# Patient Record
Sex: FEMALE | Race: OTHER | URBAN - METROPOLITAN AREA
[De-identification: names, ages, dates, MRNs, and addresses within clinical notes are randomized per-mention and may not be internally consistent; named-entity substitution may affect disease eponyms.]

---

## 2019-06-01 ENCOUNTER — OUTPATIENT (OUTPATIENT)
Dept: OUTPATIENT SERVICES | Facility: HOSPITAL | Age: 39
LOS: 1 days | End: 2019-06-01
Payer: MEDICAID

## 2019-06-01 PROCEDURE — G9001: CPT

## 2019-06-14 DIAGNOSIS — Z71.89 OTHER SPECIFIED COUNSELING: ICD-10-CM

## 2023-05-23 ENCOUNTER — INPATIENT (INPATIENT)
Facility: HOSPITAL | Age: 43
LOS: 54 days | Discharge: HOME HEALTH | DRG: 5 | End: 2023-07-17
Attending: INTERNAL MEDICINE | Admitting: INTERNAL MEDICINE
Payer: MEDICAID

## 2023-05-23 VITALS
OXYGEN SATURATION: 98 % | TEMPERATURE: 101 F | SYSTOLIC BLOOD PRESSURE: 141 MMHG | DIASTOLIC BLOOD PRESSURE: 87 MMHG | HEART RATE: 141 BPM | HEIGHT: 68 IN | WEIGHT: 207.9 LBS | RESPIRATION RATE: 28 BRPM

## 2023-05-23 DIAGNOSIS — Z78.1 PHYSICAL RESTRAINT STATUS: ICD-10-CM

## 2023-05-23 DIAGNOSIS — J10.01 INFLUENZA DUE TO OTHER IDENTIFIED INFLUENZA VIRUS WITH THE SAME OTHER IDENTIFIED INFLUENZA VIRUS PNEUMONIA: ICD-10-CM

## 2023-05-23 DIAGNOSIS — E87.0 HYPEROSMOLALITY AND HYPERNATREMIA: ICD-10-CM

## 2023-05-23 DIAGNOSIS — R45.1 RESTLESSNESS AND AGITATION: ICD-10-CM

## 2023-05-23 DIAGNOSIS — Z28.310 UNVACCINATED FOR COVID-19: ICD-10-CM

## 2023-05-23 DIAGNOSIS — Z72.0 TOBACCO USE: ICD-10-CM

## 2023-05-23 DIAGNOSIS — J15.211 PNEUMONIA DUE TO METHICILLIN SUSCEPTIBLE STAPHYLOCOCCUS AUREUS: ICD-10-CM

## 2023-05-23 DIAGNOSIS — I10 ESSENTIAL (PRIMARY) HYPERTENSION: ICD-10-CM

## 2023-05-23 DIAGNOSIS — J45.909 UNSPECIFIED ASTHMA, UNCOMPLICATED: ICD-10-CM

## 2023-05-23 DIAGNOSIS — Z28.39 OTHER UNDERIMMUNIZATION STATUS: ICD-10-CM

## 2023-05-23 DIAGNOSIS — J96.01 ACUTE RESPIRATORY FAILURE WITH HYPOXIA: ICD-10-CM

## 2023-05-23 DIAGNOSIS — D69.6 THROMBOCYTOPENIA, UNSPECIFIED: ICD-10-CM

## 2023-05-23 DIAGNOSIS — R65.21 SEVERE SEPSIS WITH SEPTIC SHOCK: ICD-10-CM

## 2023-05-23 DIAGNOSIS — R74.01 ELEVATION OF LEVELS OF LIVER TRANSAMINASE LEVELS: ICD-10-CM

## 2023-05-23 DIAGNOSIS — F41.9 ANXIETY DISORDER, UNSPECIFIED: ICD-10-CM

## 2023-05-23 DIAGNOSIS — E87.1 HYPO-OSMOLALITY AND HYPONATREMIA: ICD-10-CM

## 2023-05-23 DIAGNOSIS — A41.01 SEPSIS DUE TO METHICILLIN SUSCEPTIBLE STAPHYLOCOCCUS AUREUS: ICD-10-CM

## 2023-05-23 DIAGNOSIS — E87.20 ACIDOSIS, UNSPECIFIED: ICD-10-CM

## 2023-05-23 DIAGNOSIS — J18.9 PNEUMONIA, UNSPECIFIED ORGANISM: ICD-10-CM

## 2023-05-23 DIAGNOSIS — D63.8 ANEMIA IN OTHER CHRONIC DISEASES CLASSIFIED ELSEWHERE: ICD-10-CM

## 2023-05-23 DIAGNOSIS — N17.9 ACUTE KIDNEY FAILURE, UNSPECIFIED: ICD-10-CM

## 2023-05-23 DIAGNOSIS — E87.5 HYPERKALEMIA: ICD-10-CM

## 2023-05-23 DIAGNOSIS — R04.2 HEMOPTYSIS: ICD-10-CM

## 2023-05-23 LAB
ALBUMIN SERPL ELPH-MCNC: 4.5 G/DL — SIGNIFICANT CHANGE UP (ref 3.5–5.2)
ALP SERPL-CCNC: 125 U/L — HIGH (ref 30–115)
ALT FLD-CCNC: 27 U/L — SIGNIFICANT CHANGE UP (ref 0–41)
ANION GAP SERPL CALC-SCNC: 17 MMOL/L — HIGH (ref 7–14)
ANION GAP SERPL CALC-SCNC: 23 MMOL/L — HIGH (ref 7–14)
ANISOCYTOSIS BLD QL: SIGNIFICANT CHANGE UP
APTT BLD: 31 SEC — SIGNIFICANT CHANGE UP (ref 27–39.2)
AST SERPL-CCNC: 62 U/L — HIGH (ref 0–41)
BASE EXCESS BLDV CALC-SCNC: -5.9 MMOL/L — LOW (ref -2–3)
BASOPHILS # BLD AUTO: 0 K/UL — SIGNIFICANT CHANGE UP (ref 0–0.2)
BASOPHILS NFR BLD AUTO: 0 % — SIGNIFICANT CHANGE UP (ref 0–1)
BILIRUB SERPL-MCNC: 0.3 MG/DL — SIGNIFICANT CHANGE UP (ref 0.2–1.2)
BLD GP AB SCN SERPL QL: SIGNIFICANT CHANGE UP
BUN SERPL-MCNC: 23 MG/DL — HIGH (ref 10–20)
BUN SERPL-MCNC: 24 MG/DL — HIGH (ref 10–20)
CA-I SERPL-SCNC: 1.21 MMOL/L — SIGNIFICANT CHANGE UP (ref 1.15–1.33)
CALCIUM SERPL-MCNC: 8.7 MG/DL — SIGNIFICANT CHANGE UP (ref 8.4–10.5)
CALCIUM SERPL-MCNC: 9.5 MG/DL — SIGNIFICANT CHANGE UP (ref 8.4–10.5)
CHLORIDE SERPL-SCNC: 97 MMOL/L — LOW (ref 98–110)
CHLORIDE SERPL-SCNC: 98 MMOL/L — SIGNIFICANT CHANGE UP (ref 98–110)
CO2 SERPL-SCNC: 16 MMOL/L — LOW (ref 17–32)
CO2 SERPL-SCNC: 19 MMOL/L — SIGNIFICANT CHANGE UP (ref 17–32)
CREAT SERPL-MCNC: 1.9 MG/DL — HIGH (ref 0.7–1.5)
CREAT SERPL-MCNC: 2 MG/DL — HIGH (ref 0.7–1.5)
EGFR: 31 ML/MIN/1.73M2 — LOW
EGFR: 33 ML/MIN/1.73M2 — LOW
ELLIPTOCYTES BLD QL SMEAR: SLIGHT — SIGNIFICANT CHANGE UP
EOSINOPHIL # BLD AUTO: 0 K/UL — SIGNIFICANT CHANGE UP (ref 0–0.7)
EOSINOPHIL NFR BLD AUTO: 0 % — SIGNIFICANT CHANGE UP (ref 0–8)
FLUAV AG NPH QL: SIGNIFICANT CHANGE UP
FLUBV AG NPH QL: DETECTED
GAS PNL BLDV: 132 MMOL/L — LOW (ref 136–145)
GAS PNL BLDV: SIGNIFICANT CHANGE UP
GAS PNL BLDV: SIGNIFICANT CHANGE UP
GIANT PLATELETS BLD QL SMEAR: PRESENT — SIGNIFICANT CHANGE UP
GLUCOSE SERPL-MCNC: 129 MG/DL — HIGH (ref 70–99)
GLUCOSE SERPL-MCNC: 155 MG/DL — HIGH (ref 70–99)
HCG SERPL QL: NEGATIVE — SIGNIFICANT CHANGE UP
HCO3 BLDV-SCNC: 20 MMOL/L — LOW (ref 22–29)
HCT VFR BLD CALC: 39.1 % — SIGNIFICANT CHANGE UP (ref 37–47)
HCT VFR BLDA CALC: 35 % — LOW (ref 39–51)
HGB BLD CALC-MCNC: 11.7 G/DL — LOW (ref 12.6–17.4)
HGB BLD-MCNC: 11.4 G/DL — LOW (ref 12–16)
HYPOCHROMIA BLD QL: SIGNIFICANT CHANGE UP
INR BLD: 1.21 RATIO — SIGNIFICANT CHANGE UP (ref 0.65–1.3)
LACTATE BLDV-MCNC: 4.4 MMOL/L — CRITICAL HIGH (ref 0.5–2)
LACTATE SERPL-SCNC: 3.9 MMOL/L — HIGH (ref 0.7–2)
LACTATE SERPL-SCNC: 4.9 MMOL/L — CRITICAL HIGH (ref 0.7–2)
LYMPHOCYTES # BLD AUTO: 0 % — LOW (ref 20.5–51.1)
LYMPHOCYTES # BLD AUTO: 0 K/UL — LOW (ref 1.2–3.4)
MAGNESIUM SERPL-MCNC: 1.6 MG/DL — LOW (ref 1.8–2.4)
MANUAL SMEAR VERIFICATION: SIGNIFICANT CHANGE UP
MCHC RBC-ENTMCNC: 18 PG — LOW (ref 27–31)
MCHC RBC-ENTMCNC: 29.2 G/DL — LOW (ref 32–37)
MCV RBC AUTO: 61.6 FL — LOW (ref 81–99)
MICROCYTES BLD QL: SIGNIFICANT CHANGE UP
MONOCYTES # BLD AUTO: 0.59 K/UL — SIGNIFICANT CHANGE UP (ref 0.1–0.6)
MONOCYTES NFR BLD AUTO: 3.5 % — SIGNIFICANT CHANGE UP (ref 1.7–9.3)
NEUTROPHILS # BLD AUTO: 16.13 K/UL — HIGH (ref 1.4–6.5)
NEUTROPHILS NFR BLD AUTO: 84.3 % — HIGH (ref 42.2–75.2)
NEUTS BAND # BLD: 12.2 % — HIGH (ref 0–6)
NRBC # BLD: 2 /100 — HIGH (ref 0–0)
NRBC # BLD: SIGNIFICANT CHANGE UP /100 WBCS (ref 0–0)
PCO2 BLDV: 41 MMHG — SIGNIFICANT CHANGE UP (ref 39–42)
PH BLDV: 7.3 — LOW (ref 7.32–7.43)
PLAT MORPH BLD: NORMAL — SIGNIFICANT CHANGE UP
PLATELET # BLD AUTO: 253 K/UL — SIGNIFICANT CHANGE UP (ref 130–400)
PMV BLD: SIGNIFICANT CHANGE UP (ref 7.4–10.4)
PO2 BLDV: 28 MMHG — SIGNIFICANT CHANGE UP
POIKILOCYTOSIS BLD QL AUTO: SIGNIFICANT CHANGE UP
POLYCHROMASIA BLD QL SMEAR: SIGNIFICANT CHANGE UP
POTASSIUM BLDV-SCNC: 2.9 MMOL/L — CRITICAL LOW (ref 3.5–5.1)
POTASSIUM SERPL-MCNC: 2.9 MMOL/L — LOW (ref 3.5–5)
POTASSIUM SERPL-MCNC: 3.3 MMOL/L — LOW (ref 3.5–5)
POTASSIUM SERPL-SCNC: 2.9 MMOL/L — LOW (ref 3.5–5)
POTASSIUM SERPL-SCNC: 3.3 MMOL/L — LOW (ref 3.5–5)
PROT SERPL-MCNC: 7.7 G/DL — SIGNIFICANT CHANGE UP (ref 6–8)
PROTHROM AB SERPL-ACNC: 13.9 SEC — HIGH (ref 9.95–12.87)
RBC # BLD: 6.35 M/UL — HIGH (ref 4.2–5.4)
RBC # FLD: 21.7 % — HIGH (ref 11.5–14.5)
RBC BLD AUTO: ABNORMAL
RSV RNA NPH QL NAA+NON-PROBE: SIGNIFICANT CHANGE UP
SAO2 % BLDV: 34.5 % — SIGNIFICANT CHANGE UP
SARS-COV-2 RNA SPEC QL NAA+PROBE: SIGNIFICANT CHANGE UP
SODIUM SERPL-SCNC: 134 MMOL/L — LOW (ref 135–146)
SODIUM SERPL-SCNC: 136 MMOL/L — SIGNIFICANT CHANGE UP (ref 135–146)
TARGETS BLD QL SMEAR: SLIGHT — SIGNIFICANT CHANGE UP
TROPONIN T SERPL-MCNC: <0.01 NG/ML — SIGNIFICANT CHANGE UP
WBC # BLD: 16.72 K/UL — HIGH (ref 4.8–10.8)
WBC # FLD AUTO: 16.72 K/UL — HIGH (ref 4.8–10.8)

## 2023-05-23 PROCEDURE — 85730 THROMBOPLASTIN TIME PARTIAL: CPT

## 2023-05-23 PROCEDURE — 86900 BLOOD TYPING SEROLOGIC ABO: CPT

## 2023-05-23 PROCEDURE — 70450 CT HEAD/BRAIN W/O DYE: CPT

## 2023-05-23 PROCEDURE — 84100 ASSAY OF PHOSPHORUS: CPT

## 2023-05-23 PROCEDURE — 86147 CARDIOLIPIN ANTIBODY EA IG: CPT

## 2023-05-23 PROCEDURE — 86922 COMPATIBILITY TEST ANTIGLOB: CPT

## 2023-05-23 PROCEDURE — 71045 X-RAY EXAM CHEST 1 VIEW: CPT

## 2023-05-23 PROCEDURE — 80048 BASIC METABOLIC PNL TOTAL CA: CPT

## 2023-05-23 PROCEDURE — 87077 CULTURE AEROBIC IDENTIFY: CPT

## 2023-05-23 PROCEDURE — 87205 SMEAR GRAM STAIN: CPT

## 2023-05-23 PROCEDURE — 84295 ASSAY OF SERUM SODIUM: CPT

## 2023-05-23 PROCEDURE — 80074 ACUTE HEPATITIS PANEL: CPT

## 2023-05-23 PROCEDURE — 94002 VENT MGMT INPAT INIT DAY: CPT

## 2023-05-23 PROCEDURE — 85045 AUTOMATED RETICULOCYTE COUNT: CPT

## 2023-05-23 PROCEDURE — 93312 ECHO TRANSESOPHAGEAL: CPT

## 2023-05-23 PROCEDURE — 36430 TRANSFUSION BLD/BLD COMPNT: CPT

## 2023-05-23 PROCEDURE — P9040: CPT

## 2023-05-23 PROCEDURE — 97116 GAIT TRAINING THERAPY: CPT | Mod: GP

## 2023-05-23 PROCEDURE — 86022 PLATELET ANTIBODIES: CPT

## 2023-05-23 PROCEDURE — 87186 SC STD MICRODIL/AGAR DIL: CPT

## 2023-05-23 PROCEDURE — 82550 ASSAY OF CK (CPK): CPT

## 2023-05-23 PROCEDURE — 80361 OPIATES 1 OR MORE: CPT

## 2023-05-23 PROCEDURE — 80053 COMPREHEN METABOLIC PANEL: CPT

## 2023-05-23 PROCEDURE — 83735 ASSAY OF MAGNESIUM: CPT

## 2023-05-23 PROCEDURE — 82553 CREATINE MB FRACTION: CPT

## 2023-05-23 PROCEDURE — 94760 N-INVAS EAR/PLS OXIMETRY 1: CPT

## 2023-05-23 PROCEDURE — 87640 STAPH A DNA AMP PROBE: CPT

## 2023-05-23 PROCEDURE — 84478 ASSAY OF TRIGLYCERIDES: CPT

## 2023-05-23 PROCEDURE — 92611 MOTION FLUOROSCOPY/SWALLOW: CPT | Mod: GN

## 2023-05-23 PROCEDURE — 83935 ASSAY OF URINE OSMOLALITY: CPT

## 2023-05-23 PROCEDURE — 82570 ASSAY OF URINE CREATININE: CPT

## 2023-05-23 PROCEDURE — 87449 NOS EACH ORGANISM AG IA: CPT

## 2023-05-23 PROCEDURE — 86160 COMPLEMENT ANTIGEN: CPT

## 2023-05-23 PROCEDURE — 86901 BLOOD TYPING SEROLOGIC RH(D): CPT

## 2023-05-23 PROCEDURE — 86850 RBC ANTIBODY SCREEN: CPT

## 2023-05-23 PROCEDURE — 87529 HSV DNA AMP PROBE: CPT

## 2023-05-23 PROCEDURE — 89051 BODY FLUID CELL COUNT: CPT

## 2023-05-23 PROCEDURE — 93320 DOPPLER ECHO COMPLETE: CPT

## 2023-05-23 PROCEDURE — 93010 ELECTROCARDIOGRAM REPORT: CPT

## 2023-05-23 PROCEDURE — 71250 CT THORAX DX C-: CPT

## 2023-05-23 PROCEDURE — 94003 VENT MGMT INPAT SUBQ DAY: CPT

## 2023-05-23 PROCEDURE — 97112 NEUROMUSCULAR REEDUCATION: CPT | Mod: GO

## 2023-05-23 PROCEDURE — 71045 X-RAY EXAM CHEST 1 VIEW: CPT | Mod: 26

## 2023-05-23 PROCEDURE — 83605 ASSAY OF LACTIC ACID: CPT

## 2023-05-23 PROCEDURE — 85025 COMPLETE CBC W/AUTO DIFF WBC: CPT

## 2023-05-23 PROCEDURE — 93970 EXTREMITY STUDY: CPT

## 2023-05-23 PROCEDURE — 94640 AIRWAY INHALATION TREATMENT: CPT

## 2023-05-23 PROCEDURE — 84156 ASSAY OF PROTEIN URINE: CPT

## 2023-05-23 PROCEDURE — 92523 SPEECH SOUND LANG COMPREHEN: CPT | Mod: GN

## 2023-05-23 PROCEDURE — 85610 PROTHROMBIN TIME: CPT

## 2023-05-23 PROCEDURE — 86146 BETA-2 GLYCOPROTEIN ANTIBODY: CPT

## 2023-05-23 PROCEDURE — 80202 ASSAY OF VANCOMYCIN: CPT

## 2023-05-23 PROCEDURE — 84133 ASSAY OF URINE POTASSIUM: CPT

## 2023-05-23 PROCEDURE — 86255 FLUORESCENT ANTIBODY SCREEN: CPT

## 2023-05-23 PROCEDURE — 83036 HEMOGLOBIN GLYCOSYLATED A1C: CPT

## 2023-05-23 PROCEDURE — 92610 EVALUATE SWALLOWING FUNCTION: CPT | Mod: GN

## 2023-05-23 PROCEDURE — 80354 DRUG SCREENING FENTANYL: CPT

## 2023-05-23 PROCEDURE — 86923 COMPATIBILITY TEST ELECTRIC: CPT

## 2023-05-23 PROCEDURE — 84145 PROCALCITONIN (PCT): CPT

## 2023-05-23 PROCEDURE — 84480 ASSAY TRIIODOTHYRONINE (T3): CPT

## 2023-05-23 PROCEDURE — 97166 OT EVAL MOD COMPLEX 45 MIN: CPT | Mod: GO

## 2023-05-23 PROCEDURE — 81025 URINE PREGNANCY TEST: CPT

## 2023-05-23 PROCEDURE — 87305 ASPERGILLUS AG IA: CPT

## 2023-05-23 PROCEDURE — 71275 CT ANGIOGRAPHY CHEST: CPT | Mod: 26,MA

## 2023-05-23 PROCEDURE — 74018 RADEX ABDOMEN 1 VIEW: CPT

## 2023-05-23 PROCEDURE — 85027 COMPLETE CBC AUTOMATED: CPT

## 2023-05-23 PROCEDURE — 84132 ASSAY OF SERUM POTASSIUM: CPT

## 2023-05-23 PROCEDURE — 85018 HEMOGLOBIN: CPT

## 2023-05-23 PROCEDURE — 97535 SELF CARE MNGMENT TRAINING: CPT | Mod: GO

## 2023-05-23 PROCEDURE — 87641 MR-STAPH DNA AMP PROBE: CPT

## 2023-05-23 PROCEDURE — 76770 US EXAM ABDO BACK WALL COMP: CPT

## 2023-05-23 PROCEDURE — 36415 COLL VENOUS BLD VENIPUNCTURE: CPT

## 2023-05-23 PROCEDURE — 87798 DETECT AGENT NOS DNA AMP: CPT

## 2023-05-23 PROCEDURE — 87116 MYCOBACTERIA CULTURE: CPT

## 2023-05-23 PROCEDURE — 85384 FIBRINOGEN ACTIVITY: CPT

## 2023-05-23 PROCEDURE — 82330 ASSAY OF CALCIUM: CPT

## 2023-05-23 PROCEDURE — 99285 EMERGENCY DEPT VISIT HI MDM: CPT

## 2023-05-23 PROCEDURE — 82803 BLOOD GASES ANY COMBINATION: CPT

## 2023-05-23 PROCEDURE — 85014 HEMATOCRIT: CPT

## 2023-05-23 PROCEDURE — 93005 ELECTROCARDIOGRAM TRACING: CPT

## 2023-05-23 PROCEDURE — L8699: CPT

## 2023-05-23 PROCEDURE — 85379 FIBRIN DEGRADATION QUANT: CPT

## 2023-05-23 PROCEDURE — 93325 DOPPLER ECHO COLOR FLOW MAPG: CPT

## 2023-05-23 PROCEDURE — 84484 ASSAY OF TROPONIN QUANT: CPT

## 2023-05-23 PROCEDURE — 87075 CULTR BACTERIA EXCEPT BLOOD: CPT

## 2023-05-23 PROCEDURE — 83540 ASSAY OF IRON: CPT

## 2023-05-23 PROCEDURE — 80307 DRUG TEST PRSMV CHEM ANLYZR: CPT

## 2023-05-23 PROCEDURE — 84540 ASSAY OF URINE/UREA-N: CPT

## 2023-05-23 PROCEDURE — 87040 BLOOD CULTURE FOR BACTERIA: CPT

## 2023-05-23 PROCEDURE — 84300 ASSAY OF URINE SODIUM: CPT

## 2023-05-23 PROCEDURE — 83550 IRON BINDING TEST: CPT

## 2023-05-23 PROCEDURE — 97110 THERAPEUTIC EXERCISES: CPT | Mod: GP

## 2023-05-23 PROCEDURE — 87102 FUNGUS ISOLATION CULTURE: CPT

## 2023-05-23 PROCEDURE — 86038 ANTINUCLEAR ANTIBODIES: CPT

## 2023-05-23 PROCEDURE — 86480 TB TEST CELL IMMUN MEASURE: CPT

## 2023-05-23 PROCEDURE — 82784 ASSAY IGA/IGD/IGG/IGM EACH: CPT

## 2023-05-23 PROCEDURE — 92612 ENDOSCOPY SWALLOW (FEES) VID: CPT | Mod: GN

## 2023-05-23 PROCEDURE — 97163 PT EVAL HIGH COMPLEX 45 MIN: CPT | Mod: GP

## 2023-05-23 PROCEDURE — 86431 RHEUMATOID FACTOR QUANT: CPT

## 2023-05-23 PROCEDURE — 76705 ECHO EXAM OF ABDOMEN: CPT

## 2023-05-23 PROCEDURE — 97530 THERAPEUTIC ACTIVITIES: CPT | Mod: GO

## 2023-05-23 PROCEDURE — 82728 ASSAY OF FERRITIN: CPT

## 2023-05-23 PROCEDURE — 84439 ASSAY OF FREE THYROXINE: CPT

## 2023-05-23 PROCEDURE — 87206 SMEAR FLUORESCENT/ACID STAI: CPT

## 2023-05-23 PROCEDURE — 87899 AGENT NOS ASSAY W/OPTIC: CPT

## 2023-05-23 PROCEDURE — 80076 HEPATIC FUNCTION PANEL: CPT

## 2023-05-23 PROCEDURE — 87070 CULTURE OTHR SPECIMN AEROBIC: CPT

## 2023-05-23 PROCEDURE — P9016: CPT

## 2023-05-23 PROCEDURE — 74176 CT ABD & PELVIS W/O CONTRAST: CPT

## 2023-05-23 PROCEDURE — 82977 ASSAY OF GGT: CPT

## 2023-05-23 PROCEDURE — 87385 HISTOPLASMA CAPSUL AG IA: CPT

## 2023-05-23 PROCEDURE — 82962 GLUCOSE BLOOD TEST: CPT

## 2023-05-23 PROCEDURE — 83516 IMMUNOASSAY NONANTIBODY: CPT

## 2023-05-23 PROCEDURE — 87086 URINE CULTURE/COLONY COUNT: CPT

## 2023-05-23 PROCEDURE — 81001 URINALYSIS AUTO W/SCOPE: CPT

## 2023-05-23 PROCEDURE — 87389 HIV-1 AG W/HIV-1&-2 AB AG IA: CPT

## 2023-05-23 PROCEDURE — 87015 SPECIMEN INFECT AGNT CONCNTJ: CPT

## 2023-05-23 PROCEDURE — 86225 DNA ANTIBODY NATIVE: CPT

## 2023-05-23 PROCEDURE — 93306 TTE W/DOPPLER COMPLETE: CPT

## 2023-05-23 PROCEDURE — 92526 ORAL FUNCTION THERAPY: CPT | Mod: GN

## 2023-05-23 PROCEDURE — C1769: CPT

## 2023-05-23 RX ORDER — POTASSIUM CHLORIDE 20 MEQ
20 PACKET (EA) ORAL ONCE
Refills: 0 | Status: COMPLETED | OUTPATIENT
Start: 2023-05-23 | End: 2023-05-23

## 2023-05-23 RX ORDER — GUAIFENESIN/DEXTROMETHORPHAN 600MG-30MG
10 TABLET, EXTENDED RELEASE 12 HR ORAL ONCE
Refills: 0 | Status: COMPLETED | OUTPATIENT
Start: 2023-05-23 | End: 2023-05-23

## 2023-05-23 RX ORDER — MAGNESIUM SULFATE 500 MG/ML
2 VIAL (ML) INJECTION ONCE
Refills: 0 | Status: COMPLETED | OUTPATIENT
Start: 2023-05-23 | End: 2023-05-23

## 2023-05-23 RX ORDER — CEFTRIAXONE 500 MG/1
1000 INJECTION, POWDER, FOR SOLUTION INTRAMUSCULAR; INTRAVENOUS ONCE
Refills: 0 | Status: COMPLETED | OUTPATIENT
Start: 2023-05-23 | End: 2023-05-23

## 2023-05-23 RX ORDER — SODIUM CHLORIDE 9 MG/ML
2000 INJECTION, SOLUTION INTRAVENOUS ONCE
Refills: 0 | Status: COMPLETED | OUTPATIENT
Start: 2023-05-23 | End: 2023-05-23

## 2023-05-23 RX ORDER — SODIUM CHLORIDE 9 MG/ML
1000 INJECTION, SOLUTION INTRAVENOUS ONCE
Refills: 0 | Status: COMPLETED | OUTPATIENT
Start: 2023-05-23 | End: 2023-05-23

## 2023-05-23 RX ORDER — ACETAMINOPHEN 500 MG
975 TABLET ORAL ONCE
Refills: 0 | Status: COMPLETED | OUTPATIENT
Start: 2023-05-23 | End: 2023-05-23

## 2023-05-23 RX ORDER — MORPHINE SULFATE 50 MG/1
2 CAPSULE, EXTENDED RELEASE ORAL ONCE
Refills: 0 | Status: DISCONTINUED | OUTPATIENT
Start: 2023-05-23 | End: 2023-05-23

## 2023-05-23 RX ORDER — MEROPENEM 1 G/30ML
1000 INJECTION INTRAVENOUS ONCE
Refills: 0 | Status: COMPLETED | OUTPATIENT
Start: 2023-05-23 | End: 2023-05-23

## 2023-05-23 RX ORDER — AZITHROMYCIN 500 MG/1
500 TABLET, FILM COATED ORAL ONCE
Refills: 0 | Status: DISCONTINUED | OUTPATIENT
Start: 2023-05-23 | End: 2023-05-23

## 2023-05-23 RX ADMIN — MORPHINE SULFATE 2 MILLIGRAM(S): 50 CAPSULE, EXTENDED RELEASE ORAL at 21:14

## 2023-05-23 RX ADMIN — SODIUM CHLORIDE 1000 MILLILITER(S): 9 INJECTION, SOLUTION INTRAVENOUS at 23:24

## 2023-05-23 RX ADMIN — Medication 975 MILLIGRAM(S): at 21:14

## 2023-05-23 RX ADMIN — CEFTRIAXONE 100 MILLIGRAM(S): 500 INJECTION, POWDER, FOR SOLUTION INTRAMUSCULAR; INTRAVENOUS at 22:14

## 2023-05-23 RX ADMIN — MORPHINE SULFATE 2 MILLIGRAM(S): 50 CAPSULE, EXTENDED RELEASE ORAL at 21:44

## 2023-05-23 RX ADMIN — MEROPENEM 100 MILLIGRAM(S): 1 INJECTION INTRAVENOUS at 23:34

## 2023-05-23 RX ADMIN — SODIUM CHLORIDE 2000 MILLILITER(S): 9 INJECTION, SOLUTION INTRAVENOUS at 22:16

## 2023-05-23 RX ADMIN — Medication 25 GRAM(S): at 22:14

## 2023-05-23 RX ADMIN — Medication 50 MILLIEQUIVALENT(S): at 22:15

## 2023-05-23 RX ADMIN — Medication 10 MILLILITER(S): at 22:15

## 2023-05-23 NOTE — ED PROVIDER NOTE - OBJECTIVE STATEMENT
42 year-old female with no significant past medical history presents with complaint of cough x3 days.  Patient states cough has been progressively worsening over the past 3 days, endorses hemoptysis since yesterday.  States she has had subjective fevers with chills for the past 2 days .  Endorses chest pain worse with inspiration.  Denies history of recent travel, exertional chest pain, abdominal pain, nausea/vomiting/diarrhea, urinary symptoms, numbness/tingling, weakness/dizziness.

## 2023-05-23 NOTE — ED PROVIDER NOTE - NS ED ROS FT
Constitutional: (+) fever, (+) chills  Eyes: (-) visual changes  ENT: (-) nasal congestion  Cardiovascular: (+) chest pain, (-) syncope  Respiratory: (+) cough, (+) shortness of breath, (-) dyspnea,   Gastrointestinal: (-) vomiting, (-) diarrhea, (-)nausea,  Musculoskeletal: (-) neck pain, (-) back pain, (-) joint pain,  Integumentary: (-) rash, (-) edema, (-) bruises  Neurological: (-) headache, (-) loc, (-) dizziness, (-) tingling, (-)numbness,  Peripheral Vascular: (-) leg swelling  :  (-)dysuria,  (-) hematuria  Allergic/Immunologic: (-) pruritus

## 2023-05-23 NOTE — ED PROVIDER NOTE - CARE PLAN
1 Principal Discharge DX:	Pneumonia  Secondary Diagnosis:	Hemoptysis  Secondary Diagnosis:	Bandemia

## 2023-05-23 NOTE — ED PROVIDER NOTE - PHYSICAL EXAMINATION
Physical Exam    Vital Signs: I have reviewed the initial vital signs.  Constitutional: appears stated age, no acute distress  Eyes: Sclera clear, EOMI.  Cardiovascular: S1 and S2, regular rate, regular rhythm, well-perfused extremities, radial pulses equal and 2+, pedal pulses 2+ and equal  Respiratory: mildly increased work of braething, mild rhonchi bilaterally  Gastrointestinal: soft, non-tender abdomen, no pulsatile mass, normal bowl sounds  Musculoskeletal: supple neck, no lower extremity edema, no midline tenderness  Integumentary: warm, dry, no rash  Neurologic: awake, alert, oriented x3, extremities’ motor and sensory functions grossly intact

## 2023-05-23 NOTE — ED PROVIDER NOTE - NS ED ATTENDING STATEMENT MOD
This was a shared visit with the KRYSTA. I reviewed and verified the documentation and independently performed the documented:

## 2023-05-23 NOTE — ED PROVIDER NOTE - ATTENDING APP SHARED VISIT CONTRIBUTION OF CARE
42-year-old female no past medical history presents with 1 episodes hemoptysis chest pain shortness of breath patient was desatting in triage no family history of blood clots or TB positive sick contacts coughing up blood, NAD, non toxic. NCAT PERRLA EOMI neck supple non tender normal wob cta bl rrr abdomen s nt nd no rebound no guarding WWPx4 neuro non focal.  PE work-up rule out PE versus acute bronchitis EKG reviewed new right bundle.

## 2023-05-24 LAB
ALBUMIN SERPL ELPH-MCNC: 3.4 G/DL — LOW (ref 3.5–5.2)
ALP SERPL-CCNC: 87 U/L — SIGNIFICANT CHANGE UP (ref 30–115)
ALT FLD-CCNC: 23 U/L — SIGNIFICANT CHANGE UP (ref 0–41)
ANION GAP SERPL CALC-SCNC: 13 MMOL/L — SIGNIFICANT CHANGE UP (ref 7–14)
ANION GAP SERPL CALC-SCNC: 17 MMOL/L — HIGH (ref 7–14)
ANION GAP SERPL CALC-SCNC: 17 MMOL/L — HIGH (ref 7–14)
APPEARANCE UR: ABNORMAL
AST SERPL-CCNC: 54 U/L — HIGH (ref 0–41)
BACTERIA # UR AUTO: NEGATIVE — SIGNIFICANT CHANGE UP
BASE EXCESS BLDA CALC-SCNC: -3.8 MMOL/L — LOW (ref -2–3)
BASOPHILS # BLD AUTO: 0.01 K/UL — SIGNIFICANT CHANGE UP (ref 0–0.2)
BASOPHILS NFR BLD AUTO: 0.3 % — SIGNIFICANT CHANGE UP (ref 0–1)
BILIRUB SERPL-MCNC: 0.2 MG/DL — SIGNIFICANT CHANGE UP (ref 0.2–1.2)
BILIRUB UR-MCNC: NEGATIVE — SIGNIFICANT CHANGE UP
BUN SERPL-MCNC: 31 MG/DL — HIGH (ref 10–20)
BUN SERPL-MCNC: 35 MG/DL — HIGH (ref 10–20)
BUN SERPL-MCNC: 38 MG/DL — HIGH (ref 10–20)
CALCIUM SERPL-MCNC: 7.8 MG/DL — LOW (ref 8.4–10.4)
CALCIUM SERPL-MCNC: 8.2 MG/DL — LOW (ref 8.4–10.5)
CALCIUM SERPL-MCNC: 8.4 MG/DL — SIGNIFICANT CHANGE UP (ref 8.4–10.4)
CHLORIDE SERPL-SCNC: 96 MMOL/L — LOW (ref 98–110)
CHLORIDE SERPL-SCNC: 97 MMOL/L — LOW (ref 98–110)
CHLORIDE SERPL-SCNC: 99 MMOL/L — SIGNIFICANT CHANGE UP (ref 98–110)
CK MB CFR SERPL CALC: 13.7 NG/ML — HIGH (ref 0.6–6.3)
CK SERPL-CCNC: 1112 U/L — HIGH (ref 0–225)
CK SERPL-CCNC: 1483 U/L — HIGH (ref 0–225)
CO2 SERPL-SCNC: 16 MMOL/L — LOW (ref 17–32)
CO2 SERPL-SCNC: 18 MMOL/L — SIGNIFICANT CHANGE UP (ref 17–32)
CO2 SERPL-SCNC: 19 MMOL/L — SIGNIFICANT CHANGE UP (ref 17–32)
COLOR SPEC: YELLOW — SIGNIFICANT CHANGE UP
CREAT SERPL-MCNC: 2.6 MG/DL — HIGH (ref 0.7–1.5)
CREAT SERPL-MCNC: 2.8 MG/DL — HIGH (ref 0.7–1.5)
CREAT SERPL-MCNC: 3 MG/DL — HIGH (ref 0.7–1.5)
DIFF PNL FLD: ABNORMAL
EGFR: 19 ML/MIN/1.73M2 — LOW
EGFR: 21 ML/MIN/1.73M2 — LOW
EGFR: 23 ML/MIN/1.73M2 — LOW
EOSINOPHIL # BLD AUTO: 0 K/UL — SIGNIFICANT CHANGE UP (ref 0–0.7)
EOSINOPHIL NFR BLD AUTO: 0 % — SIGNIFICANT CHANGE UP (ref 0–8)
EPI CELLS # UR: 5 /HPF — SIGNIFICANT CHANGE UP (ref 0–5)
GAS PNL BLDA: SIGNIFICANT CHANGE UP
GAS PNL BLDA: SIGNIFICANT CHANGE UP
GLUCOSE BLDC GLUCOMTR-MCNC: 124 MG/DL — HIGH (ref 70–99)
GLUCOSE BLDC GLUCOMTR-MCNC: 136 MG/DL — HIGH (ref 70–99)
GLUCOSE BLDC GLUCOMTR-MCNC: 216 MG/DL — HIGH (ref 70–99)
GLUCOSE SERPL-MCNC: 103 MG/DL — HIGH (ref 70–99)
GLUCOSE SERPL-MCNC: 109 MG/DL — HIGH (ref 70–99)
GLUCOSE SERPL-MCNC: 185 MG/DL — HIGH (ref 70–99)
GLUCOSE UR QL: NEGATIVE — SIGNIFICANT CHANGE UP
GRAM STN FLD: SIGNIFICANT CHANGE UP
HCO3 BLDA-SCNC: 22 MMOL/L — SIGNIFICANT CHANGE UP (ref 21–28)
HCT VFR BLD CALC: 34.8 % — LOW (ref 37–47)
HGB BLD-MCNC: 10.1 G/DL — LOW (ref 12–16)
HOROWITZ INDEX BLDA+IHG-RTO: 100 — SIGNIFICANT CHANGE UP
HYALINE CASTS # UR AUTO: 13 /LPF — HIGH (ref 0–7)
IMM GRANULOCYTES NFR BLD AUTO: 0.5 % — HIGH (ref 0.1–0.3)
KETONES UR-MCNC: SIGNIFICANT CHANGE UP
LACTATE SERPL-SCNC: 1.9 MMOL/L — SIGNIFICANT CHANGE UP (ref 0.7–2)
LEUKOCYTE ESTERASE UR-ACNC: NEGATIVE — SIGNIFICANT CHANGE UP
LYMPHOCYTES # BLD AUTO: 0.27 K/UL — LOW (ref 1.2–3.4)
LYMPHOCYTES # BLD AUTO: 6.9 % — LOW (ref 20.5–51.1)
MAGNESIUM SERPL-MCNC: 1.8 MG/DL — SIGNIFICANT CHANGE UP (ref 1.8–2.4)
MCHC RBC-ENTMCNC: 17.8 PG — LOW (ref 27–31)
MCHC RBC-ENTMCNC: 29 G/DL — LOW (ref 32–37)
MCV RBC AUTO: 61.5 FL — LOW (ref 81–99)
METHOD TYPE: SIGNIFICANT CHANGE UP
MONOCYTES # BLD AUTO: 0.16 K/UL — SIGNIFICANT CHANGE UP (ref 0.1–0.6)
MONOCYTES NFR BLD AUTO: 4.1 % — SIGNIFICANT CHANGE UP (ref 1.7–9.3)
MRSA PCR RESULT.: POSITIVE
MRSA SPEC QL CULT: SIGNIFICANT CHANGE UP
NEUTROPHILS # BLD AUTO: 3.46 K/UL — SIGNIFICANT CHANGE UP (ref 1.4–6.5)
NEUTROPHILS NFR BLD AUTO: 88.2 % — HIGH (ref 42.2–75.2)
NITRITE UR-MCNC: NEGATIVE — SIGNIFICANT CHANGE UP
NRBC # BLD: 0 /100 WBCS — SIGNIFICANT CHANGE UP (ref 0–0)
OSMOLALITY UR: 340 MOS/KG — SIGNIFICANT CHANGE UP (ref 50–1200)
PCO2 BLDA: 39 MMHG — SIGNIFICANT CHANGE UP (ref 25–48)
PH BLDA: 7.35 — SIGNIFICANT CHANGE UP (ref 7.35–7.45)
PH UR: 5.5 — SIGNIFICANT CHANGE UP (ref 5–8)
PLATELET # BLD AUTO: 203 K/UL — SIGNIFICANT CHANGE UP (ref 130–400)
PMV BLD: SIGNIFICANT CHANGE UP (ref 7.4–10.4)
PO2 BLDA: 417 MMHG — HIGH (ref 83–108)
POTASSIUM SERPL-MCNC: 3.3 MMOL/L — LOW (ref 3.5–5)
POTASSIUM SERPL-MCNC: 3.5 MMOL/L — SIGNIFICANT CHANGE UP (ref 3.5–5)
POTASSIUM SERPL-MCNC: 4.6 MMOL/L — SIGNIFICANT CHANGE UP (ref 3.5–5)
POTASSIUM SERPL-SCNC: 3.3 MMOL/L — LOW (ref 3.5–5)
POTASSIUM SERPL-SCNC: 3.5 MMOL/L — SIGNIFICANT CHANGE UP (ref 3.5–5)
POTASSIUM SERPL-SCNC: 4.6 MMOL/L — SIGNIFICANT CHANGE UP (ref 3.5–5)
POTASSIUM UR-SCNC: 50 MMOL/L — SIGNIFICANT CHANGE UP
PROCALCITONIN SERPL-MCNC: 48.3 NG/ML — HIGH (ref 0.02–0.1)
PROT SERPL-MCNC: 6.3 G/DL — SIGNIFICANT CHANGE UP (ref 6–8)
PROT UR-MCNC: ABNORMAL
RBC # BLD: 5.66 M/UL — HIGH (ref 4.2–5.4)
RBC # FLD: 21.2 % — HIGH (ref 11.5–14.5)
RBC CASTS # UR COMP ASSIST: 1 /HPF — SIGNIFICANT CHANGE UP (ref 0–4)
SAO2 % BLDA: 98.8 % — HIGH (ref 94–98)
SODIUM SERPL-SCNC: 128 MMOL/L — LOW (ref 135–146)
SODIUM SERPL-SCNC: 132 MMOL/L — LOW (ref 135–146)
SODIUM SERPL-SCNC: 132 MMOL/L — LOW (ref 135–146)
SODIUM UR-SCNC: <20 MMOL/L — SIGNIFICANT CHANGE UP
SP GR SPEC: 1.05 — HIGH (ref 1.01–1.03)
SPECIMEN SOURCE: SIGNIFICANT CHANGE UP
SPECIMEN SOURCE: SIGNIFICANT CHANGE UP
TROPONIN T SERPL-MCNC: <0.01 NG/ML — SIGNIFICANT CHANGE UP
UROBILINOGEN FLD QL: SIGNIFICANT CHANGE UP
WBC # BLD: 3.92 K/UL — LOW (ref 4.8–10.8)
WBC # FLD AUTO: 3.92 K/UL — LOW (ref 4.8–10.8)
WBC UR QL: 77 /HPF — HIGH (ref 0–5)

## 2023-05-24 PROCEDURE — 99291 CRITICAL CARE FIRST HOUR: CPT

## 2023-05-24 PROCEDURE — 76770 US EXAM ABDO BACK WALL COMP: CPT | Mod: 26

## 2023-05-24 PROCEDURE — 71045 X-RAY EXAM CHEST 1 VIEW: CPT | Mod: 26

## 2023-05-24 PROCEDURE — 93010 ELECTROCARDIOGRAM REPORT: CPT

## 2023-05-24 RX ORDER — MORPHINE SULFATE 50 MG/1
2 CAPSULE, EXTENDED RELEASE ORAL EVERY 6 HOURS
Refills: 0 | Status: DISCONTINUED | OUTPATIENT
Start: 2023-05-24 | End: 2023-05-24

## 2023-05-24 RX ORDER — SENNA PLUS 8.6 MG/1
2 TABLET ORAL AT BEDTIME
Refills: 0 | Status: DISCONTINUED | OUTPATIENT
Start: 2023-05-24 | End: 2023-06-10

## 2023-05-24 RX ORDER — ADENOSINE 3 MG/ML
6 INJECTION INTRAVENOUS ONCE
Refills: 0 | Status: COMPLETED | OUTPATIENT
Start: 2023-05-24 | End: 2023-05-24

## 2023-05-24 RX ORDER — ALBUTEROL 90 UG/1
4 AEROSOL, METERED ORAL EVERY 4 HOURS
Refills: 0 | Status: DISCONTINUED | OUTPATIENT
Start: 2023-05-24 | End: 2023-05-27

## 2023-05-24 RX ORDER — PIPERACILLIN AND TAZOBACTAM 4; .5 G/20ML; G/20ML
3.38 INJECTION, POWDER, LYOPHILIZED, FOR SOLUTION INTRAVENOUS EVERY 8 HOURS
Refills: 0 | Status: DISCONTINUED | OUTPATIENT
Start: 2023-05-24 | End: 2023-05-25

## 2023-05-24 RX ORDER — PIPERACILLIN AND TAZOBACTAM 4; .5 G/20ML; G/20ML
4.5 INJECTION, POWDER, LYOPHILIZED, FOR SOLUTION INTRAVENOUS ONCE
Refills: 0 | Status: COMPLETED | OUTPATIENT
Start: 2023-05-24 | End: 2023-05-24

## 2023-05-24 RX ORDER — IPRATROPIUM BROMIDE 0.2 MG/ML
4 SOLUTION, NON-ORAL INHALATION
Refills: 0 | Status: DISCONTINUED | OUTPATIENT
Start: 2023-05-24 | End: 2023-05-27

## 2023-05-24 RX ORDER — POTASSIUM CHLORIDE 20 MEQ
40 PACKET (EA) ORAL ONCE
Refills: 0 | Status: DISCONTINUED | OUTPATIENT
Start: 2023-05-24 | End: 2023-05-24

## 2023-05-24 RX ORDER — PANTOPRAZOLE SODIUM 20 MG/1
40 TABLET, DELAYED RELEASE ORAL DAILY
Refills: 0 | Status: DISCONTINUED | OUTPATIENT
Start: 2023-05-24 | End: 2023-06-27

## 2023-05-24 RX ORDER — MORPHINE SULFATE 50 MG/1
2 CAPSULE, EXTENDED RELEASE ORAL ONCE
Refills: 0 | Status: DISCONTINUED | OUTPATIENT
Start: 2023-05-24 | End: 2023-05-24

## 2023-05-24 RX ORDER — CHLORHEXIDINE GLUCONATE 213 G/1000ML
1 SOLUTION TOPICAL DAILY
Refills: 0 | Status: DISCONTINUED | OUTPATIENT
Start: 2023-05-24 | End: 2023-06-23

## 2023-05-24 RX ORDER — PROPOFOL 10 MG/ML
10 INJECTION, EMULSION INTRAVENOUS
Qty: 1000 | Refills: 0 | Status: DISCONTINUED | OUTPATIENT
Start: 2023-05-24 | End: 2023-05-25

## 2023-05-24 RX ORDER — BENZOCAINE 10 %
1 GEL (GRAM) MUCOUS MEMBRANE ONCE
Refills: 0 | Status: COMPLETED | OUTPATIENT
Start: 2023-05-24 | End: 2023-05-24

## 2023-05-24 RX ORDER — IPRATROPIUM/ALBUTEROL SULFATE 18-103MCG
3 AEROSOL WITH ADAPTER (GRAM) INHALATION EVERY 6 HOURS
Refills: 0 | Status: DISCONTINUED | OUTPATIENT
Start: 2023-05-24 | End: 2023-05-24

## 2023-05-24 RX ORDER — INSULIN LISPRO 100/ML
VIAL (ML) SUBCUTANEOUS
Refills: 0 | Status: DISCONTINUED | OUTPATIENT
Start: 2023-05-24 | End: 2023-06-17

## 2023-05-24 RX ORDER — SODIUM CHLORIDE 9 MG/ML
500 INJECTION, SOLUTION INTRAVENOUS ONCE
Refills: 0 | Status: COMPLETED | OUTPATIENT
Start: 2023-05-24 | End: 2023-05-24

## 2023-05-24 RX ORDER — ENOXAPARIN SODIUM 100 MG/ML
40 INJECTION SUBCUTANEOUS EVERY 24 HOURS
Refills: 0 | Status: DISCONTINUED | OUTPATIENT
Start: 2023-05-24 | End: 2023-05-24

## 2023-05-24 RX ORDER — HEPARIN SODIUM 5000 [USP'U]/ML
5000 INJECTION INTRAVENOUS; SUBCUTANEOUS EVERY 8 HOURS
Refills: 0 | Status: DISCONTINUED | OUTPATIENT
Start: 2023-05-24 | End: 2023-05-30

## 2023-05-24 RX ORDER — PIPERACILLIN AND TAZOBACTAM 4; .5 G/20ML; G/20ML
4.5 INJECTION, POWDER, LYOPHILIZED, FOR SOLUTION INTRAVENOUS EVERY 6 HOURS
Refills: 0 | Status: DISCONTINUED | OUTPATIENT
Start: 2023-05-24 | End: 2023-05-24

## 2023-05-24 RX ORDER — VANCOMYCIN HCL 1 G
2000 VIAL (EA) INTRAVENOUS EVERY 24 HOURS
Refills: 0 | Status: DISCONTINUED | OUTPATIENT
Start: 2023-05-24 | End: 2023-05-24

## 2023-05-24 RX ORDER — CHLORHEXIDINE GLUCONATE 213 G/1000ML
15 SOLUTION TOPICAL EVERY 12 HOURS
Refills: 0 | Status: DISCONTINUED | OUTPATIENT
Start: 2023-05-24 | End: 2023-05-30

## 2023-05-24 RX ORDER — ALBUTEROL 90 UG/1
2 AEROSOL, METERED ORAL EVERY 6 HOURS
Refills: 0 | Status: DISCONTINUED | OUTPATIENT
Start: 2023-05-24 | End: 2023-05-24

## 2023-05-24 RX ORDER — LINEZOLID 600 MG/300ML
600 INJECTION, SOLUTION INTRAVENOUS EVERY 12 HOURS
Refills: 0 | Status: DISCONTINUED | OUTPATIENT
Start: 2023-05-24 | End: 2023-05-30

## 2023-05-24 RX ORDER — POTASSIUM CHLORIDE 20 MEQ
20 PACKET (EA) ORAL ONCE
Refills: 0 | Status: DISCONTINUED | OUTPATIENT
Start: 2023-05-24 | End: 2023-05-24

## 2023-05-24 RX ORDER — ALBUTEROL 90 UG/1
4 AEROSOL, METERED ORAL EVERY 4 HOURS
Refills: 0 | Status: DISCONTINUED | OUTPATIENT
Start: 2023-05-24 | End: 2023-06-05

## 2023-05-24 RX ORDER — MAGNESIUM SULFATE 500 MG/ML
2 VIAL (ML) INJECTION ONCE
Refills: 0 | Status: COMPLETED | OUTPATIENT
Start: 2023-05-24 | End: 2023-05-24

## 2023-05-24 RX ORDER — FENTANYL CITRATE 50 UG/ML
0.5 INJECTION INTRAVENOUS
Qty: 2500 | Refills: 0 | Status: DISCONTINUED | OUTPATIENT
Start: 2023-05-24 | End: 2023-05-25

## 2023-05-24 RX ORDER — POTASSIUM CHLORIDE 20 MEQ
40 PACKET (EA) ORAL ONCE
Refills: 0 | Status: COMPLETED | OUTPATIENT
Start: 2023-05-24 | End: 2023-05-24

## 2023-05-24 RX ORDER — VANCOMYCIN HCL 1 G
VIAL (EA) INTRAVENOUS
Refills: 0 | Status: DISCONTINUED | OUTPATIENT
Start: 2023-05-24 | End: 2023-05-24

## 2023-05-24 RX ORDER — ALPRAZOLAM 0.25 MG
1 TABLET ORAL ONCE
Refills: 0 | Status: DISCONTINUED | OUTPATIENT
Start: 2023-05-24 | End: 2023-05-24

## 2023-05-24 RX ORDER — ACETAMINOPHEN 500 MG
650 TABLET ORAL EVERY 6 HOURS
Refills: 0 | Status: DISCONTINUED | OUTPATIENT
Start: 2023-05-24 | End: 2023-06-27

## 2023-05-24 RX ORDER — PIPERACILLIN AND TAZOBACTAM 4; .5 G/20ML; G/20ML
3.75 INJECTION, POWDER, LYOPHILIZED, FOR SOLUTION INTRAVENOUS EVERY 8 HOURS
Refills: 0 | Status: DISCONTINUED | OUTPATIENT
Start: 2023-05-24 | End: 2023-05-24

## 2023-05-24 RX ORDER — IPRATROPIUM BROMIDE 0.2 MG/ML
4 SOLUTION, NON-ORAL INHALATION
Refills: 0 | Status: DISCONTINUED | OUTPATIENT
Start: 2023-05-24 | End: 2023-06-05

## 2023-05-24 RX ORDER — SODIUM CHLORIDE 9 MG/ML
1000 INJECTION, SOLUTION INTRAVENOUS
Refills: 0 | Status: DISCONTINUED | OUTPATIENT
Start: 2023-05-24 | End: 2023-05-25

## 2023-05-24 RX ORDER — POLYETHYLENE GLYCOL 3350 17 G/17G
17 POWDER, FOR SOLUTION ORAL DAILY
Refills: 0 | Status: DISCONTINUED | OUTPATIENT
Start: 2023-05-24 | End: 2023-05-25

## 2023-05-24 RX ADMIN — CHLORHEXIDINE GLUCONATE 15 MILLILITER(S): 213 SOLUTION TOPICAL at 07:14

## 2023-05-24 RX ADMIN — Medication 40 MILLIGRAM(S): at 21:12

## 2023-05-24 RX ADMIN — ADENOSINE 6 MILLIGRAM(S): 3 INJECTION INTRAVENOUS at 04:07

## 2023-05-24 RX ADMIN — Medication 40 MILLIEQUIVALENT(S): at 14:01

## 2023-05-24 RX ADMIN — SENNA PLUS 2 TABLET(S): 8.6 TABLET ORAL at 21:11

## 2023-05-24 RX ADMIN — LINEZOLID 300 MILLIGRAM(S): 600 INJECTION, SOLUTION INTRAVENOUS at 17:52

## 2023-05-24 RX ADMIN — Medication 30 MILLIGRAM(S): at 11:40

## 2023-05-24 RX ADMIN — Medication 40 MILLIGRAM(S): at 02:25

## 2023-05-24 RX ADMIN — POLYETHYLENE GLYCOL 3350 17 GRAM(S): 17 POWDER, FOR SOLUTION ORAL at 11:44

## 2023-05-24 RX ADMIN — PANTOPRAZOLE SODIUM 40 MILLIGRAM(S): 20 TABLET, DELAYED RELEASE ORAL at 11:44

## 2023-05-24 RX ADMIN — SODIUM CHLORIDE 100 MILLILITER(S): 9 INJECTION, SOLUTION INTRAVENOUS at 01:41

## 2023-05-24 RX ADMIN — HEPARIN SODIUM 5000 UNIT(S): 5000 INJECTION INTRAVENOUS; SUBCUTANEOUS at 14:00

## 2023-05-24 RX ADMIN — Medication 25 GRAM(S): at 14:02

## 2023-05-24 RX ADMIN — MORPHINE SULFATE 2 MILLIGRAM(S): 50 CAPSULE, EXTENDED RELEASE ORAL at 02:07

## 2023-05-24 RX ADMIN — PIPERACILLIN AND TAZOBACTAM 25 GRAM(S): 4; .5 INJECTION, POWDER, LYOPHILIZED, FOR SOLUTION INTRAVENOUS at 14:12

## 2023-05-24 RX ADMIN — FENTANYL CITRATE 4.72 MICROGRAM(S)/KG/HR: 50 INJECTION INTRAVENOUS at 21:25

## 2023-05-24 RX ADMIN — Medication 650 MILLIGRAM(S): at 12:14

## 2023-05-24 RX ADMIN — CHLORHEXIDINE GLUCONATE 15 MILLILITER(S): 213 SOLUTION TOPICAL at 17:52

## 2023-05-24 RX ADMIN — Medication 1 MILLIGRAM(S): at 02:26

## 2023-05-24 RX ADMIN — HEPARIN SODIUM 5000 UNIT(S): 5000 INJECTION INTRAVENOUS; SUBCUTANEOUS at 21:12

## 2023-05-24 RX ADMIN — Medication 650 MILLIGRAM(S): at 18:37

## 2023-05-24 RX ADMIN — SODIUM CHLORIDE 500 MILLILITER(S): 9 INJECTION, SOLUTION INTRAVENOUS at 21:35

## 2023-05-24 RX ADMIN — Medication 650 MILLIGRAM(S): at 11:44

## 2023-05-24 RX ADMIN — PIPERACILLIN AND TAZOBACTAM 25 GRAM(S): 4; .5 INJECTION, POWDER, LYOPHILIZED, FOR SOLUTION INTRAVENOUS at 05:39

## 2023-05-24 RX ADMIN — Medication 40 MILLIGRAM(S): at 14:01

## 2023-05-24 RX ADMIN — Medication 1 SPRAY(S): at 02:23

## 2023-05-24 RX ADMIN — ALBUTEROL 4 PUFF(S): 90 AEROSOL, METERED ORAL at 12:53

## 2023-05-24 RX ADMIN — SODIUM CHLORIDE 1000 MILLILITER(S): 9 INJECTION, SOLUTION INTRAVENOUS at 14:00

## 2023-05-24 RX ADMIN — CHLORHEXIDINE GLUCONATE 1 APPLICATION(S): 213 SOLUTION TOPICAL at 11:39

## 2023-05-24 RX ADMIN — MORPHINE SULFATE 2 MILLIGRAM(S): 50 CAPSULE, EXTENDED RELEASE ORAL at 01:39

## 2023-05-24 RX ADMIN — SODIUM CHLORIDE 100 MILLILITER(S): 9 INJECTION, SOLUTION INTRAVENOUS at 21:17

## 2023-05-24 RX ADMIN — MORPHINE SULFATE 2 MILLIGRAM(S): 50 CAPSULE, EXTENDED RELEASE ORAL at 01:59

## 2023-05-24 RX ADMIN — Medication 250 MILLIGRAM(S): at 05:38

## 2023-05-24 RX ADMIN — PIPERACILLIN AND TAZOBACTAM 25 GRAM(S): 4; .5 INJECTION, POWDER, LYOPHILIZED, FOR SOLUTION INTRAVENOUS at 21:11

## 2023-05-24 RX ADMIN — Medication 650 MILLIGRAM(S): at 17:53

## 2023-05-24 NOTE — H&P ADULT - NSHPPHYSICALEXAM_GEN_ALL_CORE
GENERAL: in acute distress  HEENT: NCAT  CHEST/LUNG: b/l coarse Rhonchi   HEART: Tachycardic and rhythm; No murmurs, rubs, or gallops  ABDOMEN: Bowel sounds present; Soft, Nontender, Nondistended.   EXTREMITIES:  No clubbing, cyanosis, or edema  NERVOUS SYSTEM:  Alert & Oriented X3, agitated  MSK: FROM all 4 extremities, full and equal strength  SKIN: No rashes or lesions

## 2023-05-24 NOTE — H&P ADULT - ASSESSMENT
42 year-old female with PMH of Asthma, HTN? presents with complaint of cough x3 days and SOB. During my encounter pt with dyspnea and increased work of breathing and chest pain, unable to properly provide accurate history. She states that her cough has been progressively worsening over the past 3 days, endorses hemoptysis since yesterday. She states she had a friend who came over and stayed with her who was sick recently, but she does not know if she recently travelled out of [facility] or not. She also admits that her children has been sick with Sore throat and fever for past 3 days. She admits to fever, chills, sever Chest pain which has got worse since she came to the ED. She took 1x Tamiflu yesterday. She otherwise denies diarrhea, constipation, urinary symptoms. She is not vaccinated for Influenza or COVID-19.     In ED  /87, , RR 28, T 100.8 F, 98% on 15 L NRB  Labs significant for wbc 16.7K with Neutrophilic Predominance and L shift, Hgb 11.4, MCV 61.6, Cr 2.0, BUN 23, Alk Phos 125/AST 62/ALT 27, Lac 4.9> 3.9     CT Angio Chest PE Protocol w/ IV Cont   1. Bilateral patchy groundglass nodularity with dominant confluent left lower lobe opacification with numerous cavitary lesions. Additional contralateral right lower lobe cavitary 1 cm nodule. Findings compatible with cavitary pneumonia in the appropriate clinical setting; differential diagnosis includes tuberculosis.  2. Confluent ill-defined left hilar and mediastinal adenopathy, likely reactive, without dominant lymph node delineated.  3. No evidence of pulmonary embolism.    s/p 1x Rocephin, Meropenem, 3 L LR in ED    #AHRF in setting of numerous cavitary lesions  #Influenza B  #Suspected Superimposed Bacterial PNA  - Sever Sepsis POA: , RR 28, T 100.8 F, 98% on 15 L NRB, Lactic acidosis, LANCE  - CT Angio Chest PE Protocol w/ IV Cont   1. Bilateral patchy groundglass nodularity with dominant confluent left lower lobe opacification with numerous cavitary lesions. Additional contralateral right lower lobe cavitary 1 cm nodule. Findings compatible with cavitary pneumonia in the appropriate clinical setting; differential diagnosis includes tuberculosis.  2. Confluent ill-defined left hilar and mediastinal adenopathy, likely reactive, without dominant lymph node delineated.  3. No evidence of pulmonary embolism.  - s/p 1x Rocephin, Meropenem, 3 L LR in ED  - Check Bcx,   - Start Vancomycin 2g q24h, Zosyn 4.5 mg q6h   - f/u ID recommendations   42 year-old female with PMH of Asthma, HTN? presents with complaint of cough x3 days and SOB. During my encounter pt with dyspnea and increased work of breathing and chest pain, unable to properly provide accurate history. She states that her cough has been progressively worsening over the past 3 days, endorses hemoptysis since yesterday.     #AHRF in setting of numerous cavitary lesions  #Influenza B  #Suspected Superimposed Bacterial PNA  - Sever Sepsis POA: , RR 28, T 100.8 F, 98% on 15 L NRB, Lactic acidosis, LANCE  - wbc 16.7K with Neutrophilic Predominance and L shift, Cr 2.0, BUN 23, Alk Phos 125/AST 62/ALT 27, Lac 4.9> 3.9  - CT Angio Chest PE Protocol w/ IV Cont   1. Bilateral patchy groundglass nodularity with dominant confluent left lower lobe opacification with numerous cavitary lesions. Additional contralateral right lower lobe cavitary 1 cm nodule. Findings compatible with cavitary pneumonia in the appropriate clinical setting; differential diagnosis includes tuberculosis.  2. Confluent ill-defined left hilar and mediastinal adenopathy, likely reactive, without dominant lymph node delineated.  3. No evidence of pulmonary embolism.  - s/p 1x Rocephin, Meropenem, 3 L LR in ED  - Check Bcx,   - Start Vancomycin 2g q24h, Zosyn 4.5 mg q6h   - f/u ID recommendations    #Microcytic Anemia  - Hgb 11.4, MCV 61.6  - Check fe profile    #LANCE vs CKD3b  - Cr 2.0, BUN 23  - Check urine lytes  - KBUS  - c/w IVF at 100 cc/hr    #Transaminitis- likely reactive  - Alk Phos 125/AST 62/ALT 27  - cont to monitor  - Avoid Hepatotoxins    DVT Ppx: Lovenox 40mg SQ QD  GI Ppx: Pantoprazole 40mg PO QD   Diet: OGT  Activity: IAT  Dispo: From Home      Pt very agitated and in acute distress could not confirm her home medications- please confirm with pharmacy in AM 42 year-old female with PMH of Asthma, HTN? presents with complaint of cough x3 days and SOB. During my encounter pt with dyspnea and increased work of breathing and chest pain, unable to properly provide accurate history. She states that her cough has been progressively worsening over the past 3 days, endorses hemoptysis since yesterday.     #AHRF in setting of numerous cavitary lesions  #Influenza B +  #Suspected Superimposed Bacterial PNA  - Sever Sepsis POA: , RR 28, T 100.8 F, 98% on 15 L NRB, Lactic acidosis, LANCE  - wbc 16.7K with Neutrophilic Predominance and L shift, Cr 2.0, BUN 23, Alk Phos 125/AST 62/ALT 27, Lac 4.9> 3.9  - CT Angio Chest PE Protocol w/ IV Cont   1. Bilateral patchy groundglass nodularity with dominant confluent left lower lobe opacification with numerous cavitary lesions. Additional contralateral right lower lobe cavitary 1 cm nodule. Findings compatible with cavitary pneumonia in the appropriate clinical setting; differential diagnosis includes tuberculosis.  2. Confluent ill-defined left hilar and mediastinal adenopathy, likely reactive, without dominant lymph node delineated.  3. No evidence of pulmonary embolism.  - s/p 1x Rocephin, Meropenem, 3 L LR in ED  - Check Bcx,   - Start Vancomycin 2g q24h, Zosyn 4.5 mg q6h, Tamiflu 75 mg BID  - f/u ID recommendations    #Microcytic Anemia  - Hgb 11.4, MCV 61.6  - Check fe profile    #LANCE vs CKD3b  - Cr 2.0, BUN 23  - Check urine lytes  - KBUS  - c/w IVF at 100 cc/hr    #Transaminitis- likely reactive  - Alk Phos 125/AST 62/ALT 27  - cont to monitor  - Avoid Hepatotoxins    DVT Ppx: Lovenox 40mg SQ QD  GI Ppx: Pantoprazole 40mg PO QD   Diet: OGT  Activity: IAT  Dispo: From Home      Pt very agitated and in acute distress could not confirm her home medications- please confirm with pharmacy in AM 42 year-old female with PMH of Asthma, HTN? presents with complaint of cough x3 days and SOB. During my encounter pt with dyspnea and increased work of breathing and chest pain, unable to properly provide accurate history. She states that her cough has been progressively worsening over the past 3 days, endorses hemoptysis since yesterday.     #AHRF in setting Necrotizing PNA  #Influenza B +  #Suspected Superimposed Bacterial PNA  - Sever Sepsis POA: , RR 28, T 100.8 F, 98% on 15 L NRB, Lactic acidosis, LANCE  - wbc 16.7K with Neutrophilic Predominance and L shift, Cr 2.0, BUN 23, Alk Phos 125/AST 62/ALT 27, Lac 4.9> 3.9  - CT Angio Chest PE Protocol w/ IV Cont   1. Bilateral patchy groundglass nodularity with dominant confluent left lower lobe opacification with numerous cavitary lesions. Additional contralateral right lower lobe cavitary 1 cm nodule. Findings compatible with cavitary pneumonia in the appropriate clinical setting; differential diagnosis includes tuberculosis.  2. Confluent ill-defined left hilar and mediastinal adenopathy, likely reactive, without dominant lymph node delineated.  3. No evidence of pulmonary embolism.  - s/p 1x Rocephin, Meropenem, 3 L LR in ED  - Check Bcx,   - Start Vancomycin 2g q24h, Zosyn 4.5 mg q6h, Tamiflu 75 mg BID  - f/u ID recommendations    #Microcytic Anemia  - Hgb 11.4, MCV 61.6  - Check fe profile    #LANCE vs CKD3b  - Cr 2.0, BUN 23  - Check urine lytes  - KBUS  - c/w IVF at 100 cc/hr    #Transaminitis- likely reactive  - Alk Phos 125/AST 62/ALT 27  - cont to monitor  - Avoid Hepatotoxins    DVT Ppx: Lovenox 40mg SQ QD  GI Ppx: Pantoprazole 40mg PO QD   Diet: OGT  Activity: IAT  Dispo: From Home      Pt very agitated and in acute distress could not confirm her home medications- please confirm with pharmacy in AM

## 2023-05-24 NOTE — AIRWAY PLACEMENT NOTE ADULT - AIRWAY COMMENTS:
Called to CEU room 2. Pt. seen at bedside, on FM with increased work of breathing. Chart reviewed, Medicine Resident requesting pt. to be intubated. Plan discussed with pt., agrees. Pt. pre-ox100% via bag mask. Induced with meds stated above.  Pt. suctioned orally for old bloody secretions. V/c visualized with glidescope. ETT 7.5 placed, + color change x 5 breaths noted. B/L BS auscultated. ETT secured, pt. connected to MV by respiratory. Care endorsed to Rn and Medicine team. Chest xray pending. Spo2 96%.

## 2023-05-24 NOTE — PHARMACOTHERAPY INTERVENTION NOTE - COMMENTS
SCr 2.6, CrCl~ 42  -recommended adjusting oseltamivir 30mg og q12h x5 days  -levofloxacin 750mg IV q48h  -Zosyn 4.5 IV q6h over 4 hrs, recommended changing to over 30min, per ID change to 3.375g IV q8h over 4hrs

## 2023-05-24 NOTE — H&P ADULT - NSHPLABSRESULTS_GEN_ALL_CORE
11.4   16.72 )-----------( 253      ( 23 May 2023 20:07 )             39.1       05-23    134<L>  |  98  |  24<H>  ----------------------------<  129<H>  2.9<L>   |  19  |  1.9<H>    Ca    8.7      23 May 2023 22:52  Mg     1.6     05-23    TPro  7.7  /  Alb  4.5  /  TBili  0.3  /  DBili  x   /  AST  62<H>  /  ALT  27  /  AlkPhos  125<H>  05-23                 CT Angio Chest PE Protocol w/ IV Cont   1. Bilateral patchy groundglass nodularity with dominant confluent left lower lobe opacification with numerous cavitary lesions. Additional contralateral right lower lobe cavitary 1 cm nodule. Findings compatible with cavitary pneumonia in the appropriate clinical setting; differential diagnosis includes tuberculosis.  2. Confluent ill-defined left hilar and mediastinal adenopathy, likely reactive, without dominant lymph node delineated.  3. No evidence of pulmonary embolism.    PT/INR - ( 23 May 2023 20:07 )   PT: 13.90 sec;   INR: 1.21 ratio         PTT - ( 23 May 2023 20:07 )  PTT:31.0 sec    Lactate Trend  05-23 @ 22:52 Lactate:3.9   05-23 @ 20:07 Lactate:4.9       CARDIAC MARKERS ( 23 May 2023 20:07 )  x     / <0.01 ng/mL / x     / x     / x            CAPILLARY BLOOD GLUCOSE

## 2023-05-24 NOTE — CONSULT NOTE ADULT - SUBJECTIVE AND OBJECTIVE BOX
Patient is a 42y old  Female who presents with a chief complaint of AHRF (24 May 2023 00:58)      HPI:  42 year-old female with PMH of Asthma, HTN? presents with complaint of cough x3 days and SOB. During my encounter pt with dyspnea and increased work of breathing and chest pain, unable to properly provide accurate history. She states that her cough has been progressively worsening over the past 3 days, endorses hemoptysis since yesterday. She states she had a friend who came over and stayed with for more than a week who was sick recently, but she does not know if she recently travelled out of [facility] or not. She also admits that her children has been sick with Sore throat and fever for past 3 days. She admits to fever, chills, sever Chest pain which has gotten worse since she came to the ED. She took 1x Tamiflu yesterday. She otherwise denies diarrhea, constipation, urinary symptoms. She is not vaccinated for Influenza or COVID-19.     In ED  /87, , RR 28, T 100.8 F, 98% on 15 L NRB  Labs significant for wbc 16.7K with Neutrophilic Predominance and L shift, Hgb 11.4, MCV 61.6, Cr 2.0, BUN 23, Alk Phos 125/AST 62/ALT 27, Lac 4.9> 3.9  RVP + Influenza B      CT Angio Chest PE Protocol w/ IV Cont   1. Bilateral patchy groundglass nodularity with dominant confluent left lower lobe opacification with numerous cavitary lesions. Additional contralateral right lower lobe cavitary 1 cm nodule. Findings compatible with cavitary pneumonia in the appropriate clinical setting; differential diagnosis includes tuberculosis.  2. Confluent ill-defined left hilar and mediastinal adenopathy, likely reactive, without dominant lymph node delineated.  3. No evidence of pulmonary embolism.    s/p 1x Rocephin, Meropenem, 3 L LR in ED    Pt admitted to SDU for further managements, during my encounter pt is very agitated, in acute distress. Pt received appropriate pain control with IV Morphine, s/p Xanax 1 mg 1x for agitation and anxiety, despite pain control and adequate fluid resuscitation pt remained tachycardic, tachypneic. STAT EKG reviewed with Cardiology team on call After discussion with Cardiology team, Pt received 1x Adenosine 6 mg IV push with no change. Case discussed with Critical Care team, decision was made for Intubation and Pt upgraded to MICU.   (24 May 2023 00:58)      PAST MEDICAL & SURGICAL HISTORY:      SOCIAL HX:   Smoking       Positive                   ETOH                            Other    FAMILY HISTORY:  :  No known cardiovacular family hisotry     Review Of Systems:     All ROS are negative except per HPI       Allergies    aspirin (Short breath; Anaphylaxis)    Intolerances          PHYSICAL EXAM    ICU Vital Signs Last 24 Hrs  T(C): 36.8 (24 May 2023 07:17), Max: 38.2 (23 May 2023 19:26)  T(F): 98.2 (24 May 2023 07:17), Max: 100.8 (23 May 2023 19:26)  HR: 122 (24 May 2023 07:17) (122 - 141)  BP: 156/109 (24 May 2023 07:17) (125/69 - 156/109)  BP(mean): 88 (24 May 2023 06:00) (88 - 107)  ABP: --  ABP(mean): --  RR: 22 (24 May 2023 07:17) (22 - 28)  SpO2: 100% (24 May 2023 07:17) (91% - 100%)    O2 Parameters below as of 24 May 2023 06:00  Patient On (Oxygen Delivery Method): ventilator    O2 Concentration (%): 100        CONSTITUTIONAL:  Well nourished.  in NAD    ENT:   Airway patent,   Mouth with normal mucosa.       CARDIAC:   Normal rate,   Regular rhythm.        RESPIRATORY:   No wheezing  Bilateral BS   Not tachypneic,  No use of accessory muscles    GASTROINTESTINAL:  Abdomen soft,   Non-tender,   No guarding,   + BS      NEUROLOGICAL:   Sedated     SKIN:   Skin normal color for race,   No evidence of rash.              05-24-23 @ 07:01  -  05-24-23 @ 08:47  --------------------------------------------------------  IN:    FentaNYL: 14.1 mL    Lactated Ringers: 100 mL    Propofol: 16.9 mL  Total IN: 131 mL    OUT:  Total OUT: 0 mL    Total NET: 131 mL          LABS:                          10.1   3.92  )-----------( 203      ( 24 May 2023 06:10 )             34.8                                               05-24    132<L>  |  99  |  31<H>  ----------------------------<  185<H>  3.3<L>   |  16<L>  |  2.6<H>    Ca    8.4      24 May 2023 06:10  Mg     1.8     05-24    TPro  6.3  /  Alb  3.4<L>  /  TBili  0.2  /  DBili  x   /  AST  54<H>  /  ALT  23  /  AlkPhos  87  05-24      PT/INR - ( 23 May 2023 20:07 )   PT: 13.90 sec;   INR: 1.21 ratio         PTT - ( 23 May 2023 20:07 )  PTT:31.0 sec                                           CARDIAC MARKERS ( 24 May 2023 06:10 )  x     / <0.01 ng/mL / 1483 U/L / x     / 13.7 ng/mL  CARDIAC MARKERS ( 23 May 2023 20:07 )  x     / <0.01 ng/mL / x     / x     / x                                                LIVER FUNCTIONS - ( 24 May 2023 06:10 )  Alb: 3.4 g/dL / Pro: 6.3 g/dL / ALK PHOS: 87 U/L / ALT: 23 U/L / AST: 54 U/L / GGT: x                                                                                               Mode: AC/ CMV (Assist Control/ Continuous Mandatory Ventilation)  RR (machine): 22  FiO2: 100  PEEP: 8  ITime: 1  PC: 18                                      ABG - ( 24 May 2023 07:33 )  pH, Arterial: 7.31  pH, Blood: x     /  pCO2: 39    /  pO2: 133   / HCO3: 20    / Base Excess: -6.2  /  SaO2: 99.2                X-Rays reviewed                                                                                     ECHO        MEDICATIONS  (STANDING):  albuterol/ipratropium for Nebulization 3 milliLiter(s) Nebulizer every 6 hours  chlorhexidine 0.12% Liquid 15 milliLiter(s) Oral Mucosa every 12 hours  chlorhexidine 2% Cloths 1 Application(s) Topical daily  enoxaparin Injectable 40 milliGRAM(s) SubCutaneous every 24 hours  fentaNYL   Infusion. 0.5 MICROgram(s)/kG/Hr (4.72 mL/Hr) IV Continuous <Continuous>  lactated ringers. 1000 milliLiter(s) (100 mL/Hr) IV Continuous <Continuous>  methylPREDNISolone sodium succinate Injectable 40 milliGRAM(s) IV Push every 8 hours  morphine  - Injectable 2 milliGRAM(s) IV Push every 6 hours  oseltamivir 75 milliGRAM(s) Oral two times a day  piperacillin/tazobactam IVPB.. 4.5 Gram(s) IV Intermittent every 6 hours  propofol Infusion 10 MICROgram(s)/kG/Min (5.66 mL/Hr) IV Continuous <Continuous>  vancomycin  IVPB 2000 milliGRAM(s) IV Intermittent every 24 hours    MEDICATIONS  (PRN):  albuterol    90 MICROgram(s) HFA Inhaler 2 Puff(s) Inhalation every 6 hours PRN Shortness of Breath and/or Wheezing

## 2023-05-24 NOTE — H&P ADULT - HISTORY OF PRESENT ILLNESS
42 year-old female with no significant past medical history presents with complaint of cough x3 days.  Patient states cough has been progressively worsening over the past 3 days, endorses hemoptysis since yesterday.  States she has had subjective fevers with chills for the past 2 days .  Endorses chest pain worse with inspiration.  Denies history of recent travel, exertional chest pain, abdominal pain, nausea/vomiting/diarrhea, urinary symptoms, numbness/tingling, weakness/dizziness.    In ED  /87, , RR 28, T 100.8 F, 98% on 15 L NRB  Labs significant for wbc 16.7K with Neutrophilic Predominance and L shift, Hgb 11.4, MCV 61.6, Cr 2.0, BUN 23, Alk Phos 125/AST 62/ALT 27, Lac 4.9> 3.9     CT Angio Chest PE Protocol w/ IV Cont   1. Bilateral patchy groundglass nodularity with dominant confluent left lower lobe opacification with numerous cavitary lesions. Additional contralateral right lower lobe cavitary 1 cm nodule. Findings compatible with cavitary pneumonia in the appropriate clinical setting; differential diagnosis includes tuberculosis.  2. Confluent ill-defined left hilar and mediastinal adenopathy, likely reactive, without dominant lymph node delineated.  3. No evidence of pulmonary embolism.    s/p 1x Rocephin, Meropenem, 3 L LR in ED   42 year-old female with PMH of Asthma, HTN? presents with complaint of cough x3 days and SOB. During my encounter pt with dyspnea and increased work of breathing and chest pain, unable to properly provide accurate history. She states that her cough has been progressively worsening over the past 3 days, endorses hemoptysis since yesterday. She states she had a friend who came over and stayed with her who was sick recently, but she does not know if she recently travelled out of [facility] or not. She also admits that her children has been sick with Sore throat and fever for past 3 days. She admits to fever, chills, sever Chest pain which has got worse since she came to the ED. She took 1x Tamiflu yesterday. She otherwise denies diarrhea, constipation, urinary symptoms. She is not vaccinated for Influenza or COVID-19.     In ED  /87, , RR 28, T 100.8 F, 98% on 15 L NRB  Labs significant for wbc 16.7K with Neutrophilic Predominance and L shift, Hgb 11.4, MCV 61.6, Cr 2.0, BUN 23, Alk Phos 125/AST 62/ALT 27, Lac 4.9> 3.9  RVP + Influenza B     CT Angio Chest PE Protocol w/ IV Cont   1. Bilateral patchy groundglass nodularity with dominant confluent left lower lobe opacification with numerous cavitary lesions. Additional contralateral right lower lobe cavitary 1 cm nodule. Findings compatible with cavitary pneumonia in the appropriate clinical setting; differential diagnosis includes tuberculosis.  2. Confluent ill-defined left hilar and mediastinal adenopathy, likely reactive, without dominant lymph node delineated.  3. No evidence of pulmonary embolism.    s/p 1x Rocephin, Meropenem, 3 L LR in ED   42 year-old female with PMH of Asthma, HTN? presents with complaint of cough x3 days and SOB. During my encounter pt with dyspnea and increased work of breathing and chest pain, unable to properly provide accurate history. She states that her cough has been progressively worsening over the past 3 days, endorses hemoptysis since yesterday. She states she had a friend who came over and stayed with for more than a week her who was sick recently, but she does not know if she recently travelled out of [facility] or not. She also admits that her children has been sick with Sore throat and fever for past 3 days. She admits to fever, chills, sever Chest pain which has got worse since she came to the ED. She took 1x Tamiflu yesterday. She otherwise denies diarrhea, constipation, urinary symptoms. She is not vaccinated for Influenza or COVID-19.     In ED  /87, , RR 28, T 100.8 F, 98% on 15 L NRB  Labs significant for wbc 16.7K with Neutrophilic Predominance and L shift, Hgb 11.4, MCV 61.6, Cr 2.0, BUN 23, Alk Phos 125/AST 62/ALT 27, Lac 4.9> 3.9  RVP + Influenza B     CT Angio Chest PE Protocol w/ IV Cont   1. Bilateral patchy groundglass nodularity with dominant confluent left lower lobe opacification with numerous cavitary lesions. Additional contralateral right lower lobe cavitary 1 cm nodule. Findings compatible with cavitary pneumonia in the appropriate clinical setting; differential diagnosis includes tuberculosis.  2. Confluent ill-defined left hilar and mediastinal adenopathy, likely reactive, without dominant lymph node delineated.  3. No evidence of pulmonary embolism.    s/p 1x Rocephin, Meropenem, 3 L LR in ED   42 year-old female with PMH of Asthma, HTN? presents with complaint of cough x3 days and SOB. During my encounter pt with dyspnea and increased work of breathing and chest pain, unable to properly provide accurate history. She states that her cough has been progressively worsening over the past 3 days, endorses hemoptysis since yesterday. She states she had a friend who came over and stayed with for more than a week her who was sick recently, but she does not know if she recently travelled out of [facility] or not. She also admits that her children has been sick with Sore throat and fever for past 3 days. She admits to fever, chills, sever Chest pain which has got worse since she came to the ED. She took 1x Tamiflu yesterday. She otherwise denies diarrhea, constipation, urinary symptoms. She is not vaccinated for Influenza or COVID-19.     In ED  /87, , RR 28, T 100.8 F, 98% on 15 L NRB  Labs significant for wbc 16.7K with Neutrophilic Predominance and L shift, Hgb 11.4, MCV 61.6, Cr 2.0, BUN 23, Alk Phos 125/AST 62/ALT 27, Lac 4.9> 3.9  RVP + Influenza B      CT Angio Chest PE Protocol w/ IV Cont   1. Bilateral patchy groundglass nodularity with dominant confluent left lower lobe opacification with numerous cavitary lesions. Additional contralateral right lower lobe cavitary 1 cm nodule. Findings compatible with cavitary pneumonia in the appropriate clinical setting; differential diagnosis includes tuberculosis.  2. Confluent ill-defined left hilar and mediastinal adenopathy, likely reactive, without dominant lymph node delineated.  3. No evidence of pulmonary embolism.    s/p 1x Rocephin, Meropenem, 3 L LR in ED    Pt admitted to SDU for further managements, during my encounter pt is very agitated, in acute distress. Pt received appropriate pain control with IV Morphine, s/p Xanax 1 mg 1x for agitation and anxiety, despite pain control and adequate fluid resuscitation pt remained tachycardic, tachypneic. STAT EKG reviewed with Cardiology team on call After discussion with Cardiology team, Pt received 1x Adenosine 6 mg IV push with no change. Case discussed with Critical Care team, decision was made for Intubation and Pt upgraded to MICU.   42 year-old female with PMH of Asthma, HTN? presents with complaint of cough x3 days and SOB. During my encounter pt with dyspnea and increased work of breathing and chest pain, unable to properly provide accurate history. She states that her cough has been progressively worsening over the past 3 days, endorses hemoptysis since yesterday. She states she had a friend who came over and stayed with for more than a week who was sick recently, but she does not know if she recently travelled out of [facility] or not. She also admits that her children has been sick with Sore throat and fever for past 3 days. She admits to fever, chills, sever Chest pain which has gotten worse since she came to the ED. She took 1x Tamiflu yesterday. She otherwise denies diarrhea, constipation, urinary symptoms. She is not vaccinated for Influenza or COVID-19.     In ED  /87, , RR 28, T 100.8 F, 98% on 15 L NRB  Labs significant for wbc 16.7K with Neutrophilic Predominance and L shift, Hgb 11.4, MCV 61.6, Cr 2.0, BUN 23, Alk Phos 125/AST 62/ALT 27, Lac 4.9> 3.9  RVP + Influenza B      CT Angio Chest PE Protocol w/ IV Cont   1. Bilateral patchy groundglass nodularity with dominant confluent left lower lobe opacification with numerous cavitary lesions. Additional contralateral right lower lobe cavitary 1 cm nodule. Findings compatible with cavitary pneumonia in the appropriate clinical setting; differential diagnosis includes tuberculosis.  2. Confluent ill-defined left hilar and mediastinal adenopathy, likely reactive, without dominant lymph node delineated.  3. No evidence of pulmonary embolism.    s/p 1x Rocephin, Meropenem, 3 L LR in ED    Pt admitted to SDU for further managements, during my encounter pt is very agitated, in acute distress. Pt received appropriate pain control with IV Morphine, s/p Xanax 1 mg 1x for agitation and anxiety, despite pain control and adequate fluid resuscitation pt remained tachycardic, tachypneic. STAT EKG reviewed with Cardiology team on call After discussion with Cardiology team, Pt received 1x Adenosine 6 mg IV push with no change. Case discussed with Critical Care team, decision was made for Intubation and Pt upgraded to MICU.

## 2023-05-24 NOTE — CONSULT NOTE ADULT - ASSESSMENT
ASSESSMENT  42 year-old female with PMH of Asthma, HTN? presents with complaint of cough x3 days and SOB. During my encounter pt with dyspnea and increased work of breathing and chest pain, unable to properly provide accurate history. She states that her cough has been progressively worsening over the past 3 days, endorses hemoptysis since yesterday.     IMPRESSION  # Severe sepsis present on admission, with leukocytosis, tachycardia, elevated lactate, and clear source of infection    # Increased work off breathing, s/p intubated and mechanically ventilated   # CT chest: Bilateral patchy groundglass nodularity with dominant confluent left lower lobe opacification with numerous cavitary lesions. Additional contralateral right lower lobe cavitary 1 cm nodule. Findings compatible with cavitary pneumonia in the appropriate clinical setting; differential diagnosis includes tuberculosis. Confluent ill-defined left hilar and mediastinal adenopathy, likely reactive, without dominant lymph node delineated.  # RVP: positive for Influenza type B  # S/p rocephine and meropenem in ED  # Started on Vancomycin and Zosyn and now switched to Linezolid, Levofloxacin and Zosyn   # On Tamiflu  # Most likely influenza pneumonia super imposed by mssa pneumonia     RECOMMENDATIONS  - f/u pending cultures, please get MRSA nares  - Please get deep tracheal aspirate cultures   - Strep and Legionella Urine Ag   - Adjust Zosyn dose to 3.375 Q8   - Adjust Levofloxacin to 750 Q 48h , (unlikely to be atypical pneumonia), Consider D/C   - C/w with Linezolid 600 mg BID   - Adjust Tamiflu to 30 mg Q12

## 2023-05-24 NOTE — CONSULT NOTE ADULT - SUBJECTIVE AND OBJECTIVE BOX
NEPHROLOGY CONSULTATION NOTE  As per EMRL:  42 year-old female with PMH of Asthma, HTN? presents with complaint of cough x3 days and SOB. During my encounter pt with dyspnea and increased work of breathing and chest pain, unable to properly provide accurate history. She states that her cough has been progressively worsening over the past 3 days, endorses hemoptysis since yesterday. She states she had a friend who came over and stayed with for more than a week who was sick recently, but she does not know if she recently travelled out of [facility] or not. She also admits that her children has been sick with Sore throat and fever for past 3 days. She admits to fever, chills, sever Chest pain which has gotten worse since she came to the ED. She took 1x Tamiflu yesterday. She otherwise denies diarrhea, constipation, urinary symptoms. She is not vaccinated for Influenza or COVID-19.     In ED  /87, , RR 28, T 100.8 F, 98% on 15 L NRB  Labs significant for wbc 16.7K with Neutrophilic Predominance and L shift, Hgb 11.4, MCV 61.6, Cr 2.0, BUN 23, Alk Phos 125/AST 62/ALT 27, Lac 4.9> 3.9  RVP + Influenza B      CT Angio Chest PE Protocol w/ IV Cont   1. Bilateral patchy groundglass nodularity with dominant confluent left lower lobe opacification with numerous cavitary lesions. Additional contralateral right lower lobe cavitary 1 cm nodule. Findings compatible with cavitary pneumonia in the appropriate clinical setting; differential diagnosis includes tuberculosis.  2. Confluent ill-defined left hilar and mediastinal adenopathy, likely reactive, without dominant lymph node delineated.  3. No evidence of pulmonary embolism.    Renal was called for LANCE - creat 2.0-3.0 , on  cc/hr, intubated, no pressors    PAST MEDICAL & SURGICAL HISTORY:    Allergies:  aspirin (Short breath; Anaphylaxis)    Home Medications Reviewed  Hospital Medications:   MEDICATIONS  (STANDING):  albuterol    90 MICROgram(s) HFA Inhaler 4 Puff(s) Inhalation every 4 hours  chlorhexidine 0.12% Liquid 15 milliLiter(s) Oral Mucosa every 12 hours  chlorhexidine 2% Cloths 1 Application(s) Topical daily  fentaNYL   Infusion. 0.5 MICROgram(s)/kG/Hr (4.72 mL/Hr) IV Continuous <Continuous>  heparin   Injectable 5000 Unit(s) SubCutaneous every 8 hours  insulin lispro (ADMELOG) corrective regimen sliding scale   SubCutaneous three times a day before meals  ipratropium 17 MICROgram(s) HFA Inhaler 4 Puff(s) Inhalation <User Schedule>  ipratropium 17 MICROgram(s) HFA Inhaler 4 Puff(s) Inhalation <User Schedule>  lactated ringers Bolus 500 milliLiter(s) IV Bolus once  lactated ringers. 1000 milliLiter(s) (100 mL/Hr) IV Continuous <Continuous>  levoFLOXacin IVPB      linezolid  IVPB 600 milliGRAM(s) IV Intermittent every 12 hours  methylPREDNISolone sodium succinate Injectable 40 milliGRAM(s) IV Push every 8 hours  oseltamivir Suspension 30 milliGRAM(s) Oral every 12 hours  pantoprazole   Suspension 40 milliGRAM(s) Oral daily  piperacillin/tazobactam IVPB.. 3.375 Gram(s) IV Intermittent every 8 hours  polyethylene glycol 3350 17 Gram(s) Oral daily  propofol Infusion 10 MICROgram(s)/kG/Min (5.66 mL/Hr) IV Continuous <Continuous>  senna 2 Tablet(s) Oral at bedtime      SOCIAL HISTORY:  Denies ETOH,Smoking,   FAMILY HISTORY:        REVIEW OF SYSTEMS:  Unable to obtain, on MV    VITALS:  T(F): 100.7 (05-24-23 @ 12:00), Max: 100.8 (05-23-23 @ 19:26)  HR: 110 (05-24-23 @ 18:30)  BP: 131/82 (05-24-23 @ 18:30)  RR: 24 (05-24-23 @ 18:30)  SpO2: 100% (05-24-23 @ 18:30)    05-24 @ 07:01  -  05-24 @ 18:33  --------------------------------------------------------  IN: 2552.6 mL / OUT: 95 mL / NET: 2457.6 mL      Height (cm): 160 (05-24 @ 08:15)  Weight (kg): 85 (05-24 @ 08:15)  BMI (kg/m2): 33.2 (05-24 @ 08:15)  BSA (m2): 1.88 (05-24 @ 08:15)    05-24-23 @ 07:01  -  05-24-23 @ 18:33  --------------------------------------------------------  IN: 0 mL / OUT: 95 mL / NET: -95 mL      I&O's Detail    24 May 2023 07:01  -  24 May 2023 18:33  --------------------------------------------------------  IN:    Enteral Tube Flush: 200 mL    FentaNYL: 135.6 mL    IV PiggyBack: 250 mL    Lactated Ringers: 1000 mL    Lactated Ringers Bolus: 800 mL    Propofol: 90.5 mL    Propofol: 76.5 mL  Total IN: 2552.6 mL    OUT:    Indwelling Catheter - Urethral (mL): 95 mL  Total OUT: 95 mL    Total NET: 2457.6 mL        Creatine Kinase, Serum: 1112 U/L (05-24-23 @ 11:48)  Creatine Kinase, Serum: 1483 U/L (05-24-23 @ 06:10)      PHYSICAL EXAM:  Constitutional: NAD. On vent  Respiratory: CTA  Cardiovascular: S1, S2, RRR  Gastrointestinal: BS+, soft, NT/ND  Extremities: No peripheral edema  Neurological: sedated  : has diggs.   Skin: No rashes  Vascular Access:    LABS:  05-24    132<L>  |  96<L>  |  35<H>  ----------------------------<  103<H>  3.5   |  19  |  3.0<H>    Ca    8.2<L>      24 May 2023 11:48  Mg     1.8     05-24    TPro  6.3  /  Alb  3.4<L>  /  TBili  0.2  /  DBili      /  AST  54<H>  /  ALT  23  /  AlkPhos  87  05-24    Creatinine Trend: 3.0 <--, 2.6 <--, 1.9 <--, 2.0 <--                        10.1   3.92  )-----------( 203      ( 24 May 2023 06:10 )             34.8     Urine Studies:              RADIOLOGY & ADDITIONAL STUDIES:    < from: Xray Chest 1 View- PORTABLE-Urgent (Xray Chest 1 View- PORTABLE-Urgent .) (05.24.23 @ 06:56) >  Impression:    Bilateral opacities.    < end of copied text >

## 2023-05-24 NOTE — ED ADULT NURSE NOTE - NSFALLRISKINTERV_ED_ALL_ED
Assistance with ambulation/Communicate fall risk and risk factors to all staff, patient, and family/Provide visual cue: yellow wristband, yellow gown, etc/Reinforce activity limits and safety measures with patient and family/Call bell, personal items and telephone in reach/Instruct patient to call for assistance before getting out of bed/chair/stretcher/Non-slip footwear applied when patient is off stretcher/[place] to call system/Physically safe environment - no spills, clutter or unnecessary equipment/Purposeful Proactive Rounding/Room/bathroom lighting operational, light cord in reach

## 2023-05-24 NOTE — CONSULT NOTE ADULT - ASSESSMENT
IMPRESSION:    Acute hypoxemic respiratory failure   Severe CAP   Non massive hemoptysis   LANCE   HO asthma   Influenza B     PLAN:    CNS: Adequate sedation.  Drug screen     HEENT: Oral care    PULMONARY:  HOB @ 45 degrees.  Aspiration precautions.  ARDS net MV setting.  Adjust PEEP.  Wean O2.  DTA including viral, fungal, AFB, and PCP.  Bronchoscopy when more stable.      CARDIOVASCULAR:  Goal directed fluid resuscitation.  ECHO.  trend CPK.      GI: GI prophylaxis.  Feeding.  Bowel regimen.      RENAL:  Follow up lytes.  Correct as needed.  [place] for strict Is and OS.  urine lytes and Sediment.  Vasculitis and Rheum panel.  Renal eval     INFECTIOUS DISEASE: Follow up cultures.  Zosyn, Zyvox, and levaquin.  Tamiflu.  Urine legionella and strep Ag.  HIV.  Galactomannan and Fungitall.  ID eval     HEMATOLOGICAL:  DVT prophylaxis.  Dimer     ENDOCRINE:  Follow up FS.  Insulin protocol if needed.      MUSCULOSKELETAL:  Rheum and Vasculitis panel.  Off loading     MICU

## 2023-05-24 NOTE — CONSULT NOTE ADULT - SUBJECTIVE AND OBJECTIVE BOX
ROSSI, TINA  42y, Female  Allergy: aspirin (Short breath; Anaphylaxis)    CHIEF COMPLAINT: AHRF (24 May 2023 08:47)    HPI:  HPI:  42 year-old female with PMH of Asthma, HTN? presents with complaint of cough x3 days and SOB. During my encounter pt with dyspnea and increased work of breathing and chest pain, unable to properly provide accurate history. She states that her cough has been progressively worsening over the past 3 days, endorses hemoptysis since yesterday. She states she had a friend who came over and stayed with for more than a week who was sick recently, but she does not know if she recently travelled out of [facility] or not. She also admits that her children has been sick with Sore throat and fever for past 3 days. She admits to fever, chills, sever Chest pain which has gotten worse since she came to the ED. She took 1x Tamiflu yesterday. She otherwise denies diarrhea, constipation, urinary symptoms. She is not vaccinated for Influenza or COVID-19.     In ED  /87, , RR 28, T 100.8 F, 98% on 15 L NRB  Labs significant for wbc 16.7K with Neutrophilic Predominance and L shift, Hgb 11.4, MCV 61.6, Cr 2.0, BUN 23, Alk Phos 125/AST 62/ALT 27, Lac 4.9> 3.9  RVP + Influenza B      CT Angio Chest PE Protocol w/ IV Cont   1. Bilateral patchy groundglass nodularity with dominant confluent left lower lobe opacification with numerous cavitary lesions. Additional contralateral right lower lobe cavitary 1 cm nodule. Findings compatible with cavitary pneumonia in the appropriate clinical setting; differential diagnosis includes tuberculosis.  2. Confluent ill-defined left hilar and mediastinal adenopathy, likely reactive, without dominant lymph node delineated.  3. No evidence of pulmonary embolism.    s/p 1x Rocephin, Meropenem, 3 L LR in ED    Pt admitted to SDU for further managements, during my encounter pt is very agitated, in acute distress. Pt received appropriate pain control with IV Morphine, s/p Xanax 1 mg 1x for agitation and anxiety, despite pain control and adequate fluid resuscitation pt remained tachycardic, tachypneic. STAT EKG reviewed with Cardiology team on call After discussion with Cardiology team, Pt received 1x Adenosine 6 mg IV push with no change. Case discussed with Critical Care team, decision was made for Intubation and Pt upgraded to MICU.   (24 May 2023 00:58)      Infectious Diseases History:  Old Micro Data/Cultures:     FAMILY HISTORY:    PAST MEDICAL & SURGICAL HISTORY:      SOCIAL HISTORY  Social History:      Recent Travel:  Other Exposures:     ROS  Unable to obtain, patient intubated     VITALS:  T(F): 100.7, Max: 100.8 (05-23-23 @ 19:26)  HR: 120  BP: 124/87  RR: 20Vital Signs Last 24 Hrs  T(C): 38.2 (24 May 2023 08:15), Max: 38.2 (23 May 2023 19:26)  T(F): 100.7 (24 May 2023 08:15), Max: 100.8 (23 May 2023 19:26)  HR: 120 (24 May 2023 08:15) (120 - 141)  BP: 124/87 (24 May 2023 08:15) (124/87 - 156/109)  BP(mean): 101 (24 May 2023 08:15) (88 - 107)  RR: 20 (24 May 2023 08:15) (20 - 28)  SpO2: 100% (24 May 2023 08:15) (91% - 100%)    Parameters below as of 24 May 2023 08:15  Patient On (Oxygen Delivery Method): ventilator    O2 Concentration (%): 100    PHYSICAL EXAM:  Gen: NAD, resting in bed  HEENT: Normocephalic, atraumatic  Neck: supple, no lymphadenopathy  CV: Regular rate & regular rhythm  Lungs: CTAB  Abdomen: Soft, BS present  Ext: Warm, well perfused  Neuro: non focal, awake  Skin: no rash, no lesions  Lines: no phlebitis    TESTS & MEASUREMENTS:                        10.1   3.92  )-----------( 203      ( 24 May 2023 06:10 )             34.8     05-24    132<L>  |  99  |  31<H>  ----------------------------<  185<H>  3.3<L>   |  16<L>  |  2.6<H>    Ca    8.4      24 May 2023 06:10  Mg     1.8     05-24    TPro  6.3  /  Alb  3.4<L>  /  TBili  0.2  /  DBili  x   /  AST  54<H>  /  ALT  23  /  AlkPhos  87  05-24      LIVER FUNCTIONS - ( 24 May 2023 06:10 )  Alb: 3.4 g/dL / Pro: 6.3 g/dL / ALK PHOS: 87 U/L / ALT: 23 U/L / AST: 54 U/L / GGT: x                 Lactate, Blood: 3.9 mmol/L (05-23-23 @ 22:52)  Blood Gas Venous - Lactate: 4.40 mmol/L (05-23-23 @ 20:36)  Lactate, Blood: 4.9 mmol/L (05-23-23 @ 20:07)      INFECTIOUS DISEASES TESTING      RADIOLOGY & ADDITIONAL TESTS:  I have personally reviewed the last available Chest xray  CXR  Xray Chest 1 View- PORTABLE-Urgent:   ACC: [id number] EXAM:  XR CHEST PORTABLE URGENT 1V   ORDERED BY: AMILCAR GARCÍA     PROCEDURE DATE:  05/24/2023          INTERPRETATION:  Clinical History / Reason for exam: Follow-up.    Comparison : Chest radiograph May 23, 2023.    Technique/Positioning: Adequate.    Findings:    Support devices: ETT with its tip above the noe and NGT with its tip   below the diaphragm.    Cardiac/mediastinum/hilum: Stable    Lung parenchyma/Pleura: Bilateral opacities. No pneumothorax is seen.    Skeleton/soft tissues: Stable    Impression:    Bilateral opacities.    Support tubes as above.    --- End of Report ---            AMANDA VELARDE MD; Attending Radiologist  This document has been electronically signed. May 24 2023  7:14AM (05-24-23 @ 06:56)  Xray Chest 1 View- PORTABLE-Urgent:   ACC: [id number] EXAM:  XR CHEST PORTABLE URGENT 1V   ORDERED BY: CESIA SHEIKH     PROCEDURE DATE:  05/23/2023          INTERPRETATION:  Clinical History / Reason for exam: Shortness of breath    Comparison : Chest radiograph None.    Technique/Positioning: Low lung volume.    Findings:    Support devices: None.    Cardiac/mediastinum/hilum: Unremarkable.    Lung parenchyma/Pleura: Within normal limits.    Skeleton/soft tissues: Unremarkable.    Impression:    Low lung volume.    No acute infiltrates.    Refer to the report of the CT scan of the chest performed on the same day.    --- End of Report ---            AMANDA VELARDE MD; Attending Radiologist  This document has been electronically signed. May 24 2023  6:01AM (05-23-23 @ 20:29)      CT  CT Angio Chest PE Protocol w/ IV Cont:   ACC: [id number] EXAM:  CT ANGIO CHEST PULM ART [facility]   ORDERED BY: CESIA SHEIKH     PROCEDURE DATE:  05/23/2023          INTERPRETATION:  CLINICAL HISTORY/REASON FOR EXAM: Shortness of breath..    TECHNIQUE: Multislice helical sections were obtained from the thoracic   inlet to the lung bases during rapid administration of 65 cc Omnipaque   350 intravenous contrast. Thin sections were reconstructed through the   pulmonary vasculature. 3D (MIP) reformats obtained. 35 cc contrast   discarded.    COMPARISON: None.    FINDINGS:    PULMONARY EMBOLUS: No evidence of acute pulmonary embolism.    LUNGS, PLEURA, AIRWAYS: Bilateral patchy groundglass nodularity with   dominant confluent left lower lobe opacification with numerous cavitary   lesions. Additional contralateral right lower lobe cavitary 1 cm nodule.    Central airway patent. No pleural effusion. No pneumothorax.    THORACIC NODES: Confluent ill-defined left hilar and mediastinal   adenopathy, likely reactive, without dominant lymph node delineated.    MEDIASTINUM/GREAT VESSELS: No pericardial effusion. Heart size is within   normal limits. The aorta and main pulmonary artery are of normal caliber.    BONES/SOFT TISSUES: Unremarkable.    VISUALIZED UPPER ABDOMEN: Left nonobstructingrenal calculi.      IMPRESSION:    1. Bilateral patchy groundglass nodularity with dominant confluent left   lower lobe opacification with numerous cavitary lesions. Additional   contralateral right lower lobe cavitary 1 cm nodule. Findings compatible   with cavitary pneumonia in the appropriate clinical setting; differential   diagnosis includes tuberculosis.    2. Confluent ill-defined left hilar and mediastinal adenopathy, likely   reactive, without dominant lymph node delineated.    3. No evidence of pulmonary embolism.    Spoke with CESIA SHEIKH PA; Emergency Me on 5/23/2023 10:53 PM with   readback.    --- End of Report ---            YARITZA WLIDE MD; Attending Radiologist  This document has been electronically signed. May 23 2023 10:56PM (05-23-23 @ 22:06)      CARDIOLOGY TESTING  12 Lead ECG:   Ventricular Rate 134 BPM    Atrial Rate 134 BPM    P-R Interval 208 ms    QRS Duration 76 ms    Q-T Interval 318 ms    QTC Calculation(Bazett) 474 ms    P Axis 97 degrees    R Axis 40 degrees    T Axis 70 degrees    Diagnosis Line Sinus tachycardiawith Fusion complexes  Otherwise normal ECG    Confirmed by PANFILO COVINGTON MD ([id number]) on 5/24/2023 6:54:09 AM (05-24-23 @ 02:40)  12 Lead ECG:   Ventricular Rate 140 BPM    Atrial Rate 140 BPM    P-R Interval 120 ms    QRS Duration 138 ms    Q-T Interval 354 ms    QTC Calculation(Bazett) 540 ms    R Axis 73 degrees    T Axis 60 degrees    Diagnosis Line Sinus tachycardia  Right bundle branch block  Abnormal ECG    Confirmed by Jorge Wyatt ([id number]) on 5/23/2023 10:37:28 PM (05-23-23 @ 19:52)      All available historical records have been reviewed    MEDICATIONS  albuterol/ipratropium for Nebulization 3  chlorhexidine 0.12% Liquid 15  chlorhexidine 2% Cloths 1  fentaNYL   Infusion. 0.5  heparin   Injectable 5000  lactated ringers. 1000  levoFLOXacin IVPB   linezolid  IVPB 600  methylPREDNISolone sodium succinate Injectable 40  oseltamivir 30  oseltamivir 30  piperacillin/tazobactam IVPB.. 4.5  propofol Infusion 10      ANTIBIOTICS:  levoFLOXacin IVPB      linezolid  IVPB 600 milliGRAM(s) IV Intermittent every 12 hours  oseltamivir 30 milliGRAM(s) Oral every 12 hours  oseltamivir 30 milliGRAM(s) Oral every 12 hours  piperacillin/tazobactam IVPB.. 4.5 Gram(s) IV Intermittent every 6 hours      All available historical data has been reviewed    ASSESSMENT  42 year-old female with PMH of Asthma, HTN? presents with complaint of cough x3 days and SOB. During my encounter pt with dyspnea and increased work of breathing and chest pain, unable to properly provide accurate history. She states that her cough has been progressively worsening over the past 3 days, endorses hemoptysis since yesterday.     IMPRESSION  # Severe sepsis present on admission, with leukocytosis, tachycardia, elevated lactate, and clear source of infection    # Increased work off breathing, s/p intubated and mechanically ventilated   # CT chest: Bilateral patchy groundglass nodularity with dominant confluent left lower lobe opacification with numerous cavitary lesions. Additional contralateral right lower lobe cavitary 1 cm nodule. Findings compatible with cavitary pneumonia in the appropriate clinical setting; differential diagnosis includes tuberculosis. Confluent ill-defined left hilar and mediastinal adenopathy, likely reactive, without dominant lymph node delineated.  # RVP: positive for Influenza type B  # S/p rocephine and meropenem in ED  # Started on Vancomycin and Zosyn and now switched to Linezolid and Zosyn   # On Tamiflu     RECOMMENDATIONS  - f/u pending cultures, MRSA nares  - C/w Zosyn and Linezolid   - C/w Tamiflu     This is a pended note. All final recommendations to follow pending discussion with ID Attending    ROSSI, TINA  42y, Female  Allergy: aspirin (Short breath; Anaphylaxis)    CHIEF COMPLAINT: AHRF (24 May 2023 08:47)    HPI:  HPI:  42 year-old female with PMH of Asthma, HTN? presents with complaint of cough x3 days and SOB. During my encounter pt with dyspnea and increased work of breathing and chest pain, unable to properly provide accurate history. She states that her cough has been progressively worsening over the past 3 days, endorses hemoptysis since yesterday. She states she had a friend who came over and stayed with for more than a week who was sick recently, but she does not know if she recently travelled out of [facility] or not. She also admits that her children has been sick with Sore throat and fever for past 3 days. She admits to fever, chills, sever Chest pain which has gotten worse since she came to the ED. She took 1x Tamiflu yesterday. She otherwise denies diarrhea, constipation, urinary symptoms. She is not vaccinated for Influenza or COVID-19.     In ED  /87, , RR 28, T 100.8 F, 98% on 15 L NRB  Labs significant for wbc 16.7K with Neutrophilic Predominance and L shift, Hgb 11.4, MCV 61.6, Cr 2.0, BUN 23, Alk Phos 125/AST 62/ALT 27, Lac 4.9> 3.9  RVP + Influenza B      CT Angio Chest PE Protocol w/ IV Cont   1. Bilateral patchy groundglass nodularity with dominant confluent left lower lobe opacification with numerous cavitary lesions. Additional contralateral right lower lobe cavitary 1 cm nodule. Findings compatible with cavitary pneumonia in the appropriate clinical setting; differential diagnosis includes tuberculosis.  2. Confluent ill-defined left hilar and mediastinal adenopathy, likely reactive, without dominant lymph node delineated.  3. No evidence of pulmonary embolism.    s/p 1x Rocephin, Meropenem, 3 L LR in ED    Pt admitted to SDU for further managements, during my encounter pt is very agitated, in acute distress. Pt received appropriate pain control with IV Morphine, s/p Xanax 1 mg 1x for agitation and anxiety, despite pain control and adequate fluid resuscitation pt remained tachycardic, tachypneic. STAT EKG reviewed with Cardiology team on call After discussion with Cardiology team, Pt received 1x Adenosine 6 mg IV push with no change. Case discussed with Critical Care team, decision was made for Intubation and Pt upgraded to MICU.   (24 May 2023 00:58)      Infectious Diseases History:  Old Micro Data/Cultures:     FAMILY HISTORY:    PAST MEDICAL & SURGICAL HISTORY:      SOCIAL HISTORY  Social History:      Recent Travel:  Other Exposures:     ROS  Unable to obtain, patient intubated     VITALS:  T(F): 100.7, Max: 100.8 (05-23-23 @ 19:26)  HR: 120  BP: 124/87  RR: 20Vital Signs Last 24 Hrs  T(C): 38.2 (24 May 2023 08:15), Max: 38.2 (23 May 2023 19:26)  T(F): 100.7 (24 May 2023 08:15), Max: 100.8 (23 May 2023 19:26)  HR: 120 (24 May 2023 08:15) (120 - 141)  BP: 124/87 (24 May 2023 08:15) (124/87 - 156/109)  BP(mean): 101 (24 May 2023 08:15) (88 - 107)  RR: 20 (24 May 2023 08:15) (20 - 28)  SpO2: 100% (24 May 2023 08:15) (91% - 100%)    Parameters below as of 24 May 2023 08:15  Patient On (Oxygen Delivery Method): ventilator    O2 Concentration (%): 100    PHYSICAL EXAM:  Gen: NAD, resting in bed  HEENT: Normocephalic, atraumatic  Neck: supple, no lymphadenopathy  CV: Regular rate & regular rhythm  Lungs: CTAB  Abdomen: Soft, BS present  Ext: Warm, well perfused  Neuro: non focal, awake  Skin: no rash, no lesions  Lines: no phlebitis    TESTS & MEASUREMENTS:                        10.1   3.92  )-----------( 203      ( 24 May 2023 06:10 )             34.8     05-24    132<L>  |  99  |  31<H>  ----------------------------<  185<H>  3.3<L>   |  16<L>  |  2.6<H>    Ca    8.4      24 May 2023 06:10  Mg     1.8     05-24    TPro  6.3  /  Alb  3.4<L>  /  TBili  0.2  /  DBili  x   /  AST  54<H>  /  ALT  23  /  AlkPhos  87  05-24      LIVER FUNCTIONS - ( 24 May 2023 06:10 )  Alb: 3.4 g/dL / Pro: 6.3 g/dL / ALK PHOS: 87 U/L / ALT: 23 U/L / AST: 54 U/L / GGT: x                 Lactate, Blood: 3.9 mmol/L (05-23-23 @ 22:52)  Blood Gas Venous - Lactate: 4.40 mmol/L (05-23-23 @ 20:36)  Lactate, Blood: 4.9 mmol/L (05-23-23 @ 20:07)      INFECTIOUS DISEASES TESTING      RADIOLOGY & ADDITIONAL TESTS:  I have personally reviewed the last available Chest xray  CXR  Xray Chest 1 View- PORTABLE-Urgent:   ACC: [id number] EXAM:  XR CHEST PORTABLE URGENT 1V   ORDERED BY: AMILCAR GARCÍA     PROCEDURE DATE:  05/24/2023          INTERPRETATION:  Clinical History / Reason for exam: Follow-up.    Comparison : Chest radiograph May 23, 2023.    Technique/Positioning: Adequate.    Findings:    Support devices: ETT with its tip above the noe and NGT with its tip   below the diaphragm.    Cardiac/mediastinum/hilum: Stable    Lung parenchyma/Pleura: Bilateral opacities. No pneumothorax is seen.    Skeleton/soft tissues: Stable    Impression:    Bilateral opacities.    Support tubes as above.    --- End of Report ---            AMANDA VELARDE MD; Attending Radiologist  This document has been electronically signed. May 24 2023  7:14AM (05-24-23 @ 06:56)  Xray Chest 1 View- PORTABLE-Urgent:   ACC: [id number] EXAM:  XR CHEST PORTABLE URGENT 1V   ORDERED BY: CESIA SHEIKH     PROCEDURE DATE:  05/23/2023          INTERPRETATION:  Clinical History / Reason for exam: Shortness of breath    Comparison : Chest radiograph None.    Technique/Positioning: Low lung volume.    Findings:    Support devices: None.    Cardiac/mediastinum/hilum: Unremarkable.    Lung parenchyma/Pleura: Within normal limits.    Skeleton/soft tissues: Unremarkable.    Impression:    Low lung volume.    No acute infiltrates.    Refer to the report of the CT scan of the chest performed on the same day.    --- End of Report ---            AMANDA VELARDE MD; Attending Radiologist  This document has been electronically signed. May 24 2023  6:01AM (05-23-23 @ 20:29)      CT  CT Angio Chest PE Protocol w/ IV Cont:   ACC: [id number] EXAM:  CT ANGIO CHEST PULM ART [facility]   ORDERED BY: CESIA SHEIKH     PROCEDURE DATE:  05/23/2023          INTERPRETATION:  CLINICAL HISTORY/REASON FOR EXAM: Shortness of breath..    TECHNIQUE: Multislice helical sections were obtained from the thoracic   inlet to the lung bases during rapid administration of 65 cc Omnipaque   350 intravenous contrast. Thin sections were reconstructed through the   pulmonary vasculature. 3D (MIP) reformats obtained. 35 cc contrast   discarded.    COMPARISON: None.    FINDINGS:    PULMONARY EMBOLUS: No evidence of acute pulmonary embolism.    LUNGS, PLEURA, AIRWAYS: Bilateral patchy groundglass nodularity with   dominant confluent left lower lobe opacification with numerous cavitary   lesions. Additional contralateral right lower lobe cavitary 1 cm nodule.    Central airway patent. No pleural effusion. No pneumothorax.    THORACIC NODES: Confluent ill-defined left hilar and mediastinal   adenopathy, likely reactive, without dominant lymph node delineated.    MEDIASTINUM/GREAT VESSELS: No pericardial effusion. Heart size is within   normal limits. The aorta and main pulmonary artery are of normal caliber.    BONES/SOFT TISSUES: Unremarkable.    VISUALIZED UPPER ABDOMEN: Left nonobstructingrenal calculi.      IMPRESSION:    1. Bilateral patchy groundglass nodularity with dominant confluent left   lower lobe opacification with numerous cavitary lesions. Additional   contralateral right lower lobe cavitary 1 cm nodule. Findings compatible   with cavitary pneumonia in the appropriate clinical setting; differential   diagnosis includes tuberculosis.    2. Confluent ill-defined left hilar and mediastinal adenopathy, likely   reactive, without dominant lymph node delineated.    3. No evidence of pulmonary embolism.    Spoke with CESIA SHEIKH PA; Emergency Me on 5/23/2023 10:53 PM with   readback.    --- End of Report ---            YARITZA WILDE MD; Attending Radiologist  This document has been electronically signed. May 23 2023 10:56PM (05-23-23 @ 22:06)      CARDIOLOGY TESTING  12 Lead ECG:   Ventricular Rate 134 BPM    Atrial Rate 134 BPM    P-R Interval 208 ms    QRS Duration 76 ms    Q-T Interval 318 ms    QTC Calculation(Bazett) 474 ms    P Axis 97 degrees    R Axis 40 degrees    T Axis 70 degrees    Diagnosis Line Sinus tachycardiawith Fusion complexes  Otherwise normal ECG    Confirmed by PANFILO COVINGTON MD ([id number]) on 5/24/2023 6:54:09 AM (05-24-23 @ 02:40)  12 Lead ECG:   Ventricular Rate 140 BPM    Atrial Rate 140 BPM    P-R Interval 120 ms    QRS Duration 138 ms    Q-T Interval 354 ms    QTC Calculation(Bazett) 540 ms    R Axis 73 degrees    T Axis 60 degrees    Diagnosis Line Sinus tachycardia  Right bundle branch block  Abnormal ECG    Confirmed by Jorge Wyatt ([id number]) on 5/23/2023 10:37:28 PM (05-23-23 @ 19:52)      All available historical records have been reviewed    MEDICATIONS  albuterol/ipratropium for Nebulization 3  chlorhexidine 0.12% Liquid 15  chlorhexidine 2% Cloths 1  fentaNYL   Infusion. 0.5  heparin   Injectable 5000  lactated ringers. 1000  levoFLOXacin IVPB   linezolid  IVPB 600  methylPREDNISolone sodium succinate Injectable 40  oseltamivir 30  oseltamivir 30  piperacillin/tazobactam IVPB.. 4.5  propofol Infusion 10      ANTIBIOTICS:  levoFLOXacin IVPB      linezolid  IVPB 600 milliGRAM(s) IV Intermittent every 12 hours  oseltamivir 30 milliGRAM(s) Oral every 12 hours  oseltamivir 30 milliGRAM(s) Oral every 12 hours  piperacillin/tazobactam IVPB.. 4.5 Gram(s) IV Intermittent every 6 hours      All available historical data has been reviewed    ASSESSMENT  42 year-old female with PMH of Asthma, HTN? presents with complaint of cough x3 days and SOB. During my encounter pt with dyspnea and increased work of breathing and chest pain, unable to properly provide accurate history. She states that her cough has been progressively worsening over the past 3 days, endorses hemoptysis since yesterday.     IMPRESSION  # Severe sepsis present on admission, with leukocytosis, tachycardia, elevated lactate, and clear source of infection    # Increased work off breathing, s/p intubated and mechanically ventilated   # CT chest: Bilateral patchy groundglass nodularity with dominant confluent left lower lobe opacification with numerous cavitary lesions. Additional contralateral right lower lobe cavitary 1 cm nodule. Findings compatible with cavitary pneumonia in the appropriate clinical setting; differential diagnosis includes tuberculosis. Confluent ill-defined left hilar and mediastinal adenopathy, likely reactive, without dominant lymph node delineated.  # RVP: positive for Influenza type B  # S/p rocephine and meropenem in ED  # Started on Vancomycin and Zosyn and now switched to Linezolid, Levofloxacin and Zosyn   # On Tamiflu     RECOMMENDATIONS  - f/u pending cultures, MRSA nares  - C/w Zosyn, Levofloxacin and Linezolid   - C/w Tamiflu     This is a pended note. All final recommendations to follow pending discussion with ID Attending    ROSSI, TINA  42y, Female  Allergy: aspirin (Short breath; Anaphylaxis)    CHIEF COMPLAINT: AHRF (24 May 2023 08:47)    HPI:  HPI:  42 year-old female with PMH of Asthma, HTN? presents with complaint of cough x3 days and SOB. During my encounter pt with dyspnea and increased work of breathing and chest pain, unable to properly provide accurate history. She states that her cough has been progressively worsening over the past 3 days, endorses hemoptysis since yesterday. She states she had a friend who came over and stayed with for more than a week who was sick recently, but she does not know if she recently travelled out of [facility] or not. She also admits that her children has been sick with Sore throat and fever for past 3 days. She admits to fever, chills, sever Chest pain which has gotten worse since she came to the ED. She took 1x Tamiflu yesterday. She otherwise denies diarrhea, constipation, urinary symptoms. She is not vaccinated for Influenza or COVID-19.     In ED  /87, , RR 28, T 100.8 F, 98% on 15 L NRB  Labs significant for wbc 16.7K with Neutrophilic Predominance and L shift, Hgb 11.4, MCV 61.6, Cr 2.0, BUN 23, Alk Phos 125/AST 62/ALT 27, Lac 4.9> 3.9  RVP + Influenza B      CT Angio Chest PE Protocol w/ IV Cont   1. Bilateral patchy groundglass nodularity with dominant confluent left lower lobe opacification with numerous cavitary lesions. Additional contralateral right lower lobe cavitary 1 cm nodule. Findings compatible with cavitary pneumonia in the appropriate clinical setting; differential diagnosis includes tuberculosis.  2. Confluent ill-defined left hilar and mediastinal adenopathy, likely reactive, without dominant lymph node delineated.  3. No evidence of pulmonary embolism.    s/p 1x Rocephin, Meropenem, 3 L LR in ED    Pt admitted to SDU for further managements, during my encounter pt is very agitated, in acute distress. Pt received appropriate pain control with IV Morphine, s/p Xanax 1 mg 1x for agitation and anxiety, despite pain control and adequate fluid resuscitation pt remained tachycardic, tachypneic. STAT EKG reviewed with Cardiology team on call After discussion with Cardiology team, Pt received 1x Adenosine 6 mg IV push with no change. Case discussed with Critical Care team, decision was made for Intubation and Pt upgraded to MICU.   (24 May 2023 00:58)      Infectious Diseases History:  Old Micro Data/Cultures:     FAMILY HISTORY:    PAST MEDICAL & SURGICAL HISTORY:      SOCIAL HISTORY  Social History:      Recent Travel:  Other Exposures:     ROS  Unable to obtain, patient intubated     VITALS:  T(F): 100.7, Max: 100.8 (05-23-23 @ 19:26)  HR: 120  BP: 124/87  RR: 20Vital Signs Last 24 Hrs  T(C): 38.2 (24 May 2023 08:15), Max: 38.2 (23 May 2023 19:26)  T(F): 100.7 (24 May 2023 08:15), Max: 100.8 (23 May 2023 19:26)  HR: 120 (24 May 2023 08:15) (120 - 141)  BP: 124/87 (24 May 2023 08:15) (124/87 - 156/109)  BP(mean): 101 (24 May 2023 08:15) (88 - 107)  RR: 20 (24 May 2023 08:15) (20 - 28)  SpO2: 100% (24 May 2023 08:15) (91% - 100%)    Parameters below as of 24 May 2023 08:15  Patient On (Oxygen Delivery Method): ventilator    O2 Concentration (%): 100    PHYSICAL EXAM:  Gen: NAD, resting in bed  HEENT: Normocephalic, atraumatic  Neck: supple, no lymphadenopathy  CV: Regular rate & regular rhythm  Lungs: CTAB  Abdomen: Soft, BS present  Ext: Warm, well perfused  Neuro: non focal, awake  Skin: no rash, no lesions  Lines: no phlebitis    TESTS & MEASUREMENTS:                        10.1   3.92  )-----------( 203      ( 24 May 2023 06:10 )             34.8     05-24    132<L>  |  99  |  31<H>  ----------------------------<  185<H>  3.3<L>   |  16<L>  |  2.6<H>    Ca    8.4      24 May 2023 06:10  Mg     1.8     05-24    TPro  6.3  /  Alb  3.4<L>  /  TBili  0.2  /  DBili  x   /  AST  54<H>  /  ALT  23  /  AlkPhos  87  05-24      LIVER FUNCTIONS - ( 24 May 2023 06:10 )  Alb: 3.4 g/dL / Pro: 6.3 g/dL / ALK PHOS: 87 U/L / ALT: 23 U/L / AST: 54 U/L / GGT: x                 Lactate, Blood: 3.9 mmol/L (05-23-23 @ 22:52)  Blood Gas Venous - Lactate: 4.40 mmol/L (05-23-23 @ 20:36)  Lactate, Blood: 4.9 mmol/L (05-23-23 @ 20:07)      INFECTIOUS DISEASES TESTING      RADIOLOGY & ADDITIONAL TESTS:  I have personally reviewed the last available Chest xray  CXR  Xray Chest 1 View- PORTABLE-Urgent:   ACC: [id number] EXAM:  XR CHEST PORTABLE URGENT 1V   ORDERED BY: AMILCAR GARCÍA     PROCEDURE DATE:  05/24/2023          INTERPRETATION:  Clinical History / Reason for exam: Follow-up.    Comparison : Chest radiograph May 23, 2023.    Technique/Positioning: Adequate.    Findings:    Support devices: ETT with its tip above the noe and NGT with its tip   below the diaphragm.    Cardiac/mediastinum/hilum: Stable    Lung parenchyma/Pleura: Bilateral opacities. No pneumothorax is seen.    Skeleton/soft tissues: Stable    Impression:    Bilateral opacities.    Support tubes as above.    --- End of Report ---            AMANDA VELARDE MD; Attending Radiologist  This document has been electronically signed. May 24 2023  7:14AM (05-24-23 @ 06:56)  Xray Chest 1 View- PORTABLE-Urgent:   ACC: [id number] EXAM:  XR CHEST PORTABLE URGENT 1V   ORDERED BY: CESIA SHEIKH     PROCEDURE DATE:  05/23/2023          INTERPRETATION:  Clinical History / Reason for exam: Shortness of breath    Comparison : Chest radiograph None.    Technique/Positioning: Low lung volume.    Findings:    Support devices: None.    Cardiac/mediastinum/hilum: Unremarkable.    Lung parenchyma/Pleura: Within normal limits.    Skeleton/soft tissues: Unremarkable.    Impression:    Low lung volume.    No acute infiltrates.    Refer to the report of the CT scan of the chest performed on the same day.    --- End of Report ---            AMANDA VELARDE MD; Attending Radiologist  This document has been electronically signed. May 24 2023  6:01AM (05-23-23 @ 20:29)      CT  CT Angio Chest PE Protocol w/ IV Cont:   ACC: [id number] EXAM:  CT ANGIO CHEST PULM ART [facility]   ORDERED BY: CESIA SHEIKH     PROCEDURE DATE:  05/23/2023          INTERPRETATION:  CLINICAL HISTORY/REASON FOR EXAM: Shortness of breath..    TECHNIQUE: Multislice helical sections were obtained from the thoracic   inlet to the lung bases during rapid administration of 65 cc Omnipaque   350 intravenous contrast. Thin sections were reconstructed through the   pulmonary vasculature. 3D (MIP) reformats obtained. 35 cc contrast   discarded.    COMPARISON: None.    FINDINGS:    PULMONARY EMBOLUS: No evidence of acute pulmonary embolism.    LUNGS, PLEURA, AIRWAYS: Bilateral patchy groundglass nodularity with   dominant confluent left lower lobe opacification with numerous cavitary   lesions. Additional contralateral right lower lobe cavitary 1 cm nodule.    Central airway patent. No pleural effusion. No pneumothorax.    THORACIC NODES: Confluent ill-defined left hilar and mediastinal   adenopathy, likely reactive, without dominant lymph node delineated.    MEDIASTINUM/GREAT VESSELS: No pericardial effusion. Heart size is within   normal limits. The aorta and main pulmonary artery are of normal caliber.    BONES/SOFT TISSUES: Unremarkable.    VISUALIZED UPPER ABDOMEN: Left nonobstructingrenal calculi.      IMPRESSION:    1. Bilateral patchy groundglass nodularity with dominant confluent left   lower lobe opacification with numerous cavitary lesions. Additional   contralateral right lower lobe cavitary 1 cm nodule. Findings compatible   with cavitary pneumonia in the appropriate clinical setting; differential   diagnosis includes tuberculosis.    2. Confluent ill-defined left hilar and mediastinal adenopathy, likely   reactive, without dominant lymph node delineated.    3. No evidence of pulmonary embolism.    Spoke with CESIA SHEIKH PA; Emergency Me on 5/23/2023 10:53 PM with   readback.    --- End of Report ---            YARITZA WILDE MD; Attending Radiologist  This document has been electronically signed. May 23 2023 10:56PM (05-23-23 @ 22:06)      CARDIOLOGY TESTING  12 Lead ECG:   Ventricular Rate 134 BPM    Atrial Rate 134 BPM    P-R Interval 208 ms    QRS Duration 76 ms    Q-T Interval 318 ms    QTC Calculation(Bazett) 474 ms    P Axis 97 degrees    R Axis 40 degrees    T Axis 70 degrees    Diagnosis Line Sinus tachycardiawith Fusion complexes  Otherwise normal ECG    Confirmed by PANFILO COVINGTON MD ([id number]) on 5/24/2023 6:54:09 AM (05-24-23 @ 02:40)  12 Lead ECG:   Ventricular Rate 140 BPM    Atrial Rate 140 BPM    P-R Interval 120 ms    QRS Duration 138 ms    Q-T Interval 354 ms    QTC Calculation(Bazett) 540 ms    R Axis 73 degrees    T Axis 60 degrees    Diagnosis Line Sinus tachycardia  Right bundle branch block  Abnormal ECG    Confirmed by Jorge Wyatt ([id number]) on 5/23/2023 10:37:28 PM (05-23-23 @ 19:52)      All available historical records have been reviewed    MEDICATIONS  albuterol/ipratropium for Nebulization 3  chlorhexidine 0.12% Liquid 15  chlorhexidine 2% Cloths 1  fentaNYL   Infusion. 0.5  heparin   Injectable 5000  lactated ringers. 1000  levoFLOXacin IVPB   linezolid  IVPB 600  methylPREDNISolone sodium succinate Injectable 40  oseltamivir 30  oseltamivir 30  piperacillin/tazobactam IVPB.. 4.5  propofol Infusion 10      ANTIBIOTICS:  levoFLOXacin IVPB      linezolid  IVPB 600 milliGRAM(s) IV Intermittent every 12 hours  oseltamivir 30 milliGRAM(s) Oral every 12 hours  oseltamivir 30 milliGRAM(s) Oral every 12 hours  piperacillin/tazobactam IVPB.. 4.5 Gram(s) IV Intermittent every 6 hours      All available historical data has been reviewed    ASSESSMENT  42 year-old female with PMH of Asthma, HTN? presents with complaint of cough x3 days and SOB. During my encounter pt with dyspnea and increased work of breathing and chest pain, unable to properly provide accurate history. She states that her cough has been progressively worsening over the past 3 days, endorses hemoptysis since yesterday.     IMPRESSION  # Severe sepsis present on admission, with leukocytosis, tachycardia, elevated lactate, and clear source of infection    # Increased work off breathing, s/p intubated and mechanically ventilated   # CT chest: Bilateral patchy groundglass nodularity with dominant confluent left lower lobe opacification with numerous cavitary lesions. Additional contralateral right lower lobe cavitary 1 cm nodule. Findings compatible with cavitary pneumonia in the appropriate clinical setting; differential diagnosis includes tuberculosis. Confluent ill-defined left hilar and mediastinal adenopathy, likely reactive, without dominant lymph node delineated.  # RVP: positive for Influenza type B  # S/p rocephine and meropenem in ED  # Started on Vancomycin and Zosyn and now switched to Linezolid, Levofloxacin and Zosyn   # On Tamiflu     RECOMMENDATIONS  - f/u pending cultures, MRSA nares  - Strep and Legionella Urine Ag   - HIV, Galactomannan, and Fungitall   - C/w Zosyn, Levofloxacin and Linezolid   - C/w Tamiflu     This is a pended note. All final recommendations to follow pending discussion with ID Attending    ROSSI, TINA  42y, Female  Allergy: aspirin (Short breath; Anaphylaxis)    CHIEF COMPLAINT: AHRF (24 May 2023 08:47)    HPI:  HPI:  42 year-old female with PMH of Asthma, HTN? presents with complaint of cough x3 days and SOB. During my encounter pt with dyspnea and increased work of breathing and chest pain, unable to properly provide accurate history. She states that her cough has been progressively worsening over the past 3 days, endorses hemoptysis since yesterday. She states she had a friend who came over and stayed with for more than a week who was sick recently, but she does not know if she recently travelled out of [facility] or not. She also admits that her children has been sick with Sore throat and fever for past 3 days. She admits to fever, chills, sever Chest pain which has gotten worse since she came to the ED. She took 1x Tamiflu yesterday. She otherwise denies diarrhea, constipation, urinary symptoms. She is not vaccinated for Influenza or COVID-19.     In ED  /87, , RR 28, T 100.8 F, 98% on 15 L NRB  Labs significant for wbc 16.7K with Neutrophilic Predominance and L shift, Hgb 11.4, MCV 61.6, Cr 2.0, BUN 23, Alk Phos 125/AST 62/ALT 27, Lac 4.9> 3.9  RVP + Influenza B      CT Angio Chest PE Protocol w/ IV Cont   1. Bilateral patchy groundglass nodularity with dominant confluent left lower lobe opacification with numerous cavitary lesions. Additional contralateral right lower lobe cavitary 1 cm nodule. Findings compatible with cavitary pneumonia in the appropriate clinical setting; differential diagnosis includes tuberculosis.  2. Confluent ill-defined left hilar and mediastinal adenopathy, likely reactive, without dominant lymph node delineated.  3. No evidence of pulmonary embolism.    s/p 1x Rocephin, Meropenem, 3 L LR in ED    Pt admitted to SDU for further managements, during my encounter pt is very agitated, in acute distress. Pt received appropriate pain control with IV Morphine, s/p Xanax 1 mg 1x for agitation and anxiety, despite pain control and adequate fluid resuscitation pt remained tachycardic, tachypneic. STAT EKG reviewed with Cardiology team on call After discussion with Cardiology team, Pt received 1x Adenosine 6 mg IV push with no change. Case discussed with Critical Care team, decision was made for Intubation and Pt upgraded to MICU.   (24 May 2023 00:58)      Infectious Diseases History:  Old Micro Data/Cultures:     FAMILY HISTORY:    PAST MEDICAL & SURGICAL HISTORY:      SOCIAL HISTORY  Social History:      Recent Travel:  Other Exposures:     ROS  Unable to obtain, patient intubated     VITALS:  T(F): 100.7, Max: 100.8 (05-23-23 @ 19:26)  HR: 120  BP: 124/87  RR: 20Vital Signs Last 24 Hrs  T(C): 38.2 (24 May 2023 08:15), Max: 38.2 (23 May 2023 19:26)  T(F): 100.7 (24 May 2023 08:15), Max: 100.8 (23 May 2023 19:26)  HR: 120 (24 May 2023 08:15) (120 - 141)  BP: 124/87 (24 May 2023 08:15) (124/87 - 156/109)  BP(mean): 101 (24 May 2023 08:15) (88 - 107)  RR: 20 (24 May 2023 08:15) (20 - 28)  SpO2: 100% (24 May 2023 08:15) (91% - 100%)    Parameters below as of 24 May 2023 08:15  Patient On (Oxygen Delivery Method): ventilator    O2 Concentration (%): 100    PHYSICAL EXAM:  Gen: Intubated  HEENT: Normocephalic, atraumatic  Neck: supple, no lymphadenopathy  CV: Regular rate & regular rhythm  Lungs: bilateral crackles   Abdomen: Soft, BS present  Ext: Warm, well perfused  Skin: no rash, no lesions  Lines: no phlebitis    TESTS & MEASUREMENTS:                        10.1   3.92  )-----------( 203      ( 24 May 2023 06:10 )             34.8     05-24    132<L>  |  99  |  31<H>  ----------------------------<  185<H>  3.3<L>   |  16<L>  |  2.6<H>    Ca    8.4      24 May 2023 06:10  Mg     1.8     05-24    TPro  6.3  /  Alb  3.4<L>  /  TBili  0.2  /  DBili  x   /  AST  54<H>  /  ALT  23  /  AlkPhos  87  05-24      LIVER FUNCTIONS - ( 24 May 2023 06:10 )  Alb: 3.4 g/dL / Pro: 6.3 g/dL / ALK PHOS: 87 U/L / ALT: 23 U/L / AST: 54 U/L / GGT: x                 Lactate, Blood: 3.9 mmol/L (05-23-23 @ 22:52)  Blood Gas Venous - Lactate: 4.40 mmol/L (05-23-23 @ 20:36)  Lactate, Blood: 4.9 mmol/L (05-23-23 @ 20:07)      INFECTIOUS DISEASES TESTING      RADIOLOGY & ADDITIONAL TESTS:  I have personally reviewed the last available Chest xray  CXR  Xray Chest 1 View- PORTABLE-Urgent:   ACC: [id number] EXAM:  XR CHEST PORTABLE URGENT 1V   ORDERED BY: AMILCAR GARCÍA     PROCEDURE DATE:  05/24/2023          INTERPRETATION:  Clinical History / Reason for exam: Follow-up.    Comparison : Chest radiograph May 23, 2023.    Technique/Positioning: Adequate.    Findings:    Support devices: ETT with its tip above the noe and NGT with its tip   below the diaphragm.    Cardiac/mediastinum/hilum: Stable    Lung parenchyma/Pleura: Bilateral opacities. No pneumothorax is seen.    Skeleton/soft tissues: Stable    Impression:    Bilateral opacities.    Support tubes as above.    --- End of Report ---            AMANDA VELARDE MD; Attending Radiologist  This document has been electronically signed. May 24 2023  7:14AM (05-24-23 @ 06:56)  Xray Chest 1 View- PORTABLE-Urgent:   ACC: [id number] EXAM:  XR CHEST PORTABLE URGENT 1V   ORDERED BY: CESIA SHEIKH     PROCEDURE DATE:  05/23/2023          INTERPRETATION:  Clinical History / Reason for exam: Shortness of breath    Comparison : Chest radiograph None.    Technique/Positioning: Low lung volume.    Findings:    Support devices: None.    Cardiac/mediastinum/hilum: Unremarkable.    Lung parenchyma/Pleura: Within normal limits.    Skeleton/soft tissues: Unremarkable.    Impression:    Low lung volume.    No acute infiltrates.    Refer to the report of the CT scan of the chest performed on the same day.    --- End of Report ---            AMANDA VELARDE MD; Attending Radiologist  This document has been electronically signed. May 24 2023  6:01AM (05-23-23 @ 20:29)      CT  CT Angio Chest PE Protocol w/ IV Cont:   ACC: [id number] EXAM:  CT ANGIO CHEST PULM ART [facility]   ORDERED BY: CESIA SHEIKH     PROCEDURE DATE:  05/23/2023          INTERPRETATION:  CLINICAL HISTORY/REASON FOR EXAM: Shortness of breath..    TECHNIQUE: Multislice helical sections were obtained from the thoracic   inlet to the lung bases during rapid administration of 65 cc Omnipaque   350 intravenous contrast. Thin sections were reconstructed through the   pulmonary vasculature. 3D (MIP) reformats obtained. 35 cc contrast   discarded.    COMPARISON: None.    FINDINGS:    PULMONARY EMBOLUS: No evidence of acute pulmonary embolism.    LUNGS, PLEURA, AIRWAYS: Bilateral patchy groundglass nodularity with   dominant confluent left lower lobe opacification with numerous cavitary   lesions. Additional contralateral right lower lobe cavitary 1 cm nodule.    Central airway patent. No pleural effusion. No pneumothorax.    THORACIC NODES: Confluent ill-defined left hilar and mediastinal   adenopathy, likely reactive, without dominant lymph node delineated.    MEDIASTINUM/GREAT VESSELS: No pericardial effusion. Heart size is within   normal limits. The aorta and main pulmonary artery are of normal caliber.    BONES/SOFT TISSUES: Unremarkable.    VISUALIZED UPPER ABDOMEN: Left nonobstructingrenal calculi.      IMPRESSION:    1. Bilateral patchy groundglass nodularity with dominant confluent left   lower lobe opacification with numerous cavitary lesions. Additional   contralateral right lower lobe cavitary 1 cm nodule. Findings compatible   with cavitary pneumonia in the appropriate clinical setting; differential   diagnosis includes tuberculosis.    2. Confluent ill-defined left hilar and mediastinal adenopathy, likely   reactive, without dominant lymph node delineated.    3. No evidence of pulmonary embolism.    Spoke with CESIA SHEIKH PA; Emergency Me on 5/23/2023 10:53 PM with   readback.    --- End of Report ---            YARITZA WILDE MD; Attending Radiologist  This document has been electronically signed. May 23 2023 10:56PM (05-23-23 @ 22:06)      CARDIOLOGY TESTING  12 Lead ECG:   Ventricular Rate 134 BPM    Atrial Rate 134 BPM    P-R Interval 208 ms    QRS Duration 76 ms    Q-T Interval 318 ms    QTC Calculation(Bazett) 474 ms    P Axis 97 degrees    R Axis 40 degrees    T Axis 70 degrees    Diagnosis Line Sinus tachycardiawith Fusion complexes  Otherwise normal ECG    Confirmed by PANFILO COVINGTON MD ([id number]) on 5/24/2023 6:54:09 AM (05-24-23 @ 02:40)  12 Lead ECG:   Ventricular Rate 140 BPM    Atrial Rate 140 BPM    P-R Interval 120 ms    QRS Duration 138 ms    Q-T Interval 354 ms    QTC Calculation(Bazett) 540 ms    R Axis 73 degrees    T Axis 60 degrees    Diagnosis Line Sinus tachycardia  Right bundle branch block  Abnormal ECG    Confirmed by Jorge Wyatt ([id number]) on 5/23/2023 10:37:28 PM (05-23-23 @ 19:52)      All available historical records have been reviewed    MEDICATIONS  albuterol/ipratropium for Nebulization 3  chlorhexidine 0.12% Liquid 15  chlorhexidine 2% Cloths 1  fentaNYL   Infusion. 0.5  heparin   Injectable 5000  lactated ringers. 1000  levoFLOXacin IVPB   linezolid  IVPB 600  methylPREDNISolone sodium succinate Injectable 40  oseltamivir 30  oseltamivir 30  piperacillin/tazobactam IVPB.. 4.5  propofol Infusion 10      ANTIBIOTICS:  levoFLOXacin IVPB      linezolid  IVPB 600 milliGRAM(s) IV Intermittent every 12 hours  oseltamivir 30 milliGRAM(s) Oral every 12 hours  oseltamivir 30 milliGRAM(s) Oral every 12 hours  piperacillin/tazobactam IVPB.. 4.5 Gram(s) IV Intermittent every 6 hours      All available historical data has been reviewed    ASSESSMENT  42 year-old female with PMH of Asthma, HTN? presents with complaint of cough x3 days and SOB. During my encounter pt with dyspnea and increased work of breathing and chest pain, unable to properly provide accurate history. She states that her cough has been progressively worsening over the past 3 days, endorses hemoptysis since yesterday.     IMPRESSION  # Severe sepsis present on admission, with leukocytosis, tachycardia, elevated lactate, and clear source of infection    # Increased work off breathing, s/p intubated and mechanically ventilated   # CT chest: Bilateral patchy groundglass nodularity with dominant confluent left lower lobe opacification with numerous cavitary lesions. Additional contralateral right lower lobe cavitary 1 cm nodule. Findings compatible with cavitary pneumonia in the appropriate clinical setting; differential diagnosis includes tuberculosis. Confluent ill-defined left hilar and mediastinal adenopathy, likely reactive, without dominant lymph node delineated.  # RVP: positive for Influenza type B  # S/p rocephine and meropenem in ED  # Started on Vancomycin and Zosyn and now switched to Linezolid, Levofloxacin and Zosyn   # On Tamiflu  # Most likely influenza pneumonia super imposed by mssa pneumonia     RECOMMENDATIONS  - f/u pending cultures, please get MRSA nares  - Please get deep tracheal aspirate cultures   - Strep and Legionella Urine Ag   - Adjust Zosyn dose to 3.375 Q8   - Adjust Levofloxacin to 750 Q 48h , (unlikely to be atypical pneumonia)  - C/w with Linezolid 600 mg BID   - Adjust Tamiflu to 30 mg Q12     This is a pended note. All final recommendations to follow pending discussion with ID Attending    ROSSI, TINA  42y, Female  Allergy: aspirin (Short breath; Anaphylaxis)    CHIEF COMPLAINT: AHRF (24 May 2023 08:47)    HPI:  HPI:  42 year-old female with PMH of Asthma, HTN? presents with complaint of cough x3 days and SOB. During my encounter pt with dyspnea and increased work of breathing and chest pain, unable to properly provide accurate history. She states that her cough has been progressively worsening over the past 3 days, endorses hemoptysis since yesterday. She states she had a friend who came over and stayed with for more than a week who was sick recently, but she does not know if she recently travelled out of [facility] or not. She also admits that her children has been sick with Sore throat and fever for past 3 days. She admits to fever, chills, sever Chest pain which has gotten worse since she came to the ED. She took 1x Tamiflu yesterday. She otherwise denies diarrhea, constipation, urinary symptoms. She is not vaccinated for Influenza or COVID-19.     In ED  /87, , RR 28, T 100.8 F, 98% on 15 L NRB  Labs significant for wbc 16.7K with Neutrophilic Predominance and L shift, Hgb 11.4, MCV 61.6, Cr 2.0, BUN 23, Alk Phos 125/AST 62/ALT 27, Lac 4.9> 3.9  RVP + Influenza B      CT Angio Chest PE Protocol w/ IV Cont   1. Bilateral patchy groundglass nodularity with dominant confluent left lower lobe opacification with numerous cavitary lesions. Additional contralateral right lower lobe cavitary 1 cm nodule. Findings compatible with cavitary pneumonia in the appropriate clinical setting; differential diagnosis includes tuberculosis.  2. Confluent ill-defined left hilar and mediastinal adenopathy, likely reactive, without dominant lymph node delineated.  3. No evidence of pulmonary embolism.    s/p 1x Rocephin, Meropenem, 3 L LR in ED    Pt admitted to SDU for further managements, during my encounter pt is very agitated, in acute distress. Pt received appropriate pain control with IV Morphine, s/p Xanax 1 mg 1x for agitation and anxiety, despite pain control and adequate fluid resuscitation pt remained tachycardic, tachypneic. STAT EKG reviewed with Cardiology team on call After discussion with Cardiology team, Pt received 1x Adenosine 6 mg IV push with no change. Case discussed with Critical Care team, decision was made for Intubation and Pt upgraded to MICU.   (24 May 2023 00:58)      Infectious Diseases History:  Old Micro Data/Cultures:     FAMILY HISTORY: non-contributory     PAST MEDICAL & SURGICAL HISTORY:      SOCIAL HISTORY  Social History:      Recent Travel:  Other Exposures:     ROS  Unable to obtain, patient intubated     VITALS:  T(F): 100.7, Max: 100.8 (05-23-23 @ 19:26)  HR: 120  BP: 124/87  RR: 20Vital Signs Last 24 Hrs  T(C): 38.2 (24 May 2023 08:15), Max: 38.2 (23 May 2023 19:26)  T(F): 100.7 (24 May 2023 08:15), Max: 100.8 (23 May 2023 19:26)  HR: 120 (24 May 2023 08:15) (120 - 141)  BP: 124/87 (24 May 2023 08:15) (124/87 - 156/109)  BP(mean): 101 (24 May 2023 08:15) (88 - 107)  RR: 20 (24 May 2023 08:15) (20 - 28)  SpO2: 100% (24 May 2023 08:15) (91% - 100%)    Parameters below as of 24 May 2023 08:15  Patient On (Oxygen Delivery Method): ventilator    O2 Concentration (%): 100    PHYSICAL EXAM:  Gen: Intubated  HEENT: Normocephalic, atraumatic  Neck: supple, no lymphadenopathy  CV: Regular rate & regular rhythm  Lungs: bilateral crackles   Abdomen: Soft, BS present  Ext: Warm, well perfused  Skin: no rash, no lesions  Lines: no phlebitis    TESTS & MEASUREMENTS:                        10.1   3.92  )-----------( 203      ( 24 May 2023 06:10 )             34.8     05-24    132<L>  |  99  |  31<H>  ----------------------------<  185<H>  3.3<L>   |  16<L>  |  2.6<H>    Ca    8.4      24 May 2023 06:10  Mg     1.8     05-24    TPro  6.3  /  Alb  3.4<L>  /  TBili  0.2  /  DBili  x   /  AST  54<H>  /  ALT  23  /  AlkPhos  87  05-24      LIVER FUNCTIONS - ( 24 May 2023 06:10 )  Alb: 3.4 g/dL / Pro: 6.3 g/dL / ALK PHOS: 87 U/L / ALT: 23 U/L / AST: 54 U/L / GGT: x                 Lactate, Blood: 3.9 mmol/L (05-23-23 @ 22:52)  Blood Gas Venous - Lactate: 4.40 mmol/L (05-23-23 @ 20:36)  Lactate, Blood: 4.9 mmol/L (05-23-23 @ 20:07)      INFECTIOUS DISEASES TESTING      RADIOLOGY & ADDITIONAL TESTS:  I have personally reviewed the last available Chest xray  CXR  Xray Chest 1 View- PORTABLE-Urgent:   ACC: [id number] EXAM:  XR CHEST PORTABLE URGENT 1V   ORDERED BY: AMILCAR GARCÍA     PROCEDURE DATE:  05/24/2023          INTERPRETATION:  Clinical History / Reason for exam: Follow-up.    Comparison : Chest radiograph May 23, 2023.    Technique/Positioning: Adequate.    Findings:    Support devices: ETT with its tip above the noe and NGT with its tip   below the diaphragm.    Cardiac/mediastinum/hilum: Stable    Lung parenchyma/Pleura: Bilateral opacities. No pneumothorax is seen.    Skeleton/soft tissues: Stable    Impression:    Bilateral opacities.    Support tubes as above.    --- End of Report ---            AMANDA VELARDE MD; Attending Radiologist  This document has been electronically signed. May 24 2023  7:14AM (05-24-23 @ 06:56)  Xray Chest 1 View- PORTABLE-Urgent:   ACC: [id number] EXAM:  XR CHEST PORTABLE URGENT 1V   ORDERED BY: CESIA SHEIKH     PROCEDURE DATE:  05/23/2023          INTERPRETATION:  Clinical History / Reason for exam: Shortness of breath    Comparison : Chest radiograph None.    Technique/Positioning: Low lung volume.    Findings:    Support devices: None.    Cardiac/mediastinum/hilum: Unremarkable.    Lung parenchyma/Pleura: Within normal limits.    Skeleton/soft tissues: Unremarkable.    Impression:    Low lung volume.    No acute infiltrates.    Refer to the report of the CT scan of the chest performed on the same day.    --- End of Report ---            AMANDA VELARDE MD; Attending Radiologist  This document has been electronically signed. May 24 2023  6:01AM (05-23-23 @ 20:29)      CT  CT Angio Chest PE Protocol w/ IV Cont:   ACC: [id number] EXAM:  CT ANGIO CHEST PULM ART [facility]   ORDERED BY: CESIA SHEIKH     PROCEDURE DATE:  05/23/2023          INTERPRETATION:  CLINICAL HISTORY/REASON FOR EXAM: Shortness of breath..    TECHNIQUE: Multislice helical sections were obtained from the thoracic   inlet to the lung bases during rapid administration of 65 cc Omnipaque   350 intravenous contrast. Thin sections were reconstructed through the   pulmonary vasculature. 3D (MIP) reformats obtained. 35 cc contrast   discarded.    COMPARISON: None.    FINDINGS:    PULMONARY EMBOLUS: No evidence of acute pulmonary embolism.    LUNGS, PLEURA, AIRWAYS: Bilateral patchy groundglass nodularity with   dominant confluent left lower lobe opacification with numerous cavitary   lesions. Additional contralateral right lower lobe cavitary 1 cm nodule.    Central airway patent. No pleural effusion. No pneumothorax.    THORACIC NODES: Confluent ill-defined left hilar and mediastinal   adenopathy, likely reactive, without dominant lymph node delineated.    MEDIASTINUM/GREAT VESSELS: No pericardial effusion. Heart size is within   normal limits. The aorta and main pulmonary artery are of normal caliber.    BONES/SOFT TISSUES: Unremarkable.    VISUALIZED UPPER ABDOMEN: Left nonobstructingrenal calculi.      IMPRESSION:    1. Bilateral patchy groundglass nodularity with dominant confluent left   lower lobe opacification with numerous cavitary lesions. Additional   contralateral right lower lobe cavitary 1 cm nodule. Findings compatible   with cavitary pneumonia in the appropriate clinical setting; differential   diagnosis includes tuberculosis.    2. Confluent ill-defined left hilar and mediastinal adenopathy, likely   reactive, without dominant lymph node delineated.    3. No evidence of pulmonary embolism.    Spoke with CESIA SHEIKH PA; Emergency Me on 5/23/2023 10:53 PM with   readback.    --- End of Report ---            YARITZA WILDE MD; Attending Radiologist  This document has been electronically signed. May 23 2023 10:56PM (05-23-23 @ 22:06)      CARDIOLOGY TESTING  12 Lead ECG:   Ventricular Rate 134 BPM    Atrial Rate 134 BPM    P-R Interval 208 ms    QRS Duration 76 ms    Q-T Interval 318 ms    QTC Calculation(Bazett) 474 ms    P Axis 97 degrees    R Axis 40 degrees    T Axis 70 degrees    Diagnosis Line Sinus tachycardiawith Fusion complexes  Otherwise normal ECG    Confirmed by PANFILO COVINGTON MD ([id number]) on 5/24/2023 6:54:09 AM (05-24-23 @ 02:40)  12 Lead ECG:   Ventricular Rate 140 BPM    Atrial Rate 140 BPM    P-R Interval 120 ms    QRS Duration 138 ms    Q-T Interval 354 ms    QTC Calculation(Bazett) 540 ms    R Axis 73 degrees    T Axis 60 degrees    Diagnosis Line Sinus tachycardia  Right bundle branch block  Abnormal ECG    Confirmed by Jorge Wyatt ([id number]) on 5/23/2023 10:37:28 PM (05-23-23 @ 19:52)      All available historical records have been reviewed    MEDICATIONS  albuterol/ipratropium for Nebulization 3  chlorhexidine 0.12% Liquid 15  chlorhexidine 2% Cloths 1  fentaNYL   Infusion. 0.5  heparin   Injectable 5000  lactated ringers. 1000  levoFLOXacin IVPB   linezolid  IVPB 600  methylPREDNISolone sodium succinate Injectable 40  oseltamivir 30  oseltamivir 30  piperacillin/tazobactam IVPB.. 4.5  propofol Infusion 10      ANTIBIOTICS:  levoFLOXacin IVPB      linezolid  IVPB 600 milliGRAM(s) IV Intermittent every 12 hours  oseltamivir 30 milliGRAM(s) Oral every 12 hours  oseltamivir 30 milliGRAM(s) Oral every 12 hours  piperacillin/tazobactam IVPB.. 4.5 Gram(s) IV Intermittent every 6 hours      All available historical data has been reviewed

## 2023-05-24 NOTE — PHARMACOTHERAPY INTERVENTION NOTE - COMMENTS
pt intubated-will change albuterol/ Atrovent neb to HFA 4 puffs q4h   -change KDur tab to Klor 40meq powder x1

## 2023-05-24 NOTE — GOALS OF CARE CONVERSATION - ADVANCED CARE PLANNING - CONVERSATION DETAILS
Discussed with patient and Her Mother (next of kin)  GoC options and their impact on QoL and duration of life. Discussed possible need for central line, CPR, and intubation. Discussed treatments' impact on QoL and life duration. Patient wished to continue to be FULL Code.

## 2023-05-24 NOTE — PATIENT PROFILE ADULT - FALL HARM RISK - UNIVERSAL INTERVENTIONS
Bed in lowest position, wheels locked, appropriate side rails in place/Call bell, personal items and telephone in reach/Instruct patient to call for assistance before getting out of bed or chair/Non-slip footwear when patient is out of bed/[place] to call system/Physically safe environment - no spills, clutter or unnecessary equipment/Purposeful Proactive Rounding/Room/bathroom lighting operational, light cord in reach

## 2023-05-24 NOTE — CONSULT NOTE ADULT - ATTENDING COMMENTS
I have personally seen and examined this patient.  I have fully participated in the care of this patient.  I have reviewed all pertinent clinical information, including history, physical exam, plan and note.  I have reviewed all pertinent clinical information and reviewed all relevant imaging and diagnostic studies personally.  My addendum includes the final recommendations and supersedes the resident's note.       #Flu B with cavitary PNA, suspect MSSA  WBC 16--> 3 12% Bands, Severe sepsis on admission  Doubt TB (lower lobe)  Acute hypoxemic resp failure requiring intubation  < from: CT Angio Chest PE Protocol w/ IV Cont (05.23.23 @ 22:06) >  1. Bilateral patchy groundglass nodularity with dominant confluent left   lower lobe opacification with numerous cavitary lesions. Additional   contralateral right lower lobe cavitary 1 cm nodule. Findings compatible   with cavitary pneumonia in the appropriate clinical setting; differential   diagnosis includes tuberculosis.  2. Confluent ill-defined left hilar and mediastinal adenopathy, likely   reactive, without dominant lymph node delineated.  3. No evidence of pulmonary embolism.  #Lactic acidosis  #LANCE  #Hyponatremia    - antibiotics and plan as above    If any questions, please call or send a message on [employer] Teams  Please continue to update ID with any pertinent new laboratory or radiographic findings  Spectra [id number]

## 2023-05-24 NOTE — H&P ADULT - NSHPREVIEWOFSYSTEMS_GEN_ALL_CORE
REVIEW OF SYSTEMS:    CONSTITUTIONAL: No weakness, + fevers or chills  EYES/ENT: No visual changes;  No vertigo or throat pain   NECK: No pain or stiffness  RESPIRATORY: +cough, wheezing, hemoptysis; and shortness of breath  CARDIOVASCULAR: + chest pain and palpitations  GASTROINTESTINAL: No abdominal or epigastric pain. No nausea, vomiting, or hematemesis; No diarrhea or constipation. No melena or hematochezia.  GENITOURINARY: No dysuria, frequency or hematuria  NEUROLOGICAL: No numbness or weakness  SKIN: No itching, rashes

## 2023-05-24 NOTE — CONSULT NOTE ADULT - ASSESSMENT
42/F with PMH of asthma and HTN, admitted with SOB, developed LANCE.     Acute hypoxemic respiratory failure   Severe CAP Cavitary PNA, r/o TB  Non massive hemoptysis   LANCE oliguric, not on pressors, on IVF  HO asthma   Influenza B +    LANCE prerenal vs ATN vs pulmo renal syndrome  - no old creat in [place]  -- obtain UA, Urine Na BUN and creat  s/p CT with IV contrast 5/23 - cont  cc/hr  CPK mildy elevated ~1500, trending down  check phos  replete K to 4, check mag  Lactic acid trending down  if UA with proteinuria, hematuria - send serology for vasculitis, Hep B C  check ANCA CHARLES c3c4   strict Is and OS  Abx - Levofloxa and Linezolid and Zosyn

## 2023-05-25 LAB
A1C WITH ESTIMATED AVERAGE GLUCOSE RESULT: 5.6 % — SIGNIFICANT CHANGE UP (ref 4–5.6)
ALBUMIN SERPL ELPH-MCNC: 2.7 G/DL — LOW (ref 3.5–5.2)
ALBUMIN SERPL ELPH-MCNC: 2.8 G/DL — LOW (ref 3.5–5.2)
ALP SERPL-CCNC: 57 U/L — SIGNIFICANT CHANGE UP (ref 30–115)
ALP SERPL-CCNC: 58 U/L — SIGNIFICANT CHANGE UP (ref 30–115)
ALT FLD-CCNC: 17 U/L — SIGNIFICANT CHANGE UP (ref 0–41)
ALT FLD-CCNC: 17 U/L — SIGNIFICANT CHANGE UP (ref 0–41)
AMPHET UR-MCNC: NEGATIVE — SIGNIFICANT CHANGE UP
ANION GAP SERPL CALC-SCNC: 16 MMOL/L — HIGH (ref 7–14)
ANION GAP SERPL CALC-SCNC: 18 MMOL/L — HIGH (ref 7–14)
APPEARANCE UR: ABNORMAL
AST SERPL-CCNC: 36 U/L — SIGNIFICANT CHANGE UP (ref 0–41)
AST SERPL-CCNC: 36 U/L — SIGNIFICANT CHANGE UP (ref 0–41)
AUTO DIFF PNL BLD: NEGATIVE — SIGNIFICANT CHANGE UP
B2 GLYCOPROT1 AB SER QL: NEGATIVE — SIGNIFICANT CHANGE UP
BACTERIA # UR AUTO: NEGATIVE — SIGNIFICANT CHANGE UP
BARBITURATES UR SCN-MCNC: NEGATIVE — SIGNIFICANT CHANGE UP
BASOPHILS # BLD AUTO: 0.02 K/UL — SIGNIFICANT CHANGE UP (ref 0–0.2)
BASOPHILS # BLD AUTO: 0.03 K/UL — SIGNIFICANT CHANGE UP (ref 0–0.2)
BASOPHILS NFR BLD AUTO: 0.3 % — SIGNIFICANT CHANGE UP (ref 0–1)
BASOPHILS NFR BLD AUTO: 0.4 % — SIGNIFICANT CHANGE UP (ref 0–1)
BENZODIAZ UR-MCNC: NEGATIVE — SIGNIFICANT CHANGE UP
BILIRUB SERPL-MCNC: 0.4 MG/DL — SIGNIFICANT CHANGE UP (ref 0.2–1.2)
BILIRUB SERPL-MCNC: 0.4 MG/DL — SIGNIFICANT CHANGE UP (ref 0.2–1.2)
BILIRUB UR-MCNC: NEGATIVE — SIGNIFICANT CHANGE UP
BUN SERPL-MCNC: 42 MG/DL — HIGH (ref 10–20)
BUN SERPL-MCNC: 44 MG/DL — HIGH (ref 10–20)
C-ANCA SER-ACNC: NEGATIVE — SIGNIFICANT CHANGE UP
C3 SERPL-MCNC: 80 MG/DL — LOW (ref 81–157)
C4 SERPL-MCNC: 34 MG/DL — SIGNIFICANT CHANGE UP (ref 13–39)
CALCIUM SERPL-MCNC: 7.5 MG/DL — LOW (ref 8.4–10.5)
CALCIUM SERPL-MCNC: 7.8 MG/DL — LOW (ref 8.4–10.5)
CARDIOLIPIN AB SER-ACNC: NEGATIVE — SIGNIFICANT CHANGE UP
CHLORIDE SERPL-SCNC: 101 MMOL/L — SIGNIFICANT CHANGE UP (ref 98–110)
CHLORIDE SERPL-SCNC: 98 MMOL/L — SIGNIFICANT CHANGE UP (ref 98–110)
CK SERPL-CCNC: 469 U/L — HIGH (ref 0–225)
CO2 SERPL-SCNC: 15 MMOL/L — LOW (ref 17–32)
CO2 SERPL-SCNC: 18 MMOL/L — SIGNIFICANT CHANGE UP (ref 17–32)
COCAINE METAB.OTHER UR-MCNC: NEGATIVE — SIGNIFICANT CHANGE UP
COLOR SPEC: YELLOW — SIGNIFICANT CHANGE UP
CREAT ?TM UR-MCNC: 178 MG/DL — SIGNIFICANT CHANGE UP
CREAT SERPL-MCNC: 2.4 MG/DL — HIGH (ref 0.7–1.5)
CREAT SERPL-MCNC: 2.5 MG/DL — HIGH (ref 0.7–1.5)
D DIMER BLD IA.RAPID-MCNC: 1665 NG/ML DDU — HIGH
DIFF PNL FLD: ABNORMAL
DRUG SCREEN 1, URINE RESULT: SIGNIFICANT CHANGE UP
EGFR: 24 ML/MIN/1.73M2 — LOW
EGFR: 25 ML/MIN/1.73M2 — LOW
EOSINOPHIL # BLD AUTO: 0 K/UL — SIGNIFICANT CHANGE UP (ref 0–0.7)
EOSINOPHIL # BLD AUTO: 0 K/UL — SIGNIFICANT CHANGE UP (ref 0–0.7)
EOSINOPHIL NFR BLD AUTO: 0 % — SIGNIFICANT CHANGE UP (ref 0–8)
EOSINOPHIL NFR BLD AUTO: 0 % — SIGNIFICANT CHANGE UP (ref 0–8)
EPI CELLS # UR: 10 /HPF — HIGH (ref 0–5)
ESTIMATED AVERAGE GLUCOSE: 114 MG/DL — SIGNIFICANT CHANGE UP (ref 68–114)
FENTANYL UR QL: POSITIVE
FIBRINOGEN PPP-MCNC: >700 MG/DL — HIGH (ref 204.4–570.6)
GAS PNL BLDA: SIGNIFICANT CHANGE UP
GAS PNL BLDA: SIGNIFICANT CHANGE UP
GLUCOSE BLDC GLUCOMTR-MCNC: 121 MG/DL — HIGH (ref 70–99)
GLUCOSE BLDC GLUCOMTR-MCNC: 125 MG/DL — HIGH (ref 70–99)
GLUCOSE BLDC GLUCOMTR-MCNC: 128 MG/DL — HIGH (ref 70–99)
GLUCOSE BLDC GLUCOMTR-MCNC: 137 MG/DL — HIGH (ref 70–99)
GLUCOSE SERPL-MCNC: 112 MG/DL — HIGH (ref 70–99)
GLUCOSE SERPL-MCNC: 99 MG/DL — SIGNIFICANT CHANGE UP (ref 70–99)
GLUCOSE UR QL: NEGATIVE — SIGNIFICANT CHANGE UP
GRAM STN FLD: SIGNIFICANT CHANGE UP
HCT VFR BLD CALC: 28.5 % — LOW (ref 37–47)
HCT VFR BLD CALC: 28.6 % — LOW (ref 37–47)
HGB BLD-MCNC: 8.7 G/DL — LOW (ref 12–16)
HGB BLD-MCNC: 8.7 G/DL — LOW (ref 12–16)
HYALINE CASTS # UR AUTO: 9 /LPF — HIGH (ref 0–7)
IGA FLD-MCNC: 261 MG/DL — SIGNIFICANT CHANGE UP (ref 84–499)
IGG FLD-MCNC: 1032 MG/DL — SIGNIFICANT CHANGE UP (ref 610–1660)
IGM SERPL-MCNC: 127 MG/DL — SIGNIFICANT CHANGE UP (ref 35–242)
IMM GRANULOCYTES NFR BLD AUTO: 1.1 % — HIGH (ref 0.1–0.3)
IMM GRANULOCYTES NFR BLD AUTO: 1.5 % — HIGH (ref 0.1–0.3)
KAPPA LC SER QL IFE: 16.56 MG/DL — HIGH (ref 0.33–1.94)
KAPPA/LAMBDA FREE LIGHT CHAIN RATIO, SERUM: 2.25 RATIO — HIGH (ref 0.26–1.65)
KETONES UR-MCNC: NEGATIVE — SIGNIFICANT CHANGE UP
LACTATE SERPL-SCNC: 2.8 MMOL/L — HIGH (ref 0.7–2)
LAMBDA LC SER QL IFE: 7.37 MG/DL — HIGH (ref 0.57–2.63)
LEGIONELLA AG UR QL: NEGATIVE — SIGNIFICANT CHANGE UP
LEUKOCYTE ESTERASE UR-ACNC: NEGATIVE — SIGNIFICANT CHANGE UP
LYMPHOCYTES # BLD AUTO: 0.39 K/UL — LOW (ref 1.2–3.4)
LYMPHOCYTES # BLD AUTO: 0.42 K/UL — LOW (ref 1.2–3.4)
LYMPHOCYTES # BLD AUTO: 5.2 % — LOW (ref 20.5–51.1)
LYMPHOCYTES # BLD AUTO: 6.3 % — LOW (ref 20.5–51.1)
MAGNESIUM SERPL-MCNC: 1.9 MG/DL — SIGNIFICANT CHANGE UP (ref 1.8–2.4)
MAGNESIUM SERPL-MCNC: 1.9 MG/DL — SIGNIFICANT CHANGE UP (ref 1.8–2.4)
MCHC RBC-ENTMCNC: 18.1 PG — LOW (ref 27–31)
MCHC RBC-ENTMCNC: 18.2 PG — LOW (ref 27–31)
MCHC RBC-ENTMCNC: 30.4 G/DL — LOW (ref 32–37)
MCHC RBC-ENTMCNC: 30.5 G/DL — LOW (ref 32–37)
MCV RBC AUTO: 59.5 FL — LOW (ref 81–99)
MCV RBC AUTO: 59.5 FL — LOW (ref 81–99)
METHADONE UR-MCNC: NEGATIVE — SIGNIFICANT CHANGE UP
MONOCYTES # BLD AUTO: 0.33 K/UL — SIGNIFICANT CHANGE UP (ref 0.1–0.6)
MONOCYTES # BLD AUTO: 0.44 K/UL — SIGNIFICANT CHANGE UP (ref 0.1–0.6)
MONOCYTES NFR BLD AUTO: 5.3 % — SIGNIFICANT CHANGE UP (ref 1.7–9.3)
MONOCYTES NFR BLD AUTO: 5.4 % — SIGNIFICANT CHANGE UP (ref 1.7–9.3)
MPO AB + PR3 PNL SER: SIGNIFICANT CHANGE UP
NEUTROPHILS # BLD AUTO: 5.34 K/UL — SIGNIFICANT CHANGE UP (ref 1.4–6.5)
NEUTROPHILS # BLD AUTO: 7.12 K/UL — HIGH (ref 1.4–6.5)
NEUTROPHILS NFR BLD AUTO: 86.6 % — HIGH (ref 42.2–75.2)
NEUTROPHILS NFR BLD AUTO: 87.9 % — HIGH (ref 42.2–75.2)
NIGHT BLUE STAIN TISS: SIGNIFICANT CHANGE UP
NITRITE UR-MCNC: NEGATIVE — SIGNIFICANT CHANGE UP
NRBC # BLD: 0 /100 WBCS — SIGNIFICANT CHANGE UP (ref 0–0)
NRBC # BLD: 0 /100 WBCS — SIGNIFICANT CHANGE UP (ref 0–0)
OPIATES UR-MCNC: POSITIVE
OXYCODONE UR-MCNC: NEGATIVE — SIGNIFICANT CHANGE UP
P-ANCA SER-ACNC: NEGATIVE — SIGNIFICANT CHANGE UP
PCP UR-MCNC: NEGATIVE — SIGNIFICANT CHANGE UP
PH UR: 6 — SIGNIFICANT CHANGE UP (ref 5–8)
PHOSPHATE SERPL-MCNC: 5.4 MG/DL — HIGH (ref 2.1–4.9)
PLATELET # BLD AUTO: 130 K/UL — SIGNIFICANT CHANGE UP (ref 130–400)
PLATELET # BLD AUTO: 136 K/UL — SIGNIFICANT CHANGE UP (ref 130–400)
PMV BLD: SIGNIFICANT CHANGE UP (ref 7.4–10.4)
PMV BLD: SIGNIFICANT CHANGE UP (ref 7.4–10.4)
POTASSIUM SERPL-MCNC: 4.3 MMOL/L — SIGNIFICANT CHANGE UP (ref 3.5–5)
POTASSIUM SERPL-MCNC: 4.4 MMOL/L — SIGNIFICANT CHANGE UP (ref 3.5–5)
POTASSIUM SERPL-SCNC: 4.3 MMOL/L — SIGNIFICANT CHANGE UP (ref 3.5–5)
POTASSIUM SERPL-SCNC: 4.4 MMOL/L — SIGNIFICANT CHANGE UP (ref 3.5–5)
PROPOXYPHENE QUALITATIVE URINE RESULT: NEGATIVE — SIGNIFICANT CHANGE UP
PROT ?TM UR-MCNC: 232 MG/DLG/24H — SIGNIFICANT CHANGE UP
PROT SERPL-MCNC: 5.3 G/DL — LOW (ref 6–8)
PROT SERPL-MCNC: 5.5 G/DL — LOW (ref 6–8)
PROT UR-MCNC: ABNORMAL
PROT/CREAT UR-RTO: 1.3 RATIO — HIGH (ref 0–0.2)
RBC # BLD: 4.79 M/UL — SIGNIFICANT CHANGE UP (ref 4.2–5.4)
RBC # BLD: 4.81 M/UL — SIGNIFICANT CHANGE UP (ref 4.2–5.4)
RBC # FLD: 20.4 % — HIGH (ref 11.5–14.5)
RBC # FLD: 20.5 % — HIGH (ref 11.5–14.5)
RBC CASTS # UR COMP ASSIST: 45 /HPF — HIGH (ref 0–4)
S PNEUM AG UR QL: NEGATIVE — SIGNIFICANT CHANGE UP
SODIUM SERPL-SCNC: 132 MMOL/L — LOW (ref 135–146)
SODIUM SERPL-SCNC: 134 MMOL/L — LOW (ref 135–146)
SP GR SPEC: 1.03 — SIGNIFICANT CHANGE UP (ref 1.01–1.03)
SPECIMEN SOURCE: SIGNIFICANT CHANGE UP
THC UR QL: POSITIVE
UROBILINOGEN FLD QL: SIGNIFICANT CHANGE UP
UUN UR-MCNC: 239 MG/DL — SIGNIFICANT CHANGE UP
WBC # BLD: 6.17 K/UL — SIGNIFICANT CHANGE UP (ref 4.8–10.8)
WBC # BLD: 8.1 K/UL — SIGNIFICANT CHANGE UP (ref 4.8–10.8)
WBC # FLD AUTO: 6.17 K/UL — SIGNIFICANT CHANGE UP (ref 4.8–10.8)
WBC # FLD AUTO: 8.1 K/UL — SIGNIFICANT CHANGE UP (ref 4.8–10.8)
WBC UR QL: 30 /HPF — HIGH (ref 0–5)

## 2023-05-25 PROCEDURE — 71045 X-RAY EXAM CHEST 1 VIEW: CPT | Mod: 26

## 2023-05-25 PROCEDURE — 99291 CRITICAL CARE FIRST HOUR: CPT

## 2023-05-25 RX ORDER — SODIUM CHLORIDE 9 MG/ML
500 INJECTION, SOLUTION INTRAVENOUS ONCE
Refills: 0 | Status: COMPLETED | OUTPATIENT
Start: 2023-05-25 | End: 2023-05-25

## 2023-05-25 RX ORDER — SODIUM CHLORIDE 9 MG/ML
1000 INJECTION, SOLUTION INTRAVENOUS
Refills: 0 | Status: DISCONTINUED | OUTPATIENT
Start: 2023-05-25 | End: 2023-05-29

## 2023-05-25 RX ORDER — POLYETHYLENE GLYCOL 3350 17 G/17G
17 POWDER, FOR SOLUTION ORAL EVERY 12 HOURS
Refills: 0 | Status: DISCONTINUED | OUTPATIENT
Start: 2023-05-25 | End: 2023-06-10

## 2023-05-25 RX ORDER — SODIUM CHLORIDE 9 MG/ML
250 INJECTION, SOLUTION INTRAVENOUS ONCE
Refills: 0 | Status: COMPLETED | OUTPATIENT
Start: 2023-05-25 | End: 2023-05-25

## 2023-05-25 RX ORDER — PROPOFOL 10 MG/ML
11.1 INJECTION, EMULSION INTRAVENOUS
Qty: 1000 | Refills: 0 | Status: DISCONTINUED | OUTPATIENT
Start: 2023-05-25 | End: 2023-06-05

## 2023-05-25 RX ORDER — FENTANYL CITRATE 50 UG/ML
0.56 INJECTION INTRAVENOUS
Qty: 2500 | Refills: 0 | Status: DISCONTINUED | OUTPATIENT
Start: 2023-05-25 | End: 2023-06-01

## 2023-05-25 RX ORDER — NOREPINEPHRINE BITARTRATE/D5W 8 MG/250ML
0.05 PLASTIC BAG, INJECTION (ML) INTRAVENOUS
Qty: 8 | Refills: 0 | Status: DISCONTINUED | OUTPATIENT
Start: 2023-05-25 | End: 2023-05-30

## 2023-05-25 RX ORDER — MUPIROCIN 20 MG/G
1 OINTMENT TOPICAL
Refills: 0 | Status: DISCONTINUED | OUTPATIENT
Start: 2023-05-25 | End: 2023-06-01

## 2023-05-25 RX ORDER — DEXMEDETOMIDINE HYDROCHLORIDE IN 0.9% SODIUM CHLORIDE 4 UG/ML
0.2 INJECTION INTRAVENOUS
Qty: 400 | Refills: 0 | Status: DISCONTINUED | OUTPATIENT
Start: 2023-05-25 | End: 2023-06-26

## 2023-05-25 RX ORDER — DEXMEDETOMIDINE HYDROCHLORIDE IN 0.9% SODIUM CHLORIDE 4 UG/ML
0.2 INJECTION INTRAVENOUS
Qty: 400 | Refills: 0 | Status: DISCONTINUED | OUTPATIENT
Start: 2023-05-25 | End: 2023-05-25

## 2023-05-25 RX ADMIN — ALBUTEROL 4 PUFF(S): 90 AEROSOL, METERED ORAL at 08:09

## 2023-05-25 RX ADMIN — Medication 7.97 MICROGRAM(S)/KG/MIN: at 17:04

## 2023-05-25 RX ADMIN — PROPOFOL 5.66 MICROGRAM(S)/KG/MIN: 10 INJECTION, EMULSION INTRAVENOUS at 06:20

## 2023-05-25 RX ADMIN — SENNA PLUS 2 TABLET(S): 8.6 TABLET ORAL at 21:05

## 2023-05-25 RX ADMIN — FENTANYL CITRATE 4.72 MICROGRAM(S)/KG/HR: 50 INJECTION INTRAVENOUS at 17:05

## 2023-05-25 RX ADMIN — PIPERACILLIN AND TAZOBACTAM 25 GRAM(S): 4; .5 INJECTION, POWDER, LYOPHILIZED, FOR SOLUTION INTRAVENOUS at 06:28

## 2023-05-25 RX ADMIN — SODIUM CHLORIDE 7500 MILLILITER(S): 9 INJECTION, SOLUTION INTRAVENOUS at 15:12

## 2023-05-25 RX ADMIN — Medication 40 MILLIGRAM(S): at 14:40

## 2023-05-25 RX ADMIN — ALBUTEROL 4 PUFF(S): 90 AEROSOL, METERED ORAL at 13:02

## 2023-05-25 RX ADMIN — Medication 40 MILLIGRAM(S): at 21:06

## 2023-05-25 RX ADMIN — CHLORHEXIDINE GLUCONATE 15 MILLILITER(S): 213 SOLUTION TOPICAL at 17:05

## 2023-05-25 RX ADMIN — LINEZOLID 300 MILLIGRAM(S): 600 INJECTION, SOLUTION INTRAVENOUS at 05:03

## 2023-05-25 RX ADMIN — PROPOFOL 5.66 MICROGRAM(S)/KG/MIN: 10 INJECTION, EMULSION INTRAVENOUS at 00:30

## 2023-05-25 RX ADMIN — SODIUM CHLORIDE 100 MILLILITER(S): 9 INJECTION, SOLUTION INTRAVENOUS at 06:29

## 2023-05-25 RX ADMIN — CHLORHEXIDINE GLUCONATE 1 APPLICATION(S): 213 SOLUTION TOPICAL at 12:27

## 2023-05-25 RX ADMIN — ALBUTEROL 4 PUFF(S): 90 AEROSOL, METERED ORAL at 16:56

## 2023-05-25 RX ADMIN — HEPARIN SODIUM 5000 UNIT(S): 5000 INJECTION INTRAVENOUS; SUBCUTANEOUS at 21:06

## 2023-05-25 RX ADMIN — CHLORHEXIDINE GLUCONATE 15 MILLILITER(S): 213 SOLUTION TOPICAL at 05:03

## 2023-05-25 RX ADMIN — POLYETHYLENE GLYCOL 3350 17 GRAM(S): 17 POWDER, FOR SOLUTION ORAL at 21:05

## 2023-05-25 RX ADMIN — MUPIROCIN 1 APPLICATION(S): 20 OINTMENT TOPICAL at 17:25

## 2023-05-25 RX ADMIN — Medication 4 PUFF(S): at 19:31

## 2023-05-25 RX ADMIN — Medication 30 MILLIGRAM(S): at 00:01

## 2023-05-25 RX ADMIN — Medication 4 PUFF(S): at 08:10

## 2023-05-25 RX ADMIN — DEXMEDETOMIDINE HYDROCHLORIDE IN 0.9% SODIUM CHLORIDE 4.25 MICROGRAM(S)/KG/HR: 4 INJECTION INTRAVENOUS at 06:53

## 2023-05-25 RX ADMIN — FENTANYL CITRATE 4.72 MICROGRAM(S)/KG/HR: 50 INJECTION INTRAVENOUS at 14:45

## 2023-05-25 RX ADMIN — LINEZOLID 300 MILLIGRAM(S): 600 INJECTION, SOLUTION INTRAVENOUS at 17:04

## 2023-05-25 RX ADMIN — PROPOFOL 5.66 MICROGRAM(S)/KG/MIN: 10 INJECTION, EMULSION INTRAVENOUS at 22:19

## 2023-05-25 RX ADMIN — Medication 4 PUFF(S): at 22:00

## 2023-05-25 RX ADMIN — DEXMEDETOMIDINE HYDROCHLORIDE IN 0.9% SODIUM CHLORIDE 4.25 MICROGRAM(S)/KG/HR: 4 INJECTION INTRAVENOUS at 17:05

## 2023-05-25 RX ADMIN — HEPARIN SODIUM 5000 UNIT(S): 5000 INJECTION INTRAVENOUS; SUBCUTANEOUS at 14:40

## 2023-05-25 RX ADMIN — Medication 40 MILLIGRAM(S): at 05:03

## 2023-05-25 RX ADMIN — PANTOPRAZOLE SODIUM 40 MILLIGRAM(S): 20 TABLET, DELAYED RELEASE ORAL at 12:36

## 2023-05-25 RX ADMIN — SODIUM CHLORIDE 7500 MILLILITER(S): 9 INJECTION, SOLUTION INTRAVENOUS at 18:00

## 2023-05-25 RX ADMIN — Medication 4 PUFF(S): at 16:54

## 2023-05-25 RX ADMIN — SODIUM CHLORIDE 500 MILLILITER(S): 9 INJECTION, SOLUTION INTRAVENOUS at 12:00

## 2023-05-25 RX ADMIN — ALBUTEROL 4 PUFF(S): 90 AEROSOL, METERED ORAL at 19:31

## 2023-05-25 RX ADMIN — Medication 30 MILLIGRAM(S): at 12:37

## 2023-05-25 RX ADMIN — Medication 30 MILLIGRAM(S): at 23:31

## 2023-05-25 RX ADMIN — PROPOFOL 5.66 MICROGRAM(S)/KG/MIN: 10 INJECTION, EMULSION INTRAVENOUS at 14:45

## 2023-05-25 RX ADMIN — HEPARIN SODIUM 5000 UNIT(S): 5000 INJECTION INTRAVENOUS; SUBCUTANEOUS at 05:03

## 2023-05-25 RX ADMIN — SODIUM CHLORIDE 100 MILLILITER(S): 9 INJECTION, SOLUTION INTRAVENOUS at 17:05

## 2023-05-25 RX ADMIN — PROPOFOL 5.66 MICROGRAM(S)/KG/MIN: 10 INJECTION, EMULSION INTRAVENOUS at 17:05

## 2023-05-25 RX ADMIN — SODIUM CHLORIDE 9999 MILLILITER(S): 9 INJECTION, SOLUTION INTRAVENOUS at 11:00

## 2023-05-25 RX ADMIN — DEXMEDETOMIDINE HYDROCHLORIDE IN 0.9% SODIUM CHLORIDE 4.25 MICROGRAM(S)/KG/HR: 4 INJECTION INTRAVENOUS at 22:19

## 2023-05-25 RX ADMIN — POLYETHYLENE GLYCOL 3350 17 GRAM(S): 17 POWDER, FOR SOLUTION ORAL at 12:36

## 2023-05-25 NOTE — PROGRESS NOTE ADULT - ASSESSMENT
42/F with PMH of asthma and HTN, admitted with SOB, developed LANCE.     Acute hypoxemic respiratory failure   Flu B with cavitary PNA, with MRSA bacteremia and cavitary PNA    5/23 BCX MRSA 3/4 bottles    MRSA PCR +   WBC 16--> 3 12% Bands, Severe sepsis on admission  Doubt TB (lower lobe)  Acute hypoxemic resp failure requiring intubation  < from: CT Angio Chest PE Protocol w/ IV Cont (05.23.23 @ 22:06) >  1. Bilateral patchy groundglass nodularity with dominant confluent left   lower lobe opacification with numerous cavitary lesions. Additional   contralateral right lower lobe cavitary 1 cm nodule. Findings compatible   with cavitary pneumonia in the appropriate clinical setting; differential   diagnosis includes tuberculosis.  Non massive hemoptysis     LANCE oliguric, not on pressors until now, BP remains low after re-intubation  UA with hematuria, proteinuria 300->100, hyaline casts  on a very concentrated urine sample  MRSA bacteremia, prerenal FFeNa, suspicion for post infectious GN  c3c4 pending along with CHARLES ANCA Hep B and C  Kidney sono neg for obstruction    LANCE prerenal vs ATN vs pulmo renal syndrome  - no old creat in [place]  s/p CT with IV contrast 5/23 - cont  cc/hr  CPK mildy elevated ~1500, trending down  check phos  Avoid hypotension, pressors as needed  Abx - on Linezolid    D/W ICU team  will follow

## 2023-05-25 NOTE — PROGRESS NOTE ADULT - ASSESSMENT
ASSESSMENT  42 year-old female with PMH of Asthma, HTN? presents with complaint of cough x3 days and SOB. During my encounter pt with dyspnea and increased work of breathing and chest pain, unable to properly provide accurate history. She states that her cough has been progressively worsening over the past 3 days, endorses hemoptysis since yesterday.     IMPRESSION  #Flu B with cavitary PNA, with MRSA bacteremia and cavitary PNA    5/23 BCX MRSA 3/4 bottles    MRSA PCR +   WBC 16--> 3 12% Bands, Severe sepsis on admission  Doubt TB (lower lobe)  Acute hypoxemic resp failure requiring intubation  < from: CT Angio Chest PE Protocol w/ IV Cont (05.23.23 @ 22:06) >  1. Bilateral patchy groundglass nodularity with dominant confluent left   lower lobe opacification with numerous cavitary lesions. Additional   contralateral right lower lobe cavitary 1 cm nodule. Findings compatible   with cavitary pneumonia in the appropriate clinical setting; differential   diagnosis includes tuberculosis.  2. Confluent ill-defined left hilar and mediastinal adenopathy, likely   reactive, without dominant lymph node delineated.  3. No evidence of pulmonary embolism.  #Lactic acidosis  #LANCE  #Hyponatremia      RECOMMENDATIONS  - D/C Zosyn  - D/C levaquin  - Send DTA  - Repeat BCX until clearance  - TTE  - Continue linezolid 600mg q12h IV, however PLT are on the downtrend. If any further downtrend, would change to Vanc IV dosing per ID pharmacy + PO Clinda 900mg TID (there is an IV Clinda shortage)  - Continue tamiflu     If any questions, please call or send a message on [email or web address] Teams  Please continue to update ID with any pertinent new laboratory or radiographic findings  Spectra [id number]

## 2023-05-25 NOTE — PROGRESS NOTE ADULT - SUBJECTIVE AND OBJECTIVE BOX
ROSSI, TINA  42y, Female  Allergy: aspirin (Short breath; Anaphylaxis)      LOS  2d    CHIEF COMPLAINT: AHRF (24 May 2023 18:33)      INTERVAL EVENTS/HPI  - T(F): , Max: 100.7 (23 @ 08:15), BCX MRSA  - WBC Count: 6.17 (23 @ 00:59)  WBC Count: 3.92 (23 @ 06:10)     - Creatinine, Serum: 2.4 (23 @ 00:59)  Creatinine, Serum: 2.8 (23 @ 17:09)     -   -   -     ROS  cannot obtain secondary to patient's sedation and/or mental status    VITALS:  T(F): 99, Max: 100.7 (23 @ 08:15)  HR: 105  BP: 150/85  RR: 25Vital Signs Last 24 Hrs  T(C): 37.2 (25 May 2023 04:00), Max: 38.2 (24 May 2023 08:15)  T(F): 99 (25 May 2023 04:00), Max: 100.7 (24 May 2023 08:15)  HR: 105 (25 May 2023 06:30) (99 - 122)  BP: 150/85 (25 May 2023 06:30) (84/58 - 156/109)  BP(mean): 113 (25 May 2023 06:30) (67 - 113)  RR: 25 (25 May 2023 06:30) (19 - 45)  SpO2: 98% (25 May 2023 06:30) (97% - 100%)    Parameters below as of 25 May 2023 04:00  Patient On (Oxygen Delivery Method): ventilator        PHYSICAL EXAM:  ***     FH: Non-contributory  Social Hx: Non-contributory    TESTS & MEASUREMENTS:                        8.7    6.17  )-----------( 130      ( 25 May 2023 00:59 )             28.6     05-    134<L>  |  101  |  42<H>  ----------------------------<  112<H>  4.4   |  15<L>  |  2.4<H>    Ca    7.5<L>      25 May 2023 00:59  Mg     1.9         TPro  5.3<L>  /  Alb  2.7<L>  /  TBili  0.4  /  DBili  x   /  AST  36  /  ALT  17  /  AlkPhos  57        LIVER FUNCTIONS - ( 25 May 2023 00:59 )  Alb: 2.7 g/dL / Pro: 5.3 g/dL / ALK PHOS: 57 U/L / ALT: 17 U/L / AST: 36 U/L / GGT: x           Urinalysis Basic - ( 24 May 2023 19:16 )    Color: Yellow / Appearance: Slightly Turbid / S.047 / pH: x  Gluc: x / Ketone: Trace  / Bili: Negative / Urobili: <2 mg/dL   Blood: x / Protein: 300 mg/dL / Nitrite: Negative   Leuk Esterase: Negative / RBC: 1 /HPF / WBC 77 /HPF   Sq Epi: x / Non Sq Epi: x / Bacteria: Negative        Culture - Blood (collected 23 @ 20:37)  Source: .Blood Blood-Peripheral  Gram Stain (23 @ 17:51):    Growth in aerobic bottle: Gram Positive Cocci in Clusters    Growth in anaerobic bottle: Gram Positive Cocci in Clusters  Preliminary Report (23 @ 17:51):    Growth in aerobic bottle: Gram Positive Cocci in Clusters    Growth in anaerobic bottle: Gram Positive Cocci in Clusters    Culture - Blood (collected 23 @ 20:37)  Source: .Blood Blood-Peripheral  Gram Stain (23 @ 14:24):    Growth in aerobic bottle: Gram Positive Cocci in Clusters  Preliminary Report (23 @ 14:24):    Growth in aerobic bottle: Gram Positive Cocci in Clusters    ***Blood Panel PCR results on this specimen are available    approximately 3 hours after the Gram stain result.***    Gram stain, PCR, and/or culture results may not always    correspond due to difference in methodologies.    ************************************************************    This PCR assay was performed by multiplex PCR. This    Assay tests for 66 bacterial and resistance gene targets.    Please refer to the [facility] test directory    at [email or web address] for details.  Organism: Blood Culture PCR (23 @ 17:20)  Organism: Blood Culture PCR (23 @ 17:20)      Method Type: PCR      -  Methicillin resistant Staphylococcus aureus (MRSA): Detec        Lactate, Blood: 1.9 mmol/L (23 @ 17:09)  Lactate, Blood: 3.9 mmol/L (23 @ 22:52)  Blood Gas Venous - Lactate: 4.40 mmol/L (23 @ 20:36)  Lactate, Blood: 4.9 mmol/L (23 @ 20:07)      INFECTIOUS DISEASES TESTING  MRSA PCR Result.: Positive (23 @ 19:17)  Procalcitonin, Serum: 48.30 (23 @ 06:10)      INFLAMMATORY MARKERS      RADIOLOGY & ADDITIONAL TESTS:  I have personally reviewed the last available Chest xray  CXR  Xray Chest 1 View- PORTABLE-Urgent:   ACC: [id number] EXAM:  XR CHEST PORTABLE URGENT 1V   ORDERED BY: AMILCAR GARCÍA     PROCEDURE DATE:  2023          INTERPRETATION:  Clinical History / Reason for exam: Follow-up.    Comparison : Chest radiograph May 23, 2023.    Technique/Positioning: Adequate.    Findings:    Support devices: ETT with its tip above the noe and NGT with its tip   below the diaphragm.    Cardiac/mediastinum/hilum: Stable    Lung parenchyma/Pleura: Bilateral opacities. No pneumothorax is seen.    Skeleton/soft tissues: Stable    Impression:    Bilateral opacities.    Support tubes as above.    --- End of Report ---            AMANDA VELARDE MD; Attending Radiologist  This document has been electronically signed. May 24 2023  7:14AM (23 @ 06:56)      CT  CT Angio Chest PE Protocol w/ IV Cont:   ACC: [id number] EXAM:  CT ANGIO CHEST PULM ART WAWIC   ORDERED BY: CESIA SHEIKH     PROCEDURE DATE:  2023          INTERPRETATION:  CLINICAL HISTORY/REASON FOR EXAM: Shortness of breath..    TECHNIQUE: Multislice helical sections were obtained from the thoracic   inlet to the lung bases during rapid administration of 65 cc Omnipaque   350 intravenous contrast. Thin sections were reconstructed through the   pulmonary vasculature. 3D (MIP) reformats obtained. 35 cc contrast   discarded.    COMPARISON: None.    FINDINGS:    PULMONARY EMBOLUS: No evidence of acute pulmonary embolism.    LUNGS, PLEURA, AIRWAYS: Bilateral patchy groundglass nodularity with   dominant confluent left lower lobe opacification with numerous cavitary   lesions. Additional contralateral right lower lobe cavitary 1 cm nodule.    Central airway patent. No pleural effusion. No pneumothorax.    THORACIC NODES: Confluent ill-defined left hilar and mediastinal   adenopathy, likely reactive, without dominant lymph node delineated.    MEDIASTINUM/GREAT VESSELS: No pericardial effusion. Heart size is within   normal limits. The aorta and main pulmonary artery are of normal caliber.    BONES/SOFT TISSUES: Unremarkable.    VISUALIZED UPPER ABDOMEN: Left nonobstructingrenal calculi.      IMPRESSION:    1. Bilateral patchy groundglass nodularity with dominant confluent left   lower lobe opacification with numerous cavitary lesions. Additional   contralateral right lower lobe cavitary 1 cm nodule. Findings compatible   with cavitary pneumonia in the appropriate clinical setting; differential   diagnosis includes tuberculosis.    2. Confluent ill-defined left hilar and mediastinal adenopathy, likely   reactive, without dominant lymph node delineated.    3. No evidence of pulmonary embolism.    Spoke with CESIA SHEIKH PA; Emergency Me on 2023 10:53 PM with   readback.    --- End of Report ---            YARITZA WILDE MD; Attending Radiologist  This document has been electronically signed. May 23 2023 10:56PM (23 @ 22:06)      CARDIOLOGY TESTING  12 Lead ECG:   Ventricular Rate 134 BPM    Atrial Rate 134 BPM    P-R Interval 208 ms    QRS Duration 76 ms    Q-T Interval 318 ms    QTC Calculation(Bazett) 474 ms    P Axis 97 degrees    R Axis 40 degrees    T Axis 70 degrees    Diagnosis Line Sinus tachycardiawith Fusion complexes  Otherwise normal ECG    Confirmed by PANFILO COVINGTON MD ([id number]) on 2023 6:54:09 AM (23 @ 02:40)  12 Lead ECG:   Ventricular Rate 140 BPM    Atrial Rate 140 BPM    P-R Interval 120 ms    QRS Duration 138 ms    Q-T Interval 354 ms    QTC Calculation(Bazett) 540 ms    R Axis 73 degrees    T Axis 60 degrees    Diagnosis Line Sinus tachycardia  Right bundle branch block  Abnormal ECG    Confirmed by Jorge Wyatt ([id number]) on 2023 10:37:28 PM (05-23-23 @ 19:52)      MEDICATIONS  albuterol    90 MICROgram(s) HFA Inhaler 4  chlorhexidine 0.12% Liquid 15  chlorhexidine 2% Cloths 1  dexMEDEtomidine Infusion 0.2  fentaNYL   Infusion. 0.5  heparin   Injectable 5000  insulin lispro (ADMELOG) corrective regimen sliding scale   ipratropium 17 MICROgram(s) HFA Inhaler 4  ipratropium 17 MICROgram(s) HFA Inhaler 4  lactated ringers. 1000  levoFLOXacin IVPB   linezolid  IVPB 600  methylPREDNISolone sodium succinate Injectable 40  oseltamivir Suspension 30  pantoprazole   Suspension 40  piperacillin/tazobactam IVPB.. 3.375  polyethylene glycol 3350 17  propofol Infusion 10  senna 2      WEIGHT  Weight (kg): 85 (23 @ 08:15)  Creatinine, Serum: 2.4 mg/dL (23 @ 00:59)  Creatinine, Serum: 2.8 mg/dL (23 @ 17:09)  Creatinine, Serum: 3.0 mg/dL (23 @ 11:48)      ANTIBIOTICS:  levoFLOXacin IVPB      linezolid  IVPB 600 milliGRAM(s) IV Intermittent every 12 hours  oseltamivir Suspension 30 milliGRAM(s) Oral every 12 hours  piperacillin/tazobactam IVPB.. 3.375 Gram(s) IV Intermittent every 8 hours      All available historical records have been reviewed   ZEYNEP ROSSI  42y, Female  Allergy: aspirin (Short breath; Anaphylaxis)      LOS  2d    CHIEF COMPLAINT: AHRF (24 May 2023 18:33)      INTERVAL EVENTS/HPI  - T(F): , Max: 100.7 (23 @ 08:15), BCX MRSA  - WBC Count: 6.17 (23 @ 00:59)  WBC Count: 3.92 (23 @ 06:10)     - Creatinine, Serum: 2.4 (23 @ 00:59)  Creatinine, Serum: 2.8 (23 @ 17:09)     -   -   -     ROS  cannot obtain secondary to patient's sedation and/or mental status    VITALS:  T(F): 99, Max: 100.7 (23 @ 08:15)  HR: 105  BP: 150/85  RR: 25Vital Signs Last 24 Hrs  T(C): 37.2 (25 May 2023 04:00), Max: 38.2 (24 May 2023 08:15)  T(F): 99 (25 May 2023 04:00), Max: 100.7 (24 May 2023 08:15)  HR: 105 (25 May 2023 06:30) (99 - 122)  BP: 150/85 (25 May 2023 06:30) (84/58 - 156/109)  BP(mean): 113 (25 May 2023 06:30) (67 - 113)  RR: 25 (25 May 2023 06:30) (19 - 45)  SpO2: 98% (25 May 2023 06:30) (97% - 100%)    Parameters below as of 25 May 2023 04:00  Patient On (Oxygen Delivery Method): ventilator        PHYSICAL EXAM:  General: intubated  HEENT: NCAT  CV: RRR  Lungs: symmetric chest expansion, decreased BS at bases  Abd: Soft  Skin: no rash  Neuro: sedated  Lines: no phlebitis     FH: Non-contributory  Social Hx: Non-contributory    TESTS & MEASUREMENTS:                        8.7    6.17  )-----------( 130      ( 25 May 2023 00:59 )             28.6     05-    134<L>  |  101  |  42<H>  ----------------------------<  112<H>  4.4   |  15<L>  |  2.4<H>    Ca    7.5<L>      25 May 2023 00:59  Mg     1.9         TPro  5.3<L>  /  Alb  2.7<L>  /  TBili  0.4  /  DBili  x   /  AST  36  /  ALT  17  /  AlkPhos  57        LIVER FUNCTIONS - ( 25 May 2023 00:59 )  Alb: 2.7 g/dL / Pro: 5.3 g/dL / ALK PHOS: 57 U/L / ALT: 17 U/L / AST: 36 U/L / GGT: x           Urinalysis Basic - ( 24 May 2023 19:16 )    Color: Yellow / Appearance: Slightly Turbid / S.047 / pH: x  Gluc: x / Ketone: Trace  / Bili: Negative / Urobili: <2 mg/dL   Blood: x / Protein: 300 mg/dL / Nitrite: Negative   Leuk Esterase: Negative / RBC: 1 /HPF / WBC 77 /HPF   Sq Epi: x / Non Sq Epi: x / Bacteria: Negative        Culture - Blood (collected 23 @ 20:37)  Source: .Blood Blood-Peripheral  Gram Stain (23 @ 17:51):    Growth in aerobic bottle: Gram Positive Cocci in Clusters    Growth in anaerobic bottle: Gram Positive Cocci in Clusters  Preliminary Report (23 @ 17:51):    Growth in aerobic bottle: Gram Positive Cocci in Clusters    Growth in anaerobic bottle: Gram Positive Cocci in Clusters    Culture - Blood (collected 23 @ 20:37)  Source: .Blood Blood-Peripheral  Gram Stain (23 @ 14:24):    Growth in aerobic bottle: Gram Positive Cocci in Clusters  Preliminary Report (23 @ 14:24):    Growth in aerobic bottle: Gram Positive Cocci in Clusters    ***Blood Panel PCR results on this specimen are available    approximately 3 hours after the Gram stain result.***    Gram stain, PCR, and/or culture results may not always    correspond due to difference in methodologies.    ************************************************************    This PCR assay was performed by multiplex PCR. This    Assay tests for 66 bacterial and resistance gene targets.    Please refer to the [facility] test directory    at [email or web address] for details.  Organism: Blood Culture PCR (23 @ 17:20)  Organism: Blood Culture PCR (23 @ 17:20)      Method Type: PCR      -  Methicillin resistant Staphylococcus aureus (MRSA): Detec        Lactate, Blood: 1.9 mmol/L (23 @ 17:09)  Lactate, Blood: 3.9 mmol/L (23 @ 22:52)  Blood Gas Venous - Lactate: 4.40 mmol/L (23 @ 20:36)  Lactate, Blood: 4.9 mmol/L (23 @ 20:07)      INFECTIOUS DISEASES TESTING  MRSA PCR Result.: Positive (23 @ 19:17)  Procalcitonin, Serum: 48.30 (23 @ 06:10)      INFLAMMATORY MARKERS      RADIOLOGY & ADDITIONAL TESTS:  I have personally reviewed the last available Chest xray  CXR  Xray Chest 1 View- PORTABLE-Urgent:   ACC: [id number] EXAM:  XR CHEST PORTABLE URGENT 1V   ORDERED BY: AMILCAR GARCÍA     PROCEDURE DATE:  2023          INTERPRETATION:  Clinical History / Reason for exam: Follow-up.    Comparison : Chest radiograph May 23, 2023.    Technique/Positioning: Adequate.    Findings:    Support devices: ETT with its tip above the noe and NGT with its tip   below the diaphragm.    Cardiac/mediastinum/hilum: Stable    Lung parenchyma/Pleura: Bilateral opacities. No pneumothorax is seen.    Skeleton/soft tissues: Stable    Impression:    Bilateral opacities.    Support tubes as above.    --- End of Report ---            AMANDA VELARDE MD; Attending Radiologist  This document has been electronically signed. May 24 2023  7:14AM (23 @ 06:56)      CT  CT Angio Chest PE Protocol w/ IV Cont:   ACC: [id number] EXAM:  CT ANGIO CHEST PULM ART WAWIC   ORDERED BY: CESIA SHEIKH     PROCEDURE DATE:  2023          INTERPRETATION:  CLINICAL HISTORY/REASON FOR EXAM: Shortness of breath..    TECHNIQUE: Multislice helical sections were obtained from the thoracic   inlet to the lung bases during rapid administration of 65 cc Omnipaque   350 intravenous contrast. Thin sections were reconstructed through the   pulmonary vasculature. 3D (MIP) reformats obtained. 35 cc contrast   discarded.    COMPARISON: None.    FINDINGS:    PULMONARY EMBOLUS: No evidence of acute pulmonary embolism.    LUNGS, PLEURA, AIRWAYS: Bilateral patchy groundglass nodularity with   dominant confluent left lower lobe opacification with numerous cavitary   lesions. Additional contralateral right lower lobe cavitary 1 cm nodule.    Central airway patent. No pleural effusion. No pneumothorax.    THORACIC NODES: Confluent ill-defined left hilar and mediastinal   adenopathy, likely reactive, without dominant lymph node delineated.    MEDIASTINUM/GREAT VESSELS: No pericardial effusion. Heart size is within   normal limits. The aorta and main pulmonary artery are of normal caliber.    BONES/SOFT TISSUES: Unremarkable.    VISUALIZED UPPER ABDOMEN: Left nonobstructingrenal calculi.      IMPRESSION:    1. Bilateral patchy groundglass nodularity with dominant confluent left   lower lobe opacification with numerous cavitary lesions. Additional   contralateral right lower lobe cavitary 1 cm nodule. Findings compatible   with cavitary pneumonia in the appropriate clinical setting; differential   diagnosis includes tuberculosis.    2. Confluent ill-defined left hilar and mediastinal adenopathy, likely   reactive, without dominant lymph node delineated.    3. No evidence of pulmonary embolism.    Spoke with CESIA SHEIKH PA; Emergency Me on 2023 10:53 PM with   readback.    --- End of Report ---            YARITZA WILDE MD; Attending Radiologist  This document has been electronically signed. May 23 2023 10:56PM (23 @ 22:06)      CARDIOLOGY TESTING  12 Lead ECG:   Ventricular Rate 134 BPM    Atrial Rate 134 BPM    P-R Interval 208 ms    QRS Duration 76 ms    Q-T Interval 318 ms    QTC Calculation(Bazett) 474 ms    P Axis 97 degrees    R Axis 40 degrees    T Axis 70 degrees    Diagnosis Line Sinus tachycardiawith Fusion complexes  Otherwise normal ECG    Confirmed by PANFILO COVINGTON MD ([id number]) on 2023 6:54:09 AM (23 @ 02:40)  12 Lead ECG:   Ventricular Rate 140 BPM    Atrial Rate 140 BPM    P-R Interval 120 ms    QRS Duration 138 ms    Q-T Interval 354 ms    QTC Calculation(Bazett) 540 ms    R Axis 73 degrees    T Axis 60 degrees    Diagnosis Line Sinus tachycardia  Right bundle branch block  Abnormal ECG    Confirmed by Jorge Wyatt ([id number]) on 2023 10:37:28 PM (23 @ 19:52)      MEDICATIONS  albuterol    90 MICROgram(s) HFA Inhaler 4  chlorhexidine 0.12% Liquid 15  chlorhexidine 2% Cloths 1  dexMEDEtomidine Infusion 0.2  fentaNYL   Infusion. 0.5  heparin   Injectable 5000  insulin lispro (ADMELOG) corrective regimen sliding scale   ipratropium 17 MICROgram(s) HFA Inhaler 4  ipratropium 17 MICROgram(s) HFA Inhaler 4  lactated ringers. 1000  levoFLOXacin IVPB   linezolid  IVPB 600  methylPREDNISolone sodium succinate Injectable 40  oseltamivir Suspension 30  pantoprazole   Suspension 40  piperacillin/tazobactam IVPB.. 3.375  polyethylene glycol 3350 17  propofol Infusion 10  senna 2      WEIGHT  Weight (kg): 85 (23 @ 08:15)  Creatinine, Serum: 2.4 mg/dL (23 @ 00:59)  Creatinine, Serum: 2.8 mg/dL (23 @ 17:09)  Creatinine, Serum: 3.0 mg/dL (23 @ 11:48)      ANTIBIOTICS:  levoFLOXacin IVPB      linezolid  IVPB 600 milliGRAM(s) IV Intermittent every 12 hours  oseltamivir Suspension 30 milliGRAM(s) Oral every 12 hours  piperacillin/tazobactam IVPB.. 3.375 Gram(s) IV Intermittent every 8 hours      All available historical records have been reviewed

## 2023-05-25 NOTE — PROGRESS NOTE ADULT - ASSESSMENT
IMPRESSION:    Acute hypoxemic respiratory failure   Severe CAP MRSA   MRSA bacteremia   Non massive hemoptysis   LANCE improving   HO asthma   Influenza B     PLAN:    CNS: Adequate sedation.  Drug screen     HEENT: Oral care    PULMONARY:  HOB @ 45 degrees.  Aspiration precautions.  ARDS net MV setting.  Repeat ABG.  Wean O2.  FU DTA including viral, fungal, AFB, and PCP.  No need for Bronchoscopy.  DC Solumedrol       CARDIOVASCULAR:  Goal directed fluid resuscitation.  ECHO.  Trend CPK.      GI: GI prophylaxis.  OG Feeding.  Bowel regimen.      RENAL:  Follow up lytes.  Correct as needed.  Diggs for strict Is and OS.  urine lytes and Sediment.  Vasculitis and Rheum panel.  Renal eval     INFECTIOUS DISEASE: Follow up cultures. Zyvox, and Tamiflu.  FU Urine legionella and strep Ag. FU HIV.  Galactomannan and Fungitall.  ID eval appreciated.  Daily BC      HEMATOLOGICAL:  DVT prophylaxis.  Dimer     ENDOCRINE:  Follow up FS.  Insulin protocol if needed.      MUSCULOSKELETAL:  Rheum and Vasculitis panel.  Off loading     MICU     TO MARILIN diggs in am    Will need steroids prior to extubation for risk of PES

## 2023-05-25 NOTE — DIETITIAN INITIAL EVALUATION ADULT - ENTERAL
Vital High Protein formula, start and maintain at 20ml/hr. Add No Carb Prosource (1pkg = 15gms Protein) 4x/day. -- will provide 720kcal, 98g protein, 403ml free water. +673kcal/day from propofol.

## 2023-05-25 NOTE — DIETITIAN INITIAL EVALUATION ADULT - PERTINENT MEDS FT
MEDICATIONS  (STANDING):  albuterol    90 MICROgram(s) HFA Inhaler 4 Puff(s) Inhalation every 4 hours  chlorhexidine 0.12% Liquid 15 milliLiter(s) Oral Mucosa every 12 hours  chlorhexidine 2% Cloths 1 Application(s) Topical daily  dexMEDEtomidine Infusion 0.2 MICROgram(s)/kG/Hr (4.25 mL/Hr) IV Continuous <Continuous>  fentaNYL   Infusion. 0.5 MICROgram(s)/kG/Hr (4.72 mL/Hr) IV Continuous <Continuous>  heparin   Injectable 5000 Unit(s) SubCutaneous every 8 hours  insulin lispro (ADMELOG) corrective regimen sliding scale   SubCutaneous three times a day before meals  ipratropium 17 MICROgram(s) HFA Inhaler 4 Puff(s) Inhalation <User Schedule>  ipratropium 17 MICROgram(s) HFA Inhaler 4 Puff(s) Inhalation <User Schedule>  lactated ringers. 1000 milliLiter(s) (100 mL/Hr) IV Continuous <Continuous>  linezolid  IVPB 600 milliGRAM(s) IV Intermittent every 12 hours  methylPREDNISolone sodium succinate Injectable 40 milliGRAM(s) IV Push every 8 hours  oseltamivir Suspension 30 milliGRAM(s) Oral every 12 hours  pantoprazole   Suspension 40 milliGRAM(s) Oral daily  polyethylene glycol 3350 17 Gram(s) Oral every 12 hours  propofol Infusion 10 MICROgram(s)/kG/Min (5.66 mL/Hr) IV Continuous <Continuous>  -- 25.5ml/hr = 673kcal/day  senna 2 Tablet(s) Oral at bedtime    MEDICATIONS  (PRN):  acetaminophen     Tablet .. 650 milliGRAM(s) Oral every 6 hours PRN Temp greater or equal to 38C (100.4F)  albuterol    90 MICROgram(s) HFA Inhaler 4 Puff(s) Inhalation every 4 hours PRN Shortness of Breath and/or Wheezing

## 2023-05-25 NOTE — PROGRESS NOTE ADULT - SUBJECTIVE AND OBJECTIVE BOX
Patient is a 42y old  Female who presents with a chief complaint of AHRF (25 May 2023 06:29)        Over Night Events:  Remains critically ill on MV.  Sedated.  Off pressors.          ROS:     All ROS are negative except HPI         PHYSICAL EXAM    ICU Vital Signs Last 24 Hrs  T(C): 37.2 (25 May 2023 04:00), Max: 38.2 (24 May 2023 12:00)  T(F): 99 (25 May 2023 04:00), Max: 100.7 (24 May 2023 12:00)  HR: 97 (25 May 2023 07:41) (97 - 122)  BP: 157/92 (25 May 2023 07:00) (84/58 - 157/92)  BP(mean): 118 (25 May 2023 07:00) (67 - 118)  ABP: --  ABP(mean): --  RR: 20 (25 May 2023 07:00) (19 - 45)  SpO2: 99% (25 May 2023 07:41) (97% - 100%)    O2 Parameters below as of 25 May 2023 06:00  Patient On (Oxygen Delivery Method): ventilator            CONSTITUTIONAL:  In NAD    ENT:   Airway patent,   Mouth with normal mucosa.   ET     EYES:   Pupils equal,   Round and reactive to light.    CARDIAC:   Normal rate,   Regular rhythm.        RESPIRATORY:   No wheezing  Bilateral BS  Normal chest expansion  Not tachypneic,  No use of accessory muscles    GASTROINTESTINAL:  Abdomen soft,   Non-tender,   No guarding,   + BS    MUSCULOSKELETAL:   Range of motion is not limited,  No clubbing, cyanosis    NEUROLOGICAL:   Sedated   Follows commands     SKIN:   Skin normal color for race,   No evidence of rash.      23 @ 07:01  -  23 @ 07:00  --------------------------------------------------------  IN:    Enteral Tube Flush: 400 mL    FentaNYL: 264.1 mL    IV PiggyBack: 800 mL    Lactated Ringers: 2400 mL    Lactated Ringers Bolus: 1500 mL    Propofol: 90.5 mL    Propofol: 257.4 mL  Total IN: 5712 mL    OUT:    Indwelling Catheter - Urethral (mL): 665 mL  Total OUT: 665 mL    Total NET: 5047 mL          LABS:                            8.7    8.10  )-----------( 136      ( 25 May 2023 05:47 )             28.5                  8.7    8.10  )-----------( 136      (  @ 05:47 )             28.5                8.7    6.17  )-----------( 130      (  @ 00:59 )             28.6                10.1   3.92  )-----------( 203      (  @ 06:10 )             34.8                11.4   16.72 )-----------( 253      (  @ 20:07 )             39.1                                                     132<L>  |  98  |  44<H>  ----------------------------<  99  4.3   |  18  |  2.5<H>    Ca    7.8<L>      25 May 2023 05:47  Mg     1.9         TPro  5.5<L>  /  Alb  2.8<L>  /  TBili  0.4  /  DBili  x   /  AST  36  /  ALT  17  /  AlkPhos  58        PT/INR - ( 23 May 2023 20:07 )   PT: 13.90 sec;   INR: 1.21 ratio         PTT - ( 23 May 2023 20:07 )  PTT:31.0 sec                                       Urinalysis Basic - ( 24 May 2023 19:16 )    Color: Yellow / Appearance: Slightly Turbid / S.047 / pH: x  Gluc: x / Ketone: Trace  / Bili: Negative / Urobili: <2 mg/dL   Blood: x / Protein: 300 mg/dL / Nitrite: Negative   Leuk Esterase: Negative / RBC: 1 /HPF / WBC 77 /HPF   Sq Epi: x / Non Sq Epi: x / Bacteria: Negative        CARDIAC MARKERS ( 25 May 2023 05:47 )  x     / x     / 469 U/L / x     / x      CARDIAC MARKERS ( 24 May 2023 11:48 )  x     / x     / 1112 U/L / x     / x      CARDIAC MARKERS ( 24 May 2023 06:10 )  x     / <0.01 ng/mL / 1483 U/L / x     / 13.7 ng/mL  CARDIAC MARKERS ( 23 May 2023 20:07 )  x     / <0.01 ng/mL / x     / x     / x                                                LIVER FUNCTIONS - ( 25 May 2023 05:47 )  Alb: 2.8 g/dL / Pro: 5.5 g/dL / ALK PHOS: 58 U/L / ALT: 17 U/L / AST: 36 U/L / GGT: x                                                  Culture - Sputum (collected 24 May 2023 19:17)  Source: Trach Asp Tracheal Aspirate  Gram Stain (25 May 2023 07:31):    No polymorphonuclear leukocytes per low power field    No Squamous epithelial cells per low power field    Numerous Gram positive cocci in pairs per oil power field    Culture - Blood (collected 23 May 2023 20:37)  Source: .Blood Blood-Peripheral  Gram Stain (24 May 2023 17:51):    Growth in aerobic bottle: Gram Positive Cocci in Clusters    Growth in anaerobic bottle: Gram Positive Cocci in Clusters  Preliminary Report (24 May 2023 17:51):    Growth in aerobic bottle: Gram Positive Cocci in Clusters    Growth in anaerobic bottle: Gram Positive Cocci in Clusters    Culture - Blood (collected 23 May 2023 20:37)  Source: .Blood Blood-Peripheral  Gram Stain (24 May 2023 14:24):    Growth in aerobic bottle: Gram Positive Cocci in Clusters  Preliminary Report (24 May 2023 14:24):    Growth in aerobic bottle: Gram Positive Cocci in Clusters    ***Blood Panel PCR results on this specimen are available    approximately 3 hours after the Gram stain result.***    Gram stain, PCR, and/or culture results may not always    correspond due to difference in methodologies.    ************************************************************    This PCR assay was performed by multiplex PCR. This    Assay tests for 66 bacterial and resistance gene targets.    Please refer to the [facility] test directory    at [email or web address] for details.  Organism: Blood Culture PCR (24 May 2023 17:20)  Organism: Blood Culture PCR (24 May 2023 17:20)                                                   Mode: AC/ CMV (Assist Control/ Continuous Mandatory Ventilation)  RR (machine): 22  TV (machine): 380  FiO2: 40  PEEP: 10  ITime: 1  MAP: 14  PIP: 34                                      ABG - ( 25 May 2023 03:00 )  pH, Arterial: 7.34  pH, Blood: x     /  pCO2: 36    /  pO2: 137   / HCO3: 19    / Base Excess: -5.8  /  SaO2: 98.8    Lac 1.7.  PPL 18               MEDICATIONS  (STANDING):  albuterol    90 MICROgram(s) HFA Inhaler 4 Puff(s) Inhalation every 4 hours  chlorhexidine 0.12% Liquid 15 milliLiter(s) Oral Mucosa every 12 hours  chlorhexidine 2% Cloths 1 Application(s) Topical daily  dexMEDEtomidine Infusion 0.2 MICROgram(s)/kG/Hr (4.25 mL/Hr) IV Continuous <Continuous>  fentaNYL   Infusion. 0.5 MICROgram(s)/kG/Hr (4.72 mL/Hr) IV Continuous <Continuous>  heparin   Injectable 5000 Unit(s) SubCutaneous every 8 hours  insulin lispro (ADMELOG) corrective regimen sliding scale   SubCutaneous three times a day before meals  ipratropium 17 MICROgram(s) HFA Inhaler 4 Puff(s) Inhalation <User Schedule>  ipratropium 17 MICROgram(s) HFA Inhaler 4 Puff(s) Inhalation <User Schedule>  lactated ringers. 1000 milliLiter(s) (100 mL/Hr) IV Continuous <Continuous>  levoFLOXacin IVPB      linezolid  IVPB 600 milliGRAM(s) IV Intermittent every 12 hours  methylPREDNISolone sodium succinate Injectable 40 milliGRAM(s) IV Push every 8 hours  oseltamivir Suspension 30 milliGRAM(s) Oral every 12 hours  pantoprazole   Suspension 40 milliGRAM(s) Oral daily  piperacillin/tazobactam IVPB.. 3.375 Gram(s) IV Intermittent every 8 hours  polyethylene glycol 3350 17 Gram(s) Oral daily  propofol Infusion 10 MICROgram(s)/kG/Min (5.66 mL/Hr) IV Continuous <Continuous>  senna 2 Tablet(s) Oral at bedtime    MEDICATIONS  (PRN):  acetaminophen     Tablet .. 650 milliGRAM(s) Oral every 6 hours PRN Temp greater or equal to 38C (100.4F)  albuterol    90 MICROgram(s) HFA Inhaler 4 Puff(s) Inhalation every 4 hours PRN Shortness of Breath and/or Wheezing      New X-rays reviewed:                                                                                  ECHO

## 2023-05-25 NOTE — PROCEDURE NOTE - ADDITIONAL PROCEDURE DETAILS
pt in respiratory distress. verbally consented to intubation. pre oxygenation x3 mins 100%nrbm.  /70 o2 sat 100 RR 40. propofol 150mg IV given succinylcholine 140mg IV. glidescope mac 3 used grade 1 view noted quick easy intubation noted.  /50 o2 sat 100 on vent.

## 2023-05-25 NOTE — DIETITIAN INITIAL EVALUATION ADULT - NSFNSGIIOFT_GEN_A_CORE
I&O's Detail    24 May 2023 07:01  -  25 May 2023 07:00  --------------------------------------------------------  IN:    Enteral Tube Flush: 400 mL    FentaNYL: 264.1 mL    IV PiggyBack: 800 mL    Lactated Ringers: 2400 mL    Lactated Ringers Bolus: 1500 mL    Propofol: 90.5 mL    Propofol: 257.4 mL  Total IN: 5712 mL    OUT:    Indwelling Catheter - Urethral (mL): 665 mL  Total OUT: 665 mL    Total NET: 5047 mL

## 2023-05-25 NOTE — PROGRESS NOTE ADULT - SUBJECTIVE AND OBJECTIVE BOX
Nephrology progress note    Patient is seen and examined at 10 am, events over the last 24 h noted .  Pt self-extubated and reintubated this am  BP has been stable off pressors  A little low at present , on increased sedation   Allergies:  aspirin (Short breath; Anaphylaxis)    Hospital Medications:   MEDICATIONS  (STANDING):  albuterol    90 MICROgram(s) HFA Inhaler 4 Puff(s) Inhalation every 4 hours  chlorhexidine 0.12% Liquid 15 milliLiter(s) Oral Mucosa every 12 hours  chlorhexidine 2% Cloths 1 Application(s) Topical daily  dexMEDEtomidine Infusion 0.2 MICROgram(s)/kG/Hr (4.25 mL/Hr) IV Continuous <Continuous>  fentaNYL   Infusion. 0.5 MICROgram(s)/kG/Hr (4.72 mL/Hr) IV Continuous <Continuous>  heparin   Injectable 5000 Unit(s) SubCutaneous every 8 hours  insulin lispro (ADMELOG) corrective regimen sliding scale   SubCutaneous three times a day before meals  ipratropium 17 MICROgram(s) HFA Inhaler 4 Puff(s) Inhalation <User Schedule>  ipratropium 17 MICROgram(s) HFA Inhaler 4 Puff(s) Inhalation <User Schedule>  lactated ringers. 1000 milliLiter(s) (100 mL/Hr) IV Continuous <Continuous>  linezolid  IVPB 600 milliGRAM(s) IV Intermittent every 12 hours  methylPREDNISolone sodium succinate Injectable 40 milliGRAM(s) IV Push every 8 hours  mupirocin 2% Nasal 1 Application(s) Both Nostrils two times a day  norepinephrine Infusion 0.05 MICROgram(s)/kG/Min (7.97 mL/Hr) IV Continuous <Continuous>  oseltamivir Suspension 30 milliGRAM(s) Oral every 12 hours  pantoprazole   Suspension 40 milliGRAM(s) Oral daily  polyethylene glycol 3350 17 Gram(s) Oral every 12 hours  propofol Infusion 10 MICROgram(s)/kG/Min (5.66 mL/Hr) IV Continuous <Continuous>  senna 2 Tablet(s) Oral at bedtime        VITALS:  T(F): 97.8 (23 @ 12:00), Max: 100.7 (23 @ 16:00)  HR: 90 (23 @ 12:15)  BP: 81/50 (23 @ 12:15)  RR: 27 (23 @ 12:15)  SpO2: 98% (23 @ 12:15)  Wt(kg): --     @ 07:01  -   @ 07:00  --------------------------------------------------------  IN: 5712 mL / OUT: 665 mL / NET: 5047 mL     @ 07:01  -   @ 12:51  --------------------------------------------------------  IN: 3217.1 mL / OUT: 230 mL / NET: 2987.1 mL          PHYSICAL EXAM:  Constitutional: NAD. On vent  Neck: No JVD  Respiratory: BS b/l+  Cardiovascular: S1, S2, RRR  Gastrointestinal: BS+, soft, NT/ND  Extremities: No peripheral edema  Neurological: sedated  : has dontae.   Skin: No rashes  Vascular Access:    LABS:      132<L>  |  98  |  44<H>  ----------------------------<  99  4.3   |  18  |  2.5<H>  Creatinine Trend: 2.5<--, 2.4<--, 2.8<--, 3.0<--, 2.6<--, 1.9<--    Ca    7.8<L>      25 May 2023 05:47  Phos  5.4       Mg     1.9         TPro  5.5<L>  /  Alb  2.8<L>  /  TBili  0.4  /  DBili      /  AST  36  /  ALT  17  /  AlkPhos  58                            8.7    8.10  )-----------( 136      ( 25 May 2023 05:47 )             28.5       Urine Studies:  Urinalysis Basic - ( 25 May 2023 11:30 )    Color: Yellow / Appearance: Slightly Turbid / S.027 / pH:   Gluc:  / Ketone: Negative  / Bili: Negative / Urobili: <2 mg/dL   Blood:  / Protein: 100 mg/dL / Nitrite: Negative   Leuk Esterase: Negative / RBC: 45 /HPF / WBC 30 /HPF   Sq Epi:  / Non Sq Epi:  / Bacteria: Negative      Sodium, Random Urine: <20.0 mmoL/L ( @ 19:16)  Creatinine, Random Urine: 178 mg/dL ( @ 19:16)  Protein/Creatinine Ratio Calculation: 1.3 Ratio ( @ 19:16)  Osmolality, Random Urine: 340 mos/kg ( @ 19:16)  Potassium, Random Urine: 50 mmol/L ( @ 19:16)    RADIOLOGY & ADDITIONAL STUDIES:  < from: Xray Chest 1 View- PORTABLE-Urgent (Xray Chest 1 View- PORTABLE-Urgent .) (23 @ 10:15) >    Unchanged bilateral predominantly left lung airspace opacities.  Likely new small layering left pleural effusion.  No pneumothorax.  Satisfactory support devices.    < end of copied text >  < from: US Kidney and Bladder (23 @ 17:52) >    Right kidney: 11.4 cm. No hydronephrosis. 3.3 cm parapelvic cyst.    Left kidney:  12.1 cm. No hydronephrosis.    Urinary bladder: Not visualized, possibly empty.    IMPRESSION:  No hydronephrosis.    < end of copied text >

## 2023-05-25 NOTE — DIETITIAN INITIAL EVALUATION ADULT - OTHER INFO
Pt presented with cough and SOB, found to have AHRF in setting Necrotizing PNA and Influenza B. Pt intubated.

## 2023-05-25 NOTE — PROGRESS NOTE ADULT - SUBJECTIVE AND OBJECTIVE BOX
ZEYNEP ROSSI 42y Female  MRN#: [id number]   Hospital Day: 2d        SUBJECTIVE  Patient is a 42y old Female who presents with a chief complaint of AHRF (25 May 2023 12:51)  Currently admitted to medicine with the primary diagnosis of Pneumonia      INTERVAL HPI AND OVERNIGHT EVENTS:  Patient was examined and seen at bedside.   Intubated, sedated with fentanyl, propofol, precedex  Pt self extubated herself overnight, re-intubated for hypoxia and tachypnia       OBJECTIVE  PAST MEDICAL & SURGICAL HISTORY    ALLERGIES:  aspirin (Short breath; Anaphylaxis)    MEDICATIONS:  STANDING MEDICATIONS  albuterol    90 MICROgram(s) HFA Inhaler 4 Puff(s) Inhalation every 4 hours  chlorhexidine 0.12% Liquid 15 milliLiter(s) Oral Mucosa every 12 hours  chlorhexidine 2% Cloths 1 Application(s) Topical daily  dexMEDEtomidine Infusion 0.2 MICROgram(s)/kG/Hr IV Continuous <Continuous>  fentaNYL   Infusion. 0.5 MICROgram(s)/kG/Hr IV Continuous <Continuous>  heparin   Injectable 5000 Unit(s) SubCutaneous every 8 hours  insulin lispro (ADMELOG) corrective regimen sliding scale   SubCutaneous three times a day before meals  ipratropium 17 MICROgram(s) HFA Inhaler 4 Puff(s) Inhalation <User Schedule>  lactated ringers Bolus 250 milliLiter(s) IV Bolus once  lactated ringers. 1000 milliLiter(s) IV Continuous <Continuous>  linezolid  IVPB 600 milliGRAM(s) IV Intermittent every 12 hours  methylPREDNISolone sodium succinate Injectable 40 milliGRAM(s) IV Push every 8 hours  mupirocin 2% Nasal 1 Application(s) Both Nostrils two times a day  norepinephrine Infusion 0.05 MICROgram(s)/kG/Min IV Continuous <Continuous>  oseltamivir Suspension 30 milliGRAM(s) Oral every 12 hours  pantoprazole   Suspension 40 milliGRAM(s) Oral daily  polyethylene glycol 3350 17 Gram(s) Oral every 12 hours  propofol Infusion 10 MICROgram(s)/kG/Min IV Continuous <Continuous>  senna 2 Tablet(s) Oral at bedtime    PRN MEDICATIONS  acetaminophen     Tablet .. 650 milliGRAM(s) Oral every 6 hours PRN  albuterol    90 MICROgram(s) HFA Inhaler 4 Puff(s) Inhalation every 4 hours PRN  ipratropium 17 MICROgram(s) HFA Inhaler 4 Puff(s) Inhalation <User Schedule> PRN      VITAL SIGNS: Last 24 Hours  T(C): 36.6 (25 May 2023 12:00), Max: 38.2 (24 May 2023 16:00)  T(F): 97.8 (25 May 2023 12:00), Max: 100.7 (24 May 2023 16:00)  HR: 85 (25 May 2023 15:00) (83 - 112)  BP: 109/68 (25 May 2023 15:00) (67/44 - 157/92)  BP(mean): 85 (25 May 2023 15:00) (52 - 118)  RR: 40 (25 May 2023 15:00) (19 - 44)  SpO2: 99% (25 May 2023 15:00) (98% - 100%)    LABS:                        8.7    8.10  )-----------( 136      ( 25 May 2023 05:47 )             28.5     05-25    132<L>  |  98  |  44<H>  ----------------------------<  99  4.3   |  18  |  2.5<H>    Ca    7.8<L>      25 May 2023 05:47  Phos  5.4     05-25  Mg     1.9     05-25    TPro  5.5<L>  /  Alb  2.8<L>  /  TBili  0.4  /  DBili  x   /  AST  36  /  ALT  17  /  AlkPhos  58  05-25    PT/INR - ( 23 May 2023 20:07 )   PT: 13.90 sec;   INR: 1.21 ratio         PTT - ( 23 May 2023 20:07 )  PTT:31.0 sec  Urinalysis Basic - ( 25 May 2023 11:30 )    Color: Yellow / Appearance: Slightly Turbid / S.027 / pH: x  Gluc: x / Ketone: Negative  / Bili: Negative / Urobili: <2 mg/dL   Blood: x / Protein: 100 mg/dL / Nitrite: Negative   Leuk Esterase: Negative / RBC: 45 /HPF / WBC 30 /HPF   Sq Epi: x / Non Sq Epi: x / Bacteria: Negative      ABG - ( 25 May 2023 08:45 )  pH, Arterial: 7.41  pH, Blood: x     /  pCO2: 29    /  pO2: 129   / HCO3: 18    / Base Excess: -5.4  /  SaO2: 97.9              Lactate, Blood: 2.8 mmol/L *H* (23 @ 11:41)  Creatine Kinase, Serum: 469 U/L *H* (23 @ 05:47)  Lactate, Blood: 1.9 mmol/L (23 @ 17:09)      Culture - Sputum (collected 24 May 2023 19:17)  Source: Trach Asp Tracheal Aspirate  Gram Stain (25 May 2023 07:31):    No polymorphonuclear leukocytes per low power field    No Squamous epithelial cells per low power field    Numerous Gram positive cocci in pairs per oil power field    Culture - Acid Fast - Sputum w/Smear (collected 24 May 2023 19:17)  Source: Trach Asp Tracheal Aspirate    Culture - Fungal, Sputum (collected 24 May 2023 19:17)  Source: Trach Asp Tracheal Aspirate  Preliminary Report (25 May 2023 11:01):    Testing in progress    Culture - Blood (collected 24 May 2023 06:10)  Source: .Blood None  Gram Stain (25 May 2023 11:18):    Growth in aerobic bottle: Gram Positive Cocci in Clusters    Growth in anaerobic bottle: Gram Positive Cocci in Clusters  Preliminary Report (25 May 2023 11:18):    Growth in aerobic bottle: Gram Positive Cocci in Clusters    Growth in anaerobic bottle: Gram Positive Cocci in Clusters    Culture - Blood (collected 23 May 2023 20:37)  Source: .Blood Blood-Peripheral  Gram Stain (24 May 2023 17:51):    Growth in aerobic bottle: Gram Positive Cocci in Clusters    Growth in anaerobic bottle: Gram Positive Cocci in Clusters  Preliminary Report (25 May 2023 11:29):    Growth in aerobic and anaerobic bottles: Staphylococcus aureus    See previous culture [id number]    Culture - Blood (collected 23 May 2023 20:37)  Source: .Blood Blood-Peripheral  Gram Stain (24 May 2023 14:24):    Growth in aerobic bottle: Gram Positive Cocci in Clusters  Preliminary Report (25 May 2023 11:28):    Growth in aerobic bottle: Staphylococcus aureus    ***Blood Panel PCR results on this specimen are available    approximately 3 hours after the Gram stain result.***    Gram stain, PCR, and/or culture results may not always    correspond due to difference in methodologies.    ************************************************************    This PCR assay was performed by multiplex PCR. This    Assay tests for 66 bacterial and resistance gene targets.    Please refer to the [facility] Labs test directory    at [email or web address] for details.  Organism: Blood Culture PCR (24 May 2023 17:20)  Organism: Blood Culture PCR (24 May 2023 17:20)      CARDIAC MARKERS ( 25 May 2023 05:47 )  x     / x     / 469 U/L / x     / x      CARDIAC MARKERS ( 24 May 2023 11:48 )  x     / x     / 1112 U/L / x     / x      CARDIAC MARKERS ( 24 May 2023 06:10 )  x     / <0.01 ng/mL / 1483 U/L / x     / 13.7 ng/mL  CARDIAC MARKERS ( 23 May 2023 20:07 )  x     / <0.01 ng/mL / x     / x     / x            PHYSICAL EXAM:  CONSTITUTIONAL: intubated, sedated   HEAD: Atraumatic, normocephalic  EYES: EOM intact, PERRLA, conjunctiva and sclera clear  ENT: Supple, no masses, no thyromegaly, no bruits, no JVD; moist mucous membranes  PULMONARY: Clear to auscultation bilaterally; no wheezes, rales, or rhonchi  CARDIOVASCULAR: Regular rate and rhythm; no murmurs, rubs, or gallops  GASTROINTESTINAL: Soft, non-tender, non-distended; bowel sounds present  MUSCULOSKELETAL: 2+ peripheral pulses; no clubbing, no cyanosis, no edema  NEUROLOGY: non-focal  SKIN: No rashes or lesions; warm and dry    ASSESSMENT & PLAN   ZEYNEP ROSSI 42y Female  MRN#: [id number]   Hospital Day: 2d        SUBJECTIVE  Patient is a 42y old Female who presents with a chief complaint of AHRF (25 May 2023 12:51)  Currently admitted to medicine with the primary diagnosis of Pneumonia      INTERVAL HPI AND OVERNIGHT EVENTS:  Patient was examined and seen at bedside.   Intubated, sedated with fentanyl, propofol, precedex  Pt self extubated herself overnight, re-intubated for hypoxia and tachypnia       OBJECTIVE  PAST MEDICAL & SURGICAL HISTORY    ALLERGIES:  aspirin (Short breath; Anaphylaxis)    MEDICATIONS:  STANDING MEDICATIONS  albuterol    90 MICROgram(s) HFA Inhaler 4 Puff(s) Inhalation every 4 hours  chlorhexidine 0.12% Liquid 15 milliLiter(s) Oral Mucosa every 12 hours  chlorhexidine 2% Cloths 1 Application(s) Topical daily  dexMEDEtomidine Infusion 0.2 MICROgram(s)/kG/Hr IV Continuous <Continuous>  fentaNYL   Infusion. 0.5 MICROgram(s)/kG/Hr IV Continuous <Continuous>  heparin   Injectable 5000 Unit(s) SubCutaneous every 8 hours  insulin lispro (ADMELOG) corrective regimen sliding scale   SubCutaneous three times a day before meals  ipratropium 17 MICROgram(s) HFA Inhaler 4 Puff(s) Inhalation <User Schedule>  lactated ringers Bolus 250 milliLiter(s) IV Bolus once  lactated ringers. 1000 milliLiter(s) IV Continuous <Continuous>  linezolid  IVPB 600 milliGRAM(s) IV Intermittent every 12 hours  methylPREDNISolone sodium succinate Injectable 40 milliGRAM(s) IV Push every 8 hours  mupirocin 2% Nasal 1 Application(s) Both Nostrils two times a day  norepinephrine Infusion 0.05 MICROgram(s)/kG/Min IV Continuous <Continuous>  oseltamivir Suspension 30 milliGRAM(s) Oral every 12 hours  pantoprazole   Suspension 40 milliGRAM(s) Oral daily  polyethylene glycol 3350 17 Gram(s) Oral every 12 hours  propofol Infusion 10 MICROgram(s)/kG/Min IV Continuous <Continuous>  senna 2 Tablet(s) Oral at bedtime    PRN MEDICATIONS  acetaminophen     Tablet .. 650 milliGRAM(s) Oral every 6 hours PRN  albuterol    90 MICROgram(s) HFA Inhaler 4 Puff(s) Inhalation every 4 hours PRN  ipratropium 17 MICROgram(s) HFA Inhaler 4 Puff(s) Inhalation <User Schedule> PRN      VITAL SIGNS: Last 24 Hours  T(C): 36.6 (25 May 2023 12:00), Max: 38.2 (24 May 2023 16:00)  T(F): 97.8 (25 May 2023 12:00), Max: 100.7 (24 May 2023 16:00)  HR: 85 (25 May 2023 15:00) (83 - 112)  BP: 109/68 (25 May 2023 15:00) (67/44 - 157/92)  BP(mean): 85 (25 May 2023 15:00) (52 - 118)  RR: 40 (25 May 2023 15:00) (19 - 44)  SpO2: 99% (25 May 2023 15:00) (98% - 100%)    LABS:                        8.7    8.10  )-----------( 136      ( 25 May 2023 05:47 )             28.5     05-25    132<L>  |  98  |  44<H>  ----------------------------<  99  4.3   |  18  |  2.5<H>    Ca    7.8<L>      25 May 2023 05:47  Phos  5.4     05-25  Mg     1.9     05-25    TPro  5.5<L>  /  Alb  2.8<L>  /  TBili  0.4  /  DBili  x   /  AST  36  /  ALT  17  /  AlkPhos  58  05-25    PT/INR - ( 23 May 2023 20:07 )   PT: 13.90 sec;   INR: 1.21 ratio         PTT - ( 23 May 2023 20:07 )  PTT:31.0 sec  Urinalysis Basic - ( 25 May 2023 11:30 )    Color: Yellow / Appearance: Slightly Turbid / S.027 / pH: x  Gluc: x / Ketone: Negative  / Bili: Negative / Urobili: <2 mg/dL   Blood: x / Protein: 100 mg/dL / Nitrite: Negative   Leuk Esterase: Negative / RBC: 45 /HPF / WBC 30 /HPF   Sq Epi: x / Non Sq Epi: x / Bacteria: Negative      ABG - ( 25 May 2023 08:45 )  pH, Arterial: 7.41  pH, Blood: x     /  pCO2: 29    /  pO2: 129   / HCO3: 18    / Base Excess: -5.4  /  SaO2: 97.9              Lactate, Blood: 2.8 mmol/L *H* (23 @ 11:41)  Creatine Kinase, Serum: 469 U/L *H* (23 @ 05:47)  Lactate, Blood: 1.9 mmol/L (23 @ 17:09)      Culture - Sputum (collected 24 May 2023 19:17)  Source: Trach Asp Tracheal Aspirate  Gram Stain (25 May 2023 07:31):    No polymorphonuclear leukocytes per low power field    No Squamous epithelial cells per low power field    Numerous Gram positive cocci in pairs per oil power field    Culture - Acid Fast - Sputum w/Smear (collected 24 May 2023 19:17)  Source: Trach Asp Tracheal Aspirate    Culture - Fungal, Sputum (collected 24 May 2023 19:17)  Source: Trach Asp Tracheal Aspirate  Preliminary Report (25 May 2023 11:01):    Testing in progress    Culture - Blood (collected 24 May 2023 06:10)  Source: .Blood None  Gram Stain (25 May 2023 11:18):    Growth in aerobic bottle: Gram Positive Cocci in Clusters    Growth in anaerobic bottle: Gram Positive Cocci in Clusters  Preliminary Report (25 May 2023 11:18):    Growth in aerobic bottle: Gram Positive Cocci in Clusters    Growth in anaerobic bottle: Gram Positive Cocci in Clusters    Culture - Blood (collected 23 May 2023 20:37)  Source: .Blood Blood-Peripheral  Gram Stain (24 May 2023 17:51):    Growth in aerobic bottle: Gram Positive Cocci in Clusters    Growth in anaerobic bottle: Gram Positive Cocci in Clusters  Preliminary Report (25 May 2023 11:29):    Growth in aerobic and anaerobic bottles: Staphylococcus aureus    See previous culture [id number]    Culture - Blood (collected 23 May 2023 20:37)  Source: .Blood Blood-Peripheral  Gram Stain (24 May 2023 14:24):    Growth in aerobic bottle: Gram Positive Cocci in Clusters  Preliminary Report (25 May 2023 11:28):    Growth in aerobic bottle: Staphylococcus aureus    ***Blood Panel PCR results on this specimen are available    approximately 3 hours after the Gram stain result.***    Gram stain, PCR, and/or culture results may not always    correspond due to difference in methodologies.    ************************************************************    This PCR assay was performed by multiplex PCR. This    Assay tests for 66 bacterial and resistance gene targets.    Please refer to the [facility] Labs test directory    at [email or web address] for details.  Organism: Blood Culture PCR (24 May 2023 17:20)  Organism: Blood Culture PCR (24 May 2023 17:20)      CARDIAC MARKERS ( 25 May 2023 05:47 )  x     / x     / 469 U/L / x     / x      CARDIAC MARKERS ( 24 May 2023 11:48 )  x     / x     / 1112 U/L / x     / x      CARDIAC MARKERS ( 24 May 2023 06:10 )  x     / <0.01 ng/mL / 1483 U/L / x     / 13.7 ng/mL  CARDIAC MARKERS ( 23 May 2023 20:07 )  x     / <0.01 ng/mL / x     / x     / x            PHYSICAL EXAM:  CONSTITUTIONAL: intubated, sedated   HEAD: Atraumatic, normocephalic  EYES: EOM intact, PERRLA, conjunctiva and sclera clear  ENT: Supple, no masses, no thyromegaly, no bruits, no JVD; moist mucous membranes  PULMONARY: Clear to auscultation bilaterally; no wheezes, rales, or rhonchi  CARDIOVASCULAR: Regular rate and rhythm; no murmurs, rubs, or gallops  GASTROINTESTINAL: Soft, non-tender, non-distended; bowel sounds present  MUSCULOSKELETAL: 2+ peripheral pulses; no clubbing, no cyanosis, no edema  NEUROLOGY: non-focal  SKIN: No rashes or lesions; warm and dry    ASSESSMENT & PLAN  42 year-old female with PMH of Asthma, HTN? presents with complaint of cough x3 days and SOB. Patient intubated after admission due t hypoxia, tachypnea and respiratory distress. Transferred to ICU.     #Acute hypoxemic respiratory failure   #Severe CAP MRSA   #MRSA bacteremia   #Non massive hemoptysis   #HO asthma   #Influenza B   - intubated , self extubated  and re-intubated    - blood cx and DTA positive for MRSA   - ID following   - on zyvox 600 mg IV Q12   - repeat blood cx until negative   - will switch to vanc and clindamycin if plt still dropping   - stopped steroids , will give one dose prior to extubation   - c/w nebs   - c/w oseltamivir for 5 days   - off levophed     #LANCE   - Cr peaked at 3 trending down 2.5 today   - probably pre-renal/ ATN   - c/w IV LR 100cc/hr   - boluses based on CHEETAH   - nephro eval noted   - KBUS ordered   - CK trending down   - good UO   - UA +ve for proteins and blood, Pr/Cr 1.3 (H)  - f/u vasculitis panel     #Thrombocytopenia   #Anemia   - due to sepsis  - monitor , dc zyvox if worsening thrombocytopenia   - keep Hb > 7     #DVT ppx: heparin SQ   #GI ppx: pantoprazole   #Diet: OG tube feeds    ZEYNEP ROSSI 42y Female  MRN#: [id number]   Hospital Day: 2d        SUBJECTIVE  Patient is a 42y old Female who presents with a chief complaint of AHRF (25 May 2023 12:51)  Currently admitted to medicine with the primary diagnosis of Pneumonia      INTERVAL HPI AND OVERNIGHT EVENTS:  Patient was examined and seen at bedside.   Intubated, sedated with fentanyl, propofol, precedex  Pt self extubated herself overnight, re-intubated for hypoxia and tachypnia       OBJECTIVE  PAST MEDICAL & SURGICAL HISTORY    ALLERGIES:  aspirin (Short breath; Anaphylaxis)    MEDICATIONS:  STANDING MEDICATIONS  albuterol    90 MICROgram(s) HFA Inhaler 4 Puff(s) Inhalation every 4 hours  chlorhexidine 0.12% Liquid 15 milliLiter(s) Oral Mucosa every 12 hours  chlorhexidine 2% Cloths 1 Application(s) Topical daily  dexMEDEtomidine Infusion 0.2 MICROgram(s)/kG/Hr IV Continuous <Continuous>  fentaNYL   Infusion. 0.5 MICROgram(s)/kG/Hr IV Continuous <Continuous>  heparin   Injectable 5000 Unit(s) SubCutaneous every 8 hours  insulin lispro (ADMELOG) corrective regimen sliding scale   SubCutaneous three times a day before meals  ipratropium 17 MICROgram(s) HFA Inhaler 4 Puff(s) Inhalation <User Schedule>  lactated ringers Bolus 250 milliLiter(s) IV Bolus once  lactated ringers. 1000 milliLiter(s) IV Continuous <Continuous>  linezolid  IVPB 600 milliGRAM(s) IV Intermittent every 12 hours  methylPREDNISolone sodium succinate Injectable 40 milliGRAM(s) IV Push every 8 hours  mupirocin 2% Nasal 1 Application(s) Both Nostrils two times a day  norepinephrine Infusion 0.05 MICROgram(s)/kG/Min IV Continuous <Continuous>  oseltamivir Suspension 30 milliGRAM(s) Oral every 12 hours  pantoprazole   Suspension 40 milliGRAM(s) Oral daily  polyethylene glycol 3350 17 Gram(s) Oral every 12 hours  propofol Infusion 10 MICROgram(s)/kG/Min IV Continuous <Continuous>  senna 2 Tablet(s) Oral at bedtime    PRN MEDICATIONS  acetaminophen     Tablet .. 650 milliGRAM(s) Oral every 6 hours PRN  albuterol    90 MICROgram(s) HFA Inhaler 4 Puff(s) Inhalation every 4 hours PRN  ipratropium 17 MICROgram(s) HFA Inhaler 4 Puff(s) Inhalation <User Schedule> PRN      VITAL SIGNS: Last 24 Hours  T(C): 36.6 (25 May 2023 12:00), Max: 38.2 (24 May 2023 16:00)  T(F): 97.8 (25 May 2023 12:00), Max: 100.7 (24 May 2023 16:00)  HR: 85 (25 May 2023 15:00) (83 - 112)  BP: 109/68 (25 May 2023 15:00) (67/44 - 157/92)  BP(mean): 85 (25 May 2023 15:00) (52 - 118)  RR: 40 (25 May 2023 15:00) (19 - 44)  SpO2: 99% (25 May 2023 15:00) (98% - 100%)    LABS:                        8.7    8.10  )-----------( 136      ( 25 May 2023 05:47 )             28.5     05-25    132<L>  |  98  |  44<H>  ----------------------------<  99  4.3   |  18  |  2.5<H>    Ca    7.8<L>      25 May 2023 05:47  Phos  5.4     05-25  Mg     1.9     05-25    TPro  5.5<L>  /  Alb  2.8<L>  /  TBili  0.4  /  DBili  x   /  AST  36  /  ALT  17  /  AlkPhos  58  05-25    PT/INR - ( 23 May 2023 20:07 )   PT: 13.90 sec;   INR: 1.21 ratio         PTT - ( 23 May 2023 20:07 )  PTT:31.0 sec  Urinalysis Basic - ( 25 May 2023 11:30 )    Color: Yellow / Appearance: Slightly Turbid / S.027 / pH: x  Gluc: x / Ketone: Negative  / Bili: Negative / Urobili: <2 mg/dL   Blood: x / Protein: 100 mg/dL / Nitrite: Negative   Leuk Esterase: Negative / RBC: 45 /HPF / WBC 30 /HPF   Sq Epi: x / Non Sq Epi: x / Bacteria: Negative      ABG - ( 25 May 2023 08:45 )  pH, Arterial: 7.41  pH, Blood: x     /  pCO2: 29    /  pO2: 129   / HCO3: 18    / Base Excess: -5.4  /  SaO2: 97.9              Lactate, Blood: 2.8 mmol/L *H* (23 @ 11:41)  Creatine Kinase, Serum: 469 U/L *H* (23 @ 05:47)  Lactate, Blood: 1.9 mmol/L (23 @ 17:09)      Culture - Sputum (collected 24 May 2023 19:17)  Source: Trach Asp Tracheal Aspirate  Gram Stain (25 May 2023 07:31):    No polymorphonuclear leukocytes per low power field    No Squamous epithelial cells per low power field    Numerous Gram positive cocci in pairs per oil power field    Culture - Acid Fast - Sputum w/Smear (collected 24 May 2023 19:17)  Source: Trach Asp Tracheal Aspirate    Culture - Fungal, Sputum (collected 24 May 2023 19:17)  Source: Trach Asp Tracheal Aspirate  Preliminary Report (25 May 2023 11:01):    Testing in progress    Culture - Blood (collected 24 May 2023 06:10)  Source: .Blood None  Gram Stain (25 May 2023 11:18):    Growth in aerobic bottle: Gram Positive Cocci in Clusters    Growth in anaerobic bottle: Gram Positive Cocci in Clusters  Preliminary Report (25 May 2023 11:18):    Growth in aerobic bottle: Gram Positive Cocci in Clusters    Growth in anaerobic bottle: Gram Positive Cocci in Clusters    Culture - Blood (collected 23 May 2023 20:37)  Source: .Blood Blood-Peripheral  Gram Stain (24 May 2023 17:51):    Growth in aerobic bottle: Gram Positive Cocci in Clusters    Growth in anaerobic bottle: Gram Positive Cocci in Clusters  Preliminary Report (25 May 2023 11:29):    Growth in aerobic and anaerobic bottles: Staphylococcus aureus    See previous culture [id number]    Culture - Blood (collected 23 May 2023 20:37)  Source: .Blood Blood-Peripheral  Gram Stain (24 May 2023 14:24):    Growth in aerobic bottle: Gram Positive Cocci in Clusters  Preliminary Report (25 May 2023 11:28):    Growth in aerobic bottle: Staphylococcus aureus    ***Blood Panel PCR results on this specimen are available    approximately 3 hours after the Gram stain result.***    Gram stain, PCR, and/or culture results may not always    correspond due to difference in methodologies.    ************************************************************    This PCR assay was performed by multiplex PCR. This    Assay tests for 66 bacterial and resistance gene targets.    Please refer to the [facility] Labs test directory    at [email or web address] for details.  Organism: Blood Culture PCR (24 May 2023 17:20)  Organism: Blood Culture PCR (24 May 2023 17:20)      CARDIAC MARKERS ( 25 May 2023 05:47 )  x     / x     / 469 U/L / x     / x      CARDIAC MARKERS ( 24 May 2023 11:48 )  x     / x     / 1112 U/L / x     / x      CARDIAC MARKERS ( 24 May 2023 06:10 )  x     / <0.01 ng/mL / 1483 U/L / x     / 13.7 ng/mL  CARDIAC MARKERS ( 23 May 2023 20:07 )  x     / <0.01 ng/mL / x     / x     / x            PHYSICAL EXAM:  CONSTITUTIONAL: intubated, sedated   HEAD: Atraumatic, normocephalic  EYES: EOM intact, PERRLA, conjunctiva and sclera clear  ENT: Supple, no masses, no thyromegaly, no bruits, no JVD; moist mucous membranes  PULMONARY: Clear to auscultation bilaterally; no wheezes, rales, or rhonchi  CARDIOVASCULAR: Regular rate and rhythm; no murmurs, rubs, or gallops  GASTROINTESTINAL: Soft, non-tender, non-distended; bowel sounds present  MUSCULOSKELETAL: 2+ peripheral pulses; no clubbing, no cyanosis, no edema  NEUROLOGY: non-focal  SKIN: No rashes or lesions; warm and dry    ASSESSMENT & PLAN  42 year-old female with PMH of Asthma, HTN? presents with complaint of cough x3 days and SOB. Patient intubated after admission due t hypoxia, tachypnea and respiratory distress. Transferred to ICU.     #Acute hypoxemic respiratory failure   #Severe CAP MRSA   #MRSA bacteremia   #Non massive hemoptysis   #HO asthma   #Influenza B   - CT Angio Chest PE Protocol w/ IV Cont (23 @ 22:06) >  1. Bilateral patchy groundglass nodularity with dominant confluent left   lower lobe opacification with numerous cavitary lesions. Additional   contralateral right lower lobe cavitary 1 cm nodule. Findings compatible   with cavitary pneumonia in the appropriate clinical setting; differential   diagnosis includes tuberculosis.  2. Confluent ill-defined left hilar and mediastinal adenopathy, likely   reactive, without dominant lymph node delineated.  3. No evidence of pulmonary embolism.  - intubated , self extubated  and re-intubated    - blood cx and DTA positive for MRSA   - ID following   - on zyvox 600 mg IV Q12   - repeat blood cx until negative   - will switch to vanc and clindamycin if plt still dropping   - stopped steroids , will give one dose prior to extubation   - c/w nebs   - c/w oseltamivir for 5 days   - off levophed     #LANCE   - Cr peaked at 3 trending down 2.5 today   - probably pre-renal/ ATN   - c/w IV LR 100cc/hr   - boluses based on CHEETAH   - nephro eval noted   - KBUS ordered   - CK trending down   - good UO   - UA +ve for proteins and blood, Pr/Cr 1.3 (H)  - f/u vasculitis panel     #Thrombocytopenia   #Anemia   - due to sepsis  - monitor , dc zyvox if worsening thrombocytopenia   - keep Hb > 7     #DVT ppx: heparin SQ   #GI ppx: pantoprazole   #Diet: OG tube feeds

## 2023-05-25 NOTE — DIETITIAN INITIAL EVALUATION ADULT - NS FNS DIET ORDER
Diet, NPO with Tube Feed:   Tube Feeding Modality: Orogastric  Jevity 1.2 Olvin  Total Volume for 24 Hours (mL): 1000  Bolus  Total Volume of Bolus (mL):  250  Total # of Feeds: 3  Tube Feed Frequency: Every 6 hours   Tube Feed Start Time: 08:00  Bolus Feed Rate (mL per Hour): 250   Bolus Feed Duration (in Hours): 1  Free Water Flush  Bolus   Total Volume per Flush (mL): 30   Frequency: Every 6 Hours (05-25-23 @ 07:49)

## 2023-05-25 NOTE — DIETITIAN INITIAL EVALUATION ADULT - NUTRITIONGOAL OUTCOME1
Pt to meet >90% & <105% estimated needs via EN in 3-5 days. RD to follow as per high risk protocol.  Monitor diet order, kcal intake, body composition, NFPE, labs  (lytes, BG, renal indices)

## 2023-05-26 LAB
-  AMPICILLIN/SULBACTAM: SIGNIFICANT CHANGE UP
-  CEFAZOLIN: SIGNIFICANT CHANGE UP
-  CLINDAMYCIN: SIGNIFICANT CHANGE UP
-  DAPTOMYCIN: SIGNIFICANT CHANGE UP
-  ERYTHROMYCIN: SIGNIFICANT CHANGE UP
-  GENTAMICIN: SIGNIFICANT CHANGE UP
-  LINEZOLID: SIGNIFICANT CHANGE UP
-  OXACILLIN: SIGNIFICANT CHANGE UP
-  PENICILLIN: SIGNIFICANT CHANGE UP
-  RIFAMPIN: SIGNIFICANT CHANGE UP
-  TETRACYCLINE: SIGNIFICANT CHANGE UP
-  TRIMETHOPRIM/SULFAMETHOXAZOLE: SIGNIFICANT CHANGE UP
-  VANCOMYCIN: SIGNIFICANT CHANGE UP
6-ACETYLMORPHINE, UR RESULT: NEGATIVE NG/ML — SIGNIFICANT CHANGE UP
6MAM UR CFM-MCNC: NEGATIVE NG/ML — SIGNIFICANT CHANGE UP
ALBUMIN SERPL ELPH-MCNC: 2.5 G/DL — LOW (ref 3.5–5.2)
ALP SERPL-CCNC: 100 U/L — SIGNIFICANT CHANGE UP (ref 30–115)
ALT FLD-CCNC: <5 U/L — SIGNIFICANT CHANGE UP (ref 0–41)
ANA PAT FLD IF-IMP: ABNORMAL
ANA TITR SER: ABNORMAL
ANION GAP SERPL CALC-SCNC: 18 MMOL/L — HIGH (ref 7–14)
AST SERPL-CCNC: 48 U/L — HIGH (ref 0–41)
BASOPHILS # BLD AUTO: 0.02 K/UL — SIGNIFICANT CHANGE UP (ref 0–0.2)
BASOPHILS # BLD AUTO: 0.07 K/UL — SIGNIFICANT CHANGE UP (ref 0–0.2)
BASOPHILS NFR BLD AUTO: 0.1 % — SIGNIFICANT CHANGE UP (ref 0–1)
BASOPHILS NFR BLD AUTO: 0.5 % — SIGNIFICANT CHANGE UP (ref 0–1)
BILIRUB SERPL-MCNC: 0.3 MG/DL — SIGNIFICANT CHANGE UP (ref 0.2–1.2)
BLD GP AB SCN SERPL QL: SIGNIFICANT CHANGE UP
BUN SERPL-MCNC: 61 MG/DL — CRITICAL HIGH (ref 10–20)
CALCIUM SERPL-MCNC: 8.2 MG/DL — LOW (ref 8.4–10.5)
CHLORIDE SERPL-SCNC: 98 MMOL/L — SIGNIFICANT CHANGE UP (ref 98–110)
CK SERPL-CCNC: 395 U/L — HIGH (ref 0–225)
CO2 SERPL-SCNC: 17 MMOL/L — SIGNIFICANT CHANGE UP (ref 17–32)
CODEINE UR CFM-MCNC: NEGATIVE NG/ML — SIGNIFICANT CHANGE UP
CODEINE, UR RESULT: NEGATIVE NG/ML — SIGNIFICANT CHANGE UP
CREAT SERPL-MCNC: 2.6 MG/DL — HIGH (ref 0.7–1.5)
CULTURE RESULTS: SIGNIFICANT CHANGE UP
DSDNA AB SER-ACNC: <12 IU/ML — SIGNIFICANT CHANGE UP
EGFR: 23 ML/MIN/1.73M2 — LOW
EOSINOPHIL # BLD AUTO: 0 K/UL — SIGNIFICANT CHANGE UP (ref 0–0.7)
EOSINOPHIL # BLD AUTO: 0.02 K/UL — SIGNIFICANT CHANGE UP (ref 0–0.7)
EOSINOPHIL NFR BLD AUTO: 0 % — SIGNIFICANT CHANGE UP (ref 0–8)
EOSINOPHIL NFR BLD AUTO: 0.1 % — SIGNIFICANT CHANGE UP (ref 0–8)
FUNGITELL: 41 PG/ML — SIGNIFICANT CHANGE UP
GAMMA INTERFERON BACKGROUND BLD IA-ACNC: 0.01 IU/ML — SIGNIFICANT CHANGE UP
GAS PNL BLDA: SIGNIFICANT CHANGE UP
GBM IGG SER-ACNC: <0.2 — SIGNIFICANT CHANGE UP (ref 0–0.9)
GLUCOSE BLDC GLUCOMTR-MCNC: 149 MG/DL — HIGH (ref 70–99)
GLUCOSE BLDC GLUCOMTR-MCNC: 164 MG/DL — HIGH (ref 70–99)
GLUCOSE BLDC GLUCOMTR-MCNC: 182 MG/DL — HIGH (ref 70–99)
GLUCOSE BLDC GLUCOMTR-MCNC: 241 MG/DL — HIGH (ref 70–99)
GLUCOSE SERPL-MCNC: 131 MG/DL — HIGH (ref 70–99)
HCT VFR BLD CALC: 23.9 % — LOW (ref 37–47)
HCT VFR BLD CALC: 24.8 % — LOW (ref 37–47)
HEPARIN-PF4 AB RESULT: <0.6 U/ML — SIGNIFICANT CHANGE UP (ref 0–0.9)
HGB BLD-MCNC: 7.6 G/DL — LOW (ref 12–16)
HGB BLD-MCNC: 7.9 G/DL — LOW (ref 12–16)
HYDROCODONE UR QL CFM: NEGATIVE NG/ML — SIGNIFICANT CHANGE UP
HYDROCODONE, UR RESULT: NEGATIVE NG/ML — SIGNIFICANT CHANGE UP
HYDROMORPHONE UR QL CFM: NEGATIVE NG/ML — SIGNIFICANT CHANGE UP
HYDROMORPHONE, UR RESULT: NEGATIVE NG/ML — SIGNIFICANT CHANGE UP
IMM GRANULOCYTES NFR BLD AUTO: 2.4 % — HIGH (ref 0.1–0.3)
IMM GRANULOCYTES NFR BLD AUTO: 7.2 % — HIGH (ref 0.1–0.3)
LACTATE SERPL-SCNC: 2.3 MMOL/L — HIGH (ref 0.7–2)
LYMPHOCYTES # BLD AUTO: 0.65 K/UL — LOW (ref 1.2–3.4)
LYMPHOCYTES # BLD AUTO: 0.65 K/UL — LOW (ref 1.2–3.4)
LYMPHOCYTES # BLD AUTO: 4.5 % — LOW (ref 20.5–51.1)
LYMPHOCYTES # BLD AUTO: 4.7 % — LOW (ref 20.5–51.1)
M TB IFN-G BLD-IMP: NEGATIVE — SIGNIFICANT CHANGE UP
M TB IFN-G CD4+ BCKGRND COR BLD-ACNC: 0.01 IU/ML — SIGNIFICANT CHANGE UP
M TB IFN-G CD4+CD8+ BCKGRND COR BLD-ACNC: 0.01 IU/ML — SIGNIFICANT CHANGE UP
MCHC RBC-ENTMCNC: 18.8 PG — LOW (ref 27–31)
MCHC RBC-ENTMCNC: 18.9 PG — LOW (ref 27–31)
MCHC RBC-ENTMCNC: 31.8 G/DL — LOW (ref 32–37)
MCHC RBC-ENTMCNC: 31.9 G/DL — LOW (ref 32–37)
MCV RBC AUTO: 59.2 FL — LOW (ref 81–99)
MCV RBC AUTO: 59.3 FL — LOW (ref 81–99)
METHOD TYPE: SIGNIFICANT CHANGE UP
MONOCYTES # BLD AUTO: 0.98 K/UL — HIGH (ref 0.1–0.6)
MONOCYTES # BLD AUTO: 1.02 K/UL — HIGH (ref 0.1–0.6)
MONOCYTES NFR BLD AUTO: 6.8 % — SIGNIFICANT CHANGE UP (ref 1.7–9.3)
MONOCYTES NFR BLD AUTO: 7.4 % — SIGNIFICANT CHANGE UP (ref 1.7–9.3)
MORPHINE UR QL CFM: 1641 NG/ML — SIGNIFICANT CHANGE UP
MORPHINE, UR RESULT: 1641 NG/ML — SIGNIFICANT CHANGE UP
NEUTROPHILS # BLD AUTO: 11.03 K/UL — HIGH (ref 1.4–6.5)
NEUTROPHILS # BLD AUTO: 12.4 K/UL — HIGH (ref 1.4–6.5)
NEUTROPHILS NFR BLD AUTO: 80.6 % — HIGH (ref 42.2–75.2)
NEUTROPHILS NFR BLD AUTO: 85.7 % — HIGH (ref 42.2–75.2)
NOROXYCODONE (OPIATES), UR RESULT: NEGATIVE NG/ML — SIGNIFICANT CHANGE UP
NOROXYCODONE UR CFM-MCNC: NEGATIVE NG/ML — SIGNIFICANT CHANGE UP
NRBC # BLD: 0 /100 WBCS — SIGNIFICANT CHANGE UP (ref 0–0)
NRBC # BLD: 0 /100 WBCS — SIGNIFICANT CHANGE UP (ref 0–0)
OPIATES IN-HOUSE INTERPRETATION: POSITIVE
OPIATES UR QL CFM: POSITIVE
ORGANISM # SPEC MICROSCOPIC CNT: SIGNIFICANT CHANGE UP
OXYCODONE (OPIATES), UR RESULT: NEGATIVE NG/ML — SIGNIFICANT CHANGE UP
OXYCODONE UR-MCNC: NEGATIVE NG/ML — SIGNIFICANT CHANGE UP
OXYMORPHONE (OPIATES), UR RESULT: NEGATIVE NG/ML — SIGNIFICANT CHANGE UP
OXYMORPHONE UR CFM-MCNC: NEGATIVE NG/ML — SIGNIFICANT CHANGE UP
PF4 HEPARIN CMPLX AB SER-ACNC: NEGATIVE — SIGNIFICANT CHANGE UP
PLATELET # BLD AUTO: 119 K/UL — LOW (ref 130–400)
PLATELET # BLD AUTO: 120 K/UL — LOW (ref 130–400)
PMV BLD: SIGNIFICANT CHANGE UP (ref 7.4–10.4)
PMV BLD: SIGNIFICANT CHANGE UP (ref 7.4–10.4)
POTASSIUM SERPL-MCNC: 4.5 MMOL/L — SIGNIFICANT CHANGE UP (ref 3.5–5)
POTASSIUM SERPL-SCNC: 4.5 MMOL/L — SIGNIFICANT CHANGE UP (ref 3.5–5)
PROT SERPL-MCNC: 5.4 G/DL — LOW (ref 6–8)
QUANT TB PLUS MITOGEN MINUS NIL: 1.18 IU/ML — SIGNIFICANT CHANGE UP
RBC # BLD: 4.04 M/UL — LOW (ref 4.2–5.4)
RBC # BLD: 4.18 M/UL — LOW (ref 4.2–5.4)
RBC # FLD: 20.6 % — HIGH (ref 11.5–14.5)
RBC # FLD: 20.7 % — HIGH (ref 11.5–14.5)
SODIUM SERPL-SCNC: 133 MMOL/L — LOW (ref 135–146)
SPECIMEN SOURCE: SIGNIFICANT CHANGE UP
WBC # BLD: 13.7 K/UL — HIGH (ref 4.8–10.8)
WBC # BLD: 14.47 K/UL — HIGH (ref 4.8–10.8)
WBC # FLD AUTO: 13.7 K/UL — HIGH (ref 4.8–10.8)
WBC # FLD AUTO: 14.47 K/UL — HIGH (ref 4.8–10.8)

## 2023-05-26 PROCEDURE — 71045 X-RAY EXAM CHEST 1 VIEW: CPT | Mod: 26

## 2023-05-26 PROCEDURE — 99291 CRITICAL CARE FIRST HOUR: CPT

## 2023-05-26 RX ORDER — SODIUM BICARBONATE 1 MEQ/ML
650 SYRINGE (ML) INTRAVENOUS EVERY 8 HOURS
Refills: 0 | Status: DISCONTINUED | OUTPATIENT
Start: 2023-05-26 | End: 2023-05-28

## 2023-05-26 RX ORDER — CHLORHEXIDINE GLUCONATE 213 G/1000ML
1 SOLUTION TOPICAL
Refills: 0 | Status: DISCONTINUED | OUTPATIENT
Start: 2023-05-26 | End: 2023-07-17

## 2023-05-26 RX ADMIN — SENNA PLUS 2 TABLET(S): 8.6 TABLET ORAL at 23:31

## 2023-05-26 RX ADMIN — POLYETHYLENE GLYCOL 3350 17 GRAM(S): 17 POWDER, FOR SOLUTION ORAL at 11:04

## 2023-05-26 RX ADMIN — ALBUTEROL 4 PUFF(S): 90 AEROSOL, METERED ORAL at 04:42

## 2023-05-26 RX ADMIN — Medication 40 MILLIGRAM(S): at 05:34

## 2023-05-26 RX ADMIN — Medication 1: at 06:47

## 2023-05-26 RX ADMIN — Medication 1: at 18:43

## 2023-05-26 RX ADMIN — MUPIROCIN 1 APPLICATION(S): 20 OINTMENT TOPICAL at 17:45

## 2023-05-26 RX ADMIN — CHLORHEXIDINE GLUCONATE 15 MILLILITER(S): 213 SOLUTION TOPICAL at 17:44

## 2023-05-26 RX ADMIN — PANTOPRAZOLE SODIUM 40 MILLIGRAM(S): 20 TABLET, DELAYED RELEASE ORAL at 12:55

## 2023-05-26 RX ADMIN — Medication 650 MILLIGRAM(S): at 09:22

## 2023-05-26 RX ADMIN — Medication 2: at 11:11

## 2023-05-26 RX ADMIN — ALBUTEROL 4 PUFF(S): 90 AEROSOL, METERED ORAL at 00:09

## 2023-05-26 RX ADMIN — LINEZOLID 300 MILLIGRAM(S): 600 INJECTION, SOLUTION INTRAVENOUS at 05:34

## 2023-05-26 RX ADMIN — Medication 650 MILLIGRAM(S): at 17:45

## 2023-05-26 RX ADMIN — CHLORHEXIDINE GLUCONATE 15 MILLILITER(S): 213 SOLUTION TOPICAL at 05:34

## 2023-05-26 RX ADMIN — Medication 4 PUFF(S): at 18:24

## 2023-05-26 RX ADMIN — CHLORHEXIDINE GLUCONATE 1 APPLICATION(S): 213 SOLUTION TOPICAL at 14:40

## 2023-05-26 RX ADMIN — FENTANYL CITRATE 4.72 MICROGRAM(S)/KG/HR: 50 INJECTION INTRAVENOUS at 02:44

## 2023-05-26 RX ADMIN — MUPIROCIN 1 APPLICATION(S): 20 OINTMENT TOPICAL at 05:35

## 2023-05-26 RX ADMIN — HEPARIN SODIUM 5000 UNIT(S): 5000 INJECTION INTRAVENOUS; SUBCUTANEOUS at 05:35

## 2023-05-26 RX ADMIN — POLYETHYLENE GLYCOL 3350 17 GRAM(S): 17 POWDER, FOR SOLUTION ORAL at 23:31

## 2023-05-26 RX ADMIN — Medication 650 MILLIGRAM(S): at 23:32

## 2023-05-26 RX ADMIN — ALBUTEROL 4 PUFF(S): 90 AEROSOL, METERED ORAL at 19:59

## 2023-05-26 RX ADMIN — ALBUTEROL 4 PUFF(S): 90 AEROSOL, METERED ORAL at 10:57

## 2023-05-26 RX ADMIN — HEPARIN SODIUM 5000 UNIT(S): 5000 INJECTION INTRAVENOUS; SUBCUTANEOUS at 14:38

## 2023-05-26 RX ADMIN — PROPOFOL 5.66 MICROGRAM(S)/KG/MIN: 10 INJECTION, EMULSION INTRAVENOUS at 02:44

## 2023-05-26 RX ADMIN — Medication 4 PUFF(S): at 10:58

## 2023-05-26 RX ADMIN — LINEZOLID 300 MILLIGRAM(S): 600 INJECTION, SOLUTION INTRAVENOUS at 17:44

## 2023-05-26 RX ADMIN — Medication 4 PUFF(S): at 16:01

## 2023-05-26 RX ADMIN — HEPARIN SODIUM 5000 UNIT(S): 5000 INJECTION INTRAVENOUS; SUBCUTANEOUS at 22:31

## 2023-05-26 RX ADMIN — ALBUTEROL 4 PUFF(S): 90 AEROSOL, METERED ORAL at 16:00

## 2023-05-26 RX ADMIN — CHLORHEXIDINE GLUCONATE 1 APPLICATION(S): 213 SOLUTION TOPICAL at 05:35

## 2023-05-26 NOTE — PROGRESS NOTE ADULT - ASSESSMENT
IMPRESSION:    Acute hypoxemic respiratory failure   Severe CAP MRSA   MRSA bacteremia   Non massive hemoptysis   LANCE   HO asthma   Influenza B     PLAN:    CNS: SAT.  Drug screen     HEENT: Oral care.  ET care     PULMONARY:  HOB @ 45 degrees.  Aspiration precautions.  ARDS net MV setting.  Repeat ABG noted.  Wean O2.  SBT       CARDIOVASCULAR:  Goal directed fluid resuscitation.  FU ECHO.  Trend CPK.      GI: GI prophylaxis.  Hold OG Feeding for SBT .  Bowel regimen.      RENAL:  Follow up lytes.  Correct as needed.  Diggs for strict Is and OS.       INFECTIOUS DISEASE: Follow up cultures. Zyvox, and Tamiflu.  FU Urine legionella and strep Ag. FU HIV.  Galactomannan and Fungitall.  ID eval appreciated.  Daily BC      HEMATOLOGICAL:  DVT prophylaxis.  Dimer noted.  LE duplex .  Monitor CBC     ENDOCRINE:  Follow up FS.  Insulin protocol if needed.      MUSCULOSKELETAL:  Rheum and Vasculitis panel.  Off loading     MICU     TO DC diggs in am    Will need steroids prior to extubation for risk of PES        IMPRESSION:    Acute hypoxemic respiratory failure   Severe CAP MRSA   MRSA bacteremia   Non massive hemoptysis   LANCE   HO asthma   Influenza B     PLAN:    CNS: SAT.  Drug screen     HEENT: Oral care.  ET care     PULMONARY:  HOB @ 45 degrees.  Aspiration precautions.  ARDS net MV setting.  Repeat ABG noted.  Wean O2.  SBT.  Left chest US wiht no pleural effusion       CARDIOVASCULAR:  Goal directed fluid resuscitation.  FU ECHO.  Trend CPK.      GI: GI prophylaxis.  Hold OG Feeding for SBT .  Bowel regimen.      RENAL:  Follow up lytes.  Correct as needed.  Diggs for strict Is and OS.       INFECTIOUS DISEASE: Follow up cultures. Zyvox, and Tamiflu.  FU Urine legionella and strep Ag. FU HIV.  Galactomannan and Fungitall.  ID eval appreciated.  Daily BC      HEMATOLOGICAL:  DVT prophylaxis.  Dimer noted.  LE duplex .  Monitor CBC     ENDOCRINE:  Follow up FS.  Insulin protocol if needed.      MUSCULOSKELETAL:  Rheum and Vasculitis panel.  Off loading     MICU     TO DC diggs in am    Will need steroids prior to extubation for risk of PES

## 2023-05-26 NOTE — PROGRESS NOTE ADULT - SUBJECTIVE AND OBJECTIVE BOX
ROSSIZEYNEP  42y, Female  Allergy: aspirin (Short breath; Anaphylaxis)      LOS  3d    CHIEF COMPLAINT: AHRF (26 May 2023 08:21)      INTERVAL EVENTS/HPI  - T(F): , Max: 99.7 (23 @ 16:00)  - WBC Count: 14.47 (23 @ 06:09)  WBC Count: 8.10 (23 @ 05:47)     - Creatinine, Serum: 2.6 (23 @ 06:09)  Creatinine, Serum: 2.5 (23 @ 05:47)     -   - D-Dimer Assay, Quantitative: 1665 ng/mL DDU (23 @ 11:41)    -     ROS  cannot obtain secondary to patient's sedation and/or mental status    VITALS:  T(F): 96.7, Max: 99.7 (23 @ 16:00)  HR: 75  BP: 141/91  RR: 31Vital Signs Last 24 Hrs  T(C): 35.9 (26 May 2023 08:00), Max: 37.6 (25 May 2023 16:00)  T(F): 96.7 (26 May 2023 08:00), Max: 99.7 (25 May 2023 16:00)  HR: 75 (26 May 2023 10:00) (75 - 90)  BP: 141/91 (26 May 2023 10:00) (67/44 - 147/95)  BP(mean): 112 (26 May 2023 10:00) (52 - 115)  RR: 31 (26 May 2023 10:00) (15 - 44)  SpO2: 99% (26 May 2023 10:00) (96% - 100%)    Parameters below as of 26 May 2023 06:00  Patient On (Oxygen Delivery Method): ventilator    O2 Concentration (%): 40    PHYSICAL EXAM:  Gen: Intubated  CV: RRR  Lungs: Decreased BS at bases  Abd: Soft  Neuro: Sedated  Lines clean, no phlebitis    FH: Non-contributory  Social Hx: Non-contributory    TESTS & MEASUREMENTS:                        7.9    14.47 )-----------( 120      ( 26 May 2023 06:09 )             24.8         133<L>  |  98  |  61<HH>  ----------------------------<  131<H>  4.5   |  17  |  2.6<H>    Ca    8.2<L>      26 May 2023 06:09  Phos  5.4       Mg     1.9         TPro  5.4<L>  /  Alb  2.5<L>  /  TBili  0.3  /  DBili  x   /  AST  48<H>  /  ALT  <5  /  AlkPhos  100        LIVER FUNCTIONS - ( 26 May 2023 06:09 )  Alb: 2.5 g/dL / Pro: 5.4 g/dL / ALK PHOS: 100 U/L / ALT: <5 U/L / AST: 48 U/L / GGT: x           Urinalysis Basic - ( 25 May 2023 11:30 )    Color: Yellow / Appearance: Slightly Turbid / S.027 / pH: x  Gluc: x / Ketone: Negative  / Bili: Negative / Urobili: <2 mg/dL   Blood: x / Protein: 100 mg/dL / Nitrite: Negative   Leuk Esterase: Negative / RBC: 45 /HPF / WBC 30 /HPF   Sq Epi: x / Non Sq Epi: x / Bacteria: Negative        Culture - Sputum (collected 23 @ 19:17)  Source: Trach Asp Tracheal Aspirate  Gram Stain (23 @ 07:31):    No polymorphonuclear leukocytes per low power field    No Squamous epithelial cells per low power field    Numerous Gram positive cocci in pairs per oil power field  Preliminary Report (23 @ 07:47):    Numerous Staphylococcus aureus    Normal Respiratory Laila present    Culture - Acid Fast - Sputum w/Smear (collected 23 @ 19:17)  Source: Trach Asp Tracheal Aspirate    Culture - Fungal, Sputum (collected 23 @ 19:17)  Source: Trach Asp Tracheal Aspirate  Preliminary Report (23 @ 11:01):    Testing in progress    Culture - Blood (collected 23 @ 06:10)  Source: .Blood None  Gram Stain (23 @ 11:18):    Growth in aerobic bottle: Gram Positive Cocci in Clusters    Growth in anaerobic bottle: Gram Positive Cocci in Clusters  Final Report (23 @ 08:13):    Growth in aerobic and anaerobic bottles: Methicillin Resistant    Staphylococcus aureus    See previous culture [id number]    Culture - Blood (collected 23 @ 20:37)  Source: .Blood Blood-Peripheral  Gram Stain (23 @ 17:51):    Growth in aerobic bottle: Gram Positive Cocci in Clusters    Growth in anaerobic bottle: Gram Positive Cocci in Clusters  Final Report (23 @ 08:14):    Growth in aerobic and anaerobic bottles: Methicillin Resistant    Staphylococcus aureus    See previous culture [id number]    Culture - Blood (collected 23 @ 20:37)  Source: .Blood Blood-Peripheral  Gram Stain (23 @ 14:24):    Growth in aerobic bottle: Gram Positive Cocci in Clusters  Final Report (23 @ 08:11):    Growth in aerobic bottle: Methicillin Resistant Staphylococcus aureus    ***Blood Panel PCR results on this specimen are available    approximately 3 hours after the Gram stain result.***    Gram stain, PCR, and/or culture results may not always    correspond due to difference in methodologies.    ************************************************************    This PCR assay was performed by multiplex PCR. This    Assay tests for 66 bacterial and resistance gene targets.    Please refer to the [facility] test directory    at [email or web address] for details.  Organism: Blood Culture PCR  Methicillin resistant Staphylococcus aureus (23 @ 08:11)  Organism: Methicillin resistant Staphylococcus aureus (23 @ 08:11)      Method Type: RUSSELL      -  Ampicillin/Sulbactam: R 16/8      -  Cefazolin: R 16      -  Clindamycin: S <=0.25      -  Daptomycin: S 0.5      -  Erythromycin: R >4      -  Gentamicin: S <=1 Should not be used as monotherapy      -  Linezolid: S 2      -  Oxacillin: R >2      -  Penicillin: R >8      -  Rifampin: S <=1 Should not be used as monotherapy      -  Tetracycline: S <=1      -  Trimethoprim/Sulfamethoxazole: S <=0.5/9.5      -  Vancomycin: S 2  Organism: Blood Culture PCR (23 @ 08:11)      Method Type: PCR      -  Methicillin resistant Staphylococcus aureus (MRSA): Detec        Lactate, Blood: 2.3 mmol/L (23 @ 06:09)  Lactate, Blood: 2.8 mmol/L (23 @ 11:41)  Lactate, Blood: 1.9 mmol/L (23 @ 17:09)  Lactate, Blood: 3.9 mmol/L (23 @ 22:52)  Blood Gas Venous - Lactate: 4.40 mmol/L (23 @ 20:36)  Lactate, Blood: 4.9 mmol/L (23 @ 20:07)      INFECTIOUS DISEASES TESTING  MRSA PCR Result.: Positive (23 @ 19:17)  Legionella Antigen, Urine: Negative (23 @ 19:16)  Procalcitonin, Serum: 48.30 (23 @ 06:10)      INFLAMMATORY MARKERS      RADIOLOGY & ADDITIONAL TESTS:  I have personally reviewed the last available Chest xray  CXR  Xray Chest 1 View- PORTABLE-Urgent:   ACC: [id number] EXAM:  XR CHEST PORTABLE URGENT 1V   ORDERED BY: RADHA SOLORIO     ACC: [id number] EXAM:  XR CHEST PORTABLE ROUTINE 1V   ORDERED BY: CHIARA HENNING     PROCEDURE DATE:  2023          INTERPRETATION:  Clinical History / Reason for exam: Intubated. Follow-up.    Comparison : Chest radiograph 2023 at 6:30 AM.    Technique/Positioning: Portable radiographs were obtained at 4:25 AM and   8:50 AM on 2023.    Findings:    2023 at 4:25 AM: Enteric tube tip below left hemidiaphragm.   Endotracheal tube tip at the level of clavicles Overlying telemetry   leads. Unchanged cardiomediastinal silhouette and osseous structures.   Bilateral opacities appear unchanged, however a left layering pleural   effusion appears worse, possibly related to positioning. No pneumothorax    2023 at 8:50 AM:  Support devices: Enteric tube below left hemidiaphragm. Endotracheal tube   tip 2.5 cm above noe.    Cardiac/mediastinum/hilum: Unchanged    Lung parenchyma/Pleura: Bilateral opacities are unchanged. Layering left   pleural effusion appears decreased from earlier in the day but again new   from on 2023. No pneumothorax.    Skeleton/soft tissues: Unchanged    Impression:    Unchanged bilateral predominantly left lung airspace opacities.  Likely new small layering left pleural effusion.  No pneumothorax.  Satisfactory support devices.        --- End of Report ---            LOIDA MACIAS MD; Attending Radiologist  This document has been electronically signed. 2023 10:44AM (23 @ 10:15)      CT  CT Angio Chest PE Protocol w/ IV Cont:   ACC: [id number] EXAM:  CT ANGIO CHEST PULM ART WAWI   ORDERED BY: CESIA SHEIKH     PROCEDURE DATE:  2023          INTERPRETATION:  CLINICAL HISTORY/REASON FOR EXAM: Shortness of breath..    TECHNIQUE: Multislice helical sections were obtained from the thoracic   inlet to the lung bases during rapid administration of 65 cc Omnipaque   350 intravenous contrast. Thin sections were reconstructed through the   pulmonary vasculature. 3D (MIP) reformats obtained. 35 cc contrast   discarded.    COMPARISON: None.    FINDINGS:    PULMONARY EMBOLUS: No evidence of acute pulmonary embolism.    LUNGS, PLEURA, AIRWAYS: Bilateral patchy groundglass nodularity with   dominant confluent left lower lobe opacification with numerous cavitary   lesions. Additional contralateral right lower lobe cavitary 1 cm nodule.    Central airway patent. No pleural effusion. No pneumothorax.    THORACIC NODES: Confluent ill-defined left hilar and mediastinal   adenopathy, likely reactive, without dominant lymph node delineated.    MEDIASTINUM/GREAT VESSELS: No pericardial effusion. Heart size is within   normal limits. The aorta and main pulmonary artery are of normal caliber.    BONES/SOFT TISSUES: Unremarkable.    VISUALIZED UPPER ABDOMEN: Left nonobstructingrenal calculi.      IMPRESSION:    1. Bilateral patchy groundglass nodularity with dominant confluent left   lower lobe opacification with numerous cavitary lesions. Additional   contralateral right lower lobe cavitary 1 cm nodule. Findings compatible   with cavitary pneumonia in the appropriate clinical setting; differential   diagnosis includes tuberculosis.    2. Confluent ill-defined left hilar and mediastinal adenopathy, likely   reactive, without dominant lymph node delineated.    3. No evidence of pulmonary embolism.    Spoke with CESIA SHEIKH PA; Emergency Me on 2023 10:53 PM with   readback.    --- End of Report ---            YARITZA WILDE MD; Attending Radiologist  This document has been electronically signed. May 23 2023 10:56PM (23 @ 22:06)      CARDIOLOGY TESTING  12 Lead ECG:   Ventricular Rate 134 BPM    Atrial Rate 134 BPM    P-R Interval 208 ms    QRS Duration 76 ms    Q-T Interval 318 ms    QTC Calculation(Bazett) 474 ms    P Axis 97 degrees    R Axis 40 degrees    T Axis 70 degrees    Diagnosis Line Sinus tachycardiawith Fusion complexes  Otherwise normal ECG    Confirmed by PANFILO COVINGTON MD ([id number]) on 2023 6:54:09 AM (23 @ 02:40)  12 Lead ECG:   Ventricular Rate 140 BPM    Atrial Rate 140 BPM    P-R Interval 120 ms    QRS Duration 138 ms    Q-T Interval 354 ms    QTC Calculation(Bazett) 540 ms    R Axis 73 degrees    T Axis 60 degrees    Diagnosis Line Sinus tachycardia  Right bundle branch block  Abnormal ECG    Confirmed by Jorge Wyatt ([id number]) on 2023 10:37:28 PM (23 @ 19:52)      MEDICATIONS  albuterol    90 MICROgram(s) HFA Inhaler 4  chlorhexidine 0.12% Liquid 15  chlorhexidine 2% Cloths 1  chlorhexidine 2% Cloths 1  dexMEDEtomidine Infusion 0.2  fentaNYL   Infusion. 0.555  heparin   Injectable 5000  insulin lispro (ADMELOG) corrective regimen sliding scale   ipratropium 17 MICROgram(s) HFA Inhaler 4  lactated ringers. 1000  linezolid  IVPB 600  mupirocin 2% Nasal 1  norepinephrine Infusion 0.05  oseltamivir Suspension 30  pantoprazole   Suspension 40  polyethylene glycol 3350 17  propofol Infusion 11.098  senna 2  sodium bicarbonate 650      WEIGHT  Weight (kg): 85 (23 @ 08:15)  Creatinine, Serum: 2.6 mg/dL (23 @ 06:09)      ANTIBIOTICS:  linezolid  IVPB 600 milliGRAM(s) IV Intermittent every 12 hours  oseltamivir Suspension 30 milliGRAM(s) Oral every 12 hours      All available historical records have been reviewed

## 2023-05-26 NOTE — PROGRESS NOTE ADULT - SUBJECTIVE AND OBJECTIVE BOX
ZEYNEP ROSSI 42y Female  MRN#: [id number]   Hospital Day: 3d    HPI:  42 year-old female with PMH of Asthma, HTN? presents with complaint of cough x3 days and SOB. During my encounter pt with dyspnea and increased work of breathing and chest pain, unable to properly provide accurate history. She states that her cough has been progressively worsening over the past 3 days, endorses hemoptysis since yesterday. She states she had a friend who came over and stayed with for more than a week who was sick recently, but she does not know if she recently travelled out of [facility] or not. She also admits that her children has been sick with Sore throat and fever for past 3 days. She admits to fever, chills, sever Chest pain which has gotten worse since she came to the ED. She took 1x Tamiflu yesterday. She otherwise denies diarrhea, constipation, urinary symptoms. She is not vaccinated for Influenza or COVID-19.     In ED  /87, , RR 28, T 100.8 F, 98% on 15 L NRB  Labs significant for wbc 16.7K with Neutrophilic Predominance and L shift, Hgb 11.4, MCV 61.6, Cr 2.0, BUN 23, Alk Phos 125/AST 62/ALT 27, Lac 4.9> 3.9  RVP + Influenza B      CT Angio Chest PE Protocol w/ IV Cont   1. Bilateral patchy groundglass nodularity with dominant confluent left lower lobe opacification with numerous cavitary lesions. Additional contralateral right lower lobe cavitary 1 cm nodule. Findings compatible with cavitary pneumonia in the appropriate clinical setting; differential diagnosis includes tuberculosis.  2. Confluent ill-defined left hilar and mediastinal adenopathy, likely reactive, without dominant lymph node delineated.  3. No evidence of pulmonary embolism.    s/p 1x Rocephin, Meropenem, 3 L LR in ED    Pt admitted to SDU for further managements, during my encounter pt is very agitated, in acute distress. Pt received appropriate pain control with IV Morphine, s/p Xanax 1 mg 1x for agitation and anxiety, despite pain control and adequate fluid resuscitation pt remained tachycardic, tachypneic. STAT EKG reviewed with Cardiology team on call After discussion with Cardiology team, Pt received 1x Adenosine 6 mg IV push with no change. Case discussed with Critical Care team, decision was made for Intubation and Pt upgraded to MICU.   (24 May 2023 00:58)      SUBJECTIVE  Patient is a 42y old Female who presents with a chief complaint of AHRF (26 May 2023 10:28)  Currently admitted to medicine with the primary diagnosis of Pneumonia      INTERVAL HPI AND OVERNIGHT EVENTS:  Patient was examined and seen at bedside.   Intubated, sedated       OBJECTIVE  PAST MEDICAL & SURGICAL HISTORY    ALLERGIES:  aspirin (Short breath; Anaphylaxis)    MEDICATIONS:  STANDING MEDICATIONS  albuterol    90 MICROgram(s) HFA Inhaler 4 Puff(s) Inhalation every 4 hours  chlorhexidine 0.12% Liquid 15 milliLiter(s) Oral Mucosa every 12 hours  chlorhexidine 2% Cloths 1 Application(s) Topical daily  chlorhexidine 2% Cloths 1 Application(s) Topical <User Schedule>  dexMEDEtomidine Infusion 0.2 MICROgram(s)/kG/Hr IV Continuous <Continuous>  fentaNYL   Infusion. 0.555 MICROgram(s)/kG/Hr IV Continuous <Continuous>  heparin   Injectable 5000 Unit(s) SubCutaneous every 8 hours  insulin lispro (ADMELOG) corrective regimen sliding scale   SubCutaneous three times a day before meals  ipratropium 17 MICROgram(s) HFA Inhaler 4 Puff(s) Inhalation <User Schedule>  lactated ringers. 1000 milliLiter(s) IV Continuous <Continuous>  linezolid  IVPB 600 milliGRAM(s) IV Intermittent every 12 hours  mupirocin 2% Nasal 1 Application(s) Both Nostrils two times a day  norepinephrine Infusion 0.05 MICROgram(s)/kG/Min IV Continuous <Continuous>  oseltamivir Suspension 30 milliGRAM(s) Oral every 12 hours  pantoprazole   Suspension 40 milliGRAM(s) Oral daily  polyethylene glycol 3350 17 Gram(s) Oral every 12 hours  propofol Infusion 11.098 MICROgram(s)/kG/Min IV Continuous <Continuous>  senna 2 Tablet(s) Oral at bedtime  sodium bicarbonate 650 milliGRAM(s) Oral every 8 hours    PRN MEDICATIONS  acetaminophen     Tablet .. 650 milliGRAM(s) Oral every 6 hours PRN  albuterol    90 MICROgram(s) HFA Inhaler 4 Puff(s) Inhalation every 4 hours PRN  ipratropium 17 MICROgram(s) HFA Inhaler 4 Puff(s) Inhalation <User Schedule> PRN      VITAL SIGNS: Last 24 Hours  T(C): 35.9 (26 May 2023 08:00), Max: 37.6 (25 May 2023 16:00)  T(F): 96.7 (26 May 2023 08:00), Max: 99.7 (25 May 2023 16:00)  HR: 73 (26 May 2023 13:00) (73 - 88)  BP: 145/92 (26 May 2023 13:00) (89/53 - 147/95)  BP(mean): 114 (26 May 2023 13:00) (67 - 115)  RR: 16 (26 May 2023 13:00) (15 - 43)  SpO2: 100% (26 May 2023 13:00) (96% - 100%)    LABS:                        7.9    14.47 )-----------( 120      ( 26 May 2023 06:09 )             24.8     05-    133<L>  |  98  |  61<HH>  ----------------------------<  131<H>  4.5   |  17  |  2.6<H>    Ca    8.2<L>      26 May 2023 06:09  Phos  5.4     05-25  Mg     1.9     05-25    TPro  5.4<L>  /  Alb  2.5<L>  /  TBili  0.3  /  DBili  x   /  AST  48<H>  /  ALT  <5  /  AlkPhos  100  05-26      Urinalysis Basic - ( 25 May 2023 11:30 )    Color: Yellow / Appearance: Slightly Turbid / S.027 / pH: x  Gluc: x / Ketone: Negative  / Bili: Negative / Urobili: <2 mg/dL   Blood: x / Protein: 100 mg/dL / Nitrite: Negative   Leuk Esterase: Negative / RBC: 45 /HPF / WBC 30 /HPF   Sq Epi: x / Non Sq Epi: x / Bacteria: Negative      ABG - ( 26 May 2023 03:38 )  pH, Arterial: 7.34  pH, Blood: x     /  pCO2: 34    /  pO2: 110   / HCO3: 18    / Base Excess: -6.8  /  SaO2: 98.4      Lactate, Blood: 2.3 mmol/L *H* (23 @ 06:09)  Creatine Kinase, Serum: 395 U/L *H* (23 @ 06:09)      Culture - Sputum (collected 24 May 2023 19:17)  Source: Trach Asp Tracheal Aspirate  Gram Stain (25 May 2023 07:31):    No polymorphonuclear leukocytes per low power field    No Squamous epithelial cells per low power field    Numerous Gram positive cocci in pairs per oil power field  Preliminary Report (26 May 2023 07:47):    Numerous Staphylococcus aureus    Normal Respiratory Laila present    Culture - Acid Fast - Sputum w/Smear (collected 24 May 2023 19:17)  Source: Trach Asp Tracheal Aspirate    Culture - Fungal, Sputum (collected 24 May 2023 19:17)  Source: Trach Asp Tracheal Aspirate  Preliminary Report (25 May 2023 11:01):    Testing in progress    Culture - Blood (collected 24 May 2023 06:10)  Source: .Blood None  Gram Stain (25 May 2023 11:18):    Growth in aerobic bottle: Gram Positive Cocci in Clusters    Growth in anaerobic bottle: Gram Positive Cocci in Clusters  Final Report (26 May 2023 08:13):    Growth in aerobic and anaerobic bottles: Methicillin Resistant    Staphylococcus aureus    See previous culture [id number]    Culture - Blood (collected 23 May 2023 20:37)  Source: .Blood Blood-Peripheral  Gram Stain (24 May 2023 17:51):    Growth in aerobic bottle: Gram Positive Cocci in Clusters    Growth in anaerobic bottle: Gram Positive Cocci in Clusters  Final Report (26 May 2023 08:14):    Growth in aerobic and anaerobic bottles: Methicillin Resistant    Staphylococcus aureus    See previous culture [id number]    Culture - Blood (collected 23 May 2023 20:37)  Source: .Blood Blood-Peripheral  Gram Stain (24 May 2023 14:24):    Growth in aerobic bottle: Gram Positive Cocci in Clusters  Final Report (26 May 2023 08:11):    Growth in aerobic bottle: Methicillin Resistant Staphylococcus aureus    ***Blood Panel PCR results on this specimen are available    approximately 3 hours after the Gram stain result.***    Gram stain, PCR, and/or culture results may not always    correspond due to difference in methodologies.    ************************************************************    This PCR assay was performed by multiplex PCR. This    Assay tests for 66 bacterial and resistance gene targets.    Please refer to the [facility] test directory    at [email or web address] for details.  Organism: Blood Culture PCR  Methicillin resistant Staphylococcus aureus (26 May 2023 08:11)  Organism: Methicillin resistant Staphylococcus aureus (26 May 2023 08:11)  Organism: Blood Culture PCR (26 May 2023 08:11)      CARDIAC MARKERS ( 26 May 2023 06:09 )  x     / x     / 395 U/L / x     / x      CARDIAC MARKERS ( 25 May 2023 05:47 )  x     / x     / 469 U/L / x     / x          PHYSICAL EXAM:  CONSTITUTIONAL: intubated, sedated   HEAD: Atraumatic, normocephalic  EYES: EOM intact, PERRLA, conjunctiva and sclera clear  ENT: Supple, no masses, no thyromegaly, no bruits, no JVD; moist mucous membranes  PULMONARY: Clear to auscultation bilaterally; no wheezes, rales, or rhonchi  CARDIOVASCULAR: Regular rate and rhythm; no murmurs, rubs, or gallops  GASTROINTESTINAL: Soft, non-tender, non-distended; bowel sounds present  MUSCULOSKELETAL: 2+ peripheral pulses; no clubbing, no cyanosis, no edema  NEUROLOGY: non-focal  SKIN: No rashes or lesions; warm and dry    ASSESSMENT & PLAN  42 year-old female with PMH of Asthma, HTN? presents with complaint of cough x3 days and SOB. Patient intubated after admission due t hypoxia, tachypnea and respiratory distress. Transferred to ICU.     #Acute hypoxemic respiratory failure   #Severe CAP MRSA   #MRSA bacteremia   #Non massive hemoptysis   #HO asthma   #Influenza B   - CT Angio Chest PE Protocol w/ IV Cont (23 @ 22:06) >  1. Bilateral patchy groundglass nodularity with dominant confluent left   lower lobe opacification with numerous cavitary lesions. Additional   contralateral right lower lobe cavitary 1 cm nodule. Findings compatible   with cavitary pneumonia in the appropriate clinical setting; differential   diagnosis includes tuberculosis.  2. Confluent ill-defined left hilar and mediastinal adenopathy, likely   reactive, without dominant lymph node delineated.  3. No evidence of pulmonary embolism.  - intubated , self extubated  and re-intubated    - blood cx and DTA positive for MRSA   - ID following  - on zyvox 600 mg IV Q12   - repeat blood cx until negative   - will switch to vanc and clindamycin if plt still dropping   - stopped steroids , will give one dose prior to extubation   - c/w nebs   - c/w oseltamivir for 5 days   - off levophed   - CXR showed left sided opacity, bedside POCUS done, no loculation or collection  - f/u TTE to r/o vegetations     #LANCE   - Cr peaked at 3 --> 2.5 --> 2.6   - probably pre-renal/ ATN   - c/w IV LR 100cc/hr   - boluses based on CHEETAH   - nephro eval noted   - KBUS negative   - CK trending down   - good UO   - UA +ve for proteins and blood, Pr/Cr 1.3 (H)  - Urine lytes suggestive of pre-renal LANCE   - f/u vasculitis panel, hepatitis, HIV   - started on oral bicarb 650 mg Q8hrs     #Thrombocytopenia   #Anemia   - due to sepsis  - monitor , dc zyvox if worsening thrombocytopenia   - keep Hb > 7   - DIC panel -ve   - Dimer 1665  - f/u LE duplex     #DVT ppx: heparin SQ   #GI ppx: pantoprazole   #Diet: OG tube feeds

## 2023-05-26 NOTE — PROGRESS NOTE ADULT - ASSESSMENT
ASSESSMENT  42 year-old female with PMH of Asthma, HTN? presents with complaint of cough x3 days and SOB. During my encounter pt with dyspnea and increased work of breathing and chest pain, unable to properly provide accurate history. She states that her cough has been progressively worsening over the past 3 days, endorses hemoptysis since yesterday.     IMPRESSION  #Flu B with cavitary PNA, with MRSA bacteremia and cavitary PNA    5/24 DTA   Numerous Staphylococcus aureus    5/23 BCX MRSA 3/4 bottles    MRSA PCR +   WBC 16--> 3 12% Bands, Severe sepsis on admission  Doubt TB (lower lobe)  Acute hypoxemic resp failure requiring intubation  < from: CT Angio Chest PE Protocol w/ IV Cont (05.23.23 @ 22:06) >  1. Bilateral patchy groundglass nodularity with dominant confluent left   lower lobe opacification with numerous cavitary lesions. Additional   contralateral right lower lobe cavitary 1 cm nodule. Findings compatible   with cavitary pneumonia in the appropriate clinical setting; differential   diagnosis includes tuberculosis.  2. Confluent ill-defined left hilar and mediastinal adenopathy, likely   reactive, without dominant lymph node delineated.  3. No evidence of pulmonary embolism.  #Lactic acidosis  #LANCE  #Hyponatremia      RECOMMENDATIONS  - Repeat BCX until clearance  - TTE  - Given downtrending PLT recommend CHANGE to Vanc IV renal dosing per ID pharmacy + PO Clinda 900mg TID (there is an IV Clinda shortage)  - Continue tamiflu , would prolong to 10 days as severely ill     If any questions, please call or send a message on [employer] Teams  Please continue to update ID with any pertinent new laboratory or radiographic findings  Spectra [id number]  ASSESSMENT  42 year-old female with PMH of Asthma, HTN? presents with complaint of cough x3 days and SOB. During my encounter pt with dyspnea and increased work of breathing and chest pain, unable to properly provide accurate history. She states that her cough has been progressively worsening over the past 3 days, endorses hemoptysis since yesterday.     IMPRESSION  #Flu B with cavitary PNA, with MRSA bacteremia and cavitary PNA    5/24 DTA   Numerous Staphylococcus aureus    5/23 BCX MRSA 3/4 bottles    MRSA PCR +   WBC 16--> 3 12% Bands, Severe sepsis on admission  Doubt TB (lower lobe)  Acute hypoxemic resp failure requiring intubation  < from: CT Angio Chest PE Protocol w/ IV Cont (05.23.23 @ 22:06) >  1. Bilateral patchy groundglass nodularity with dominant confluent left   lower lobe opacification with numerous cavitary lesions. Additional   contralateral right lower lobe cavitary 1 cm nodule. Findings compatible   with cavitary pneumonia in the appropriate clinical setting; differential   diagnosis includes tuberculosis.  2. Confluent ill-defined left hilar and mediastinal adenopathy, likely   reactive, without dominant lymph node delineated.  3. No evidence of pulmonary embolism.  #Lactic acidosis  #LANCE  #Hyponatremia      RECOMMENDATIONS  - Repeat BCX until clearance  - TTE  - Given downtrending PLT recommend CHANGE to Vanc IV renal dosing per ID pharmacy + PO Clinda 900mg TID (there is an IV Clinda shortage)  Would not continue linezolid if PLT <100  - Continue tamiflu , would prolong to 10 days as severely ill     If any questions, please call or send a message on [employer] Teams  Please continue to update ID with any pertinent new laboratory or radiographic findings  Spectra [phone]

## 2023-05-26 NOTE — PROGRESS NOTE ADULT - SUBJECTIVE AND OBJECTIVE BOX
Patient is a 42y old  Female who presents with a chief complaint of AHRF (26 May 2023 07:05)        Over Night Events:  Remains Critically ill on MV>  Sedated.  Off pressors.          ROS:     All ROS are negative except HPI         PHYSICAL EXAM    ICU Vital Signs Last 24 Hrs  T(C): 36.9 (26 May 2023 04:00), Max: 37.6 (25 May 2023 16:00)  T(F): 98.5 (26 May 2023 04:00), Max: 99.7 (25 May 2023 16:00)  HR: 75 (26 May 2023 06:45) (75 - 94)  BP: 147/95 (26 May 2023 06:45) (67/44 - 147/95)  BP(mean): 115 (26 May 2023 06:45) (52 - 115)  ABP: --  ABP(mean): --  RR: 15 (26 May 2023 06:45) (15 - 44)  SpO2: 99% (26 May 2023 06:45) (96% - 100%)    O2 Parameters below as of 26 May 2023 06:00  Patient On (Oxygen Delivery Method): ventilator    O2 Concentration (%): 40        CONSTITUTIONAL:  In NAD    ENT:   Airway patent,   Mouth with normal mucosa.   ET     EYES:   Pupils equal,   Round and reactive to light.    CARDIAC:   Normal rate,   Regular rhythm.        RESPIRATORY:   No wheezing  Bilateral BS  Normal chest expansion  Not tachypneic,  No use of accessory muscles    GASTROINTESTINAL:  Abdomen soft,   Non-tender,   No guarding,   + BS    MUSCULOSKELETAL:   Range of motion is not limited,  No clubbing, cyanosis    NEUROLOGICAL:   Sedated.    SKIN:   Skin normal color for race,   Warm and dry and intact.   No evidence of rash.      23 @ 07:01  -  23 @ 07:00  --------------------------------------------------------  IN:    Dexmedetomidine: 212.5 mL    Dexmedetomidine: 357.3 mL    Enteral Tube Flush: 250 mL    FentaNYL: 204 mL    FentaNYL: 165.6 mL    IV PiggyBack: 300 mL    Jevity 1.2: 250 mL    Lactated Ringers: 1100 mL    Lactated Ringers: 2300 mL    Lactated Ringers Bolus: 1500 mL    Norepinephrine: 53.1 mL    Propofol: 224.4 mL    Propofol: 382.5 mL    Vital High Protein: 420 mL  Total IN: 7719.4 mL    OUT:    Indwelling Catheter - Urethral (mL): 1450 mL  Total OUT: 1450 mL    Total NET: 6269.4 mL          LABS:                            7.9    14.47 )-----------( 120      ( 26 May 2023 06:09 )             24.8                                                   133<L>  |  98  |  61<HH>  ----------------------------<  131<H>  4.5   |  17  |  2.6<H>    Creatinine Trend  BUN 61, Cr 2.6, (23 @ 06:09)  Creatinine Trend  BUN 44, Cr 2.5, (23 @ 05:47)  Creatinine Trend  BUN 42, Cr 2.4, (23 @ 00:59)  Creatinine Trend  BUN 38, Cr 2.8, (23 @ 17:09)  Creatinine Trend  BUN 35, Cr 3.0, (23 @ 11:48)  Creatinine Trend  BUN 31, Cr 2.6, (23 @ 06:10)  Creatinine Trend  BUN 24, Cr 1.9, (23 @ 22:52)  Creatinine Trend  BUN 23, Cr 2.0, (23 @ 20:07)      Ca    8.2<L>      26 May 2023 06:09  Phos  5.4       Mg     1.9         TPro  5.4<L>  /  Alb  2.5<L>  /  TBili  0.3  /  DBili  x   /  AST  48<H>  /  ALT  <5  /  AlkPhos  100                                               Urinalysis Basic - ( 25 May 2023 11:30 )    Color: Yellow / Appearance: Slightly Turbid / S.027 / pH: x  Gluc: x / Ketone: Negative  / Bili: Negative / Urobili: <2 mg/dL   Blood: x / Protein: 100 mg/dL / Nitrite: Negative   Leuk Esterase: Negative / RBC: 45 /HPF / WBC 30 /HPF   Sq Epi: x / Non Sq Epi: x / Bacteria: Negative        CARDIAC MARKERS ( 26 May 2023 06:09 )  x     / x     / 395 U/L / x     / x      CARDIAC MARKERS ( 25 May 2023 05:47 )  x     / x     / 469 U/L / x     / x      CARDIAC MARKERS ( 24 May 2023 11:48 )  x     / x     / 1112 U/L / x     / x                                                LIVER FUNCTIONS - ( 26 May 2023 06:09 )  Alb: 2.5 g/dL / Pro: 5.4 g/dL / ALK PHOS: 100 U/L / ALT: <5 U/L / AST: 48 U/L / GGT: x                                                  Culture - Sputum (collected 24 May 2023 19:17)  Source: Trach Asp Tracheal Aspirate  Gram Stain (25 May 2023 07:31):    No polymorphonuclear leukocytes per low power field    No Squamous epithelial cells per low power field    Numerous Gram positive cocci in pairs per oil power field  Preliminary Report (26 May 2023 07:47):    Numerous Staphylococcus aureus    Normal Respiratory Laila present    Culture - Acid Fast - Sputum w/Smear (collected 24 May 2023 19:17)  Source: Trach Asp Tracheal Aspirate    Culture - Fungal, Sputum (collected 24 May 2023 19:17)  Source: Trach Asp Tracheal Aspirate  Preliminary Report (25 May 2023 11:01):    Testing in progress    Culture - Blood (collected 24 May 2023 06:10)  Source: .Blood None  Gram Stain (25 May 2023 11:18):    Growth in aerobic bottle: Gram Positive Cocci in Clusters    Growth in anaerobic bottle: Gram Positive Cocci in Clusters  Final Report (26 May 2023 08:13):    Growth in aerobic and anaerobic bottles: Methicillin Resistant    Staphylococcus aureus    See previous culture [id number]    Culture - Blood (collected 23 May 2023 20:37)  Source: .Blood Blood-Peripheral  Gram Stain (24 May 2023 17:51):    Growth in aerobic bottle: Gram Positive Cocci in Clusters    Growth in anaerobic bottle: Gram Positive Cocci in Clusters  Final Report (26 May 2023 08:14):    Growth in aerobic and anaerobic bottles: Methicillin Resistant    Staphylococcus aureus    See previous culture [id number]    Culture - Blood (collected 23 May 2023 20:37)  Source: .Blood Blood-Peripheral  Gram Stain (24 May 2023 14:24):    Growth in aerobic bottle: Gram Positive Cocci in Clusters  Final Report (26 May 2023 08:11):    Growth in aerobic bottle: Methicillin Resistant Staphylococcus aureus    ***Blood Panel PCR results on this specimen are available    approximately 3 hours after the Gram stain result.***    Gram stain, PCR, and/or culture results may not always    correspond due to difference in methodologies.    ************************************************************    This PCR assay was performed by multiplex PCR. This    Assay tests for 66 bacterial and resistance gene targets.    Please refer to the [facility] test directory    at [email or web address] for details.  Organism: Blood Culture PCR  Methicillin resistant Staphylococcus aureus (26 May 2023 08:11)  Organism: Methicillin resistant Staphylococcus aureus (26 May 2023 08:11)  Organism: Blood Culture PCR (26 May 2023 08:11)                                                   Mode: AC/ CMV (Assist Control/ Continuous Mandatory Ventilation)  RR (machine): 22  TV (machine): 380  FiO2: 40  PEEP: 8  ITime: 1  MAP: 14  PIP: 24                                      ABG - ( 26 May 2023 03:38 )  pH, Arterial: 7.34  pH, Blood: x     /  pCO2: 34    /  pO2: 110   / HCO3: 18    / Base Excess: -6.8  /  SaO2: 98.4                MEDICATIONS  (STANDING):  albuterol    90 MICROgram(s) HFA Inhaler 4 Puff(s) Inhalation every 4 hours  chlorhexidine 0.12% Liquid 15 milliLiter(s) Oral Mucosa every 12 hours  chlorhexidine 2% Cloths 1 Application(s) Topical daily  chlorhexidine 2% Cloths 1 Application(s) Topical <User Schedule>  dexMEDEtomidine Infusion 0.2 MICROgram(s)/kG/Hr (4.25 mL/Hr) IV Continuous <Continuous>  fentaNYL   Infusion. 0.555 MICROgram(s)/kG/Hr (4.72 mL/Hr) IV Continuous <Continuous>  heparin   Injectable 5000 Unit(s) SubCutaneous every 8 hours  insulin lispro (ADMELOG) corrective regimen sliding scale   SubCutaneous three times a day before meals  ipratropium 17 MICROgram(s) HFA Inhaler 4 Puff(s) Inhalation <User Schedule>  lactated ringers. 1000 milliLiter(s) (100 mL/Hr) IV Continuous <Continuous>  linezolid  IVPB 600 milliGRAM(s) IV Intermittent every 12 hours  mupirocin 2% Nasal 1 Application(s) Both Nostrils two times a day  norepinephrine Infusion 0.05 MICROgram(s)/kG/Min (7.97 mL/Hr) IV Continuous <Continuous>  oseltamivir Suspension 30 milliGRAM(s) Oral every 12 hours  pantoprazole   Suspension 40 milliGRAM(s) Oral daily  polyethylene glycol 3350 17 Gram(s) Oral every 12 hours  propofol Infusion 11.098 MICROgram(s)/kG/Min (5.66 mL/Hr) IV Continuous <Continuous>  senna 2 Tablet(s) Oral at bedtime  sodium bicarbonate 650 milliGRAM(s) Oral every 8 hours    MEDICATIONS  (PRN):  acetaminophen     Tablet .. 650 milliGRAM(s) Oral every 6 hours PRN Temp greater or equal to 38C (100.4F)  albuterol    90 MICROgram(s) HFA Inhaler 4 Puff(s) Inhalation every 4 hours PRN Shortness of Breath and/or Wheezing  ipratropium 17 MICROgram(s) HFA Inhaler 4 Puff(s) Inhalation <User Schedule> PRN -      New X-rays reviewed:                                                                                  ECHO

## 2023-05-26 NOTE — PROGRESS NOTE ADULT - ASSESSMENT
42/F with PMH of asthma and HTN, admitted with SOB, developed LANCE.   Acute hypoxemic resp failure requiring intubation  < from: CT Angio Chest PE Protocol w/ IV Cont (05.23.23 @ 22:06) >  1. Bilateral patchy groundglass nodularity with dominant confluent left   lower lobe opacification with numerous cavitary lesions. Additional   contralateral right lower lobe cavitary 1 cm nodule. Findings compatible   with cavitary pneumonia in the appropriate clinical setting; differential   diagnosis includes tuberculosis.  Non massive hemoptysis   MRSA bacteremia, prerenal FFeNa, suspicion for post infectious GN  c3c4 pending along with CHARLES ANCA Hep B and C  Kidney sono neg for obstruction  LANCE prerenal vs ATN vs pulmo renal syndrome  s/p CT with IV contrast 5/23 - cont  cc/hr  non oliguric   cr stable    phos noted / no binders   w/up for GN noted , check  anti GBM/ DNA, HEPATITIS PROFILE   U sodium in favor of prerenal or acute GN   followed by pulmonary ? bronchoscopy   start sodium bicarbonate 650 q 8   no acute indication for RRT   will follow

## 2023-05-26 NOTE — PROGRESS NOTE ADULT - SUBJECTIVE AND OBJECTIVE BOX
seen and examined  24 h events noted   off pressors   intubated/ventilated         PAST HISTORY  --------------------------------------------------------------------------------  No significant changes to PMH, PSH, FHx, SHx, unless otherwise noted    ALLERGIES & MEDICATIONS  --------------------------------------------------------------------------------  Allergies    aspirin (Short breath; Anaphylaxis)    Intolerances      Standing Inpatient Medications  albuterol    90 MICROgram(s) HFA Inhaler 4 Puff(s) Inhalation every 4 hours  chlorhexidine 0.12% Liquid 15 milliLiter(s) Oral Mucosa every 12 hours  chlorhexidine 2% Cloths 1 Application(s) Topical daily  chlorhexidine 2% Cloths 1 Application(s) Topical <User Schedule>  dexMEDEtomidine Infusion 0.2 MICROgram(s)/kG/Hr IV Continuous <Continuous>  fentaNYL   Infusion. 0.555 MICROgram(s)/kG/Hr IV Continuous <Continuous>  heparin   Injectable 5000 Unit(s) SubCutaneous every 8 hours  insulin lispro (ADMELOG) corrective regimen sliding scale   SubCutaneous three times a day before meals  ipratropium 17 MICROgram(s) HFA Inhaler 4 Puff(s) Inhalation <User Schedule>  lactated ringers. 1000 milliLiter(s) IV Continuous <Continuous>  linezolid  IVPB 600 milliGRAM(s) IV Intermittent every 12 hours  methylPREDNISolone sodium succinate Injectable 40 milliGRAM(s) IV Push every 8 hours  mupirocin 2% Nasal 1 Application(s) Both Nostrils two times a day  norepinephrine Infusion 0.05 MICROgram(s)/kG/Min IV Continuous <Continuous>  oseltamivir Suspension 30 milliGRAM(s) Oral every 12 hours  pantoprazole   Suspension 40 milliGRAM(s) Oral daily  polyethylene glycol 3350 17 Gram(s) Oral every 12 hours  propofol Infusion 11.098 MICROgram(s)/kG/Min IV Continuous <Continuous>  senna 2 Tablet(s) Oral at bedtime    PRN Inpatient Medications  acetaminophen     Tablet .. 650 milliGRAM(s) Oral every 6 hours PRN  albuterol    90 MICROgram(s) HFA Inhaler 4 Puff(s) Inhalation every 4 hours PRN  ipratropium 17 MICROgram(s) HFA Inhaler 4 Puff(s) Inhalation <User Schedule> PRN        VITALS/PHYSICAL EXAM  --------------------------------------------------------------------------------  T(C): 36.9 (05-26-23 @ 04:00), Max: 37.6 (05-25-23 @ 16:00)  HR: 75 (05-26-23 @ 06:45) (75 - 97)  BP: 147/95 (05-26-23 @ 06:45) (67/44 - 147/95)  RR: 15 (05-26-23 @ 06:45) (15 - 44)  SpO2: 99% (05-26-23 @ 06:45) (96% - 100%)  Wt(kg): --  Height (cm): 160 (05-24-23 @ 08:15)  Weight (kg): 85 (05-24-23 @ 08:15)  BMI (kg/m2): 33.2 (05-24-23 @ 08:15)  BSA (m2): 1.88 (05-24-23 @ 08:15)      05-25-23 @ 07:01  -  05-26-23 @ 07:00  --------------------------------------------------------  IN: 7719.4 mL / OUT: 1450 mL / NET: 6269.4 mL      Physical Exam:  	Gen: intubated/ventilated   	Pulm:  B/L kell   	CV: S1S2; no rub  	Abd: +distended      LABS/STUDIES  --------------------------------------------------------------------------------              8.7    8.10  >-----------<  136      [05-25-23 @ 05:47]              28.5     132  |  98  |  44  ----------------------------<  99      [05-25-23 @ 05:47]  4.3   |  18  |  2.5        Ca     7.8     [05-25-23 @ 05:47]      Mg     1.9     [05-25-23 @ 08:10]      Phos  5.4     [05-25-23 @ 08:10]    TPro  5.5  /  Alb  2.8  /  TBili  0.4  /  DBili  x   /  AST  36  /  ALT  17  /  AlkPhos  58  [05-25-23 @ 05:47]            [05-25-23 @ 05:47]    Creatinine Trend:  SCr 2.5 [05-25 @ 05:47]  SCr 2.4 [05-25 @ 00:59]  SCr 2.8 [05-24 @ 17:09]  SCr 3.0 [05-24 @ 11:48]  SCr 2.6 [05-24 @ 06:10]    Urinalysis - [05-25-23 @ 11:30]      Color Yellow / Appearance Slightly Turbid / SG 1.027 / pH 6.0      Gluc Negative / Ketone Negative  / Bili Negative / Urobili <2 mg/dL       Blood Large / Protein 100 mg/dL / Leuk Est Negative / Nitrite Negative      RBC 45 / WBC 30 / Hyaline 9 / Gran  / Sq Epi  / Non Sq Epi  / Bacteria Negative    Urine Creatinine 178      [05-24-23 @ 19:16]  Urine Protein 232      [05-24-23 @ 19:16]  Urine Sodium <20.0      [05-24-23 @ 19:16]  Urine Urea Nitrogen 239      [05-24-23 @ 19:16]  Urine Potassium 50      [05-24-23 @ 19:16]  Urine Osmolality 340      [05-24-23 @ 19:16]        CHARLES: titer 1:640, pattern Centromere      [05-24-23 @ 11:48]  C3 Complement 80      [05-24-23 @ 11:48]  C4 Complement 34      [05-24-23 @ 11:48]  ANCA: cANCA Negative, pANCA Negative, atypical ANCA Negative      [05-24-23 @ 11:48]  Free Light Chains: kappa 16.56, lambda 7.37, ratio = 2.25      [05-24 @ 11:48]

## 2023-05-27 LAB
-  AMPICILLIN/SULBACTAM: SIGNIFICANT CHANGE UP
-  CEFAZOLIN: SIGNIFICANT CHANGE UP
-  CLINDAMYCIN: SIGNIFICANT CHANGE UP
-  ERYTHROMYCIN: SIGNIFICANT CHANGE UP
-  GENTAMICIN: SIGNIFICANT CHANGE UP
-  LINEZOLID: SIGNIFICANT CHANGE UP
-  OXACILLIN: SIGNIFICANT CHANGE UP
-  PENICILLIN: SIGNIFICANT CHANGE UP
-  RIFAMPIN: SIGNIFICANT CHANGE UP
-  TETRACYCLINE: SIGNIFICANT CHANGE UP
-  TRIMETHOPRIM/SULFAMETHOXAZOLE: SIGNIFICANT CHANGE UP
-  VANCOMYCIN: SIGNIFICANT CHANGE UP
ALBUMIN SERPL ELPH-MCNC: 2.4 G/DL — LOW (ref 3.5–5.2)
ALP SERPL-CCNC: 93 U/L — SIGNIFICANT CHANGE UP (ref 30–115)
ALT FLD-CCNC: <5 U/L — SIGNIFICANT CHANGE UP (ref 0–41)
ANION GAP SERPL CALC-SCNC: 16 MMOL/L — HIGH (ref 7–14)
AST SERPL-CCNC: <5 U/L — SIGNIFICANT CHANGE UP (ref 0–41)
BASE EXCESS BLDA CALC-SCNC: -4 MMOL/L — LOW (ref -2–3)
BASOPHILS # BLD AUTO: 0.01 K/UL — SIGNIFICANT CHANGE UP (ref 0–0.2)
BASOPHILS NFR BLD AUTO: 0.1 % — SIGNIFICANT CHANGE UP (ref 0–1)
BILIRUB SERPL-MCNC: 0.2 MG/DL — SIGNIFICANT CHANGE UP (ref 0.2–1.2)
BUN SERPL-MCNC: 81 MG/DL — CRITICAL HIGH (ref 10–20)
CALCIUM SERPL-MCNC: 8.5 MG/DL — SIGNIFICANT CHANGE UP (ref 8.4–10.5)
CHLORIDE SERPL-SCNC: 104 MMOL/L — SIGNIFICANT CHANGE UP (ref 98–110)
CO2 SERPL-SCNC: 17 MMOL/L — SIGNIFICANT CHANGE UP (ref 17–32)
CREAT SERPL-MCNC: 2.7 MG/DL — HIGH (ref 0.7–1.5)
CULTURE RESULTS: SIGNIFICANT CHANGE UP
EGFR: 22 ML/MIN/1.73M2 — LOW
EOSINOPHIL # BLD AUTO: 0 K/UL — SIGNIFICANT CHANGE UP (ref 0–0.7)
EOSINOPHIL NFR BLD AUTO: 0 % — SIGNIFICANT CHANGE UP (ref 0–8)
GAS PNL BLDA: SIGNIFICANT CHANGE UP
GAS PNL BLDA: SIGNIFICANT CHANGE UP
GLUCOSE BLDC GLUCOMTR-MCNC: 106 MG/DL — HIGH (ref 70–99)
GLUCOSE BLDC GLUCOMTR-MCNC: 115 MG/DL — HIGH (ref 70–99)
GLUCOSE SERPL-MCNC: 135 MG/DL — HIGH (ref 70–99)
HAV IGM SER-ACNC: SIGNIFICANT CHANGE UP
HBV CORE IGM SER-ACNC: SIGNIFICANT CHANGE UP
HBV SURFACE AG SER-ACNC: SIGNIFICANT CHANGE UP
HCO3 BLDA-SCNC: 21 MMOL/L — SIGNIFICANT CHANGE UP (ref 21–28)
HCT VFR BLD CALC: 22 % — LOW (ref 37–47)
HCV AB S/CO SERPL IA: 0.16 S/CO — SIGNIFICANT CHANGE UP (ref 0–0.99)
HCV AB SERPL-IMP: SIGNIFICANT CHANGE UP
HGB BLD-MCNC: 7.2 G/DL — LOW (ref 12–16)
HIV 1+2 AB+HIV1 P24 AG SERPL QL IA: SIGNIFICANT CHANGE UP
HOROWITZ INDEX BLDA+IHG-RTO: 40 — SIGNIFICANT CHANGE UP
IMM GRANULOCYTES NFR BLD AUTO: 4.4 % — HIGH (ref 0.1–0.3)
LYMPHOCYTES # BLD AUTO: 0.76 K/UL — LOW (ref 1.2–3.4)
LYMPHOCYTES # BLD AUTO: 7.1 % — LOW (ref 20.5–51.1)
MCHC RBC-ENTMCNC: 19 PG — LOW (ref 27–31)
MCHC RBC-ENTMCNC: 32.7 G/DL — SIGNIFICANT CHANGE UP (ref 32–37)
MCV RBC AUTO: 58 FL — LOW (ref 81–99)
METHOD TYPE: SIGNIFICANT CHANGE UP
MONOCYTES # BLD AUTO: 0.64 K/UL — HIGH (ref 0.1–0.6)
MONOCYTES NFR BLD AUTO: 5.9 % — SIGNIFICANT CHANGE UP (ref 1.7–9.3)
NEUTROPHILS # BLD AUTO: 8.89 K/UL — HIGH (ref 1.4–6.5)
NEUTROPHILS NFR BLD AUTO: 82.5 % — HIGH (ref 42.2–75.2)
NRBC # BLD: 0 /100 WBCS — SIGNIFICANT CHANGE UP (ref 0–0)
ORGANISM # SPEC MICROSCOPIC CNT: SIGNIFICANT CHANGE UP
ORGANISM # SPEC MICROSCOPIC CNT: SIGNIFICANT CHANGE UP
PCO2 BLDA: 37 MMHG — SIGNIFICANT CHANGE UP (ref 25–48)
PH BLDA: 7.36 — SIGNIFICANT CHANGE UP (ref 7.35–7.45)
PLATELET # BLD AUTO: 117 K/UL — LOW (ref 130–400)
PMV BLD: SIGNIFICANT CHANGE UP (ref 7.4–10.4)
PO2 BLDA: 123 MMHG — HIGH (ref 83–108)
POTASSIUM SERPL-MCNC: 4.2 MMOL/L — SIGNIFICANT CHANGE UP (ref 3.5–5)
POTASSIUM SERPL-SCNC: 4.2 MMOL/L — SIGNIFICANT CHANGE UP (ref 3.5–5)
PROT SERPL-MCNC: 5.4 G/DL — LOW (ref 6–8)
RBC # BLD: 3.79 M/UL — LOW (ref 4.2–5.4)
RBC # FLD: 20.4 % — HIGH (ref 11.5–14.5)
SAO2 % BLDA: 99.1 % — HIGH (ref 94–98)
SODIUM SERPL-SCNC: 137 MMOL/L — SIGNIFICANT CHANGE UP (ref 135–146)
SPECIMEN SOURCE: SIGNIFICANT CHANGE UP
WBC # BLD: 10.77 K/UL — SIGNIFICANT CHANGE UP (ref 4.8–10.8)
WBC # FLD AUTO: 10.77 K/UL — SIGNIFICANT CHANGE UP (ref 4.8–10.8)

## 2023-05-27 PROCEDURE — 99291 CRITICAL CARE FIRST HOUR: CPT

## 2023-05-27 PROCEDURE — 93970 EXTREMITY STUDY: CPT | Mod: 26

## 2023-05-27 PROCEDURE — 71045 X-RAY EXAM CHEST 1 VIEW: CPT | Mod: 26

## 2023-05-27 RX ORDER — IPRATROPIUM BROMIDE 0.2 MG/ML
4 SOLUTION, NON-ORAL INHALATION EVERY 6 HOURS
Refills: 0 | Status: DISCONTINUED | OUTPATIENT
Start: 2023-05-27 | End: 2023-06-03

## 2023-05-27 RX ORDER — ALBUTEROL 90 UG/1
4 AEROSOL, METERED ORAL EVERY 6 HOURS
Refills: 0 | Status: DISCONTINUED | OUTPATIENT
Start: 2023-05-27 | End: 2023-06-03

## 2023-05-27 RX ADMIN — HEPARIN SODIUM 5000 UNIT(S): 5000 INJECTION INTRAVENOUS; SUBCUTANEOUS at 05:32

## 2023-05-27 RX ADMIN — MUPIROCIN 1 APPLICATION(S): 20 OINTMENT TOPICAL at 05:32

## 2023-05-27 RX ADMIN — Medication 30 MILLIGRAM(S): at 17:49

## 2023-05-27 RX ADMIN — ALBUTEROL 4 PUFF(S): 90 AEROSOL, METERED ORAL at 03:58

## 2023-05-27 RX ADMIN — CHLORHEXIDINE GLUCONATE 1 APPLICATION(S): 213 SOLUTION TOPICAL at 05:32

## 2023-05-27 RX ADMIN — LINEZOLID 300 MILLIGRAM(S): 600 INJECTION, SOLUTION INTRAVENOUS at 05:32

## 2023-05-27 RX ADMIN — HEPARIN SODIUM 5000 UNIT(S): 5000 INJECTION INTRAVENOUS; SUBCUTANEOUS at 21:26

## 2023-05-27 RX ADMIN — Medication 650 MILLIGRAM(S): at 12:59

## 2023-05-27 RX ADMIN — Medication 4 PUFF(S): at 20:28

## 2023-05-27 RX ADMIN — PANTOPRAZOLE SODIUM 40 MILLIGRAM(S): 20 TABLET, DELAYED RELEASE ORAL at 12:58

## 2023-05-27 RX ADMIN — MUPIROCIN 1 APPLICATION(S): 20 OINTMENT TOPICAL at 17:17

## 2023-05-27 RX ADMIN — Medication 4 PUFF(S): at 07:51

## 2023-05-27 RX ADMIN — POLYETHYLENE GLYCOL 3350 17 GRAM(S): 17 POWDER, FOR SOLUTION ORAL at 21:26

## 2023-05-27 RX ADMIN — ALBUTEROL 4 PUFF(S): 90 AEROSOL, METERED ORAL at 20:28

## 2023-05-27 RX ADMIN — POLYETHYLENE GLYCOL 3350 17 GRAM(S): 17 POWDER, FOR SOLUTION ORAL at 12:57

## 2023-05-27 RX ADMIN — SENNA PLUS 2 TABLET(S): 8.6 TABLET ORAL at 21:26

## 2023-05-27 RX ADMIN — LINEZOLID 300 MILLIGRAM(S): 600 INJECTION, SOLUTION INTRAVENOUS at 17:16

## 2023-05-27 RX ADMIN — Medication 30 MILLIGRAM(S): at 01:42

## 2023-05-27 RX ADMIN — Medication 650 MILLIGRAM(S): at 05:32

## 2023-05-27 RX ADMIN — CHLORHEXIDINE GLUCONATE 1 APPLICATION(S): 213 SOLUTION TOPICAL at 17:16

## 2023-05-27 RX ADMIN — ALBUTEROL 4 PUFF(S): 90 AEROSOL, METERED ORAL at 13:14

## 2023-05-27 RX ADMIN — Medication 4 PUFF(S): at 13:13

## 2023-05-27 RX ADMIN — Medication 4 PUFF(S): at 02:59

## 2023-05-27 RX ADMIN — HEPARIN SODIUM 5000 UNIT(S): 5000 INJECTION INTRAVENOUS; SUBCUTANEOUS at 12:57

## 2023-05-27 RX ADMIN — ALBUTEROL 4 PUFF(S): 90 AEROSOL, METERED ORAL at 07:51

## 2023-05-27 RX ADMIN — Medication 650 MILLIGRAM(S): at 21:27

## 2023-05-27 RX ADMIN — ALBUTEROL 4 PUFF(S): 90 AEROSOL, METERED ORAL at 00:15

## 2023-05-27 RX ADMIN — CHLORHEXIDINE GLUCONATE 15 MILLILITER(S): 213 SOLUTION TOPICAL at 17:17

## 2023-05-27 RX ADMIN — CHLORHEXIDINE GLUCONATE 15 MILLILITER(S): 213 SOLUTION TOPICAL at 05:32

## 2023-05-27 NOTE — PROGRESS NOTE ADULT - ASSESSMENT
42/F with PMH of asthma and HTN, admitted with SOB, developed LANCE.   Acute hypoxemic resp failure requiring intubation  < from: CT Angio Chest PE Protocol w/ IV Cont (05.23.23 @ 22:06) >  1. Bilateral patchy groundglass nodularity with dominant confluent left   lower lobe opacification with numerous cavitary lesions. Additional   contralateral right lower lobe cavitary 1 cm nodule. Findings compatible   with cavitary pneumonia in the appropriate clinical setting; differential   diagnosis includes tuberculosis.  Non massive hemoptysis   MRSA bacteremia, prerenal FFeNa, suspicion for post infectious GN  c3c4 pending along with CHARLES ANCA Hep B and C  Kidney sono neg for obstruction  LANCE prerenal vs ATN vs pulmo renal syndrome  s/p CT with IV contrast 5/23 - cont  cc/hr  non oliguric   cr stable    phos noted / no binders yet / feed nepro   w/up for GN noted  negative so far   U sodium in favor of prerenal or acute GN   followed by pulmonary ? bronchoscopy  ID notes appreciated    follow platelet count   check iron stores   increase sodium bicarbonate  to 1300  q 8   no acute indication for RRT   will follow

## 2023-05-27 NOTE — PHARMACOTHERAPY INTERVENTION NOTE - COMMENTS
Recommended to decrease oseltamivir dosing to 30mg via OG tube daily, since patient has creatinine clearance of 28 ml/min using adjusted body weight of 65kg. Recommended to add stop date as 6/2, since Dr. Chance recommended to prolong duration to 10 days due to severity of illness.

## 2023-05-27 NOTE — PROGRESS NOTE ADULT - SUBJECTIVE AND OBJECTIVE BOX
Patient is a 42y old  Female who presents with a chief complaint of AHRF (27 May 2023 06:45)        Over Night Events:  Remains critically ill on MV.  Sedated.  off pressors.  Agitated off sedation         ROS:     All ROS are negative except HPI         PHYSICAL EXAM    ICU Vital Signs Last 24 Hrs  T(C): 36.7 (27 May 2023 08:00), Max: 37.5 (26 May 2023 16:00)  T(F): 98 (27 May 2023 08:00), Max: 99.5 (26 May 2023 16:00)  HR: 69 (27 May 2023 08:00) (64 - 77)  BP: 157/92 (27 May 2023 08:00) (136/88 - 165/101)  BP(mean): 118 (27 May 2023 08:00) (107 - 127)  ABP: --  ABP(mean): --  RR: 24 (27 May 2023 08:00) (16 - 34)  SpO2: 100% (27 May 2023 08:00) (99% - 100%)    O2 Parameters below as of 27 May 2023 09:00  Patient On (Oxygen Delivery Method): ventilator    O2 Concentration (%): 40        CONSTITUTIONAL:  In NAD    ENT:   Airway patent,   Mouth with normal mucosa.   ET     EYES:   Pupils equal,   Round and reactive to light.    CARDIAC:   Normal rate,   Regular rhythm.        RESPIRATORY:   No wheezing  Bilateral BS  Normal chest expansion  Not tachypneic,  No use of accessory muscles    GASTROINTESTINAL:  Abdomen soft,   Non-tender,   No guarding,   + BS    MUSCULOSKELETAL:   Range of motion is not limited,  No clubbing, cyanosis    NEUROLOGICAL:   Sedated    SKIN:   Skin normal color for race,           23 @ 07:01  -  23 @ 07:00  --------------------------------------------------------  IN:    Dexmedetomidine: 676 mL    Enteral Tube Flush: 220 mL    IV PiggyBack: 300 mL    Lactated Ringers: 2400 mL    Propofol: 663.2 mL    Vital High Protein: 460 mL  Total IN: 4719.2 mL    OUT:    FentaNYL: 0 mL    Indwelling Catheter - Urethral (mL): 2210 mL    Norepinephrine: 0 mL  Total OUT: 2210 mL    Total NET: 2509.2 mL      23 @ 07:01  -  23 @ 09:53  --------------------------------------------------------  IN:    Dexmedetomidine: 63.8 mL    Lactated Ringers: 200 mL    Propofol: 61.2 mL    Vital High Protein: 40 mL  Total IN: 365 mL    OUT:    Indwelling Catheter - Urethral (mL): 200 mL  Total OUT: 200 mL    Total NET: 165 mL          LABS:                            7.2    10.77 )-----------( 117      ( 27 May 2023 05:47 )             22.0                                                   137  |  104  |  81<HH>  ----------------------------<  135<H>  4.2   |  17  |  2.7<H>    Creatinine Trend  BUN 81, Cr 2.7, (23 @ 05:47)  Creatinine Trend  BUN 61, Cr 2.6, (23 @ 06:09)  Creatinine Trend  BUN 44, Cr 2.5, (23 @ 05:47)  Creatinine Trend  BUN 42, Cr 2.4, (23 @ 00:59)  Creatinine Trend  BUN 38, Cr 2.8, (23 @ 17:09)  Creatinine Trend  BUN 35, Cr 3.0, (23 @ 11:48)  Creatinine Trend  BUN 31, Cr 2.6, (23 @ 06:10)  Creatinine Trend  BUN 24, Cr 1.9, (23 @ 22:52)  Creatinine Trend  BUN 23, Cr 2.0, (23 @ 20:07)      Ca    8.5      27 May 2023 05:47    TPro  5.4<L>  /  Alb  2.4<L>  /  TBili  0.2  /  DBili  x   /  AST  <5  /  ALT  <5  /  AlkPhos  93                                               Urinalysis Basic - ( 25 May 2023 11:30 )    Color: Yellow / Appearance: Slightly Turbid / S.027 / pH: x  Gluc: x / Ketone: Negative  / Bili: Negative / Urobili: <2 mg/dL   Blood: x / Protein: 100 mg/dL / Nitrite: Negative   Leuk Esterase: Negative / RBC: 45 /HPF / WBC 30 /HPF   Sq Epi: x / Non Sq Epi: x / Bacteria: Negative        CARDIAC MARKERS ( 26 May 2023 06:09 )  x     / x     / 395 U/L / x     / x                                                LIVER FUNCTIONS - ( 27 May 2023 05:47 )  Alb: 2.4 g/dL / Pro: 5.4 g/dL / ALK PHOS: 93 U/L / ALT: <5 U/L / AST: <5 U/L / GGT: x                                                  Culture - Sputum (collected 24 May 2023 19:17)  Source: Trach Asp Tracheal Aspirate  Gram Stain (25 May 2023 07:31):    No polymorphonuclear leukocytes per low power field    No Squamous epithelial cells per low power field    Numerous Gram positive cocci in pairs per oil power field  Final Report (27 May 2023 08:45):    Numerous Methicillin Resistant Staphylococcus aureus    Normal Respiratory Laila present  Organism: Methicillin resistant Staphylococcus aureus (27 May 2023 08:45)  Organism: Methicillin resistant Staphylococcus aureus (27 May 2023 08:45)    Culture - Acid Fast - Sputum w/Smear (collected 24 May 2023 19:17)  Source: Trach Asp Tracheal Aspirate    Culture - Fungal, Sputum (collected 24 May 2023 19:17)  Source: Trach Asp Tracheal Aspirate  Preliminary Report (25 May 2023 11:01):    Testing in progress                                                   Mode: AC/ CMV (Assist Control/ Continuous Mandatory Ventilation)  RR (machine): 22  TV (machine): 380  FiO2: 40  PEEP: 8  ITime: 1  MAP: 14  PIP: 22                                      ABG - ( 27 May 2023 02:50 )  pH, Arterial: 7.30  pH, Blood: x     /  pCO2: 42    /  pO2: 63    / HCO3: 20    / Base Excess: -5.4  /  SaO2: 90.0                MEDICATIONS  (STANDING):  albuterol    90 MICROgram(s) HFA Inhaler 4 Puff(s) Inhalation every 6 hours  chlorhexidine 0.12% Liquid 15 milliLiter(s) Oral Mucosa every 12 hours  chlorhexidine 2% Cloths 1 Application(s) Topical <User Schedule>  chlorhexidine 2% Cloths 1 Application(s) Topical daily  dexMEDEtomidine Infusion 0.2 MICROgram(s)/kG/Hr (4.25 mL/Hr) IV Continuous <Continuous>  fentaNYL   Infusion. 0.555 MICROgram(s)/kG/Hr (4.72 mL/Hr) IV Continuous <Continuous>  heparin   Injectable 5000 Unit(s) SubCutaneous every 8 hours  insulin lispro (ADMELOG) corrective regimen sliding scale   SubCutaneous three times a day before meals  ipratropium 17 MICROgram(s) HFA Inhaler 4 Puff(s) Inhalation every 6 hours  lactated ringers. 1000 milliLiter(s) (100 mL/Hr) IV Continuous <Continuous>  linezolid  IVPB 600 milliGRAM(s) IV Intermittent every 12 hours  mupirocin 2% Nasal 1 Application(s) Both Nostrils two times a day  norepinephrine Infusion 0.05 MICROgram(s)/kG/Min (7.97 mL/Hr) IV Continuous <Continuous>  oseltamivir Suspension 30 milliGRAM(s) Oral every 12 hours  pantoprazole   Suspension 40 milliGRAM(s) Oral daily  polyethylene glycol 3350 17 Gram(s) Oral every 12 hours  propofol Infusion 11.098 MICROgram(s)/kG/Min (5.66 mL/Hr) IV Continuous <Continuous>  senna 2 Tablet(s) Oral at bedtime  sodium bicarbonate 650 milliGRAM(s) Oral every 8 hours    MEDICATIONS  (PRN):  acetaminophen     Tablet .. 650 milliGRAM(s) Oral every 6 hours PRN Temp greater or equal to 38C (100.4F)  albuterol    90 MICROgram(s) HFA Inhaler 4 Puff(s) Inhalation every 4 hours PRN Shortness of Breath and/or Wheezing  ipratropium 17 MICROgram(s) HFA Inhaler 4 Puff(s) Inhalation <User Schedule> PRN -      New X-rays reviewed:                                                                                  ECHO

## 2023-05-27 NOTE — PROGRESS NOTE ADULT - SUBJECTIVE AND OBJECTIVE BOX
seen and examined  24 h events noted   intubated/ventilated         PAST HISTORY  --------------------------------------------------------------------------------  No significant changes to PMH, PSH, FHx, SHx, unless otherwise noted    ALLERGIES & MEDICATIONS  --------------------------------------------------------------------------------  Allergies    aspirin (Short breath; Anaphylaxis)    Intolerances      Standing Inpatient Medications  albuterol    90 MICROgram(s) HFA Inhaler 4 Puff(s) Inhalation every 4 hours  chlorhexidine 0.12% Liquid 15 milliLiter(s) Oral Mucosa every 12 hours  chlorhexidine 2% Cloths 1 Application(s) Topical <User Schedule>  chlorhexidine 2% Cloths 1 Application(s) Topical daily  dexMEDEtomidine Infusion 0.2 MICROgram(s)/kG/Hr IV Continuous <Continuous>  fentaNYL   Infusion. 0.555 MICROgram(s)/kG/Hr IV Continuous <Continuous>  heparin   Injectable 5000 Unit(s) SubCutaneous every 8 hours  insulin lispro (ADMELOG) corrective regimen sliding scale   SubCutaneous three times a day before meals  ipratropium 17 MICROgram(s) HFA Inhaler 4 Puff(s) Inhalation <User Schedule>  lactated ringers. 1000 milliLiter(s) IV Continuous <Continuous>  linezolid  IVPB 600 milliGRAM(s) IV Intermittent every 12 hours  mupirocin 2% Nasal 1 Application(s) Both Nostrils two times a day  norepinephrine Infusion 0.05 MICROgram(s)/kG/Min IV Continuous <Continuous>  oseltamivir Suspension 30 milliGRAM(s) Oral every 12 hours  pantoprazole   Suspension 40 milliGRAM(s) Oral daily  polyethylene glycol 3350 17 Gram(s) Oral every 12 hours  propofol Infusion 11.098 MICROgram(s)/kG/Min IV Continuous <Continuous>  senna 2 Tablet(s) Oral at bedtime  sodium bicarbonate 650 milliGRAM(s) Oral every 8 hours    PRN Inpatient Medications  acetaminophen     Tablet .. 650 milliGRAM(s) Oral every 6 hours PRN  albuterol    90 MICROgram(s) HFA Inhaler 4 Puff(s) Inhalation every 4 hours PRN  ipratropium 17 MICROgram(s) HFA Inhaler 4 Puff(s) Inhalation <User Schedule> PRN            VITALS/PHYSICAL EXAM  --------------------------------------------------------------------------------  T(C): 37.5 (05-27-23 @ 04:00), Max: 37.5 (05-26-23 @ 16:00)  HR: 65 (05-27-23 @ 06:00) (64 - 81)  BP: 152/90 (05-27-23 @ 06:00) (116/85 - 165/101)  RR: 17 (05-27-23 @ 06:00) (15 - 34)  SpO2: 100% (05-27-23 @ 06:00) (98% - 100%)  Wt(kg): --        05-25-23 @ 07:01  -  05-26-23 @ 07:00  --------------------------------------------------------  IN: 7719.4 mL / OUT: 1450 mL / NET: 6269.4 mL    05-26-23 @ 07:01  -  05-27-23 @ 06:46  --------------------------------------------------------  IN: 4506.7 mL / OUT: 1985 mL / NET: 2521.7 mL        LABS/STUDIES  --------------------------------------------------------------------------------              7.6    13.70 >-----------<  119      [05-26-23 @ 11:00]              23.9     133  |  98  |  61  ----------------------------<  131      [05-26-23 @ 06:09]  4.5   |  17  |  2.6        Ca     8.2     [05-26-23 @ 06:09]      Mg     1.9     [05-25-23 @ 08:10]      Phos  5.4     [05-25-23 @ 08:10]    TPro  5.4  /  Alb  2.5  /  TBili  0.3  /  DBili  x   /  AST  48  /  ALT  <5  /  AlkPhos  100  [05-26-23 @ 06:09]          [05-26-23 @ 06:09]    Creatinine Trend:  SCr 2.6 [05-26 @ 06:09]  SCr 2.5 [05-25 @ 05:47]  SCr 2.4 [05-25 @ 00:59]  SCr 2.8 [05-24 @ 17:09]  SCr 3.0 [05-24 @ 11:48]    Urinalysis - [05-25-23 @ 11:30]      Color Yellow / Appearance Slightly Turbid / SG 1.027 / pH 6.0      Gluc Negative / Ketone Negative  / Bili Negative / Urobili <2 mg/dL       Blood Large / Protein 100 mg/dL / Leuk Est Negative / Nitrite Negative      RBC 45 / WBC 30 / Hyaline 9 / Gran  / Sq Epi  / Non Sq Epi  / Bacteria Negative    Urine Creatinine 178      [05-24-23 @ 19:16]  Urine Protein 232      [05-24-23 @ 19:16]  Urine Sodium <20.0      [05-24-23 @ 19:16]  Urine Urea Nitrogen 239      [05-24-23 @ 19:16]  Urine Potassium 50      [05-24-23 @ 19:16]  Urine Osmolality 340      [05-24-23 @ 19:16]      HBsAg Nonreact      [05-26-23 @ 11:00]  HCV 0.16, Nonreact      [05-26-23 @ 11:00]    CHARLES: titer 1:640, pattern Centromere      [05-24-23 @ 11:48]  dsDNA <12      [05-24-23 @ 11:48]  C3 Complement 80      [05-24-23 @ 11:48]  C4 Complement 34      [05-24-23 @ 11:48]  ANCA: cANCA Negative, pANCA Negative, atypical ANCA Negative      [05-24-23 @ 11:48]  anti-GBM <0.2      [05-24-23 @ 11:48]  Free Light Chains: kappa 16.56, lambda 7.37, ratio = 2.25      [05-24 @ 11:48]

## 2023-05-27 NOTE — PROGRESS NOTE ADULT - ASSESSMENT
IMPRESSION:    Acute hypoxemic respiratory failure   Severe CAP MRSA   MRSA bacteremia   Non massive hemoptysis   LANCE   HO asthma   Influenza B     PLAN:    CNS: SAT.  Drug screen     HEENT: Oral care.  ET care     PULMONARY:  HOB @ 45 degrees.  Aspiration precautions.  ARDS net MV setting.  Repeat ABG noted.  Wean O2.  SBT.      CARDIOVASCULAR:  Goal directed fluid resuscitation.  FU ECHO.  Trend CPK.      GI: GI prophylaxis.  OG Feeding to target.  Adjust Bowel regimen.      RENAL:  Follow up lytes.  Correct as needed.  Weaver for strict Is and OS.       INFECTIOUS DISEASE: Follow up cultures. Zyvox, and Tamiflu.  FU Urine legionella and strep Ag. FU HIV.  Galactomannan and Fungitell.  Daily BC      HEMATOLOGICAL:  DVT prophylaxis.  Dimer noted.  LE duplex  .  Monitor CBC     ENDOCRINE:  Follow up FS.  Insulin protocol if needed.      MUSCULOSKELETAL:  Rheum and Vasculitis panel.  Off loading     MICU     Will need steroids prior to extubation for risk of PES

## 2023-05-28 LAB
ALBUMIN SERPL ELPH-MCNC: 1.9 G/DL — LOW (ref 3.5–5.2)
ALP SERPL-CCNC: 81 U/L — SIGNIFICANT CHANGE UP (ref 30–115)
ALT FLD-CCNC: 13 U/L — SIGNIFICANT CHANGE UP (ref 0–41)
ANION GAP SERPL CALC-SCNC: 15 MMOL/L — HIGH (ref 7–14)
APTT BLD: 31 SEC — SIGNIFICANT CHANGE UP (ref 27–39.2)
AST SERPL-CCNC: 15 U/L — SIGNIFICANT CHANGE UP (ref 0–41)
BASOPHILS # BLD AUTO: 0.02 K/UL — SIGNIFICANT CHANGE UP (ref 0–0.2)
BASOPHILS NFR BLD AUTO: 0.2 % — SIGNIFICANT CHANGE UP (ref 0–1)
BILIRUB SERPL-MCNC: 0.3 MG/DL — SIGNIFICANT CHANGE UP (ref 0.2–1.2)
BLD GP AB SCN SERPL QL: SIGNIFICANT CHANGE UP
BUN SERPL-MCNC: 82 MG/DL — CRITICAL HIGH (ref 10–20)
CALCIUM SERPL-MCNC: 8.1 MG/DL — LOW (ref 8.4–10.5)
CHLORIDE SERPL-SCNC: 106 MMOL/L — SIGNIFICANT CHANGE UP (ref 98–110)
CO2 SERPL-SCNC: 18 MMOL/L — SIGNIFICANT CHANGE UP (ref 17–32)
CREAT SERPL-MCNC: 3 MG/DL — HIGH (ref 0.7–1.5)
EGFR: 19 ML/MIN/1.73M2 — LOW
EOSINOPHIL # BLD AUTO: 0.02 K/UL — SIGNIFICANT CHANGE UP (ref 0–0.7)
EOSINOPHIL NFR BLD AUTO: 0.2 % — SIGNIFICANT CHANGE UP (ref 0–8)
GAS PNL BLDA: SIGNIFICANT CHANGE UP
GLUCOSE BLDC GLUCOMTR-MCNC: 121 MG/DL — HIGH (ref 70–99)
GLUCOSE BLDC GLUCOMTR-MCNC: 128 MG/DL — HIGH (ref 70–99)
GLUCOSE BLDC GLUCOMTR-MCNC: 97 MG/DL — SIGNIFICANT CHANGE UP (ref 70–99)
GLUCOSE SERPL-MCNC: 73 MG/DL — SIGNIFICANT CHANGE UP (ref 70–99)
GRAM STN FLD: SIGNIFICANT CHANGE UP
GRAM STN FLD: SIGNIFICANT CHANGE UP
HCT VFR BLD CALC: 21.5 % — LOW (ref 37–47)
HGB BLD-MCNC: 7 G/DL — LOW (ref 12–16)
IMM GRANULOCYTES NFR BLD AUTO: 0.4 % — HIGH (ref 0.1–0.3)
INR BLD: 1.03 RATIO — SIGNIFICANT CHANGE UP (ref 0.65–1.3)
IRON SATN MFR SERPL: 13 UG/DL — LOW (ref 35–150)
IRON SATN MFR SERPL: 7 % — LOW (ref 15–50)
LYMPHOCYTES # BLD AUTO: 0.52 K/UL — LOW (ref 1.2–3.4)
LYMPHOCYTES # BLD AUTO: 4.6 % — LOW (ref 20.5–51.1)
MAGNESIUM SERPL-MCNC: 2.4 MG/DL — SIGNIFICANT CHANGE UP (ref 1.8–2.4)
MCHC RBC-ENTMCNC: 19.1 PG — LOW (ref 27–31)
MCHC RBC-ENTMCNC: 32.6 G/DL — SIGNIFICANT CHANGE UP (ref 32–37)
MCV RBC AUTO: 58.6 FL — LOW (ref 81–99)
MONOCYTES # BLD AUTO: 0.85 K/UL — HIGH (ref 0.1–0.6)
MONOCYTES NFR BLD AUTO: 7.6 % — SIGNIFICANT CHANGE UP (ref 1.7–9.3)
NEUTROPHILS # BLD AUTO: 9.79 K/UL — HIGH (ref 1.4–6.5)
NEUTROPHILS NFR BLD AUTO: 87 % — HIGH (ref 42.2–75.2)
NRBC # BLD: 0 /100 WBCS — SIGNIFICANT CHANGE UP (ref 0–0)
PHOSPHATE SERPL-MCNC: 5 MG/DL — HIGH (ref 2.1–4.9)
PLATELET # BLD AUTO: 147 K/UL — SIGNIFICANT CHANGE UP (ref 130–400)
PMV BLD: SIGNIFICANT CHANGE UP (ref 7.4–10.4)
POTASSIUM SERPL-MCNC: 4.4 MMOL/L — SIGNIFICANT CHANGE UP (ref 3.5–5)
POTASSIUM SERPL-SCNC: 4.4 MMOL/L — SIGNIFICANT CHANGE UP (ref 3.5–5)
PROT SERPL-MCNC: 5.3 G/DL — LOW (ref 6–8)
PROTHROM AB SERPL-ACNC: 11.8 SEC — SIGNIFICANT CHANGE UP (ref 9.95–12.87)
RBC # BLD: 3.67 M/UL — LOW (ref 4.2–5.4)
RBC # FLD: 20.8 % — HIGH (ref 11.5–14.5)
SODIUM SERPL-SCNC: 139 MMOL/L — SIGNIFICANT CHANGE UP (ref 135–146)
SPECIMEN SOURCE: SIGNIFICANT CHANGE UP
SPECIMEN SOURCE: SIGNIFICANT CHANGE UP
TIBC SERPL-MCNC: 174 UG/DL — LOW (ref 220–430)
UIBC SERPL-MCNC: 161 UG/DL — SIGNIFICANT CHANGE UP (ref 110–370)
WBC # BLD: 11.24 K/UL — HIGH (ref 4.8–10.8)
WBC # FLD AUTO: 11.24 K/UL — HIGH (ref 4.8–10.8)

## 2023-05-28 PROCEDURE — 71045 X-RAY EXAM CHEST 1 VIEW: CPT | Mod: 26,76

## 2023-05-28 PROCEDURE — 93306 TTE W/DOPPLER COMPLETE: CPT | Mod: 26

## 2023-05-28 PROCEDURE — 99291 CRITICAL CARE FIRST HOUR: CPT

## 2023-05-28 RX ORDER — MIDAZOLAM HYDROCHLORIDE 1 MG/ML
2 INJECTION, SOLUTION INTRAMUSCULAR; INTRAVENOUS ONCE
Refills: 0 | Status: DISCONTINUED | OUTPATIENT
Start: 2023-05-28 | End: 2023-05-28

## 2023-05-28 RX ORDER — MIDAZOLAM HYDROCHLORIDE 1 MG/ML
2 INJECTION, SOLUTION INTRAMUSCULAR; INTRAVENOUS EVERY 8 HOURS
Refills: 0 | Status: DISCONTINUED | OUTPATIENT
Start: 2023-05-28 | End: 2023-05-30

## 2023-05-28 RX ORDER — SODIUM BICARBONATE 1 MEQ/ML
1300 SYRINGE (ML) INTRAVENOUS EVERY 8 HOURS
Refills: 0 | Status: DISCONTINUED | OUTPATIENT
Start: 2023-05-28 | End: 2023-06-01

## 2023-05-28 RX ORDER — DEXAMETHASONE 0.5 MG/5ML
10 ELIXIR ORAL ONCE
Refills: 0 | Status: COMPLETED | OUTPATIENT
Start: 2023-05-28 | End: 2023-05-28

## 2023-05-28 RX ADMIN — SODIUM CHLORIDE 100 MILLILITER(S): 9 INJECTION, SOLUTION INTRAVENOUS at 16:36

## 2023-05-28 RX ADMIN — CHLORHEXIDINE GLUCONATE 15 MILLILITER(S): 213 SOLUTION TOPICAL at 05:15

## 2023-05-28 RX ADMIN — SENNA PLUS 2 TABLET(S): 8.6 TABLET ORAL at 22:03

## 2023-05-28 RX ADMIN — DEXMEDETOMIDINE HYDROCHLORIDE IN 0.9% SODIUM CHLORIDE 4.25 MICROGRAM(S)/KG/HR: 4 INJECTION INTRAVENOUS at 16:36

## 2023-05-28 RX ADMIN — PROPOFOL 5.66 MICROGRAM(S)/KG/MIN: 10 INJECTION, EMULSION INTRAVENOUS at 13:50

## 2023-05-28 RX ADMIN — MUPIROCIN 1 APPLICATION(S): 20 OINTMENT TOPICAL at 05:21

## 2023-05-28 RX ADMIN — FENTANYL CITRATE 4.72 MICROGRAM(S)/KG/HR: 50 INJECTION INTRAVENOUS at 07:57

## 2023-05-28 RX ADMIN — MIDAZOLAM HYDROCHLORIDE 2 MILLIGRAM(S): 1 INJECTION, SOLUTION INTRAMUSCULAR; INTRAVENOUS at 12:06

## 2023-05-28 RX ADMIN — HEPARIN SODIUM 5000 UNIT(S): 5000 INJECTION INTRAVENOUS; SUBCUTANEOUS at 05:16

## 2023-05-28 RX ADMIN — MUPIROCIN 1 APPLICATION(S): 20 OINTMENT TOPICAL at 18:18

## 2023-05-28 RX ADMIN — HEPARIN SODIUM 5000 UNIT(S): 5000 INJECTION INTRAVENOUS; SUBCUTANEOUS at 13:19

## 2023-05-28 RX ADMIN — HEPARIN SODIUM 5000 UNIT(S): 5000 INJECTION INTRAVENOUS; SUBCUTANEOUS at 22:04

## 2023-05-28 RX ADMIN — Medication 4 PUFF(S): at 03:21

## 2023-05-28 RX ADMIN — Medication 4 PUFF(S): at 19:50

## 2023-05-28 RX ADMIN — PANTOPRAZOLE SODIUM 40 MILLIGRAM(S): 20 TABLET, DELAYED RELEASE ORAL at 13:19

## 2023-05-28 RX ADMIN — DEXMEDETOMIDINE HYDROCHLORIDE IN 0.9% SODIUM CHLORIDE 4.25 MICROGRAM(S)/KG/HR: 4 INJECTION INTRAVENOUS at 11:07

## 2023-05-28 RX ADMIN — Medication 1300 MILLIGRAM(S): at 13:18

## 2023-05-28 RX ADMIN — ALBUTEROL 4 PUFF(S): 90 AEROSOL, METERED ORAL at 19:50

## 2023-05-28 RX ADMIN — CHLORHEXIDINE GLUCONATE 1 APPLICATION(S): 213 SOLUTION TOPICAL at 05:17

## 2023-05-28 RX ADMIN — Medication 4 PUFF(S): at 08:04

## 2023-05-28 RX ADMIN — Medication 30 MILLIGRAM(S): at 13:50

## 2023-05-28 RX ADMIN — FENTANYL CITRATE 4.72 MICROGRAM(S)/KG/HR: 50 INJECTION INTRAVENOUS at 00:27

## 2023-05-28 RX ADMIN — PROPOFOL 5.66 MICROGRAM(S)/KG/MIN: 10 INJECTION, EMULSION INTRAVENOUS at 10:35

## 2023-05-28 RX ADMIN — ALBUTEROL 4 PUFF(S): 90 AEROSOL, METERED ORAL at 14:30

## 2023-05-28 RX ADMIN — POLYETHYLENE GLYCOL 3350 17 GRAM(S): 17 POWDER, FOR SOLUTION ORAL at 13:18

## 2023-05-28 RX ADMIN — LINEZOLID 300 MILLIGRAM(S): 600 INJECTION, SOLUTION INTRAVENOUS at 18:17

## 2023-05-28 RX ADMIN — Medication 10 MILLIGRAM(S): at 10:35

## 2023-05-28 RX ADMIN — ALBUTEROL 4 PUFF(S): 90 AEROSOL, METERED ORAL at 08:03

## 2023-05-28 RX ADMIN — POLYETHYLENE GLYCOL 3350 17 GRAM(S): 17 POWDER, FOR SOLUTION ORAL at 22:03

## 2023-05-28 RX ADMIN — DEXMEDETOMIDINE HYDROCHLORIDE IN 0.9% SODIUM CHLORIDE 4.25 MICROGRAM(S)/KG/HR: 4 INJECTION INTRAVENOUS at 07:55

## 2023-05-28 RX ADMIN — Medication 4 PUFF(S): at 14:30

## 2023-05-28 RX ADMIN — LINEZOLID 300 MILLIGRAM(S): 600 INJECTION, SOLUTION INTRAVENOUS at 05:18

## 2023-05-28 RX ADMIN — CHLORHEXIDINE GLUCONATE 15 MILLILITER(S): 213 SOLUTION TOPICAL at 18:17

## 2023-05-28 RX ADMIN — FENTANYL CITRATE 4.72 MICROGRAM(S)/KG/HR: 50 INJECTION INTRAVENOUS at 16:36

## 2023-05-28 RX ADMIN — Medication 1300 MILLIGRAM(S): at 22:03

## 2023-05-28 RX ADMIN — Medication 650 MILLIGRAM(S): at 05:16

## 2023-05-28 RX ADMIN — MIDAZOLAM HYDROCHLORIDE 2 MILLIGRAM(S): 1 INJECTION, SOLUTION INTRAMUSCULAR; INTRAVENOUS at 19:28

## 2023-05-28 RX ADMIN — CHLORHEXIDINE GLUCONATE 1 APPLICATION(S): 213 SOLUTION TOPICAL at 13:32

## 2023-05-28 RX ADMIN — ALBUTEROL 4 PUFF(S): 90 AEROSOL, METERED ORAL at 03:20

## 2023-05-28 NOTE — PROGRESS NOTE ADULT - SUBJECTIVE AND OBJECTIVE BOX
Patient is a 42y old  Female who presents with a chief complaint of AHRF (27 May 2023 09:53)        Over Night Events:  remains critically ill on MV.  Sedated.  Off Levophed          ROS:     All ROS are negative except HPI         PHYSICAL EXAM    ICU Vital Signs Last 24 Hrs  T(C): 36.7 (28 May 2023 07:00), Max: 37 (27 May 2023 20:00)  T(F): 98 (28 May 2023 07:00), Max: 98.6 (27 May 2023 20:00)  HR: 73 (28 May 2023 07:00) (64 - 77)  BP: 101/60 (28 May 2023 07:00) (97/54 - 148/75)  BP(mean): 73 (28 May 2023 07:00) (70 - 105)  ABP: --  ABP(mean): --  RR: 14 (28 May 2023 07:00) (14 - 41)  SpO2: 99% (28 May 2023 07:00) (97% - 100%)    O2 Parameters below as of 28 May 2023 07:00  Patient On (Oxygen Delivery Method): ventilator    O2 Concentration (%): 40        CONSTITUTIONAL:  In NAD    ENT:   Airway patent,   Mouth with normal mucosa.   ET     EYES:   Pupils equal,   Round and reactive to light.    CARDIAC:   Normal rate,   Regular rhythm.        RESPIRATORY:   No wheezing  Bilateral BS  Normal chest expansion  Not tachypneic,  No use of accessory muscles    GASTROINTESTINAL:  Abdomen soft,   Non-tender,   No guarding,   + BS    MUSCULOSKELETAL:   Range of motion is not limited,  No clubbing, cyanosis    NEUROLOGICAL:   Sedated     SKIN:   Skin normal color for race,   No evidence of rash.        05-27-23 @ 07:01  -  05-28-23 @ 07:00  --------------------------------------------------------  IN:    Dexmedetomidine: 765.6 mL    FentaNYL: 382.6 mL    IV PiggyBack: 300 mL    Lactated Ringers: 2400 mL    Propofol: 752.6 mL    Vital High Protein: 340 mL  Total IN: 4940.8 mL    OUT:    Indwelling Catheter - Urethral (mL): 2125 mL  Total OUT: 2125 mL    Total NET: 2815.8 mL      05-28-23 @ 07:01  -  05-28-23 @ 08:45  --------------------------------------------------------  IN:    Dexmedetomidine: 63.8 mL    FentaNYL: 59.6 mL    Lactated Ringers: 200 mL    Propofol: 61.2 mL  Total IN: 384.6 mL    OUT:    Indwelling Catheter - Urethral (mL): 85 mL  Total OUT: 85 mL    Total NET: 299.6 mL          LABS:                            7.2    10.77 )-----------( 117      ( 27 May 2023 05:47 )             22.0                    7.2    10.77 )-----------( 117      ( 05-27 @ 05:47 )             22.0                7.6    13.70 )-----------( 119      ( 05-26 @ 11:00 )             23.9                7.9    14.47 )-----------( 120      ( 05-26 @ 06:09 )             24.8                                               05-27    137  |  104  |  81<HH>  ----------------------------<  135<H>  4.2   |  17  |  2.7<H>    Creatinine Trend  BUN 81, Cr 2.7, (05-27-23 @ 05:47)  Creatinine Trend  BUN 61, Cr 2.6, (05-26-23 @ 06:09)  Creatinine Trend  BUN 44, Cr 2.5, (05-25-23 @ 05:47)  Creatinine Trend  BUN 42, Cr 2.4, (05-25-23 @ 00:59)  Creatinine Trend  BUN 38, Cr 2.8, (05-24-23 @ 17:09)  Creatinine Trend  BUN 35, Cr 3.0, (05-24-23 @ 11:48)  Creatinine Trend  BUN 31, Cr 2.6, (05-24-23 @ 06:10)  Creatinine Trend  BUN 24, Cr 1.9, (05-23-23 @ 22:52)  Creatinine Trend  BUN 23, Cr 2.0, (05-23-23 @ 20:07)      Ca    8.5      27 May 2023 05:47    TPro  5.4<L>  /  Alb  2.4<L>  /  TBili  0.2  /  DBili  x   /  AST  <5  /  ALT  <5  /  AlkPhos  93  05-27                                                                                           LIVER FUNCTIONS - ( 27 May 2023 05:47 )  Alb: 2.4 g/dL / Pro: 5.4 g/dL / ALK PHOS: 93 U/L / ALT: <5 U/L / AST: <5 U/L / GGT: x                                                  Culture - Blood (collected 26 May 2023 06:09)  Source: .Blood Blood  Preliminary Report (27 May 2023 13:02):    No growth to date.                                                   Mode: AC/ CMV (Assist Control/ Continuous Mandatory Ventilation)  RR (machine): 22  TV (machine): 380  FiO2: 40  PEEP: 8  ITime: 1  MAP: 14  PIP: 30                                      ABG - ( 28 May 2023 03:32 )  pH, Arterial: 7.37  pH, Blood: x     /  pCO2: 34    /  pO2: 134   / HCO3: 20    / Base Excess: -5.1  /  SaO2: 98.5                MEDICATIONS  (STANDING):  albuterol    90 MICROgram(s) HFA Inhaler 4 Puff(s) Inhalation every 6 hours  chlorhexidine 0.12% Liquid 15 milliLiter(s) Oral Mucosa every 12 hours  chlorhexidine 2% Cloths 1 Application(s) Topical <User Schedule>  chlorhexidine 2% Cloths 1 Application(s) Topical daily  dexMEDEtomidine Infusion 0.2 MICROgram(s)/kG/Hr (4.25 mL/Hr) IV Continuous <Continuous>  fentaNYL   Infusion. 0.555 MICROgram(s)/kG/Hr (4.72 mL/Hr) IV Continuous <Continuous>  heparin   Injectable 5000 Unit(s) SubCutaneous every 8 hours  insulin lispro (ADMELOG) corrective regimen sliding scale   SubCutaneous three times a day before meals  ipratropium 17 MICROgram(s) HFA Inhaler 4 Puff(s) Inhalation every 6 hours  lactated ringers. 1000 milliLiter(s) (100 mL/Hr) IV Continuous <Continuous>  linezolid  IVPB 600 milliGRAM(s) IV Intermittent every 12 hours  mupirocin 2% Nasal 1 Application(s) Both Nostrils two times a day  norepinephrine Infusion 0.05 MICROgram(s)/kG/Min (7.97 mL/Hr) IV Continuous <Continuous>  oseltamivir Suspension 30 milliGRAM(s) Oral daily  pantoprazole   Suspension 40 milliGRAM(s) Oral daily  polyethylene glycol 3350 17 Gram(s) Oral every 12 hours  propofol Infusion 11.098 MICROgram(s)/kG/Min (5.66 mL/Hr) IV Continuous <Continuous>  senna 2 Tablet(s) Oral at bedtime  sodium bicarbonate 1300 milliGRAM(s) Oral every 8 hours    MEDICATIONS  (PRN):  acetaminophen     Tablet .. 650 milliGRAM(s) Oral every 6 hours PRN Temp greater or equal to 38C (100.4F)  albuterol    90 MICROgram(s) HFA Inhaler 4 Puff(s) Inhalation every 4 hours PRN Shortness of Breath and/or Wheezing  ipratropium 17 MICROgram(s) HFA Inhaler 4 Puff(s) Inhalation <User Schedule> PRN -      New X-rays reviewed:                                                                                  ECHO

## 2023-05-28 NOTE — PROGRESS NOTE ADULT - ASSESSMENT
42/F with PMH of asthma and HTN, admitted with SOB, developed LANCE.   Acute hypoxemic resp failure requiring intubation  < from: CT Angio Chest PE Protocol w/ IV Cont (05.23.23 @ 22:06) >  1. Bilateral patchy groundglass nodularity with dominant confluent left   lower lobe opacification with numerous cavitary lesions. Additional   contralateral right lower lobe cavitary 1 cm nodule. Findings compatible   with cavitary pneumonia in the appropriate clinical setting; differential   diagnosis includes tuberculosis.  Non massive hemoptysis   MRSA bacteremia, prerenal FFeNa, suspicion for post infectious GN  c3c4 pending along with CHARLES ANCA Hep B and C  Kidney sono neg for obstruction  LANCE prerenal vs ATN vs pulmo renal syndrome  s/p CT with IV contrast 5/23 - cont  cc/hr  non oliguric   cr stable    phos noted / no binders yet / feed nepro   w/up for GN noted  negative so far   U sodium in favor of prerenal or acute GN   followed by pulmonary ? bronchoscopy  ID notes appreciated    sodium bicarbonate  to 1300  q 8   no acute indication for RRT   will follow

## 2023-05-28 NOTE — PROGRESS NOTE ADULT - ASSESSMENT
ASSESSMENT  42 year-old female with PMH of Asthma, HTN? presents with complaint of cough x3 days and SOB. During my encounter pt with dyspnea and increased work of breathing and chest pain, unable to properly provide accurate history. She states that her cough has been progressively worsening over the past 3 days, endorses hemoptysis since yesterday.     IMPRESSION  #Flu B with cavitary PNA, with MRSA bacteremia and cavitary PNA    5/26 BCX NGTD     5/24 DTA MRSA    5/23 BCX MRSA 3/4 bottles    MRSA PCR +   WBC 16--> 3 12% Bands, Severe sepsis on admission  Doubt TB (lower lobe)  Acute hypoxemic resp failure requiring intubation  < from: CT Angio Chest PE Protocol w/ IV Cont (05.23.23 @ 22:06) >  1. Bilateral patchy groundglass nodularity with dominant confluent left   lower lobe opacification with numerous cavitary lesions. Additional   contralateral right lower lobe cavitary 1 cm nodule. Findings compatible   with cavitary pneumonia in the appropriate clinical setting; differential   diagnosis includes tuberculosis.  2. Confluent ill-defined left hilar and mediastinal adenopathy, likely   reactive, without dominant lymph node delineated.  3. No evidence of pulmonary embolism.  #Lactic acidosis  #LANCE  #Hyponatremia      RECOMMENDATIONS  - Repeat BCX until clearance, 5/26 NG  - TTE   - On linezolid  IVPB 600 milliGRAM(s) IV Intermittent every 12 hours, if  downtrending PLT < 100 recommend CHANGE to Vanc IV renal dosing per ID pharmacy + PO Clinda 900mg TID (there is an IV Clinda shortage)  - Continue tamiflu , would prolong to 10 days as severely ill   - Guarded prognosis    If any questions, please call or send a message on [employer]  Please continue to update ID with any pertinent new laboratory or radiographic findings  Spectra [id number]

## 2023-05-28 NOTE — CHART NOTE - NSCHARTNOTEFT_GEN_A_CORE
Pt maxed out on sedation on propofol, Precedex, and Fentanyl. Pt remains agitated and biting on to ET tube,     Versed pushes PRN added Pt maxed out on sedation on propofol, Precedex, and Fentanyl. Pt remains agitated and biting on to ET tube,     Versed pushes PRN added    Overnight Updated:  Notified by RN, patient became very agitated despite current sedation  Lost all IV access due to patient agitation  Actively biting ET tube  Gave an additional push of Versed  Patient HD stable  Placed right midline and 18 gauge on the left  Will f/u ET tube placement  Consider adding versed drip if patient continues to be agitated Pt maxed out on sedation on propofol, Precedex, and Fentanyl. Pt remains agitated and biting on to ET tube,     Versed pushes PRN added    Overnight Updated:  Notified by RN, patient became very agitated despite current sedation  Lost all IV access due to patient agitation  Actively biting ET tube, OG tube possibly displaced 2/2 agitation  Gave an additional push of Versed  Patient HD stable  Placed right midline and 18 gauge on the left  OG tube confirmed on CXR  Consider adding versed drip if patient continues to be agitated

## 2023-05-28 NOTE — CHART NOTE - NSCHARTNOTEFT_GEN_A_CORE
Plan was for extubation today as per AM rounds.    RN gave 10mg IV push of Dexa and began SAT. Propofol dose lowered to 60 from 70    Called at bedside by RN, pt seen agitated, biting on to ET tube, not following commands.     Propofol dose increased back to 70, pt required 2mg IVP of versed.    Pt failed SAT, cannot be extubated today      Spoke with pt brother Juan, medical update given and he requested to be called at [phone] for any changes Plan was for extubation today as per AM rounds.    RN gave 10mg IV push of Dexa and began SAT. Propofol dose decreased to wean pt off sedation for SAT.    Called at bedside by RN, pt seen agitated, biting on to ET tube, not following commands.     Propofol dose increased again, pt required 2mg IVP of versed.    Pt failed SAT, cannot be extubated today      Spoke with pt brother Juan, medical update given and he requested to be called at [phone] for any changes

## 2023-05-28 NOTE — PROGRESS NOTE ADULT - ASSESSMENT
IMPRESSION:    Acute hypoxemic respiratory failure   Severe CAP MRSA   MRSA bacteremia   Non massive hemoptysis   LANCE   HO asthma   Influenza B     PLAN:    CNS: SAT.  Drug screen noted     HEENT: Oral care.  ET care     PULMONARY:  HOB @ 45 degrees.  Aspiration precautions.  ARDS net MV setting.  Monitor PPL and DP.  SBT.      CARDIOVASCULAR:  Goal directed fluid resuscitation.  FU ECHO.  Trend CPK.      GI: GI prophylaxis.  OG Feeding to target.  Adjust Bowel regimen.      RENAL:  Follow up lytes.  Correct as needed.  Weaver for strict Is and OS.       INFECTIOUS DISEASE: Follow up cultures. Zyvox, and Tamiflu.  FU Urine legionella and strep Ag. HIV negative.  FU Galactomannan and Fungitell.  Daily BC      HEMATOLOGICAL:  DVT prophylaxis.  Dimer noted.  FU LE duplex .  Monitor CBC     ENDOCRINE:  Follow up FS.  Insulin protocol if needed.      MUSCULOSKELETAL:  Rheum and Vasculitis panel.  Off loading     MICU     Will need steroids prior to extubation for risk of PES

## 2023-05-28 NOTE — PROGRESS NOTE ADULT - SUBJECTIVE AND OBJECTIVE BOX
Nephrology progress note    Patient was seen and examined, events over the last 24 h noted .  Cr stable     Allergies:  aspirin (Short breath; Anaphylaxis)    Hospital Medications:   MEDICATIONS  (STANDING):  albuterol    90 MICROgram(s) HFA Inhaler 4 Puff(s) Inhalation every 6 hours  chlorhexidine 0.12% Liquid 15 milliLiter(s) Oral Mucosa every 12 hours  chlorhexidine 2% Cloths 1 Application(s) Topical daily  chlorhexidine 2% Cloths 1 Application(s) Topical <User Schedule>  dexMEDEtomidine Infusion 0.2 MICROgram(s)/kG/Hr (4.25 mL/Hr) IV Continuous <Continuous>  fentaNYL   Infusion. 0.555 MICROgram(s)/kG/Hr (4.72 mL/Hr) IV Continuous <Continuous>  heparin   Injectable 5000 Unit(s) SubCutaneous every 8 hours  insulin lispro (ADMELOG) corrective regimen sliding scale   SubCutaneous three times a day before meals  ipratropium 17 MICROgram(s) HFA Inhaler 4 Puff(s) Inhalation every 6 hours  lactated ringers. 1000 milliLiter(s) (100 mL/Hr) IV Continuous <Continuous>  linezolid  IVPB 600 milliGRAM(s) IV Intermittent every 12 hours  mupirocin 2% Nasal 1 Application(s) Both Nostrils two times a day  norepinephrine Infusion 0.05 MICROgram(s)/kG/Min (7.97 mL/Hr) IV Continuous <Continuous>  oseltamivir Suspension 30 milliGRAM(s) Oral daily  pantoprazole   Suspension 40 milliGRAM(s) Oral daily  polyethylene glycol 3350 17 Gram(s) Oral every 12 hours  propofol Infusion 11.098 MICROgram(s)/kG/Min (5.66 mL/Hr) IV Continuous <Continuous>  senna 2 Tablet(s) Oral at bedtime  sodium bicarbonate 1300 milliGRAM(s) Oral every 8 hours        VITALS:  T(F): 98 (23 @ 07:00), Max: 98.6 (23 @ 20:00)  HR: 73 (23 @ 11:00)  BP: 104/61 (23 @ 10:00)  RR: 19 (23 @ 11:00)  SpO2: 100% (23 @ 11:00)  Wt(kg): --     @ 07: @ 07:00  --------------------------------------------------------  IN: 4719.2 mL / OUT: 2210 mL / NET: 2509.2 mL     @ 07: @ 07:00  --------------------------------------------------------  IN: 4940.8 mL / OUT: 2125 mL / NET: 2815.8 mL     @ 07: @ 13:38  --------------------------------------------------------  IN: 384.6 mL / OUT: 85 mL / NET: 299.6 mL          PHYSICAL EXAM:  Constitutional: NAD  HEENT: anicteric sclera, oropharynx clear, MMM  Neck: No JVD  Respiratory: CTAB, no wheezes, rales or rhonchi  Cardiovascular: S1, S2, RRR  Gastrointestinal: BS+, soft, NT/ND  Extremities: No cyanosis or clubbing. No peripheral edema  :  No diggs.   Skin: No rashes    LABS:      139  |  106  |  82<HH>  ----------------------------<  73  4.4   |  18  |  3.0<H>    Ca    8.1<L>      28 May 2023 12:05  Phos  5.0       Mg     2.4         TPro  5.3<L>  /  Alb  1.9<L>  /  TBili  0.3  /  DBili      /  AST  15  /  ALT  13  /  AlkPhos  81                            7.0    11.24 )-----------( 147      ( 28 May 2023 12:10 )             21.5       Urine Studies:  Urinalysis Basic - ( 25 May 2023 11:30 )    Color: Yellow / Appearance: Slightly Turbid / S.027 / pH:   Gluc:  / Ketone: Negative  / Bili: Negative / Urobili: <2 mg/dL   Blood:  / Protein: 100 mg/dL / Nitrite: Negative   Leuk Esterase: Negative / RBC: 45 /HPF / WBC 30 /HPF   Sq Epi:  / Non Sq Epi:  / Bacteria: Negative      Sodium, Random Urine: <20.0 mmoL/L ( @ 19:16)  Creatinine, Random Urine: 178 mg/dL ( @ 19:16)  Protein/Creatinine Ratio Calculation: 1.3 Ratio ( @ 19:16)  Osmolality, Random Urine: 340 mos/kg ( @ 19:16)  Potassium, Random Urine: 50 mmol/L ( @ 19:16)    RADIOLOGY & ADDITIONAL STUDIES:

## 2023-05-28 NOTE — PROGRESS NOTE ADULT - SUBJECTIVE AND OBJECTIVE BOX
ROSSIZEYNEP  42y, Female  Allergy: aspirin (Short breath; Anaphylaxis)      LOS  5d    CHIEF COMPLAINT: AHRF (28 May 2023 08:45)      INTERVAL EVENTS/HPI  - T(F): , Max: 98.6 (05-27-23 @ 20:00), off pressors  - WBC Count: 10.77 (05-27-23 @ 05:47)  WBC Count: 13.70 (05-26-23 @ 11:00)     - Creatinine, Serum: 2.7 (05-27-23 @ 05:47)     -   -   -     ROS  cannot obtain secondary to patient's sedation and/or mental status    VITALS:  T(F): 98, Max: 98.6 (05-27-23 @ 20:00)  HR: 73  BP: 101/60  RR: 14Vital Signs Last 24 Hrs  T(C): 36.7 (28 May 2023 07:00), Max: 37 (27 May 2023 20:00)  T(F): 98 (28 May 2023 07:00), Max: 98.6 (27 May 2023 20:00)  HR: 73 (28 May 2023 07:00) (64 - 77)  BP: 101/60 (28 May 2023 07:00) (97/54 - 140/77)  BP(mean): 73 (28 May 2023 07:00) (70 - 103)  RR: 14 (28 May 2023 07:00) (14 - 41)  SpO2: 99% (28 May 2023 07:00) (97% - 100%)    Parameters below as of 28 May 2023 07:00  Patient On (Oxygen Delivery Method): ventilator    O2 Concentration (%): 40    PHYSICAL EXAM:  Gen: Intubated  CV: RRR  Lungs: Decreased BS at bases  Abd: Soft  Neuro: Sedated  Lines clean, no phlebitis    FH: Non-contributory  Social Hx: Non-contributory    TESTS & MEASUREMENTS:                        7.2    10.77 )-----------( 117      ( 27 May 2023 05:47 )             22.0     05-27    137  |  104  |  81<HH>  ----------------------------<  135<H>  4.2   |  17  |  2.7<H>    Ca    8.5      27 May 2023 05:47    TPro  5.4<L>  /  Alb  2.4<L>  /  TBili  0.2  /  DBili  x   /  AST  <5  /  ALT  <5  /  AlkPhos  93  05-27      LIVER FUNCTIONS - ( 27 May 2023 05:47 )  Alb: 2.4 g/dL / Pro: 5.4 g/dL / ALK PHOS: 93 U/L / ALT: <5 U/L / AST: <5 U/L / GGT: x               Culture - Blood (collected 05-26-23 @ 06:09)  Source: .Blood Blood  Preliminary Report (05-27-23 @ 13:02):    No growth to date.    Culture - Sputum (collected 05-24-23 @ 19:17)  Source: Trach Asp Tracheal Aspirate  Gram Stain (05-25-23 @ 07:31):    No polymorphonuclear leukocytes per low power field    No Squamous epithelial cells per low power field    Numerous Gram positive cocci in pairs per oil power field  Final Report (05-27-23 @ 08:45):    Numerous Methicillin Resistant Staphylococcus aureus    Normal Respiratory Laila present  Organism: Methicillin resistant Staphylococcus aureus (05-27-23 @ 08:45)  Organism: Methicillin resistant Staphylococcus aureus (05-27-23 @ 08:45)      Method Type: RUSSELL      -  Ampicillin/Sulbactam: R 16/8      -  Cefazolin: R >16      -  Clindamycin: S <=0.25      -  Erythromycin: R >4      -  Gentamicin: S <=1 Should not be used as monotherapy      -  Linezolid: S 2      -  Oxacillin: R >2      -  Penicillin: R >8      -  Rifampin: S <=1 Should not be used as monotherapy      -  Tetracycline: S <=1      -  Trimethoprim/Sulfamethoxazole: S <=0.5/9.5      -  Vancomycin: S 2    Culture - Acid Fast - Sputum w/Smear (collected 05-24-23 @ 19:17)  Source: Trach Asp Tracheal Aspirate  Preliminary Report (05-27-23 @ 15:08):    Culture is being performed.    Culture - Fungal, Sputum (collected 05-24-23 @ 19:17)  Source: Trach Asp Tracheal Aspirate  Preliminary Report (05-27-23 @ 15:03):    Culture is being performed. Fungal cultures are held for 4 weeks.    Culture - Blood (collected 05-24-23 @ 06:10)  Source: .Blood None  Gram Stain (05-25-23 @ 11:18):    Growth in aerobic bottle: Gram Positive Cocci in Clusters    Growth in anaerobic bottle: Gram Positive Cocci in Clusters  Final Report (05-26-23 @ 08:13):    Growth in aerobic and anaerobic bottles: Methicillin Resistant    Staphylococcus aureus    See previous culture [id number]    Culture - Blood (collected 05-23-23 @ 20:37)  Source: .Blood Blood-Peripheral  Gram Stain (05-24-23 @ 17:51):    Growth in aerobic bottle: Gram Positive Cocci in Clusters    Growth in anaerobic bottle: Gram Positive Cocci in Clusters  Final Report (05-26-23 @ 08:14):    Growth in aerobic and anaerobic bottles: Methicillin Resistant    Staphylococcus aureus    See previous culture [id number]    Culture - Blood (collected 05-23-23 @ 20:37)  Source: .Blood Blood-Peripheral  Gram Stain (05-24-23 @ 14:24):    Growth in aerobic bottle: Gram Positive Cocci in Clusters  Final Report (05-26-23 @ 08:11):    Growth in aerobic bottle: Methicillin Resistant Staphylococcus aureus    ***Blood Panel PCR results on this specimen are available    approximately 3 hours after the Gram stain result.***    Gram stain, PCR, and/or culture results may not always    correspond due to difference in methodologies.    ************************************************************    This PCR assay was performed by multiplex PCR. This    Assay tests for 66 bacterial and resistance gene targets.    Please refer to the [facility] test directory    at [email or web address] for details.  Organism: Blood Culture PCR  Methicillin resistant Staphylococcus aureus (05-26-23 @ 08:11)  Organism: Methicillin resistant Staphylococcus aureus (05-26-23 @ 08:11)      Method Type: RUSSELL      -  Ampicillin/Sulbactam: R 16/8      -  Cefazolin: R 16      -  Clindamycin: S <=0.25      -  Daptomycin: S 0.5      -  Erythromycin: R >4      -  Gentamicin: S <=1 Should not be used as monotherapy      -  Linezolid: S 2      -  Oxacillin: R >2      -  Penicillin: R >8      -  Rifampin: S <=1 Should not be used as monotherapy      -  Tetracycline: S <=1      -  Trimethoprim/Sulfamethoxazole: S <=0.5/9.5      -  Vancomycin: S 2  Organism: Blood Culture PCR (05-26-23 @ 08:11)      Method Type: PCR      -  Methicillin resistant Staphylococcus aureus (MRSA): Detec        Lactate, Blood: 2.3 mmol/L (05-26-23 @ 06:09)  Lactate, Blood: 2.8 mmol/L (05-25-23 @ 11:41)  Lactate, Blood: 1.9 mmol/L (05-24-23 @ 17:09)  Lactate, Blood: 3.9 mmol/L (05-23-23 @ 22:52)  Blood Gas Venous - Lactate: 4.40 mmol/L (05-23-23 @ 20:36)  Lactate, Blood: 4.9 mmol/L (05-23-23 @ 20:07)      INFECTIOUS DISEASES TESTING  HIV-1/2 Combo Result: Nonreact (05-27-23 @ 05:47)  MRSA PCR Result.: Positive (05-24-23 @ 19:17)  Legionella Antigen, Urine: Negative (05-24-23 @ 19:16)  Procalcitonin, Serum: 48.30 (05-24-23 @ 06:10)  Fungitell: 41 (05-24-23 @ 06:10)      INFLAMMATORY MARKERS      RADIOLOGY & ADDITIONAL TESTS:  I have personally reviewed the last available Chest xray  CXR  Xray Chest 1 View- PORTABLE-Urgent:   ACC: [id number] EXAM:  XR CHEST PORTABLE URGENT 1V   ORDERED BY: CHIARA HENNING     PROCEDURE DATE:  05/28/2023          INTERPRETATION:  CLINICAL HISTORY / REASON FOR EXAM: Line placement.    COMPARISON: Chest radiograph from May 28, 2023.    TECHNIQUE/POSITIONING: Satisfactory. Single image, AP portable chest   radiograph.    FINDINGS:    SUPPORT DEVICES: Endotracheal tube with distal tip approximately 3 cm   above the noe. Enteric tube courses below the left hemidiaphragm, with   distal tip out of the field of view.    CARDIAC/MEDIASTINUM/HILUM: Unchanged cardiac silhouette.    LUNG PARENCHYMA/PLEURA: Unchanged left greater than right opacities. No   pneumothorax.    SKELETON/SOFT TISSUES: Unchanged.      IMPRESSION:    Unchanged left greater than right opacities.    Interval placement of enteric tube.    --- End of Report ---            FLORA GOODMAN MD; Attending Radiologist  This document has been electronically signed. May 28 2023  8:52AM (05-28-23 @ 07:59)      CT      CARDIOLOGY TESTING  12 Lead ECG:   Ventricular Rate 134 BPM    Atrial Rate 134 BPM    P-R Interval 208 ms    QRS Duration 76 ms    Q-T Interval 318 ms    QTC Calculation(Bazett) 474 ms    P Axis 97 degrees    R Axis 40 degrees    T Axis 70 degrees    Diagnosis Line Sinus tachycardiawith Fusion complexes  Otherwise normal ECG    Confirmed by PANFILO COVINGTON MD ([id number]) on 5/24/2023 6:54:09 AM (05-24-23 @ 02:40)  12 Lead ECG:   Ventricular Rate 140 BPM    Atrial Rate 140 BPM    P-R Interval 120 ms    QRS Duration 138 ms    Q-T Interval 354 ms    QTC Calculation(Bazett) 540 ms    R Axis 73 degrees    T Axis 60 degrees    Diagnosis Line Sinus tachycardia  Right bundle branch block  Abnormal ECG    Confirmed by Jorge Wyatt ([id number]) on 5/23/2023 10:37:28 PM (05-23-23 @ 19:52)      MEDICATIONS  albuterol    90 MICROgram(s) HFA Inhaler 4  chlorhexidine 0.12% Liquid 15  chlorhexidine 2% Cloths 1  chlorhexidine 2% Cloths 1  dexAMETHasone  Injectable 10  dexMEDEtomidine Infusion 0.2  fentaNYL   Infusion. 0.555  heparin   Injectable 5000  insulin lispro (ADMELOG) corrective regimen sliding scale   ipratropium 17 MICROgram(s) HFA Inhaler 4  lactated ringers. 1000  linezolid  IVPB 600  mupirocin 2% Nasal 1  norepinephrine Infusion 0.05  oseltamivir Suspension 30  pantoprazole   Suspension 40  polyethylene glycol 3350 17  propofol Infusion 11.098  senna 2  sodium bicarbonate 1300      WEIGHT  Weight (kg): 85 (05-24-23 @ 08:15)      ANTIBIOTICS:  linezolid  IVPB 600 milliGRAM(s) IV Intermittent every 12 hours  oseltamivir Suspension 30 milliGRAM(s) Oral daily      All available historical records have been reviewed

## 2023-05-29 LAB
ALBUMIN SERPL ELPH-MCNC: 1.8 G/DL — LOW (ref 3.5–5.2)
ALP SERPL-CCNC: 80 U/L — SIGNIFICANT CHANGE UP (ref 30–115)
ALT FLD-CCNC: 11 U/L — SIGNIFICANT CHANGE UP (ref 0–41)
ANION GAP SERPL CALC-SCNC: 15 MMOL/L — HIGH (ref 7–14)
AST SERPL-CCNC: 13 U/L — SIGNIFICANT CHANGE UP (ref 0–41)
BASOPHILS # BLD AUTO: 0.02 K/UL — SIGNIFICANT CHANGE UP (ref 0–0.2)
BASOPHILS # BLD AUTO: 0.04 K/UL — SIGNIFICANT CHANGE UP (ref 0–0.2)
BASOPHILS NFR BLD AUTO: 0.1 % — SIGNIFICANT CHANGE UP (ref 0–1)
BASOPHILS NFR BLD AUTO: 0.2 % — SIGNIFICANT CHANGE UP (ref 0–1)
BILIRUB SERPL-MCNC: <0.2 MG/DL — SIGNIFICANT CHANGE UP (ref 0.2–1.2)
BUN SERPL-MCNC: 82 MG/DL — CRITICAL HIGH (ref 10–20)
CALCIUM SERPL-MCNC: 8.2 MG/DL — LOW (ref 8.4–10.5)
CHLORIDE SERPL-SCNC: 112 MMOL/L — HIGH (ref 98–110)
CK SERPL-CCNC: 39 U/L — SIGNIFICANT CHANGE UP (ref 0–225)
CO2 SERPL-SCNC: 18 MMOL/L — SIGNIFICANT CHANGE UP (ref 17–32)
CREAT SERPL-MCNC: 2.9 MG/DL — HIGH (ref 0.7–1.5)
EGFR: 20 ML/MIN/1.73M2 — LOW
EOSINOPHIL # BLD AUTO: 0.04 K/UL — SIGNIFICANT CHANGE UP (ref 0–0.7)
EOSINOPHIL # BLD AUTO: 0.07 K/UL — SIGNIFICANT CHANGE UP (ref 0–0.7)
EOSINOPHIL NFR BLD AUTO: 0.2 % — SIGNIFICANT CHANGE UP (ref 0–8)
EOSINOPHIL NFR BLD AUTO: 0.4 % — SIGNIFICANT CHANGE UP (ref 0–8)
GAS PNL BLDA: SIGNIFICANT CHANGE UP
GLUCOSE BLDC GLUCOMTR-MCNC: 126 MG/DL — HIGH (ref 70–99)
GLUCOSE BLDC GLUCOMTR-MCNC: 127 MG/DL — HIGH (ref 70–99)
GLUCOSE SERPL-MCNC: 91 MG/DL — SIGNIFICANT CHANGE UP (ref 70–99)
GRAM STN FLD: SIGNIFICANT CHANGE UP
GRAM STN FLD: SIGNIFICANT CHANGE UP
HCT VFR BLD CALC: 22.2 % — LOW (ref 37–47)
HCT VFR BLD CALC: 23.2 % — LOW (ref 37–47)
HGB BLD-MCNC: 7.2 G/DL — LOW (ref 12–16)
HGB BLD-MCNC: 7.5 G/DL — LOW (ref 12–16)
IMM GRANULOCYTES NFR BLD AUTO: 2.2 % — HIGH (ref 0.1–0.3)
IMM GRANULOCYTES NFR BLD AUTO: 3.3 % — HIGH (ref 0.1–0.3)
LYMPHOCYTES # BLD AUTO: 1.01 K/UL — LOW (ref 1.2–3.4)
LYMPHOCYTES # BLD AUTO: 1.11 K/UL — LOW (ref 1.2–3.4)
LYMPHOCYTES # BLD AUTO: 6.1 % — LOW (ref 20.5–51.1)
LYMPHOCYTES # BLD AUTO: 6.5 % — LOW (ref 20.5–51.1)
MAGNESIUM SERPL-MCNC: 2.5 MG/DL — HIGH (ref 1.8–2.4)
MCHC RBC-ENTMCNC: 19.8 PG — LOW (ref 27–31)
MCHC RBC-ENTMCNC: 19.9 PG — LOW (ref 27–31)
MCHC RBC-ENTMCNC: 32.3 G/DL — SIGNIFICANT CHANGE UP (ref 32–37)
MCHC RBC-ENTMCNC: 32.4 G/DL — SIGNIFICANT CHANGE UP (ref 32–37)
MCV RBC AUTO: 61 FL — LOW (ref 81–99)
MCV RBC AUTO: 61.5 FL — LOW (ref 81–99)
MONOCYTES # BLD AUTO: 1.47 K/UL — HIGH (ref 0.1–0.6)
MONOCYTES # BLD AUTO: 1.63 K/UL — HIGH (ref 0.1–0.6)
MONOCYTES NFR BLD AUTO: 8.9 % — SIGNIFICANT CHANGE UP (ref 1.7–9.3)
MONOCYTES NFR BLD AUTO: 9.6 % — HIGH (ref 1.7–9.3)
NEUTROPHILS # BLD AUTO: 13.5 K/UL — HIGH (ref 1.4–6.5)
NEUTROPHILS # BLD AUTO: 13.78 K/UL — HIGH (ref 1.4–6.5)
NEUTROPHILS NFR BLD AUTO: 81.2 % — HIGH (ref 42.2–75.2)
NEUTROPHILS NFR BLD AUTO: 81.3 % — HIGH (ref 42.2–75.2)
NRBC # BLD: 0 /100 WBCS — SIGNIFICANT CHANGE UP (ref 0–0)
NRBC # BLD: 0 /100 WBCS — SIGNIFICANT CHANGE UP (ref 0–0)
PLATELET # BLD AUTO: 184 K/UL — SIGNIFICANT CHANGE UP (ref 130–400)
PLATELET # BLD AUTO: 197 K/UL — SIGNIFICANT CHANGE UP (ref 130–400)
PMV BLD: 10 FL — SIGNIFICANT CHANGE UP (ref 7.4–10.4)
PMV BLD: 9.8 FL — SIGNIFICANT CHANGE UP (ref 7.4–10.4)
POTASSIUM SERPL-MCNC: 4.5 MMOL/L — SIGNIFICANT CHANGE UP (ref 3.5–5)
POTASSIUM SERPL-SCNC: 4.5 MMOL/L — SIGNIFICANT CHANGE UP (ref 3.5–5)
PROT SERPL-MCNC: 5.1 G/DL — LOW (ref 6–8)
RBC # BLD: 3.64 M/UL — LOW (ref 4.2–5.4)
RBC # BLD: 3.77 M/UL — LOW (ref 4.2–5.4)
RBC # FLD: 22.7 % — HIGH (ref 11.5–14.5)
RBC # FLD: 23 % — HIGH (ref 11.5–14.5)
SODIUM SERPL-SCNC: 145 MMOL/L — SIGNIFICANT CHANGE UP (ref 135–146)
WBC # BLD: 16.61 K/UL — HIGH (ref 4.8–10.8)
WBC # BLD: 16.98 K/UL — HIGH (ref 4.8–10.8)
WBC # FLD AUTO: 16.61 K/UL — HIGH (ref 4.8–10.8)
WBC # FLD AUTO: 16.98 K/UL — HIGH (ref 4.8–10.8)

## 2023-05-29 PROCEDURE — 71250 CT THORAX DX C-: CPT | Mod: 26

## 2023-05-29 PROCEDURE — 71045 X-RAY EXAM CHEST 1 VIEW: CPT | Mod: 26,76

## 2023-05-29 PROCEDURE — 99291 CRITICAL CARE FIRST HOUR: CPT

## 2023-05-29 RX ORDER — MIDAZOLAM HYDROCHLORIDE 1 MG/ML
2 INJECTION, SOLUTION INTRAMUSCULAR; INTRAVENOUS ONCE
Refills: 0 | Status: DISCONTINUED | OUTPATIENT
Start: 2023-05-29 | End: 2023-05-30

## 2023-05-29 RX ORDER — FUROSEMIDE 40 MG
60 TABLET ORAL EVERY 12 HOURS
Refills: 0 | Status: DISCONTINUED | OUTPATIENT
Start: 2023-05-29 | End: 2023-05-30

## 2023-05-29 RX ADMIN — CHLORHEXIDINE GLUCONATE 1 APPLICATION(S): 213 SOLUTION TOPICAL at 06:07

## 2023-05-29 RX ADMIN — ALBUTEROL 4 PUFF(S): 90 AEROSOL, METERED ORAL at 07:58

## 2023-05-29 RX ADMIN — FENTANYL CITRATE 4.72 MICROGRAM(S)/KG/HR: 50 INJECTION INTRAVENOUS at 15:53

## 2023-05-29 RX ADMIN — MIDAZOLAM HYDROCHLORIDE 2 MILLIGRAM(S): 1 INJECTION, SOLUTION INTRAMUSCULAR; INTRAVENOUS at 11:11

## 2023-05-29 RX ADMIN — PROPOFOL 5.66 MICROGRAM(S)/KG/MIN: 10 INJECTION, EMULSION INTRAVENOUS at 08:36

## 2023-05-29 RX ADMIN — PANTOPRAZOLE SODIUM 40 MILLIGRAM(S): 20 TABLET, DELAYED RELEASE ORAL at 13:23

## 2023-05-29 RX ADMIN — SENNA PLUS 2 TABLET(S): 8.6 TABLET ORAL at 22:13

## 2023-05-29 RX ADMIN — HEPARIN SODIUM 5000 UNIT(S): 5000 INJECTION INTRAVENOUS; SUBCUTANEOUS at 06:07

## 2023-05-29 RX ADMIN — MUPIROCIN 1 APPLICATION(S): 20 OINTMENT TOPICAL at 06:19

## 2023-05-29 RX ADMIN — POLYETHYLENE GLYCOL 3350 17 GRAM(S): 17 POWDER, FOR SOLUTION ORAL at 22:13

## 2023-05-29 RX ADMIN — CHLORHEXIDINE GLUCONATE 1 APPLICATION(S): 213 SOLUTION TOPICAL at 14:29

## 2023-05-29 RX ADMIN — CHLORHEXIDINE GLUCONATE 15 MILLILITER(S): 213 SOLUTION TOPICAL at 18:42

## 2023-05-29 RX ADMIN — HEPARIN SODIUM 5000 UNIT(S): 5000 INJECTION INTRAVENOUS; SUBCUTANEOUS at 13:23

## 2023-05-29 RX ADMIN — Medication 4 PUFF(S): at 08:22

## 2023-05-29 RX ADMIN — Medication 1300 MILLIGRAM(S): at 22:13

## 2023-05-29 RX ADMIN — MUPIROCIN 1 APPLICATION(S): 20 OINTMENT TOPICAL at 18:42

## 2023-05-29 RX ADMIN — MIDAZOLAM HYDROCHLORIDE 2 MILLIGRAM(S): 1 INJECTION, SOLUTION INTRAMUSCULAR; INTRAVENOUS at 00:21

## 2023-05-29 RX ADMIN — ALBUTEROL 4 PUFF(S): 90 AEROSOL, METERED ORAL at 13:40

## 2023-05-29 RX ADMIN — DEXMEDETOMIDINE HYDROCHLORIDE IN 0.9% SODIUM CHLORIDE 4.25 MICROGRAM(S)/KG/HR: 4 INJECTION INTRAVENOUS at 00:18

## 2023-05-29 RX ADMIN — Medication 4 PUFF(S): at 20:45

## 2023-05-29 RX ADMIN — ALBUTEROL 4 PUFF(S): 90 AEROSOL, METERED ORAL at 20:45

## 2023-05-29 RX ADMIN — LINEZOLID 300 MILLIGRAM(S): 600 INJECTION, SOLUTION INTRAVENOUS at 08:33

## 2023-05-29 RX ADMIN — CHLORHEXIDINE GLUCONATE 15 MILLILITER(S): 213 SOLUTION TOPICAL at 06:07

## 2023-05-29 RX ADMIN — Medication 4 PUFF(S): at 02:57

## 2023-05-29 RX ADMIN — ALBUTEROL 4 PUFF(S): 90 AEROSOL, METERED ORAL at 02:57

## 2023-05-29 RX ADMIN — Medication 4 PUFF(S): at 13:39

## 2023-05-29 RX ADMIN — PROPOFOL 5.66 MICROGRAM(S)/KG/MIN: 10 INJECTION, EMULSION INTRAVENOUS at 00:17

## 2023-05-29 RX ADMIN — Medication 1300 MILLIGRAM(S): at 06:07

## 2023-05-29 RX ADMIN — HEPARIN SODIUM 5000 UNIT(S): 5000 INJECTION INTRAVENOUS; SUBCUTANEOUS at 22:14

## 2023-05-29 RX ADMIN — Medication 60 MILLIGRAM(S): at 10:02

## 2023-05-29 RX ADMIN — POLYETHYLENE GLYCOL 3350 17 GRAM(S): 17 POWDER, FOR SOLUTION ORAL at 08:33

## 2023-05-29 RX ADMIN — Medication 1300 MILLIGRAM(S): at 13:23

## 2023-05-29 RX ADMIN — DEXMEDETOMIDINE HYDROCHLORIDE IN 0.9% SODIUM CHLORIDE 4.25 MICROGRAM(S)/KG/HR: 4 INJECTION INTRAVENOUS at 13:24

## 2023-05-29 RX ADMIN — LINEZOLID 300 MILLIGRAM(S): 600 INJECTION, SOLUTION INTRAVENOUS at 18:43

## 2023-05-29 RX ADMIN — Medication 60 MILLIGRAM(S): at 18:43

## 2023-05-29 RX ADMIN — FENTANYL CITRATE 4.72 MICROGRAM(S)/KG/HR: 50 INJECTION INTRAVENOUS at 00:18

## 2023-05-29 NOTE — PROGRESS NOTE ADULT - SUBJECTIVE AND OBJECTIVE BOX
Patient is a 42y old  Female who presents with a chief complaint of AHRF (28 May 2023 13:38)        Over Night Events:  Remains critically ill on MV.  Sedated.  Off pressors.          ROS:     All ROS are negative except HPI         PHYSICAL EXAM    ICU Vital Signs Last 24 Hrs  T(C): 36.5 (29 May 2023 08:00), Max: 37.2 (29 May 2023 04:00)  T(F): 97.7 (29 May 2023 08:00), Max: 98.9 (29 May 2023 04:00)  HR: 92 (29 May 2023 08:00) (70 - 133)  BP: 134/70 (29 May 2023 08:00) (102/59 - 148/87)  BP(mean): 94 (29 May 2023 08:00) (73 - 112)  ABP: --  ABP(mean): --  RR: 33 (29 May 2023 08:00) (9 - 40)  SpO2: 99% (29 May 2023 08:00) (91% - 100%)    O2 Parameters below as of 29 May 2023 08:00  Patient On (Oxygen Delivery Method): ventilator    O2 Concentration (%): 40        CONSTITUTIONAL:  Well nourished.  NAD    ENT:   Airway patent,   Mouth with normal mucosa.       EYES:   Pupils equal,   Round and reactive to light.    CARDIAC:   Normal rate,   Regular rhythm.    edema    RESPIRATORY:   No wheezing  Dec BS left base   Normal chest expansion  Not tachypneic,  No use of accessory muscles    GASTROINTESTINAL:  Abdomen soft,   Non-tender,   No guarding,   + BS    MUSCULOSKELETAL:   Range of motion is not limited,  No clubbing, cyanosis    NEUROLOGICAL:   Sedated     SKIN:   Skin normal color for race,    No evidence of rash.    PSYCHIATRIC:   No apparent risk to self or others.        05-28-23 @ 07:01  -  05-29-23 @ 07:00  --------------------------------------------------------  IN:    Dexmedetomidine: 765.6 mL    Enteral Tube Flush: 100 mL    FentaNYL: 723.3 mL    Lactated Ringers: 2200 mL    PRBCs (Packed Red Blood Cells): 282 mL    Propofol: 773.4 mL    Vital High Protein: 280 mL  Total IN: 5124.3 mL    OUT:    Indwelling Catheter - Urethral (mL): 1585 mL  Total OUT: 1585 mL    Total NET: 3539.3 mL      05-29-23 @ 07:01  -  05-29-23 @ 08:57  --------------------------------------------------------  IN:    Dexmedetomidine: 63.8 mL    FentaNYL: 51 mL    Lactated Ringers: 200 mL    Propofol: 33.2 mL    Vital High Protein: 40 mL  Total IN: 388 mL    OUT:    Indwelling Catheter - Urethral (mL): 210 mL    Norepinephrine: 0 mL  Total OUT: 210 mL    Total NET: 178 mL          LABS:                            7.0    11.24 )-----------( 147      ( 28 May 2023 12:10 )             21.5                                               05-29    145  |  112<H>  |  82<HH>  ----------------------------<  91  4.5   |  18  |  2.9<H>    Creatinine Trend  BUN 82, Cr 2.9, (05-29-23 @ 05:42)  Creatinine Trend  BUN 82, Cr 3.0, (05-28-23 @ 12:05)  Creatinine Trend  BUN 81, Cr 2.7, (05-27-23 @ 05:47)  Creatinine Trend  BUN 61, Cr 2.6, (05-26-23 @ 06:09)  Creatinine Trend  BUN 44, Cr 2.5, (05-25-23 @ 05:47)  Creatinine Trend  BUN 42, Cr 2.4, (05-25-23 @ 00:59)  Creatinine Trend  BUN 38, Cr 2.8, (05-24-23 @ 17:09)  Creatinine Trend  BUN 35, Cr 3.0, (05-24-23 @ 11:48)      Ca    8.2<L>      29 May 2023 05:42  Phos  5.0     05-28  Mg     2.5     05-29    TPro  5.1<L>  /  Alb  1.8<L>  /  TBili  <0.2  /  DBili  x   /  AST  13  /  ALT  11  /  AlkPhos  80  05-29      PT/INR - ( 28 May 2023 12:05 )   PT: 11.80 sec;   INR: 1.03 ratio         PTT - ( 28 May 2023 12:05 )  PTT:31.0 sec                                           CARDIAC MARKERS ( 29 May 2023 05:42 )  x     / x     / 39 U/L / x     / x                                                LIVER FUNCTIONS - ( 29 May 2023 05:42 )  Alb: 1.8 g/dL / Pro: 5.1 g/dL / ALK PHOS: 80 U/L / ALT: 11 U/L / AST: 13 U/L / GGT: x                                                  Culture - Blood (collected 27 May 2023 06:05)  Source: .Blood Blood  Gram Stain (28 May 2023 17:02):    Growth in aerobic bottle: Gram Positive Cocci in Clusters  Preliminary Report (28 May 2023 17:02):    Growth in aerobic bottle: Gram Positive Cocci in Clusters    Culture - Blood (collected 27 May 2023 06:05)  Source: .Blood Blood  Gram Stain (28 May 2023 13:39):    Growth in aerobic bottle: Gram Positive Cocci in Clusters  Preliminary Report (28 May 2023 13:39):    Growth in aerobic bottle: Gram Positive Cocci in Clusters                                                   Mode: AC/ CMV (Assist Control/ Continuous Mandatory Ventilation)  RR (machine): 22  TV (machine): 380  FiO2: 40  PEEP: 8  ITime: 1  MAP: 13  PIP: 26                                      ABG - ( 29 May 2023 03:07 )  pH, Arterial: 7.37  pH, Blood: x     /  pCO2: 32    /  pO2: 91    / HCO3: 18    / Base Excess: -6.2  /  SaO2: 98.3  PPL 20.                MEDICATIONS  (STANDING):  albuterol    90 MICROgram(s) HFA Inhaler 4 Puff(s) Inhalation every 6 hours  chlorhexidine 0.12% Liquid 15 milliLiter(s) Oral Mucosa every 12 hours  chlorhexidine 2% Cloths 1 Application(s) Topical daily  chlorhexidine 2% Cloths 1 Application(s) Topical <User Schedule>  dexMEDEtomidine Infusion 0.2 MICROgram(s)/kG/Hr (4.25 mL/Hr) IV Continuous <Continuous>  fentaNYL   Infusion. 0.555 MICROgram(s)/kG/Hr (4.72 mL/Hr) IV Continuous <Continuous>  heparin   Injectable 5000 Unit(s) SubCutaneous every 8 hours  insulin lispro (ADMELOG) corrective regimen sliding scale   SubCutaneous three times a day before meals  ipratropium 17 MICROgram(s) HFA Inhaler 4 Puff(s) Inhalation every 6 hours  lactated ringers. 1000 milliLiter(s) (100 mL/Hr) IV Continuous <Continuous>  linezolid  IVPB 600 milliGRAM(s) IV Intermittent every 12 hours  mupirocin 2% Nasal 1 Application(s) Both Nostrils two times a day  norepinephrine Infusion 0.05 MICROgram(s)/kG/Min (7.97 mL/Hr) IV Continuous <Continuous>  oseltamivir Suspension 30 milliGRAM(s) Oral daily  pantoprazole   Suspension 40 milliGRAM(s) Oral daily  polyethylene glycol 3350 17 Gram(s) Oral every 12 hours  propofol Infusion 11.098 MICROgram(s)/kG/Min (5.66 mL/Hr) IV Continuous <Continuous>  senna 2 Tablet(s) Oral at bedtime  sodium bicarbonate 1300 milliGRAM(s) Oral every 8 hours    MEDICATIONS  (PRN):  acetaminophen     Tablet .. 650 milliGRAM(s) Oral every 6 hours PRN Temp greater or equal to 38C (100.4F)  albuterol    90 MICROgram(s) HFA Inhaler 4 Puff(s) Inhalation every 4 hours PRN Shortness of Breath and/or Wheezing  ipratropium 17 MICROgram(s) HFA Inhaler 4 Puff(s) Inhalation <User Schedule> PRN -  midazolam Injectable 2 milliGRAM(s) IV Push every 8 hours PRN Sedation      New X-rays reviewed:                                                                                  ECHO

## 2023-05-29 NOTE — CHART NOTE - NSCHARTNOTEFT_GEN_A_CORE
Notified by RT, ABG showed Hgb of 6.2  Previous CBC showed Hgb of 7.0, which has been steadily downtrending  As ABGs usually overestimate Hgb, 1x pRBC was given  continue to monitor CBC and maintain active T&S

## 2023-05-29 NOTE — CHART NOTE - NSCHARTNOTEFT_GEN_A_CORE
Registered Dietitian Follow-Up     Patient Profile Reviewed                           Yes [x]   No []     Nutrition History Previously Obtained        Yes []  No [x]       Pertinent Subjective Information: Limited to chart review at this time as pt intubated and unable to provide hx.     Pertinent Medical Interventions: Pt continues to be managed fro Acute hypoxemic respiratory failure on vent, Severe CAP MRSA, MRSA bacteremia, Non massive hemoptysis, LANCE and Influenza B. Pt remains intubated.      Diet order:   Diet, NPO with Tube Feed:   Tube Feeding Modality: Orogastric  Vital High Protein  Total Volume for 24 Hours (mL): 480  Continuous  Starting Tube Feed Rate {mL per Hour}: 20  Until Goal Tube Feed Rate (mL per Hour): 20  Tube Feed Duration (in Hours): 24  Tube Feed Start Time: 11:00  Free Water Flush  Bolus   Total Volume per Flush (mL): 30   Frequency: Every 6 Hours  No Carb Prosource (1pkg = 15gms Protein)     Qty per Day:  4 (23 @ 10:25) [Active]    Anthropometrics:  Height (cm): 160 (23 @ 08:15)  Weight (kg): 85 (23 @ 08:15)  BMI (kg/m2): 33.2 (23 @ 08:15)  IBW: 52.1 kg  Daily Weight in k.1 (05-29), Weight in k.7 (-28), Weight in k.3 (05-27), Weight in k.5 (05-26), Weight in k.8 (-25), Weight in k (-24)  Weight Change: trending up likely reflective of fluid retention     MEDICATIONS  (STANDING):  albuterol    90 MICROgram(s) HFA Inhaler 4 Puff(s) Inhalation every 6 hours  chlorhexidine 0.12% Liquid 15 milliLiter(s) Oral Mucosa every 12 hours  chlorhexidine 2% Cloths 1 Application(s) Topical daily  chlorhexidine 2% Cloths 1 Application(s) Topical <User Schedule>  dexMEDEtomidine Infusion 0.2 MICROgram(s)/kG/Hr (4.25 mL/Hr) IV Continuous <Continuous>  fentaNYL   Infusion. 0.555 MICROgram(s)/kG/Hr (4.72 mL/Hr) IV Continuous <Continuous>  furosemide   Injectable 60 milliGRAM(s) IV Push every 12 hours  heparin   Injectable 5000 Unit(s) SubCutaneous every 8 hours  insulin lispro (ADMELOG) corrective regimen sliding scale   SubCutaneous three times a day before meals  ipratropium 17 MICROgram(s) HFA Inhaler 4 Puff(s) Inhalation every 6 hours  linezolid  IVPB 600 milliGRAM(s) IV Intermittent every 12 hours  mupirocin 2% Nasal 1 Application(s) Both Nostrils two times a day  norepinephrine Infusion 0.05 MICROgram(s)/kG/Min (7.97 mL/Hr) IV Continuous <Continuous>  pantoprazole   Suspension 40 milliGRAM(s) Oral daily  polyethylene glycol 3350 17 Gram(s) Oral every 12 hours  propofol Infusion 11.098 MICROgram(s)/kG/Min IV Continuous <Continuous>  -- 33.2ml/hr = 876kcal/day  senna 2 Tablet(s) Oral at bedtime  sodium bicarbonate 1300 milliGRAM(s) Oral every 8 hours    MEDICATIONS  (PRN):  acetaminophen     Tablet .. 650 milliGRAM(s) Oral every 6 hours PRN Temp greater or equal to 38C (100.4F)  albuterol    90 MICROgram(s) HFA Inhaler 4 Puff(s) Inhalation every 4 hours PRN Shortness of Breath and/or Wheezing  ipratropium 17 MICROgram(s) HFA Inhaler 4 Puff(s) Inhalation <User Schedule> PRN -  midazolam Injectable 2 milliGRAM(s) IV Push every 8 hours PRN Sedation    Pertinent Labs:                         7.0    11.24 )-----------( 147      ( 28 May 2023 12:10 )             21.5     05- @ 05:42: Na 145, BUN 82<HH>, Cr 2.9<H>, BG 91, K+ 4.5, Phos --, Mg 2.5<H>, Alk Phos 80, ALT/SGPT 11, AST/SGOT 13, HbA1c --   @ 12:05: Na 139, BUN 82<HH>, Cr 3.0<H>, BG 73, K+ 4.4, Phos 5.0<H>, Mg 2.4, Alk Phos 81, ALT/SGPT 13, AST/SGOT 15, HbA1c --    Finger Sticks:  POCT Blood Glucose.: 126 mg/dL ( @ 11:19)  POCT Blood Glucose.: 127 mg/dL ( @ 06:05)  POCT Blood Glucose.: 121 mg/dL ( @ 16:30)    I&O's Detail    28 May 2023 07:01  -  29 May 2023 07:00  --------------------------------------------------------  IN:    Dexmedetomidine: 765.6 mL    Enteral Tube Flush: 100 mL    FentaNYL: 723.3 mL    Lactated Ringers: 2200 mL    PRBCs (Packed Red Blood Cells): 282 mL    Propofol: 773.4 mL    Vital High Protein: 280 mL  Total IN: 5124.3 mL    OUT:    Indwelling Catheter - Urethral (mL): 1585 mL  Total OUT: 1585 mL    Total NET: 3539.3 mL    Physical Findings:  - Appearance: sedated; 2+ generalized edema; 3+ edema to left arm; right arm; left hand; right hand  - GI function: abdomen obese; last bowel movement 29-May-2023  - Tubes: OG tube  - Oral/Mouth cavity: NPO  - Skin: intact     Nutrition Requirements:  Weight Used: dosing weight 85kg; IBW 52.1 kg     Estimated Energy Needs    Continue [x]  Adjust []  Energy Recommendations: 935-1190 kcal/day - 11-14kcal/kg IBW        Estimated Protein Needs    Continue [x]  Adjust []  Protein Recommendations: 104 gm/day - 2g/kg IBW        Estimated Fluid Needs        Continue [x]  Adjust []  Fluid Recommendations: 2975 mL/day - adjusted fluid needs for BMI>30     Nutrient Intake: current TF provides 720kcal, 98g protein, 403ml free water. Propofol providing 876kcal/day.     [] Previous Nutrition Diagnosis: Increased Nutrient Needs (protein)            [x] Ongoing          [] Resolved    Nutrition Intervention      Goal/Expected Outcome: Pt to meet >90% & <105% estimated needs via EN in 3-5 days. RD to follow as per high risk protocol.     Indicator/Monitoring: Monitor diet order, kcal intake, body composition, NFPE, labs  (lytes, BG, renal indices)    Recommendation:  Continue Vital HP formula, decrease rate to 10ml/hr. Add No Carb Prosource (1pkg = 15gms Protein) 2pkt 3x/day (6 pkts in total daily). -- will provide 600kcal, 109g protein, 202ml free water. +876kcal/day from propofol.   Free water flushes per MICU team.     Patient assessed at high nutrition risk.  RD spectra [phone], also available on Teams.

## 2023-05-29 NOTE — PROGRESS NOTE ADULT - ASSESSMENT
IMPRESSION:    Acute hypoxemic respiratory failure   Severe CAP MRSA   MRSA bacteremia   Non massive hemoptysis   LANCE   HO asthma   Influenza B     PLAN:    CNS: SAT.  Drug screen noted     HEENT: Oral care.  ET care     PULMONARY:  HOB @ 45 degrees.  Aspiration precautions.  ARDS net MV setting.  Monitor PPL and DP.  PEEP 10.  CT chest NC.      CARDIOVASCULAR:  ECHO noted.  Lasix 60 mg BID.  DC IVF.  Trend CPK.      GI: GI prophylaxis.  OG Feeding to target.  Adjust Bowel regimen.      RENAL:  Follow up lytes.  Correct as needed.  Weaver for strict Is and OS.       INFECTIOUS DISEASE: Follow up cultures. Zyvox, and Tamiflu.  FU Urine legionella and strep Ag. HIV negative.  FU Galactomannan and Fungitell.  Daily BC      HEMATOLOGICAL:  DVT prophylaxis.  Dimer noted.  FU LE duplex .  Monitor CBC     ENDOCRINE:  Follow up FS.  Insulin protocol if needed.      MUSCULOSKELETAL:  Rheum and Vasculitis panel.  Off loading     MICU     Will need steroids prior to extubation for risk of PES

## 2023-05-29 NOTE — PROGRESS NOTE ADULT - ASSESSMENT
42/F with PMH of asthma and HTN, admitted with SOB, developed LANCE.   Acute hypoxemic resp failure requiring intubation    < from: CT Angio Chest PE Protocol w/ IV Cont (05.23.23 @ 22:06) >  1. Bilateral patchy groundglass nodularity with dominant confluent left   lower lobe opacification with numerous cavitary lesions. Additional   contralateral right lower lobe cavitary 1 cm nodule. Findings compatible   with cavitary pneumonia in the appropriate clinical setting; differential   diagnosis includes tuberculosis.  Non massive hemoptysis   MRSA bacteremia, prerenal FFeNa, suspicion for post infectious GN  c3c4 pending along with CHARLES ANCA Hep B and C  Kidney sono neg for obstruction  LANCE prerenal vs ATN vs pulmo renal syndrome  s/p CT with IV contrast 5/23 -  non oliguric   cr stable    phos noted / no binders yet / feed nepro   w/up for GN noted  negative so far   ID notes appreciated    sodium bicarbonate  to 1300  q 8   no acute indication for RRT   will follow

## 2023-05-29 NOTE — PROGRESS NOTE ADULT - SUBJECTIVE AND OBJECTIVE BOX
ZEYNEP ROSSI 42y Female  MRN#: [id number]   CODE STATUS:________    Hospital Day: 6d    Pt is currently admitted with the primary diagnosis of AHRF    SUBJECTIVE    Patient seen and examined at bedside.  Intubated. On Precedex, Fentanyl, Propofol. No pressors.     Overnight events: Versed PRN pushes started for agitation.                                             ----------------------------------------------------------  OBJECTIVE  PAST MEDICAL & SURGICAL HISTORY                                            -----------------------------------------------------------  ALLERGIES:  aspirin (Short breath; Anaphylaxis)                                            ------------------------------------------------------------    HOME MEDICATIONS  Home Medications:                           MEDICATIONS:  STANDING MEDICATIONS  albuterol    90 MICROgram(s) HFA Inhaler 4 Puff(s) Inhalation every 6 hours  chlorhexidine 0.12% Liquid 15 milliLiter(s) Oral Mucosa every 12 hours  chlorhexidine 2% Cloths 1 Application(s) Topical daily  chlorhexidine 2% Cloths 1 Application(s) Topical <User Schedule>  dexMEDEtomidine Infusion 0.2 MICROgram(s)/kG/Hr IV Continuous <Continuous>  fentaNYL   Infusion. 0.555 MICROgram(s)/kG/Hr IV Continuous <Continuous>  furosemide   Injectable 60 milliGRAM(s) IV Push every 12 hours  heparin   Injectable 5000 Unit(s) SubCutaneous every 8 hours  insulin lispro (ADMELOG) corrective regimen sliding scale   SubCutaneous three times a day before meals  ipratropium 17 MICROgram(s) HFA Inhaler 4 Puff(s) Inhalation every 6 hours  linezolid  IVPB 600 milliGRAM(s) IV Intermittent every 12 hours  mupirocin 2% Nasal 1 Application(s) Both Nostrils two times a day  norepinephrine Infusion 0.05 MICROgram(s)/kG/Min IV Continuous <Continuous>  pantoprazole   Suspension 40 milliGRAM(s) Oral daily  polyethylene glycol 3350 17 Gram(s) Oral every 12 hours  propofol Infusion 11.098 MICROgram(s)/kG/Min IV Continuous <Continuous>  senna 2 Tablet(s) Oral at bedtime  sodium bicarbonate 1300 milliGRAM(s) Oral every 8 hours    PRN MEDICATIONS  acetaminophen     Tablet .. 650 milliGRAM(s) Oral every 6 hours PRN  albuterol    90 MICROgram(s) HFA Inhaler 4 Puff(s) Inhalation every 4 hours PRN  ipratropium 17 MICROgram(s) HFA Inhaler 4 Puff(s) Inhalation <User Schedule> PRN  midazolam Injectable 2 milliGRAM(s) IV Push every 8 hours PRN                                            ------------------------------------------------------------  VITAL SIGNS: Last 24 Hours  T(C): 36.5 (29 May 2023 08:00), Max: 37.2 (29 May 2023 04:00)  T(F): 97.7 (29 May 2023 08:00), Max: 98.9 (29 May 2023 04:00)  HR: 79 (29 May 2023 09:00) (70 - 133)  BP: 113/65 (29 May 2023 09:00) (102/59 - 148/87)  BP(mean): 83 (29 May 2023 09:00) (73 - 112)  RR: 25 (29 May 2023 09:00) (9 - 40)  SpO2: 98% (29 May 2023 09:00) (91% - 100%)      05-28-23 @ 07:01  -  05-29-23 @ 07:00  --------------------------------------------------------  IN: 5124.3 mL / OUT: 1585 mL / NET: 3539.3 mL    05-29-23 @ 07:01  -  05-29-23 @ 10:21  --------------------------------------------------------  IN: 388 mL / OUT: 210 mL / NET: 178 mL                                             --------------------------------------------------------------  LABS:                        7.0    11.24 )-----------( 147      ( 28 May 2023 12:10 )             21.5     05-29    145  |  112<H>  |  82<HH>  ----------------------------<  91  4.5   |  18  |  2.9<H>    Ca    8.2<L>      29 May 2023 05:42  Phos  5.0     05-28  Mg     2.5     05-29    TPro  5.1<L>  /  Alb  1.8<L>  /  TBili  <0.2  /  DBili  x   /  AST  13  /  ALT  11  /  AlkPhos  80  05-29    PT/INR - ( 28 May 2023 12:05 )   PT: 11.80 sec;   INR: 1.03 ratio         PTT - ( 28 May 2023 12:05 )  PTT:31.0 sec    ABG - ( 29 May 2023 03:07 )  pH, Arterial: 7.37  pH, Blood: x     /  pCO2: 32    /  pO2: 91    / HCO3: 18    / Base Excess: -6.2  /  SaO2: 98.3              Creatine Kinase, Serum: 39 U/L (05-29-23 @ 05:42)        Culture - Blood (collected 27 May 2023 06:05)  Source: .Blood Blood  Gram Stain (28 May 2023 17:02):    Growth in aerobic bottle: Gram Positive Cocci in Clusters  Preliminary Report (29 May 2023 10:07):    Growth in aerobic bottle: Staphylococcus aureus    See previous culture [id number]    Culture - Blood (collected 27 May 2023 06:05)  Source: .Blood Blood  Gram Stain (28 May 2023 13:39):    Growth in aerobic bottle: Gram Positive Cocci in Clusters  Preliminary Report (29 May 2023 10:06):    Growth in aerobic bottle: Staphylococcus aureus        CARDIAC MARKERS ( 29 May 2023 05:42 )  x     / x     / 39 U/L / x     / x                                                  -------------------------------------------------------------  RADIOLOGY:    < from: Xray Chest 1 View- PORTABLE-Routine (Xray Chest 1 View- PORTABLE-Routine .) (05.28.23 @ 10:40) >  IMPRESSION:    Placement of enteric tube.    Unchanged left-sided opacity.    --- End of Report ---    < end of copied text >  < from: Xray Chest 1 View- PORTABLE-Urgent (Xray Chest 1 View- PORTABLE-Urgent .) (05.28.23 @ 07:59) >  IMPRESSION:    Unchanged left greater than right opacities.    Interval placement of enteric tube.    --- End of Report ---    < end of copied text >  < from: US Kidney and Bladder (05.24.23 @ 17:52) >  IMPRESSION:  No hydronephrosis.    < end of copied text >  < from: CT Angio Chest PE Protocol w/ IV Cont (05.23.23 @ 22:06) >  IMPRESSION:    1. Bilateral patchy groundglass nodularity with dominant confluent left   lower lobe opacification with numerous cavitary lesions. Additional   contralateral right lower lobe cavitary 1 cm nodule. Findings compatible   with cavitary pneumonia in the appropriate clinical setting; differential   diagnosis includes tuberculosis.    2. Confluent ill-defined left hilar and mediastinal adenopathy, likely   reactive, without dominant lymph node delineated.    3. No evidence of pulmonary embolism.    Spoke with CESIA SHEIKH PA; Emergency Me on 5/23/2023 10:53 PM with   readback.    --- End of Report ---      < end of copied text >                                              --------------------------------------------------------------    PHYSICAL EXAM:  General:   HEENT:  LUNGS:  HEART:  ABDOMEN:  EXT:  NEURO:  SKIN:                                           --------------------------------------------------------------    ASSESSMENT & PLAN    42 year old female with PMHx Asthma, HTN presents with cough and SOB x3 days and hemoptysis. CT chest showing cavitary pneumonia. Cultures +MRSA bacteremia. Intubated; failed SBT. On Zyvox and s/p Tamiflu 6 day course.     # Acute hypoxemic respiratory failure 2/2 cavitary PNA  # MRSA bacteremia  # Influenza B  # hx Asthma   - Strep/Legionella negative, HIV negative   - CT Angio Chest PE Protocol: Bilateral patchy groundglass nodularity with dominant confluent left lower lobe opacification with numerous cavitary lesions. Additional contralateral right lower lobe cavitary 1 cm nodule. Findings compatible with cavitary pneumonia in the appropriate clinical setting; differential diagnosis includes tuberculosis.  - blood cultures +MRSA  - ABx: Zyvox 600mg q12, s/p 6 day course Tamiflu  - Mupirocin BID x5 days  - f/u daily blood cultures  - ECHO: no vegetations. EF 65%, mild TR and pulm HTN  - MASTER?  - failed SBT  - D-dimer 1665, f/u LE duplex  - d/c fluids and start Lasix 60mg BID  - f/u CT chest non-con    # LANCE   - Cr stable around 2.9  - monitor electrolytes and replete PRN  - d/c IVF, start Lasix 60mg BID    # Acute anemia  - Hgb 6.2 on ABG; transfused 1u pRBC  - trend CBC, active type and screen, transfuse <7    #DVT ppx: heparin 5000 q8  #GI ppx: Pantoprazole 40mg qd  #Diet: NPO w/ tube feed   #Activity: bedrest   #Dispo: MICU     ZEYNEP ROSSI 42y Female  MRN#: [id number]   CODE STATUS:________    Hospital Day: 6d    Pt is currently admitted with the primary diagnosis of AHRF    SUBJECTIVE    Patient seen and examined at bedside.  Intubated. On Precedex, Fentanyl, Propofol. No pressors.     Overnight events: Versed PRN pushes started for agitation.                                             ----------------------------------------------------------  OBJECTIVE  PAST MEDICAL & SURGICAL HISTORY                                            -----------------------------------------------------------  ALLERGIES:  aspirin (Short breath; Anaphylaxis)                                            ------------------------------------------------------------    HOME MEDICATIONS  Home Medications:                           MEDICATIONS:  STANDING MEDICATIONS  albuterol    90 MICROgram(s) HFA Inhaler 4 Puff(s) Inhalation every 6 hours  chlorhexidine 0.12% Liquid 15 milliLiter(s) Oral Mucosa every 12 hours  chlorhexidine 2% Cloths 1 Application(s) Topical daily  chlorhexidine 2% Cloths 1 Application(s) Topical <User Schedule>  dexMEDEtomidine Infusion 0.2 MICROgram(s)/kG/Hr IV Continuous <Continuous>  fentaNYL   Infusion. 0.555 MICROgram(s)/kG/Hr IV Continuous <Continuous>  furosemide   Injectable 60 milliGRAM(s) IV Push every 12 hours  heparin   Injectable 5000 Unit(s) SubCutaneous every 8 hours  insulin lispro (ADMELOG) corrective regimen sliding scale   SubCutaneous three times a day before meals  ipratropium 17 MICROgram(s) HFA Inhaler 4 Puff(s) Inhalation every 6 hours  linezolid  IVPB 600 milliGRAM(s) IV Intermittent every 12 hours  mupirocin 2% Nasal 1 Application(s) Both Nostrils two times a day  norepinephrine Infusion 0.05 MICROgram(s)/kG/Min IV Continuous <Continuous>  pantoprazole   Suspension 40 milliGRAM(s) Oral daily  polyethylene glycol 3350 17 Gram(s) Oral every 12 hours  propofol Infusion 11.098 MICROgram(s)/kG/Min IV Continuous <Continuous>  senna 2 Tablet(s) Oral at bedtime  sodium bicarbonate 1300 milliGRAM(s) Oral every 8 hours    PRN MEDICATIONS  acetaminophen     Tablet .. 650 milliGRAM(s) Oral every 6 hours PRN  albuterol    90 MICROgram(s) HFA Inhaler 4 Puff(s) Inhalation every 4 hours PRN  ipratropium 17 MICROgram(s) HFA Inhaler 4 Puff(s) Inhalation <User Schedule> PRN  midazolam Injectable 2 milliGRAM(s) IV Push every 8 hours PRN                                            ------------------------------------------------------------  VITAL SIGNS: Last 24 Hours  T(C): 36.5 (29 May 2023 08:00), Max: 37.2 (29 May 2023 04:00)  T(F): 97.7 (29 May 2023 08:00), Max: 98.9 (29 May 2023 04:00)  HR: 79 (29 May 2023 09:00) (70 - 133)  BP: 113/65 (29 May 2023 09:00) (102/59 - 148/87)  BP(mean): 83 (29 May 2023 09:00) (73 - 112)  RR: 25 (29 May 2023 09:00) (9 - 40)  SpO2: 98% (29 May 2023 09:00) (91% - 100%)      05-28-23 @ 07:01  -  05-29-23 @ 07:00  --------------------------------------------------------  IN: 5124.3 mL / OUT: 1585 mL / NET: 3539.3 mL    05-29-23 @ 07:01  -  05-29-23 @ 10:21  --------------------------------------------------------  IN: 388 mL / OUT: 210 mL / NET: 178 mL                                             --------------------------------------------------------------  LABS:                        7.0    11.24 )-----------( 147      ( 28 May 2023 12:10 )             21.5     05-29    145  |  112<H>  |  82<HH>  ----------------------------<  91  4.5   |  18  |  2.9<H>    Ca    8.2<L>      29 May 2023 05:42  Phos  5.0     05-28  Mg     2.5     05-29    TPro  5.1<L>  /  Alb  1.8<L>  /  TBili  <0.2  /  DBili  x   /  AST  13  /  ALT  11  /  AlkPhos  80  05-29    PT/INR - ( 28 May 2023 12:05 )   PT: 11.80 sec;   INR: 1.03 ratio         PTT - ( 28 May 2023 12:05 )  PTT:31.0 sec    ABG - ( 29 May 2023 03:07 )  pH, Arterial: 7.37  pH, Blood: x     /  pCO2: 32    /  pO2: 91    / HCO3: 18    / Base Excess: -6.2  /  SaO2: 98.3              Creatine Kinase, Serum: 39 U/L (05-29-23 @ 05:42)        Culture - Blood (collected 27 May 2023 06:05)  Source: .Blood Blood  Gram Stain (28 May 2023 17:02):    Growth in aerobic bottle: Gram Positive Cocci in Clusters  Preliminary Report (29 May 2023 10:07):    Growth in aerobic bottle: Staphylococcus aureus    See previous culture [id number]    Culture - Blood (collected 27 May 2023 06:05)  Source: .Blood Blood  Gram Stain (28 May 2023 13:39):    Growth in aerobic bottle: Gram Positive Cocci in Clusters  Preliminary Report (29 May 2023 10:06):    Growth in aerobic bottle: Staphylococcus aureus        CARDIAC MARKERS ( 29 May 2023 05:42 )  x     / x     / 39 U/L / x     / x                                                  -------------------------------------------------------------  RADIOLOGY:    < from: Xray Chest 1 View- PORTABLE-Routine (Xray Chest 1 View- PORTABLE-Routine .) (05.28.23 @ 10:40) >  IMPRESSION:    Placement of enteric tube.    Unchanged left-sided opacity.    --- End of Report ---    < end of copied text >  < from: Xray Chest 1 View- PORTABLE-Urgent (Xray Chest 1 View- PORTABLE-Urgent .) (05.28.23 @ 07:59) >  IMPRESSION:    Unchanged left greater than right opacities.    Interval placement of enteric tube.    --- End of Report ---    < end of copied text >  < from: US Kidney and Bladder (05.24.23 @ 17:52) >  IMPRESSION:  No hydronephrosis.    < end of copied text >  < from: CT Angio Chest PE Protocol w/ IV Cont (05.23.23 @ 22:06) >  IMPRESSION:    1. Bilateral patchy groundglass nodularity with dominant confluent left   lower lobe opacification with numerous cavitary lesions. Additional   contralateral right lower lobe cavitary 1 cm nodule. Findings compatible   with cavitary pneumonia in the appropriate clinical setting; differential   diagnosis includes tuberculosis.    2. Confluent ill-defined left hilar and mediastinal adenopathy, likely   reactive, without dominant lymph node delineated.    3. No evidence of pulmonary embolism.    Spoke with CESIA SHEIKH PA; Emergency Me on 5/23/2023 10:53 PM with   readback.    --- End of Report ---      < end of copied text >                                              --------------------------------------------------------------    PHYSICAL EXAM:  CONSTITUTIONAL: intubated, sedated   HEAD: Atraumatic, normocephalic  EYES: EOM intact, PERRLA, conjunctiva and sclera clear  ENT: Supple, no masses, no thyromegaly, no bruits, no JVD; moist mucous membranes  PULMONARY: Clear to auscultation bilaterally; no wheezes, rales, or rhonchi  CARDIOVASCULAR: Regular rate and rhythm; no murmurs, rubs, or gallops  GASTROINTESTINAL: Soft, non-tender, non-distended; bowel sounds present  MUSCULOSKELETAL: 2+ peripheral pulses; no clubbing, no cyanosis, no edema  NEUROLOGY: non-focal  SKIN: No rashes or lesions; warm and dry                                           --------------------------------------------------------------    ASSESSMENT & PLAN    42 year old female with PMHx Asthma, HTN presents with cough and SOB x3 days and hemoptysis. CT chest showing cavitary pneumonia. Cultures +MRSA bacteremia. Intubated; failed SBT. On Zyvox and s/p Tamiflu 6 day course.     # Acute hypoxemic respiratory failure 2/2 cavitary PNA  # MRSA bacteremia  # Influenza B  # hx Asthma   - CT Angio Chest PE Protocol: Bilateral patchy groundglass nodularity with dominant confluent left lower lobe opacification with numerous cavitary lesions. Additional contralateral right lower lobe cavitary 1 cm nodule. Findings compatible with cavitary pneumonia in the appropriate clinical setting; differential diagnosis includes tuberculosis.  - Strep/Legionella negative, HIV negative   - intubated 5/23, self-extubated 5/25, re-intubated 5/25  - blood cultures and DTA +MRSA  - ABx per ID: Zyvox 600mg q12, s/p 6 day course Tamiflu  - Mupirocin BID x5 days  - f/u daily blood cultures  - ECHO: no vegetations. EF 65%, mild TR and pulm HTN  - MASTER?  - failed SBT  - D-dimer 1665, f/u LE duplex  - d/c fluids and start Lasix 60mg BID  - f/u CT chest non-con    # LANCE   - Cr stable around 2.9  - monitor electrolytes and replete PRN  - renal U/S: negative   - CK trending down 1483>39  - good urine output  - UA: +proteins and blood, Pr/Cr 1.3 (H)  - Urine lytes suggestive of pre-renal LANCE   - f/u vasculitis panel, hepatitis, HIV neg  - c/w oral bicarb 1300 mg q8  - d/c IVF, start Lasix 60mg BID    # Anemia  # Thrombocytopenia   - Hgb 6.2 on ABG; transfused 1u pRBC  - trend CBC, active type and screen, transfuse <7  - monitor plts, on Zyvox  - DIC panel negative   - Dimer 1665, f/u LE duplex     #DVT ppx: heparin 5000 q8  #GI ppx: Pantoprazole 40mg qd  #Diet: NPO w/ tube feed   #Activity: bedrest   #Dispo: MICU     ZEYNEP ROSSI 42y Female  MRN#: [id number]   CODE STATUS: full    Hospital Day: 6d    Pt is currently admitted with the primary diagnosis of AHRF    SUBJECTIVE    Patient seen and examined at bedside.  Intubated. On Precedex, Fentanyl, Propofol. No pressors.     Overnight events: Versed PRN pushes started for agitation.                                             ----------------------------------------------------------  OBJECTIVE  PAST MEDICAL & SURGICAL HISTORY                                            -----------------------------------------------------------  ALLERGIES:  aspirin (Short breath; Anaphylaxis)                                            ------------------------------------------------------------    HOME MEDICATIONS  Home Medications:                           MEDICATIONS:  STANDING MEDICATIONS  albuterol    90 MICROgram(s) HFA Inhaler 4 Puff(s) Inhalation every 6 hours  chlorhexidine 0.12% Liquid 15 milliLiter(s) Oral Mucosa every 12 hours  chlorhexidine 2% Cloths 1 Application(s) Topical daily  chlorhexidine 2% Cloths 1 Application(s) Topical <User Schedule>  dexMEDEtomidine Infusion 0.2 MICROgram(s)/kG/Hr IV Continuous <Continuous>  fentaNYL   Infusion. 0.555 MICROgram(s)/kG/Hr IV Continuous <Continuous>  furosemide   Injectable 60 milliGRAM(s) IV Push every 12 hours  heparin   Injectable 5000 Unit(s) SubCutaneous every 8 hours  insulin lispro (ADMELOG) corrective regimen sliding scale   SubCutaneous three times a day before meals  ipratropium 17 MICROgram(s) HFA Inhaler 4 Puff(s) Inhalation every 6 hours  linezolid  IVPB 600 milliGRAM(s) IV Intermittent every 12 hours  mupirocin 2% Nasal 1 Application(s) Both Nostrils two times a day  norepinephrine Infusion 0.05 MICROgram(s)/kG/Min IV Continuous <Continuous>  pantoprazole   Suspension 40 milliGRAM(s) Oral daily  polyethylene glycol 3350 17 Gram(s) Oral every 12 hours  propofol Infusion 11.098 MICROgram(s)/kG/Min IV Continuous <Continuous>  senna 2 Tablet(s) Oral at bedtime  sodium bicarbonate 1300 milliGRAM(s) Oral every 8 hours    PRN MEDICATIONS  acetaminophen     Tablet .. 650 milliGRAM(s) Oral every 6 hours PRN  albuterol    90 MICROgram(s) HFA Inhaler 4 Puff(s) Inhalation every 4 hours PRN  ipratropium 17 MICROgram(s) HFA Inhaler 4 Puff(s) Inhalation <User Schedule> PRN  midazolam Injectable 2 milliGRAM(s) IV Push every 8 hours PRN                                            ------------------------------------------------------------  VITAL SIGNS: Last 24 Hours  T(C): 36.5 (29 May 2023 08:00), Max: 37.2 (29 May 2023 04:00)  T(F): 97.7 (29 May 2023 08:00), Max: 98.9 (29 May 2023 04:00)  HR: 79 (29 May 2023 09:00) (70 - 133)  BP: 113/65 (29 May 2023 09:00) (102/59 - 148/87)  BP(mean): 83 (29 May 2023 09:00) (73 - 112)  RR: 25 (29 May 2023 09:00) (9 - 40)  SpO2: 98% (29 May 2023 09:00) (91% - 100%)      05-28-23 @ 07:01  -  05-29-23 @ 07:00  --------------------------------------------------------  IN: 5124.3 mL / OUT: 1585 mL / NET: 3539.3 mL    05-29-23 @ 07:01  -  05-29-23 @ 10:21  --------------------------------------------------------  IN: 388 mL / OUT: 210 mL / NET: 178 mL                                             --------------------------------------------------------------  LABS:                        7.0    11.24 )-----------( 147      ( 28 May 2023 12:10 )             21.5     05-29    145  |  112<H>  |  82<HH>  ----------------------------<  91  4.5   |  18  |  2.9<H>    Ca    8.2<L>      29 May 2023 05:42  Phos  5.0     05-28  Mg     2.5     05-29    TPro  5.1<L>  /  Alb  1.8<L>  /  TBili  <0.2  /  DBili  x   /  AST  13  /  ALT  11  /  AlkPhos  80  05-29    PT/INR - ( 28 May 2023 12:05 )   PT: 11.80 sec;   INR: 1.03 ratio         PTT - ( 28 May 2023 12:05 )  PTT:31.0 sec    ABG - ( 29 May 2023 03:07 )  pH, Arterial: 7.37  pH, Blood: x     /  pCO2: 32    /  pO2: 91    / HCO3: 18    / Base Excess: -6.2  /  SaO2: 98.3              Creatine Kinase, Serum: 39 U/L (05-29-23 @ 05:42)        Culture - Blood (collected 27 May 2023 06:05)  Source: .Blood Blood  Gram Stain (28 May 2023 17:02):    Growth in aerobic bottle: Gram Positive Cocci in Clusters  Preliminary Report (29 May 2023 10:07):    Growth in aerobic bottle: Staphylococcus aureus    See previous culture [id number]    Culture - Blood (collected 27 May 2023 06:05)  Source: .Blood Blood  Gram Stain (28 May 2023 13:39):    Growth in aerobic bottle: Gram Positive Cocci in Clusters  Preliminary Report (29 May 2023 10:06):    Growth in aerobic bottle: Staphylococcus aureus        CARDIAC MARKERS ( 29 May 2023 05:42 )  x     / x     / 39 U/L / x     / x                                                  -------------------------------------------------------------  RADIOLOGY:    < from: Xray Chest 1 View- PORTABLE-Routine (Xray Chest 1 View- PORTABLE-Routine .) (05.28.23 @ 10:40) >  IMPRESSION:    Placement of enteric tube.    Unchanged left-sided opacity.    --- End of Report ---    < end of copied text >  < from: Xray Chest 1 View- PORTABLE-Urgent (Xray Chest 1 View- PORTABLE-Urgent .) (05.28.23 @ 07:59) >  IMPRESSION:    Unchanged left greater than right opacities.    Interval placement of enteric tube.    --- End of Report ---    < end of copied text >  < from: US Kidney and Bladder (05.24.23 @ 17:52) >  IMPRESSION:  No hydronephrosis.    < end of copied text >  < from: CT Angio Chest PE Protocol w/ IV Cont (05.23.23 @ 22:06) >  IMPRESSION:    1. Bilateral patchy groundglass nodularity with dominant confluent left   lower lobe opacification with numerous cavitary lesions. Additional   contralateral right lower lobe cavitary 1 cm nodule. Findings compatible   with cavitary pneumonia in the appropriate clinical setting; differential   diagnosis includes tuberculosis.    2. Confluent ill-defined left hilar and mediastinal adenopathy, likely   reactive, without dominant lymph node delineated.    3. No evidence of pulmonary embolism.    Spoke with CESIA SHEIKH PA; Emergency Me on 5/23/2023 10:53 PM with   readback.    --- End of Report ---      < end of copied text >                                              --------------------------------------------------------------    PHYSICAL EXAM:  CONSTITUTIONAL: intubated, sedated   HEAD: Atraumatic, normocephalic  EYES: EOM intact, PERRLA, conjunctiva and sclera clear  ENT: Supple, no masses, no thyromegaly, no bruits, no JVD; moist mucous membranes  PULMONARY: Clear to auscultation bilaterally; no wheezes, rales, or rhonchi  CARDIOVASCULAR: Regular rate and rhythm; no murmurs, rubs, or gallops  GASTROINTESTINAL: Soft, non-tender, non-distended; bowel sounds present  MUSCULOSKELETAL: 2+ peripheral pulses; no clubbing, no cyanosis, no edema  NEUROLOGY: non-focal  SKIN: No rashes or lesions; warm and dry                                           --------------------------------------------------------------    ASSESSMENT & PLAN    42 year old female with PMHx Asthma, HTN presents with cough and SOB x3 days and hemoptysis. CT chest showing cavitary pneumonia. Cultures +MRSA bacteremia. Intubated; failed SBT. On Zyvox and s/p Tamiflu 6 day course.     # Acute hypoxemic respiratory failure 2/2 cavitary PNA  # MRSA bacteremia  # Influenza B  # hx Asthma   - CT Angio Chest PE Protocol: Bilateral patchy groundglass nodularity with dominant confluent left lower lobe opacification with numerous cavitary lesions. Additional contralateral right lower lobe cavitary 1 cm nodule. Findings compatible with cavitary pneumonia in the appropriate clinical setting; differential diagnosis includes tuberculosis.  - Strep/Legionella negative, HIV negative   - intubated 5/23, self-extubated 5/25, re-intubated 5/25  - blood cultures and DTA +MRSA  - ABx per ID: Zyvox 600mg q12, s/p 6 day course Tamiflu  - Mupirocin BID x5 days  - f/u daily blood cultures  - ECHO: no vegetations. EF 65%, mild TR and pulm HTN  - MASTER?  - failed SBT  - D-dimer 1665, f/u LE duplex  - d/c fluids and start Lasix 60mg BID  - f/u CT chest non-con    # LANCE   - Cr stable around 2.9  - monitor electrolytes and replete PRN  - renal U/S: negative   - CK trending down 1483>39  - good urine output  - UA: +proteins and blood, Pr/Cr 1.3 (H)  - Urine lytes suggestive of pre-renal LANCE   - f/u vasculitis panel, hepatitis, HIV neg  - c/w oral bicarb 1300 mg q8  - d/c IVF, start Lasix 60mg BID    # Anemia  # Thrombocytopenia   - Hgb 6.2 on ABG; transfused 1u pRBC  - trend CBC, active type and screen, transfuse <7  - monitor plts, on Zyvox  - DIC panel negative   - Dimer 1665, f/u LE duplex     #DVT ppx: heparin 5000 q8  #GI ppx: Pantoprazole 40mg qd  #Diet: NPO w/ tube feed   #Activity: bedrest   #Dispo: MICU

## 2023-05-29 NOTE — PROGRESS NOTE ADULT - SUBJECTIVE AND OBJECTIVE BOX
Nephrology progress note    Patient was seen and examined, events over the last 24 h noted .  Cr stable    Allergies:  aspirin (Short breath; Anaphylaxis)    Hospital Medications:   MEDICATIONS  (STANDING):  albuterol    90 MICROgram(s) HFA Inhaler 4 Puff(s) Inhalation every 6 hours  chlorhexidine 0.12% Liquid 15 milliLiter(s) Oral Mucosa every 12 hours  chlorhexidine 2% Cloths 1 Application(s) Topical daily  chlorhexidine 2% Cloths 1 Application(s) Topical <User Schedule>  dexMEDEtomidine Infusion 0.2 MICROgram(s)/kG/Hr (4.25 mL/Hr) IV Continuous <Continuous>  fentaNYL   Infusion. 0.555 MICROgram(s)/kG/Hr (4.72 mL/Hr) IV Continuous <Continuous>  furosemide   Injectable 60 milliGRAM(s) IV Push every 12 hours  heparin   Injectable 5000 Unit(s) SubCutaneous every 8 hours  insulin lispro (ADMELOG) corrective regimen sliding scale   SubCutaneous three times a day before meals  ipratropium 17 MICROgram(s) HFA Inhaler 4 Puff(s) Inhalation every 6 hours  linezolid  IVPB 600 milliGRAM(s) IV Intermittent every 12 hours  mupirocin 2% Nasal 1 Application(s) Both Nostrils two times a day  norepinephrine Infusion 0.05 MICROgram(s)/kG/Min (7.97 mL/Hr) IV Continuous <Continuous>  pantoprazole   Suspension 40 milliGRAM(s) Oral daily  polyethylene glycol 3350 17 Gram(s) Oral every 12 hours  propofol Infusion 11.098 MICROgram(s)/kG/Min (5.66 mL/Hr) IV Continuous <Continuous>  senna 2 Tablet(s) Oral at bedtime  sodium bicarbonate 1300 milliGRAM(s) Oral every 8 hours        VITALS:  T(F): 99.4 (23 @ 10:00), Max: 99.4 (23 @ 10:00)  HR: 75 (23 @ 10:00)  BP: 115/63 (23 @ 10:00)  RR: 11 (23 @ 10:00)  SpO2: 99% (23 @ 10:00)  Wt(kg): --     @ 07:01  -   @ 07:00  --------------------------------------------------------  IN: 4940.8 mL / OUT: 2125 mL / NET: 2815.8 mL     @ 07: @ 07:00  --------------------------------------------------------  IN: 5124.3 mL / OUT: 1585 mL / NET: 3539.3 mL     @ 07: @ 12:29  --------------------------------------------------------  IN: 792.4 mL / OUT: 435 mL / NET: 357.4 mL          PHYSICAL EXAM:  intubated/ventilated     LABS:      145  |  112<H>  |  82<HH>  ----------------------------<  91  4.5   |  18  |  2.9<H>    Ca    8.2<L>      29 May 2023 05:42  Phos  5.0       Mg     2.5         TPro  5.1<L>  /  Alb  1.8<L>  /  TBili  <0.2  /  DBili      /  AST  13  /  ALT  11  /  AlkPhos  80                            7.5    16.98 )-----------( 184      ( 29 May 2023 11:49 )             23.2       Urine Studies:  Urinalysis Basic - ( 25 May 2023 11:30 )    Color: Yellow / Appearance: Slightly Turbid / S.027 / pH:   Gluc:  / Ketone: Negative  / Bili: Negative / Urobili: <2 mg/dL   Blood:  / Protein: 100 mg/dL / Nitrite: Negative   Leuk Esterase: Negative / RBC: 45 /HPF / WBC 30 /HPF   Sq Epi:  / Non Sq Epi:  / Bacteria: Negative      Sodium, Random Urine: <20.0 mmoL/L ( @ 19:16)  Creatinine, Random Urine: 178 mg/dL ( @ 19:16)  Protein/Creatinine Ratio Calculation: 1.3 Ratio ( @ 19:16)  Osmolality, Random Urine: 340 mos/kg ( @ 19:16)  Potassium, Random Urine: 50 mmol/L ( @ 19:16)    RADIOLOGY & ADDITIONAL STUDIES:

## 2023-05-30 LAB
-  AMPICILLIN/SULBACTAM: SIGNIFICANT CHANGE UP
-  CEFAZOLIN: SIGNIFICANT CHANGE UP
-  CLINDAMYCIN: SIGNIFICANT CHANGE UP
-  DAPTOMYCIN: SIGNIFICANT CHANGE UP
-  ERYTHROMYCIN: SIGNIFICANT CHANGE UP
-  GENTAMICIN: SIGNIFICANT CHANGE UP
-  LINEZOLID: SIGNIFICANT CHANGE UP
-  OXACILLIN: SIGNIFICANT CHANGE UP
-  PENICILLIN: SIGNIFICANT CHANGE UP
-  RIFAMPIN: SIGNIFICANT CHANGE UP
-  TETRACYCLINE: SIGNIFICANT CHANGE UP
-  TRIMETHOPRIM/SULFAMETHOXAZOLE: SIGNIFICANT CHANGE UP
-  VANCOMYCIN: SIGNIFICANT CHANGE UP
ALBUMIN SERPL ELPH-MCNC: 2 G/DL — LOW (ref 3.5–5.2)
ALP SERPL-CCNC: 84 U/L — SIGNIFICANT CHANGE UP (ref 30–115)
ALT FLD-CCNC: <5 U/L — SIGNIFICANT CHANGE UP (ref 0–41)
ANION GAP SERPL CALC-SCNC: 14 MMOL/L — SIGNIFICANT CHANGE UP (ref 7–14)
AST SERPL-CCNC: <5 U/L — SIGNIFICANT CHANGE UP (ref 0–41)
BASE EXCESS BLDA CALC-SCNC: -2.4 MMOL/L — LOW (ref -2–3)
BASOPHILS # BLD AUTO: 0.03 K/UL — SIGNIFICANT CHANGE UP (ref 0–0.2)
BASOPHILS NFR BLD AUTO: 0.2 % — SIGNIFICANT CHANGE UP (ref 0–1)
BILIRUB SERPL-MCNC: 0.2 MG/DL — SIGNIFICANT CHANGE UP (ref 0.2–1.2)
BUN SERPL-MCNC: 81 MG/DL — CRITICAL HIGH (ref 10–20)
CALCIUM SERPL-MCNC: 8.3 MG/DL — LOW (ref 8.4–10.5)
CHLORIDE SERPL-SCNC: 112 MMOL/L — HIGH (ref 98–110)
CK SERPL-CCNC: 33 U/L — SIGNIFICANT CHANGE UP (ref 0–225)
CK SERPL-CCNC: 50 U/L — SIGNIFICANT CHANGE UP (ref 0–225)
CO2 SERPL-SCNC: 21 MMOL/L — SIGNIFICANT CHANGE UP (ref 17–32)
CREAT SERPL-MCNC: 2.8 MG/DL — HIGH (ref 0.7–1.5)
CULTURE RESULTS: SIGNIFICANT CHANGE UP
CULTURE RESULTS: SIGNIFICANT CHANGE UP
EGFR: 21 ML/MIN/1.73M2 — LOW
EOSINOPHIL # BLD AUTO: 0.07 K/UL — SIGNIFICANT CHANGE UP (ref 0–0.7)
EOSINOPHIL NFR BLD AUTO: 0.4 % — SIGNIFICANT CHANGE UP (ref 0–8)
GALACTOMANNAN AG SERPL-ACNC: 0.05 INDEX — SIGNIFICANT CHANGE UP (ref 0–0.49)
GAS PNL BLDA: SIGNIFICANT CHANGE UP
GLUCOSE BLDC GLUCOMTR-MCNC: 112 MG/DL — HIGH (ref 70–99)
GLUCOSE BLDC GLUCOMTR-MCNC: 113 MG/DL — HIGH (ref 70–99)
GLUCOSE BLDC GLUCOMTR-MCNC: 114 MG/DL — HIGH (ref 70–99)
GLUCOSE BLDC GLUCOMTR-MCNC: 145 MG/DL — HIGH (ref 70–99)
GLUCOSE SERPL-MCNC: 91 MG/DL — SIGNIFICANT CHANGE UP (ref 70–99)
HCO3 BLDA-SCNC: 23 MMOL/L — SIGNIFICANT CHANGE UP (ref 21–28)
HCT VFR BLD CALC: 22.4 % — LOW (ref 37–47)
HGB BLD-MCNC: 7.2 G/DL — LOW (ref 12–16)
IMM GRANULOCYTES NFR BLD AUTO: 2.6 % — HIGH (ref 0.1–0.3)
LYMPHOCYTES # BLD AUTO: 0.98 K/UL — LOW (ref 1.2–3.4)
LYMPHOCYTES # BLD AUTO: 5.8 % — LOW (ref 20.5–51.1)
MAGNESIUM SERPL-MCNC: 2.3 MG/DL — SIGNIFICANT CHANGE UP (ref 1.8–2.4)
MCHC RBC-ENTMCNC: 19.7 PG — LOW (ref 27–31)
MCHC RBC-ENTMCNC: 32.1 G/DL — SIGNIFICANT CHANGE UP (ref 32–37)
MCV RBC AUTO: 61.4 FL — LOW (ref 81–99)
METHOD TYPE: SIGNIFICANT CHANGE UP
MONOCYTES # BLD AUTO: 1.26 K/UL — HIGH (ref 0.1–0.6)
MONOCYTES NFR BLD AUTO: 7.5 % — SIGNIFICANT CHANGE UP (ref 1.7–9.3)
NEUTROPHILS # BLD AUTO: 14.1 K/UL — HIGH (ref 1.4–6.5)
NEUTROPHILS NFR BLD AUTO: 83.5 % — HIGH (ref 42.2–75.2)
NRBC # BLD: 0 /100 WBCS — SIGNIFICANT CHANGE UP (ref 0–0)
ORGANISM # SPEC MICROSCOPIC CNT: SIGNIFICANT CHANGE UP
ORGANISM # SPEC MICROSCOPIC CNT: SIGNIFICANT CHANGE UP
PCO2 BLDA: 42 MMHG — SIGNIFICANT CHANGE UP (ref 25–48)
PH BLDA: 7.35 — SIGNIFICANT CHANGE UP (ref 7.35–7.45)
PLATELET # BLD AUTO: 214 K/UL — SIGNIFICANT CHANGE UP (ref 130–400)
PMV BLD: 9.8 FL — SIGNIFICANT CHANGE UP (ref 7.4–10.4)
PO2 BLDA: 103 MMHG — SIGNIFICANT CHANGE UP (ref 83–108)
POTASSIUM SERPL-MCNC: 4.3 MMOL/L — SIGNIFICANT CHANGE UP (ref 3.5–5)
POTASSIUM SERPL-SCNC: 4.3 MMOL/L — SIGNIFICANT CHANGE UP (ref 3.5–5)
PROT SERPL-MCNC: 5.5 G/DL — LOW (ref 6–8)
RBC # BLD: 3.65 M/UL — LOW (ref 4.2–5.4)
RBC # FLD: 22.6 % — HIGH (ref 11.5–14.5)
SAO2 % BLDA: 98.6 % — HIGH (ref 94–98)
SODIUM SERPL-SCNC: 147 MMOL/L — HIGH (ref 135–146)
SPECIMEN SOURCE: SIGNIFICANT CHANGE UP
SPECIMEN SOURCE: SIGNIFICANT CHANGE UP
TRIGL SERPL-MCNC: 1009 MG/DL — HIGH
WBC # BLD: 16.88 K/UL — HIGH (ref 4.8–10.8)
WBC # FLD AUTO: 16.88 K/UL — HIGH (ref 4.8–10.8)

## 2023-05-30 PROCEDURE — 71045 X-RAY EXAM CHEST 1 VIEW: CPT | Mod: 26

## 2023-05-30 PROCEDURE — 99291 CRITICAL CARE FIRST HOUR: CPT

## 2023-05-30 RX ORDER — FUROSEMIDE 40 MG
60 TABLET ORAL EVERY 24 HOURS
Refills: 0 | Status: DISCONTINUED | OUTPATIENT
Start: 2023-05-31 | End: 2023-05-31

## 2023-05-30 RX ORDER — NOREPINEPHRINE BITARTRATE/D5W 8 MG/250ML
0.05 PLASTIC BAG, INJECTION (ML) INTRAVENOUS
Qty: 16 | Refills: 0 | Status: DISCONTINUED | OUTPATIENT
Start: 2023-05-30 | End: 2023-05-30

## 2023-05-30 RX ORDER — NOREPINEPHRINE BITARTRATE/D5W 8 MG/250ML
0.05 PLASTIC BAG, INJECTION (ML) INTRAVENOUS
Qty: 8 | Refills: 0 | Status: DISCONTINUED | OUTPATIENT
Start: 2023-05-30 | End: 2023-06-05

## 2023-05-30 RX ORDER — MIDAZOLAM HYDROCHLORIDE 1 MG/ML
2 INJECTION, SOLUTION INTRAMUSCULAR; INTRAVENOUS ONCE
Refills: 0 | Status: DISCONTINUED | OUTPATIENT
Start: 2023-05-30 | End: 2023-05-30

## 2023-05-30 RX ORDER — MIDAZOLAM HYDROCHLORIDE 1 MG/ML
2 INJECTION, SOLUTION INTRAMUSCULAR; INTRAVENOUS EVERY 4 HOURS
Refills: 0 | Status: DISCONTINUED | OUTPATIENT
Start: 2023-05-30 | End: 2023-06-05

## 2023-05-30 RX ORDER — CHLORHEXIDINE GLUCONATE 213 G/1000ML
15 SOLUTION TOPICAL EVERY 12 HOURS
Refills: 0 | Status: DISCONTINUED | OUTPATIENT
Start: 2023-05-30 | End: 2023-05-31

## 2023-05-30 RX ORDER — KETAMINE HYDROCHLORIDE 100 MG/ML
0.25 INJECTION INTRAMUSCULAR; INTRAVENOUS
Qty: 1000 | Refills: 0 | Status: DISCONTINUED | OUTPATIENT
Start: 2023-05-30 | End: 2023-06-06

## 2023-05-30 RX ORDER — VANCOMYCIN HCL 1 G
500 VIAL (EA) INTRAVENOUS EVERY 24 HOURS
Refills: 0 | Status: DISCONTINUED | OUTPATIENT
Start: 2023-05-30 | End: 2023-06-02

## 2023-05-30 RX ADMIN — Medication 60 MILLIGRAM(S): at 05:01

## 2023-05-30 RX ADMIN — Medication 4 PUFF(S): at 03:35

## 2023-05-30 RX ADMIN — CHLORHEXIDINE GLUCONATE 15 MILLILITER(S): 213 SOLUTION TOPICAL at 05:01

## 2023-05-30 RX ADMIN — POLYETHYLENE GLYCOL 3350 17 GRAM(S): 17 POWDER, FOR SOLUTION ORAL at 12:15

## 2023-05-30 RX ADMIN — CHLORHEXIDINE GLUCONATE 15 MILLILITER(S): 213 SOLUTION TOPICAL at 18:00

## 2023-05-30 RX ADMIN — Medication 650 MILLIGRAM(S): at 21:17

## 2023-05-30 RX ADMIN — Medication 100 MILLIGRAM(S): at 18:31

## 2023-05-30 RX ADMIN — FENTANYL CITRATE 4.72 MICROGRAM(S)/KG/HR: 50 INJECTION INTRAVENOUS at 23:41

## 2023-05-30 RX ADMIN — ALBUTEROL 4 PUFF(S): 90 AEROSOL, METERED ORAL at 03:35

## 2023-05-30 RX ADMIN — Medication 650 MILLIGRAM(S): at 04:11

## 2023-05-30 RX ADMIN — Medication 4 PUFF(S): at 08:45

## 2023-05-30 RX ADMIN — MIDAZOLAM HYDROCHLORIDE 2 MILLIGRAM(S): 1 INJECTION, SOLUTION INTRAMUSCULAR; INTRAVENOUS at 20:14

## 2023-05-30 RX ADMIN — Medication 1300 MILLIGRAM(S): at 15:24

## 2023-05-30 RX ADMIN — MIDAZOLAM HYDROCHLORIDE 2 MILLIGRAM(S): 1 INJECTION, SOLUTION INTRAMUSCULAR; INTRAVENOUS at 19:17

## 2023-05-30 RX ADMIN — DEXMEDETOMIDINE HYDROCHLORIDE IN 0.9% SODIUM CHLORIDE 4.25 MICROGRAM(S)/KG/HR: 4 INJECTION INTRAVENOUS at 04:11

## 2023-05-30 RX ADMIN — LINEZOLID 300 MILLIGRAM(S): 600 INJECTION, SOLUTION INTRAVENOUS at 05:02

## 2023-05-30 RX ADMIN — CHLORHEXIDINE GLUCONATE 1 APPLICATION(S): 213 SOLUTION TOPICAL at 17:59

## 2023-05-30 RX ADMIN — Medication 1300 MILLIGRAM(S): at 21:16

## 2023-05-30 RX ADMIN — Medication 30 MILLIGRAM(S): at 11:06

## 2023-05-30 RX ADMIN — MIDAZOLAM HYDROCHLORIDE 2 MILLIGRAM(S): 1 INJECTION, SOLUTION INTRAMUSCULAR; INTRAVENOUS at 22:06

## 2023-05-30 RX ADMIN — DEXMEDETOMIDINE HYDROCHLORIDE IN 0.9% SODIUM CHLORIDE 4.25 MICROGRAM(S)/KG/HR: 4 INJECTION INTRAVENOUS at 13:37

## 2023-05-30 RX ADMIN — Medication 450 MILLIGRAM(S): at 18:00

## 2023-05-30 RX ADMIN — DEXMEDETOMIDINE HYDROCHLORIDE IN 0.9% SODIUM CHLORIDE 4.25 MICROGRAM(S)/KG/HR: 4 INJECTION INTRAVENOUS at 18:30

## 2023-05-30 RX ADMIN — MIDAZOLAM HYDROCHLORIDE 2 MILLIGRAM(S): 1 INJECTION, SOLUTION INTRAMUSCULAR; INTRAVENOUS at 11:05

## 2023-05-30 RX ADMIN — Medication 4 PUFF(S): at 13:40

## 2023-05-30 RX ADMIN — KETAMINE HYDROCHLORIDE 2.13 MG/KG/HR: 100 INJECTION INTRAMUSCULAR; INTRAVENOUS at 21:18

## 2023-05-30 RX ADMIN — CHLORHEXIDINE GLUCONATE 1 APPLICATION(S): 213 SOLUTION TOPICAL at 05:02

## 2023-05-30 RX ADMIN — POLYETHYLENE GLYCOL 3350 17 GRAM(S): 17 POWDER, FOR SOLUTION ORAL at 21:16

## 2023-05-30 RX ADMIN — FENTANYL CITRATE 4.72 MICROGRAM(S)/KG/HR: 50 INJECTION INTRAVENOUS at 18:31

## 2023-05-30 RX ADMIN — DEXMEDETOMIDINE HYDROCHLORIDE IN 0.9% SODIUM CHLORIDE 4.25 MICROGRAM(S)/KG/HR: 4 INJECTION INTRAVENOUS at 11:05

## 2023-05-30 RX ADMIN — Medication 30 MILLIGRAM(S): at 21:16

## 2023-05-30 RX ADMIN — PROPOFOL 5.66 MICROGRAM(S)/KG/MIN: 10 INJECTION, EMULSION INTRAVENOUS at 13:37

## 2023-05-30 RX ADMIN — KETAMINE HYDROCHLORIDE 2.13 MG/KG/HR: 100 INJECTION INTRAMUSCULAR; INTRAVENOUS at 13:36

## 2023-05-30 RX ADMIN — PANTOPRAZOLE SODIUM 40 MILLIGRAM(S): 20 TABLET, DELAYED RELEASE ORAL at 15:24

## 2023-05-30 RX ADMIN — MUPIROCIN 1 APPLICATION(S): 20 OINTMENT TOPICAL at 18:00

## 2023-05-30 RX ADMIN — Medication 650 MILLIGRAM(S): at 21:47

## 2023-05-30 RX ADMIN — MIDAZOLAM HYDROCHLORIDE 2 MILLIGRAM(S): 1 INJECTION, SOLUTION INTRAMUSCULAR; INTRAVENOUS at 04:28

## 2023-05-30 RX ADMIN — Medication 650 MILLIGRAM(S): at 04:41

## 2023-05-30 RX ADMIN — PROPOFOL 5.66 MICROGRAM(S)/KG/MIN: 10 INJECTION, EMULSION INTRAVENOUS at 04:11

## 2023-05-30 RX ADMIN — HEPARIN SODIUM 5000 UNIT(S): 5000 INJECTION INTRAVENOUS; SUBCUTANEOUS at 05:01

## 2023-05-30 RX ADMIN — MIDAZOLAM HYDROCHLORIDE 2 MILLIGRAM(S): 1 INJECTION, SOLUTION INTRAMUSCULAR; INTRAVENOUS at 12:30

## 2023-05-30 RX ADMIN — SENNA PLUS 2 TABLET(S): 8.6 TABLET ORAL at 21:16

## 2023-05-30 RX ADMIN — Medication 1300 MILLIGRAM(S): at 05:01

## 2023-05-30 RX ADMIN — MUPIROCIN 1 APPLICATION(S): 20 OINTMENT TOPICAL at 05:01

## 2023-05-30 NOTE — PROGRESS NOTE ADULT - SUBJECTIVE AND OBJECTIVE BOX
Over Night Events: events noted, remain critically ill, still intubated, ventilated, on precedex 1.5, fentanyl 3/ propofol 65, low grade T    PHYSICAL EXAM    ICU Vital Signs Last 24 Hrs  T(C): 38 (30 May 2023 04:00), Max: 38 (30 May 2023 04:00)  T(F): 100.4 (30 May 2023 04:00), Max: 100.4 (30 May 2023 04:00)  HR: 94 (30 May 2023 07:40) (75 - 94)  BP: 100/58 (30 May 2023 07:00) (96/54 - 135/72)  BP(mean): 75 (30 May 2023 07:00) (70 - 98)  RR: 29 (30 May 2023 07:00) (11 - 39)  SpO2: 100% (30 May 2023 07:40) (96% - 100%)    O2 Parameters below as of 29 May 2023 20:00  Patient On (Oxygen Delivery Method): ventilator    O2 Concentration (%): 40        General: ill looking  HEENT: ETT  Lungs: dec bs both bases, bloody secretions  Cardiovascular: Regular   Abdomen: Soft, Positive BS  Extremities: No clubbing   Neurological: Non focal       05-29-23 @ 07:01  -  05-30-23 @ 07:00  --------------------------------------------------------  IN:    Dexmedetomidine: 606.1 mL    Enteral Tube Flush: 250 mL    FentaNYL: 484.5 mL    IV PiggyBack: 300 mL    Lactated Ringers: 200 mL    Propofol: 630.8 mL    Vital High Protein: 240 mL  Total IN: 2711.4 mL    OUT:    Indwelling Catheter - Urethral (mL): 3405 mL    Norepinephrine: 0 mL  Total OUT: 3405 mL    Total NET: -693.6 mL          LABS:                          7.2    16.88 )-----------( 214      ( 30 May 2023 06:00 )             22.4                                               05-30    147<H>  |  112<H>  |  81<HH>  ----------------------------<  91  4.3   |  21  |  2.8<H>    Ca    8.3<L>      30 May 2023 06:00  Phos  5.0     05-28  Mg     2.3     05-30    TPro  5.5<L>  /  Alb  2.0<L>  /  TBili  0.2  /  DBili  x   /  AST  <5  /  ALT  <5  /  AlkPhos  84  05-30      PT/INR - ( 28 May 2023 12:05 )   PT: 11.80 sec;   INR: 1.03 ratio         PTT - ( 28 May 2023 12:05 )  PTT:31.0 sec                                           CARDIAC MARKERS ( 30 May 2023 06:00 )  x     / x     / 33 U/L / x     / x      CARDIAC MARKERS ( 29 May 2023 05:42 )  x     / x     / 39 U/L / x     / x                                                LIVER FUNCTIONS - ( 30 May 2023 06:00 )  Alb: 2.0 g/dL / Pro: 5.5 g/dL / ALK PHOS: 84 U/L / ALT: <5 U/L / AST: <5 U/L / GGT: x                                                  Culture - Blood (collected 28 May 2023 16:00)  Source: .Blood Blood-Peripheral  Preliminary Report (30 May 2023 03:02):    No growth to date.    Culture - Blood (collected 28 May 2023 12:00)  Source: .Blood Blood  Preliminary Report (29 May 2023 21:01):    No growth to date.                                                   Mode: AC/ CMV (Assist Control/ Continuous Mandatory Ventilation)  RR (machine): 22  TV (machine): 380  FiO2: 40  PEEP: 10  ITime: 1  MAP: 14  PIP: 29                                      ABG - ( 30 May 2023 02:43 )  pH, Arterial: 7.36  pH, Blood: x     /  pCO2: 37    /  pO2: 123   / HCO3: 21    / Base Excess: -4.2  /  SaO2: 98.2                MEDICATIONS  (STANDING):  albuterol    90 MICROgram(s) HFA Inhaler 4 Puff(s) Inhalation every 6 hours  chlorhexidine 0.12% Liquid 15 milliLiter(s) Oral Mucosa every 12 hours  chlorhexidine 2% Cloths 1 Application(s) Topical <User Schedule>  chlorhexidine 2% Cloths 1 Application(s) Topical daily  dexMEDEtomidine Infusion 0.2 MICROgram(s)/kG/Hr (4.25 mL/Hr) IV Continuous <Continuous>  fentaNYL   Infusion. 0.555 MICROgram(s)/kG/Hr (4.72 mL/Hr) IV Continuous <Continuous>  furosemide   Injectable 60 milliGRAM(s) IV Push every 12 hours  heparin   Injectable 5000 Unit(s) SubCutaneous every 8 hours  insulin lispro (ADMELOG) corrective regimen sliding scale   SubCutaneous three times a day before meals  ipratropium 17 MICROgram(s) HFA Inhaler 4 Puff(s) Inhalation every 6 hours  linezolid  IVPB 600 milliGRAM(s) IV Intermittent every 12 hours  mupirocin 2% Nasal 1 Application(s) Both Nostrils two times a day  norepinephrine Infusion 0.05 MICROgram(s)/kG/Min (7.97 mL/Hr) IV Continuous <Continuous>  pantoprazole   Suspension 40 milliGRAM(s) Oral daily  polyethylene glycol 3350 17 Gram(s) Oral every 12 hours  propofol Infusion 11.098 MICROgram(s)/kG/Min (5.66 mL/Hr) IV Continuous <Continuous>  senna 2 Tablet(s) Oral at bedtime  sodium bicarbonate 1300 milliGRAM(s) Oral every 8 hours    MEDICATIONS  (PRN):  acetaminophen     Tablet .. 650 milliGRAM(s) Oral every 6 hours PRN Temp greater or equal to 38C (100.4F)  albuterol    90 MICROgram(s) HFA Inhaler 4 Puff(s) Inhalation every 4 hours PRN Shortness of Breath and/or Wheezing  ipratropium 17 MICROgram(s) HFA Inhaler 4 Puff(s) Inhalation <User Schedule> PRN -  midazolam Injectable 2 milliGRAM(s) IV Push once PRN agitation  midazolam Injectable 2 milliGRAM(s) IV Push every 8 hours PRN Sedation    CXR reviewed

## 2023-05-30 NOTE — CHART NOTE - NSCHARTNOTEFT_GEN_A_CORE
Patient with septic shock secondary to pneumonia and MSSA bacteremia   ID recommending MASTER   Patient intubated and sedated   Anemia but no thrombocytopenia   No absolute contraindicaton for MASTER   Keep NPO after midnight

## 2023-05-30 NOTE — PROGRESS NOTE ADULT - ASSESSMENT
42 year old female with PMHx Asthma, HTN presents with cough and SOB x3 days and hemoptysis. CT chest showing cavitary pneumonia. Cultures +MRSA bacteremia. Intubated; failed SBT. On Zyvox and Tamiflu. Repeat CT showing worsening PNA.    # Acute hypoxemic respiratory failure 2/2 cavitary PNA  # MRSA bacteremia  # Influenza B  # hx Asthma   - CT Angio Chest PE Protocol: Bilateral patchy groundglass nodularity with dominant confluent left lower lobe opacification with numerous cavitary lesions. Additional contralateral right lower lobe cavitary 1 cm nodule. Findings compatible with cavitary pneumonia in the appropriate clinical setting; differential diagnosis includes tuberculosis.  - Strep/Legionella negative, HIV negative   - intubated 5/23, self-extubated 5/25, re-intubated 5/25  - blood cultures and DTA +MRSA  - ABx per ID: Zyvox 600mg q12, 10 day course Tamiflu  - Mupirocin BID x5 days  - blood cultures +MRSA  - blood cultures 5/28: NGTD x2  - ECHO: no vegetations. EF 65%, mild TR and pulm HTN  - MASTER?  - repeat CT chest: Diffusely increased number and size of bilateral cavitary lesions with the left lower lobe now demonstrating a consolidative process of the entirelobe.  - D-dimer 1665, LE duplex negative for DVT  - c/w Lasix 60mg qd, add Metolazone 5mg   - repeat pro-mya    # LANCE   - Nephro following; post-infectious GN vs. ATN vs. pulmonary renal syndrome  - Cr stable around 2.9  - renal U/S: negative   - CK trending down 1483>39, continue to trend   - good urine output  - UA: +proteins and blood, Pr/Cr 1.3 (H)  - f/u vasculitis panel, hepatitis, HIV neg  - c/w oral bicarb 1300 mg q8  - d/c IVF, start Lasix 60mg BID  - no acute indication for RRT    # Anemia  # Thrombocytopenia   - Hgb 6.2 on ABG; transfused 1u pRBC  - trend CBC, active type and screen, transfuse <7  - monitor plts, on Zyvox  - DIC panel negative   - Dimer 1665, LE Duplex negative for DVT  - hold heparin 5/30    #DVT ppx: heparin 5000 q8  #GI ppx: Pantoprazole 40mg qd  #Diet: NPO w/ tube feed   #Activity: bedrest   #Dispo: MICU   42 year old female with PMHx Asthma, HTN presents with cough and SOB x3 days and hemoptysis. CT chest showing cavitary pneumonia. Cultures +MRSA bacteremia. Intubated; failed SBT. On Zyvox and Tamiflu. Repeat CT showing worsening PNA.    # Acute hypoxemic respiratory failure 2/2 cavitary PNA  # MRSA bacteremia  # Influenza B  # hx Asthma   - CT Angio Chest PE Protocol: Bilateral patchy groundglass nodularity with dominant confluent left lower lobe opacification with numerous cavitary lesions. Additional contralateral right lower lobe cavitary 1 cm nodule. Findings compatible with cavitary pneumonia in the appropriate clinical setting; differential diagnosis includes tuberculosis.  - Strep/Legionella negative, HIV negative   - intubated 5/23, self-extubated 5/25, re-intubated 5/25  - blood cultures and DTA +MRSA  - ABx per ID: Zyvox 600mg q12, 10 day course Tamiflu  - Mupirocin BID x5 days  - blood cultures +MRSA  - blood cultures 5/28: NGTD x2  - ECHO: no vegetations. EF 65%, mild TR and pulm HTN  - MASTER?  - repeat CT chest: Diffusely increased number and size of bilateral cavitary lesions with the left lower lobe now demonstrating a consolidative process of the entirelobe.  - D-dimer 1665, LE duplex negative for DVT  - c/w Lasix 60mg qd, add Metolazone 5mg   - repeat pro-mya  - as per ID: recommend MASTER  - D/C linezolid- Clinda 450mg every 6h PO (IV storage), Vancomycin 500 qd as per pharmacy  - c/w Tamiflu (day 7/10)    # LANCE   - Nephro following; post-infectious GN vs. ATN vs. pulmonary renal syndrome  - Cr stable around 2.9  - renal U/S: negative   - CK trending down 1483>39, continue to trend   - good urine output  - UA: +proteins and blood, Pr/Cr 1.3 (H)  - f/u vasculitis panel, hepatitis, HIV neg  - c/w oral bicarb 1300 mg q8  - d/c IVF, start Lasix 60mg BID  - no acute indication for RRT    # Anemia  # Thrombocytopenia   - Hgb 6.2 on ABG; transfused 1u pRBC  - trend CBC, active type and screen, transfuse <7  - monitor plts, on Zyvox  - DIC panel negative   - Dimer 1665, LE Duplex negative for DVT  - hold heparin 5/30    #DVT ppx: heparin 5000 q8  #GI ppx: Pantoprazole 40mg qd  #Diet: NPO w/ tube feed   #Activity: bedrest   #Dispo: MICU

## 2023-05-30 NOTE — PROGRESS NOTE ADULT - SUBJECTIVE AND OBJECTIVE BOX
Patient is a 42y old  Female who presents with a chief complaint of AHRF (30 May 2023 09:15)    HPI:  42 year-old female with PMH of Asthma, HTN? presents with complaint of cough x3 days and SOB. During my encounter pt with dyspnea and increased work of breathing and chest pain, unable to properly provide accurate history. She states that her cough has been progressively worsening over the past 3 days, endorses hemoptysis since yesterday. She states she had a friend who came over and stayed with for more than a week who was sick recently, but she does not know if she recently travelled out of [facility] or not. She also admits that her children has been sick with Sore throat and fever for past 3 days. She admits to fever, chills, sever Chest pain which has gotten worse since she came to the ED. She took 1x Tamiflu yesterday. She otherwise denies diarrhea, constipation, urinary symptoms. She is not vaccinated for Influenza or COVID-19.     In ED  /87, , RR 28, T 100.8 F, 98% on 15 L NRB  Labs significant for wbc 16.7K with Neutrophilic Predominance and L shift, Hgb 11.4, MCV 61.6, Cr 2.0, BUN 23, Alk Phos 125/AST 62/ALT 27, Lac 4.9> 3.9  RVP + Influenza B      CT Angio Chest PE Protocol w/ IV Cont   1. Bilateral patchy groundglass nodularity with dominant confluent left lower lobe opacification with numerous cavitary lesions. Additional contralateral right lower lobe cavitary 1 cm nodule. Findings compatible with cavitary pneumonia in the appropriate clinical setting; differential diagnosis includes tuberculosis.  2. Confluent ill-defined left hilar and mediastinal adenopathy, likely reactive, without dominant lymph node delineated.  3. No evidence of pulmonary embolism.    s/p 1x Rocephin, Meropenem, 3 L LR in ED    Pt admitted to SDU for further managements, during my encounter pt is very agitated, in acute distress. Pt received appropriate pain control with IV Morphine, s/p Xanax 1 mg 1x for agitation and anxiety, despite pain control and adequate fluid resuscitation pt remained tachycardic, tachypneic. STAT EKG reviewed with Cardiology team on call After discussion with Cardiology team, Pt received 1x Adenosine 6 mg IV push with no change. Case discussed with Critical Care team, decision was made for Intubation and Pt upgraded to MICU.   (24 May 2023 00:58)       INTERVAL HPI/OVERNIGHT EVENTS:   No overnight events   Afebrile, hemodynamically stable   Sedated with Precedex, Fentanyl, Propofol. Versed pushes PRN for agitation    Subjective:    ICU Vital Signs Last 24 Hrs  T(C): 37.1 (30 May 2023 08:00), Max: 38 (30 May 2023 04:00)  T(F): 98.8 (30 May 2023 08:00), Max: 100.4 (30 May 2023 04:00)  HR: 79 (30 May 2023 09:00) (76 - 94)  BP: 107/69 (30 May 2023 08:00) (96/54 - 135/72)  BP(mean): 83 (30 May 2023 08:00) (70 - 98)  ABP: --  ABP(mean): --  RR: 22 (30 May 2023 09:00) (15 - 39)  SpO2: 99% (30 May 2023 09:00) (96% - 100%)    O2 Parameters below as of 30 May 2023 09:00      O2 Concentration (%): 40      I&O's Summary    29 May 2023 07:01  -  30 May 2023 07:00  --------------------------------------------------------  IN: 2711.4 mL / OUT: 3405 mL / NET: -693.6 mL    30 May 2023 07:01  -  30 May 2023 10:00  --------------------------------------------------------  IN: 292.5 mL / OUT: 625 mL / NET: -332.5 mL      Mode: AC/ CMV (Assist Control/ Continuous Mandatory Ventilation)  RR (machine): 22  TV (machine): 380  FiO2: 40  PEEP: 10  ITime: 1  MAP: 14  PIP: 29      Daily     Daily Weight in k.4 (30 May 2023 05:00)    Adult Advanced Hemodynamics Last 24 Hrs  CVP(mm Hg): --  CVP(cm H2O): --  CO: --  CI: --  PA: --  PA(mean): --  PCWP: --  SVR: --  SVRI: --  PVR: --  PVRI: --    EKG/Telemetry Events:    MEDICATIONS  (STANDING):  albuterol    90 MICROgram(s) HFA Inhaler 4 Puff(s) Inhalation every 6 hours  chlorhexidine 0.12% Liquid 15 milliLiter(s) Oral Mucosa every 12 hours  chlorhexidine 2% Cloths 1 Application(s) Topical daily  chlorhexidine 2% Cloths 1 Application(s) Topical <User Schedule>  dexMEDEtomidine Infusion 0.2 MICROgram(s)/kG/Hr (4.25 mL/Hr) IV Continuous <Continuous>  fentaNYL   Infusion. 0.555 MICROgram(s)/kG/Hr (4.72 mL/Hr) IV Continuous <Continuous>  insulin lispro (ADMELOG) corrective regimen sliding scale   SubCutaneous three times a day before meals  ipratropium 17 MICROgram(s) HFA Inhaler 4 Puff(s) Inhalation every 6 hours  linezolid  IVPB 600 milliGRAM(s) IV Intermittent every 12 hours  metolazone 5 milliGRAM(s) Oral daily  mupirocin 2% Nasal 1 Application(s) Both Nostrils two times a day  oseltamivir 30 milliGRAM(s) Oral every 12 hours  pantoprazole   Suspension 40 milliGRAM(s) Oral daily  polyethylene glycol 3350 17 Gram(s) Oral every 12 hours  propofol Infusion 11.098 MICROgram(s)/kG/Min (5.66 mL/Hr) IV Continuous <Continuous>  senna 2 Tablet(s) Oral at bedtime  sodium bicarbonate 1300 milliGRAM(s) Oral every 8 hours    MEDICATIONS  (PRN):  acetaminophen     Tablet .. 650 milliGRAM(s) Oral every 6 hours PRN Temp greater or equal to 38C (100.4F)  albuterol    90 MICROgram(s) HFA Inhaler 4 Puff(s) Inhalation every 4 hours PRN Shortness of Breath and/or Wheezing  ipratropium 17 MICROgram(s) HFA Inhaler 4 Puff(s) Inhalation <User Schedule> PRN -  midazolam Injectable 2 milliGRAM(s) IV Push once PRN agitation  midazolam Injectable 2 milliGRAM(s) IV Push every 8 hours PRN Sedation      PHYSICAL EXAM:  GENERAL:   HEAD:  Atraumatic, Normocephalic  EYES: EOMI, PERRLA, conjunctiva and sclera clear  NECK: Supple, No JVD, Normal thyroid, no enlarged nodes  NERVOUS SYSTEM:  Alert & Awake.   CHEST/LUNG: B/L good air entry; No rales, rhonchi, or wheezing  HEART: S1S2 normal, no S3, Regular rate and rhythm; No murmurs  ABDOMEN: Soft, Nontender, Nondistended; Bowel sounds present  EXTREMITIES:  2+ Peripheral Pulses, No clubbing, cyanosis, or edema  LYMPH: No lymphadenopathy noted  SKIN: No rashes or lesions    LABS:                        7.2    16.88 )-----------( 214      ( 30 May 2023 06:00 )             22.4     05-30    147<H>  |  112<H>  |  81<HH>  ----------------------------<  91  4.3   |  21  |  2.8<H>    Ca    8.3<L>      30 May 2023 06:00  Phos  5.0     05-28  Mg     2.3     05-30    TPro  5.5<L>  /  Alb  2.0<L>  /  TBili  0.2  /  DBili  x   /  AST  <5  /  ALT  <5  /  AlkPhos  84  05-30    LIVER FUNCTIONS - ( 30 May 2023 06:00 )  Alb: 2.0 g/dL / Pro: 5.5 g/dL / ALK PHOS: 84 U/L / ALT: <5 U/L / AST: <5 U/L / GGT: x           PT/INR - ( 28 May 2023 12:05 )   PT: 11.80 sec;   INR: 1.03 ratio         PTT - ( 28 May 2023 12:05 )  PTT:31.0 sec  CAPILLARY BLOOD GLUCOSE      POCT Blood Glucose.: 145 mg/dL (30 May 2023 06:29)  POCT Blood Glucose.: 126 mg/dL (29 May 2023 11:19)    ABG - ( 30 May 2023 02:43 )  pH, Arterial: 7.36  pH, Blood: x     /  pCO2: 37    /  pO2: 123   / HCO3: 21    / Base Excess: -4.2  /  SaO2: 98.2              Creatine Kinase, Serum: 33 U/L ( @ 06:00)    CARDIAC MARKERS ( 30 May 2023 06:00 )  x     / x     / 33 U/L / x     / x      CARDIAC MARKERS ( 29 May 2023 05:42 )  x     / x     / 39 U/L / x     / x                RADIOLOGY & ADDITIONAL TESTS:  CXR:    < from: CT Chest No Cont (23 @ 14:14) >  IMPRESSION:    Diffusely increased number and size of bilateral cavitary lesions with   the left lower lobe now demonstrating a consolidative process of the   entirelobe.      Additional scattered bilateral ground glass opacities are noted. Findings   are compatible with progressing pneumonia.    < end of copied text >    < from: TTE Echo Complete w/o Contrast w/ Doppler (23 @ 08:47) >  Summary:   1. LV Ejection Fraction by Evans's Method with a biplane EF of 65 %.   2. Normal global left ventricular systolic function.   3. Mildly increased LV wall thickness.   4. Mildly reduced RV systolic function.   5. Mild mitral annular calcification.   6. Mild thickening of the anterior and posterior mitral valve leaflets.   7. Moderate tricuspid regurgitation.   8. Estimated pulmonary artery systolic pressure is 42.3 mmHg assuming a   right atrial pressure of 8 mmHg, which is consistent with mild pulmonary   hypertension.   9. Normal left atrial size.  10. Normal right atrial size.  11. Large pleural effusion in the left lateral region.    < end of copied text >          Care Discussed with Consultants/Other Providers [ x] YES  [ ] NO

## 2023-05-30 NOTE — PHARMACOTHERAPY INTERVENTION NOTE - COMMENTS
Recommended switching linezolid to IV vancomycin and PO clindamycin, as patient has been persistently bacteremic, is continuing to spike fevers, and still has leukocytosis.

## 2023-05-30 NOTE — PROGRESS NOTE ADULT - ASSESSMENT
IMPRESSION:    Acute hypoxemic respiratory failure still intubated, ventilated  Severe cavitary CAP MRSA   MRSA bacteremia   Non massive hemoptysis   LANCE   HO asthma   Influenza B     PLAN:    CNS: SAT.  Drug screen noted     HEENT: Oral care.  ET care     PULMONARY:  HOB @ 45 degrees.  Aspiration precautions.  ARDS net MV setting.  Monitor PPL and DP.  if needed change to PC for flow asynchrony,     CARDIOVASCULAR:  ECHO noted.  Hold lasix .  DC IVF.  Trend CPK.  metolazone/ lasix ( + 19 l), NEED noe    GI: GI prophylaxis.  OG Feeding to target.  Adjust Bowel regimen.      RENAL:  Follow up lytes.  Correct as needed.  Weaver for strict Is and OS.       INFECTIOUS DISEASE: Follow up cultures. Zyvox, and Tamiflu.     HEMATOLOGICAL:  DVT prophylaxis.  Dimer noted.  FU LE duplex .  Monitor CBC     ENDOCRINE:  Follow up FS.  Insulin protocol if needed.      MUSCULOSKELETAL:  Rheum and Vasculitis panel.  Off loading     MICU

## 2023-05-30 NOTE — PROGRESS NOTE ADULT - SUBJECTIVE AND OBJECTIVE BOX
seen andexamined  24 h events noted   intubated/ventilated         PAST HISTORY  --------------------------------------------------------------------------------  No significant changes to PMH, PSH, FHx, SHx, unless otherwise noted    ALLERGIES & MEDICATIONS  --------------------------------------------------------------------------------  Allergies    aspirin (Short breath; Anaphylaxis)    Intolerances      Standing Inpatient Medications  albuterol    90 MICROgram(s) HFA Inhaler 4 Puff(s) Inhalation every 6 hours  chlorhexidine 0.12% Liquid 15 milliLiter(s) Oral Mucosa every 12 hours  chlorhexidine 2% Cloths 1 Application(s) Topical <User Schedule>  chlorhexidine 2% Cloths 1 Application(s) Topical daily  dexMEDEtomidine Infusion 0.2 MICROgram(s)/kG/Hr IV Continuous <Continuous>  fentaNYL   Infusion. 0.555 MICROgram(s)/kG/Hr IV Continuous <Continuous>  insulin lispro (ADMELOG) corrective regimen sliding scale   SubCutaneous three times a day before meals  ipratropium 17 MICROgram(s) HFA Inhaler 4 Puff(s) Inhalation every 6 hours  linezolid  IVPB 600 milliGRAM(s) IV Intermittent every 12 hours  metolazone 5 milliGRAM(s) Oral daily  mupirocin 2% Nasal 1 Application(s) Both Nostrils two times a day  oseltamivir 30 milliGRAM(s) Oral every 24 hours  pantoprazole   Suspension 40 milliGRAM(s) Oral daily  polyethylene glycol 3350 17 Gram(s) Oral every 12 hours  propofol Infusion 11.098 MICROgram(s)/kG/Min IV Continuous <Continuous>  senna 2 Tablet(s) Oral at bedtime  sodium bicarbonate 1300 milliGRAM(s) Oral every 8 hours    PRN Inpatient Medications  acetaminophen     Tablet .. 650 milliGRAM(s) Oral every 6 hours PRN  albuterol    90 MICROgram(s) HFA Inhaler 4 Puff(s) Inhalation every 4 hours PRN  ipratropium 17 MICROgram(s) HFA Inhaler 4 Puff(s) Inhalation <User Schedule> PRN  midazolam Injectable 2 milliGRAM(s) IV Push once PRN  midazolam Injectable 2 milliGRAM(s) IV Push every 8 hours PRN      VITALS/PHYSICAL EXAM  --------------------------------------------------------------------------------  T(C): 37.1 (05-30-23 @ 08:00), Max: 38 (05-30-23 @ 04:00)  HR: 85 (05-30-23 @ 08:00) (75 - 94)  BP: 107/69 (05-30-23 @ 08:00) (96/54 - 135/72)  RR: 36 (05-30-23 @ 08:00) (11 - 39)  SpO2: 98% (05-30-23 @ 08:00) (96% - 100%)  Wt(kg): --        05-29-23 @ 07:01  -  05-30-23 @ 07:00  --------------------------------------------------------  IN: 2711.4 mL / OUT: 3405 mL / NET: -693.6 mL      Physical Exam:  	Gen: intubated/ventilated   	Pulm: B/L kell   	CV:  S1S2; no rub  	Abd: +distended  	    LABS/STUDIES  --------------------------------------------------------------------------------              7.2    16.88 >-----------<  214      [05-30-23 @ 06:00]              22.4     147  |  112  |  81  ----------------------------<  91      [05-30-23 @ 06:00]  4.3   |  21  |  2.8        Ca     8.3     [05-30-23 @ 06:00]      Mg     2.3     [05-30-23 @ 06:00]      Phos  5.0     [05-28-23 @ 12:05]    TPro  5.5  /  Alb  2.0  /  TBili  0.2  /  DBili  x   /  AST  <5  /  ALT  <5  /  AlkPhos  84  [05-30-23 @ 06:00]    PT/INR: PT 11.80, INR 1.03       [05-28-23 @ 12:05]  PTT: 31.0       [05-28-23 @ 12:05]    CK 33      [05-30-23 @ 06:00]    Creatinine Trend:  SCr 2.8 [05-30 @ 06:00]  SCr 2.9 [05-29 @ 05:42]  SCr 3.0 [05-28 @ 12:05]  SCr 2.7 [05-27 @ 05:47]  SCr 2.6 [05-26 @ 06:09]    Urinalysis - [05-25-23 @ 11:30]      Color Yellow / Appearance Slightly Turbid / SG 1.027 / pH 6.0      Gluc Negative / Ketone Negative  / Bili Negative / Urobili <2 mg/dL       Blood Large / Protein 100 mg/dL / Leuk Est Negative / Nitrite Negative      RBC 45 / WBC 30 / Hyaline 9 / Gran  / Sq Epi  / Non Sq Epi  / Bacteria Negative    Urine Creatinine 178      [05-24-23 @ 19:16]  Urine Protein 232      [05-24-23 @ 19:16]  Urine Sodium <20.0      [05-24-23 @ 19:16]  Urine Urea Nitrogen 239      [05-24-23 @ 19:16]  Urine Potassium 50      [05-24-23 @ 19:16]  Urine Osmolality 340      [05-24-23 @ 19:16]    Iron 13, TIBC 174, %sat 7      [05-28-23 @ 12:10]    HBsAg Nonreact      [05-26-23 @ 11:00]  HCV 0.16, Nonreact      [05-26-23 @ 11:00]  HIV Nonreact      [05-27-23 @ 05:47]    CHARLES: titer 1:640, pattern Centromere      [05-24-23 @ 11:48]  dsDNA <12      [05-24-23 @ 11:48]  C3 Complement 80      [05-24-23 @ 11:48]  C4 Complement 34      [05-24-23 @ 11:48]  ANCA: cANCA Negative, pANCA Negative, atypical ANCA Negative      [05-24-23 @ 11:48]  anti-GBM <0.2      [05-24-23 @ 11:48]  Free Light Chains: kappa 16.56, lambda 7.37, ratio = 2.25      [05-24 @ 11:48]

## 2023-05-30 NOTE — PROGRESS NOTE ADULT - ASSESSMENT
ASSESSMENT  42 year-old female with PMH of Asthma, HTN? presents with complaint of cough x3 days and SOB. During my encounter pt with dyspnea and increased work of breathing and chest pain, unable to properly provide accurate history. She states that her cough has been progressively worsening over the past 3 days, endorses hemoptysis since yesterday.     IMPRESSION  #Flu B with cavitary PNA, with MRSA bacteremia and cavitary PNA    5/29 BCX NGTD     5/28 BCX NGTD     5/27 BCX +     5/26 BCX +    5/24 DTA MRSA    5/24 fungal ETT   Few Yeast, Fungitell: 41 (05-24-23 @ 06:10)-- unremarkable     5/23 BCX MRSA 3/4 bottles    MRSA PCR +   < from: CT Chest No Cont (05.29.23 @ 14:14) >  Diffusely increased number and size of bilateral cavitary lesions with   the left lower lobe now demonstrating a consolidative process of the   entirelobe.  Additional scattered bilateral ground glass opacities are noted. Findings   are compatible with progressing pneumonia.  WBC 16--> 3 12% Bands, Severe sepsis on admission  Doubt TB (lower lobe)  Acute hypoxemic resp failure requiring intubation  < from: CT Angio Chest PE Protocol w/ IV Cont (05.23.23 @ 22:06) >  1. Bilateral patchy groundglass nodularity with dominant confluent left   lower lobe opacification with numerous cavitary lesions. Additional   contralateral right lower lobe cavitary 1 cm nodule. Findings compatible   with cavitary pneumonia in the appropriate clinical setting; differential   diagnosis includes tuberculosis.  2. Confluent ill-defined left hilar and mediastinal adenopathy, likely   reactive, without dominant lymph node delineated.  3. No evidence of pulmonary embolism.  #Lactic acidosis  #LANCE  #Hyponatremia      RECOMMENDATIONS  - Repeat ETT Cx   - Repeat BCX until clearance, 5/28 NG  - TTE Mild thickening of the anterior and posterior mitral valve leaflets.  - Persistent bacteremia , recommend MASTER  - D/C linezolid- Vanc dosing per ID pharmacy and Clinda 450mg every 6h PO (IV storage)  - Continue tamiflu , would prolong to 10 days as severely ill   - Guarded prognosis    If any questions, please call or send a message on [facility] [employer]  Please continue to update ID with any pertinent new laboratory or radiographic findings  Spectra [id number]

## 2023-05-30 NOTE — PROGRESS NOTE ADULT - ASSESSMENT
42/F with PMH of asthma and HTN, admitted with SOB, developed LANCE.   Acute hypoxemic resp failure requiring intubation  Bilateral patchy groundglass nodularity with dominant confluent left   lower lobe opacification with numerous cavitary lesions. Additional   contralateral right lower lobe cavitary 1 cm nodule. Findings compatible   with cavitary pneumonia in the appropriate clinical setting; differential   diagnosis includes tuberculosis.  Non massive hemoptysis   MRSA bacteremia, prerenal FeNa, suspicion for post infectious GN  Kidney sono neg for obstruction  LANCE prerenal vs ATN vs pulmo renal syndrome  s/p CT with IV contrast 5/23 -  non oliguric   cr stable    phos noted / no binders yet / feed nepro   w/up for GN noted  negative so far      continue sodium bicarbonate   1300  q 8   no acute indication for RRT   will follow

## 2023-05-30 NOTE — PROGRESS NOTE ADULT - SUBJECTIVE AND OBJECTIVE BOX
ROSSIZEYNEP  42y, Female  Allergy: aspirin (Short breath; Anaphylaxis)      LOS  7d    CHIEF COMPLAINT: AHRF (30 May 2023 10:00)      INTERVAL EVENTS/HPI  - T(F): , Max: 100.4 (05-30-23 @ 04:00) fever  - WBC Count: 16.88 (05-30-23 @ 06:00)  WBC Count: 16.61 (05-29-23 @ 16:00)     - Creatinine, Serum: 2.8 (05-30-23 @ 06:00)  Creatinine, Serum: 2.9 (05-29-23 @ 05:42)     -   -   -     ROS  cannot obtain secondary to patient's sedation and/or mental status    VITALS:  T(F): 98.8, Max: 100.4 (05-30-23 @ 04:00)  HR: 98  BP: 159/80  RR: 26Vital Signs Last 24 Hrs  T(C): 37.1 (30 May 2023 08:00), Max: 38 (30 May 2023 04:00)  T(F): 98.8 (30 May 2023 08:00), Max: 100.4 (30 May 2023 04:00)  HR: 98 (30 May 2023 12:23) (76 - 100)  BP: 159/80 (30 May 2023 12:23) (83/48 - 159/80)  BP(mean): 111 (30 May 2023 12:23) (61 - 122)  RR: 26 (30 May 2023 12:23) (19 - 45)  SpO2: 100% (30 May 2023 12:23) (96% - 100%)    Parameters below as of 30 May 2023 09:00      O2 Concentration (%): 40    PHYSICAL EXAM:  Gen: Intubated  CV: RRR  Lungs: Decreased BS at bases  Abd: Soft  Neuro: Sedated  Lines clean, no phlebitis    FH: Non-contributory  Social Hx: Non-contributory    TESTS & MEASUREMENTS:                        7.2    16.88 )-----------( 214      ( 30 May 2023 06:00 )             22.4     05-30    147<H>  |  112<H>  |  81<HH>  ----------------------------<  91  4.3   |  21  |  2.8<H>    Ca    8.3<L>      30 May 2023 06:00  Mg     2.3     05-30    TPro  5.5<L>  /  Alb  2.0<L>  /  TBili  0.2  /  DBili  x   /  AST  <5  /  ALT  <5  /  AlkPhos  84  05-30      LIVER FUNCTIONS - ( 30 May 2023 06:00 )  Alb: 2.0 g/dL / Pro: 5.5 g/dL / ALK PHOS: 84 U/L / ALT: <5 U/L / AST: <5 U/L / GGT: x               Culture - Blood (collected 05-29-23 @ 05:55)  Source: .Blood Blood  Preliminary Report (05-30-23 @ 14:01):    No growth to date.    Culture - Blood (collected 05-28-23 @ 16:00)  Source: .Blood Blood-Peripheral  Preliminary Report (05-30-23 @ 03:02):    No growth to date.    Culture - Blood (collected 05-28-23 @ 12:00)  Source: .Blood Blood  Preliminary Report (05-29-23 @ 21:01):    No growth to date.    Culture - Blood (collected 05-27-23 @ 06:05)  Source: .Blood Blood  Gram Stain (05-28-23 @ 17:02):    Growth in aerobic bottle: Gram Positive Cocci in Clusters  Final Report (05-30-23 @ 09:16):    Growth in aerobic bottle: Staphylococcus aureus    See previous culture [id number]    Culture - Blood (collected 05-27-23 @ 06:05)  Source: .Blood Blood  Gram Stain (05-29-23 @ 13:29):    Growth in aerobic bottle: Gram Positive Cocci in Clusters    Growth in anaerobic bottle: Gram Positive Cocci in Clusters  Final Report (05-30-23 @ 11:11):    Growth in aerobic and anaerobic bottles: Methicillin Resistant    Staphylococcus aureus  Organism: Methicillin resistant Staphylococcus aureus (05-30-23 @ 11:11)  Organism: Methicillin resistant Staphylococcus aureus (05-30-23 @ 11:11)      Method Type: RUSSELL      -  Ampicillin/Sulbactam: R 16/8      -  Cefazolin: R >16      -  Clindamycin: S 0.5      -  Daptomycin: S 1      -  Erythromycin: R >4      -  Gentamicin: S <=1 Should not be used as monotherapy      -  Linezolid: S 2      -  Oxacillin: R >2      -  Penicillin: R >8      -  Rifampin: S <=1 Should not be used as monotherapy      -  Tetracycline: S <=1      -  Trimethoprim/Sulfamethoxazole: S <=0.5/9.5      -  Vancomycin: S 2    Culture - Blood (collected 05-26-23 @ 06:09)  Source: .Blood Blood  Gram Stain (05-29-23 @ 00:35):    Growth in anaerobic bottle: Gram Positive Cocci in Clusters  Preliminary Report (05-29-23 @ 16:57):    Growth in anaerobic bottle: Staphylococcus aureus    See previous culture [id number]    Culture - Sputum (collected 05-24-23 @ 19:17)  Source: Trach Asp Tracheal Aspirate  Gram Stain (05-25-23 @ 07:31):    No polymorphonuclear leukocytes per low power field    No Squamous epithelial cells per low power field    Numerous Gram positive cocci in pairs per oil power field  Final Report (05-27-23 @ 08:45):    Numerous Methicillin Resistant Staphylococcus aureus    Normal Respiratory Laila present  Organism: Methicillin resistant Staphylococcus aureus (05-27-23 @ 08:45)  Organism: Methicillin resistant Staphylococcus aureus (05-27-23 @ 08:45)      Method Type: RUSSELL      -  Ampicillin/Sulbactam: R 16/8      -  Cefazolin: R >16      -  Clindamycin: S <=0.25      -  Erythromycin: R >4      -  Gentamicin: S <=1 Should not be used as monotherapy      -  Linezolid: S 2      -  Oxacillin: R >2      -  Penicillin: R >8      -  Rifampin: S <=1 Should not be used as monotherapy      -  Tetracycline: S <=1      -  Trimethoprim/Sulfamethoxazole: S <=0.5/9.5      -  Vancomycin: S 2    Culture - Acid Fast - Sputum w/Smear (collected 05-24-23 @ 19:17)  Source: Trach Asp Tracheal Aspirate  Preliminary Report (05-27-23 @ 15:08):    Culture is being performed.    Culture - Fungal, Sputum (collected 05-24-23 @ 19:17)  Source: Trach Asp Tracheal Aspirate  Preliminary Report (05-29-23 @ 07:07):    Few Yeast    Culture - Blood (collected 05-24-23 @ 06:10)  Source: .Blood None  Gram Stain (05-25-23 @ 11:18):    Growth in aerobic bottle: Gram Positive Cocci in Clusters    Growth in anaerobic bottle: Gram Positive Cocci in Clusters  Final Report (05-26-23 @ 08:13):    Growth in aerobic and anaerobic bottles: Methicillin Resistant    Staphylococcus aureus    See previous culture [id number]    Culture - Blood (collected 05-23-23 @ 20:37)  Source: .Blood Blood-Peripheral  Gram Stain (05-24-23 @ 17:51):    Growth in aerobic bottle: Gram Positive Cocci in Clusters    Growth in anaerobic bottle: Gram Positive Cocci in Clusters  Final Report (05-26-23 @ 08:14):    Growth in aerobic and anaerobic bottles: Methicillin Resistant    Staphylococcus aureus    See previous culture [id number]    Culture - Blood (collected 05-23-23 @ 20:37)  Source: .Blood Blood-Peripheral  Gram Stain (05-24-23 @ 14:24):    Growth in aerobic bottle: Gram Positive Cocci in Clusters  Final Report (05-26-23 @ 08:11):    Growth in aerobic bottle: Methicillin Resistant Staphylococcus aureus    ***Blood Panel PCR results on this specimen are available    approximately 3 hours after the Gram stain result.***    Gram stain, PCR, and/or culture results may not always    correspond due to difference in methodologies.    ************************************************************    This PCR assay was performed by multiplex PCR. This    Assay tests for 66 bacterial and resistance gene targets.    Please refer to the [facility] test directory    at [email or web address] for details.  Organism: Blood Culture PCR  Methicillin resistant Staphylococcus aureus (05-26-23 @ 08:11)  Organism: Methicillin resistant Staphylococcus aureus (05-26-23 @ 08:11)      Method Type: RUSSELL      -  Ampicillin/Sulbactam: R 16/8      -  Cefazolin: R 16      -  Clindamycin: S <=0.25      -  Daptomycin: S 0.5      -  Erythromycin: R >4      -  Gentamicin: S <=1 Should not be used as monotherapy      -  Linezolid: S 2      -  Oxacillin: R >2      -  Penicillin: R >8      -  Rifampin: S <=1 Should not be used as monotherapy      -  Tetracycline: S <=1      -  Trimethoprim/Sulfamethoxazole: S <=0.5/9.5      -  Vancomycin: S 2  Organism: Blood Culture PCR (05-26-23 @ 08:11)      Method Type: PCR      -  Methicillin resistant Staphylococcus aureus (MRSA): Detec        Lactate, Blood: 2.3 mmol/L (05-26-23 @ 06:09)      INFECTIOUS DISEASES TESTING  HIV-1/2 Combo Result: Nonreact (05-27-23 @ 05:47)  MRSA PCR Result.: Positive (05-24-23 @ 19:17)  Legionella Antigen, Urine: Negative (05-24-23 @ 19:16)  Procalcitonin, Serum: 48.30 (05-24-23 @ 06:10)  Fungitell: 41 (05-24-23 @ 06:10)      INFLAMMATORY MARKERS      RADIOLOGY & ADDITIONAL TESTS:  I have personally reviewed the last available Chest xray  CXR  Xray Chest 1 View- PORTABLE-Urgent:   ACC: [id number] EXAM:  XR CHEST PORTABLE URGENT 1V   ORDERED BY: CHIARA HENNING     PROCEDURE DATE:  05/28/2023          INTERPRETATION:  CLINICAL HISTORY / REASON FOR EXAM: Line placement.    COMPARISON: Chest radiograph from May 28, 2023.    TECHNIQUE/POSITIONING: Satisfactory. Single image, AP portable chest   radiograph.    FINDINGS:    SUPPORT DEVICES: Endotracheal tube with distal tip approximately 3 cm   above the noe. Enteric tube courses below the left hemidiaphragm, with   distal tip out of the field of view.    CARDIAC/MEDIASTINUM/HILUM: Unchanged cardiac silhouette.    LUNG PARENCHYMA/PLEURA: Unchanged left greater than right opacities. No   pneumothorax.    SKELETON/SOFT TISSUES: Unchanged.      IMPRESSION:    Unchanged left greater than right opacities.    Interval placement of enteric tube.    --- End of Report ---            FLORA GOODMAN MD; Attending Radiologist  This document has been electronically signed. May 28 2023  8:52AM (05-28-23 @ 07:59)      CT  CT Chest No Cont:   ACC: [id number] EXAM:  CT CHEST   ORDERED BY: SILVER HERNANDEZ     PROCEDURE DATE:  05/29/2023          INTERPRETATION:  CLINICAL HISTORY / REASON FOR EXAM: Evaluate fluid   overload. MRSA bacteremia.    TECHNIQUE: Multislice helical sections were obtained from the thoracic   inlet to the lung bases without intravenous contrast. Coronal, sagittal   and 3D/MIP reformatted images are also submitted.    COMPARISON: CT chest from 5/23/2023.    FINDINGS:    TUBES/LINES: Endotracheal tube terminates 3cm superior to the noe.   Enteric tube terminates within the stomach.    LUNGS, PLEURA, AND AIRWAYS: Motion artifact limits evaluation of lung   parenchyma. There has been interval increase in size and number of the   previously noted left-sided predominant cavitary lesions, now with   multiple new cavitary lesions within the left upper lobe and right middle   lobe. The previously noted left lower lobe groundglass opacities now   demonstrate a consolidative process of the entire lobe. Additional   scattered bilateral glass opacities.    MEDIASTINUM/LYMPH NODES: No mediastinal or axillary lymphadenopathy.    HEART/GREAT VESSELS: No pericardial effusion. Heart size unremarkable. No   aneurysmal dilation of the thoracic aorta.    BONES/SOFT TISSUES: No acute osseous abnormality.    VISUALIZED UPPER ABDOMEN: Unremarkable.      IMPRESSION:    Diffusely increased number and size of bilateral cavitary lesions with   the left lower lobe now demonstrating a consolidative process of the   entirelobe.      Additional scattered bilateral ground glass opacities are noted. Findings   are compatible with progressing pneumonia.    --- End of Report ---          HEMA PAREDES MD; Resident Radiologist  This document has been electronically signed.  BARBARA BARRY MD; Attending Radiologist  This document has been electronically signed. May 30 2023  9:38AM (05-29-23 @ 14:14)      CARDIOLOGY TESTING  12 Lead ECG:   Ventricular Rate 134 BPM    Atrial Rate 134 BPM    P-R Interval 208 ms    QRS Duration 76 ms    Q-T Interval 318 ms    QTC Calculation(Bazett) 474 ms    P Axis 97 degrees    R Axis 40 degrees    T Axis 70 degrees    Diagnosis Line Sinus tachycardiawith Fusion complexes  Otherwise normal ECG    Confirmed by PANFILO COVINGTON MD ([id number]) on 5/24/2023 6:54:09 AM (05-24-23 @ 02:40)  12 Lead ECG:   Ventricular Rate 140 BPM    Atrial Rate 140 BPM    P-R Interval 120 ms    QRS Duration 138 ms    Q-T Interval 354 ms    QTC Calculation(Bazett) 540 ms    R Axis 73 degrees    T Axis 60 degrees    Diagnosis Line Sinus tachycardia  Right bundle branch block  Abnormal ECG    Confirmed by Jorge Wyatt ([id number]) on 5/23/2023 10:37:28 PM (05-23-23 @ 19:52)      MEDICATIONS  albuterol    90 MICROgram(s) HFA Inhaler 4  chlorhexidine 0.12% Liquid 15  chlorhexidine 2% Cloths 1  chlorhexidine 2% Cloths 1  dexMEDEtomidine Infusion 0.2  fentaNYL   Infusion. 0.555  insulin lispro (ADMELOG) corrective regimen sliding scale   ipratropium 17 MICROgram(s) HFA Inhaler 4  ketamine Infusion. 0.25  linezolid  IVPB 600  metolazone 5  mupirocin 2% Nasal 1  oseltamivir 30  pantoprazole   Suspension 40  polyethylene glycol 3350 17  propofol Infusion 11.098  senna 2  sodium bicarbonate 1300      WEIGHT  Weight (kg): 85 (05-24-23 @ 08:15)  Creatinine, Serum: 2.8 mg/dL (05-30-23 @ 06:00)      ANTIBIOTICS:  linezolid  IVPB 600 milliGRAM(s) IV Intermittent every 12 hours  oseltamivir 30 milliGRAM(s) Oral every 12 hours      All available historical records have been reviewed

## 2023-05-31 LAB
-  AMPICILLIN/SULBACTAM: SIGNIFICANT CHANGE UP
-  CEFAZOLIN: SIGNIFICANT CHANGE UP
-  CLINDAMYCIN: SIGNIFICANT CHANGE UP
-  DAPTOMYCIN: SIGNIFICANT CHANGE UP
-  ERYTHROMYCIN: SIGNIFICANT CHANGE UP
-  GENTAMICIN: SIGNIFICANT CHANGE UP
-  LINEZOLID: SIGNIFICANT CHANGE UP
-  OXACILLIN: SIGNIFICANT CHANGE UP
-  PENICILLIN: SIGNIFICANT CHANGE UP
-  RIFAMPIN: SIGNIFICANT CHANGE UP
-  TETRACYCLINE: SIGNIFICANT CHANGE UP
-  TRIMETHOPRIM/SULFAMETHOXAZOLE: SIGNIFICANT CHANGE UP
-  VANCOMYCIN: SIGNIFICANT CHANGE UP
ALBUMIN SERPL ELPH-MCNC: 2.3 G/DL — LOW (ref 3.5–5.2)
ALFENTANIL UR QUANT: SIGNIFICANT CHANGE UP NG/MG CREAT
ALP SERPL-CCNC: 79 U/L — SIGNIFICANT CHANGE UP (ref 30–115)
ALT FLD-CCNC: 10 U/L — SIGNIFICANT CHANGE UP (ref 0–41)
ANION GAP SERPL CALC-SCNC: 15 MMOL/L — HIGH (ref 7–14)
ANISOCYTOSIS BLD QL: SIGNIFICANT CHANGE UP
AST SERPL-CCNC: 19 U/L — SIGNIFICANT CHANGE UP (ref 0–41)
BASOPHILS # BLD AUTO: 0 K/UL — SIGNIFICANT CHANGE UP (ref 0–0.2)
BASOPHILS NFR BLD AUTO: 0 % — SIGNIFICANT CHANGE UP (ref 0–1)
BILIRUB SERPL-MCNC: 0.4 MG/DL — SIGNIFICANT CHANGE UP (ref 0.2–1.2)
BUN SERPL-MCNC: 75 MG/DL — CRITICAL HIGH (ref 10–20)
CALCIUM SERPL-MCNC: 8.2 MG/DL — LOW (ref 8.4–10.5)
CHLORIDE SERPL-SCNC: 112 MMOL/L — HIGH (ref 98–110)
CK SERPL-CCNC: 41 U/L — SIGNIFICANT CHANGE UP (ref 0–225)
CO2 SERPL-SCNC: 21 MMOL/L — SIGNIFICANT CHANGE UP (ref 17–32)
CREAT SERPL-MCNC: 3 MG/DL — HIGH (ref 0.7–1.5)
CREAT UR-MCNC: 134 MG/DL — SIGNIFICANT CHANGE UP
CULTURE RESULTS: SIGNIFICANT CHANGE UP
EGFR: 19 ML/MIN/1.73M2 — LOW
EOSINOPHIL # BLD AUTO: 0 K/UL — SIGNIFICANT CHANGE UP (ref 0–0.7)
EOSINOPHIL NFR BLD AUTO: 0 % — SIGNIFICANT CHANGE UP (ref 0–8)
FENTANYL UR CFM-MCNC: 93 NG/MG CREAT — SIGNIFICANT CHANGE UP
FENTANYL UR CFM-MCNC: ABNORMAL
FERRITIN SERPL-MCNC: 136 NG/ML — SIGNIFICANT CHANGE UP (ref 15–150)
GAS PNL BLDA: SIGNIFICANT CHANGE UP
GIANT PLATELETS BLD QL SMEAR: PRESENT — SIGNIFICANT CHANGE UP
GLUCOSE BLDC GLUCOMTR-MCNC: 112 MG/DL — HIGH (ref 70–99)
GLUCOSE BLDC GLUCOMTR-MCNC: 112 MG/DL — HIGH (ref 70–99)
GLUCOSE BLDC GLUCOMTR-MCNC: 114 MG/DL — HIGH (ref 70–99)
GLUCOSE BLDC GLUCOMTR-MCNC: 118 MG/DL — HIGH (ref 70–99)
GLUCOSE SERPL-MCNC: 86 MG/DL — SIGNIFICANT CHANGE UP (ref 70–99)
H CAPSUL AG SER IA-MCNC: SIGNIFICANT CHANGE UP
HCT VFR BLD CALC: 24.9 % — LOW (ref 37–47)
HGB BLD-MCNC: 7.6 G/DL — LOW (ref 12–16)
HYPOCHROMIA BLD QL: SIGNIFICANT CHANGE UP
LYMPHOCYTES # BLD AUTO: 10.4 % — LOW (ref 20.5–51.1)
LYMPHOCYTES # BLD AUTO: 2.48 K/UL — SIGNIFICANT CHANGE UP (ref 1.2–3.4)
MAGNESIUM SERPL-MCNC: 2.2 MG/DL — SIGNIFICANT CHANGE UP (ref 1.8–2.4)
MANUAL SMEAR VERIFICATION: SIGNIFICANT CHANGE UP
MCHC RBC-ENTMCNC: 19 PG — LOW (ref 27–31)
MCHC RBC-ENTMCNC: 30.5 G/DL — LOW (ref 32–37)
MCV RBC AUTO: 62.3 FL — LOW (ref 81–99)
METAMYELOCYTES # FLD: 1.7 % — HIGH (ref 0–0)
METHOD TYPE: SIGNIFICANT CHANGE UP
MICROCYTES BLD QL: SIGNIFICANT CHANGE UP
MONOCYTES # BLD AUTO: 1.86 K/UL — HIGH (ref 0.1–0.6)
MONOCYTES NFR BLD AUTO: 7.8 % — SIGNIFICANT CHANGE UP (ref 1.7–9.3)
MYELOCYTES NFR BLD: 0.9 % — HIGH (ref 0–0)
NEUTROPHILS # BLD AUTO: 18.87 K/UL — HIGH (ref 1.4–6.5)
NEUTROPHILS NFR BLD AUTO: 78.3 % — HIGH (ref 42.2–75.2)
NEUTS BAND # BLD: 0.9 % — SIGNIFICANT CHANGE UP (ref 0–6)
NORFENTANYL UR-MCNC: 103 NG/MG CREAT — SIGNIFICANT CHANGE UP
ORGANISM # SPEC MICROSCOPIC CNT: SIGNIFICANT CHANGE UP
ORGANISM # SPEC MICROSCOPIC CNT: SIGNIFICANT CHANGE UP
PLAT MORPH BLD: NORMAL — SIGNIFICANT CHANGE UP
PLATELET # BLD AUTO: 278 K/UL — SIGNIFICANT CHANGE UP (ref 130–400)
PMV BLD: 9.7 FL — SIGNIFICANT CHANGE UP (ref 7.4–10.4)
POIKILOCYTOSIS BLD QL AUTO: SLIGHT — SIGNIFICANT CHANGE UP
POLYCHROMASIA BLD QL SMEAR: SIGNIFICANT CHANGE UP
POTASSIUM SERPL-MCNC: 4.6 MMOL/L — SIGNIFICANT CHANGE UP (ref 3.5–5)
POTASSIUM SERPL-SCNC: 4.6 MMOL/L — SIGNIFICANT CHANGE UP (ref 3.5–5)
PROCALCITONIN SERPL-MCNC: 5.8 NG/ML — HIGH (ref 0.02–0.1)
PROT SERPL-MCNC: 6.2 G/DL — SIGNIFICANT CHANGE UP (ref 6–8)
RBC # BLD: 4 M/UL — LOW (ref 4.2–5.4)
RBC # FLD: 22.5 % — HIGH (ref 11.5–14.5)
RBC BLD AUTO: ABNORMAL
SODIUM SERPL-SCNC: 148 MMOL/L — HIGH (ref 135–146)
SPECIMEN SOURCE: SIGNIFICANT CHANGE UP
SUFENTANIL UR QUANT: SIGNIFICANT CHANGE UP NG/MG CREAT
TARGETS BLD QL SMEAR: SLIGHT — SIGNIFICANT CHANGE UP
WBC # BLD: 23.83 K/UL — HIGH (ref 4.8–10.8)
WBC # FLD AUTO: 23.83 K/UL — HIGH (ref 4.8–10.8)

## 2023-05-31 PROCEDURE — 93325 DOPPLER ECHO COLOR FLOW MAPG: CPT | Mod: 26

## 2023-05-31 PROCEDURE — 93320 DOPPLER ECHO COMPLETE: CPT | Mod: 26

## 2023-05-31 PROCEDURE — 71045 X-RAY EXAM CHEST 1 VIEW: CPT | Mod: 26

## 2023-05-31 PROCEDURE — 93312 ECHO TRANSESOPHAGEAL: CPT | Mod: 26,XU

## 2023-05-31 PROCEDURE — 99291 CRITICAL CARE FIRST HOUR: CPT

## 2023-05-31 RX ORDER — CEFEPIME 1 G/1
INJECTION, POWDER, FOR SOLUTION INTRAMUSCULAR; INTRAVENOUS
Refills: 0 | Status: DISCONTINUED | OUTPATIENT
Start: 2023-05-31 | End: 2023-06-02

## 2023-05-31 RX ORDER — CHLORHEXIDINE GLUCONATE 213 G/1000ML
15 SOLUTION TOPICAL EVERY 12 HOURS
Refills: 0 | Status: DISCONTINUED | OUTPATIENT
Start: 2023-05-31 | End: 2023-06-03

## 2023-05-31 RX ORDER — CEFEPIME 1 G/1
1000 INJECTION, POWDER, FOR SOLUTION INTRAMUSCULAR; INTRAVENOUS EVERY 24 HOURS
Refills: 0 | Status: DISCONTINUED | OUTPATIENT
Start: 2023-06-01 | End: 2023-06-02

## 2023-05-31 RX ORDER — SALICYLIC ACID 0.5 %
1 CLEANSER (GRAM) TOPICAL DAILY
Refills: 0 | Status: DISCONTINUED | OUTPATIENT
Start: 2023-05-31 | End: 2023-07-07

## 2023-05-31 RX ORDER — CHLORHEXIDINE GLUCONATE 213 G/1000ML
15 SOLUTION TOPICAL EVERY 12 HOURS
Refills: 0 | Status: DISCONTINUED | OUTPATIENT
Start: 2023-05-31 | End: 2023-05-31

## 2023-05-31 RX ORDER — CEFEPIME 1 G/1
1000 INJECTION, POWDER, FOR SOLUTION INTRAMUSCULAR; INTRAVENOUS ONCE
Refills: 0 | Status: COMPLETED | OUTPATIENT
Start: 2023-05-31 | End: 2023-05-31

## 2023-05-31 RX ADMIN — PANTOPRAZOLE SODIUM 40 MILLIGRAM(S): 20 TABLET, DELAYED RELEASE ORAL at 12:47

## 2023-05-31 RX ADMIN — Medication 100 MILLIGRAM(S): at 17:22

## 2023-05-31 RX ADMIN — DEXMEDETOMIDINE HYDROCHLORIDE IN 0.9% SODIUM CHLORIDE 4.25 MICROGRAM(S)/KG/HR: 4 INJECTION INTRAVENOUS at 05:25

## 2023-05-31 RX ADMIN — Medication 30 MILLIGRAM(S): at 21:31

## 2023-05-31 RX ADMIN — Medication 650 MILLIGRAM(S): at 05:13

## 2023-05-31 RX ADMIN — POLYETHYLENE GLYCOL 3350 17 GRAM(S): 17 POWDER, FOR SOLUTION ORAL at 08:20

## 2023-05-31 RX ADMIN — CHLORHEXIDINE GLUCONATE 15 MILLILITER(S): 213 SOLUTION TOPICAL at 05:13

## 2023-05-31 RX ADMIN — MUPIROCIN 1 APPLICATION(S): 20 OINTMENT TOPICAL at 05:26

## 2023-05-31 RX ADMIN — MIDAZOLAM HYDROCHLORIDE 2 MILLIGRAM(S): 1 INJECTION, SOLUTION INTRAMUSCULAR; INTRAVENOUS at 18:44

## 2023-05-31 RX ADMIN — KETAMINE HYDROCHLORIDE 2.13 MG/KG/HR: 100 INJECTION INTRAMUSCULAR; INTRAVENOUS at 21:30

## 2023-05-31 RX ADMIN — CHLORHEXIDINE GLUCONATE 15 MILLILITER(S): 213 SOLUTION TOPICAL at 17:23

## 2023-05-31 RX ADMIN — POLYETHYLENE GLYCOL 3350 17 GRAM(S): 17 POWDER, FOR SOLUTION ORAL at 21:31

## 2023-05-31 RX ADMIN — CHLORHEXIDINE GLUCONATE 15 MILLILITER(S): 213 SOLUTION TOPICAL at 18:17

## 2023-05-31 RX ADMIN — CEFEPIME 1000 MILLIGRAM(S): 1 INJECTION, POWDER, FOR SOLUTION INTRAMUSCULAR; INTRAVENOUS at 12:45

## 2023-05-31 RX ADMIN — Medication 1300 MILLIGRAM(S): at 21:31

## 2023-05-31 RX ADMIN — Medication 450 MILLIGRAM(S): at 00:44

## 2023-05-31 RX ADMIN — Medication 650 MILLIGRAM(S): at 18:44

## 2023-05-31 RX ADMIN — CHLORHEXIDINE GLUCONATE 1 APPLICATION(S): 213 SOLUTION TOPICAL at 14:04

## 2023-05-31 RX ADMIN — Medication 450 MILLIGRAM(S): at 17:22

## 2023-05-31 RX ADMIN — Medication 4 PUFF(S): at 20:03

## 2023-05-31 RX ADMIN — SENNA PLUS 2 TABLET(S): 8.6 TABLET ORAL at 21:34

## 2023-05-31 RX ADMIN — FENTANYL CITRATE 4.72 MICROGRAM(S)/KG/HR: 50 INJECTION INTRAVENOUS at 23:41

## 2023-05-31 RX ADMIN — DEXMEDETOMIDINE HYDROCHLORIDE IN 0.9% SODIUM CHLORIDE 4.25 MICROGRAM(S)/KG/HR: 4 INJECTION INTRAVENOUS at 21:31

## 2023-05-31 RX ADMIN — FENTANYL CITRATE 4.72 MICROGRAM(S)/KG/HR: 50 INJECTION INTRAVENOUS at 05:25

## 2023-05-31 RX ADMIN — CHLORHEXIDINE GLUCONATE 1 APPLICATION(S): 213 SOLUTION TOPICAL at 05:25

## 2023-05-31 RX ADMIN — Medication 450 MILLIGRAM(S): at 23:41

## 2023-05-31 RX ADMIN — Medication 30 MILLIGRAM(S): at 12:47

## 2023-05-31 RX ADMIN — Medication 60 MILLIGRAM(S): at 05:14

## 2023-05-31 RX ADMIN — Medication 1300 MILLIGRAM(S): at 14:07

## 2023-05-31 RX ADMIN — MUPIROCIN 1 APPLICATION(S): 20 OINTMENT TOPICAL at 18:18

## 2023-05-31 RX ADMIN — MIDAZOLAM HYDROCHLORIDE 2 MILLIGRAM(S): 1 INJECTION, SOLUTION INTRAMUSCULAR; INTRAVENOUS at 08:40

## 2023-05-31 RX ADMIN — Medication 1300 MILLIGRAM(S): at 05:13

## 2023-05-31 RX ADMIN — Medication 450 MILLIGRAM(S): at 06:51

## 2023-05-31 RX ADMIN — Medication 650 MILLIGRAM(S): at 12:48

## 2023-05-31 RX ADMIN — ALBUTEROL 4 PUFF(S): 90 AEROSOL, METERED ORAL at 20:03

## 2023-05-31 RX ADMIN — Medication 1 APPLICATION(S): at 18:23

## 2023-05-31 RX ADMIN — Medication 650 MILLIGRAM(S): at 05:43

## 2023-05-31 NOTE — PROGRESS NOTE ADULT - ASSESSMENT
ASSESSMENT  42 year-old female with PMH of Asthma, HTN? presents with complaint of cough x3 days and SOB. During my encounter pt with dyspnea and increased work of breathing and chest pain, unable to properly provide accurate history. She states that her cough has been progressively worsening over the past 3 days, endorses hemoptysis since yesterday.     IMPRESSION  #Fever  #Flu B with cavitary PNA, with MRSA bacteremia and cavitary PNA    5/29 BCX NGTD     5/28 BCX NGTD     5/27 BCX +     5/26 BCX +    5/24 DTA MRSA    5/24 fungal ETT   Few Yeast, Fungitell: 41 (05-24-23 @ 06:10)-- unremarkable     5/23 BCX MRSA 3/4 bottles    MRSA PCR +   < from: CT Chest No Cont (05.29.23 @ 14:14) >  Diffusely increased number and size of bilateral cavitary lesions with   the left lower lobe now demonstrating a consolidative process of the   entirelobe.  Additional scattered bilateral ground glass opacities are noted. Findings   are compatible with progressing pneumonia.  WBC 16--> 3 12% Bands, Severe sepsis on admission  Doubt TB (lower lobe)  Acute hypoxemic resp failure requiring intubation  < from: CT Angio Chest PE Protocol w/ IV Cont (05.23.23 @ 22:06) >  1. Bilateral patchy groundglass nodularity with dominant confluent left   lower lobe opacification with numerous cavitary lesions. Additional   contralateral right lower lobe cavitary 1 cm nodule. Findings compatible   with cavitary pneumonia in the appropriate clinical setting; differential   diagnosis includes tuberculosis.  2. Confluent ill-defined left hilar and mediastinal adenopathy, likely   reactive, without dominant lymph node delineated.  3. No evidence of pulmonary embolism.  #Lactic acidosis  #LANCE  #Hyponatremia      RECOMMENDATIONS  - Fever to 103 , f/u pending BCX  - ADD Cefepime 1g q24h IV   - Send ETT Cx   - Weaver 5/24- remove when possible, midline 5/29   - Repeat BCX until clearance, 5/28 NG  - TTE Mild thickening of the anterior and posterior mitral valve leaflets.  - Persistent bacteremia , recommend MASTER when stable  - Vanc dosing per ID pharmacy  - Clinda 450mg every 6h PO (IV shortage)  - tamiflu 10 days  - Guarded prognosis    If any questions, please call or send a message on [employer] (by [email or web address]) Teams  Please continue to update ID with any pertinent new laboratory or radiographic findings  Spectra [id number]  ASSESSMENT  42 year-old female with PMH of Asthma, HTN? presents with complaint of cough x3 days and SOB. During my encounter pt with dyspnea and increased work of breathing and chest pain, unable to properly provide accurate history. She states that her cough has been progressively worsening over the past 3 days, endorses hemoptysis since yesterday.     IMPRESSION  #Fever  #Flu B with cavitary PNA, with MRSA bacteremia and cavitary PNA    5/29 BCX NGTD     5/28 BCX NGTD  x2    5/27 BCX +     5/26 BCX +    5/24 DTA MRSA    5/24 fungal ETT   Few Yeast, Fungitell: 41 (05-24-23 @ 06:10)-- unremarkable     5/23 BCX MRSA 3/4 bottles    MRSA PCR +     MASTER no vegetations   < from: CT Chest No Cont (05.29.23 @ 14:14) >  Diffusely increased number and size of bilateral cavitary lesions with   the left lower lobe now demonstrating a consolidative process of the   entirelobe.  Additional scattered bilateral ground glass opacities are noted. Findings   are compatible with progressing pneumonia.  WBC 16--> 3 12% Bands, Severe sepsis on admission  Doubt TB (lower lobe)  Acute hypoxemic resp failure requiring intubation  < from: CT Angio Chest PE Protocol w/ IV Cont (05.23.23 @ 22:06) >  1. Bilateral patchy groundglass nodularity with dominant confluent left   lower lobe opacification with numerous cavitary lesions. Additional   contralateral right lower lobe cavitary 1 cm nodule. Findings compatible   with cavitary pneumonia in the appropriate clinical setting; differential   diagnosis includes tuberculosis.  2. Confluent ill-defined left hilar and mediastinal adenopathy, likely   reactive, without dominant lymph node delineated.  3. No evidence of pulmonary embolism.  #Lactic acidosis  #LANCE  #Hyponatremia      RECOMMENDATIONS  - Fever to 103 , f/u pending BCX  - ADD Cefepime 1g q24h IV   - Send ETT Cx   - Weaver 5/24- remove when possible, midline 5/29   - Repeat BCX until clearance, 5/28 NG  - Vanc dosing per ID pharmacy  - Clinda 450mg every 6h PO (IV shortage)  - tamiflu 10 days  - Guarded prognosis    If any questions, please call or send a message on [employer] Teams  Please continue to update ID with any pertinent new laboratory or radiographic findings  Spectra [phone]

## 2023-05-31 NOTE — PROGRESS NOTE ADULT - SUBJECTIVE AND OBJECTIVE BOX
Over Night Events: events noted, remain critically ill, still intubated ventilated, on precedex, fentanyl. ketamine, and levophed 0.04, low grade T     PHYSICAL EXAM    ICU Vital Signs Last 24 Hrs  T(C): 38.2 (31 May 2023 06:00), Max: 38.2 (30 May 2023 20:00)  T(F): 100.7 (31 May 2023 06:00), Max: 100.8 (30 May 2023 20:00)  HR: 101 (31 May 2023 07:00) (79 - 138)  BP: 96/53 (31 May 2023 07:00) (65/34 - 179/104)  BP(mean): 69 (31 May 2023 07:00) (45 - 135)  RR: 22 (31 May 2023 07:00) (19 - 45)  SpO2: 100% (31 May 2023 07:00) (93% - 100%)    O2 Parameters below as of 31 May 2023 07:00  Patient On (Oxygen Delivery Method): ventilator    O2 Concentration (%): 40        General: ILL LOOKING  HEENT: ett  Lungs: dec bs l base  Cardiovascular: Regular   Abdomen: Soft, Positive BS  Extremities: No clubbing   Neurological: Non focal       05-30-23 @ 07:01  -  05-31-23 @ 07:00  --------------------------------------------------------  IN:    Dexmedetomidine: 744.8 mL    Enteral Tube Flush: 210 mL    FentaNYL: 858 mL    Ketamine: 364.5 mL    Norepinephrine: 51.2 mL    Propofol: 230 mL    Vital High Protein: 130 mL  Total IN: 2588.5 mL    OUT:    Indwelling Catheter - Urethral (mL): 3595 mL  Total OUT: 3595 mL    Total NET: -1006.5 mL          LABS:                          7.6    23.83 )-----------( 278      ( 31 May 2023 04:48 )             24.9                                               05-31    148<H>  |  112<H>  |  75<HH>  ----------------------------<  86  4.6   |  21  |  3.0<H>    Ca    8.2<L>      31 May 2023 04:48  Mg     2.2     05-31    TPro  6.2  /  Alb  2.3<L>  /  TBili  0.4  /  DBili  x   /  AST  19  /  ALT  10  /  AlkPhos  79  05-31                                                 CARDIAC MARKERS ( 31 May 2023 04:48 )  x     / x     / 41 U/L / x     / x      CARDIAC MARKERS ( 30 May 2023 11:39 )  x     / x     / 50 U/L / x     / x      CARDIAC MARKERS ( 30 May 2023 06:00 )  x     / x     / 33 U/L / x     / x                                                LIVER FUNCTIONS - ( 31 May 2023 04:48 )  Alb: 2.3 g/dL / Pro: 6.2 g/dL / ALK PHOS: 79 U/L / ALT: 10 U/L / AST: 19 U/L / GGT: x                                                  Culture - Blood (collected 29 May 2023 05:55)  Source: .Blood Blood  Preliminary Report (30 May 2023 14:01):    No growth to date.    Culture - Blood (collected 28 May 2023 16:00)  Source: .Blood Blood-Peripheral  Preliminary Report (30 May 2023 03:02):    No growth to date.    Culture - Blood (collected 28 May 2023 12:00)  Source: .Blood Blood  Preliminary Report (29 May 2023 21:01):    No growth to date.                                                   Mode: AC/ CMV (Assist Control/ Continuous Mandatory Ventilation)  RR (machine): 22  FiO2: 40  PEEP: 10  ITime: 0.9  MAP: 18  PC: 18  PIP: 30                                      ABG - ( 31 May 2023 03:36 )  pH, Arterial: 7.31  pH, Blood: x     /  pCO2: 47    /  pO2: 93    / HCO3: 24    / Base Excess: -2.6  /  SaO2: 96.8                MEDICATIONS  (STANDING):  albuterol    90 MICROgram(s) HFA Inhaler 4 Puff(s) Inhalation every 6 hours  chlorhexidine 0.12% Liquid 15 milliLiter(s) Oral Mucosa every 12 hours  chlorhexidine 2% Cloths 1 Application(s) Topical daily  chlorhexidine 2% Cloths 1 Application(s) Topical <User Schedule>  clindamycin Solution 15 mG/mL 450 milliGRAM(s) Oral every 6 hours  dexMEDEtomidine Infusion 0.2 MICROgram(s)/kG/Hr (4.25 mL/Hr) IV Continuous <Continuous>  fentaNYL   Infusion. 0.555 MICROgram(s)/kG/Hr (4.72 mL/Hr) IV Continuous <Continuous>  furosemide   Injectable 60 milliGRAM(s) IV Push every 24 hours  insulin lispro (ADMELOG) corrective regimen sliding scale   SubCutaneous three times a day before meals  ipratropium 17 MICROgram(s) HFA Inhaler 4 Puff(s) Inhalation every 6 hours  ketamine Infusion. 0.25 mG/kG/Hr (2.13 mL/Hr) IV Continuous <Continuous>  metolazone 5 milliGRAM(s) Oral daily  mupirocin 2% Nasal 1 Application(s) Both Nostrils two times a day  norepinephrine Infusion 0.05 MICROgram(s)/kG/Min (7.97 mL/Hr) IV Continuous <Continuous>  oseltamivir Suspension 30 milliGRAM(s) Oral every 12 hours  pantoprazole   Suspension 40 milliGRAM(s) Oral daily  polyethylene glycol 3350 17 Gram(s) Oral every 12 hours  propofol Infusion 11.098 MICROgram(s)/kG/Min (5.66 mL/Hr) IV Continuous <Continuous>  senna 2 Tablet(s) Oral at bedtime  sodium bicarbonate 1300 milliGRAM(s) Oral every 8 hours  vancomycin  IVPB 500 milliGRAM(s) IV Intermittent every 24 hours    MEDICATIONS  (PRN):  acetaminophen     Tablet .. 650 milliGRAM(s) Oral every 6 hours PRN Temp greater or equal to 38C (100.4F)  albuterol    90 MICROgram(s) HFA Inhaler 4 Puff(s) Inhalation every 4 hours PRN Shortness of Breath and/or Wheezing  ipratropium 17 MICROgram(s) HFA Inhaler 4 Puff(s) Inhalation <User Schedule> PRN -  midazolam Injectable 2 milliGRAM(s) IV Push every 4 hours PRN Agitation    CXR reviewed

## 2023-05-31 NOTE — CHART NOTE - NSCHARTNOTEFT_GEN_A_CORE
Pt noted to be regurgitating her OG tube feeds, feed rate lowered and pt still not tolerating. OG tube feeds held for now.

## 2023-05-31 NOTE — PROGRESS NOTE ADULT - SUBJECTIVE AND OBJECTIVE BOX
Patient is a 42y old  Female who presents with a chief complaint of AHRF (31 May 2023 09:16)    HPI:  42 year-old female with PMH of Asthma, HTN? presents with complaint of cough x3 days and SOB. During my encounter pt with dyspnea and increased work of breathing and chest pain, unable to properly provide accurate history. She states that her cough has been progressively worsening over the past 3 days, endorses hemoptysis since yesterday. She states she had a friend who came over and stayed with for more than a week who was sick recently, but she does not know if she recently travelled out of [facility] or not. She also admits that her children has been sick with Sore throat and fever for past 3 days. She admits to fever, chills, sever Chest pain which has gotten worse since she came to the ED. She took 1x Tamiflu yesterday. She otherwise denies diarrhea, constipation, urinary symptoms. She is not vaccinated for Influenza or COVID-19.     In ED  /87, , RR 28, T 100.8 F, 98% on 15 L NRB  Labs significant for wbc 16.7K with Neutrophilic Predominance and L shift, Hgb 11.4, MCV 61.6, Cr 2.0, BUN 23, Alk Phos 125/AST 62/ALT 27, Lac 4.9> 3.9  RVP + Influenza B      CT Angio Chest PE Protocol w/ IV Cont   1. Bilateral patchy groundglass nodularity with dominant confluent left lower lobe opacification with numerous cavitary lesions. Additional contralateral right lower lobe cavitary 1 cm nodule. Findings compatible with cavitary pneumonia in the appropriate clinical setting; differential diagnosis includes tuberculosis.  2. Confluent ill-defined left hilar and mediastinal adenopathy, likely reactive, without dominant lymph node delineated.  3. No evidence of pulmonary embolism.    s/p 1x Rocephin, Meropenem, 3 L LR in ED    Pt admitted to SDU for further managements, during my encounter pt is very agitated, in acute distress. Pt received appropriate pain control with IV Morphine, s/p Xanax 1 mg 1x for agitation and anxiety, despite pain control and adequate fluid resuscitation pt remained tachycardic, tachypneic. STAT EKG reviewed with Cardiology team on call After discussion with Cardiology team, Pt received 1x Adenosine 6 mg IV push with no change. Case discussed with Critical Care team, decision was made for Intubation and Pt upgraded to MICU.   (24 May 2023 00:58)       INTERVAL HPI/OVERNIGHT EVENTS:   No overnight events   Afebrile, hemodynamically stable   Levo started overnight, now off    Subjective:    ICU Vital Signs Last 24 Hrs  T(C): 39.4 (31 May 2023 08:00), Max: 39.4 (31 May 2023 08:00)  T(F): 103 (31 May 2023 08:00), Max: 103 (31 May 2023 08:00)  HR: 105 (31 May 2023 08:45) (79 - 138)  BP: 84/45 (31 May 2023 08:45) (65/34 - 179/104)  BP(mean): 59 (31 May 2023 08:45) (45 - 135)  ABP: --  ABP(mean): --  RR: 20 (31 May 2023 08:45) (13 - 45)  SpO2: 96% (31 May 2023 08:45) (93% - 100%)    O2 Parameters below as of 31 May 2023 08:00  Patient On (Oxygen Delivery Method): ventillator     O2 Concentration (%): 40      I&O's Summary    30 May 2023 07:01  -  31 May 2023 07:00  --------------------------------------------------------  IN: 2588.5 mL / OUT: 3595 mL / NET: -1006.5 mL    31 May 2023 07:01  -  31 May 2023 09:22  --------------------------------------------------------  IN: 199.9 mL / OUT: 380 mL / NET: -180.1 mL      Mode: AC/ CMV (Assist Control/ Continuous Mandatory Ventilation)  RR (machine): 22  FiO2: 40  PEEP: 10  ITime: 0.9  MAP: 18  PC: 18  PIP: 30      Daily     Daily Weight in k (31 May 2023 05:00)    Adult Advanced Hemodynamics Last 24 Hrs  CVP(mm Hg): --  CVP(cm H2O): --  CO: --  CI: --  PA: --  PA(mean): --  PCWP: --  SVR: --  SVRI: --  PVR: --  PVRI: --    EKG/Telemetry Events:    MEDICATIONS  (STANDING):  albuterol    90 MICROgram(s) HFA Inhaler 4 Puff(s) Inhalation every 6 hours  chlorhexidine 0.12% Liquid 15 milliLiter(s) Oral Mucosa every 12 hours  chlorhexidine 2% Cloths 1 Application(s) Topical daily  chlorhexidine 2% Cloths 1 Application(s) Topical <User Schedule>  clindamycin Solution 15 mG/mL 450 milliGRAM(s) Oral every 6 hours  dexMEDEtomidine Infusion 0.2 MICROgram(s)/kG/Hr (4.25 mL/Hr) IV Continuous <Continuous>  fentaNYL   Infusion. 0.555 MICROgram(s)/kG/Hr (4.72 mL/Hr) IV Continuous <Continuous>  insulin lispro (ADMELOG) corrective regimen sliding scale   SubCutaneous three times a day before meals  ipratropium 17 MICROgram(s) HFA Inhaler 4 Puff(s) Inhalation every 6 hours  ketamine Infusion. 0.25 mG/kG/Hr (2.13 mL/Hr) IV Continuous <Continuous>  mupirocin 2% Nasal 1 Application(s) Both Nostrils two times a day  norepinephrine Infusion 0.05 MICROgram(s)/kG/Min (7.97 mL/Hr) IV Continuous <Continuous>  oseltamivir Suspension 30 milliGRAM(s) Oral every 12 hours  pantoprazole   Suspension 40 milliGRAM(s) Oral daily  polyethylene glycol 3350 17 Gram(s) Oral every 12 hours  propofol Infusion 11.098 MICROgram(s)/kG/Min (5.66 mL/Hr) IV Continuous <Continuous>  senna 2 Tablet(s) Oral at bedtime  sodium bicarbonate 1300 milliGRAM(s) Oral every 8 hours  vancomycin  IVPB 500 milliGRAM(s) IV Intermittent every 24 hours    MEDICATIONS  (PRN):  acetaminophen     Tablet .. 650 milliGRAM(s) Oral every 6 hours PRN Temp greater or equal to 38C (100.4F)  albuterol    90 MICROgram(s) HFA Inhaler 4 Puff(s) Inhalation every 4 hours PRN Shortness of Breath and/or Wheezing  ipratropium 17 MICROgram(s) HFA Inhaler 4 Puff(s) Inhalation <User Schedule> PRN -  midazolam Injectable 2 milliGRAM(s) IV Push every 4 hours PRN Agitation      PHYSICAL EXAM:  GENERAL:   HEAD:  Atraumatic, Normocephalic  EYES: EOMI, PERRLA, conjunctiva and sclera clear  NECK: Supple, No JVD, Normal thyroid, no enlarged nodes  NERVOUS SYSTEM:  Alert & Awake.   CHEST/LUNG: B/L good air entry; No rales, rhonchi, or wheezing  HEART: S1S2 normal, no S3, Regular rate and rhythm; No murmurs  ABDOMEN: Soft, Nontender, Nondistended; Bowel sounds present  EXTREMITIES:  2+ Peripheral Pulses, No clubbing, cyanosis, or edema  LYMPH: No lymphadenopathy noted  SKIN: No rashes or lesions    LABS:                        7.6    23.83 )-----------( 278      ( 31 May 2023 04:48 )             24.9         148<H>  |  112<H>  |  75<HH>  ----------------------------<  86  4.6   |  21  |  3.0<H>    Ca    8.2<L>      31 May 2023 04:48  Mg     2.2         TPro  6.2  /  Alb  2.3<L>  /  TBili  0.4  /  DBili  x   /  AST  19  /  ALT  10  /  AlkPhos  79      LIVER FUNCTIONS - ( 31 May 2023 04:48 )  Alb: 2.3 g/dL / Pro: 6.2 g/dL / ALK PHOS: 79 U/L / ALT: 10 U/L / AST: 19 U/L / GGT: x             CAPILLARY BLOOD GLUCOSE      POCT Blood Glucose.: 112 mg/dL (31 May 2023 06:08)  POCT Blood Glucose.: 114 mg/dL (30 May 2023 22:12)  POCT Blood Glucose.: 113 mg/dL (30 May 2023 18:28)  POCT Blood Glucose.: 112 mg/dL (30 May 2023 11:56)    ABG - ( 31 May 2023 03:36 )  pH, Arterial: 7.31  pH, Blood: x     /  pCO2: 47    /  pO2: 93    / HCO3: 24    / Base Excess: -2.6  /  SaO2: 96.8              Creatine Kinase, Serum: 41 U/L ( @ 04:48)  Creatine Kinase, Serum: 50 U/L ( @ 11:39)    CARDIAC MARKERS ( 31 May 2023 04:48 )  x     / x     / 41 U/L / x     / x      CARDIAC MARKERS ( 30 May 2023 11:39 )  x     / x     / 50 U/L / x     / x      CARDIAC MARKERS ( 30 May 2023 06:00 )  x     / x     / 33 U/L / x     / x                RADIOLOGY & ADDITIONAL TESTS:  CXR:        Care Discussed with Consultants/Other Providers [ x] YES  [ ] NO

## 2023-05-31 NOTE — CHART NOTE - NSCHARTNOTEFT_GEN_A_CORE
Registered Dietitian Follow-Up     Patient Profile Reviewed                           Yes [x]   No []     Nutrition History Previously Obtained        Yes []  No [x]       Pertinent Subjective Information: Limited to chart review at this time as pt intubated and unable to provide hx.     Pertinent Medical Interventions: Pt continues to be treated for respiratory failure, MRSA bacteremia and LANCE.      Diet order:   Diet, NPO after Midnight:      NPO Start Date: 30-May-2023,   NPO Start Time: 23:59  Except Medications (23 @ 20:50) [Active]  Diet, NPO with Tube Feed:   Tube Feeding Modality: Orogastric  Vital High Protein  Total Volume for 24 Hours (mL): 240  Continuous  Starting Tube Feed Rate {mL per Hour}: 10  Until Goal Tube Feed Rate (mL per Hour): 10  Tube Feed Duration (in Hours): 24  Tube Feed Start Time: 12:00  No Carb Prosource (1pkg = 15gms Protein)     Qty per Day:  6; 2pkts Q8H (23 @ 12:04) [Active]    Anthropometrics:  Height (cm): 160 (23 @ 08:15)  Weight (kg): 85 (23 @ 08:15)  BMI (kg/m2): 33.2 (23 @ 08:15)  IBW: 52.1 kg  Daily Weight in k (05-31), Weight in k.4 (05-30), Weight in k.1 (-29), Weight in k.7 (-28), Weight in k.3 (05-27), Weight in k.5 (05-26), Weight in k.8 (05-25), Weight in k (05-24)  Weight Change: trending up likely reflective of fluid retention     MEDICATIONS  (STANDING):  albuterol    90 MICROgram(s) HFA Inhaler 4 Puff(s) Inhalation every 6 hours  cefepime   IVPB 1000 milliGRAM(s) IV Intermittent once  cefepime   IVPB      chlorhexidine 0.12% Liquid 15 milliLiter(s) Oral Mucosa every 12 hours  chlorhexidine 2% Cloths 1 Application(s) Topical daily  chlorhexidine 2% Cloths 1 Application(s) Topical <User Schedule>  clindamycin Solution 15 mG/mL 450 milliGRAM(s) Oral every 6 hours  dexMEDEtomidine Infusion 0.2 MICROgram(s)/kG/Hr (4.25 mL/Hr) IV Continuous <Continuous>  fentaNYL   Infusion. 0.555 MICROgram(s)/kG/Hr (4.72 mL/Hr) IV Continuous <Continuous>  insulin lispro (ADMELOG) corrective regimen sliding scale   SubCutaneous three times a day before meals  ipratropium 17 MICROgram(s) HFA Inhaler 4 Puff(s) Inhalation every 6 hours  ketamine Infusion. 0.25 mG/kG/Hr (2.13 mL/Hr) IV Continuous <Continuous>  mupirocin 2% Nasal 1 Application(s) Both Nostrils two times a day  norepinephrine Infusion 0.05 MICROgram(s)/kG/Min (7.97 mL/Hr) IV Continuous <Continuous>  oseltamivir Suspension 30 milliGRAM(s) Oral every 12 hours  pantoprazole   Suspension 40 milliGRAM(s) Oral daily  polyethylene glycol 3350 17 Gram(s) Oral every 12 hours  propofol Infusion 11.098 MICROgram(s)/kG/Min (5.66 mL/Hr) IV Continuous <Continuous>  -- has been held since last night  senna 2 Tablet(s) Oral at bedtime  sodium bicarbonate 1300 milliGRAM(s) Oral every 8 hours  vancomycin  IVPB 500 milliGRAM(s) IV Intermittent every 24 hours    MEDICATIONS  (PRN):  acetaminophen     Tablet .. 650 milliGRAM(s) Oral every 6 hours PRN Temp greater or equal to 38C (100.4F)  albuterol    90 MICROgram(s) HFA Inhaler 4 Puff(s) Inhalation every 4 hours PRN Shortness of Breath and/or Wheezing  ipratropium 17 MICROgram(s) HFA Inhaler 4 Puff(s) Inhalation <User Schedule> PRN -  midazolam Injectable 2 milliGRAM(s) IV Push every 4 hours PRN Agitation    Pertinent Labs:                         7.6    23.83 )-----------( 278      ( 31 May 2023 04:48 )             24.9     05-31 @ 04:48: Na 148<H>, BUN 75<HH>, Cr 3.0<H>, BG 86, K+ 4.6, Phos --, Mg 2.2, Alk Phos 79, ALT/SGPT 10, AST/SGOT 19, HbA1c --    Finger Sticks:  POCT Blood Glucose.: 112 mg/dL ( @ 06:08)  POCT Blood Glucose.: 114 mg/dL ( @ 22:12)  POCT Blood Glucose.: 113 mg/dL ( @ 18:28)  POCT Blood Glucose.: 112 mg/dL ( @ 11:56)    Triglycerides () 1009    I&O's Detail    30 May 2023 07:01  -  31 May 2023 07:00  --------------------------------------------------------  IN:    Dexmedetomidine: 744.8 mL    Enteral Tube Flush: 210 mL    FentaNYL: 858 mL    Ketamine: 364.5 mL    Norepinephrine: 51.2 mL    Propofol: 230 mL    Vital High Protein: 130 mL  Total IN: 2588.5 mL    OUT:    Indwelling Catheter - Urethral (mL): 3595 mL  Total OUT: 3595 mL    Total NET: -1006.5 mL    Physical Findings:  - Appearance: sedated; 2+ generalized edema  - GI function: abdomen obese; last bowel movement 31-May-2023 - soft  - Tubes: OG tube  - Oral/Mouth cavity: NPO  - Skin: intact     Nutrition Requirements:  Weight Used: dosing weight 85kg; IBW 52.1 kg     Estimated Energy Needs    Continue [x]  Adjust []  Energy Recommendations: 935-1190 kcal/day - 11-14kcal/kg IBW        Estimated Protein Needs    Continue [x]  Adjust []  Protein Recommendations: 104 gm/day - 2g/kg IBW        Estimated Fluid Needs        Continue [x]  Adjust []  Fluid Recommendations: 2975 mL/day - adjusted fluid needs for BMI>30     Nutrient Intake: current TF provides 600kcal, 109g protein, 202ml free water.     [] Previous Nutrition Diagnosis: Increased Nutrient Needs (protein)            [x] Ongoing          [] Resolved    Nutrition Intervention      Goal/Expected Outcome: Pt to meet >90% & <105% estimated needs via EN in 3-5 days. RD to follow as per high risk protocol.     Indicator/Monitoring: Monitor diet order, kcal intake, body composition, NFPE, labs  (lytes, BG, renal indices)    Recommendation:  Peptamen Intense VHP formula, start at 20ml/hr and advance by 20ml Q4H to goal 50ml/hr. Free water flushes 300ml Q6H. -- will provide 1200kcal, 110g protein, 1008+1200ml free water and MICU team to further adjust.     Patient assessed at high nutrition risk.  RD spectra [phone], also available on Teams.

## 2023-05-31 NOTE — PROGRESS NOTE ADULT - ASSESSMENT
42 year old female with PMHx Asthma, HTN presents with cough and SOB x3 days and hemoptysis. CT chest showing cavitary pneumonia. Cultures +MRSA bacteremia. Intubated; failed SBT. On Zyvox and Tamiflu. Repeat CT showing worsening PNA.    # Acute hypoxemic respiratory failure 2/2 cavitary PNA  # MRSA bacteremia  # Influenza B  # hx Asthma   - CT Angio Chest PE Protocol: Bilateral patchy groundglass nodularity with dominant confluent left lower lobe opacification with numerous cavitary lesions. Additional contralateral right lower lobe cavitary 1 cm nodule. Findings compatible with cavitary pneumonia in the appropriate clinical setting; differential diagnosis includes tuberculosis.  - Strep/Legionella negative, HIV negative   - intubated 5/23, self-extubated 5/25, re-intubated 5/25  - blood cultures and DTA +MRSA  - ABx per ID: Zyvox 600mg q12, 10 day course Tamiflu  - Mupirocin BID x5 days  - blood cultures +MRSA  - blood cultures 5/28: NGTD x3 --> repeat cultures  - ECHO: no vegetations. EF 65%, mild TR and pulm HTN  - repeat CT chest: Diffusely increased number and size of bilateral cavitary lesions with the left lower lobe now demonstrating a consolidative process of the entirelobe.  - D-dimer 1665, LE duplex negative for DVT  - 5/31 stop Lasix and Metolazone   - repeat pro-mya 48 > 5.8  - NPO for MASTER today   - c/w Tamiflu (day 7/10)  - as per ID: c/w Clindamycin 450 q6, Vancomycin 500 qd, Tamiflu (day 8/10)    # LANCE   - Nephro following; post-infectious GN vs. ATN vs. pulmonary renal syndrome  - Cr stable around 2.9  - renal U/S: negative   - CK trending down 1483>39>41  - UA: +proteins and blood, Pr/Cr 1.3 (H)  - CHARLES+ 1:640, hep/HIV negative  - c/w PO bicarb 1300 mg q8  - no acute indication for RRT    # Anemia  # Thrombocytopenia   - trend CBC, active type and screen, transfuse <7  - DIC panel negative   - Dimer 1665, LE Duplex negative for DVT    #DVT ppx: heparin on hold  #GI ppx: Pantoprazole 40mg qd  #Diet: NPO w/ tube feed; NPO for MASTER today  #Activity: bedrest   #Dispo: MICU 42 year old female with PMHx Asthma, HTN presents with cough and SOB x3 days and hemoptysis. CT chest showing cavitary pneumonia. Cultures +MRSA bacteremia. Intubated; failed SBT. On Zyvox and Tamiflu. Repeat CT showing worsening PNA.    # Acute hypoxemic respiratory failure 2/2 cavitary PNA  # MRSA bacteremia  # Influenza B  # hx Asthma   - CT Angio Chest PE Protocol: Bilateral patchy groundglass nodularity with dominant confluent left lower lobe opacification with numerous cavitary lesions. Additional contralateral right lower lobe cavitary 1 cm nodule. Findings compatible with cavitary pneumonia in the appropriate clinical setting; differential diagnosis includes tuberculosis.  - Strep/Legionella negative, HIV negative   - intubated 5/23, self-extubated 5/25, re-intubated 5/25  - blood cultures and DTA +MRSA  - ABx per ID: Zyvox 600mg q12, 10 day course Tamiflu  - Mupirocin BID x5 days  - blood cultures +MRSA  - blood cultures 5/28: NGTD x3 --> repeat cultures  - ECHO: no vegetations. EF 65%, mild TR and pulm HTN  - repeat CT chest: Diffusely increased number and size of bilateral cavitary lesions with the left lower lobe now demonstrating a consolidative process of the entirelobe.  - D-dimer 1665, LE duplex negative for DVT  - 5/31 stop Lasix and Metolazone   - repeat pro-mya 48 > 5.8  - NPO for MASTER today   - c/w Tamiflu (day 7/10)  - as per ID: c/w Clindamycin 450 q6, Vancomycin 500 qd, Tamiflu (day 8/10)  - 5/31: Tmax 103, start Cefepime 1g q24, send ETT cultures    # LANCE   - Nephro following; post-infectious GN vs. ATN vs. pulmonary renal syndrome  - Cr stable around 2.9  - renal U/S: negative   - CK trending down 1483>39>41  - UA: +proteins and blood, Pr/Cr 1.3 (H)  - CHARLES+ 1:640, hep/HIV negative  - c/w PO bicarb 1300 mg q8  - no acute indication for RRT    # Anemia  # Thrombocytopenia   - trend CBC, active type and screen, transfuse <7  - DIC panel negative   - Dimer 1665, LE Duplex negative for DVT    #DVT ppx: heparin on hold  #GI ppx: Pantoprazole 40mg qd  #Diet: NPO w/ tube feed; NPO for MASTER today  #Activity: bedrest   #Dispo: MICU 42 year old female with PMHx Asthma, HTN presents with cough and SOB x3 days and hemoptysis. CT chest showing cavitary pneumonia. Cultures +MRSA bacteremia. Intubated; failed SBT. On Zyvox and Tamiflu. Repeat CT showing worsening PNA.    # Acute hypoxemic respiratory failure 2/2 cavitary PNA  # MRSA bacteremia  # Influenza B  # hx Asthma   - CT Angio Chest PE Protocol: Bilateral patchy groundglass nodularity with dominant confluent left lower lobe opacification with numerous cavitary lesions. Additional contralateral right lower lobe cavitary 1 cm nodule. Findings compatible with cavitary pneumonia in the appropriate clinical setting; differential diagnosis includes tuberculosis.  - Strep/Legionella negative, HIV negative   - intubated 5/23, self-extubated 5/25, re-intubated 5/25  - blood cultures and DTA +MRSA  - ABx per ID: Zyvox 600mg q12, 10 day course Tamiflu  - Mupirocin BID x5 days  - blood cultures +MRSA  - blood cultures 5/28: NGTD x3 --> repeat cultures  - ECHO: no vegetations. EF 65%, mild TR and pulm HTN  - repeat CT chest: Diffusely increased number and size of bilateral cavitary lesions with the left lower lobe now demonstrating a consolidative process of the entirelobe.  - D-dimer 1665, LE duplex negative for DVT  - 5/31 stop Lasix and Metolazone   - repeat pro-mya 48 > 5.8  - s/p MASTER 5/31: No evidence of valvular vegetations or intracardiac abscesses to support IE. Pulmonary hepatisation incidental finding.   - as per ID: c/w Clindamycin 450 q6, Vancomycin 500 qd, Tamiflu (day 8/10)  - 5/31: Tmax 103, start Cefepime 1g q24, send ETT cultures    # LANCE   - Nephro following; post-infectious GN vs. ATN vs. pulmonary renal syndrome  - Cr stable around 2.9  - renal U/S: negative   - CK trending down 1483>39>41  - UA: +proteins and blood, Pr/Cr 1.3 (H)  - CHARLES+ 1:640, hep/HIV negative  - c/w PO bicarb 1300 mg q8  - no acute indication for RRT    # Anemia  # Thrombocytopenia   - trend CBC, active type and screen, transfuse <7  - DIC panel negative   - Dimer 1665, LE Duplex negative for DVT    #DVT ppx: heparin on hold  #GI ppx: Pantoprazole 40mg qd  #Diet: NPO w/ tube feed; NPO for MASTER today  #Activity: bedrest   #Dispo: MICU

## 2023-05-31 NOTE — CHART NOTE - NSCHARTNOTEFT_GEN_A_CORE
POST OPERATIVE PROCEDURAL DOCUMENTATION  PRE-OP DIAGNOSIS:  R/O IE      POST-OP DIAGNOSIS:  No evidence of valvular vegetations or intracardiac abscesses to r/i IE.       PROCEDURE: Transesophageal echocardiogram    Primary Physician:  Dr. Wyatt  Assistant: Dr. Finney    ANESTHESIA TYPE  [  ] General Anesthesia  [ x ] Conscious Sedation  [  ] Local/Regional    CONDITION  [  ] Critical  [  ] Serious  [  ] Fair  [ x ] Good    SPECIMENS REMOVED (IF APPLICABLE): N/A    IMPLANTS (IF APPLICABLE): None    ESTIMATED BLOOD LOSS: None    COMPLICATIONS: None      FINDINGS:    After risks and benefits of procedures were explained, informed consent was obtained and placed in chart. Refer to Anesthesia note for sedation details.  The MASTER probe was passed into the esophagus without difficulty.  Transesophageal and transgastric images were obtained.  The MASTER probe was removed without difficulty and examined.  There was no evidence for bleeding.  The patient tolerated the procedure well without any immediate MASTER-related complications.       Summary:    Left ventricular ejection fraction, by visual estimation, is 65 to 70%.   Hyperdynamic global left ventricular systolic function.   Mildly enlarged right ventricle.   Normal left atrial size.   Mild mitral valve regurgitation.   Moderate tricuspid regurgitation.   Hepatization of the L lung seen in images taken for the descending aorta.       DIAGNOSIS/IMPRESSION:  No evidence of valvular vegetations or intracardiac abscesses to support IE.     PLAN OF CARE:  f/u with primary team / id team. POST OPERATIVE PROCEDURAL DOCUMENTATION  PRE-OP DIAGNOSIS:  R/O IE      POST-OP DIAGNOSIS:  No evidence of valvular vegetations or intracardiac abscesses to r/i IE.       PROCEDURE: Transesophageal echocardiogram    Primary Physician:  Dr. Wyatt  Assistant: Dr. Finney    ANESTHESIA TYPE  [  ] General Anesthesia  [ x ] Conscious Sedation  [  ] Local/Regional    CONDITION  [  ] Critical  [  ] Serious  [  ] Fair  [ x ] Good    SPECIMENS REMOVED (IF APPLICABLE): N/A    IMPLANTS (IF APPLICABLE): None    ESTIMATED BLOOD LOSS: None    COMPLICATIONS: None      FINDINGS:    After risks and benefits of procedures were explained, informed consent was obtained and placed in chart. Refer to Anesthesia note for sedation details.  The MASTER probe was passed into the esophagus without difficulty.  Transesophageal and transgastric images were obtained.  The MASTER probe was removed without difficulty and examined.  There was no evidence for bleeding.  The patient tolerated the procedure well without any immediate MASTER-related complications.       Summary:    Left ventricular ejection fraction, by visual estimation, is 65 to 70%.   Hyperdynamic global left ventricular systolic function.   Mildly enlarged right ventricle.   Normal left atrial size.   Mild mitral valve regurgitation.   Moderate tricuspid regurgitation.   Hepatization of the L lung incidentally seen in images taken for the descending aorta.       DIAGNOSIS/IMPRESSION:  No evidence of valvular vegetations or intracardiac abscesses to support IE.     PLAN OF CARE:  f/u with primary team / id team. POST OPERATIVE PROCEDURAL DOCUMENTATION  PRE-OP DIAGNOSIS:  R/O IE      POST-OP DIAGNOSIS:  No evidence of valvular vegetations or intracardiac abscesses to r/i IE.       PROCEDURE: Transesophageal echocardiogram    Primary Physician:  Dr. Wyatt  Assistant: Dr. Finney    ANESTHESIA TYPE  [  ] General Anesthesia  [ x ] Conscious Sedation  [  ] Local/Regional    CONDITION  [  ] Critical  [  ] Serious  [  ] Fair  [ x ] Good    SPECIMENS REMOVED (IF APPLICABLE): N/A    IMPLANTS (IF APPLICABLE): None    ESTIMATED BLOOD LOSS: None    COMPLICATIONS: None      FINDINGS:    After risks and benefits of procedures were explained, informed consent was obtained and placed in chart. Refer to Anesthesia note for sedation details.  The MASTER probe was passed into the esophagus without difficulty.  Transesophageal and transgastric images were obtained.  The MASTER probe was removed without difficulty and examined.  There was no evidence for bleeding.  The patient tolerated the procedure well without any immediate MASTER-related complications.       Summary:    Left ventricular ejection fraction, by visual estimation, is 65 to 70%.   Hyperdynamic global left ventricular systolic function.   Mildly enlarged right ventricle.   Normal left atrial size.   Mild mitral valve regurgitation.   Moderate tricuspid regurgitation.   Hepatization of the L lung incidentally seen in images taken for the descending aorta. Correlate clinically for lobar pneumonic consolidation.       DIAGNOSIS/IMPRESSION:  No evidence of valvular vegetations or intracardiac abscesses to support IE.   Pulmonary hepatisation incidental finding.     PLAN OF CARE:  f/u with primary team / id team. POST OPERATIVE PROCEDURAL DOCUMENTATION  PRE-OP DIAGNOSIS:  R/O IE      POST-OP DIAGNOSIS:  No evidence of valvular vegetations or intracardiac abscesses to r/i IE.       PROCEDURE: Transesophageal echocardiogram    Primary Physician:  Dr. Wyatt  Assistant: Dr. Finney    ANESTHESIA TYPE  [  ] General Anesthesia  [ x ] Conscious Sedation  [  ] Local/Regional    CONDITION  [  ] Critical  [  ] Serious  [  ] Fair  [ x ] Good    SPECIMENS REMOVED (IF APPLICABLE): N/A    IMPLANTS (IF APPLICABLE): None    ESTIMATED BLOOD LOSS: None    COMPLICATIONS: None      FINDINGS:    After risks and benefits of procedures were explained, informed consent was obtained and placed in chart. Refer to Anesthesia note for sedation details.  The MASTER probe was passed into the esophagus without difficulty.  Transesophageal and transgastric images were obtained.  The MASTER probe was removed without difficulty and examined.  There was no evidence for bleeding.  The patient tolerated the procedure well without any immediate MASTER-related complications.       Summary:    < from: Transesophageal Echocardiogram (05.31.23 @ 10:11) >     1. Left ventricular ejection fraction, by visual estimation, is 65 to   70%.   2. Hyperdynamic global left ventricular systolic function.   3. Mildly enlarged right ventricle.   4. Mild mitral valve regurgitation.   5. Moderate tricuspid regurgitation.  6. No echocardiographic evidence of infective endocarditis.   7. Hepatization of the left lung incidentally seen on images taken for   the descending aorta. Correlate clinically for lobar pneumonic   consolidation.    < end of copied text >           DIAGNOSIS/IMPRESSION:  No evidence of valvular vegetations or intracardiac abscesses to support IE.   Pulmonary hepatisation incidental finding.     PLAN OF CARE:  f/u with primary team / id team.

## 2023-05-31 NOTE — PROGRESS NOTE ADULT - ASSESSMENT
42/F with PMH of asthma and HTN, admitted with SOB, developed LANCE. Acute hypoxemic resp failure requiring intubation  Bilateral patchy groundglass nodularity with dominant confluent left  lower lobe opacification with numerous cavitary lesions. Additional contralateral right lower lobe cavitary 1 cm nodule. Findings compatible  with cavitary pneumonia/Tb?     Non massive hemoptysis   MRSA bacteremia, prerenal FeNa, suspicion for post infectious GN  Kidney sono neg for obstruction  LANCE prerenal vs ATN vs pulmo renal syndrome  s/p CT with IV contrast 5/23 -  non oliguric  cr stable keep I =O   follow IP    phos noted / no binders yet / feed nepro   w/up for GN noted  negative so far      continue sodium bicarbonate   1300  q 8   no acute indication for RRT   will follow

## 2023-05-31 NOTE — PROGRESS NOTE ADULT - SUBJECTIVE AND OBJECTIVE BOX
ROSSIZEYNEP PAULINO  42y, Female  Allergy: aspirin (Short breath; Anaphylaxis)      LOS  8d    CHIEF COMPLAINT: AHRF (31 May 2023 09:22)      INTERVAL EVENTS/HPI  - T(F): , Max: 103 (05-31-23 @ 08:00) Fever to 103   - WBC Count: 23.83 (05-31-23 @ 04:48)  WBC Count: 16.88 (05-30-23 @ 06:00)  - Creatinine, Serum: 3.0 (05-31-23 @ 04:48)  Creatinine, Serum: 2.8 (05-30-23 @ 06:00)     - Procalcitonin, Serum: 5.80 ng/mL (05-30-23 @ 11:39)    -   -     ROS  cannot obtain secondary to patient's sedation and/or mental status    VITALS:  T(F): 103, Max: 103 (05-31-23 @ 08:00)  HR: 105  BP: 84/45  RR: 20Vital Signs Last 24 Hrs  T(C): 39.4 (31 May 2023 08:00), Max: 39.4 (31 May 2023 08:00)  T(F): 103 (31 May 2023 08:00), Max: 103 (31 May 2023 08:00)  HR: 105 (31 May 2023 08:45) (81 - 138)  BP: 84/45 (31 May 2023 08:45) (65/34 - 179/104)  BP(mean): 59 (31 May 2023 08:45) (45 - 135)  RR: 20 (31 May 2023 08:45) (13 - 45)  SpO2: 96% (31 May 2023 08:45) (93% - 100%)    Parameters below as of 31 May 2023 08:00  Patient On (Oxygen Delivery Method): ventillator     O2 Concentration (%): 40    PHYSICAL EXAM:  Gen: Intubated  CV: RRR  Lungs: Decreased BS at bases  Abd: Soft  Neuro: Sedated  Lines clean, no phlebitis    FH: Non-contributory  Social Hx: Non-contributory    TESTS & MEASUREMENTS:                        7.6    23.83 )-----------( 278      ( 31 May 2023 04:48 )             24.9     05-31    148<H>  |  112<H>  |  75<HH>  ----------------------------<  86  4.6   |  21  |  3.0<H>    Ca    8.2<L>      31 May 2023 04:48  Mg     2.2     05-31    TPro  6.2  /  Alb  2.3<L>  /  TBili  0.4  /  DBili  x   /  AST  19  /  ALT  10  /  AlkPhos  79  05-31      LIVER FUNCTIONS - ( 31 May 2023 04:48 )  Alb: 2.3 g/dL / Pro: 6.2 g/dL / ALK PHOS: 79 U/L / ALT: 10 U/L / AST: 19 U/L / GGT: x               Culture - Blood (collected 05-29-23 @ 05:55)  Source: .Blood Blood  Preliminary Report (05-30-23 @ 14:01):    No growth to date.    Culture - Blood (collected 05-28-23 @ 16:00)  Source: .Blood Blood-Peripheral  Preliminary Report (05-30-23 @ 03:02):    No growth to date.    Culture - Blood (collected 05-28-23 @ 12:00)  Source: .Blood Blood  Preliminary Report (05-29-23 @ 21:01):    No growth to date.    Culture - Blood (collected 05-27-23 @ 06:05)  Source: .Blood Blood  Gram Stain (05-29-23 @ 13:29):    Growth in aerobic bottle: Gram Positive Cocci in Clusters    Growth in anaerobic bottle: Gram Positive Cocci in Clusters  Final Report (05-30-23 @ 11:11):    Growth in aerobic and anaerobic bottles: Methicillin Resistant    Staphylococcus aureus  Organism: Methicillin resistant Staphylococcus aureus (05-30-23 @ 11:11)  Organism: Methicillin resistant Staphylococcus aureus (05-30-23 @ 11:11)      -  Clindamycin: S 0.5      -  Oxacillin: R >2      -  Gentamicin: S <=1 Should not be used as monotherapy      -  Daptomycin: S 1      -  Linezolid: S 2      -  Cefazolin: R >16      -  Vancomycin: S 2      -  Tetracycline: S <=1      Method Type: RUSSELL      -  Ampicillin/Sulbactam: R 16/8      -  Penicillin: R >8      -  Rifampin: S <=1 Should not be used as monotherapy      -  Erythromycin: R >4      -  Trimethoprim/Sulfamethoxazole: S <=0.5/9.5    Culture - Blood (collected 05-27-23 @ 06:05)  Source: .Blood Blood  Gram Stain (05-28-23 @ 17:02):    Growth in aerobic bottle: Gram Positive Cocci in Clusters  Final Report (05-30-23 @ 09:16):    Growth in aerobic bottle: Staphylococcus aureus    See previous culture [id number]    Culture - Blood (collected 05-26-23 @ 06:09)  Source: .Blood Blood  Gram Stain (05-29-23 @ 00:35):    Growth in anaerobic bottle: Gram Positive Cocci in Clusters  Final Report (05-31-23 @ 08:25):    Growth in anaerobic bottle: Methicillin Resistant Staphylococcus aureus  Organism: Methicillin resistant Staphylococcus aureus (05-31-23 @ 08:25)  Organism: Methicillin resistant Staphylococcus aureus (05-31-23 @ 08:25)      -  Clindamycin: S 0.5      -  Oxacillin: R >2      -  Gentamicin: S <=1 Should not be used as monotherapy      -  Daptomycin: S 1      -  Linezolid: S 4      -  Cefazolin: R >16      -  Vancomycin: S 2      -  Tetracycline: S <=1      Method Type: RUSSELL      -  Ampicillin/Sulbactam: R 16/8      -  Penicillin: R >8      -  Rifampin: S <=1 Should not be used as monotherapy      -  Erythromycin: I 4      -  Trimethoprim/Sulfamethoxazole: S <=0.5/9.5    Culture - Fungal, Sputum (collected 05-24-23 @ 19:17)  Source: Trach Asp Tracheal Aspirate  Preliminary Report (05-29-23 @ 07:07):    Few Yeast    Culture - Acid Fast - Sputum w/Smear (collected 05-24-23 @ 19:17)  Source: Trach Asp Tracheal Aspirate  Preliminary Report (05-27-23 @ 15:08):    Culture is being performed.    Culture - Sputum (collected 05-24-23 @ 19:17)  Source: Trach Asp Tracheal Aspirate  Gram Stain (05-25-23 @ 07:31):    No polymorphonuclear leukocytes per low power field    No Squamous epithelial cells per low power field    Numerous Gram positive cocci in pairs per oil power field  Final Report (05-27-23 @ 08:45):    Numerous Methicillin Resistant Staphylococcus aureus    Normal Respiratory Laila present  Organism: Methicillin resistant Staphylococcus aureus (05-27-23 @ 08:45)  Organism: Methicillin resistant Staphylococcus aureus (05-27-23 @ 08:45)      -  Clindamycin: S <=0.25      -  Oxacillin: R >2      -  Gentamicin: S <=1 Should not be used as monotherapy      -  Linezolid: S 2      -  Cefazolin: R >16      -  Vancomycin: S 2      -  Tetracycline: S <=1      Method Type: RUSSELL      -  Ampicillin/Sulbactam: R 16/8      -  Penicillin: R >8      -  Rifampin: S <=1 Should not be used as monotherapy      -  Erythromycin: R >4      -  Trimethoprim/Sulfamethoxazole: S <=0.5/9.5    Culture - Blood (collected 05-24-23 @ 06:10)  Source: .Blood None  Gram Stain (05-25-23 @ 11:18):    Growth in aerobic bottle: Gram Positive Cocci in Clusters    Growth in anaerobic bottle: Gram Positive Cocci in Clusters  Final Report (05-26-23 @ 08:13):    Growth in aerobic and anaerobic bottles: Methicillin Resistant    Staphylococcus aureus    See previous culture [id number]    Culture - Blood (collected 05-23-23 @ 20:37)  Source: .Blood Blood-Peripheral  Gram Stain (05-24-23 @ 14:24):    Growth in aerobic bottle: Gram Positive Cocci in Clusters  Final Report (05-26-23 @ 08:11):    Growth in aerobic bottle: Methicillin Resistant Staphylococcus aureus    ***Blood Panel PCR results on this specimen are available    approximately 3 hours after the Gram stain result.***    Gram stain, PCR, and/or culture results may not always    correspond due to difference in methodologies.    ************************************************************    This PCR assay was performed by multiplex PCR. This    Assay tests for 66 bacterial and resistance gene targets.    Please refer to the [facility] test directory    at [email or web address] for details.  Organism: Blood Culture PCR  Methicillin resistant Staphylococcus aureus (05-26-23 @ 08:11)  Organism: Methicillin resistant Staphylococcus aureus (05-26-23 @ 08:11)      -  Clindamycin: S <=0.25      -  Oxacillin: R >2      -  Gentamicin: S <=1 Should not be used as monotherapy      -  Daptomycin: S 0.5      -  Linezolid: S 2      -  Cefazolin: R 16      -  Vancomycin: S 2      -  Tetracycline: S <=1      Method Type: RUSSELL      -  Ampicillin/Sulbactam: R 16/8      -  Penicillin: R >8      -  Rifampin: S <=1 Should not be used as monotherapy      -  Erythromycin: R >4      -  Trimethoprim/Sulfamethoxazole: S <=0.5/9.5  Organism: Blood Culture PCR (05-26-23 @ 08:11)      Method Type: PCR      -  Methicillin resistant Staphylococcus aureus (MRSA): Detec    Culture - Blood (collected 05-23-23 @ 20:37)  Source: .Blood Blood-Peripheral  Gram Stain (05-24-23 @ 17:51):    Growth in aerobic bottle: Gram Positive Cocci in Clusters    Growth in anaerobic bottle: Gram Positive Cocci in Clusters  Final Report (05-26-23 @ 08:14):    Growth in aerobic and anaerobic bottles: Methicillin Resistant    Staphylococcus aureus    See previous culture [id number]            INFECTIOUS DISEASES TESTING  Procalcitonin, Serum: 5.80 (05-30-23 @ 11:39)  HIV-1/2 Combo Result: Nonreact (05-27-23 @ 05:47)  MRSA PCR Result.: Positive (05-24-23 @ 19:17)  Legionella Antigen, Urine: Negative (05-24-23 @ 19:16)  Procalcitonin, Serum: 48.30 (05-24-23 @ 06:10)  Fungitell: 41 (05-24-23 @ 06:10)      INFLAMMATORY MARKERS      RADIOLOGY & ADDITIONAL TESTS:  I have personally reviewed the last available Chest xray  CXR      CT  CT Chest No Cont:   ACC: [id number] EXAM:  CT CHEST   ORDERED BY: SILVER HERNANDEZ     PROCEDURE DATE:  05/29/2023          INTERPRETATION:  CLINICAL HISTORY / REASON FOR EXAM: Evaluate fluid   overload. MRSA bacteremia.    TECHNIQUE: Multislice helical sections were obtained from the thoracic   inlet to the lung bases without intravenous contrast. Coronal, sagittal   and 3D/MIP reformatted images are also submitted.    COMPARISON: CT chest from 5/23/2023.    FINDINGS:    TUBES/LINES: Endotracheal tube terminates 3cm superior to the noe.   Enteric tube terminates within the stomach.    LUNGS, PLEURA, AND AIRWAYS: Motion artifact limits evaluation of lung   parenchyma. There has been interval increase in size and number of the   previously noted left-sided predominant cavitary lesions, now with   multiple new cavitary lesions within the left upper lobe and right middle   lobe. The previously noted left lower lobe groundglass opacities now   demonstrate a consolidative process of the entire lobe. Additional   scattered bilateral glass opacities.    MEDIASTINUM/LYMPH NODES: No mediastinal or axillary lymphadenopathy.    HEART/GREAT VESSELS: No pericardial effusion. Heart size unremarkable. No   aneurysmal dilation of the thoracic aorta.    BONES/SOFT TISSUES: No acute osseous abnormality.    VISUALIZED UPPER ABDOMEN: Unremarkable.      IMPRESSION:    Diffusely increased number and size of bilateral cavitary lesions with   the left lower lobe now demonstrating a consolidative process of the   entirelobe.      Additional scattered bilateral ground glass opacities are noted. Findings   are compatible with progressing pneumonia.    --- End of Report ---          HEMA PAREDES MD; Resident Radiologist  This document has been electronically signed.  BARBARA BARRY MD; Attending Radiologist  This document has been electronically signed. May 30 2023  9:38AM (05-29-23 @ 14:14)      CARDIOLOGY TESTING  12 Lead ECG:   Ventricular Rate 134 BPM    Atrial Rate 134 BPM    P-R Interval 208 ms    QRS Duration 76 ms    Q-T Interval 318 ms    QTC Calculation(Bazett) 474 ms    P Axis 97 degrees    R Axis 40 degrees    T Axis 70 degrees    Diagnosis Line Sinus tachycardiawith Fusion complexes  Otherwise normal ECG    Confirmed by PANFILO COVINGTON MD ([id number]) on 5/24/2023 6:54:09 AM (05-24-23 @ 02:40)  12 Lead ECG:   Ventricular Rate 140 BPM    Atrial Rate 140 BPM    P-R Interval 120 ms    QRS Duration 138 ms    Q-T Interval 354 ms    QTC Calculation(Bazett) 540 ms    R Axis 73 degrees    T Axis 60 degrees    Diagnosis Line Sinus tachycardia  Right bundle branch block  Abnormal ECG    Confirmed by Jorge Wyatt ([id number]) on 5/23/2023 10:37:28 PM (05-23-23 @ 19:52)      MEDICATIONS  albuterol    90 MICROgram(s) HFA Inhaler 4  chlorhexidine 0.12% Liquid 15  chlorhexidine 2% Cloths 1  chlorhexidine 2% Cloths 1  clindamycin Solution 15 mG/mL 450  dexMEDEtomidine Infusion 0.2  fentaNYL   Infusion. 0.555  insulin lispro (ADMELOG) corrective regimen sliding scale   ipratropium 17 MICROgram(s) HFA Inhaler 4  ketamine Infusion. 0.25  mupirocin 2% Nasal 1  norepinephrine Infusion 0.05  oseltamivir Suspension 30  pantoprazole   Suspension 40  polyethylene glycol 3350 17  propofol Infusion 11.098  senna 2  sodium bicarbonate 1300  vancomycin  IVPB 500      WEIGHT  Weight (kg): 85 (05-24-23 @ 08:15)  Creatinine, Serum: 3.0 mg/dL (05-31-23 @ 04:48)      ANTIBIOTICS:  clindamycin Solution 15 mG/mL 450 milliGRAM(s) Oral every 6 hours  oseltamivir Suspension 30 milliGRAM(s) Oral every 12 hours  vancomycin  IVPB 500 milliGRAM(s) IV Intermittent every 24 hours      All available historical records have been reviewed   ROSSI ZEYNEP  42y, Female  Allergy: aspirin (Short breath; Anaphylaxis)      LOS  8d    CHIEF COMPLAINT: AHRF (31 May 2023 09:22)      INTERVAL EVENTS/HPI  - T(F): , Max: 103 (05-31-23 @ 08:00) Fever to 103 , MASTER no vegetations   - WBC Count: 23.83 (05-31-23 @ 04:48)  WBC Count: 16.88 (05-30-23 @ 06:00)  - Creatinine, Serum: 3.0 (05-31-23 @ 04:48)  Creatinine, Serum: 2.8 (05-30-23 @ 06:00)     - Procalcitonin, Serum: 5.80 ng/mL (05-30-23 @ 11:39)    -   -     ROS  cannot obtain secondary to patient's sedation and/or mental status    VITALS:  T(F): 103, Max: 103 (05-31-23 @ 08:00)  HR: 105  BP: 84/45  RR: 20Vital Signs Last 24 Hrs  T(C): 39.4 (31 May 2023 08:00), Max: 39.4 (31 May 2023 08:00)  T(F): 103 (31 May 2023 08:00), Max: 103 (31 May 2023 08:00)  HR: 105 (31 May 2023 08:45) (81 - 138)  BP: 84/45 (31 May 2023 08:45) (65/34 - 179/104)  BP(mean): 59 (31 May 2023 08:45) (45 - 135)  RR: 20 (31 May 2023 08:45) (13 - 45)  SpO2: 96% (31 May 2023 08:45) (93% - 100%)    Parameters below as of 31 May 2023 08:00  Patient On (Oxygen Delivery Method): ventillator     O2 Concentration (%): 40    PHYSICAL EXAM:  Gen: Intubated  ETT sanguinous drainage  CV: RRR  Lungs: Decreased BS at bases  Abd: Soft  Neuro: Sedated  edema  Lines clean, no phlebitis    FH: Non-contributory  Social Hx: Non-contributory    TESTS & MEASUREMENTS:                        7.6    23.83 )-----------( 278      ( 31 May 2023 04:48 )             24.9     05-31    148<H>  |  112<H>  |  75<HH>  ----------------------------<  86  4.6   |  21  |  3.0<H>    Ca    8.2<L>      31 May 2023 04:48  Mg     2.2     05-31    TPro  6.2  /  Alb  2.3<L>  /  TBili  0.4  /  DBili  x   /  AST  19  /  ALT  10  /  AlkPhos  79  05-31      LIVER FUNCTIONS - ( 31 May 2023 04:48 )  Alb: 2.3 g/dL / Pro: 6.2 g/dL / ALK PHOS: 79 U/L / ALT: 10 U/L / AST: 19 U/L / GGT: x               Culture - Blood (collected 05-29-23 @ 05:55)  Source: .Blood Blood  Preliminary Report (05-30-23 @ 14:01):    No growth to date.    Culture - Blood (collected 05-28-23 @ 16:00)  Source: .Blood Blood-Peripheral  Preliminary Report (05-30-23 @ 03:02):    No growth to date.    Culture - Blood (collected 05-28-23 @ 12:00)  Source: .Blood Blood  Preliminary Report (05-29-23 @ 21:01):    No growth to date.    Culture - Blood (collected 05-27-23 @ 06:05)  Source: .Blood Blood  Gram Stain (05-29-23 @ 13:29):    Growth in aerobic bottle: Gram Positive Cocci in Clusters    Growth in anaerobic bottle: Gram Positive Cocci in Clusters  Final Report (05-30-23 @ 11:11):    Growth in aerobic and anaerobic bottles: Methicillin Resistant    Staphylococcus aureus  Organism: Methicillin resistant Staphylococcus aureus (05-30-23 @ 11:11)  Organism: Methicillin resistant Staphylococcus aureus (05-30-23 @ 11:11)      -  Clindamycin: S 0.5      -  Oxacillin: R >2      -  Gentamicin: S <=1 Should not be used as monotherapy      -  Daptomycin: S 1      -  Linezolid: S 2      -  Cefazolin: R >16      -  Vancomycin: S 2      -  Tetracycline: S <=1      Method Type: RUSSELL      -  Ampicillin/Sulbactam: R 16/8      -  Penicillin: R >8      -  Rifampin: S <=1 Should not be used as monotherapy      -  Erythromycin: R >4      -  Trimethoprim/Sulfamethoxazole: S <=0.5/9.5    Culture - Blood (collected 05-27-23 @ 06:05)  Source: .Blood Blood  Gram Stain (05-28-23 @ 17:02):    Growth in aerobic bottle: Gram Positive Cocci in Clusters  Final Report (05-30-23 @ 09:16):    Growth in aerobic bottle: Staphylococcus aureus    See previous culture [id number]    Culture - Blood (collected 05-26-23 @ 06:09)  Source: .Blood Blood  Gram Stain (05-29-23 @ 00:35):    Growth in anaerobic bottle: Gram Positive Cocci in Clusters  Final Report (05-31-23 @ 08:25):    Growth in anaerobic bottle: Methicillin Resistant Staphylococcus aureus  Organism: Methicillin resistant Staphylococcus aureus (05-31-23 @ 08:25)  Organism: Methicillin resistant Staphylococcus aureus (05-31-23 @ 08:25)      -  Clindamycin: S 0.5      -  Oxacillin: R >2      -  Gentamicin: S <=1 Should not be used as monotherapy      -  Daptomycin: S 1      -  Linezolid: S 4      -  Cefazolin: R >16      -  Vancomycin: S 2      -  Tetracycline: S <=1      Method Type: RUSSELL      -  Ampicillin/Sulbactam: R 16/8      -  Penicillin: R >8      -  Rifampin: S <=1 Should not be used as monotherapy      -  Erythromycin: I 4      -  Trimethoprim/Sulfamethoxazole: S <=0.5/9.5    Culture - Fungal, Sputum (collected 05-24-23 @ 19:17)  Source: Trach Asp Tracheal Aspirate  Preliminary Report (05-29-23 @ 07:07):    Few Yeast    Culture - Acid Fast - Sputum w/Smear (collected 05-24-23 @ 19:17)  Source: Trach Asp Tracheal Aspirate  Preliminary Report (05-27-23 @ 15:08):    Culture is being performed.    Culture - Sputum (collected 05-24-23 @ 19:17)  Source: Trach Asp Tracheal Aspirate  Gram Stain (05-25-23 @ 07:31):    No polymorphonuclear leukocytes per low power field    No Squamous epithelial cells per low power field    Numerous Gram positive cocci in pairs per oil power field  Final Report (05-27-23 @ 08:45):    Numerous Methicillin Resistant Staphylococcus aureus    Normal Respiratory Laila present  Organism: Methicillin resistant Staphylococcus aureus (05-27-23 @ 08:45)  Organism: Methicillin resistant Staphylococcus aureus (05-27-23 @ 08:45)      -  Clindamycin: S <=0.25      -  Oxacillin: R >2      -  Gentamicin: S <=1 Should not be used as monotherapy      -  Linezolid: S 2      -  Cefazolin: R >16      -  Vancomycin: S 2      -  Tetracycline: S <=1      Method Type: RUSSELL      -  Ampicillin/Sulbactam: R 16/8      -  Penicillin: R >8      -  Rifampin: S <=1 Should not be used as monotherapy      -  Erythromycin: R >4      -  Trimethoprim/Sulfamethoxazole: S <=0.5/9.5    Culture - Blood (collected 05-24-23 @ 06:10)  Source: .Blood None  Gram Stain (05-25-23 @ 11:18):    Growth in aerobic bottle: Gram Positive Cocci in Clusters    Growth in anaerobic bottle: Gram Positive Cocci in Clusters  Final Report (05-26-23 @ 08:13):    Growth in aerobic and anaerobic bottles: Methicillin Resistant    Staphylococcus aureus    See previous culture [id number]    Culture - Blood (collected 05-23-23 @ 20:37)  Source: .Blood Blood-Peripheral  Gram Stain (05-24-23 @ 14:24):    Growth in aerobic bottle: Gram Positive Cocci in Clusters  Final Report (05-26-23 @ 08:11):    Growth in aerobic bottle: Methicillin Resistant Staphylococcus aureus    ***Blood Panel PCR results on this specimen are available    approximately 3 hours after the Gram stain result.***    Gram stain, PCR, and/or culture results may not always    correspond due to difference in methodologies.    ************************************************************    This PCR assay was performed by multiplex PCR. This    Assay tests for 66 bacterial and resistance gene targets.    Please refer to the [facility] test directory    at [email or web address] for details.  Organism: Blood Culture PCR  Methicillin resistant Staphylococcus aureus (05-26-23 @ 08:11)  Organism: Methicillin resistant Staphylococcus aureus (05-26-23 @ 08:11)      -  Clindamycin: S <=0.25      -  Oxacillin: R >2      -  Gentamicin: S <=1 Should not be used as monotherapy      -  Daptomycin: S 0.5      -  Linezolid: S 2      -  Cefazolin: R 16      -  Vancomycin: S 2      -  Tetracycline: S <=1      Method Type: RUSSELL      -  Ampicillin/Sulbactam: R 16/8      -  Penicillin: R >8      -  Rifampin: S <=1 Should not be used as monotherapy      -  Erythromycin: R >4      -  Trimethoprim/Sulfamethoxazole: S <=0.5/9.5  Organism: Blood Culture PCR (05-26-23 @ 08:11)      Method Type: PCR      -  Methicillin resistant Staphylococcus aureus (MRSA): Detec    Culture - Blood (collected 05-23-23 @ 20:37)  Source: .Blood Blood-Peripheral  Gram Stain (05-24-23 @ 17:51):    Growth in aerobic bottle: Gram Positive Cocci in Clusters    Growth in anaerobic bottle: Gram Positive Cocci in Clusters  Final Report (05-26-23 @ 08:14):    Growth in aerobic and anaerobic bottles: Methicillin Resistant    Staphylococcus aureus    See previous culture [id number]            INFECTIOUS DISEASES TESTING  Procalcitonin, Serum: 5.80 (05-30-23 @ 11:39)  HIV-1/2 Combo Result: Nonreact (05-27-23 @ 05:47)  MRSA PCR Result.: Positive (05-24-23 @ 19:17)  Legionella Antigen, Urine: Negative (05-24-23 @ 19:16)  Procalcitonin, Serum: 48.30 (05-24-23 @ 06:10)  Fungitell: 41 (05-24-23 @ 06:10)      INFLAMMATORY MARKERS      RADIOLOGY & ADDITIONAL TESTS:  I have personally reviewed the last available Chest xray  CXR      CT  CT Chest No Cont:   ACC: [id number] EXAM:  CT CHEST   ORDERED BY: SILVER HERNANDEZ     PROCEDURE DATE:  05/29/2023          INTERPRETATION:  CLINICAL HISTORY / REASON FOR EXAM: Evaluate fluid   overload. MRSA bacteremia.    TECHNIQUE: Multislice helical sections were obtained from the thoracic   inlet to the lung bases without intravenous contrast. Coronal, sagittal   and 3D/MIP reformatted images are also submitted.    COMPARISON: CT chest from 5/23/2023.    FINDINGS:    TUBES/LINES: Endotracheal tube terminates 3cm superior to the noe.   Enteric tube terminates within the stomach.    LUNGS, PLEURA, AND AIRWAYS: Motion artifact limits evaluation of lung   parenchyma. There has been interval increase in size and number of the   previously noted left-sided predominant cavitary lesions, now with   multiple new cavitary lesions within the left upper lobe and right middle   lobe. The previously noted left lower lobe groundglass opacities now   demonstrate a consolidative process of the entire lobe. Additional   scattered bilateral glass opacities.    MEDIASTINUM/LYMPH NODES: No mediastinal or axillary lymphadenopathy.    HEART/GREAT VESSELS: No pericardial effusion. Heart size unremarkable. No   aneurysmal dilation of the thoracic aorta.    BONES/SOFT TISSUES: No acute osseous abnormality.    VISUALIZED UPPER ABDOMEN: Unremarkable.      IMPRESSION:    Diffusely increased number and size of bilateral cavitary lesions with   the left lower lobe now demonstrating a consolidative process of the   entirelobe.      Additional scattered bilateral ground glass opacities are noted. Findings   are compatible with progressing pneumonia.    --- End of Report ---          HEMA PAREDES MD; Resident Radiologist  This document has been electronically signed.  BARBARA BARRY MD; Attending Radiologist  This document has been electronically signed. May 30 2023  9:38AM (05-29-23 @ 14:14)      CARDIOLOGY TESTING  12 Lead ECG:   Ventricular Rate 134 BPM    Atrial Rate 134 BPM    P-R Interval 208 ms    QRS Duration 76 ms    Q-T Interval 318 ms    QTC Calculation(Bazett) 474 ms    P Axis 97 degrees    R Axis 40 degrees    T Axis 70 degrees    Diagnosis Line Sinus tachycardiawith Fusion complexes  Otherwise normal ECG    Confirmed by PANFILO COVINGTON MD ([id number]) on 5/24/2023 6:54:09 AM (05-24-23 @ 02:40)  12 Lead ECG:   Ventricular Rate 140 BPM    Atrial Rate 140 BPM    P-R Interval 120 ms    QRS Duration 138 ms    Q-T Interval 354 ms    QTC Calculation(Bazett) 540 ms    R Axis 73 degrees    T Axis 60 degrees    Diagnosis Line Sinus tachycardia  Right bundle branch block  Abnormal ECG    Confirmed by Jorge Wyatt ([id number]) on 5/23/2023 10:37:28 PM (05-23-23 @ 19:52)      MEDICATIONS  albuterol    90 MICROgram(s) HFA Inhaler 4  chlorhexidine 0.12% Liquid 15  chlorhexidine 2% Cloths 1  chlorhexidine 2% Cloths 1  clindamycin Solution 15 mG/mL 450  dexMEDEtomidine Infusion 0.2  fentaNYL   Infusion. 0.555  insulin lispro (ADMELOG) corrective regimen sliding scale   ipratropium 17 MICROgram(s) HFA Inhaler 4  ketamine Infusion. 0.25  mupirocin 2% Nasal 1  norepinephrine Infusion 0.05  oseltamivir Suspension 30  pantoprazole   Suspension 40  polyethylene glycol 3350 17  propofol Infusion 11.098  senna 2  sodium bicarbonate 1300  vancomycin  IVPB 500      WEIGHT  Weight (kg): 85 (05-24-23 @ 08:15)  Creatinine, Serum: 3.0 mg/dL (05-31-23 @ 04:48)      ANTIBIOTICS:  clindamycin Solution 15 mG/mL 450 milliGRAM(s) Oral every 6 hours  oseltamivir Suspension 30 milliGRAM(s) Oral every 12 hours  vancomycin  IVPB 500 milliGRAM(s) IV Intermittent every 24 hours      All available historical records have been reviewed

## 2023-05-31 NOTE — PROGRESS NOTE ADULT - SUBJECTIVE AND OBJECTIVE BOX
Nephrology progress note    THIS IS AN INCOMPLETE NOTE . FULL NOTE TO FOLLOW SHORTLY    Patient is seen and examined, events over the last 24 h noted .    Allergies:  aspirin (Short breath; Anaphylaxis)    Hospital Medications:   MEDICATIONS  (STANDING):  albuterol    90 MICROgram(s) HFA Inhaler 4 Puff(s) Inhalation every 6 hours  chlorhexidine 0.12% Liquid 15 milliLiter(s) Oral Mucosa every 12 hours  chlorhexidine 2% Cloths 1 Application(s) Topical <User Schedule>  chlorhexidine 2% Cloths 1 Application(s) Topical daily  clindamycin Solution 15 mG/mL 450 milliGRAM(s) Oral every 6 hours  dexMEDEtomidine Infusion 0.2 MICROgram(s)/kG/Hr (4.25 mL/Hr) IV Continuous <Continuous>  fentaNYL   Infusion. 0.555 MICROgram(s)/kG/Hr (4.72 mL/Hr) IV Continuous <Continuous>  insulin lispro (ADMELOG) corrective regimen sliding scale   SubCutaneous three times a day before meals  ipratropium 17 MICROgram(s) HFA Inhaler 4 Puff(s) Inhalation every 6 hours  ketamine Infusion. 0.25 mG/kG/Hr (2.13 mL/Hr) IV Continuous <Continuous>  mupirocin 2% Nasal 1 Application(s) Both Nostrils two times a day  norepinephrine Infusion 0.05 MICROgram(s)/kG/Min (7.97 mL/Hr) IV Continuous <Continuous>  oseltamivir Suspension 30 milliGRAM(s) Oral every 12 hours  pantoprazole   Suspension 40 milliGRAM(s) Oral daily  polyethylene glycol 3350 17 Gram(s) Oral every 12 hours  propofol Infusion 11.098 MICROgram(s)/kG/Min (5.66 mL/Hr) IV Continuous <Continuous>  senna 2 Tablet(s) Oral at bedtime  sodium bicarbonate 1300 milliGRAM(s) Oral every 8 hours  vancomycin  IVPB 500 milliGRAM(s) IV Intermittent every 24 hours        VITALS:  T(F): 103 (23 @ 08:00), Max: 103 (23 @ 08:00)  HR: 105 (23 @ 08:45)  BP: 84/45 (23 @ 08:45)  RR: 20 (23 @ 08:45)  SpO2: 96% (23 @ 08:45)  Wt(kg): --     @ 07:01  -   @ 07:00  --------------------------------------------------------  IN: 2711.4 mL / OUT: 3405 mL / NET: -693.6 mL     @ 07: @ 07:00  --------------------------------------------------------  IN: 2588.5 mL / OUT: 3595 mL / NET: -1006.5 mL     @ 07: @ 09:17  --------------------------------------------------------  IN: 199.9 mL / OUT: 380 mL / NET: -180.1 mL          PHYSICAL EXAM:  Constitutional: NAD  HEENT: anicteric sclera, oropharynx clear, MMM  Neck: No JVD  Respiratory: CTAB, no wheezes, rales or rhonchi  Cardiovascular: S1, S2, RRR  Gastrointestinal: BS+, soft, NT/ND  Extremities: No cyanosis or clubbing. No peripheral edema  :  No diggs.   Skin: No rashes    LABS:      148<H>  |  112<H>  |  75<HH>  ----------------------------<  86  4.6   |  21  |  3.0<H>    Ca    8.2<L>      31 May 2023 04:48  Mg     2.2         TPro  6.2  /  Alb  2.3<L>  /  TBili  0.4  /  DBili      /  AST  19  /  ALT  10  /  AlkPhos  79                            7.6    23.83 )-----------( 278      ( 31 May 2023 04:48 )             24.9       Urine Studies:  Urinalysis Basic - ( 25 May 2023 11:30 )    Color: Yellow / Appearance: Slightly Turbid / S.027 / pH:   Gluc:  / Ketone: Negative  / Bili: Negative / Urobili: <2 mg/dL   Blood:  / Protein: 100 mg/dL / Nitrite: Negative   Leuk Esterase: Negative / RBC: 45 /HPF / WBC 30 /HPF   Sq Epi:  / Non Sq Epi:  / Bacteria: Negative      Sodium, Random Urine: <20.0 mmoL/L ( 19:16)  Creatinine, Random Urine: 178 mg/dL ( 19:16)  Protein/Creatinine Ratio Calculation: 1.3 Ratio ( 19:16)  Osmolality, Random Urine: 340 mos/kg ( @ 19:16)  Potassium, Random Urine: 50 mmol/L ( 19:16)      Iron 13, TIBC 174, %sat 7      [23 @ 12:10]  Ferritin 136      [23 @ 12:10]  Lipid: chol --, TG 1009, HDL --, LDL --      [23 @ 11:39]    HBsAg Nonreact      [23 @ 11:00]  HCV 0.16, Nonreact      [23 @ 11:00]  HIV Nonreact      [23 @ 05:47]    CHARLES: titer 1:640, pattern Centromere      [23 @ 11:48]  dsDNA <12      [23 @ 11:48]  C3 Complement 80      [23 @ 11:48]  C4 Complement 34      [23 @ 11:48]  ANCA: cANCA Negative, pANCA Negative, atypical ANCA Negative      [23 @ 11:48]  anti-GBM <0.2      [23 @ 11:48]  Free Light Chains: kappa 16.56, lambda 7.37, ratio = 2.25      [ @ 11:48]      RADIOLOGY & ADDITIONAL STUDIES:   Nephrology progress note    Patient is seen and examined, events over the last 24 h noted .  Intubated on MV       Allergies:  aspirin (Short breath; Anaphylaxis)    Hospital Medications:   MEDICATIONS  (STANDING):    albuterol    90 MICROgram(s) HFA Inhaler 4 Puff(s) Inhalation every 6 hours  clindamycin Solution 15 mG/mL 450 milliGRAM(s) Oral every 6 hours  dexMEDEtomidine Infusion 0.2 MICROgram(s)/kG/Hr (4.25 mL/Hr) IV Continuous <Continuous>  fentaNYL   Infusion. 0.555 MICROgram(s)/kG/Hr (4.72 mL/Hr) IV Continuous <Continuous>  insulin lispro (ADMELOG) corrective regimen sliding scale   SubCutaneous three times a day before meals  ipratropium 17 MICROgram(s) HFA Inhaler 4 Puff(s) Inhalation every 6 hours  ketamine Infusion. 0.25 mG/kG/Hr (2.13 mL/Hr) IV Continuous <Continuous>  mupirocin 2% Nasal 1 Application(s) Both Nostrils two times a day  norepinephrine Infusion 0.05 MICROgram(s)/kG/Min (7.97 mL/Hr) IV Continuous <Continuous>  oseltamivir Suspension 30 milliGRAM(s) Oral every 12 hours  pantoprazole   Suspension 40 milliGRAM(s) Oral daily  polyethylene glycol 3350 17 Gram(s) Oral every 12 hours  propofol Infusion 11.098 MICROgram(s)/kG/Min (5.66 mL/Hr) IV Continuous <Continuous>  senna 2 Tablet(s) Oral at bedtime  sodium bicarbonate 1300 milliGRAM(s) Oral every 8 hours  vancomycin  IVPB 500 milliGRAM(s) IV Intermittent every 24 hours        VITALS:  T(F): 103 (23 @ 08:00), Max: 103 (23 @ 08:00)  HR: 105 (23 @ 08:45)  BP: 84/45 (23 @ 08:45)  RR: 20 (23 @ 08:45)  SpO2: 96% (23 @ 08:45)       @ 07:01  -   @ 07:00  --------------------------------------------------------  IN: 2711.4 mL / OUT: 3405 mL / NET: -693.6 mL     @ 07: @ 07:00  --------------------------------------------------------  IN: 2588.5 mL / OUT: 3595 mL / NET: -1006.5 mL     @ 07: @ 09:17  --------------------------------------------------------  IN: 199.9 mL / OUT: 380 mL / NET: -180.1 mL          PHYSICAL EXAM:  Constitutional: NAD  Respiratory: CTAB,  Cardiovascular: S1, S2, RRR  Gastrointestinal: BS+, soft, NT/ND  Extremities: No cyanosis or clubbing. No peripheral edema  :  No diggs.   Skin: No rashes    LABS:      148<H>  |  112<H>  |  75<HH>  ----------------------------<  86  4.6   |  21  |  3.0<H>    Creatinine Trend: 3.0<--, 2.8<--, 2.9<--, 3.0<--, 2.7<--, 2.6<--    Ca    8.2<L>      31 May 2023 04:48  Mg     2.2         TPro  6.2  /  Alb  2.3<L>  /  TBili  0.4  /  DBili      /  AST  19  /  ALT  10  /  AlkPhos  79                            7.6    23.83 )-----------( 278      ( 31 May 2023 04:48 )             24.9       Urine Studies:  Urinalysis Basic - ( 25 May 2023 11:30 )    Color: Yellow / Appearance: Slightly Turbid / S.027 / pH:   Gluc:  / Ketone: Negative  / Bili: Negative / Urobili: <2 mg/dL   Blood:  / Protein: 100 mg/dL / Nitrite: Negative   Leuk Esterase: Negative / RBC: 45 /HPF / WBC 30 /HPF   Sq Epi:  / Non Sq Epi:  / Bacteria: Negative      Sodium, Random Urine: <20.0 mmoL/L ( @ 19:16)  Creatinine, Random Urine: 178 mg/dL ( 19:16)  Protein/Creatinine Ratio Calculation: 1.3 Ratio ( 19:16)  Osmolality, Random Urine: 340 mos/kg ( @ 19:16)  Potassium, Random Urine: 50 mmol/L ( 19:16)      Iron 13, TIBC 174, %sat 7      [23 @ 12:10]  Ferritin 136      [23 @ 12:10]  Lipid: chol --, TG 1009, HDL --, LDL --      [23 @ 11:39]    HBsAg Nonreact      [23 @ 11:00]  HCV 0.16, Nonreact      [23 @ 11:00]  HIV Nonreact      [23 @ 05:47]    CHARLES: titer 1:640, pattern Centromere      [23 @ 11:48]  dsDNA <12      [23 @ 11:48]  C3 Complement 80      [23 @ 11:48]  C4 Complement 34      [23 @ 11:48]  ANCA: cANCA Negative, pANCA Negative, atypical ANCA Negative      [23 @ 11:48]  anti-GBM <0.2      [23 @ 11:48]  Free Light Chains: kappa 16.56, lambda 7.37, ratio = 2.25      [ @ 11:48]      RADIOLOGY & ADDITIONAL STUDIES:

## 2023-06-01 LAB
ALBUMIN SERPL ELPH-MCNC: 2.3 G/DL — LOW (ref 3.5–5.2)
ALP SERPL-CCNC: 79 U/L — SIGNIFICANT CHANGE UP (ref 30–115)
ALT FLD-CCNC: 9 U/L — SIGNIFICANT CHANGE UP (ref 0–41)
ANION GAP SERPL CALC-SCNC: 9 MMOL/L — SIGNIFICANT CHANGE UP (ref 7–14)
ANISOCYTOSIS BLD QL: SIGNIFICANT CHANGE UP
APTT BLD: 29.3 SEC — SIGNIFICANT CHANGE UP (ref 27–39.2)
AST SERPL-CCNC: 17 U/L — SIGNIFICANT CHANGE UP (ref 0–41)
BASOPHILS # BLD AUTO: 0.04 K/UL — SIGNIFICANT CHANGE UP (ref 0–0.2)
BASOPHILS # BLD AUTO: 0.04 K/UL — SIGNIFICANT CHANGE UP (ref 0–0.2)
BASOPHILS NFR BLD AUTO: 0.2 % — SIGNIFICANT CHANGE UP (ref 0–1)
BASOPHILS NFR BLD AUTO: 0.2 % — SIGNIFICANT CHANGE UP (ref 0–1)
BILIRUB SERPL-MCNC: 0.4 MG/DL — SIGNIFICANT CHANGE UP (ref 0.2–1.2)
BLD GP AB SCN SERPL QL: SIGNIFICANT CHANGE UP
BUN SERPL-MCNC: 74 MG/DL — CRITICAL HIGH (ref 10–20)
CALCIUM SERPL-MCNC: 8.2 MG/DL — LOW (ref 8.4–10.4)
CHLORIDE SERPL-SCNC: 113 MMOL/L — HIGH (ref 98–110)
CK SERPL-CCNC: 31 U/L — SIGNIFICANT CHANGE UP (ref 0–225)
CO2 SERPL-SCNC: 24 MMOL/L — SIGNIFICANT CHANGE UP (ref 17–32)
CREAT SERPL-MCNC: 3 MG/DL — HIGH (ref 0.7–1.5)
EGFR: 19 ML/MIN/1.73M2 — LOW
EOSINOPHIL # BLD AUTO: 0.02 K/UL — SIGNIFICANT CHANGE UP (ref 0–0.7)
EOSINOPHIL # BLD AUTO: 0.07 K/UL — SIGNIFICANT CHANGE UP (ref 0–0.7)
EOSINOPHIL NFR BLD AUTO: 0.1 % — SIGNIFICANT CHANGE UP (ref 0–8)
EOSINOPHIL NFR BLD AUTO: 0.3 % — SIGNIFICANT CHANGE UP (ref 0–8)
GAS PNL BLDA: SIGNIFICANT CHANGE UP
GIANT PLATELETS BLD QL SMEAR: PRESENT — SIGNIFICANT CHANGE UP
GLUCOSE BLDC GLUCOMTR-MCNC: 113 MG/DL — HIGH (ref 70–99)
GLUCOSE BLDC GLUCOMTR-MCNC: 121 MG/DL — HIGH (ref 70–99)
GLUCOSE BLDC GLUCOMTR-MCNC: 131 MG/DL — HIGH (ref 70–99)
GLUCOSE SERPL-MCNC: 105 MG/DL — HIGH (ref 70–99)
GRAM STN FLD: SIGNIFICANT CHANGE UP
HCT VFR BLD CALC: 19.7 % — LOW (ref 37–47)
HCT VFR BLD CALC: 19.9 % — LOW (ref 37–47)
HCT VFR BLD CALC: 20.1 % — LOW (ref 37–47)
HCT VFR BLD CALC: 22.2 % — LOW (ref 37–47)
HGB BLD-MCNC: 6 G/DL — CRITICAL LOW (ref 12–16)
HGB BLD-MCNC: 6 G/DL — CRITICAL LOW (ref 12–16)
HGB BLD-MCNC: 6.1 G/DL — CRITICAL LOW (ref 12–16)
HGB BLD-MCNC: 6.8 G/DL — CRITICAL LOW (ref 12–16)
HYPOCHROMIA BLD QL: SIGNIFICANT CHANGE UP
IMM GRANULOCYTES NFR BLD AUTO: 3.3 % — HIGH (ref 0.1–0.3)
IMM GRANULOCYTES NFR BLD AUTO: 4.6 % — HIGH (ref 0.1–0.3)
INR BLD: 1.29 RATIO — SIGNIFICANT CHANGE UP (ref 0.65–1.3)
LYMPHOCYTES # BLD AUTO: 1.45 K/UL — SIGNIFICANT CHANGE UP (ref 1.2–3.4)
LYMPHOCYTES # BLD AUTO: 1.6 K/UL — SIGNIFICANT CHANGE UP (ref 1.2–3.4)
LYMPHOCYTES # BLD AUTO: 7 % — LOW (ref 20.5–51.1)
LYMPHOCYTES # BLD AUTO: 7.1 % — LOW (ref 20.5–51.1)
MAGNESIUM SERPL-MCNC: 2 MG/DL — SIGNIFICANT CHANGE UP (ref 1.8–2.4)
MANUAL SMEAR VERIFICATION: SIGNIFICANT CHANGE UP
MCHC RBC-ENTMCNC: 19 PG — LOW (ref 27–31)
MCHC RBC-ENTMCNC: 19.2 PG — LOW (ref 27–31)
MCHC RBC-ENTMCNC: 19.3 PG — LOW (ref 27–31)
MCHC RBC-ENTMCNC: 20.2 PG — LOW (ref 27–31)
MCHC RBC-ENTMCNC: 30.2 G/DL — LOW (ref 32–37)
MCHC RBC-ENTMCNC: 30.3 G/DL — LOW (ref 32–37)
MCHC RBC-ENTMCNC: 30.5 G/DL — LOW (ref 32–37)
MCHC RBC-ENTMCNC: 30.6 G/DL — LOW (ref 32–37)
MCV RBC AUTO: 63 FL — LOW (ref 81–99)
MCV RBC AUTO: 63.2 FL — LOW (ref 81–99)
MCV RBC AUTO: 63.3 FL — LOW (ref 81–99)
MCV RBC AUTO: 65.9 FL — LOW (ref 81–99)
MICROCYTES BLD QL: SIGNIFICANT CHANGE UP
MONOCYTES # BLD AUTO: 1.74 K/UL — HIGH (ref 0.1–0.6)
MONOCYTES # BLD AUTO: 1.75 K/UL — HIGH (ref 0.1–0.6)
MONOCYTES NFR BLD AUTO: 7.6 % — SIGNIFICANT CHANGE UP (ref 1.7–9.3)
MONOCYTES NFR BLD AUTO: 8.6 % — SIGNIFICANT CHANGE UP (ref 1.7–9.3)
NEUTROPHILS # BLD AUTO: 16.37 K/UL — HIGH (ref 1.4–6.5)
NEUTROPHILS # BLD AUTO: 18.35 K/UL — HIGH (ref 1.4–6.5)
NEUTROPHILS NFR BLD AUTO: 80.3 % — HIGH (ref 42.2–75.2)
NEUTROPHILS NFR BLD AUTO: 80.7 % — HIGH (ref 42.2–75.2)
NRBC # BLD: 0 /100 WBCS — SIGNIFICANT CHANGE UP (ref 0–0)
PLAT MORPH BLD: NORMAL — SIGNIFICANT CHANGE UP
PLATELET # BLD AUTO: 240 K/UL — SIGNIFICANT CHANGE UP (ref 130–400)
PLATELET # BLD AUTO: 241 K/UL — SIGNIFICANT CHANGE UP (ref 130–400)
PLATELET # BLD AUTO: 244 K/UL — SIGNIFICANT CHANGE UP (ref 130–400)
PLATELET # BLD AUTO: 279 K/UL — SIGNIFICANT CHANGE UP (ref 130–400)
PMV BLD: 9.4 FL — SIGNIFICANT CHANGE UP (ref 7.4–10.4)
PMV BLD: 9.5 FL — SIGNIFICANT CHANGE UP (ref 7.4–10.4)
PMV BLD: 9.6 FL — SIGNIFICANT CHANGE UP (ref 7.4–10.4)
PMV BLD: 9.9 FL — SIGNIFICANT CHANGE UP (ref 7.4–10.4)
POIKILOCYTOSIS BLD QL AUTO: SLIGHT — SIGNIFICANT CHANGE UP
POLYCHROMASIA BLD QL SMEAR: SIGNIFICANT CHANGE UP
POTASSIUM SERPL-MCNC: 4.3 MMOL/L — SIGNIFICANT CHANGE UP (ref 3.5–5)
POTASSIUM SERPL-SCNC: 4.3 MMOL/L — SIGNIFICANT CHANGE UP (ref 3.5–5)
PROT SERPL-MCNC: 6 G/DL — SIGNIFICANT CHANGE UP (ref 6–8)
PROTHROM AB SERPL-ACNC: 14.8 SEC — HIGH (ref 9.95–12.87)
RBC # BLD: 3.11 M/UL — LOW (ref 4.2–5.4)
RBC # BLD: 3.16 M/UL — LOW (ref 4.2–5.4)
RBC # BLD: 3.18 M/UL — LOW (ref 4.2–5.4)
RBC # BLD: 3.37 M/UL — LOW (ref 4.2–5.4)
RBC # FLD: 22.2 % — HIGH (ref 11.5–14.5)
RBC # FLD: 22.2 % — HIGH (ref 11.5–14.5)
RBC # FLD: 22.7 % — HIGH (ref 11.5–14.5)
RBC # FLD: 25 % — HIGH (ref 11.5–14.5)
RBC BLD AUTO: ABNORMAL
SODIUM SERPL-SCNC: 146 MMOL/L — SIGNIFICANT CHANGE UP (ref 135–146)
SPECIMEN SOURCE: SIGNIFICANT CHANGE UP
TARGETS BLD QL SMEAR: SIGNIFICANT CHANGE UP
TRIGL SERPL-MCNC: 286 MG/DL — HIGH
VANCOMYCIN TROUGH SERPL-MCNC: 11.9 UG/ML — HIGH (ref 5–10)
VARIANT LYMPHS # BLD: 0.9 % — SIGNIFICANT CHANGE UP (ref 0–5)
WBC # BLD: 19.6 K/UL — HIGH (ref 4.8–10.8)
WBC # BLD: 20.29 K/UL — HIGH (ref 4.8–10.8)
WBC # BLD: 20.33 K/UL — HIGH (ref 4.8–10.8)
WBC # BLD: 22.85 K/UL — HIGH (ref 4.8–10.8)
WBC # FLD AUTO: 19.6 K/UL — HIGH (ref 4.8–10.8)
WBC # FLD AUTO: 20.29 K/UL — HIGH (ref 4.8–10.8)
WBC # FLD AUTO: 20.33 K/UL — HIGH (ref 4.8–10.8)
WBC # FLD AUTO: 22.85 K/UL — HIGH (ref 4.8–10.8)

## 2023-06-01 PROCEDURE — 71250 CT THORAX DX C-: CPT | Mod: 26

## 2023-06-01 PROCEDURE — 71045 X-RAY EXAM CHEST 1 VIEW: CPT | Mod: 26

## 2023-06-01 PROCEDURE — 99291 CRITICAL CARE FIRST HOUR: CPT

## 2023-06-01 RX ORDER — SODIUM BICARBONATE 1 MEQ/ML
650 SYRINGE (ML) INTRAVENOUS EVERY 8 HOURS
Refills: 0 | Status: DISCONTINUED | OUTPATIENT
Start: 2023-06-01 | End: 2023-06-03

## 2023-06-01 RX ORDER — METOCLOPRAMIDE HCL 10 MG
4 TABLET ORAL EVERY 6 HOURS
Refills: 0 | Status: COMPLETED | OUTPATIENT
Start: 2023-06-01 | End: 2023-06-02

## 2023-06-01 RX ORDER — ACYCLOVIR SODIUM 500 MG
125 VIAL (EA) INTRAVENOUS EVERY 24 HOURS
Refills: 0 | Status: DISCONTINUED | OUTPATIENT
Start: 2023-06-01 | End: 2023-06-04

## 2023-06-01 RX ADMIN — FENTANYL CITRATE 4.72 MICROGRAM(S)/KG/HR: 50 INJECTION INTRAVENOUS at 10:48

## 2023-06-01 RX ADMIN — Medication 100 MILLIGRAM(S): at 18:04

## 2023-06-01 RX ADMIN — Medication 4 PUFF(S): at 03:04

## 2023-06-01 RX ADMIN — POLYETHYLENE GLYCOL 3350 17 GRAM(S): 17 POWDER, FOR SOLUTION ORAL at 21:26

## 2023-06-01 RX ADMIN — Medication 4 MILLIGRAM(S): at 18:04

## 2023-06-01 RX ADMIN — POLYETHYLENE GLYCOL 3350 17 GRAM(S): 17 POWDER, FOR SOLUTION ORAL at 12:19

## 2023-06-01 RX ADMIN — Medication 4 MILLIGRAM(S): at 23:28

## 2023-06-01 RX ADMIN — ALBUTEROL 4 PUFF(S): 90 AEROSOL, METERED ORAL at 20:27

## 2023-06-01 RX ADMIN — Medication 650 MILLIGRAM(S): at 12:59

## 2023-06-01 RX ADMIN — Medication 1300 MILLIGRAM(S): at 05:01

## 2023-06-01 RX ADMIN — CHLORHEXIDINE GLUCONATE 15 MILLILITER(S): 213 SOLUTION TOPICAL at 17:23

## 2023-06-01 RX ADMIN — Medication 102.5 MILLIGRAM(S): at 18:04

## 2023-06-01 RX ADMIN — Medication 30 MILLIGRAM(S): at 21:26

## 2023-06-01 RX ADMIN — DEXMEDETOMIDINE HYDROCHLORIDE IN 0.9% SODIUM CHLORIDE 4.25 MICROGRAM(S)/KG/HR: 4 INJECTION INTRAVENOUS at 04:41

## 2023-06-01 RX ADMIN — Medication 650 MILLIGRAM(S): at 18:48

## 2023-06-01 RX ADMIN — Medication 1 APPLICATION(S): at 12:19

## 2023-06-01 RX ADMIN — FENTANYL CITRATE 4.72 MICROGRAM(S)/KG/HR: 50 INJECTION INTRAVENOUS at 17:35

## 2023-06-01 RX ADMIN — MIDAZOLAM HYDROCHLORIDE 2 MILLIGRAM(S): 1 INJECTION, SOLUTION INTRAMUSCULAR; INTRAVENOUS at 15:21

## 2023-06-01 RX ADMIN — ALBUTEROL 4 PUFF(S): 90 AEROSOL, METERED ORAL at 03:04

## 2023-06-01 RX ADMIN — Medication 650 MILLIGRAM(S): at 18:06

## 2023-06-01 RX ADMIN — MIDAZOLAM HYDROCHLORIDE 2 MILLIGRAM(S): 1 INJECTION, SOLUTION INTRAMUSCULAR; INTRAVENOUS at 01:09

## 2023-06-01 RX ADMIN — Medication 650 MILLIGRAM(S): at 21:26

## 2023-06-01 RX ADMIN — CEFEPIME 100 MILLIGRAM(S): 1 INJECTION, POWDER, FOR SOLUTION INTRAMUSCULAR; INTRAVENOUS at 12:17

## 2023-06-01 RX ADMIN — Medication 4 PUFF(S): at 13:52

## 2023-06-01 RX ADMIN — MIDAZOLAM HYDROCHLORIDE 2 MILLIGRAM(S): 1 INJECTION, SOLUTION INTRAMUSCULAR; INTRAVENOUS at 20:43

## 2023-06-01 RX ADMIN — CHLORHEXIDINE GLUCONATE 1 APPLICATION(S): 213 SOLUTION TOPICAL at 05:01

## 2023-06-01 RX ADMIN — KETAMINE HYDROCHLORIDE 2.13 MG/KG/HR: 100 INJECTION INTRAMUSCULAR; INTRAVENOUS at 12:22

## 2023-06-01 RX ADMIN — CHLORHEXIDINE GLUCONATE 1 APPLICATION(S): 213 SOLUTION TOPICAL at 11:19

## 2023-06-01 RX ADMIN — SENNA PLUS 2 TABLET(S): 8.6 TABLET ORAL at 21:26

## 2023-06-01 RX ADMIN — PANTOPRAZOLE SODIUM 40 MILLIGRAM(S): 20 TABLET, DELAYED RELEASE ORAL at 12:20

## 2023-06-01 RX ADMIN — FENTANYL CITRATE 4.72 MICROGRAM(S)/KG/HR: 50 INJECTION INTRAVENOUS at 04:41

## 2023-06-01 RX ADMIN — Medication 4 PUFF(S): at 20:28

## 2023-06-01 RX ADMIN — Medication 650 MILLIGRAM(S): at 13:00

## 2023-06-01 RX ADMIN — MUPIROCIN 1 APPLICATION(S): 20 OINTMENT TOPICAL at 05:02

## 2023-06-01 RX ADMIN — Medication 450 MILLIGRAM(S): at 23:49

## 2023-06-01 RX ADMIN — Medication 650 MILLIGRAM(S): at 05:11

## 2023-06-01 RX ADMIN — Medication 30 MILLIGRAM(S): at 10:23

## 2023-06-01 RX ADMIN — Medication 4 PUFF(S): at 08:52

## 2023-06-01 RX ADMIN — KETAMINE HYDROCHLORIDE 2.13 MG/KG/HR: 100 INJECTION INTRAMUSCULAR; INTRAVENOUS at 17:35

## 2023-06-01 RX ADMIN — Medication 450 MILLIGRAM(S): at 06:31

## 2023-06-01 RX ADMIN — Medication 650 MILLIGRAM(S): at 04:41

## 2023-06-01 RX ADMIN — ALBUTEROL 4 PUFF(S): 90 AEROSOL, METERED ORAL at 08:53

## 2023-06-01 RX ADMIN — DEXMEDETOMIDINE HYDROCHLORIDE IN 0.9% SODIUM CHLORIDE 4.25 MICROGRAM(S)/KG/HR: 4 INJECTION INTRAVENOUS at 07:57

## 2023-06-01 RX ADMIN — Medication 450 MILLIGRAM(S): at 17:49

## 2023-06-01 RX ADMIN — Medication 450 MILLIGRAM(S): at 12:22

## 2023-06-01 RX ADMIN — ALBUTEROL 4 PUFF(S): 90 AEROSOL, METERED ORAL at 13:52

## 2023-06-01 RX ADMIN — DEXMEDETOMIDINE HYDROCHLORIDE IN 0.9% SODIUM CHLORIDE 4.25 MICROGRAM(S)/KG/HR: 4 INJECTION INTRAVENOUS at 17:34

## 2023-06-01 RX ADMIN — CHLORHEXIDINE GLUCONATE 15 MILLILITER(S): 213 SOLUTION TOPICAL at 05:01

## 2023-06-01 NOTE — CONSULT NOTE ADULT - SUBJECTIVE AND OBJECTIVE BOX
GENERAL SURGERY CONSULT NOTE    Patient: ZEYNEP ROSSI , 42y ([birth year])Female   MRN: [id number]  Location: [address]  Visit: 05-23-23 Inpatient  Date: 06-01-23 @ 11:01    HPI:  42 year-old female with PMH of Asthma, HTN? presents with complaint of cough x3 days and SOB. During my encounter pt with dyspnea and increased work of breathing and chest pain, unable to properly provide accurate history. She states that her cough has been progressively worsening over the past 3 days, endorses hemoptysis since yesterday. She states she had a friend who came over and stayed with for more than a week who was sick recently, but she does not know if she recently travelled out of [facility] or not. She also admits that her children has been sick with Sore throat and fever for past 3 days. She admits to fever, chills, sever Chest pain which has gotten worse since she came to the ED. She took 1x Tamiflu yesterday. She otherwise denies diarrhea, constipation, urinary symptoms. She is not vaccinated for Influenza or COVID-19.     In ED  /87, , RR 28, T 100.8 F, 98% on 15 L NRB  Labs significant for wbc 16.7K with Neutrophilic Predominance and L shift, Hgb 11.4, MCV 61.6, Cr 2.0, BUN 23, Alk Phos 125/AST 62/ALT 27, Lac 4.9> 3.9  RVP + Influenza B      CT Angio Chest PE Protocol w/ IV Cont   1. Bilateral patchy groundglass nodularity with dominant confluent left lower lobe opacification with numerous cavitary lesions. Additional contralateral right lower lobe cavitary 1 cm nodule. Findings compatible with cavitary pneumonia in the appropriate clinical setting; differential diagnosis includes tuberculosis.  2. Confluent ill-defined left hilar and mediastinal adenopathy, likely reactive, without dominant lymph node delineated.  3. No evidence of pulmonary embolism.    s/p 1x Rocephin, Meropenem, 3 L LR in ED    Pt admitted to SDU for further managements, during my encounter pt is very agitated, in acute distress. Pt received appropriate pain control with IV Morphine, s/p Xanax 1 mg 1x for agitation and anxiety, despite pain control and adequate fluid resuscitation pt remained tachycardic, tachypneic. STAT EKG reviewed with Cardiology team on call After discussion with Cardiology team, Pt received 1x Adenosine 6 mg IV push with no change. Case discussed with Critical Care team, decision was made for Intubation and Pt upgraded to MICU.   (24 May 2023 00:58)    Thoracic surgery consult: 42F w/ pmh of asthma and htn admitted 5/24 with ahrh secondary to MRSA PNA still intubated and spiking fevers. CT chest 5/27 revealing b/l cavitary lesions w/ additional left consolidation and effusion. Bedside ultrasound revealing septated left effusion, repeat CT chest pending.    PAST MEDICAL & SURGICAL HISTORY:      Home Medications:        VITALS:  T(F): 99.6 (06-01-23 @ 10:00), Max: 101.7 (05-31-23 @ 17:30)  HR: 97 (06-01-23 @ 10:00) (93 - 124)  BP: 128/66 (06-01-23 @ 10:00) (66/33 - 192/95)  RR: 22 (06-01-23 @ 10:00) (4 - 28)  SpO2: 100% (06-01-23 @ 10:00) (95% - 100%)    PHYSICAL EXAM:  General: Intubated/sedated  HEENT: NCAT, TRAM, EOMI, intubated  Cardiac: RRR S1, S2, no Murmurs, rubs or gallops  Respiratory: CTAB, 40/10  Abdomen: Soft, non-distended    MEDICATIONS  (STANDING):  albuterol    90 MICROgram(s) HFA Inhaler 4 Puff(s) Inhalation every 6 hours  cefepime   IVPB 1000 milliGRAM(s) IV Intermittent every 24 hours  cefepime   IVPB      chlorhexidine 0.12% Liquid 15 milliLiter(s) Oral Mucosa every 12 hours  chlorhexidine 2% Cloths 1 Application(s) Topical <User Schedule>  chlorhexidine 2% Cloths 1 Application(s) Topical daily  clindamycin Solution 15 mG/mL 450 milliGRAM(s) Oral every 6 hours  dexMEDEtomidine Infusion 0.2 MICROgram(s)/kG/Hr (4.25 mL/Hr) IV Continuous <Continuous>  fentaNYL   Infusion. 0.555 MICROgram(s)/kG/Hr (4.72 mL/Hr) IV Continuous <Continuous>  insulin lispro (ADMELOG) corrective regimen sliding scale   SubCutaneous three times a day before meals  ipratropium 17 MICROgram(s) HFA Inhaler 4 Puff(s) Inhalation every 6 hours  ketamine Infusion. 0.25 mG/kG/Hr (2.13 mL/Hr) IV Continuous <Continuous>  norepinephrine Infusion 0.05 MICROgram(s)/kG/Min (7.97 mL/Hr) IV Continuous <Continuous>  oseltamivir Suspension 30 milliGRAM(s) Oral every 12 hours  pantoprazole   Suspension 40 milliGRAM(s) Oral daily  polyethylene glycol 3350 17 Gram(s) Oral every 12 hours  propofol Infusion 11.098 MICROgram(s)/kG/Min (5.66 mL/Hr) IV Continuous <Continuous>  senna 2 Tablet(s) Oral at bedtime  sodium bicarbonate 650 milliGRAM(s) Oral every 8 hours  vancomycin  IVPB 500 milliGRAM(s) IV Intermittent every 24 hours  vitamin A &amp; D Ointment 1 Application(s) Topical daily    MEDICATIONS  (PRN):  acetaminophen     Tablet .. 650 milliGRAM(s) Oral every 6 hours PRN Temp greater or equal to 38C (100.4F)  albuterol    90 MICROgram(s) HFA Inhaler 4 Puff(s) Inhalation every 4 hours PRN Shortness of Breath and/or Wheezing  ipratropium 17 MICROgram(s) HFA Inhaler 4 Puff(s) Inhalation <User Schedule> PRN -  midazolam Injectable 2 milliGRAM(s) IV Push every 4 hours PRN Agitation      LAB/STUDIES:                        6.1    22.85 )-----------( 279      ( 01 Jun 2023 04:59 )             20.1     06-01    146  |  113<H>  |  74<HH>  ----------------------------<  105<H>  4.3   |  24  |  3.0<H>    Ca    8.2<L>      01 Jun 2023 04:59  Mg     2.0     06-01    TPro  6.0  /  Alb  2.3<L>  /  TBili  0.4  /  DBili  x   /  AST  17  /  ALT  9   /  AlkPhos  79  06-01      LIVER FUNCTIONS - ( 01 Jun 2023 04:59 )  Alb: 2.3 g/dL / Pro: 6.0 g/dL / ALK PHOS: 79 U/L / ALT: 9 U/L / AST: 17 U/L / GGT: x             CARDIAC MARKERS ( 01 Jun 2023 04:59 )  x     / x     / 31 U/L / x     / x      CARDIAC MARKERS ( 31 May 2023 04:48 )  x     / x     / 41 U/L / x     / x      CARDIAC MARKERS ( 30 May 2023 11:39 )  x     / x     / 50 U/L / x     / x              ABG - ( 01 Jun 2023 03:30 )  pH, Arterial: 7.41  pH, Blood: x     /  pCO2: 40    /  pO2: 64    / HCO3: 25    / Base Excess: 0.7   /  SaO2: 94.1                Culture - Blood (collected 30 May 2023 04:30)  Source: .Blood Blood  Preliminary Report (31 May 2023 14:02):    No growth to date.      IMAGING:  < from: Xray Chest 1 View- PORTABLE-Routine (Xray Chest 1 View- PORTABLE-Routine in AM.) (06.01.23 @ 06:41) >  Impression:    Again demonstrated are bilateral lung opacities, most pronounced on the   left with left pleural effusion.        --- End of Report ---    < end of copied text >  < from: CT Chest No Cont (05.29.23 @ 14:14) >  IMPRESSION:    Diffusely increased number and size of bilateral cavitary lesions with   the left lower lobe now demonstrating a consolidative process of the   entirelobe.      Additional scattered bilateral ground glass opacities are noted. Findings   are compatible with progressing pneumonia.    --- End of Report ---    < end of copied text >      ACCESS DEVICES:  [X] Peripheral IV

## 2023-06-01 NOTE — PROGRESS NOTE ADULT - SUBJECTIVE AND OBJECTIVE BOX
ROSSIZEYNEP PAULINO  42y, Female  Allergy: aspirin (Short breath; Anaphylaxis)      LOS  9d    CHIEF COMPLAINT: AHRF (01 Jun 2023 11:01)      INTERVAL EVENTS/HPI  - T(F): , Max: 101.7 (05-31-23 @ 17:30), fevers rash LUE  - WBC Count: 22.85 (06-01-23 @ 04:59)  WBC Count: 23.83 (05-31-23 @ 04:48)     - Creatinine, Serum: 3.0 (06-01-23 @ 04:59)  Creatinine, Serum: 3.0 (05-31-23 @ 04:48)     -   -   -     ROS  cannot obtain secondary to patient's sedation and/or mental status    VITALS:  T(F): 101.1, Max: 101.7 (05-31-23 @ 17:30)  HR: 91  BP: 117/62  RR: 22Vital Signs Last 24 Hrs  T(C): 38.4 (01 Jun 2023 12:00), Max: 38.7 (31 May 2023 17:30)  T(F): 101.1 (01 Jun 2023 12:00), Max: 101.7 (31 May 2023 17:30)  HR: 91 (01 Jun 2023 15:02) (91 - 110)  BP: 117/62 (01 Jun 2023 13:00) (90/42 - 192/95)  BP(mean): 83 (01 Jun 2023 13:00) (61 - 136)  RR: 22 (01 Jun 2023 13:00) (4 - 28)  SpO2: 100% (01 Jun 2023 15:02) (95% - 100%)    Parameters below as of 01 Jun 2023 08:00  Patient On (Oxygen Delivery Method): ventilator        PHYSICAL EXAM:  Gen: Intubated  CV: RRR  Lungs: Decreased BS at bases  Abd: Soft  Neuro: Sedated  SKin LUE erythematous rash, fine vesicles   Lines clean, no phlebitis    FH: Non-contributory  Social Hx: Non-contributory    TESTS & MEASUREMENTS:                        6.1    22.85 )-----------( 279      ( 01 Jun 2023 04:59 )             20.1     06-01    146  |  113<H>  |  74<HH>  ----------------------------<  105<H>  4.3   |  24  |  3.0<H>    Ca    8.2<L>      01 Jun 2023 04:59  Mg     2.0     06-01    TPro  6.0  /  Alb  2.3<L>  /  TBili  0.4  /  DBili  x   /  AST  17  /  ALT  9   /  AlkPhos  79  06-01      LIVER FUNCTIONS - ( 01 Jun 2023 04:59 )  Alb: 2.3 g/dL / Pro: 6.0 g/dL / ALK PHOS: 79 U/L / ALT: 9 U/L / AST: 17 U/L / GGT: x               Culture - Blood (collected 05-30-23 @ 04:30)  Source: .Blood Blood  Preliminary Report (05-31-23 @ 14:02):    No growth to date.    Culture - Blood (collected 05-29-23 @ 05:55)  Source: .Blood Blood  Preliminary Report (05-30-23 @ 14:01):    No growth to date.    Culture - Blood (collected 05-28-23 @ 16:00)  Source: .Blood Blood-Peripheral  Preliminary Report (05-30-23 @ 03:02):    No growth to date.    Culture - Blood (collected 05-28-23 @ 12:00)  Source: .Blood Blood  Preliminary Report (05-29-23 @ 21:01):    No growth to date.    Culture - Blood (collected 05-27-23 @ 06:05)  Source: .Blood Blood  Gram Stain (05-28-23 @ 17:02):    Growth in aerobic bottle: Gram Positive Cocci in Clusters  Final Report (05-30-23 @ 09:16):    Growth in aerobic bottle: Staphylococcus aureus    See previous culture [id number]    Culture - Blood (collected 05-27-23 @ 06:05)  Source: .Blood Blood  Gram Stain (05-29-23 @ 13:29):    Growth in aerobic bottle: Gram Positive Cocci in Clusters    Growth in anaerobic bottle: Gram Positive Cocci in Clusters  Final Report (05-30-23 @ 11:11):    Growth in aerobic and anaerobic bottles: Methicillin Resistant    Staphylococcus aureus  Organism: Methicillin resistant Staphylococcus aureus (05-30-23 @ 11:11)  Organism: Methicillin resistant Staphylococcus aureus (05-30-23 @ 11:11)      Method Type: RUSSELL      -  Ampicillin/Sulbactam: R 16/8      -  Cefazolin: R >16      -  Clindamycin: S 0.5      -  Daptomycin: S 1      -  Erythromycin: R >4      -  Gentamicin: S <=1 Should not be used as monotherapy      -  Linezolid: S 2      -  Oxacillin: R >2      -  Penicillin: R >8      -  Rifampin: S <=1 Should not be used as monotherapy      -  Tetracycline: S <=1      -  Trimethoprim/Sulfamethoxazole: S <=0.5/9.5      -  Vancomycin: S 2    Culture - Blood (collected 05-26-23 @ 06:09)  Source: .Blood Blood  Gram Stain (05-29-23 @ 00:35):    Growth in anaerobic bottle: Gram Positive Cocci in Clusters  Final Report (05-31-23 @ 08:25):    Growth in anaerobic bottle: Methicillin Resistant Staphylococcus aureus  Organism: Methicillin resistant Staphylococcus aureus (05-31-23 @ 08:25)  Organism: Methicillin resistant Staphylococcus aureus (05-31-23 @ 08:25)      Method Type: RUSSELL      -  Ampicillin/Sulbactam: R 16/8      -  Cefazolin: R >16      -  Clindamycin: S 0.5      -  Daptomycin: S 1      -  Erythromycin: I 4      -  Gentamicin: S <=1 Should not be used as monotherapy      -  Linezolid: S 4      -  Oxacillin: R >2      -  Penicillin: R >8      -  Rifampin: S <=1 Should not be used as monotherapy      -  Tetracycline: S <=1      -  Trimethoprim/Sulfamethoxazole: S <=0.5/9.5      -  Vancomycin: S 2    Culture - Sputum (collected 05-24-23 @ 19:17)  Source: Trach Asp Tracheal Aspirate  Gram Stain (05-25-23 @ 07:31):    No polymorphonuclear leukocytes per low power field    No Squamous epithelial cells per low power field    Numerous Gram positive cocci in pairs per oil power field  Final Report (05-27-23 @ 08:45):    Numerous Methicillin Resistant Staphylococcus aureus    Normal Respiratory Laila present  Organism: Methicillin resistant Staphylococcus aureus (05-27-23 @ 08:45)  Organism: Methicillin resistant Staphylococcus aureus (05-27-23 @ 08:45)      Method Type: RUSSELL      -  Ampicillin/Sulbactam: R 16/8      -  Cefazolin: R >16      -  Clindamycin: S <=0.25      -  Erythromycin: R >4      -  Gentamicin: S <=1 Should not be used as monotherapy      -  Linezolid: S 2      -  Oxacillin: R >2      -  Penicillin: R >8      -  Rifampin: S <=1 Should not be used as monotherapy      -  Tetracycline: S <=1      -  Trimethoprim/Sulfamethoxazole: S <=0.5/9.5      -  Vancomycin: S 2    Culture - Acid Fast - Sputum w/Smear (collected 05-24-23 @ 19:17)  Source: Trach Asp Tracheal Aspirate  Preliminary Report (05-27-23 @ 15:08):    Culture is being performed.    Culture - Fungal, Sputum (collected 05-24-23 @ 19:17)  Source: Trach Asp Tracheal Aspirate  Preliminary Report (05-29-23 @ 07:07):    Few Yeast    Culture - Blood (collected 05-24-23 @ 06:10)  Source: .Blood None  Gram Stain (05-25-23 @ 11:18):    Growth in aerobic bottle: Gram Positive Cocci in Clusters    Growth in anaerobic bottle: Gram Positive Cocci in Clusters  Final Report (05-26-23 @ 08:13):    Growth in aerobic and anaerobic bottles: Methicillin Resistant    Staphylococcus aureus    See previous culture [id number]    Culture - Blood (collected 05-23-23 @ 20:37)  Source: .Blood Blood-Peripheral  Gram Stain (05-24-23 @ 17:51):    Growth in aerobic bottle: Gram Positive Cocci in Clusters    Growth in anaerobic bottle: Gram Positive Cocci in Clusters  Final Report (05-26-23 @ 08:14):    Growth in aerobic and anaerobic bottles: Methicillin Resistant    Staphylococcus aureus    See previous culture [id number]    Culture - Blood (collected 05-23-23 @ 20:37)  Source: .Blood Blood-Peripheral  Gram Stain (05-24-23 @ 14:24):    Growth in aerobic bottle: Gram Positive Cocci in Clusters  Final Report (05-26-23 @ 08:11):    Growth in aerobic bottle: Methicillin Resistant Staphylococcus aureus    ***Blood Panel PCR results on this specimen are available    approximately 3 hours after the Gram stain result.***    Gram stain, PCR, and/or culture results may not always    correspond due to difference in methodologies.    ************************************************************    This PCR assay was performed by multiplex PCR. This    Assay tests for 66 bacterial and resistance gene targets.    Please refer to the [facility] test directory    at [email or web address] for details.  Organism: Blood Culture PCR  Methicillin resistant Staphylococcus aureus (05-26-23 @ 08:11)  Organism: Methicillin resistant Staphylococcus aureus (05-26-23 @ 08:11)      Method Type: RUSSELL      -  Ampicillin/Sulbactam: R 16/8      -  Cefazolin: R 16      -  Clindamycin: S <=0.25      -  Daptomycin: S 0.5      -  Erythromycin: R >4      -  Gentamicin: S <=1 Should not be used as monotherapy      -  Linezolid: S 2      -  Oxacillin: R >2      -  Penicillin: R >8      -  Rifampin: S <=1 Should not be used as monotherapy      -  Tetracycline: S <=1      -  Trimethoprim/Sulfamethoxazole: S <=0.5/9.5      -  Vancomycin: S 2  Organism: Blood Culture PCR (05-26-23 @ 08:11)      Method Type: PCR      -  Methicillin resistant Staphylococcus aureus (MRSA): Detec            INFECTIOUS DISEASES TESTING  Procalcitonin, Serum: 5.80 (05-30-23 @ 11:39)  HIV-1/2 Combo Result: Nonreact (05-27-23 @ 05:47)  MRSA PCR Result.: Positive (05-24-23 @ 19:17)  Legionella Antigen, Urine: Negative (05-24-23 @ 19:16)  Procalcitonin, Serum: 48.30 (05-24-23 @ 06:10)  Fungitell: 41 (05-24-23 @ 06:10)      INFLAMMATORY MARKERS      RADIOLOGY & ADDITIONAL TESTS:  I have personally reviewed the last available Chest xray  CXR      CT      CARDIOLOGY TESTING  12 Lead ECG:   Ventricular Rate 134 BPM    Atrial Rate 134 BPM    P-R Interval 208 ms    QRS Duration 76 ms    Q-T Interval 318 ms    QTC Calculation(Bazett) 474 ms    P Axis 97 degrees    R Axis 40 degrees    T Axis 70 degrees    Diagnosis Line Sinus tachycardiawith Fusion complexes  Otherwise normal ECG    Confirmed by PANFILO COVINGTON MD ([id number]) on 5/24/2023 6:54:09 AM (05-24-23 @ 02:40)  12 Lead ECG:   Ventricular Rate 140 BPM    Atrial Rate 140 BPM    P-R Interval 120 ms    QRS Duration 138 ms    Q-T Interval 354 ms    QTC Calculation(Bazett) 540 ms    R Axis 73 degrees    T Axis 60 degrees    Diagnosis Line Sinus tachycardia  Right bundle branch block  Abnormal ECG    Confirmed by Jorge Wyatt ([id number]) on 5/23/2023 10:37:28 PM (05-23-23 @ 19:52)      MEDICATIONS  albuterol    90 MICROgram(s) HFA Inhaler 4  cefepime   IVPB 1000  cefepime   IVPB   chlorhexidine 0.12% Liquid 15  chlorhexidine 2% Cloths 1  chlorhexidine 2% Cloths 1  clindamycin Solution 15 mG/mL 450  dexMEDEtomidine Infusion 0.2  fentaNYL   Infusion. 0.555  insulin lispro (ADMELOG) corrective regimen sliding scale   ipratropium 17 MICROgram(s) HFA Inhaler 4  ketamine Infusion. 0.25  norepinephrine Infusion 0.05  oseltamivir Suspension 30  pantoprazole   Suspension 40  polyethylene glycol 3350 17  propofol Infusion 11.098  senna 2  sodium bicarbonate 650  vancomycin  IVPB 500  vitamin A &amp; D Ointment 1      WEIGHT  Weight (kg): 85 (05-24-23 @ 08:15)  Creatinine, Serum: 3.0 mg/dL (06-01-23 @ 04:59)      ANTIBIOTICS:  cefepime   IVPB 1000 milliGRAM(s) IV Intermittent every 24 hours  cefepime   IVPB      clindamycin Solution 15 mG/mL 450 milliGRAM(s) Oral every 6 hours  oseltamivir Suspension 30 milliGRAM(s) Oral every 12 hours  vancomycin  IVPB 500 milliGRAM(s) IV Intermittent every 24 hours      All available historical records have been reviewed

## 2023-06-01 NOTE — PROGRESS NOTE ADULT - SUBJECTIVE AND OBJECTIVE BOX
Nephrology progress note    THIS IS AN INCOMPLETE NOTE . FULL NOTE TO FOLLOW SHORTLY    Patient is seen and examined, events over the last 24 h noted .    Allergies:  aspirin (Short breath; Anaphylaxis)    Hospital Medications:   MEDICATIONS  (STANDING):  albuterol    90 MICROgram(s) HFA Inhaler 4 Puff(s) Inhalation every 6 hours  cefepime   IVPB 1000 milliGRAM(s) IV Intermittent every 24 hours  cefepime   IVPB      chlorhexidine 0.12% Liquid 15 milliLiter(s) Oral Mucosa every 12 hours  chlorhexidine 2% Cloths 1 Application(s) Topical <User Schedule>  chlorhexidine 2% Cloths 1 Application(s) Topical daily  clindamycin Solution 15 mG/mL 450 milliGRAM(s) Oral every 6 hours  dexMEDEtomidine Infusion 0.2 MICROgram(s)/kG/Hr (4.25 mL/Hr) IV Continuous <Continuous>  fentaNYL   Infusion. 0.555 MICROgram(s)/kG/Hr (4.72 mL/Hr) IV Continuous <Continuous>  insulin lispro (ADMELOG) corrective regimen sliding scale   SubCutaneous three times a day before meals  ipratropium 17 MICROgram(s) HFA Inhaler 4 Puff(s) Inhalation every 6 hours  ketamine Infusion. 0.25 mG/kG/Hr (2.13 mL/Hr) IV Continuous <Continuous>  mupirocin 2% Nasal 1 Application(s) Both Nostrils two times a day  norepinephrine Infusion 0.05 MICROgram(s)/kG/Min (7.97 mL/Hr) IV Continuous <Continuous>  oseltamivir Suspension 30 milliGRAM(s) Oral every 12 hours  pantoprazole   Suspension 40 milliGRAM(s) Oral daily  polyethylene glycol 3350 17 Gram(s) Oral every 12 hours  propofol Infusion 11.098 MICROgram(s)/kG/Min (5.66 mL/Hr) IV Continuous <Continuous>  senna 2 Tablet(s) Oral at bedtime  sodium bicarbonate 650 milliGRAM(s) Oral every 8 hours  vancomycin  IVPB 500 milliGRAM(s) IV Intermittent every 24 hours  vitamin A &amp; D Ointment 1 Application(s) Topical daily        VITALS:  T(F): 98.6 (23 @ 08:00), Max: 102.3 (23 @ 10:00)  HR: 96 (23 @ 08:00)  BP: 130/68 (23 @ 08:00)  RR: 22 (23 @ 08:00)  SpO2: 99% (23 @ 08:00)  Wt(kg): --     @ 07:01  -   @ 07:00  --------------------------------------------------------  IN: 2588.5 mL / OUT: 3595 mL / NET: -1006.5 mL     @ 07:  -   @ 07:00  --------------------------------------------------------  IN: 2658.9 mL / OUT: 2565 mL / NET: 93.9 mL     @ 07:  -   @ 08:49  --------------------------------------------------------  IN: 178.6 mL / OUT: 75 mL / NET: 103.6 mL          PHYSICAL EXAM:  Constitutional: NAD  HEENT: anicteric sclera, oropharynx clear, MMM  Neck: No JVD  Respiratory: CTAB, no wheezes, rales or rhonchi  Cardiovascular: S1, S2, RRR  Gastrointestinal: BS+, soft, NT/ND  Extremities: No cyanosis or clubbing. No peripheral edema  :  No diggs.   Skin: No rashes    LABS:      146  |  113<H>  |  74<HH>  ----------------------------<  105<H>  4.3   |  24  |  3.0<H>  Creatinine Trend: 3.0<--, 3.0<--, 2.8<--, 2.9<--, 3.0<--, 2.7<--  Ca    8.2<L>      2023 04:59  Mg     2.0         TPro  6.0  /  Alb  2.3<L>  /  TBili  0.4  /  DBili      /  AST  17  /  ALT  9   /  AlkPhos  79                            6.1    22.85 )-----------( 279      ( 2023 04:59 )             20.1       Urine Studies:  Urinalysis Basic - ( 25 May 2023 11:30 )    Color: Yellow / Appearance: Slightly Turbid / S.027 / pH:   Gluc:  / Ketone: Negative  / Bili: Negative / Urobili: <2 mg/dL   Blood:  / Protein: 100 mg/dL / Nitrite: Negative   Leuk Esterase: Negative / RBC: 45 /HPF / WBC 30 /HPF   Sq Epi:  / Non Sq Epi:  / Bacteria: Negative          Iron 13, TIBC 174, %sat 7      [23 @ 12:10]  Ferritin 136      [23 @ 12:10]  Lipid: chol --, , HDL --, LDL --      [23 @ 04:59]    HBsAg Nonreact      [23 @ 11:00]  HCV 0.16, Nonreact      [23 @ 11:00]  HIV Nonreact      [23 @ 05:47]    CHARLES: titer 1:640, pattern Centromere      [23 @ 11:48]  dsDNA <12      [23 @ 11:48]  C3 Complement 80      [23 @ 11:48]  C4 Complement 34      [23 @ 11:48]  ANCA: cANCA Negative, pANCA Negative, atypical ANCA Negative      [23 @ 11:48]  anti-GBM <0.2      [23 @ 11:48]  Free Light Chains: kappa 16.56, lambda 7.37, ratio = 2.25      [ @ 11:48]      RADIOLOGY & ADDITIONAL STUDIES:   Nephrology progress note  Patient is seen and examined,   intubated on MV  sp MASTER yesterday     Allergies:  aspirin (Short breath; Anaphylaxis)    Hospital Medications:   MEDICATIONS  (STANDING):    albuterol    90 MICROgram(s) HFA Inhaler 4 Puff(s) Inhalation every 6 hours  cefepime   IVPB 1000 milliGRAM(s) IV Intermittent every 24 hours  clindamycin Solution 15 mG/mL 450 milliGRAM(s) Oral every 6 hours  dexMEDEtomidine Infusion 0.2 MICROgram(s)/kG/Hr (4.25 mL/Hr) IV Continuous <Continuous>  fentaNYL   Infusion. 0.555 MICROgram(s)/kG/Hr (4.72 mL/Hr) IV Continuous <Continuous>  insulin lispro (ADMELOG) corrective regimen sliding scale   SubCutaneous three times a day before meals  ipratropium 17 MICROgram(s) HFA Inhaler 4 Puff(s) Inhalation every 6 hours  ketamine Infusion. 0.25 mG/kG/Hr (2.13 mL/Hr) IV Continuous <Continuous>  mupirocin 2% Nasal 1 Application(s) Both Nostrils two times a day  norepinephrine Infusion 0.05 MICROgram(s)/kG/Min (7.97 mL/Hr) IV Continuous <Continuous>  oseltamivir Suspension 30 milliGRAM(s) Oral every 12 hours  pantoprazole   Suspension 40 milliGRAM(s) Oral daily  polyethylene glycol 3350 17 Gram(s) Oral every 12 hours  propofol Infusion 11.098 MICROgram(s)/kG/Min (5.66 mL/Hr) IV Continuous <Continuous>  senna 2 Tablet(s) Oral at bedtime  sodium bicarbonate 650 milliGRAM(s) Oral every 8 hours  vancomycin  IVPB 500 milliGRAM(s) IV Intermittent every 24 hours  vitamin A &amp; D Ointment 1 Application(s) Topical daily        VITALS:  T(F): 98.6 (23 @ 08:00), Max: 102.3 (23 @ 10:00)  HR: 96 (23 @ 08:00)  BP: 130/68 (23 @ 08:00)  RR: 22 (23 @ 08:00)  SpO2: 99% (23 @ 08:00)     @ 07:01  -   @ 07:00  --------------------------------------------------------  IN: 2588.5 mL / OUT: 3595 mL / NET: -1006.5 mL     @ 07: @ 07:00  --------------------------------------------------------  IN: 2658.9 mL / OUT: 2565 mL / NET: 93.9 mL     @ 07: @ 08:49  --------------------------------------------------------  IN: 178.6 mL / OUT: 75 mL / NET: 103.6 mL          PHYSICAL EXAM:  Constitutional: intubated on MV   Respiratory: Crackles at base   Cardiovascular: S1, S2, RRR  Gastrointestinal: BS+, soft, NT/ND  Extremities: No cyanosis or clubbing. plus one edema   :   dontae.   Skin: No rashes    LABS:      146  |  113<H>  |  74<HH>  ----------------------------<  105<H>  4.3   |  24  |  3.0<H>    Creatinine Trend: 3.0<--, 3.0<--, 2.8<--, 2.9<--, 3.0<--, 2.7<--    Ca    8.2<L>      2023 04:59  Mg     2.0         TPro  6.0  /  Alb  2.3<L>  /  TBili  0.4  /  DBili      /  AST  17  /  ALT  9   /  AlkPhos  79                            6.1    22.85 )-----------( 279      ( 2023 04:59 )             20.1       Urine Studies:  Urinalysis Basic - ( 25 May 2023 11:30 )    Color: Yellow / Appearance: Slightly Turbid / S.027 / pH:   Gluc:  / Ketone: Negative  / Bili: Negative / Urobili: <2 mg/dL   Blood:  / Protein: 100 mg/dL / Nitrite: Negative   Leuk Esterase: Negative / RBC: 45 /HPF / WBC 30 /HPF   Sq Epi:  / Non Sq Epi:  / Bacteria: Negative          Iron 13, TIBC 174, %sat 7      [23 @ 12:10]  Ferritin 136      [23 @ 12:10]  Lipid: chol --, , HDL --, LDL --      [23 @ 04:59]    HBsAg Nonreact      [23 @ 11:00]  HCV 0.16, Nonreact      [23 @ 11:00]  HIV Nonreact      [23 @ 05:47]    CHARLES: titer 1:640, pattern Centromere      [23 @ 11:48]  dsDNA <12      [23 @ 11:48]  C3 Complement 80      [23 @ 11:48]  C4 Complement 34      [23 @ 11:48]  ANCA: cANCA Negative, pANCA Negative, atypical ANCA Negative      [23 @ 11:48]  anti-GBM <0.2      [23 @ 11:48]  Free Light Chains: kappa 16.56, lambda 7.37, ratio = 2.25      [ @ 11:48]      RADIOLOGY & ADDITIONAL STUDIES:

## 2023-06-01 NOTE — PROGRESS NOTE ADULT - ASSESSMENT
42/F with PMH of asthma and HTN, admitted with SOB, developed LANCE. Acute hypoxemic resp failure requiring intubation  Bilateral patchy groundglass nodularity with dominant confluent left  lower lobe opacification with numerous cavitary lesions. Additional contralateral right lower lobe cavitary 1 cm nodule. Findings compatible  with cavitary pneumonia/Tb?     Non massive hemoptysis   MRSA bacteremia, prerenal FeNa, suspicion for post infectious GN  Kidney sono neg for obstruction  LANCE prerenal vs ATN vs pulmo renal syndrome  s/p CT with IV contrast 5/23 -  non oliguric  cr stable keep I =O   follow IP    phos noted / no binders yet / feed nepro   w/up for GN noted  negative so far      continue sodium bicarbonate   1300  q 8   anemia acute will need transfusion to keep Hb > 7  on cefepime clinda and vanco / check vanco trough   no acute indication for RRT   will follow

## 2023-06-01 NOTE — PROGRESS NOTE ADULT - ASSESSMENT
42 year old female with PMHx Asthma, HTN presents with cough and SOB x3 days and hemoptysis. CT chest showing cavitary pneumonia. Cultures +MRSA bacteremia. Intubated; failed SBT. On Zyvox and Tamiflu. Repeat CT showing worsening PNA.    # Acute hypoxemic respiratory failure 2/2 cavitary MRSA PNA  # MRSA bacteremia  # +Influenza B  # hx Asthma   - CT Angio Chest PE Protocol: Bilateral patchy groundglass nodularity with dominant confluent left lower lobe opacification with numerous cavitary lesions. Additional contralateral right lower lobe cavitary 1 cm nodule. Findings compatible with cavitary pneumonia in the appropriate clinical setting; differential diagnosis includes tuberculosis.  - Strep/Legionella negative, HIV negative   - intubated 5/23, self-extubated 5/25, re-intubated 5/25  - s/p Mupirocin BID x5 days  - blood cultures +MRSA  - blood cultures 5/28-30: NGTD x3  - ECHO: no vegetations. EF 65%, mild TR and pulm HTN  - repeat CT chest: Diffusely increased number and size of bilateral cavitary lesions with the left lower lobe now demonstrating a consolidative process of the entirelobe.  - D-dimer 1665, LE duplex negative for DVT  - 5/31 stop Lasix and Metolazone   - repeat pro-mya 48 > 5.8  - s/p MASTER 5/31: No evidence of valvular vegetations or intracardiac abscesses to support IE. Pulmonary hepatisation incidental finding.   - ABx per ID: c/w Clindamycin 450 q6, Vancomycin 500 qd, Cefepime 1g q24, Tamiflu 30mg q12 (day 9/10)  - f/u ET cultures  - repeat CT chest today  - consult CT surg for possible VATs    # LANCE   - Nephro following; post-infectious GN vs. ATN vs. pulmonary renal syndrome  - Cr stable around 3  - renal U/S: negative   - CK trending down 1483>39>41  - UA: +proteins and blood, Pr/Cr 1.3 (H)  - CHARLES+ 1:640, hep/HIV negative  - c/w PO bicarb 650 mg q8  - no acute indication for RRT    # Anemia  # Thrombocytopenia   - trend CBC, active type and screen, transfuse <7  - DIC panel negative   - Dimer 1665, LE Duplex negative for DVT  - hold heparin drip. SCDs  - transfuse 1u pRBC 6/1; Hgb 6.1  - f/u PTT    #DVT ppx: SCDs  #GI ppx: Pantoprazole 40mg qd  #Diet: NPO w/ tube feed; resume feeds after CT w/ Reglan  #Activity: bedrest   #Dispo: MICU   42 year old female with PMHx Asthma, HTN presents with cough and SOB x3 days and hemoptysis. CT chest showing cavitary pneumonia. Cultures +MRSA bacteremia. Intubated; failed SBT. On Zyvox and Tamiflu. Repeat CT showing worsening PNA.    # Acute hypoxemic respiratory failure 2/2 cavitary MRSA PNA  # MRSA bacteremia  # +Influenza B  # hx Asthma   - CT Angio Chest PE Protocol: Bilateral patchy groundglass nodularity with dominant confluent left lower lobe opacification with numerous cavitary lesions. Additional contralateral right lower lobe cavitary 1 cm nodule. Findings compatible with cavitary pneumonia in the appropriate clinical setting; differential diagnosis includes tuberculosis.  - Strep/Legionella negative, HIV negative   - intubated 5/23, self-extubated 5/25, re-intubated 5/25  - s/p Mupirocin BID x5 days  - blood cultures +MRSA  - blood cultures 5/28-30: NGTD x3  - ECHO: no vegetations. EF 65%, mild TR and pulm HTN  - repeat CT chest: Diffusely increased number and size of bilateral cavitary lesions with the left lower lobe now demonstrating a consolidative process of the entirelobe.  - D-dimer 1665, LE duplex negative for DVT  - 5/31 stop Lasix and Metolazone   - repeat pro-mya 48 > 5.8  - s/p MASTER 5/31: No evidence of valvular vegetations or intracardiac abscesses to support IE. Pulmonary hepatisation incidental finding.   - ABx per ID: c/w Clindamycin 450 q6, Vancomycin 500 qd, Cefepime 1g q24, Tamiflu 30mg q12 (day 9/10)  - f/u ET cultures  - Bedside pocus w/ septated left effusion  - repeat CT chest today  - consult CT surg for possible VATs    # LANCE   - Nephro following; post-infectious GN vs. ATN vs. pulmonary renal syndrome  - Cr stable around 3  - renal U/S: negative   - CK trending down 1483>39>41  - UA: +proteins and blood, Pr/Cr 1.3 (H)  - CHARLES+ 1:640, hep/HIV negative  - c/w PO bicarb 650 mg q8  - no acute indication for RRT    # Anemia  # Thrombocytopenia   - trend CBC, active type and screen, transfuse <7  - DIC panel negative   - Dimer 1665, LE Duplex negative for DVT  - hold heparin drip. SCDs  - transfuse 1u pRBC 6/1; Hgb 6.1  - f/u PTT    #DVT ppx: SCDs  #GI ppx: Pantoprazole 40mg qd  #Diet: NPO w/ tube feed; resume feeds after CT w/ Reglan  #Activity: bedrest   #Dispo: MICU   42 year old female with PMHx Asthma, HTN presents with cough and SOB x3 days and hemoptysis. CT chest showing cavitary pneumonia. Cultures +MRSA bacteremia. Intubated; failed SBT. On Zyvox and Tamiflu. Repeat CT showing worsening PNA.    # Acute hypoxemic respiratory failure 2/2 cavitary MRSA PNA  # MRSA bacteremia  # +Influenza B  # hx Asthma   - CT Angio Chest PE Protocol: Bilateral patchy groundglass nodularity with dominant confluent left lower lobe opacification with numerous cavitary lesions. Additional contralateral right lower lobe cavitary 1 cm nodule. Findings compatible with cavitary pneumonia in the appropriate clinical setting; differential diagnosis includes tuberculosis.  - Strep/Legionella negative, HIV negative   - intubated 5/23, self-extubated 5/25, re-intubated 5/25  - s/p Mupirocin BID x5 days  - blood cultures +MRSA  - blood cultures 5/28-30: NGTD x3  - ECHO: no vegetations. EF 65%, mild TR and pulm HTN  - repeat CT chest: Diffusely increased number and size of bilateral cavitary lesions with the left lower lobe now demonstrating a consolidative process of the entirelobe.  - D-dimer 1665, LE duplex negative for DVT  - 5/31 stop Lasix and Metolazone   - repeat pro-mya 48 > 5.8  - s/p MASTER 5/31: No evidence of valvular vegetations or intracardiac abscesses to support IE. Pulmonary hepatisation incidental finding.   - ABx per ID: c/w Clindamycin 450 q6, Vancomycin 500 qd, Cefepime 1g q24, Tamiflu 30mg q12 (day 9/10)  - f/u ET cultures  - Bedside pocus w/ septated left effusion  - repeat CT chest today  - consult CT surg for possible VATs; Possible chest tube vs vats pending further imaging    # LANCE   - Nephro following; post-infectious GN vs. ATN vs. pulmonary renal syndrome  - Cr stable around 3  - renal U/S: negative   - CK trending down 1483>39>41  - UA: +proteins and blood, Pr/Cr 1.3 (H)  - CHARLES+ 1:640, hep/HIV negative  - c/w PO bicarb 650 mg q8  - no acute indication for RRT    # Anemia  # Thrombocytopenia   - trend CBC, active type and screen, transfuse <7  - DIC panel negative   - Dimer 1665, LE Duplex negative for DVT  - hold heparin drip. SCDs  - transfuse 1u pRBC 6/1; Hgb 6.1  - f/u PTT    #DVT ppx: SCDs  #GI ppx: Pantoprazole 40mg qd  #Diet: NPO w/ tube feed; resume feeds after CT w/ Reglan  #Activity: bedrest   #Dispo: MICU

## 2023-06-01 NOTE — PROGRESS NOTE ADULT - SUBJECTIVE AND OBJECTIVE BOX
Over Night Events; events noted, remain critically ill, fentanyl, precedex, ketamine, spiked t, ID noted    PHYSICAL EXAM    ICU Vital Signs Last 24 Hrs  T(C): 37 (01 Jun 2023 08:00), Max: 39.1 (31 May 2023 10:00)  T(F): 98.6 (01 Jun 2023 08:00), Max: 102.3 (31 May 2023 10:00)  HR: 96 (01 Jun 2023 08:00) (93 - 142)  BP: 130/68 (01 Jun 2023 08:00) (66/33 - 192/95)  BP(mean): 91 (01 Jun 2023 08:00) (45 - 136)  RR: 22 (01 Jun 2023 08:00) (4 - 37)  SpO2: 99% (01 Jun 2023 08:00) (92% - 100%)    O2 Parameters below as of 01 Jun 2023 08:00  Patient On (Oxygen Delivery Method): ventilator            General: ill looking  HEENT: ETT      Lungs: DEC BS L bse  Cardiovascular: Regular   Abdomen: Soft, Positive BS  Extremities: No clubbing   Neurological: Non focal       05-31-23 @ 07:01  -  06-01-23 @ 07:00  --------------------------------------------------------  IN:    Dexmedetomidine: 765.6 mL    FentaNYL: 1020 mL    IV PiggyBack: 150 mL    Ketamine: 560 mL    Norepinephrine: 93.3 mL    Peptamen A.F.: 70 mL  Total IN: 2658.9 mL    OUT:    Indwelling Catheter - Urethral (mL): 2565 mL    Vital High Protein: 0 mL  Total OUT: 2565 mL    Total NET: 93.9 mL          LABS:                          6.1    22.85 )-----------( 279      ( 01 Jun 2023 04:59 )             20.1                                               06-01    146  |  113<H>  |  74<HH>  ----------------------------<  105<H>  4.3   |  24  |  3.0<H>    Ca    8.2<L>      01 Jun 2023 04:59  Mg     2.0     06-01    TPro  6.0  /  Alb  2.3<L>  /  TBili  0.4  /  DBili  x   /  AST  17  /  ALT  9   /  AlkPhos  79  06-01                                                 CARDIAC MARKERS ( 01 Jun 2023 04:59 )  x     / x     / 31 U/L / x     / x      CARDIAC MARKERS ( 31 May 2023 04:48 )  x     / x     / 41 U/L / x     / x      CARDIAC MARKERS ( 30 May 2023 11:39 )  x     / x     / 50 U/L / x     / x                                                LIVER FUNCTIONS - ( 01 Jun 2023 04:59 )  Alb: 2.3 g/dL / Pro: 6.0 g/dL / ALK PHOS: 79 U/L / ALT: 9 U/L / AST: 17 U/L / GGT: x                                                  Culture - Blood (collected 30 May 2023 04:30)  Source: .Blood Blood  Preliminary Report (31 May 2023 14:02):    No growth to date.                                                   Mode: AC/ CMV (Assist Control/ Continuous Mandatory Ventilation)  RR (machine): 22  FiO2: 40  PEEP: 10  ITime: 0.9  MAP: 17  PC: 18  PIP: 28                                      ABG - ( 01 Jun 2023 03:30 )  pH, Arterial: 7.41  pH, Blood: x     /  pCO2: 40    /  pO2: 64    / HCO3: 25    / Base Excess: 0.7   /  SaO2: 94.1                MEDICATIONS  (STANDING):  albuterol    90 MICROgram(s) HFA Inhaler 4 Puff(s) Inhalation every 6 hours  cefepime   IVPB 1000 milliGRAM(s) IV Intermittent every 24 hours  cefepime   IVPB      chlorhexidine 0.12% Liquid 15 milliLiter(s) Oral Mucosa every 12 hours  chlorhexidine 2% Cloths 1 Application(s) Topical daily  chlorhexidine 2% Cloths 1 Application(s) Topical <User Schedule>  clindamycin Solution 15 mG/mL 450 milliGRAM(s) Oral every 6 hours  dexMEDEtomidine Infusion 0.2 MICROgram(s)/kG/Hr (4.25 mL/Hr) IV Continuous <Continuous>  fentaNYL   Infusion. 0.555 MICROgram(s)/kG/Hr (4.72 mL/Hr) IV Continuous <Continuous>  insulin lispro (ADMELOG) corrective regimen sliding scale   SubCutaneous three times a day before meals  ipratropium 17 MICROgram(s) HFA Inhaler 4 Puff(s) Inhalation every 6 hours  ketamine Infusion. 0.25 mG/kG/Hr (2.13 mL/Hr) IV Continuous <Continuous>  mupirocin 2% Nasal 1 Application(s) Both Nostrils two times a day  norepinephrine Infusion 0.05 MICROgram(s)/kG/Min (7.97 mL/Hr) IV Continuous <Continuous>  oseltamivir Suspension 30 milliGRAM(s) Oral every 12 hours  pantoprazole   Suspension 40 milliGRAM(s) Oral daily  polyethylene glycol 3350 17 Gram(s) Oral every 12 hours  propofol Infusion 11.098 MICROgram(s)/kG/Min (5.66 mL/Hr) IV Continuous <Continuous>  senna 2 Tablet(s) Oral at bedtime  sodium bicarbonate 1300 milliGRAM(s) Oral every 8 hours  vancomycin  IVPB 500 milliGRAM(s) IV Intermittent every 24 hours  vitamin A &amp; D Ointment 1 Application(s) Topical daily    MEDICATIONS  (PRN):  acetaminophen     Tablet .. 650 milliGRAM(s) Oral every 6 hours PRN Temp greater or equal to 38C (100.4F)  albuterol    90 MICROgram(s) HFA Inhaler 4 Puff(s) Inhalation every 4 hours PRN Shortness of Breath and/or Wheezing  ipratropium 17 MICROgram(s) HFA Inhaler 4 Puff(s) Inhalation <User Schedule> PRN -  midazolam Injectable 2 milliGRAM(s) IV Push every 4 hours PRN Agitation      CXR reviewed

## 2023-06-01 NOTE — PROGRESS NOTE ADULT - SUBJECTIVE AND OBJECTIVE BOX
Patient is a 42y old  Female who presents with a chief complaint of AHRF (2023 08:48)    HPI:  42 year-old female with PMH of Asthma, HTN? presents with complaint of cough x3 days and SOB. During my encounter pt with dyspnea and increased work of breathing and chest pain, unable to properly provide accurate history. She states that her cough has been progressively worsening over the past 3 days, endorses hemoptysis since yesterday. She states she had a friend who came over and stayed with for more than a week who was sick recently, but she does not know if she recently travelled out of [facility] or not. She also admits that her children has been sick with Sore throat and fever for past 3 days. She admits to fever, chills, sever Chest pain which has gotten worse since she came to the ED. She took 1x Tamiflu yesterday. She otherwise denies diarrhea, constipation, urinary symptoms. She is not vaccinated for Influenza or COVID-19.     In ED  /87, , RR 28, T 100.8 F, 98% on 15 L NRB  Labs significant for wbc 16.7K with Neutrophilic Predominance and L shift, Hgb 11.4, MCV 61.6, Cr 2.0, BUN 23, Alk Phos 125/AST 62/ALT 27, Lac 4.9> 3.9  RVP + Influenza B      CT Angio Chest PE Protocol w/ IV Cont   1. Bilateral patchy groundglass nodularity with dominant confluent left lower lobe opacification with numerous cavitary lesions. Additional contralateral right lower lobe cavitary 1 cm nodule. Findings compatible with cavitary pneumonia in the appropriate clinical setting; differential diagnosis includes tuberculosis.  2. Confluent ill-defined left hilar and mediastinal adenopathy, likely reactive, without dominant lymph node delineated.  3. No evidence of pulmonary embolism.    s/p 1x Rocephin, Meropenem, 3 L LR in ED    Pt admitted to SDU for further managements, during my encounter pt is very agitated, in acute distress. Pt received appropriate pain control with IV Morphine, s/p Xanax 1 mg 1x for agitation and anxiety, despite pain control and adequate fluid resuscitation pt remained tachycardic, tachypneic. STAT EKG reviewed with Cardiology team on call After discussion with Cardiology team, Pt received 1x Adenosine 6 mg IV push with no change. Case discussed with Critical Care team, decision was made for Intubation and Pt upgraded to MICU.   (24 May 2023 00:58)       INTERVAL HPI/OVERNIGHT EVENTS:   No overnight events   Afebrile, hemodynamically stable   Feeds held overnight, not tolerating     Subjective:    ICU Vital Signs Last 24 Hrs  T(C): 37 (2023 08:00), Max: 39.1 (31 May 2023 10:00)  T(F): 98.6 (2023 08:00), Max: 102.3 (31 May 2023 10:00)  HR: 96 (2023 09:00) (93 - 142)  BP: 137/68 (2023 09:00) (66/33 - 192/95)  BP(mean): 96 (2023 09:) (45 - 136)  ABP: --  ABP(mean): --  RR: 22 (2023 09:00) (4 - 37)  SpO2: 99% (2023 09:00) (92% - 100%)    O2 Parameters below as of 2023 08:00  Patient On (Oxygen Delivery Method): ventilator          I&O's Summary    31 May 2023 07:01  -  2023 07:00  --------------------------------------------------------  IN: 2658.9 mL / OUT: 2565 mL / NET: 93.9 mL    2023 07:01  -  2023 09:41  --------------------------------------------------------  IN: 267.8 mL / OUT: 75 mL / NET: 192.8 mL      Mode: AC/ CMV (Assist Control/ Continuous Mandatory Ventilation)  RR (machine): 22  FiO2: 40  PEEP: 10  ITime: 0.9  MAP: 15  PC: 18  PIP: 30      Daily     Daily Weight in k.4 (2023 05:00)    Adult Advanced Hemodynamics Last 24 Hrs  CVP(mm Hg): --  CVP(cm H2O): --  CO: --  CI: --  PA: --  PA(mean): --  PCWP: --  SVR: --  SVRI: --  PVR: --  PVRI: --    EKG/Telemetry Events:    MEDICATIONS  (STANDING):  albuterol    90 MICROgram(s) HFA Inhaler 4 Puff(s) Inhalation every 6 hours  cefepime   IVPB      cefepime   IVPB 1000 milliGRAM(s) IV Intermittent every 24 hours  chlorhexidine 0.12% Liquid 15 milliLiter(s) Oral Mucosa every 12 hours  chlorhexidine 2% Cloths 1 Application(s) Topical daily  chlorhexidine 2% Cloths 1 Application(s) Topical <User Schedule>  clindamycin Solution 15 mG/mL 450 milliGRAM(s) Oral every 6 hours  dexMEDEtomidine Infusion 0.2 MICROgram(s)/kG/Hr (4.25 mL/Hr) IV Continuous <Continuous>  fentaNYL   Infusion. 0.555 MICROgram(s)/kG/Hr (4.72 mL/Hr) IV Continuous <Continuous>  insulin lispro (ADMELOG) corrective regimen sliding scale   SubCutaneous three times a day before meals  ipratropium 17 MICROgram(s) HFA Inhaler 4 Puff(s) Inhalation every 6 hours  ketamine Infusion. 0.25 mG/kG/Hr (2.13 mL/Hr) IV Continuous <Continuous>  mupirocin 2% Nasal 1 Application(s) Both Nostrils two times a day  norepinephrine Infusion 0.05 MICROgram(s)/kG/Min (7.97 mL/Hr) IV Continuous <Continuous>  oseltamivir Suspension 30 milliGRAM(s) Oral every 12 hours  pantoprazole   Suspension 40 milliGRAM(s) Oral daily  polyethylene glycol 3350 17 Gram(s) Oral every 12 hours  propofol Infusion 11.098 MICROgram(s)/kG/Min (5.66 mL/Hr) IV Continuous <Continuous>  senna 2 Tablet(s) Oral at bedtime  sodium bicarbonate 650 milliGRAM(s) Oral every 8 hours  vancomycin  IVPB 500 milliGRAM(s) IV Intermittent every 24 hours  vitamin A &amp; D Ointment 1 Application(s) Topical daily    MEDICATIONS  (PRN):  acetaminophen     Tablet .. 650 milliGRAM(s) Oral every 6 hours PRN Temp greater or equal to 38C (100.4F)  albuterol    90 MICROgram(s) HFA Inhaler 4 Puff(s) Inhalation every 4 hours PRN Shortness of Breath and/or Wheezing  ipratropium 17 MICROgram(s) HFA Inhaler 4 Puff(s) Inhalation <User Schedule> PRN -  midazolam Injectable 2 milliGRAM(s) IV Push every 4 hours PRN Agitation      PHYSICAL EXAM:  GENERAL:   HEAD:  Atraumatic, Normocephalic  EYES: EOMI, PERRLA, conjunctiva and sclera clear  NECK: Supple, No JVD, Normal thyroid, no enlarged nodes  NERVOUS SYSTEM:  Alert & Awake.   CHEST/LUNG: B/L good air entry; No rales, rhonchi, or wheezing  HEART: S1S2 normal, no S3, Regular rate and rhythm; No murmurs  ABDOMEN: Soft, Nontender, Nondistended; Bowel sounds present  EXTREMITIES:  2+ Peripheral Pulses, No clubbing, cyanosis, or edema  LYMPH: No lymphadenopathy noted  SKIN: No rashes or lesions    LABS:                        6.1    22.85 )-----------( 279      ( 2023 04:59 )             20.1     06-01    146  |  113<H>  |  74<HH>  ----------------------------<  105<H>  4.3   |  24  |  3.0<H>    Ca    8.2<L>      2023 04:59  Mg     2.0     06-01    TPro  6.0  /  Alb  2.3<L>  /  TBili  0.4  /  DBili  x   /  AST  17  /  ALT  9   /  AlkPhos  79  06-01    LIVER FUNCTIONS - ( 2023 04:59 )  Alb: 2.3 g/dL / Pro: 6.0 g/dL / ALK PHOS: 79 U/L / ALT: 9 U/L / AST: 17 U/L / GGT: x             CAPILLARY BLOOD GLUCOSE      POCT Blood Glucose.: 121 mg/dL (2023 05:11)  POCT Blood Glucose.: 114 mg/dL (31 May 2023 21:33)  POCT Blood Glucose.: 118 mg/dL (31 May 2023 17:30)  POCT Blood Glucose.: 112 mg/dL (31 May 2023 13:04)    ABG - ( 2023 03:30 )  pH, Arterial: 7.41  pH, Blood: x     /  pCO2: 40    /  pO2: 64    / HCO3: 25    / Base Excess: 0.7   /  SaO2: 94.1              Creatine Kinase, Serum: 31 U/L ( @ 04:59)    CARDIAC MARKERS ( 2023 04:59 )  x     / x     / 31 U/L / x     / x      CARDIAC MARKERS ( 31 May 2023 04:48 )  x     / x     / 41 U/L / x     / x      CARDIAC MARKERS ( 30 May 2023 11:39 )  x     / x     / 50 U/L / x     / x                RADIOLOGY & ADDITIONAL TESTS:  CXR:        Care Discussed with Consultants/Other Providers [ x] YES  [ ] NO           Patient is a 42y old  Female who presents with a chief complaint of AHRF (2023 08:48)    HPI:  42 year-old female with PMH of Asthma, HTN? presents with complaint of cough x3 days and SOB. During my encounter pt with dyspnea and increased work of breathing and chest pain, unable to properly provide accurate history. She states that her cough has been progressively worsening over the past 3 days, endorses hemoptysis since yesterday. She states she had a friend who came over and stayed with for more than a week who was sick recently, but she does not know if she recently travelled out of [facility] or not. She also admits that her children has been sick with Sore throat and fever for past 3 days. She admits to fever, chills, sever Chest pain which has gotten worse since she came to the ED. She took 1x Tamiflu yesterday. She otherwise denies diarrhea, constipation, urinary symptoms. She is not vaccinated for Influenza or COVID-19.     In ED  /87, , RR 28, T 100.8 F, 98% on 15 L NRB  Labs significant for wbc 16.7K with Neutrophilic Predominance and L shift, Hgb 11.4, MCV 61.6, Cr 2.0, BUN 23, Alk Phos 125/AST 62/ALT 27, Lac 4.9> 3.9  RVP + Influenza B      CT Angio Chest PE Protocol w/ IV Cont   1. Bilateral patchy groundglass nodularity with dominant confluent left lower lobe opacification with numerous cavitary lesions. Additional contralateral right lower lobe cavitary 1 cm nodule. Findings compatible with cavitary pneumonia in the appropriate clinical setting; differential diagnosis includes tuberculosis.  2. Confluent ill-defined left hilar and mediastinal adenopathy, likely reactive, without dominant lymph node delineated.  3. No evidence of pulmonary embolism.    s/p 1x Rocephin, Meropenem, 3 L LR in ED    Pt admitted to SDU for further managements, during my encounter pt is very agitated, in acute distress. Pt received appropriate pain control with IV Morphine, s/p Xanax 1 mg 1x for agitation and anxiety, despite pain control and adequate fluid resuscitation pt remained tachycardic, tachypneic. STAT EKG reviewed with Cardiology team on call After discussion with Cardiology team, Pt received 1x Adenosine 6 mg IV push with no change. Case discussed with Critical Care team, decision was made for Intubation and Pt upgraded to MICU.   (24 May 2023 00:58)       INTERVAL HPI/OVERNIGHT EVENTS:   No overnight events   Afebrile, hemodynamically stable   Feeds held overnight, not tolerating     Subjective:    ICU Vital Signs Last 24 Hrs  T(C): 37 (2023 08:00), Max: 39.1 (31 May 2023 10:00)  T(F): 98.6 (2023 08:00), Max: 102.3 (31 May 2023 10:00)  HR: 96 (2023 09:00) (93 - 142)  BP: 137/68 (2023 09:00) (66/33 - 192/95)  BP(mean): 96 (2023 09:) (45 - 136)  ABP: --  ABP(mean): --  RR: 22 (2023 09:00) (4 - 37)  SpO2: 99% (2023 09:00) (92% - 100%)    O2 Parameters below as of 2023 08:00  Patient On (Oxygen Delivery Method): ventilator          I&O's Summary    31 May 2023 07:01  -  2023 07:00  --------------------------------------------------------  IN: 2658.9 mL / OUT: 2565 mL / NET: 93.9 mL    2023 07:01  -  2023 09:41  --------------------------------------------------------  IN: 267.8 mL / OUT: 75 mL / NET: 192.8 mL      Mode: AC/ CMV (Assist Control/ Continuous Mandatory Ventilation)  RR (machine): 22  FiO2: 40  PEEP: 10  ITime: 0.9  MAP: 15  PC: 18  PIP: 30      Daily     Daily Weight in k.4 (2023 05:00)    Adult Advanced Hemodynamics Last 24 Hrs  CVP(mm Hg): --  CVP(cm H2O): --  CO: --  CI: --  PA: --  PA(mean): --  PCWP: --  SVR: --  SVRI: --  PVR: --  PVRI: --    EKG/Telemetry Events:    MEDICATIONS  (STANDING):  albuterol    90 MICROgram(s) HFA Inhaler 4 Puff(s) Inhalation every 6 hours  cefepime   IVPB      cefepime   IVPB 1000 milliGRAM(s) IV Intermittent every 24 hours  chlorhexidine 0.12% Liquid 15 milliLiter(s) Oral Mucosa every 12 hours  chlorhexidine 2% Cloths 1 Application(s) Topical daily  chlorhexidine 2% Cloths 1 Application(s) Topical <User Schedule>  clindamycin Solution 15 mG/mL 450 milliGRAM(s) Oral every 6 hours  dexMEDEtomidine Infusion 0.2 MICROgram(s)/kG/Hr (4.25 mL/Hr) IV Continuous <Continuous>  fentaNYL   Infusion. 0.555 MICROgram(s)/kG/Hr (4.72 mL/Hr) IV Continuous <Continuous>  insulin lispro (ADMELOG) corrective regimen sliding scale   SubCutaneous three times a day before meals  ipratropium 17 MICROgram(s) HFA Inhaler 4 Puff(s) Inhalation every 6 hours  ketamine Infusion. 0.25 mG/kG/Hr (2.13 mL/Hr) IV Continuous <Continuous>  mupirocin 2% Nasal 1 Application(s) Both Nostrils two times a day  norepinephrine Infusion 0.05 MICROgram(s)/kG/Min (7.97 mL/Hr) IV Continuous <Continuous>  oseltamivir Suspension 30 milliGRAM(s) Oral every 12 hours  pantoprazole   Suspension 40 milliGRAM(s) Oral daily  polyethylene glycol 3350 17 Gram(s) Oral every 12 hours  propofol Infusion 11.098 MICROgram(s)/kG/Min (5.66 mL/Hr) IV Continuous <Continuous>  senna 2 Tablet(s) Oral at bedtime  sodium bicarbonate 650 milliGRAM(s) Oral every 8 hours  vancomycin  IVPB 500 milliGRAM(s) IV Intermittent every 24 hours  vitamin A &amp; D Ointment 1 Application(s) Topical daily    MEDICATIONS  (PRN):  acetaminophen     Tablet .. 650 milliGRAM(s) Oral every 6 hours PRN Temp greater or equal to 38C (100.4F)  albuterol    90 MICROgram(s) HFA Inhaler 4 Puff(s) Inhalation every 4 hours PRN Shortness of Breath and/or Wheezing  ipratropium 17 MICROgram(s) HFA Inhaler 4 Puff(s) Inhalation <User Schedule> PRN -  midazolam Injectable 2 milliGRAM(s) IV Push every 4 hours PRN Agitation      PHYSICAL EXAM:  GENERAL:   HEAD:  Atraumatic, Normocephalic  EYES: EOMI, PERRLA, conjunctiva and sclera clear  NECK: Supple, No JVD, Normal thyroid, no enlarged nodes  NERVOUS SYSTEM:  Alert & Awake.   CHEST/LUNG: B/L good air entry; No rales, rhonchi, or wheezing  HEART: S1S2 normal, no S3, Regular rate and rhythm; No murmurs  ABDOMEN: Soft, Nontender, Nondistended; Bowel sounds present  EXTREMITIES:  2+ Peripheral Pulses, No clubbing, cyanosis, or edema  LYMPH: No lymphadenopathy noted  SKIN: No rashes or lesions    LABS:                        6.1    22.85 )-----------( 279      ( 2023 04:59 )             20.1     06-01    146  |  113<H>  |  74<HH>  ----------------------------<  105<H>  4.3   |  24  |  3.0<H>    Ca    8.2<L>      2023 04:59  Mg     2.0     06-01    TPro  6.0  /  Alb  2.3<L>  /  TBili  0.4  /  DBili  x   /  AST  17  /  ALT  9   /  AlkPhos  79  06-01    LIVER FUNCTIONS - ( 2023 04:59 )  Alb: 2.3 g/dL / Pro: 6.0 g/dL / ALK PHOS: 79 U/L / ALT: 9 U/L / AST: 17 U/L / GGT: x             CAPILLARY BLOOD GLUCOSE      POCT Blood Glucose.: 121 mg/dL (2023 05:11)  POCT Blood Glucose.: 114 mg/dL (31 May 2023 21:33)  POCT Blood Glucose.: 118 mg/dL (31 May 2023 17:30)  POCT Blood Glucose.: 112 mg/dL (31 May 2023 13:04)    ABG - ( 2023 03:30 )  pH, Arterial: 7.41  pH, Blood: x     /  pCO2: 40    /  pO2: 64    / HCO3: 25    / Base Excess: 0.7   /  SaO2: 94.1              Creatine Kinase, Serum: 31 U/L ( @ 04:59)    CARDIAC MARKERS ( 2023 04:59 )  x     / x     / 31 U/L / x     / x      CARDIAC MARKERS ( 31 May 2023 04:48 )  x     / x     / 41 U/L / x     / x      CARDIAC MARKERS ( 30 May 2023 11:39 )  x     / x     / 50 U/L / x     / x                RADIOLOGY & ADDITIONAL TESTS:  < from: Xray Chest 1 View- PORTABLE-Routine (Xray Chest 1 View- PORTABLE-Routine in AM.) (23 @ 06:41) >  Impression:    Again demonstrated are bilateral lung opacities, most pronounced on the   left with left pleural effusion.    < end of copied text >  < from: CT Chest No Cont (23 @ 14:14) >  IMPRESSION:    Diffusely increased number and size of bilateral cavitary lesions with   the left lower lobe now demonstrating a consolidative process of the   entirelobe.      Additional scattered bilateral ground glass opacities are noted. Findings   are compatible with progressing pneumonia.    --- End of Report ---      < end of copied text >          Care Discussed with Consultants/Other Providers [ x] YES  [ ] NO

## 2023-06-01 NOTE — PROGRESS NOTE ADULT - ASSESSMENT
IMPRESSION:    Acute hypoxemic respiratory failure still intubated, ventilated   Severe cavitary CAP MRSA/ ro loculated/ septated effusion  MRSA bacteremia   Anemia  Non massive hemoptysis   LANCE no oliguric  HO asthma   Influenza B     PLAN:    CNS: SAT.      HEENT: Oral care.  ET care     PULMONARY:  HOB @ 45 degrees.  Aspiration precautions.  ARDS net MV setting.  Monitor PPL and DP.  PC 18/10/ 22/ 40%, chest ct, CT sx    CARDIOVASCULAR:  ECHO noted.  Hold lasix .  DC IVF.  MASTER noted    GI: GI prophylaxis.  OG Feeding to target.  Adjust Bowel regimen.  reglan as tolerated    RENAL:  Follow up lytes.  Correct as needed.  Diggs for strict Is and OS.       INFECTIOUS DISEASE: ABX PER id    HEMATOLOGICAL:  DVT prophylaxis.  Dimer noted.  FU LE duplex .  Monitor CBC transfuse    ENDOCRINE:  Follow up FS.  Insulin protocol if needed.      MICU     diggs+

## 2023-06-01 NOTE — CHART NOTE - NSCHARTNOTEFT_GEN_A_CORE
Patient's Hgb 6.1 this AM  Repeat 6.0  Received 2 units pRBC during the day  Repeat Hgb 6.8  Active menses and blood on tracheal suction  Giving 3rd unit pRBC for the day  Will reassess based on AM Hgb Patient's Hgb 6.1 this AM  Repeat 6.0  Received 2 units pRBC during the day  Repeat Hgb 6.8  Active menses and blood on tracheal suction  Giving 3rd unit pRBC for the day  Repeat AM CBC, Hgb 7.8 Patient's Hgb 6.1 this AM  Repeat 6.0  Received 2 units pRBC during the day  Repeat Hgb 6.8  Active menses and blood on tracheal suction  Giving 3rd unit pRBC for the day  Repeat AM CBC, Hgb 7.8    AM Sodium 150  Free water deficit w/ sodium goal of 140 is 3.0L  Already receiving 300cc q6hrs free water flushes with feeds #Anemia  - Patient's Hgb 6.1 this AM  - Repeat 6.0  - Received 2 units pRBC during the day  - Repeat Hgb 6.8  - Active menses and blood on tracheal suction  - Giving 3rd unit pRBC for the day  - Repeat AM CBC, Hgb 7.8    #Hypernatremia  - AM Sodium 150  - Free water deficit w/ sodium goal of 140 is 3.0L  - Already receiving 300cc q6hrs free water flushes with feeds    #Agitation  #Fever  #Hypertension  - rectal temp of 101.8>101.6  - remained tachycardic and febrile  - IV tylenol given  - patient under-sedated on Ketamine and Q4hrs Versed IVP  - additional 3mg IVP given  - would consider versed ggt and avoid propofol ggt as patient had elevated triglycerides of 1009 2 days prior (although downtrending to 286 yesterday) #Anemia  - Patient's Hgb 6.1 this AM  - Repeat 6.0  - Received 2 units pRBC during the day  - Repeat Hgb 6.8  - Active menses and blood on tracheal suction  - Giving 3rd unit pRBC for the day  - Repeat AM CBC, Hgb 7.8    #Hypernatremia  - AM Sodium 150  - Free water deficit w/ sodium goal of 140 is 3.0L  - Already receiving 300cc q6hrs free water flushes with feeds    #Agitation  #Fever  - rectal temp of 101.8>101.6  - remained febrile, tachycardic () and hypertensive (SBP >200)  - IV tylenol given  - patient under-sedated on Ketamine and Q4hrs Versed IVP  - additional 3mg IVP given  - would consider versed ggt and avoid propofol ggt as patient had elevated triglycerides of 1009 2 days prior (although downtrending to 286 yesterday) #Anemia  - Patient's Hgb 6.1 this AM  - Repeat 6.0  - Received 2 units pRBC during the day  - Repeat Hgb 6.8  - Active menses and blood on tracheal suction  - Giving 3rd unit pRBC for the day  - Repeat AM CBC, Hgb 7.8    #Hypernatremia  - AM Sodium 150  - Free water deficit w/ sodium goal of 140 is 3.0L  - Already receiving 300cc q6hrs free water flushes with feeds    #Agitation  #Fever  - rectal temp of 101.8>101.6  - remained febrile, tachycardic () and hypertensive (SBP >200)  - IV tylenol given  - patient breathing over the vent  - ABG did not show CO2 retention  - CXR this AM showed stable left sided opacity, no pneumo, ET tube in place  - patient under-sedated on Ketamine and Q4hrs Versed IVP  - additional 5mg IVP given  - started on versed ggt  - propofol IVP given  - decreased Ketamine ggt  - would avoid propofol ggt as patient had elevated triglycerides of 1009 2 days prior (although downtrending to 286 yesterday) #Anemia  - Patient's Hgb 6.1 this AM  - Repeat 6.0  - Received 2 units pRBC during the day  - Repeat Hgb 6.8  - Active menses and blood on tracheal suction  - Giving 3rd unit pRBC for the day  - Repeat AM CBC, Hgb 7.8    #Hypernatremia  - AM Sodium 150  - Free water deficit w/ sodium goal of 140 is 3.0L  - Already receiving 300cc q6hrs free water flushes with feeds    #Agitation  #Fever  - rectal temp of 101.8>101.6  - remained febrile, tachycardic () and hypertensive (SBP >200)  - IV tylenol given  - patient breathing over the vent  - ABG did not show CO2 retention  - CXR this AM showed stable left sided opacity, no pneumo, ET tube in place  - patient under-sedated on Ketamine and Q4hrs Versed IVP  - additional 5mg IVP given  - started on versed ggt  - propofol IVP given  - decreased Ketamine ggt  - would avoid propofol ggt as patient had elevated triglycerides of 1009 2 days prior (although downtrending to 286 yesterday)  - consider paralytic

## 2023-06-01 NOTE — PROGRESS NOTE ADULT - ASSESSMENT
ASSESSMENT  42 year-old female with PMH of Asthma, HTN? presents with complaint of cough x3 days and SOB. During my encounter pt with dyspnea and increased work of breathing and chest pain, unable to properly provide accurate history. She states that her cough has been progressively worsening over the past 3 days, endorses hemoptysis since yesterday.     IMPRESSION  #Fever    5/30 BCX NGTD     LUE rash ?shingles  #Flu B with cavitary PNA, with MRSA bacteremia and cavitary PNA    5/29 BCX NGTD     5/28 BCX NGTD  x2    5/27 BCX +     5/26 BCX +    5/24 DTA MRSA    5/24 fungal ETT   Few Yeast, Fungitell: 41 (05-24-23 @ 06:10)-- unremarkable     5/23 BCX MRSA 3/4 bottles    MRSA PCR +     MASTER no vegetations   < from: CT Chest No Cont (05.29.23 @ 14:14) >  Diffusely increased number and size of bilateral cavitary lesions with   the left lower lobe now demonstrating a consolidative process of the   entirelobe.  Additional scattered bilateral ground glass opacities are noted. Findings   are compatible with progressing pneumonia.  WBC 16--> 3 12% Bands, Severe sepsis on admission  Doubt TB (lower lobe)  Acute hypoxemic resp failure requiring intubation  < from: CT Angio Chest PE Protocol w/ IV Cont (05.23.23 @ 22:06) >  1. Bilateral patchy groundglass nodularity with dominant confluent left   lower lobe opacification with numerous cavitary lesions. Additional   contralateral right lower lobe cavitary 1 cm nodule. Findings compatible   with cavitary pneumonia in the appropriate clinical setting; differential   diagnosis includes tuberculosis.  2. Confluent ill-defined left hilar and mediastinal adenopathy, likely   reactive, without dominant lymph node delineated.  3. No evidence of pulmonary embolism.  #Lactic acidosis  #LANCE  Creatinine, Serum: 3.0 mg/dL (06.01.23 @ 04:59)  #Hyponatremia      RECOMMENDATIONS  - CTS ?VATS  - Cefepime 1g q24h IV , if hemodynamic compromise, change to Meropenem 1g q24h IV   - LUE rash ? shingles, very fine vesicles, not typical, given critical illness , start IV ACV 2.5 mg/kg/dose every 24 hours -ideal body wt= 125mg IV q24h , monitor Cr very closely .   - Send HSV/VZV PCR of skin scraping  - f/u ETT Cx   - Weaver 5/24- remove when possible, midline 5/29   - Repeat BCX 5/28 NG  - Vanc dosing per ID pharmacy  - Clinda 450mg every 6h PO (IV shortage)  - tamiflu 10 days end 6/2  - Guarded prognosis    If any questions, please call or send a message on [employer] Teams  Please continue to update ID with any pertinent new laboratory or radiographic findings  Spectra [id number]

## 2023-06-01 NOTE — CONSULT NOTE ADULT - ASSESSMENT
ASSESSMENT:   42F w/ pmh of asthma and htn admitted 5/24 with ahrh secondary to MRSA PNA still intubated and spiking fevers. CT chest 5/27 revealing b/l cavitary lesions w/ additional left consolidation and effusion. Bedside ultrasound revealing septated left effusion, repeat CT chest pending.    PLAN:  -F/U repeat CT Chest  -Possible chest tube vs vats pending further imaging    Discussed with Dr. Caio Salamanca  06-01-23 @ 11:01    [id number]

## 2023-06-02 LAB
ALBUMIN SERPL ELPH-MCNC: 2.3 G/DL — LOW (ref 3.5–5.2)
ALP SERPL-CCNC: 68 U/L — SIGNIFICANT CHANGE UP (ref 30–115)
ALT FLD-CCNC: 10 U/L — SIGNIFICANT CHANGE UP (ref 0–41)
ANION GAP SERPL CALC-SCNC: 11 MMOL/L — SIGNIFICANT CHANGE UP (ref 7–14)
ANION GAP SERPL CALC-SCNC: 12 MMOL/L — SIGNIFICANT CHANGE UP (ref 7–14)
ANION GAP SERPL CALC-SCNC: 13 MMOL/L — SIGNIFICANT CHANGE UP (ref 7–14)
AST SERPL-CCNC: 20 U/L — SIGNIFICANT CHANGE UP (ref 0–41)
B PERT IGG+IGM PNL SER: ABNORMAL
BASE EXCESS BLDA CALC-SCNC: -0.4 MMOL/L — SIGNIFICANT CHANGE UP (ref -2–3)
BASOPHILS # BLD AUTO: 0.06 K/UL — SIGNIFICANT CHANGE UP (ref 0–0.2)
BASOPHILS NFR BLD AUTO: 0.3 % — SIGNIFICANT CHANGE UP (ref 0–1)
BILIRUB SERPL-MCNC: 0.4 MG/DL — SIGNIFICANT CHANGE UP (ref 0.2–1.2)
BUN SERPL-MCNC: 73 MG/DL — CRITICAL HIGH (ref 10–20)
BUN SERPL-MCNC: 78 MG/DL — CRITICAL HIGH (ref 10–20)
BUN SERPL-MCNC: 78 MG/DL — CRITICAL HIGH (ref 10–20)
CALCIUM SERPL-MCNC: 8 MG/DL — LOW (ref 8.4–10.5)
CALCIUM SERPL-MCNC: 8.1 MG/DL — LOW (ref 8.4–10.4)
CALCIUM SERPL-MCNC: 8.3 MG/DL — LOW (ref 8.4–10.5)
CHLORIDE SERPL-SCNC: 113 MMOL/L — HIGH (ref 98–110)
CHLORIDE SERPL-SCNC: 114 MMOL/L — HIGH (ref 98–110)
CHLORIDE SERPL-SCNC: 115 MMOL/L — HIGH (ref 98–110)
CK SERPL-CCNC: 83 U/L — SIGNIFICANT CHANGE UP (ref 0–225)
CO2 SERPL-SCNC: 23 MMOL/L — SIGNIFICANT CHANGE UP (ref 17–32)
CO2 SERPL-SCNC: 23 MMOL/L — SIGNIFICANT CHANGE UP (ref 17–32)
CO2 SERPL-SCNC: 26 MMOL/L — SIGNIFICANT CHANGE UP (ref 17–32)
COLOR FLD: SIGNIFICANT CHANGE UP
CREAT SERPL-MCNC: 2.5 MG/DL — HIGH (ref 0.7–1.5)
CREAT SERPL-MCNC: 2.6 MG/DL — HIGH (ref 0.7–1.5)
CREAT SERPL-MCNC: 2.6 MG/DL — HIGH (ref 0.7–1.5)
CULTURE RESULTS: SIGNIFICANT CHANGE UP
EGFR: 23 ML/MIN/1.73M2 — LOW
EGFR: 23 ML/MIN/1.73M2 — LOW
EGFR: 24 ML/MIN/1.73M2 — LOW
EOSINOPHIL # BLD AUTO: 0.02 K/UL — SIGNIFICANT CHANGE UP (ref 0–0.7)
EOSINOPHIL # FLD: 2 % — SIGNIFICANT CHANGE UP
EOSINOPHIL NFR BLD AUTO: 0.1 % — SIGNIFICANT CHANGE UP (ref 0–8)
FOLATE+VIT B12 SERBLD-IMP: 1 % — SIGNIFICANT CHANGE UP
GAS PNL BLDA: SIGNIFICANT CHANGE UP
GAS PNL BLDA: SIGNIFICANT CHANGE UP
GLUCOSE BLDC GLUCOMTR-MCNC: 107 MG/DL — HIGH (ref 70–99)
GLUCOSE BLDC GLUCOMTR-MCNC: 110 MG/DL — HIGH (ref 70–99)
GLUCOSE BLDC GLUCOMTR-MCNC: 120 MG/DL — HIGH (ref 70–99)
GLUCOSE SERPL-MCNC: 109 MG/DL — HIGH (ref 70–99)
GLUCOSE SERPL-MCNC: 121 MG/DL — HIGH (ref 70–99)
GLUCOSE SERPL-MCNC: 126 MG/DL — HIGH (ref 70–99)
HCO3 BLDA-SCNC: 26 MMOL/L — SIGNIFICANT CHANGE UP (ref 21–28)
HCT VFR BLD CALC: 25 % — LOW (ref 37–47)
HGB BLD-MCNC: 7.8 G/DL — LOW (ref 12–16)
HOROWITZ INDEX BLDA+IHG-RTO: 40 — SIGNIFICANT CHANGE UP
HSV+VZV DNA SPEC QL NAA+PROBE: SIGNIFICANT CHANGE UP
IMM GRANULOCYTES NFR BLD AUTO: 3.2 % — HIGH (ref 0.1–0.3)
LYMPHOCYTES # BLD AUTO: 1.9 K/UL — SIGNIFICANT CHANGE UP (ref 1.2–3.4)
LYMPHOCYTES # BLD AUTO: 8.6 % — LOW (ref 20.5–51.1)
LYMPHOCYTES # FLD: 6 — SIGNIFICANT CHANGE UP
MAGNESIUM SERPL-MCNC: 1.9 MG/DL — SIGNIFICANT CHANGE UP (ref 1.8–2.4)
MANUAL DIF COMMENT BLD-IMP: SIGNIFICANT CHANGE UP
MCHC RBC-ENTMCNC: 21.1 PG — LOW (ref 27–31)
MCHC RBC-ENTMCNC: 31.2 G/DL — LOW (ref 32–37)
MCV RBC AUTO: 67.6 FL — LOW (ref 81–99)
MONOCYTES # BLD AUTO: 1.65 K/UL — HIGH (ref 0.1–0.6)
MONOCYTES NFR BLD AUTO: 7.5 % — SIGNIFICANT CHANGE UP (ref 1.7–9.3)
MONOS+MACROS # FLD: 4 % — SIGNIFICANT CHANGE UP
NEUTROPHILS # BLD AUTO: 17.63 K/UL — HIGH (ref 1.4–6.5)
NEUTROPHILS NFR BLD AUTO: 80.3 % — HIGH (ref 42.2–75.2)
NEUTROPHILS-BODY FLUID: 87 % — SIGNIFICANT CHANGE UP
NRBC # BLD: 0 /100 WBCS — SIGNIFICANT CHANGE UP (ref 0–0)
PCO2 BLDA: 48 MMHG — SIGNIFICANT CHANGE UP (ref 25–48)
PH BLDA: 7.34 — LOW (ref 7.35–7.45)
PLATELET # BLD AUTO: 249 K/UL — SIGNIFICANT CHANGE UP (ref 130–400)
PMV BLD: 9.6 FL — SIGNIFICANT CHANGE UP (ref 7.4–10.4)
PO2 BLDA: 98 MMHG — SIGNIFICANT CHANGE UP (ref 83–108)
POTASSIUM SERPL-MCNC: 3.9 MMOL/L — SIGNIFICANT CHANGE UP (ref 3.5–5)
POTASSIUM SERPL-MCNC: 4 MMOL/L — SIGNIFICANT CHANGE UP (ref 3.5–5)
POTASSIUM SERPL-MCNC: 4.1 MMOL/L — SIGNIFICANT CHANGE UP (ref 3.5–5)
POTASSIUM SERPL-SCNC: 3.9 MMOL/L — SIGNIFICANT CHANGE UP (ref 3.5–5)
POTASSIUM SERPL-SCNC: 4 MMOL/L — SIGNIFICANT CHANGE UP (ref 3.5–5)
POTASSIUM SERPL-SCNC: 4.1 MMOL/L — SIGNIFICANT CHANGE UP (ref 3.5–5)
PROT SERPL-MCNC: 6.2 G/DL — SIGNIFICANT CHANGE UP (ref 6–8)
RBC # BLD: 3.7 M/UL — LOW (ref 4.2–5.4)
RBC # FLD: 25.4 % — HIGH (ref 11.5–14.5)
RCV VOL RI: HIGH /UL (ref 0–0)
SAO2 % BLDA: 98.2 % — HIGH (ref 94–98)
SODIUM SERPL-SCNC: 149 MMOL/L — HIGH (ref 135–146)
SODIUM SERPL-SCNC: 150 MMOL/L — HIGH (ref 135–146)
SODIUM SERPL-SCNC: 151 MMOL/L — HIGH (ref 135–146)
SPECIMEN SOURCE: SIGNIFICANT CHANGE UP
SPECIMEN SOURCE: SIGNIFICANT CHANGE UP
TOTAL NUCLEATED CELL COUNT, BODY FLUID: 1931 /UL — SIGNIFICANT CHANGE UP
VANCOMYCIN TROUGH SERPL-MCNC: 9.1 UG/ML — SIGNIFICANT CHANGE UP (ref 5–10)
WBC # BLD: 21.97 K/UL — HIGH (ref 4.8–10.8)
WBC # FLD AUTO: 21.97 K/UL — HIGH (ref 4.8–10.8)

## 2023-06-02 PROCEDURE — 99231 SBSQ HOSP IP/OBS SF/LOW 25: CPT

## 2023-06-02 PROCEDURE — 71045 X-RAY EXAM CHEST 1 VIEW: CPT | Mod: 26,77

## 2023-06-02 PROCEDURE — 99291 CRITICAL CARE FIRST HOUR: CPT

## 2023-06-02 PROCEDURE — 74176 CT ABD & PELVIS W/O CONTRAST: CPT | Mod: 26

## 2023-06-02 PROCEDURE — 71045 X-RAY EXAM CHEST 1 VIEW: CPT | Mod: 26

## 2023-06-02 PROCEDURE — 93010 ELECTROCARDIOGRAM REPORT: CPT

## 2023-06-02 RX ORDER — CEFEPIME 1 G/1
1000 INJECTION, POWDER, FOR SOLUTION INTRAMUSCULAR; INTRAVENOUS EVERY 12 HOURS
Refills: 0 | Status: DISCONTINUED | OUTPATIENT
Start: 2023-06-02 | End: 2023-06-02

## 2023-06-02 RX ORDER — MIDAZOLAM HYDROCHLORIDE 1 MG/ML
2 INJECTION, SOLUTION INTRAMUSCULAR; INTRAVENOUS ONCE
Refills: 0 | Status: DISCONTINUED | OUTPATIENT
Start: 2023-06-02 | End: 2023-06-02

## 2023-06-02 RX ORDER — LABETALOL HCL 100 MG
5 TABLET ORAL ONCE
Refills: 0 | Status: COMPLETED | OUTPATIENT
Start: 2023-06-02 | End: 2023-06-02

## 2023-06-02 RX ORDER — MEROPENEM 1 G/30ML
1000 INJECTION INTRAVENOUS EVERY 12 HOURS
Refills: 0 | Status: DISCONTINUED | OUTPATIENT
Start: 2023-06-02 | End: 2023-06-06

## 2023-06-02 RX ORDER — CISATRACURIUM BESYLATE 2 MG/ML
3 INJECTION INTRAVENOUS
Qty: 200 | Refills: 0 | Status: DISCONTINUED | OUTPATIENT
Start: 2023-06-02 | End: 2023-06-03

## 2023-06-02 RX ORDER — FENTANYL CITRATE 50 UG/ML
0.56 INJECTION INTRAVENOUS
Qty: 2500 | Refills: 0 | Status: DISCONTINUED | OUTPATIENT
Start: 2023-06-02 | End: 2023-06-03

## 2023-06-02 RX ORDER — VANCOMYCIN HCL 1 G
1000 VIAL (EA) INTRAVENOUS EVERY 24 HOURS
Refills: 0 | Status: DISCONTINUED | OUTPATIENT
Start: 2023-06-02 | End: 2023-06-05

## 2023-06-02 RX ORDER — MIDAZOLAM HYDROCHLORIDE 1 MG/ML
1 INJECTION, SOLUTION INTRAMUSCULAR; INTRAVENOUS ONCE
Refills: 0 | Status: DISCONTINUED | OUTPATIENT
Start: 2023-06-02 | End: 2023-06-02

## 2023-06-02 RX ORDER — ACETAMINOPHEN 500 MG
1000 TABLET ORAL ONCE
Refills: 0 | Status: COMPLETED | OUTPATIENT
Start: 2023-06-02 | End: 2023-06-02

## 2023-06-02 RX ORDER — MIDAZOLAM HYDROCHLORIDE 1 MG/ML
0.02 INJECTION, SOLUTION INTRAMUSCULAR; INTRAVENOUS
Qty: 100 | Refills: 0 | Status: DISCONTINUED | OUTPATIENT
Start: 2023-06-02 | End: 2023-06-03

## 2023-06-02 RX ORDER — PROPOFOL 10 MG/ML
10 INJECTION, EMULSION INTRAVENOUS ONCE
Refills: 0 | Status: COMPLETED | OUTPATIENT
Start: 2023-06-02 | End: 2023-06-02

## 2023-06-02 RX ORDER — SODIUM CHLORIDE 9 MG/ML
1000 INJECTION, SOLUTION INTRAVENOUS
Refills: 0 | Status: DISCONTINUED | OUTPATIENT
Start: 2023-06-02 | End: 2023-06-05

## 2023-06-02 RX ADMIN — CHLORHEXIDINE GLUCONATE 1 APPLICATION(S): 213 SOLUTION TOPICAL at 12:17

## 2023-06-02 RX ADMIN — MIDAZOLAM HYDROCHLORIDE 2 MILLIGRAM(S): 1 INJECTION, SOLUTION INTRAMUSCULAR; INTRAVENOUS at 05:59

## 2023-06-02 RX ADMIN — MEROPENEM 100 MILLIGRAM(S): 1 INJECTION INTRAVENOUS at 17:51

## 2023-06-02 RX ADMIN — POLYETHYLENE GLYCOL 3350 17 GRAM(S): 17 POWDER, FOR SOLUTION ORAL at 21:12

## 2023-06-02 RX ADMIN — PANTOPRAZOLE SODIUM 40 MILLIGRAM(S): 20 TABLET, DELAYED RELEASE ORAL at 12:16

## 2023-06-02 RX ADMIN — Medication 4 MILLIGRAM(S): at 07:12

## 2023-06-02 RX ADMIN — SODIUM CHLORIDE 60 MILLILITER(S): 9 INJECTION, SOLUTION INTRAVENOUS at 17:42

## 2023-06-02 RX ADMIN — Medication 250 MILLIGRAM(S): at 12:16

## 2023-06-02 RX ADMIN — Medication 5 MILLIGRAM(S): at 07:00

## 2023-06-02 RX ADMIN — Medication 4 PUFF(S): at 08:33

## 2023-06-02 RX ADMIN — Medication 1 APPLICATION(S): at 12:16

## 2023-06-02 RX ADMIN — MIDAZOLAM HYDROCHLORIDE 2 MILLIGRAM(S): 1 INJECTION, SOLUTION INTRAMUSCULAR; INTRAVENOUS at 02:34

## 2023-06-02 RX ADMIN — Medication 4 PUFF(S): at 02:59

## 2023-06-02 RX ADMIN — Medication 650 MILLIGRAM(S): at 19:04

## 2023-06-02 RX ADMIN — Medication 650 MILLIGRAM(S): at 12:46

## 2023-06-02 RX ADMIN — SENNA PLUS 2 TABLET(S): 8.6 TABLET ORAL at 21:06

## 2023-06-02 RX ADMIN — CISATRACURIUM BESYLATE 15.3 MICROGRAM(S)/KG/MIN: 2 INJECTION INTRAVENOUS at 09:34

## 2023-06-02 RX ADMIN — Medication 650 MILLIGRAM(S): at 18:34

## 2023-06-02 RX ADMIN — CHLORHEXIDINE GLUCONATE 15 MILLILITER(S): 213 SOLUTION TOPICAL at 17:52

## 2023-06-02 RX ADMIN — ALBUTEROL 4 PUFF(S): 90 AEROSOL, METERED ORAL at 08:32

## 2023-06-02 RX ADMIN — CHLORHEXIDINE GLUCONATE 15 MILLILITER(S): 213 SOLUTION TOPICAL at 06:57

## 2023-06-02 RX ADMIN — ALBUTEROL 4 PUFF(S): 90 AEROSOL, METERED ORAL at 13:14

## 2023-06-02 RX ADMIN — Medication 30 MILLIGRAM(S): at 12:15

## 2023-06-02 RX ADMIN — Medication 450 MILLIGRAM(S): at 07:12

## 2023-06-02 RX ADMIN — Medication 1000 MILLIGRAM(S): at 07:04

## 2023-06-02 RX ADMIN — PROPOFOL 10 MILLIGRAM(S): 10 INJECTION, EMULSION INTRAVENOUS at 06:58

## 2023-06-02 RX ADMIN — Medication 102.5 MILLIGRAM(S): at 17:52

## 2023-06-02 RX ADMIN — MIDAZOLAM HYDROCHLORIDE 1 MILLIGRAM(S): 1 INJECTION, SOLUTION INTRAMUSCULAR; INTRAVENOUS at 05:59

## 2023-06-02 RX ADMIN — CHLORHEXIDINE GLUCONATE 1 APPLICATION(S): 213 SOLUTION TOPICAL at 06:57

## 2023-06-02 RX ADMIN — Medication 5 MILLIGRAM(S): at 06:30

## 2023-06-02 RX ADMIN — Medication 450 MILLIGRAM(S): at 12:17

## 2023-06-02 RX ADMIN — ALBUTEROL 4 PUFF(S): 90 AEROSOL, METERED ORAL at 02:58

## 2023-06-02 RX ADMIN — Medication 4 PUFF(S): at 13:14

## 2023-06-02 RX ADMIN — Medication 650 MILLIGRAM(S): at 13:11

## 2023-06-02 RX ADMIN — Medication 400 MILLIGRAM(S): at 06:49

## 2023-06-02 RX ADMIN — Medication 4 MILLIGRAM(S): at 12:19

## 2023-06-02 RX ADMIN — Medication 650 MILLIGRAM(S): at 21:06

## 2023-06-02 RX ADMIN — Medication 650 MILLIGRAM(S): at 12:16

## 2023-06-02 NOTE — PROGRESS NOTE ADULT - ASSESSMENT
ASSESSMENT  42 year-old female with PMH of Asthma, HTN? presents with complaint of cough x3 days and SOB. During my encounter pt with dyspnea and increased work of breathing and chest pain, unable to properly provide accurate history. She states that her cough has been progressively worsening over the past 3 days, endorses hemoptysis since yesterday.     IMPRESSION  #Fever    6/1 DTA   Normal Respiratory Laila present    5/31 BCX NGTD     5/30 BCX NGTD     LUE rash ?shingles  #Flu B with cavitary PNA, with MRSA bacteremia and cavitary PNA    5/29 BCX NGTD     5/28 BCX NGTD  x2    5/27 BCX +     5/26 BCX +    5/24 DTA MRSA    5/24 fungal ETT   Few Yeast, Fungitell: 41 (05-24-23 @ 06:10)-- unremarkable     5/23 BCX MRSA 3/4 bottles    MRSA PCR +     MASTER no vegetations   < from: CT Chest No Cont (05.29.23 @ 14:14) >  Diffusely increased number and size of bilateral cavitary lesions with   the left lower lobe now demonstrating a consolidative process of the   entirelobe.  Additional scattered bilateral ground glass opacities are noted. Findings   are compatible with progressing pneumonia.  WBC 16--> 3 12% Bands, Severe sepsis on admission  Doubt TB (lower lobe)  Acute hypoxemic resp failure requiring intubation  < from: CT Angio Chest PE Protocol w/ IV Cont (05.23.23 @ 22:06) >  1. Bilateral patchy groundglass nodularity with dominant confluent left   lower lobe opacification with numerous cavitary lesions. Additional   contralateral right lower lobe cavitary 1 cm nodule. Findings compatible   with cavitary pneumonia in the appropriate clinical setting; differential   diagnosis includes tuberculosis.  2. Confluent ill-defined left hilar and mediastinal adenopathy, likely   reactive, without dominant lymph node delineated.  3. No evidence of pulmonary embolism.  #Lactic acidosis  #LANCE  Creatinine, Serum: 3.0 mg/dL (06.01.23 @ 04:59)  #Hyponatremia      RECOMMENDATIONS  - CTS ?VATS  - D/C cefepime Meropenem 1g q12h IV   - D/C PO clinda  - Continue Vanc dosing per ID pharmacy   - LUE rash ? shingles, very fine vesicles, not typical, given critical illness , start IV ACV 2.5 mg/kg/dose every 24 hours -ideal body wt= 125mg IV q24h , monitor Cr very closely .   - Send HSV/VZV PCR of skin scraping  - Weaver 5/24- remove when possible, midline 5/29   - Repeat BCX 5/28 NG  - tamiflu 10 days end 6/2  - Guarded prognosis    If any questions, please call or send a message on [employer] Teams  Please continue to update ID with any pertinent new laboratory or radiographic findings  Spectra [id number]

## 2023-06-02 NOTE — PROGRESS NOTE ADULT - SUBJECTIVE AND OBJECTIVE BOX
ROSSIZEYNEP  42y, Female  Allergy: aspirin (Short breath; Anaphylaxis)      LOS  10d    CHIEF COMPLAINT: AHRF (02 Jun 2023 09:13)      INTERVAL EVENTS/HPI  - T(F): , Max: 101.8 (06-01-23 @ 18:30) fevers, curve downtrending   - WBC Count: 21.97 (06-02-23 @ 05:00)  WBC Count: 20.29 (06-01-23 @ 21:55)     - Creatinine, Serum: 2.6 (06-02-23 @ 05:00)  Creatinine, Serum: 3.0 (06-01-23 @ 04:59)     -   -   -     ROS  cannot obtain secondary to patient's sedation and/or mental status    VITALS:  T(F): 100.6, Max: 101.8 (06-01-23 @ 18:30)  HR: 141  BP: 118/71  RR: 22Vital Signs Last 24 Hrs  T(C): 38.1 (02 Jun 2023 12:00), Max: 38.8 (01 Jun 2023 18:30)  T(F): 100.6 (02 Jun 2023 12:00), Max: 101.8 (01 Jun 2023 18:30)  HR: 141 (02 Jun 2023 15:00) (90 - 141)  BP: 118/71 (02 Jun 2023 15:00) (113/83 - 217/101)  BP(mean): 90 (02 Jun 2023 15:00) (83 - 145)  RR: 22 (02 Jun 2023 15:00) (16 - 25)  SpO2: 96% (02 Jun 2023 15:00) (95% - 100%)    Parameters below as of 02 Jun 2023 15:00  Patient On (Oxygen Delivery Method): ventilator    O2 Concentration (%): 40    PHYSICAL EXAM:  Gen: Intubated  CV: RRR  Lungs: Decreased BS at bases  Abd: Soft  Neuro: Sedated  Skin L arm rash   Lines clean, no phlebitis    FH: Non-contributory  Social Hx: Non-contributory    TESTS & MEASUREMENTS:                        7.8    21.97 )-----------( 249      ( 02 Jun 2023 05:00 )             25.0     06-02    150<H>  |  115<H>  |  73<HH>  ----------------------------<  109<H>  4.0   |  23  |  2.6<H>    Ca    8.1<L>      02 Jun 2023 05:00  Mg     1.9     06-02    TPro  6.2  /  Alb  2.3<L>  /  TBili  0.4  /  DBili  x   /  AST  20  /  ALT  10  /  AlkPhos  68  06-02      LIVER FUNCTIONS - ( 02 Jun 2023 05:00 )  Alb: 2.3 g/dL / Pro: 6.2 g/dL / ALK PHOS: 68 U/L / ALT: 10 U/L / AST: 20 U/L / GGT: x               Culture - Sputum (collected 06-01-23 @ 02:15)  Source: ET Tube ET Tube  Gram Stain (06-01-23 @ 17:36):    Moderate polymorphonuclear leukocytes per low power field    No Squamous epithelial cells per low power field    Rare Gram positive cocci in pairs per oil power field  Preliminary Report (06-02-23 @ 08:47):    Normal Respiratory Laila present    Culture - Blood (collected 05-31-23 @ 04:48)  Source: .Blood None  Preliminary Report (06-01-23 @ 16:01):    No growth to date.    Culture - Blood (collected 05-30-23 @ 04:30)  Source: .Blood Blood  Preliminary Report (05-31-23 @ 14:02):    No growth to date.    Culture - Blood (collected 05-29-23 @ 05:55)  Source: .Blood Blood  Preliminary Report (05-30-23 @ 14:01):    No growth to date.    Culture - Blood (collected 05-28-23 @ 16:00)  Source: .Blood Blood-Peripheral  Preliminary Report (05-30-23 @ 03:02):    No growth to date.    Culture - Blood (collected 05-28-23 @ 12:00)  Source: .Blood Blood  Preliminary Report (05-29-23 @ 21:01):    No growth to date.    Culture - Blood (collected 05-27-23 @ 06:05)  Source: .Blood Blood  Gram Stain (05-28-23 @ 17:02):    Growth in aerobic bottle: Gram Positive Cocci in Clusters  Final Report (05-30-23 @ 09:16):    Growth in aerobic bottle: Staphylococcus aureus    See previous culture [id number]    Culture - Blood (collected 05-27-23 @ 06:05)  Source: .Blood Blood  Gram Stain (05-29-23 @ 13:29):    Growth in aerobic bottle: Gram Positive Cocci in Clusters    Growth in anaerobic bottle: Gram Positive Cocci in Clusters  Final Report (05-30-23 @ 11:11):    Growth in aerobic and anaerobic bottles: Methicillin Resistant    Staphylococcus aureus  Organism: Methicillin resistant Staphylococcus aureus (05-30-23 @ 11:11)  Organism: Methicillin resistant Staphylococcus aureus (05-30-23 @ 11:11)      Method Type: RUSSELL      -  Ampicillin/Sulbactam: R 16/8      -  Cefazolin: R >16      -  Clindamycin: S 0.5      -  Daptomycin: S 1      -  Erythromycin: R >4      -  Gentamicin: S <=1 Should not be used as monotherapy      -  Linezolid: S 2      -  Oxacillin: R >2      -  Penicillin: R >8      -  Rifampin: S <=1 Should not be used as monotherapy      -  Tetracycline: S <=1      -  Trimethoprim/Sulfamethoxazole: S <=0.5/9.5      -  Vancomycin: S 2    Culture - Blood (collected 05-26-23 @ 06:09)  Source: .Blood Blood  Gram Stain (05-29-23 @ 00:35):    Growth in anaerobic bottle: Gram Positive Cocci in Clusters  Final Report (05-31-23 @ 08:25):    Growth in anaerobic bottle: Methicillin Resistant Staphylococcus aureus  Organism: Methicillin resistant Staphylococcus aureus (05-31-23 @ 08:25)  Organism: Methicillin resistant Staphylococcus aureus (05-31-23 @ 08:25)      Method Type: RUSSELL      -  Ampicillin/Sulbactam: R 16/8      -  Cefazolin: R >16      -  Clindamycin: S 0.5      -  Daptomycin: S 1      -  Erythromycin: I 4      -  Gentamicin: S <=1 Should not be used as monotherapy      -  Linezolid: S 4      -  Oxacillin: R >2      -  Penicillin: R >8      -  Rifampin: S <=1 Should not be used as monotherapy      -  Tetracycline: S <=1      -  Trimethoprim/Sulfamethoxazole: S <=0.5/9.5      -  Vancomycin: S 2    Culture - Sputum (collected 05-24-23 @ 19:17)  Source: Trach Asp Tracheal Aspirate  Gram Stain (05-25-23 @ 07:31):    No polymorphonuclear leukocytes per low power field    No Squamous epithelial cells per low power field    Numerous Gram positive cocci in pairs per oil power field  Final Report (05-27-23 @ 08:45):    Numerous Methicillin Resistant Staphylococcus aureus    Normal Respiratory Laila present  Organism: Methicillin resistant Staphylococcus aureus (05-27-23 @ 08:45)  Organism: Methicillin resistant Staphylococcus aureus (05-27-23 @ 08:45)      Method Type: RUSSELL      -  Ampicillin/Sulbactam: R 16/8      -  Cefazolin: R >16      -  Clindamycin: S <=0.25      -  Erythromycin: R >4      -  Gentamicin: S <=1 Should not be used as monotherapy      -  Linezolid: S 2      -  Oxacillin: R >2      -  Penicillin: R >8      -  Rifampin: S <=1 Should not be used as monotherapy      -  Tetracycline: S <=1      -  Trimethoprim/Sulfamethoxazole: S <=0.5/9.5      -  Vancomycin: S 2    Culture - Acid Fast - Sputum w/Smear (collected 05-24-23 @ 19:17)  Source: Trach Asp Tracheal Aspirate  Preliminary Report (05-27-23 @ 15:08):    Culture is being performed.    Culture - Fungal, Sputum (collected 05-24-23 @ 19:17)  Source: Trach Asp Tracheal Aspirate  Preliminary Report (05-29-23 @ 07:07):    Few Yeast    Culture - Blood (collected 05-24-23 @ 06:10)  Source: .Blood None  Gram Stain (05-25-23 @ 11:18):    Growth in aerobic bottle: Gram Positive Cocci in Clusters    Growth in anaerobic bottle: Gram Positive Cocci in Clusters  Final Report (05-26-23 @ 08:13):    Growth in aerobic and anaerobic bottles: Methicillin Resistant    Staphylococcus aureus    See previous culture [id number]    Culture - Blood (collected 05-23-23 @ 20:37)  Source: .Blood Blood-Peripheral  Gram Stain (05-24-23 @ 17:51):    Growth in aerobic bottle: Gram Positive Cocci in Clusters    Growth in anaerobic bottle: Gram Positive Cocci in Clusters  Final Report (05-26-23 @ 08:14):    Growth in aerobic and anaerobic bottles: Methicillin Resistant    Staphylococcus aureus    See previous culture [id number]    Culture - Blood (collected 05-23-23 @ 20:37)  Source: .Blood Blood-Peripheral  Gram Stain (05-24-23 @ 14:24):    Growth in aerobic bottle: Gram Positive Cocci in Clusters  Final Report (05-26-23 @ 08:11):    Growth in aerobic bottle: Methicillin Resistant Staphylococcus aureus    ***Blood Panel PCR results on this specimen are available    approximately 3 hours after the Gram stain result.***    Gram stain, PCR, and/or culture results may not always    correspond due to difference in methodologies.    ************************************************************    This PCR assay was performed by multiplex PCR. This    Assay tests for 66 bacterial and resistance gene targets.    Please refer to the [facility] test directory    at [email or web address] for details.  Organism: Blood Culture PCR  Methicillin resistant Staphylococcus aureus (05-26-23 @ 08:11)  Organism: Methicillin resistant Staphylococcus aureus (05-26-23 @ 08:11)      Method Type: RUSSELL      -  Ampicillin/Sulbactam: R 16/8      -  Cefazolin: R 16      -  Clindamycin: S <=0.25      -  Daptomycin: S 0.5      -  Erythromycin: R >4      -  Gentamicin: S <=1 Should not be used as monotherapy      -  Linezolid: S 2      -  Oxacillin: R >2      -  Penicillin: R >8      -  Rifampin: S <=1 Should not be used as monotherapy      -  Tetracycline: S <=1      -  Trimethoprim/Sulfamethoxazole: S <=0.5/9.5      -  Vancomycin: S 2  Organism: Blood Culture PCR (05-26-23 @ 08:11)      Method Type: PCR      -  Methicillin resistant Staphylococcus aureus (MRSA): Detec            INFECTIOUS DISEASES TESTING  Procalcitonin, Serum: 5.80 (05-30-23 @ 11:39)  HIV-1/2 Combo Result: Nonreact (05-27-23 @ 05:47)  MRSA PCR Result.: Positive (05-24-23 @ 19:17)  Legionella Antigen, Urine: Negative (05-24-23 @ 19:16)  Procalcitonin, Serum: 48.30 (05-24-23 @ 06:10)  Fungitell: 41 (05-24-23 @ 06:10)      INFLAMMATORY MARKERS      RADIOLOGY & ADDITIONAL TESTS:  I have personally reviewed the last available Chest xray  CXR  Xray Chest 1 View- PORTABLE-Urgent:   ACC: [id number] EXAM:  XR CHEST PORTABLE URGENT 1V   ORDERED BY: INDIA MERRITT     PROCEDURE DATE:  06/02/2023          INTERPRETATION:  Clinical History / Reason for exam: Line placement    Comparison : Chest radiograph June 2, 2023.    Technique/Positioning: Single AP view of the chest.    Findings:    Support devices: Endotracheal tube and feeding tube in place. Left IJ   central venous catheter with tip overlying proximal SVC    Cardiac/mediastinum/hilum: Unchanged    Lung parenchyma/Pleura:Unchanged diffuse left lung opacity/effusion. No   pneumothorax.    Skeleton/soft tissues: Unchanged    Impression:    Unchanged diffuse left lung opacity/effusion.    Tubes and lines as above.    --- End of Report ---            ELLIOT LANDAU MD; Attending Radiologist  This document has been electronically signed. Jun 2 2023 12:50PM (06-02-23 @ 12:48)      CT  CT Abdomen and Pelvis No Cont:   ACC: [id number] EXAM:  CT ABDOMEN AND PELVIS   ORDERED BY: RAJI ALAMO     PROCEDURE DATE:  06/02/2023          INTERPRETATION:  CLINICAL HISTORY: Rule out retroperitoneal bleed..    TECHNIQUE: Contiguous axial CT images were obtained from the lowerchest   to the pubic symphysis without intravenous contrast. Oral contrast was   not administered. Reformatted images in the coronal and sagittal planes   were acquired.    COMPARISON: None.      FINDINGS: Evaluation of intra-abdominal organs is limited due to lack of   intravenous contrast. Additionally the study is limited by streak   artifact from patient arm positioning.    LOWER CHEST: See chest CT from one day prior.    HEPATOBILIARY: Cholelithiasis.    SPLEEN: Unremarkable.    PANCREAS: Unremarkable.    ADRENAL GLANDS: 1.6 cm left adrenal nodule, indeterminate.    KIDNEYS: Punctate nonobstructing bilateral renal calculi. No   hydronephrosis.    ABDOMINOPELVIC NODES: Unremarkable    PELVIC ORGANS: Bladder contracted around Weaver catheter.    PERITONEUM/MESENTERY/BOWEL: No evidence of retroperitoneal hematoma.   Liquid stool in the colon. No evidence of bowel obstruction ascites or   free air. Feeding tube tip in the stomach.    BONES/SOFT TISSUES: Degenerative changes of the spine. Body wall edema.          IMPRESSION:    No evidence of retroperitoneal hematoma.    Indeterminate 1.6 cm left adrenal nodule.    See chest CT from one day prior for thoracic findings.    --- End of Report ---            NAZ CARRANZA MD; Attending Radiologist  This document has been electronically signed. Jun 2 2023 10:07AM (06-02-23 @ 01:42)      CARDIOLOGY TESTING  12 Lead ECG:   Systolic  mmHg    Diastolic BP 91 mmHg    Ventricular Rate 139 BPM    Atrial Rate 139 BPM    P-R Interval 126 ms    QRS Duration 120 ms    Q-T Interval 338 ms    QTC Calculation(Bazett) 514 ms    P Axis 43 degrees    R Axis 5 degrees    T Axis 20 degrees    Diagnosis Line Sinus tachycardia  Right bundle branch block  Minimal voltage criteria for LVH, may be normal variant  Nonspecific T wave abnormality  Abnormal ECG    Confirmed by Damir Hua ([id number]) on 6/2/2023 2:04:26 PM (06-02-23 @ 12:37)  12 Lead ECG:   Ventricular Rate 134 BPM    Atrial Rate 134 BPM    P-R Interval 208 ms    QRS Duration 76 ms    Q-T Interval 318 ms    QTC Calculation(Bazett) 474 ms    P Axis 97 degrees    R Axis 40 degrees    T Axis 70 degrees    Diagnosis Line Sinus tachycardiawith Fusion complexes  Otherwise normal ECG    Confirmed by PANFILO COVINGTON MD ([id number]) on 5/24/2023 6:54:09 AM (05-24-23 @ 02:40)      MEDICATIONS  acyclovir IVPB 125  albuterol    90 MICROgram(s) HFA Inhaler 4  cefepime   IVPB 1000  chlorhexidine 0.12% Liquid 15  chlorhexidine 2% Cloths 1  chlorhexidine 2% Cloths 1  cisatracurium Infusion 3  clindamycin Solution 15 mG/mL 450  dexMEDEtomidine Infusion 0.2  fentaNYL   Infusion. 0.555  insulin lispro (ADMELOG) corrective regimen sliding scale   ipratropium 17 MICROgram(s) HFA Inhaler 4  ketamine Infusion. 0.25  midazolam Infusion 0.02  norepinephrine Infusion 0.05  oseltamivir Suspension 30  pantoprazole   Suspension 40  polyethylene glycol 3350 17  propofol Infusion 11.098  senna 2  sodium bicarbonate 650  vancomycin  IVPB 1000  vitamin A &amp; D Ointment 1      WEIGHT  Weight (kg): 85 (05-24-23 @ 08:15)  Creatinine, Serum: 2.6 mg/dL (06-02-23 @ 05:00)      ANTIBIOTICS:  acyclovir IVPB 125 milliGRAM(s) IV Intermittent every 24 hours  cefepime   IVPB 1000 milliGRAM(s) IV Intermittent every 12 hours  clindamycin Solution 15 mG/mL 450 milliGRAM(s) Oral every 6 hours  oseltamivir Suspension 30 milliGRAM(s) Oral every 12 hours  vancomycin  IVPB 1000 milliGRAM(s) IV Intermittent every 24 hours      All available historical records have been reviewed

## 2023-06-02 NOTE — PROGRESS NOTE ADULT - ASSESSMENT
42/F with PMH of asthma and HTN, admitted with SOB, developed LANCE. Acute hypoxemic resp failure requiring intubation  Bilateral patchy groundglass nodularity with dominant confluent left  lower lobe opacification with numerous cavitary lesions. Additional contralateral right lower lobe cavitary 1 cm nodule. Findings compatible  with cavitary pneumonia/Tb?     Non massive hemoptysis   MRSA bacteremia, prerenal FeNa, suspicion for post infectious GN/ pleural effusion   Kidney sono neg for obstruction  LANCE prerenal vs ATN vs pulmo renal syndrome  s/p CT with IV contrast 5/23 -  non oliguric  cr better   follow IP    phos noted / no binders yet / feed nepro   w/up for GN noted  negative / Creat better / holding on kidney bx   Hold sodium bicarb  increase free water with feeding to 400 cc q4h   anemia acute will need transfusion to keep Hb > 7  on cefepime clinda and vanco / vanco trough noted low? increase to 750 mg q24h   no acute indication for RRT   will follow

## 2023-06-02 NOTE — PHARMACOTHERAPY INTERVENTION NOTE - COMMENTS
Patient is on IV acyclovir for possible shingles. Recommended starting IV fluids for hydration since acyclovir can cause crystallization in kidneys. D/W Dr. Cannon - team would like to hold off on giving patient additional fluids at this time, but they will monitor kidney function closely.

## 2023-06-02 NOTE — PROGRESS NOTE ADULT - SUBJECTIVE AND OBJECTIVE BOX
GENERAL SURGERY PROGRESS NOTE    Patient: ZEYNEP ROSSI , 42y ([birth year])Female   MRN: [id number]  Location: [address]  Visit: 05-23-23 Inpatient  Date: 06-02-23 @ 08:02    Hospital Day #: 2    Procedure/Dx/Injuries:    Events of past 24 hours:    PAST MEDICAL & SURGICAL HISTORY:      Vitals:   T(F): 101.8 (06-02-23 @ 04:00), Max: 101.8 (06-01-23 @ 18:30)  HR: 113 (06-02-23 @ 07:00)  BP: 199/105 (06-02-23 @ 07:00)  RR: 18 (06-02-23 @ 05:00)  SpO2: 99% (06-02-23 @ 05:00)  Mode: AC/ CMV (Assist Control/ Continuous Mandatory Ventilation), RR (machine): 22, FiO2: 40, PEEP: 10, ITime: 0.9, MAP: 18, PC: 18, PIP: 28    Diet, NPO with Tube Feed:   Tube Feeding Modality: Orogastric  Peptamen Intense VHP  Total Volume for 24 Hours (mL): 1200  Continuous  Starting Tube Feed Rate mL per Hour: 20  Increase Tube Feed Rate by (mL): 20     Every 4 hours  Until Goal Tube Feed Rate (mL per Hour): 50  Tube Feed Duration (in Hours): 24  Tube Feed Start Time: 12:00  Free Water Flush   Total Volume per Flush (mL): 300   Frequency: Every 6 Hours  Diet, NPO after Midnight:      NPO Start Date: 30-May-2023,   NPO Start Time: 23:59  Except Medications      Fluids:     I & O's:    06-01-23 @ 07:01  -  06-02-23 @ 07:00  --------------------------------------------------------  IN:    Dexmedetomidine: 765.6 mL    FentaNYL: 799 mL    FentaNYL: 212.5 mL    Ketamine: 430.8 mL    Peptamen A.F.: 10 mL    PRBCs (Packed Red Blood Cells): 300 mL  Total IN: 2517.9 mL    OUT:    Indwelling Catheter - Urethral (mL): 2785 mL  Total OUT: 2785 mL    Total NET: -267.1 mL    PHYSICAL EXAM:  General: NAD, AAOx3, calm and cooperative  HEENT: NCAT, TRAM, EOMI, Trachea ML, Neck supple  Cardiac: RRR S1, S2, no Murmurs, rubs or gallops  Respiratory: CTAB, normal respiratory effort, breath sounds equal BL, no wheeze, rhonchi or crackles  Abdomen: Soft, non-distended, non-tender, no rebound, no guarding. +BS.  Musculoskeletal: Strength 5/5 BL UE/LE, ROM intact, compartments soft  Neuro: Sensation grossly intact and equal throughout, no focal deficits  Vascular: Pulses 2+ throughout, extremities well perfused  Skin: Warm/dry, normal color, no jaundice    MEDICATIONS  (STANDING):  acyclovir IVPB 125 milliGRAM(s) IV Intermittent every 24 hours  albuterol    90 MICROgram(s) HFA Inhaler 4 Puff(s) Inhalation every 6 hours  cefepime   IVPB 1000 milliGRAM(s) IV Intermittent every 24 hours  cefepime   IVPB      chlorhexidine 0.12% Liquid 15 milliLiter(s) Oral Mucosa every 12 hours  chlorhexidine 2% Cloths 1 Application(s) Topical daily  chlorhexidine 2% Cloths 1 Application(s) Topical <User Schedule>  cisatracurium Infusion 3 MICROgram(s)/kG/Min (15.3 mL/Hr) IV Continuous <Continuous>  clindamycin Solution 15 mG/mL 450 milliGRAM(s) Oral every 6 hours  dexMEDEtomidine Infusion 0.2 MICROgram(s)/kG/Hr (4.25 mL/Hr) IV Continuous <Continuous>  fentaNYL   Infusion. 0.555 MICROgram(s)/kG/Hr (4.72 mL/Hr) IV Continuous <Continuous>  insulin lispro (ADMELOG) corrective regimen sliding scale   SubCutaneous three times a day before meals  ipratropium 17 MICROgram(s) HFA Inhaler 4 Puff(s) Inhalation every 6 hours  ketamine Infusion. 0.25 mG/kG/Hr (2.13 mL/Hr) IV Continuous <Continuous>  labetalol Injectable 5 milliGRAM(s) IV Push once  labetalol Injectable 5 milliGRAM(s) IV Push once  metoclopramide Injectable 4 milliGRAM(s) IV Push every 6 hours  midazolam Infusion 0.02 mG/kG/Hr (1.7 mL/Hr) IV Continuous <Continuous>  norepinephrine Infusion 0.05 MICROgram(s)/kG/Min (7.97 mL/Hr) IV Continuous <Continuous>  oseltamivir Suspension 30 milliGRAM(s) Oral every 12 hours  pantoprazole   Suspension 40 milliGRAM(s) Oral daily  polyethylene glycol 3350 17 Gram(s) Oral every 12 hours  propofol Infusion 11.098 MICROgram(s)/kG/Min (5.66 mL/Hr) IV Continuous <Continuous>  senna 2 Tablet(s) Oral at bedtime  sodium bicarbonate 650 milliGRAM(s) Oral every 8 hours  vancomycin  IVPB 500 milliGRAM(s) IV Intermittent every 24 hours  vitamin A &amp; D Ointment 1 Application(s) Topical daily    MEDICATIONS  (PRN):  acetaminophen     Tablet .. 650 milliGRAM(s) Oral every 6 hours PRN Temp greater or equal to 38C (100.4F)  albuterol    90 MICROgram(s) HFA Inhaler 4 Puff(s) Inhalation every 4 hours PRN Shortness of Breath and/or Wheezing  ipratropium 17 MICROgram(s) HFA Inhaler 4 Puff(s) Inhalation <User Schedule> PRN -  midazolam Injectable 2 milliGRAM(s) IV Push every 4 hours PRN Agitation      DVT PROPHYLAXIS:   GI PROPHYLAXIS: pantoprazole   Suspension 40 milliGRAM(s) Oral daily    ANTICOAGULATION:   ANTIBIOTICS:  acyclovir IVPB 125 milliGRAM(s)  cefepime   IVPB 1000 milliGRAM(s)  cefepime   IVPB    clindamycin Solution 15 mG/mL 450 milliGRAM(s)  oseltamivir Suspension 30 milliGRAM(s)  vancomycin  IVPB 500 milliGRAM(s)            LAB/STUDIES:  Labs:  CAPILLARY BLOOD GLUCOSE      POCT Blood Glucose.: 107 mg/dL (02 Jun 2023 07:03)  POCT Blood Glucose.: 113 mg/dL (01 Jun 2023 17:33)  POCT Blood Glucose.: 131 mg/dL (01 Jun 2023 11:57)                          7.8    21.97 )-----------( 249      ( 02 Jun 2023 05:00 )             25.0       Auto Neutrophil %: 80.3 % (06-02-23 @ 05:00)  Auto Immature Granulocyte %: 3.2 % (06-02-23 @ 05:00)  Auto Neutrophil %: 80.7 % (06-01-23 @ 21:55)  Auto Immature Granulocyte %: 3.3 % (06-01-23 @ 21:55)    06-02    150<H>  |  115<H>  |  73<HH>  ----------------------------<  109<H>  4.0   |  23  |  2.6<H>      Calcium, Total Serum: 8.1 mg/dL (06-02-23 @ 05:00)      LFTs:             6.2  | 0.4  | 20       ------------------[68      ( 02 Jun 2023 05:00 )  2.3  | x    | 10          Lipase:x      Amylase:x         Blood Gas Arterial, Lactate: 0.80 mmol/L (06-02-23 @ 03:08)  Blood Gas Arterial, Lactate: 0.90 mmol/L (06-01-23 @ 03:30)  Blood Gas Arterial, Lactate: 1.00 mmol/L (05-31-23 @ 03:36)  Blood Gas Arterial, Lactate: 0.90 mmol/L (05-30-23 @ 16:28)    ABG - ( 02 Jun 2023 03:08 )  pH: 7.46  /  pCO2: 34    /  pO2: 102   / HCO3: 24    / Base Excess: 0.5   /  SaO2: 98.3            ABG - ( 01 Jun 2023 03:30 )  pH: 7.41  /  pCO2: 40    /  pO2: 64    / HCO3: 25    / Base Excess: 0.7   /  SaO2: 94.1            ABG - ( 31 May 2023 03:36 )  pH: 7.31  /  pCO2: 47    /  pO2: 93    / HCO3: 24    / Base Excess: -2.6  /  SaO2: 96.8              Coags:     14.80  ----< 1.29    ( 01 Jun 2023 16:49 )     29.3        CARDIAC MARKERS ( 02 Jun 2023 05:00 )  x     / x     / 83 U/L / x     / x      CARDIAC MARKERS ( 01 Jun 2023 04:59 )  x     / x     / 31 U/L / x     / x                  Culture - Sputum (collected 01 Jun 2023 02:15)  Source: ET Tube ET Tube  Gram Stain (01 Jun 2023 17:36):    Moderate polymorphonuclear leukocytes per low power field    No Squamous epithelial cells per low power field    Rare Gram positive cocci in pairs per oil power field    Culture - Blood (collected 31 May 2023 04:48)  Source: .Blood None  Preliminary Report (01 Jun 2023 16:01):    No growth to date.        ASSESSMENT:  Patient is a 42F w/ pmh of asthma and htn admitted 5/24 with ahrh secondary to MRSA PNA still intubated and spiking fevers. CT chest 5/27 revealing b/l cavitary lesions w/ additional left consolidation and effusion. Bedside ultrasound revealing septated left effusion, repeat CT chest pending.    PLAN:  - Pending further plan...    Discussed with Dr. Caio Salamanca  06-01-23 @ 11:01    [id number]

## 2023-06-02 NOTE — PROGRESS NOTE ADULT - ASSESSMENT
42 year old female with PMHx Asthma, HTN presents with cough and SOB x3 days and hemoptysis. CT chest showing cavitary pneumonia. Cultures +MRSA bacteremia. Intubated; failed SBT. On Zyvox and Tamiflu. Repeat CT showing worsening PNA.    # Acute hypoxemic respiratory failure 2/2 cavitary MRSA PNA  # MRSA bacteremia, repeat cultures NGTD x3  # +Influenza B  # hx Asthma   - CT Angio Chest PE Protocol: Bilateral patchy groundglass nodularity with dominant confluent left lower lobe opacification with numerous cavitary lesions. Additional contralateral right lower lobe cavitary 1 cm nodule. Findings compatible with cavitary pneumonia in the appropriate clinical setting; differential diagnosis includes tuberculosis.  - Strep/Legionella negative, HIV negative   - intubated 5/23, self-extubated 5/25, re-intubated 5/25  - s/p Mupirocin BID x5 days  - blood cultures +MRSA  - blood cultures 5/28-30: NGTD x3  - ECHO: no vegetations. EF 65%, mild TR and pulm HTN  - repeat CT chest: Diffusely increased number and size of bilateral cavitary lesions with the left lower lobe now demonstrating a consolidative process of the entirelobe.  - D-dimer 1665, LE duplex negative for DVT  - pro-mya 48 > 5.8  - s/p MASTER 5/31: No evidence of valvular vegetations or intracardiac abscesses to support IE. Pulmonary hepatisation incidental finding.   - ABx per ID: c/w Clindamycin 450 q6, Vancomycin 500 qd, Cefepime 1g q24, Tamiflu 30mg q12 (day 10/10)  - start Acyclovir 125 mg q24 for suspected Shingles (vesicles on LUE)  - f/u PCR of skin scraping   - f/u ET cultures  - repeat CT chest: Small left pleural effusion with overall essentially unchanged exam.  - f/u CT surg    # LANCE   - Nephro following; post-infectious GN vs. ATN vs. pulmonary renal syndrome  - Cr stable around 3 > 2.6  - renal U/S: negative   - CK trending down 1483>39>41  - UA: +proteins and blood, Pr/Cr 1.3 (H)  - CHARLES+ 1:640, hep/HIV negative  - c/w PO bicarb 650 mg q8  - no acute indication for RRT    # Anemia  - trend CBC, active type and screen, transfuse <7  - DIC panel negative   - Dimer 1665, LE Duplex negative for DVT  - hold heparin drip. SCDs  - s/p 3u pRBCs; Hgb 7.8  - PTT wnl  - gastric lavage negative   - mild amount of blood w/ suctioning  - pt is menstruating   - f/u CTAP to r/o retroperitoneal bleed    #DVT ppx: SCDs  #GI ppx: Pantoprazole 40mg qd  #Diet: NPO w/ tube feed w/ Reglan  #Activity: bedrest   #Dispo: MICU

## 2023-06-02 NOTE — PROGRESS NOTE ADULT - SUBJECTIVE AND OBJECTIVE BOX
Nephrology progress note    THIS IS AN INCOMPLETE NOTE . FULL NOTE TO FOLLOW SHORTLY    Patient is seen and examined, events over the last 24 h noted .    Allergies:  aspirin (Short breath; Anaphylaxis)    Hospital Medications:   MEDICATIONS  (STANDING):  acyclovir IVPB 125 milliGRAM(s) IV Intermittent every 24 hours  albuterol    90 MICROgram(s) HFA Inhaler 4 Puff(s) Inhalation every 6 hours  cefepime   IVPB 1000 milliGRAM(s) IV Intermittent every 24 hours  cefepime   IVPB      chlorhexidine 0.12% Liquid 15 milliLiter(s) Oral Mucosa every 12 hours  chlorhexidine 2% Cloths 1 Application(s) Topical daily  chlorhexidine 2% Cloths 1 Application(s) Topical <User Schedule>  cisatracurium Infusion 3 MICROgram(s)/kG/Min (15.3 mL/Hr) IV Continuous <Continuous>  clindamycin Solution 15 mG/mL 450 milliGRAM(s) Oral every 6 hours  dexMEDEtomidine Infusion 0.2 MICROgram(s)/kG/Hr (4.25 mL/Hr) IV Continuous <Continuous>  fentaNYL   Infusion. 0.555 MICROgram(s)/kG/Hr (4.72 mL/Hr) IV Continuous <Continuous>  insulin lispro (ADMELOG) corrective regimen sliding scale   SubCutaneous three times a day before meals  ipratropium 17 MICROgram(s) HFA Inhaler 4 Puff(s) Inhalation every 6 hours  ketamine Infusion. 0.25 mG/kG/Hr (2.13 mL/Hr) IV Continuous <Continuous>  metoclopramide Injectable 4 milliGRAM(s) IV Push every 6 hours  midazolam Infusion 0.02 mG/kG/Hr (1.7 mL/Hr) IV Continuous <Continuous>  norepinephrine Infusion 0.05 MICROgram(s)/kG/Min (7.97 mL/Hr) IV Continuous <Continuous>  oseltamivir Suspension 30 milliGRAM(s) Oral every 12 hours  pantoprazole   Suspension 40 milliGRAM(s) Oral daily  polyethylene glycol 3350 17 Gram(s) Oral every 12 hours  propofol Infusion 11.098 MICROgram(s)/kG/Min (5.66 mL/Hr) IV Continuous <Continuous>  senna 2 Tablet(s) Oral at bedtime  sodium bicarbonate 650 milliGRAM(s) Oral every 8 hours  vancomycin  IVPB 500 milliGRAM(s) IV Intermittent every 24 hours  vitamin A &amp; D Ointment 1 Application(s) Topical daily        VITALS:  T(F): 100.8 (06-02-23 @ 08:00), Max: 101.8 (06-01-23 @ 18:30)  HR: 105 (06-02-23 @ 08:00)  BP: 153/78 (06-02-23 @ 08:00)  RR: 23 (06-02-23 @ 08:00)  SpO2: 97% (06-02-23 @ 08:00)  Wt(kg): --    05-31 @ 07:01  -  06-01 @ 07:00  --------------------------------------------------------  IN: 2658.9 mL / OUT: 2565 mL / NET: 93.9 mL    06-01 @ 07:01  -  06-02 @ 07:00  --------------------------------------------------------  IN: 2517.9 mL / OUT: 2785 mL / NET: -267.1 mL    06-02 @ 07:01  -  06-02 @ 09:01  --------------------------------------------------------  IN: 91.4 mL / OUT: 225 mL / NET: -133.6 mL          PHYSICAL EXAM:  Constitutional: NAD  HEENT: anicteric sclera, oropharynx clear, MMM  Neck: No JVD  Respiratory: CTAB, no wheezes, rales or rhonchi  Cardiovascular: S1, S2, RRR  Gastrointestinal: BS+, soft, NT/ND  Extremities: No cyanosis or clubbing. No peripheral edema  :  No diggs.   Skin: No rashes    LABS:  06-02    150<H>  |  115<H>  |  73<HH>  ----------------------------<  109<H>  4.0   |  23  |  2.6<H>    Ca    8.1<L>      02 Jun 2023 05:00  Mg     1.9     06-02    TPro  6.2  /  Alb  2.3<L>  /  TBili  0.4  /  DBili      /  AST  20  /  ALT  10  /  AlkPhos  68  06-02                          7.8    21.97 )-----------( 249      ( 02 Jun 2023 05:00 )             25.0       Urine Studies:        Iron 13, TIBC 174, %sat 7      [05-28-23 @ 12:10]  Ferritin 136      [05-28-23 @ 12:10]  Lipid: chol --, , HDL --, LDL --      [06-01-23 @ 04:59]    HBsAg Nonreact      [05-26-23 @ 11:00]  HCV 0.16, Nonreact      [05-26-23 @ 11:00]  HIV Nonreact      [05-27-23 @ 05:47]    CHARLES: titer 1:640, pattern Centromere      [05-24-23 @ 11:48]  dsDNA <12      [05-24-23 @ 11:48]  C3 Complement 80      [05-24-23 @ 11:48]  C4 Complement 34      [05-24-23 @ 11:48]  ANCA: cANCA Negative, pANCA Negative, atypical ANCA Negative      [05-24-23 @ 11:48]  anti-GBM <0.2      [05-24-23 @ 11:48]  Free Light Chains: kappa 16.56, lambda 7.37, ratio = 2.25      [05-24 @ 11:48]      RADIOLOGY & ADDITIONAL STUDIES:   Nephrology progress note    Patient is seen and examined, events over the last 24 h noted .  Lying in bed   intubated on MV     Allergies:  aspirin (Short breath; Anaphylaxis)    Hospital Medications:   MEDICATIONS  (STANDING):  acyclovir IVPB 125 milliGRAM(s) IV Intermittent every 24 hours  albuterol    90 MICROgram(s) HFA Inhaler 4 Puff(s) Inhalation every 6 hours  cefepime   IVPB 1000 milliGRAM(s) IV Intermittent every 24 hours  cisatracurium Infusion 3 MICROgram(s)/kG/Min (15.3 mL/Hr) IV Continuous <Continuous>  clindamycin Solution 15 mG/mL 450 milliGRAM(s) Oral every 6 hours  dexMEDEtomidine Infusion 0.2 MICROgram(s)/kG/Hr (4.25 mL/Hr) IV Continuous <Continuous>  fentaNYL   Infusion. 0.555 MICROgram(s)/kG/Hr (4.72 mL/Hr) IV Continuous <Continuous>  insulin lispro (ADMELOG) corrective regimen sliding scale   SubCutaneous three times a day before meals  ipratropium 17 MICROgram(s) HFA Inhaler 4 Puff(s) Inhalation every 6 hours  ketamine Infusion. 0.25 mG/kG/Hr (2.13 mL/Hr) IV Continuous <Continuous>  metoclopramide Injectable 4 milliGRAM(s) IV Push every 6 hours  midazolam Infusion 0.02 mG/kG/Hr (1.7 mL/Hr) IV Continuous <Continuous>  norepinephrine Infusion 0.05 MICROgram(s)/kG/Min (7.97 mL/Hr) IV Continuous <Continuous>  oseltamivir Suspension 30 milliGRAM(s) Oral every 12 hours  pantoprazole   Suspension 40 milliGRAM(s) Oral daily  polyethylene glycol 3350 17 Gram(s) Oral every 12 hours  propofol Infusion 11.098 MICROgram(s)/kG/Min (5.66 mL/Hr) IV Continuous <Continuous>  senna 2 Tablet(s) Oral at bedtime  sodium bicarbonate 650 milliGRAM(s) Oral every 8 hours  vancomycin  IVPB 500 milliGRAM(s) IV Intermittent every 24 hours  vitamin A &amp; D Ointment 1 Application(s) Topical daily        VITALS:  T(F): 100.8 (06-02-23 @ 08:00), Max: 101.8 (06-01-23 @ 18:30)  HR: 105 (06-02-23 @ 08:00)  BP: 153/78 (06-02-23 @ 08:00)  RR: 23 (06-02-23 @ 08:00)  SpO2: 97% (06-02-23 @ 08:00)      05-31 @ 07:01  -  06-01 @ 07:00  --------------------------------------------------------  IN: 2658.9 mL / OUT: 2565 mL / NET: 93.9 mL    06-01 @ 07:01  -  06-02 @ 07:00  --------------------------------------------------------  IN: 2517.9 mL / OUT: 2785 mL / NET: -267.1 mL    06-02 @ 07:01  -  06-02 @ 09:01  --------------------------------------------------------  IN: 91.4 mL / OUT: 225 mL / NET: -133.6 mL          PHYSICAL EXAM:  Constitutional: intubated on MV   Respiratory: CTAB,   Cardiovascular: S1, S2, RRR  Gastrointestinal: BS+, soft, NT/ND  Extremities: No cyanosis or clubbing. No peripheral edema  :  No diggs.   Skin: No rashes    LABS:  06-02    150<H>  |  115<H>  |  73<HH>  ----------------------------<  109<H>  4.0   |  23  |  2.6<H>    Creatinine Trend: 2.6<--, 3.0<--, 3.0<--, 2.8<--, 2.9<--, 3.0<--  Ca    8.1<L>      02 Jun 2023 05:00  Mg     1.9     06-02    TPro  6.2  /  Alb  2.3<L>  /  TBili  0.4  /  DBili      /  AST  20  /  ALT  10  /  AlkPhos  68  06-02                          7.8    21.97 )-----------( 249      ( 02 Jun 2023 05:00 )             25.0       Vancomycin Level, Trough: 9.1: Vancomycin trough levels should be rapidly reached and maintained at  15-20 ug/ml for life threatening MRSA  infections such as sepsis, endocarditis, osteomyelitis and pneumonia. A  first trough level should be drawn  before the 3rd or 4th dose.  Risk of renal toxicity is increased for levels >15 ug/ml, in patients on  other nephrotoxic drugs, who are  hemodynamically unstable, have unstable renal function, or are on  Vancomycin therapy for >14 days. Renal function with  creatinine levels should be monitored for those patients. ug/mL (06.02.23 @ 05:00)        Urine Studies:        Iron 13, TIBC 174, %sat 7      [05-28-23 @ 12:10]  Ferritin 136      [05-28-23 @ 12:10]  Lipid: chol --, , HDL --, LDL --      [06-01-23 @ 04:59]    HBsAg Nonreact      [05-26-23 @ 11:00]  HCV 0.16, Nonreact      [05-26-23 @ 11:00]  HIV Nonreact      [05-27-23 @ 05:47]    CHARLES: titer 1:640, pattern Centromere      [05-24-23 @ 11:48]  dsDNA <12      [05-24-23 @ 11:48]  C3 Complement 80      [05-24-23 @ 11:48]  C4 Complement 34      [05-24-23 @ 11:48]  ANCA: cANCA Negative, pANCA Negative, atypical ANCA Negative      [05-24-23 @ 11:48]  anti-GBM <0.2      [05-24-23 @ 11:48]  Free Light Chains: kappa 16.56, lambda 7.37, ratio = 2.25      [05-24 @ 11:48]      RADIOLOGY & ADDITIONAL STUDIES:

## 2023-06-02 NOTE — PROGRESS NOTE ADULT - SUBJECTIVE AND OBJECTIVE BOX
Over Night Events: events noted, remain critically ill, still intubated, ventilated, ID/ CT sx noted. sp blood tx, SP CT Chest/ A/P    PHYSICAL EXAM    ICU Vital Signs Last 24 Hrs  T(C): 37.9 (02 Jun 2023 00:00), Max: 38.8 (01 Jun 2023 18:30)  T(F): 100.3 (02 Jun 2023 00:00), Max: 101.8 (01 Jun 2023 18:30)  HR: 97 (02 Jun 2023 04:20) (91 - 114)  BP: 132/76 (02 Jun 2023 03:00) (114/57 - 203/94)  BP(mean): 98 (02 Jun 2023 03:00) (78 - 135)  RR: 16 (02 Jun 2023 03:00) (16 - 25)  SpO2: 97% (02 Jun 2023 04:20) (95% - 100%)    O2 Parameters below as of 02 Jun 2023 00:00  Patient On (Oxygen Delivery Method): ventilator    O2 Concentration (%): 40        General: ill looking  HEENT: ETT  Lungs: dec bs l base  Cardiovascular: Regular   Abdomen: Soft, Positive BS  Extremities: No clubbing   Sedated      05-31-23 @ 07:01  -  06-01-23 @ 07:00  --------------------------------------------------------  IN:    Dexmedetomidine: 765.6 mL    FentaNYL: 1020 mL    IV PiggyBack: 150 mL    Ketamine: 560 mL    Norepinephrine: 93.3 mL    Peptamen A.F.: 70 mL  Total IN: 2658.9 mL    OUT:    Indwelling Catheter - Urethral (mL): 2565 mL    Vital High Protein: 0 mL  Total OUT: 2565 mL    Total NET: 93.9 mL      06-01-23 @ 07:01  -  06-02-23 @ 06:23  --------------------------------------------------------  IN:    Dexmedetomidine: 638 mL    FentaNYL: 42.5 mL    FentaNYL: 799 mL    Ketamine: 380.8 mL    Peptamen A.F.: 10 mL    PRBCs (Packed Red Blood Cells): 300 mL  Total IN: 2170.3 mL    OUT:    Indwelling Catheter - Urethral (mL): 2060 mL  Total OUT: 2060 mL    Total NET: 110.3 mL          LABS:                          7.8    21.97 )-----------( 249      ( 02 Jun 2023 05:00 )             25.0                                               06-02    150<H>  |  115<H>  |  73<HH>  ----------------------------<  109<H>  4.0   |  23  |  2.6<H>    Ca    8.1<L>      02 Jun 2023 05:00  Mg     1.9     06-02    TPro  6.2  /  Alb  2.3<L>  /  TBili  0.4  /  DBili  x   /  AST  20  /  ALT  10  /  AlkPhos  68  06-02      PT/INR - ( 01 Jun 2023 16:49 )   PT: 14.80 sec;   INR: 1.29 ratio         PTT - ( 01 Jun 2023 16:49 )  PTT:29.3 sec                                           CARDIAC MARKERS ( 02 Jun 2023 05:00 )  x     / x     / 83 U/L / x     / x      CARDIAC MARKERS ( 01 Jun 2023 04:59 )  x     / x     / 31 U/L / x     / x                                                LIVER FUNCTIONS - ( 02 Jun 2023 05:00 )  Alb: 2.3 g/dL / Pro: 6.2 g/dL / ALK PHOS: 68 U/L / ALT: 10 U/L / AST: 20 U/L / GGT: x                                                  Culture - Sputum (collected 01 Jun 2023 02:15)  Source: ET Tube ET Tube  Gram Stain (01 Jun 2023 17:36):    Moderate polymorphonuclear leukocytes per low power field    No Squamous epithelial cells per low power field    Rare Gram positive cocci in pairs per oil power field    Culture - Blood (collected 31 May 2023 04:48)  Source: .Blood None  Preliminary Report (01 Jun 2023 16:01):    No growth to date.                                                   Mode: AC/ CMV (Assist Control/ Continuous Mandatory Ventilation)  RR (machine): 22  FiO2: 40  PEEP: 10  ITime: 0.9  MAP: 18  PC: 18  PIP: 28                                      ABG - ( 02 Jun 2023 03:08 )  pH, Arterial: 7.46  pH, Blood: x     /  pCO2: 34    /  pO2: 102   / HCO3: 24    / Base Excess: 0.5   /  SaO2: 98.3                MEDICATIONS  (STANDING):  acyclovir IVPB 125 milliGRAM(s) IV Intermittent every 24 hours  albuterol    90 MICROgram(s) HFA Inhaler 4 Puff(s) Inhalation every 6 hours  cefepime   IVPB 1000 milliGRAM(s) IV Intermittent every 24 hours  cefepime   IVPB      chlorhexidine 0.12% Liquid 15 milliLiter(s) Oral Mucosa every 12 hours  chlorhexidine 2% Cloths 1 Application(s) Topical daily  chlorhexidine 2% Cloths 1 Application(s) Topical <User Schedule>  clindamycin Solution 15 mG/mL 450 milliGRAM(s) Oral every 6 hours  dexMEDEtomidine Infusion 0.2 MICROgram(s)/kG/Hr (4.25 mL/Hr) IV Continuous <Continuous>  fentaNYL   Infusion. 0.555 MICROgram(s)/kG/Hr (4.72 mL/Hr) IV Continuous <Continuous>  insulin lispro (ADMELOG) corrective regimen sliding scale   SubCutaneous three times a day before meals  ipratropium 17 MICROgram(s) HFA Inhaler 4 Puff(s) Inhalation every 6 hours  ketamine Infusion. 0.25 mG/kG/Hr (2.13 mL/Hr) IV Continuous <Continuous>  metoclopramide Injectable 4 milliGRAM(s) IV Push every 6 hours  norepinephrine Infusion 0.05 MICROgram(s)/kG/Min (7.97 mL/Hr) IV Continuous <Continuous>  oseltamivir Suspension 30 milliGRAM(s) Oral every 12 hours  pantoprazole   Suspension 40 milliGRAM(s) Oral daily  polyethylene glycol 3350 17 Gram(s) Oral every 12 hours  propofol Infusion 11.098 MICROgram(s)/kG/Min (5.66 mL/Hr) IV Continuous <Continuous>  senna 2 Tablet(s) Oral at bedtime  sodium bicarbonate 650 milliGRAM(s) Oral every 8 hours  vancomycin  IVPB 500 milliGRAM(s) IV Intermittent every 24 hours  vitamin A &amp; D Ointment 1 Application(s) Topical daily    MEDICATIONS  (PRN):  acetaminophen     Tablet .. 650 milliGRAM(s) Oral every 6 hours PRN Temp greater or equal to 38C (100.4F)  acetaminophen   IVPB .. 1000 milliGRAM(s) IV Intermittent once PRN Temp greater or equal to 38.5C (101.3F)  albuterol    90 MICROgram(s) HFA Inhaler 4 Puff(s) Inhalation every 4 hours PRN Shortness of Breath and/or Wheezing  ipratropium 17 MICROgram(s) HFA Inhaler 4 Puff(s) Inhalation <User Schedule> PRN -  midazolam Injectable 2 milliGRAM(s) IV Push every 4 hours PRN Agitation      cxr reviewed     Over Night Events: events noted, remain critically ill, still intubated, ventilated, ID/ CT sx noted. sp blood tx, SP CT Chest/ A/P, fentanyl, ketamine, precedex, fentanyl, sp PRBC    PHYSICAL EXAM    ICU Vital Signs Last 24 Hrs  T(C): 37.9 (02 Jun 2023 00:00), Max: 38.8 (01 Jun 2023 18:30)  T(F): 100.3 (02 Jun 2023 00:00), Max: 101.8 (01 Jun 2023 18:30)  HR: 97 (02 Jun 2023 04:20) (91 - 114)  BP: 132/76 (02 Jun 2023 03:00) (114/57 - 203/94)  BP(mean): 98 (02 Jun 2023 03:00) (78 - 135)  RR: 16 (02 Jun 2023 03:00) (16 - 25)  SpO2: 97% (02 Jun 2023 04:20) (95% - 100%)    O2 Parameters below as of 02 Jun 2023 00:00  Patient On (Oxygen Delivery Method): ventilator    O2 Concentration (%): 40        General: ill looking  HEENT: ETT  Lungs: dec bs l base  Cardiovascular: Regular   Abdomen: Soft, Positive BS  Extremities: No clubbing   Sedated  LUE rash      05-31-23 @ 07:01  -  06-01-23 @ 07:00  --------------------------------------------------------  IN:    Dexmedetomidine: 765.6 mL    FentaNYL: 1020 mL    IV PiggyBack: 150 mL    Ketamine: 560 mL    Norepinephrine: 93.3 mL    Peptamen A.F.: 70 mL  Total IN: 2658.9 mL    OUT:    Indwelling Catheter - Urethral (mL): 2565 mL    Vital High Protein: 0 mL  Total OUT: 2565 mL    Total NET: 93.9 mL      06-01-23 @ 07:01  -  06-02-23 @ 06:23  --------------------------------------------------------  IN:    Dexmedetomidine: 638 mL    FentaNYL: 42.5 mL    FentaNYL: 799 mL    Ketamine: 380.8 mL    Peptamen A.F.: 10 mL    PRBCs (Packed Red Blood Cells): 300 mL  Total IN: 2170.3 mL    OUT:    Indwelling Catheter - Urethral (mL): 2060 mL  Total OUT: 2060 mL    Total NET: 110.3 mL          LABS:                          7.8    21.97 )-----------( 249      ( 02 Jun 2023 05:00 )             25.0                                               06-02    150<H>  |  115<H>  |  73<HH>  ----------------------------<  109<H>  4.0   |  23  |  2.6<H>    Ca    8.1<L>      02 Jun 2023 05:00  Mg     1.9     06-02    TPro  6.2  /  Alb  2.3<L>  /  TBili  0.4  /  DBili  x   /  AST  20  /  ALT  10  /  AlkPhos  68  06-02      PT/INR - ( 01 Jun 2023 16:49 )   PT: 14.80 sec;   INR: 1.29 ratio         PTT - ( 01 Jun 2023 16:49 )  PTT:29.3 sec                                           CARDIAC MARKERS ( 02 Jun 2023 05:00 )  x     / x     / 83 U/L / x     / x      CARDIAC MARKERS ( 01 Jun 2023 04:59 )  x     / x     / 31 U/L / x     / x                                                LIVER FUNCTIONS - ( 02 Jun 2023 05:00 )  Alb: 2.3 g/dL / Pro: 6.2 g/dL / ALK PHOS: 68 U/L / ALT: 10 U/L / AST: 20 U/L / GGT: x                                                  Culture - Sputum (collected 01 Jun 2023 02:15)  Source: ET Tube ET Tube  Gram Stain (01 Jun 2023 17:36):    Moderate polymorphonuclear leukocytes per low power field    No Squamous epithelial cells per low power field    Rare Gram positive cocci in pairs per oil power field    Culture - Blood (collected 31 May 2023 04:48)  Source: .Blood None  Preliminary Report (01 Jun 2023 16:01):    No growth to date.                                                   Mode: AC/ CMV (Assist Control/ Continuous Mandatory Ventilation)  RR (machine): 22  FiO2: 40  PEEP: 10  ITime: 0.9  MAP: 18  PC: 18  PIP: 28                                      ABG - ( 02 Jun 2023 03:08 )  pH, Arterial: 7.46  pH, Blood: x     /  pCO2: 34    /  pO2: 102   / HCO3: 24    / Base Excess: 0.5   /  SaO2: 98.3                MEDICATIONS  (STANDING):  acyclovir IVPB 125 milliGRAM(s) IV Intermittent every 24 hours  albuterol    90 MICROgram(s) HFA Inhaler 4 Puff(s) Inhalation every 6 hours  cefepime   IVPB 1000 milliGRAM(s) IV Intermittent every 24 hours  cefepime   IVPB      chlorhexidine 0.12% Liquid 15 milliLiter(s) Oral Mucosa every 12 hours  chlorhexidine 2% Cloths 1 Application(s) Topical daily  chlorhexidine 2% Cloths 1 Application(s) Topical <User Schedule>  clindamycin Solution 15 mG/mL 450 milliGRAM(s) Oral every 6 hours  dexMEDEtomidine Infusion 0.2 MICROgram(s)/kG/Hr (4.25 mL/Hr) IV Continuous <Continuous>  fentaNYL   Infusion. 0.555 MICROgram(s)/kG/Hr (4.72 mL/Hr) IV Continuous <Continuous>  insulin lispro (ADMELOG) corrective regimen sliding scale   SubCutaneous three times a day before meals  ipratropium 17 MICROgram(s) HFA Inhaler 4 Puff(s) Inhalation every 6 hours  ketamine Infusion. 0.25 mG/kG/Hr (2.13 mL/Hr) IV Continuous <Continuous>  metoclopramide Injectable 4 milliGRAM(s) IV Push every 6 hours  norepinephrine Infusion 0.05 MICROgram(s)/kG/Min (7.97 mL/Hr) IV Continuous <Continuous>  oseltamivir Suspension 30 milliGRAM(s) Oral every 12 hours  pantoprazole   Suspension 40 milliGRAM(s) Oral daily  polyethylene glycol 3350 17 Gram(s) Oral every 12 hours  propofol Infusion 11.098 MICROgram(s)/kG/Min (5.66 mL/Hr) IV Continuous <Continuous>  senna 2 Tablet(s) Oral at bedtime  sodium bicarbonate 650 milliGRAM(s) Oral every 8 hours  vancomycin  IVPB 500 milliGRAM(s) IV Intermittent every 24 hours  vitamin A &amp; D Ointment 1 Application(s) Topical daily    MEDICATIONS  (PRN):  acetaminophen     Tablet .. 650 milliGRAM(s) Oral every 6 hours PRN Temp greater or equal to 38C (100.4F)  acetaminophen   IVPB .. 1000 milliGRAM(s) IV Intermittent once PRN Temp greater or equal to 38.5C (101.3F)  albuterol    90 MICROgram(s) HFA Inhaler 4 Puff(s) Inhalation every 4 hours PRN Shortness of Breath and/or Wheezing  ipratropium 17 MICROgram(s) HFA Inhaler 4 Puff(s) Inhalation <User Schedule> PRN -  midazolam Injectable 2 milliGRAM(s) IV Push every 4 hours PRN Agitation      cxr reviewed

## 2023-06-02 NOTE — PHARMACOTHERAPY INTERVENTION NOTE - COMMENTS
Patient on vancomycin 500mg IV q24h for MRSA bacteremia and pneumonia per Dr. Chance. Vancomycin level on 6/1 at 2155 was 11.9 mg/L, and vancomycin level on 6/2 at 0500 was 9.1 mg/L. Per [employer] Bayesian vancomycin dosing software, current regimen of vancomycin 500mg IV q24h predicts a subtherapeutic steady-state AUC/RUSSELL of 218.13 mg/L*hr (goal 400-600). An increased regimen of vancomycin 1000mg IV q24h predicts a therapeutic steady-state AUC/RUSSELL of 436.27 mg/L*hr. Recommended increasing vancomycin dose to 1000mg IV q24h, and obtaining a repeat vancomycin level on 6/4 with AM labs, prior to third dose of new regimen (does not need to be a trough because [employer] will adjust for levels drawn early).

## 2023-06-02 NOTE — PROGRESS NOTE ADULT - SUBJECTIVE AND OBJECTIVE BOX
Patient is a 42y old  Female who presents with a chief complaint of AHRF (2023 09:00)    HPI:  42 year-old female with PMH of Asthma, HTN? presents with complaint of cough x3 days and SOB. During my encounter pt with dyspnea and increased work of breathing and chest pain, unable to properly provide accurate history. She states that her cough has been progressively worsening over the past 3 days, endorses hemoptysis since yesterday. She states she had a friend who came over and stayed with for more than a week who was sick recently, but she does not know if she recently travelled out of [facility] or not. She also admits that her children has been sick with Sore throat and fever for past 3 days. She admits to fever, chills, sever Chest pain which has gotten worse since she came to the ED. She took 1x Tamiflu yesterday. She otherwise denies diarrhea, constipation, urinary symptoms. She is not vaccinated for Influenza or COVID-19.     In ED  /87, , RR 28, T 100.8 F, 98% on 15 L NRB  Labs significant for wbc 16.7K with Neutrophilic Predominance and L shift, Hgb 11.4, MCV 61.6, Cr 2.0, BUN 23, Alk Phos 125/AST 62/ALT 27, Lac 4.9> 3.9  RVP + Influenza B      CT Angio Chest PE Protocol w/ IV Cont   1. Bilateral patchy groundglass nodularity with dominant confluent left lower lobe opacification with numerous cavitary lesions. Additional contralateral right lower lobe cavitary 1 cm nodule. Findings compatible with cavitary pneumonia in the appropriate clinical setting; differential diagnosis includes tuberculosis.  2. Confluent ill-defined left hilar and mediastinal adenopathy, likely reactive, without dominant lymph node delineated.  3. No evidence of pulmonary embolism.    s/p 1x Rocephin, Meropenem, 3 L LR in ED    Pt admitted to SDU for further managements, during my encounter pt is very agitated, in acute distress. Pt received appropriate pain control with IV Morphine, s/p Xanax 1 mg 1x for agitation and anxiety, despite pain control and adequate fluid resuscitation pt remained tachycardic, tachypneic. STAT EKG reviewed with Cardiology team on call After discussion with Cardiology team, Pt received 1x Adenosine 6 mg IV push with no change. Case discussed with Critical Care team, decision was made for Intubation and Pt upgraded to MICU.   (24 May 2023 00:58)       INTERVAL HPI/OVERNIGHT EVENTS:   ON +fever, agitation, tachycardia, hypertension  given Versed and Propofol pushes  started on Versed drip  Decreased Ketamine drip  AM started Nimbex     Subjective:    ICU Vital Signs Last 24 Hrs  T(C): 38.2 (2023 08:00), Max: 38.8 (2023 18:30)  T(F): 100.8 (2023 08:00), Max: 101.8 (2023 18:30)  HR: 105 (2023 08:00) (91 - 115)  BP: 153/78 (2023 08:00) (114/57 - 217/101)  BP(mean): 141 (2023 08:00) (78 - 145)  ABP: --  ABP(mean): --  RR: 23 (2023 08:00) (16 - 25)  SpO2: 97% (2023 08:00) (95% - 100%)    O2 Parameters below as of 2023 08:00  Patient On (Oxygen Delivery Method): ventilator    O2 Concentration (%): 40      I&O's Summary    2023 07:01  -  2023 07:00  --------------------------------------------------------  IN: 2517.9 mL / OUT: 2785 mL / NET: -267.1 mL    2023 07:01  -  2023 09:13  --------------------------------------------------------  IN: 91.4 mL / OUT: 225 mL / NET: -133.6 mL      Mode: AC/ CMV (Assist Control/ Continuous Mandatory Ventilation)  RR (machine): 22  TV (machine): 380  FiO2: 40  PEEP: 10  ITime: 0.8  MAP: 17  PC: 18  PIP: 30      Daily     Daily Weight in k (2023 06:30)    Adult Advanced Hemodynamics Last 24 Hrs  CVP(mm Hg): --  CVP(cm H2O): --  CO: --  CI: --  PA: --  PA(mean): --  PCWP: --  SVR: --  SVRI: --  PVR: --  PVRI: --    EKG/Telemetry Events:    MEDICATIONS  (STANDING):  acyclovir IVPB 125 milliGRAM(s) IV Intermittent every 24 hours  albuterol    90 MICROgram(s) HFA Inhaler 4 Puff(s) Inhalation every 6 hours  cefepime   IVPB 1000 milliGRAM(s) IV Intermittent every 24 hours  cefepime   IVPB      chlorhexidine 0.12% Liquid 15 milliLiter(s) Oral Mucosa every 12 hours  chlorhexidine 2% Cloths 1 Application(s) Topical <User Schedule>  chlorhexidine 2% Cloths 1 Application(s) Topical daily  cisatracurium Infusion 3 MICROgram(s)/kG/Min (15.3 mL/Hr) IV Continuous <Continuous>  clindamycin Solution 15 mG/mL 450 milliGRAM(s) Oral every 6 hours  dexMEDEtomidine Infusion 0.2 MICROgram(s)/kG/Hr (4.25 mL/Hr) IV Continuous <Continuous>  fentaNYL   Infusion. 0.555 MICROgram(s)/kG/Hr (4.72 mL/Hr) IV Continuous <Continuous>  insulin lispro (ADMELOG) corrective regimen sliding scale   SubCutaneous three times a day before meals  ipratropium 17 MICROgram(s) HFA Inhaler 4 Puff(s) Inhalation every 6 hours  ketamine Infusion. 0.25 mG/kG/Hr (2.13 mL/Hr) IV Continuous <Continuous>  metoclopramide Injectable 4 milliGRAM(s) IV Push every 6 hours  midazolam Infusion 0.02 mG/kG/Hr (1.7 mL/Hr) IV Continuous <Continuous>  norepinephrine Infusion 0.05 MICROgram(s)/kG/Min (7.97 mL/Hr) IV Continuous <Continuous>  oseltamivir Suspension 30 milliGRAM(s) Oral every 12 hours  pantoprazole   Suspension 40 milliGRAM(s) Oral daily  polyethylene glycol 3350 17 Gram(s) Oral every 12 hours  propofol Infusion 11.098 MICROgram(s)/kG/Min (5.66 mL/Hr) IV Continuous <Continuous>  senna 2 Tablet(s) Oral at bedtime  sodium bicarbonate 650 milliGRAM(s) Oral every 8 hours  vancomycin  IVPB 500 milliGRAM(s) IV Intermittent every 24 hours  vitamin A &amp; D Ointment 1 Application(s) Topical daily    MEDICATIONS  (PRN):  acetaminophen     Tablet .. 650 milliGRAM(s) Oral every 6 hours PRN Temp greater or equal to 38C (100.4F)  albuterol    90 MICROgram(s) HFA Inhaler 4 Puff(s) Inhalation every 4 hours PRN Shortness of Breath and/or Wheezing  ipratropium 17 MICROgram(s) HFA Inhaler 4 Puff(s) Inhalation <User Schedule> PRN -  midazolam Injectable 2 milliGRAM(s) IV Push every 4 hours PRN Agitation      PHYSICAL EXAM:  GENERAL:   HEAD:  Atraumatic, Normocephalic  EYES: EOMI, PERRLA, conjunctiva and sclera clear  NECK: Supple, No JVD, Normal thyroid, no enlarged nodes  NERVOUS SYSTEM:  Alert & Awake.   CHEST/LUNG: B/L good air entry; No rales, rhonchi, or wheezing  HEART: S1S2 normal, no S3, Regular rate and rhythm; No murmurs  ABDOMEN: Soft, Nontender, Nondistended; Bowel sounds present  EXTREMITIES:  2+ Peripheral Pulses, No clubbing, cyanosis, or edema  LYMPH: No lymphadenopathy noted  SKIN: No rashes or lesions    LABS:                        7.8    21.97 )-----------( 249      ( 2023 05:00 )             25.0     06-02    150<H>  |  115<H>  |  73<HH>  ----------------------------<  109<H>  4.0   |  23  |  2.6<H>    Ca    8.1<L>      2023 05:00  Mg     1.9     06-02    TPro  6.2  /  Alb  2.3<L>  /  TBili  0.4  /  DBili  x   /  AST  20  /  ALT  10  /  AlkPhos  68  06-02    LIVER FUNCTIONS - ( 2023 05:00 )  Alb: 2.3 g/dL / Pro: 6.2 g/dL / ALK PHOS: 68 U/L / ALT: 10 U/L / AST: 20 U/L / GGT: x           PT/INR - ( 2023 16:49 )   PT: 14.80 sec;   INR: 1.29 ratio         PTT - ( 2023 16:49 )  PTT:29.3 sec  CAPILLARY BLOOD GLUCOSE      POCT Blood Glucose.: 107 mg/dL (2023 07:03)  POCT Blood Glucose.: 113 mg/dL (2023 17:33)  POCT Blood Glucose.: 131 mg/dL (2023 11:57)    ABG - ( 2023 03:08 )  pH, Arterial: 7.46  pH, Blood: x     /  pCO2: 34    /  pO2: 102   / HCO3: 24    / Base Excess: 0.5   /  SaO2: 98.3              Creatine Kinase, Serum: 83 U/L ( @ 05:00)    CARDIAC MARKERS ( 2023 05:00 )  x     / x     / 83 U/L / x     / x      CARDIAC MARKERS ( 2023 04:59 )  x     / x     / 31 U/L / x     / x                RADIOLOGY & ADDITIONAL TESTS:  < from: Xray Chest 1 View- PORTABLE-Routine (Xray Chest 1 View- PORTABLE-Routine in AM.) (23 @ 06:43) >  Impression:    Stable diffuse left lung opacification        --- End of Report ---      < end of copied text >  < from: CT Chest No Cont (23 @ 15:19) >  IMPRESSION:      Small left pleural effusion with overall essentially unchanged exam.    --- End of Report ---      < end of copied text >  < from: CT Chest No Cont (23 @ 14:14) >  IMPRESSION:    Diffusely increased number and size of bilateral cavitary lesions with   the left lower lobe now demonstrating a consolidative process of the   entirelobe.      Additional scattered bilateral ground glass opacities are noted. Findings   are compatible with progressing pneumonia.    --- End of Report ---    < end of copied text >          Care Discussed with Consultants/Other Providers [ x] YES  [ ] NO

## 2023-06-02 NOTE — PROGRESS NOTE ADULT - ASSESSMENT
IMPRESSION:    Acute hypoxemic respiratory failure still intubated, ventilated on 40%  Severe cavitary CAP MRSA/ ro loculated/ septated effusion  MRSA bacteremia / MASTER -  Anemia sp tx  hemoptysis   LANCE non oliguric  hypernatremia  HO asthma   Influenza B     PLAN:    CNS: SAT.      HEENT: Oral care.  ET care     PULMONARY:  HOB @ 45 degrees.  Aspiration precautions.  ARDS net MV setting.  Monitor PPL and DP.  PC 18/10/ 22/ 40%, chest ct, CT sx    CARDIOVASCULAR:  ECHO/ MASTER noted, free water    GI: GI prophylaxis.  OG Feeding to target.  Adjust Bowel regimen.  reglan as tolerated fup CT AP    RENAL:  Follow up lytes.  Correct as needed.  Weaver for strict Is and OS.       INFECTIOUS DISEASE: ABX PER id    HEMATOLOGICAL:  DVT prophylaxis.  trend CBC    ENDOCRINE:  Follow up FS.  Insulin protocol if needed.      MICU          IMPRESSION:    Acute hypoxemic respiratory failure still intubated, ventilated on 40%  Severe cavitary CAP MRSA/ ro loculated/ sp repeat CT  MRSA bacteremia / MASTER -  Anemia sp tx  hemoptysis   LANCE non oliguric  hypernatremia  HO asthma   Influenza B     PLAN:    CNS: SAT.      HEENT: Oral care.  ET care     PULMONARY:  HOB @ 45 degrees.  Aspiration precautions.  ARDS net MV setting.  Monitor PPL and DP., chest ct, CT sx, keep SaO2 92 TO 96%    CARDIOVASCULAR:  ECHO/ MASTER noted, free water    GI: GI prophylaxis.  OG Feeding to target.  Adjust Bowel regimen.  reglan as tolerated fup CT AP    RENAL:  Follow up lytes.  Correct as needed.  Weaver for strict Is and OS.       INFECTIOUS DISEASE: ABX PER id    HEMATOLOGICAL:  DVT prophylaxis.  trend CBC    ENDOCRINE:  Follow up FS.  Insulin protocol if needed.      MICU

## 2023-06-03 LAB
ALBUMIN SERPL ELPH-MCNC: 2.3 G/DL — LOW (ref 3.5–5.2)
ALP SERPL-CCNC: 77 U/L — SIGNIFICANT CHANGE UP (ref 30–115)
ALT FLD-CCNC: 10 U/L — SIGNIFICANT CHANGE UP (ref 0–41)
ANION GAP SERPL CALC-SCNC: 10 MMOL/L — SIGNIFICANT CHANGE UP (ref 7–14)
ANION GAP SERPL CALC-SCNC: 11 MMOL/L — SIGNIFICANT CHANGE UP (ref 7–14)
ANION GAP SERPL CALC-SCNC: 9 MMOL/L — SIGNIFICANT CHANGE UP (ref 7–14)
AST SERPL-CCNC: 15 U/L — SIGNIFICANT CHANGE UP (ref 0–41)
BASOPHILS # BLD AUTO: 0.07 K/UL — SIGNIFICANT CHANGE UP (ref 0–0.2)
BASOPHILS NFR BLD AUTO: 0.3 % — SIGNIFICANT CHANGE UP (ref 0–1)
BILIRUB SERPL-MCNC: 0.3 MG/DL — SIGNIFICANT CHANGE UP (ref 0.2–1.2)
BLD GP AB SCN SERPL QL: SIGNIFICANT CHANGE UP
BUN SERPL-MCNC: 76 MG/DL — CRITICAL HIGH (ref 10–20)
BUN SERPL-MCNC: 76 MG/DL — CRITICAL HIGH (ref 10–20)
BUN SERPL-MCNC: 78 MG/DL — CRITICAL HIGH (ref 10–20)
CALCIUM SERPL-MCNC: 7.8 MG/DL — LOW (ref 8.4–10.5)
CALCIUM SERPL-MCNC: 8.1 MG/DL — LOW (ref 8.4–10.5)
CALCIUM SERPL-MCNC: 8.4 MG/DL — SIGNIFICANT CHANGE UP (ref 8.4–10.4)
CHLORIDE SERPL-SCNC: 110 MMOL/L — SIGNIFICANT CHANGE UP (ref 98–110)
CHLORIDE SERPL-SCNC: 113 MMOL/L — HIGH (ref 98–110)
CHLORIDE SERPL-SCNC: 114 MMOL/L — HIGH (ref 98–110)
CK SERPL-CCNC: 64 U/L — SIGNIFICANT CHANGE UP (ref 0–225)
CO2 SERPL-SCNC: 24 MMOL/L — SIGNIFICANT CHANGE UP (ref 17–32)
CO2 SERPL-SCNC: 26 MMOL/L — SIGNIFICANT CHANGE UP (ref 17–32)
CO2 SERPL-SCNC: 26 MMOL/L — SIGNIFICANT CHANGE UP (ref 17–32)
CREAT SERPL-MCNC: 2 MG/DL — HIGH (ref 0.7–1.5)
CREAT SERPL-MCNC: 2.2 MG/DL — HIGH (ref 0.7–1.5)
CREAT SERPL-MCNC: 2.6 MG/DL — HIGH (ref 0.7–1.5)
CULTURE RESULTS: SIGNIFICANT CHANGE UP
EGFR: 23 ML/MIN/1.73M2 — LOW
EGFR: 28 ML/MIN/1.73M2 — LOW
EGFR: 31 ML/MIN/1.73M2 — LOW
EOSINOPHIL # BLD AUTO: 0.05 K/UL — SIGNIFICANT CHANGE UP (ref 0–0.7)
EOSINOPHIL NFR BLD AUTO: 0.2 % — SIGNIFICANT CHANGE UP (ref 0–8)
GAS PNL BLDA: SIGNIFICANT CHANGE UP
GAS PNL BLDA: SIGNIFICANT CHANGE UP
GLUCOSE BLDC GLUCOMTR-MCNC: 120 MG/DL — HIGH (ref 70–99)
GLUCOSE SERPL-MCNC: 133 MG/DL — HIGH (ref 70–99)
GLUCOSE SERPL-MCNC: 135 MG/DL — HIGH (ref 70–99)
GLUCOSE SERPL-MCNC: 155 MG/DL — HIGH (ref 70–99)
GRAM STN FLD: SIGNIFICANT CHANGE UP
HCT VFR BLD CALC: 26 % — LOW (ref 37–47)
HGB BLD-MCNC: 7.8 G/DL — LOW (ref 12–16)
IMM GRANULOCYTES NFR BLD AUTO: 1.6 % — HIGH (ref 0.1–0.3)
LYMPHOCYTES # BLD AUTO: 1.13 K/UL — LOW (ref 1.2–3.4)
LYMPHOCYTES # BLD AUTO: 5.1 % — LOW (ref 20.5–51.1)
MAGNESIUM SERPL-MCNC: 1.8 MG/DL — SIGNIFICANT CHANGE UP (ref 1.8–2.4)
MCHC RBC-ENTMCNC: 20.9 PG — LOW (ref 27–31)
MCHC RBC-ENTMCNC: 30 G/DL — LOW (ref 32–37)
MCV RBC AUTO: 69.5 FL — LOW (ref 81–99)
MONOCYTES # BLD AUTO: 1.24 K/UL — HIGH (ref 0.1–0.6)
MONOCYTES NFR BLD AUTO: 5.5 % — SIGNIFICANT CHANGE UP (ref 1.7–9.3)
NEUTROPHILS # BLD AUTO: 19.51 K/UL — HIGH (ref 1.4–6.5)
NEUTROPHILS NFR BLD AUTO: 87.3 % — HIGH (ref 42.2–75.2)
NIGHT BLUE STAIN TISS: SIGNIFICANT CHANGE UP
NRBC # BLD: 0 /100 WBCS — SIGNIFICANT CHANGE UP (ref 0–0)
PLATELET # BLD AUTO: 270 K/UL — SIGNIFICANT CHANGE UP (ref 130–400)
PMV BLD: 9.8 FL — SIGNIFICANT CHANGE UP (ref 7.4–10.4)
POTASSIUM SERPL-MCNC: 3.8 MMOL/L — SIGNIFICANT CHANGE UP (ref 3.5–5)
POTASSIUM SERPL-MCNC: 3.8 MMOL/L — SIGNIFICANT CHANGE UP (ref 3.5–5)
POTASSIUM SERPL-MCNC: 3.9 MMOL/L — SIGNIFICANT CHANGE UP (ref 3.5–5)
POTASSIUM SERPL-SCNC: 3.8 MMOL/L — SIGNIFICANT CHANGE UP (ref 3.5–5)
POTASSIUM SERPL-SCNC: 3.8 MMOL/L — SIGNIFICANT CHANGE UP (ref 3.5–5)
POTASSIUM SERPL-SCNC: 3.9 MMOL/L — SIGNIFICANT CHANGE UP (ref 3.5–5)
PROCALCITONIN SERPL-MCNC: 1.76 NG/ML — HIGH (ref 0.02–0.1)
PROT SERPL-MCNC: 6.5 G/DL — SIGNIFICANT CHANGE UP (ref 6–8)
RBC # BLD: 3.74 M/UL — LOW (ref 4.2–5.4)
RBC # FLD: 26.1 % — HIGH (ref 11.5–14.5)
SODIUM SERPL-SCNC: 145 MMOL/L — SIGNIFICANT CHANGE UP (ref 135–146)
SODIUM SERPL-SCNC: 148 MMOL/L — HIGH (ref 135–146)
SODIUM SERPL-SCNC: 150 MMOL/L — HIGH (ref 135–146)
SPECIMEN SOURCE: SIGNIFICANT CHANGE UP
WBC # BLD: 22.35 K/UL — HIGH (ref 4.8–10.8)
WBC # FLD AUTO: 22.35 K/UL — HIGH (ref 4.8–10.8)

## 2023-06-03 PROCEDURE — 71045 X-RAY EXAM CHEST 1 VIEW: CPT | Mod: 26

## 2023-06-03 PROCEDURE — 99291 CRITICAL CARE FIRST HOUR: CPT

## 2023-06-03 PROCEDURE — 99231 SBSQ HOSP IP/OBS SF/LOW 25: CPT | Mod: 57

## 2023-06-03 RX ORDER — CISATRACURIUM BESYLATE 2 MG/ML
3 INJECTION INTRAVENOUS
Qty: 200 | Refills: 0 | Status: DISCONTINUED | OUTPATIENT
Start: 2023-06-03 | End: 2023-06-04

## 2023-06-03 RX ORDER — MIDAZOLAM HYDROCHLORIDE 1 MG/ML
0.02 INJECTION, SOLUTION INTRAMUSCULAR; INTRAVENOUS
Qty: 100 | Refills: 0 | Status: DISCONTINUED | OUTPATIENT
Start: 2023-06-03 | End: 2023-06-08

## 2023-06-03 RX ORDER — CHLORHEXIDINE GLUCONATE 213 G/1000ML
15 SOLUTION TOPICAL EVERY 12 HOURS
Refills: 0 | Status: DISCONTINUED | OUTPATIENT
Start: 2023-06-03 | End: 2023-06-23

## 2023-06-03 RX ORDER — FENTANYL CITRATE 50 UG/ML
0.56 INJECTION INTRAVENOUS
Qty: 2500 | Refills: 0 | Status: DISCONTINUED | OUTPATIENT
Start: 2023-06-03 | End: 2023-06-08

## 2023-06-03 RX ORDER — IPRATROPIUM BROMIDE 0.2 MG/ML
1 SOLUTION, NON-ORAL INHALATION EVERY 6 HOURS
Refills: 0 | Status: DISCONTINUED | OUTPATIENT
Start: 2023-06-03 | End: 2023-06-05

## 2023-06-03 RX ADMIN — Medication 650 MILLIGRAM(S): at 14:00

## 2023-06-03 RX ADMIN — MEROPENEM 100 MILLIGRAM(S): 1 INJECTION INTRAVENOUS at 06:02

## 2023-06-03 RX ADMIN — CHLORHEXIDINE GLUCONATE 1 APPLICATION(S): 213 SOLUTION TOPICAL at 13:07

## 2023-06-03 RX ADMIN — CHLORHEXIDINE GLUCONATE 1 APPLICATION(S): 213 SOLUTION TOPICAL at 07:04

## 2023-06-03 RX ADMIN — CHLORHEXIDINE GLUCONATE 15 MILLILITER(S): 213 SOLUTION TOPICAL at 07:03

## 2023-06-03 RX ADMIN — Medication 102.5 MILLIGRAM(S): at 17:48

## 2023-06-03 RX ADMIN — Medication 650 MILLIGRAM(S): at 14:18

## 2023-06-03 RX ADMIN — PANTOPRAZOLE SODIUM 40 MILLIGRAM(S): 20 TABLET, DELAYED RELEASE ORAL at 13:12

## 2023-06-03 RX ADMIN — Medication 1 PUFF(S): at 15:06

## 2023-06-03 RX ADMIN — MIDAZOLAM HYDROCHLORIDE 1.7 MG/KG/HR: 1 INJECTION, SOLUTION INTRAMUSCULAR; INTRAVENOUS at 01:34

## 2023-06-03 RX ADMIN — POLYETHYLENE GLYCOL 3350 17 GRAM(S): 17 POWDER, FOR SOLUTION ORAL at 20:52

## 2023-06-03 RX ADMIN — SENNA PLUS 2 TABLET(S): 8.6 TABLET ORAL at 20:52

## 2023-06-03 RX ADMIN — Medication 30 MILLIGRAM(S): at 00:12

## 2023-06-03 RX ADMIN — CHLORHEXIDINE GLUCONATE 15 MILLILITER(S): 213 SOLUTION TOPICAL at 17:48

## 2023-06-03 RX ADMIN — Medication 1 PUFF(S): at 09:59

## 2023-06-03 RX ADMIN — Medication 650 MILLIGRAM(S): at 06:02

## 2023-06-03 RX ADMIN — MEROPENEM 100 MILLIGRAM(S): 1 INJECTION INTRAVENOUS at 17:48

## 2023-06-03 RX ADMIN — Medication 250 MILLIGRAM(S): at 13:13

## 2023-06-03 RX ADMIN — Medication 1 APPLICATION(S): at 13:36

## 2023-06-03 NOTE — PROGRESS NOTE ADULT - SUBJECTIVE AND OBJECTIVE BOX
seen and examined   24 h events noted   intubated/ventilated        PAST HISTORY  --------------------------------------------------------------------------------  No significant changes to PMH, PSH, FHx, SHx, unless otherwise noted    ALLERGIES & MEDICATIONS  --------------------------------------------------------------------------------  Allergies    aspirin (Short breath; Anaphylaxis)    Intolerances      Standing Inpatient Medications  acyclovir IVPB 125 milliGRAM(s) IV Intermittent every 24 hours  chlorhexidine 0.12% Liquid 15 milliLiter(s) Oral Mucosa every 12 hours  chlorhexidine 2% Cloths 1 Application(s) Topical <User Schedule>  chlorhexidine 2% Cloths 1 Application(s) Topical daily  cisatracurium Infusion 3 MICROgram(s)/kG/Min IV Continuous <Continuous>  dexMEDEtomidine Infusion 0.2 MICROgram(s)/kG/Hr IV Continuous <Continuous>  dextrose 5%. 1000 milliLiter(s) IV Continuous <Continuous>  fentaNYL   Infusion. 0.555 MICROgram(s)/kG/Hr IV Continuous <Continuous>  insulin lispro (ADMELOG) corrective regimen sliding scale   SubCutaneous three times a day before meals  ipratropium 17 MICROgram(s) HFA Inhaler 1 Puff(s) Inhalation every 6 hours  ketamine Infusion. 0.25 mG/kG/Hr IV Continuous <Continuous>  meropenem  IVPB 1000 milliGRAM(s) IV Intermittent every 12 hours  midazolam Infusion 0.02 mG/kG/Hr IV Continuous <Continuous>  norepinephrine Infusion 0.05 MICROgram(s)/kG/Min IV Continuous <Continuous>  pantoprazole   Suspension 40 milliGRAM(s) Oral daily  polyethylene glycol 3350 17 Gram(s) Oral every 12 hours  propofol Infusion 11.098 MICROgram(s)/kG/Min IV Continuous <Continuous>  senna 2 Tablet(s) Oral at bedtime  vancomycin  IVPB 1000 milliGRAM(s) IV Intermittent every 24 hours  vitamin A &amp; D Ointment 1 Application(s) Topical daily    PRN Inpatient Medications  acetaminophen     Tablet .. 650 milliGRAM(s) Oral every 6 hours PRN  albuterol    90 MICROgram(s) HFA Inhaler 4 Puff(s) Inhalation every 4 hours PRN  ipratropium 17 MICROgram(s) HFA Inhaler 4 Puff(s) Inhalation <User Schedule> PRN  midazolam Injectable 2 milliGRAM(s) IV Push every 4 hours PRN          VITALS/PHYSICAL EXAM  --------------------------------------------------------------------------------  T(C): 37.7 (06-03-23 @ 08:00), Max: 38.2 (06-02-23 @ 16:00)  HR: 128 (06-03-23 @ 08:00) (90 - 144)  BP: 124/63 (06-03-23 @ 08:00) (117/68 - 178/103)  RR: 30 (06-03-23 @ 08:00) (22 - 30)  SpO2: 98% (06-03-23 @ 08:00) (95% - 100%)  Wt(kg): --        06-02-23 @ 07:01  -  06-03-23 @ 07:00  --------------------------------------------------------  IN: 3448.1 mL / OUT: 2835 mL / NET: 613.1 mL    06-03-23 @ 07:01  -  06-03-23 @ 12:36  --------------------------------------------------------  IN: 136.5 mL / OUT: 0 mL / NET: 136.5 mL      Physical Exam:  	Gen: intubated/ventilated  	Pulm: B/L kell  	CV:  S1S2; no rub  	Abd: +distended      LABS/STUDIES  --------------------------------------------------------------------------------              7.8    22.35 >-----------<  270      [06-03-23 @ 05:23]              26.0     150  |  114  |  76  ----------------------------<  135      [06-03-23 @ 05:23]  3.8   |  26  |  2.2        Ca     8.4     [06-03-23 @ 05:23]      Mg     1.8     [06-03-23 @ 05:23]    TPro  6.5  /  Alb  2.3  /  TBili  0.3  /  DBili  x   /  AST  15  /  ALT  10  /  AlkPhos  77  [06-03-23 @ 05:23]    PT/INR: PT 14.80, INR 1.29       [06-01-23 @ 16:49]  PTT: 29.3       [06-01-23 @ 16:49]    CK 64      [06-03-23 @ 05:23]    Creatinine Trend:  SCr 2.2 [06-03 @ 05:23]  SCr 2.6 [06-02 @ 23:32]  SCr 2.5 [06-02 @ 20:45]  SCr 2.6 [06-02 @ 14:58]  SCr 2.6 [06-02 @ 05:00]    Urinalysis - [05-25-23 @ 11:30]      Color Yellow / Appearance Slightly Turbid / SG 1.027 / pH 6.0      Gluc Negative / Ketone Negative  / Bili Negative / Urobili <2 mg/dL       Blood Large / Protein 100 mg/dL / Leuk Est Negative / Nitrite Negative      RBC 45 / WBC 30 / Hyaline 9 / Gran  / Sq Epi  / Non Sq Epi  / Bacteria Negative      Iron 13, TIBC 174, %sat 7      [05-28-23 @ 12:10]  Ferritin 136      [05-28-23 @ 12:10]  Lipid: chol --, , HDL --, LDL --      [06-01-23 @ 04:59]    HBsAg Nonreact      [05-26-23 @ 11:00]  HCV 0.16, Nonreact      [05-26-23 @ 11:00]  HIV Nonreact      [05-27-23 @ 05:47]    CHARLES: titer 1:640, pattern Centromere      [05-24-23 @ 11:48]  dsDNA <12      [05-24-23 @ 11:48]  C3 Complement 80      [05-24-23 @ 11:48]  C4 Complement 34      [05-24-23 @ 11:48]  ANCA: cANCA Negative, pANCA Negative, atypical ANCA Negative      [05-24-23 @ 11:48]  anti-GBM <0.2      [05-24-23 @ 11:48]  Free Light Chains: kappa 16.56, lambda 7.37, ratio = 2.25      [05-24 @ 11:48]

## 2023-06-03 NOTE — PROGRESS NOTE ADULT - ASSESSMENT
ASSESSMENT:  Patient is a 42F w/ pmh of asthma and htn admitted 5/24 with ahrh secondary to MRSA PNA still intubated and spiking fevers. CT chest 5/27 revealing b/l cavitary lesions w/ additional left consolidation and effusion. Bedside ultrasound revealing septated left effusion, repeat CT chest redemonstrated cavitary lesions as well as left sided loculated effusion.    PLAN:  - s/p left 16F chest tube placement - now to sxn  - monitor output and for air leaks   - management per ICU team   - patient remains on heavy sedation, paralytics, no pressors     [id number]

## 2023-06-03 NOTE — PROGRESS NOTE ADULT - ASSESSMENT
42/F with PMH of asthma and HTN, admitted with SOB, developed LANCE. Acute hypoxemic resp failure requiring intubation  Bilateral patchy groundglass nodularity with dominant confluent left  lower lobe opacification with numerous cavitary lesions. Additional contralateral right lower lobe cavitary 1 cm nodule. Findings compatible  with cavitary pneumonia/Tb?  Non massive hemoptysis   MRSA bacteremia, prerenal FeNa, suspicion for post infectious GN/ pleural effusion   Kidney sono neg for obstruction  LANCE prerenal vs ATN vs pulmo renal syndrome  s/p CT with IV contrast 5/23 -  non oliguric    cr better   follow IP    phos noted / no binders yet / feed nepro   w/up for GN noted  negative / Creat better / holding on kidney bx   off  sodium bicarb   sdoium noted : d5 at 75 cc/h for 12h / free water with feed  anemia acute will need transfusion to keep Hb > 7  follow vanco level   no acute indication for RRT   will follow

## 2023-06-03 NOTE — PROGRESS NOTE ADULT - ASSESSMENT
IMPRESSION:    Acute hypoxemic respiratory failure still intubated, ventilated on 40%  Severe cavitary CAP MRSA/ ro loculated/ sp repeat CT sp Chest tube  MRSA bacteremia / MASTER -  Anemia sp tx  hemoptysis   LANCE non oliguric  hypernatremia  HO asthma   Influenza B     PLAN:    CNS: daily SAT    HEENT: Oral care.  ET care     PULMONARY:  HOB @ 45 degrees.  Aspiration precautions.  ARDS net MV setting.  Monitor PPL and DP., chest ct, CT sx, keep SaO2 92 TO 96%    CARDIOVASCULAR:  ECHO/ MASTER noted, free water    GI: GI prophylaxis.  OG Feeding to target.  Adjust Bowel regimen.  reglan as tolerated fup CT AP noted    RENAL:  Follow up lytes.  Correct as needed.     INFECTIOUS DISEASE: ABX PER id    HEMATOLOGICAL:  DVT prophylaxis.  trend CBC    ENDOCRINE:  Follow up FS.  Insulin protocol if needed.      MICU          IMPRESSION:    Acute hypoxemic respiratory failure still intubated, ventilated on 40%  Severe cavitary CAP MRSA/ ro loculated/ sp repeat CT sp Chest tube  MRSA bacteremia / MASTER -  Anemia sp tx  hemoptysis   LANCE non oliguric improving  hypernatremia  HO asthma   Influenza B     PLAN:    CNS: daily SAT hold nimbex    HEENT: Oral care.  ET care     PULMONARY:  HOB @ 45 degrees.  Aspiration precautions.  ARDS net MV setting.  Monitor PPL and DP., chest ct, CT sx, keep SaO2 92 TO 96%    CARDIOVASCULAR:  ECHO/ MASTER noted, free water    GI: GI prophylaxis.  OG Feeding to target.  Adjust Bowel regimen.  reglan as tolerated fup CT AP noted    RENAL:  Follow up lytes.  Correct as needed.     INFECTIOUS DISEASE: ABX PER id    HEMATOLOGICAL:  DVT prophylaxis.  trend CBC, RLE doppler    ENDOCRINE:  Follow up FS.  Insulin protocol if needed.      MICU

## 2023-06-03 NOTE — PROGRESS NOTE ADULT - SUBJECTIVE AND OBJECTIVE BOX
Over Night Events: events noted, remain critically ill, still intubated, ventilated, CT sx noted sp Chest tube, low grade fever    PHYSICAL EXAM    ICU Vital Signs Last 24 Hrs  T(C): 37.4 (03 Jun 2023 04:00), Max: 38.2 (02 Jun 2023 08:00)  T(F): 99.3 (03 Jun 2023 04:00), Max: 100.8 (02 Jun 2023 08:00)  HR: 126 (03 Jun 2023 06:00) (90 - 144)  BP: 170/103 (03 Jun 2023 06:00) (113/83 - 199/105)  BP(mean): 129 (03 Jun 2023 06:00) (85 - 144)  RR: 26 (03 Jun 2023 06:00) (21 - 26)  SpO2: 98% (03 Jun 2023 06:00) (95% - 100%)    O2 Parameters below as of 02 Jun 2023 20:00  Patient On (Oxygen Delivery Method): ventilator    O2 Concentration (%): 40        General: ill looking  HEENT: ETT           Lungs:  Dec bs l base  Cardiovascular: Regular   Abdomen: Soft, Positive BS  Extremities: No clubbing   sedated      06-01-23 @ 07:01  -  06-02-23 @ 07:00  --------------------------------------------------------  IN:    Dexmedetomidine: 765.6 mL    FentaNYL: 799 mL    FentaNYL: 212.5 mL    Ketamine: 430.8 mL    Peptamen A.F.: 10 mL    PRBCs (Packed Red Blood Cells): 300 mL  Total IN: 2517.9 mL    OUT:    Indwelling Catheter - Urethral (mL): 2785 mL  Total OUT: 2785 mL    Total NET: -267.1 mL      06-02-23 @ 07:01  -  06-03-23 @ 06:48  --------------------------------------------------------  IN:    Cisatracurium: 290.7 mL    Dexmedetomidine: 66.8 mL    dextrose 5%: 660 mL    FentaNYL: 751 mL    IV PiggyBack: 400 mL    Ketamine: 34 mL    Midazolam: 141.1 mL    Peptamen A.F.: 425 mL  Total IN: 2768.6 mL    OUT:    Indwelling Catheter - Urethral (mL): 1810 mL  Total OUT: 1810 mL    Total NET: 958.6 mL          LABS:                          7.8    22.35 )-----------( 270      ( 03 Jun 2023 05:23 )             26.0                                               06-03    150<H>  |  114<H>  |  76<HH>  ----------------------------<  135<H>  3.8   |  26  |  2.2<H>    Ca    8.4      03 Jun 2023 05:23  Mg     1.8     06-03    TPro  6.5  /  Alb  2.3<L>  /  TBili  0.3  /  DBili  x   /  AST  15  /  ALT  10  /  AlkPhos  77  06-03      PT/INR - ( 01 Jun 2023 16:49 )   PT: 14.80 sec;   INR: 1.29 ratio         PTT - ( 01 Jun 2023 16:49 )  PTT:29.3 sec                                           CARDIAC MARKERS ( 03 Jun 2023 05:23 )  x     / x     / 64 U/L / x     / x      CARDIAC MARKERS ( 02 Jun 2023 05:00 )  x     / x     / 83 U/L / x     / x                                                LIVER FUNCTIONS - ( 03 Jun 2023 05:23 )  Alb: 2.3 g/dL / Pro: 6.5 g/dL / ALK PHOS: 77 U/L / ALT: 10 U/L / AST: 15 U/L / GGT: x                                                  Culture - Body Fluid with Gram Stain (collected 02 Jun 2023 17:52)  Source: Pleural Fl Pleural Fluid  Gram Stain (03 Jun 2023 02:05):    polymorphonuclear leukocytes seen    No organisms seen    by cytocentrifuge    Culture - Blood (collected 01 Jun 2023 04:59)  Source: .Blood None  Preliminary Report (02 Jun 2023 19:02):    No growth to date.    Culture - Sputum (collected 01 Jun 2023 02:15)  Source: ET Tube ET Tube  Gram Stain (01 Jun 2023 17:36):    Moderate polymorphonuclear leukocytes per low power field    No Squamous epithelial cells per low power field    Rare Gram positive cocci in pairs per oil power field  Preliminary Report (02 Jun 2023 08:47):    Normal Respiratory Laila present                                                   Mode: AC/ CMV (Assist Control/ Continuous Mandatory Ventilation)  RR (machine): 26  TV (machine): 380  FiO2: 40  PEEP: 10  ITime: 1  MAP: 19  PC: 18  PIP: 33                                      ABG - ( 03 Jun 2023 04:07 )  pH, Arterial: 7.35  pH, Blood: x     /  pCO2: 48    /  pO2: 93    / HCO3: 26    / Base Excess: 0.4   /  SaO2: 97.8                MEDICATIONS  (STANDING):  acyclovir IVPB 125 milliGRAM(s) IV Intermittent every 24 hours  chlorhexidine 0.12% Liquid 15 milliLiter(s) Oral Mucosa every 12 hours  chlorhexidine 2% Cloths 1 Application(s) Topical <User Schedule>  chlorhexidine 2% Cloths 1 Application(s) Topical daily  cisatracurium Infusion 3 MICROgram(s)/kG/Min (15.3 mL/Hr) IV Continuous <Continuous>  dexMEDEtomidine Infusion 0.2 MICROgram(s)/kG/Hr (4.25 mL/Hr) IV Continuous <Continuous>  dextrose 5%. 1000 milliLiter(s) (60 mL/Hr) IV Continuous <Continuous>  fentaNYL   Infusion. 0.555 MICROgram(s)/kG/Hr (4.72 mL/Hr) IV Continuous <Continuous>  insulin lispro (ADMELOG) corrective regimen sliding scale   SubCutaneous three times a day before meals  ketamine Infusion. 0.25 mG/kG/Hr (2.13 mL/Hr) IV Continuous <Continuous>  meropenem  IVPB 1000 milliGRAM(s) IV Intermittent every 12 hours  midazolam Infusion 0.02 mG/kG/Hr (1.7 mL/Hr) IV Continuous <Continuous>  norepinephrine Infusion 0.05 MICROgram(s)/kG/Min (7.97 mL/Hr) IV Continuous <Continuous>  pantoprazole   Suspension 40 milliGRAM(s) Oral daily  polyethylene glycol 3350 17 Gram(s) Oral every 12 hours  propofol Infusion 11.098 MICROgram(s)/kG/Min (5.66 mL/Hr) IV Continuous <Continuous>  senna 2 Tablet(s) Oral at bedtime  sodium bicarbonate 650 milliGRAM(s) Oral every 8 hours  vancomycin  IVPB 1000 milliGRAM(s) IV Intermittent every 24 hours  vitamin A &amp; D Ointment 1 Application(s) Topical daily    MEDICATIONS  (PRN):  acetaminophen     Tablet .. 650 milliGRAM(s) Oral every 6 hours PRN Temp greater or equal to 38C (100.4F)  albuterol    90 MICROgram(s) HFA Inhaler 4 Puff(s) Inhalation every 4 hours PRN Shortness of Breath and/or Wheezing  ipratropium 17 MICROgram(s) HFA Inhaler 4 Puff(s) Inhalation <User Schedule> PRN -  midazolam Injectable 2 milliGRAM(s) IV Push every 4 hours PRN Agitation      Xrays:                                                                                     ECHO     Over Night Events: events noted, remain critically ill, still intubated, ventilated, CT sx noted sp Chest tube 50 cc out, low grade fever, fentanyl, versed, nimbex    PHYSICAL EXAM    ICU Vital Signs Last 24 Hrs  T(C): 37.4 (03 Jun 2023 04:00), Max: 38.2 (02 Jun 2023 08:00)  T(F): 99.3 (03 Jun 2023 04:00), Max: 100.8 (02 Jun 2023 08:00)  HR: 126 (03 Jun 2023 06:00) (90 - 144)  BP: 170/103 (03 Jun 2023 06:00) (113/83 - 199/105)  BP(mean): 129 (03 Jun 2023 06:00) (85 - 144)  RR: 26 (03 Jun 2023 06:00) (21 - 26)  SpO2: 98% (03 Jun 2023 06:00) (95% - 100%)    O2 Parameters below as of 02 Jun 2023 20:00  Patient On (Oxygen Delivery Method): ventilator    O2 Concentration (%): 40        General: ill looking  HEENT: ETT           Lungs:  Dec bs l base  Cardiovascular: Regular   Abdomen: Soft, Positive BS  Extremities: No clubbing   sedated/ paralyzed  LUE rash. extending to her back    06-01-23 @ 07:01  -  06-02-23 @ 07:00  --------------------------------------------------------  IN:    Dexmedetomidine: 765.6 mL    FentaNYL: 799 mL    FentaNYL: 212.5 mL    Ketamine: 430.8 mL    Peptamen A.F.: 10 mL    PRBCs (Packed Red Blood Cells): 300 mL  Total IN: 2517.9 mL    OUT:    Indwelling Catheter - Urethral (mL): 2785 mL  Total OUT: 2785 mL    Total NET: -267.1 mL      06-02-23 @ 07:01  -  06-03-23 @ 06:48  --------------------------------------------------------  IN:    Cisatracurium: 290.7 mL    Dexmedetomidine: 66.8 mL    dextrose 5%: 660 mL    FentaNYL: 751 mL    IV PiggyBack: 400 mL    Ketamine: 34 mL    Midazolam: 141.1 mL    Peptamen A.F.: 425 mL  Total IN: 2768.6 mL    OUT:    Indwelling Catheter - Urethral (mL): 1810 mL  Total OUT: 1810 mL    Total NET: 958.6 mL          LABS:                          7.8    22.35 )-----------( 270      ( 03 Jun 2023 05:23 )             26.0                                               06-03    150<H>  |  114<H>  |  76<HH>  ----------------------------<  135<H>  3.8   |  26  |  2.2<H>    Ca    8.4      03 Jun 2023 05:23  Mg     1.8     06-03    TPro  6.5  /  Alb  2.3<L>  /  TBili  0.3  /  DBili  x   /  AST  15  /  ALT  10  /  AlkPhos  77  06-03      PT/INR - ( 01 Jun 2023 16:49 )   PT: 14.80 sec;   INR: 1.29 ratio         PTT - ( 01 Jun 2023 16:49 )  PTT:29.3 sec                                           CARDIAC MARKERS ( 03 Jun 2023 05:23 )  x     / x     / 64 U/L / x     / x      CARDIAC MARKERS ( 02 Jun 2023 05:00 )  x     / x     / 83 U/L / x     / x                                                LIVER FUNCTIONS - ( 03 Jun 2023 05:23 )  Alb: 2.3 g/dL / Pro: 6.5 g/dL / ALK PHOS: 77 U/L / ALT: 10 U/L / AST: 15 U/L / GGT: x                                                  Culture - Body Fluid with Gram Stain (collected 02 Jun 2023 17:52)  Source: Pleural Fl Pleural Fluid  Gram Stain (03 Jun 2023 02:05):    polymorphonuclear leukocytes seen    No organisms seen    by cytocentrifuge    Culture - Blood (collected 01 Jun 2023 04:59)  Source: .Blood None  Preliminary Report (02 Jun 2023 19:02):    No growth to date.    Culture - Sputum (collected 01 Jun 2023 02:15)  Source: ET Tube ET Tube  Gram Stain (01 Jun 2023 17:36):    Moderate polymorphonuclear leukocytes per low power field    No Squamous epithelial cells per low power field    Rare Gram positive cocci in pairs per oil power field  Preliminary Report (02 Jun 2023 08:47):    Normal Respiratory Laila present                                                   Mode: AC/ CMV (Assist Control/ Continuous Mandatory Ventilation)  RR (machine): 26  TV (machine): 380  FiO2: 40  PEEP: 10  ITime: 1  MAP: 19  PC: 18  PIP: 33                                      ABG - ( 03 Jun 2023 04:07 )  pH, Arterial: 7.35  pH, Blood: x     /  pCO2: 48    /  pO2: 93    / HCO3: 26    / Base Excess: 0.4   /  SaO2: 97.8        P/P 44/36        MEDICATIONS  (STANDING):  acyclovir IVPB 125 milliGRAM(s) IV Intermittent every 24 hours  chlorhexidine 0.12% Liquid 15 milliLiter(s) Oral Mucosa every 12 hours  chlorhexidine 2% Cloths 1 Application(s) Topical <User Schedule>  chlorhexidine 2% Cloths 1 Application(s) Topical daily  cisatracurium Infusion 3 MICROgram(s)/kG/Min (15.3 mL/Hr) IV Continuous <Continuous>  dexMEDEtomidine Infusion 0.2 MICROgram(s)/kG/Hr (4.25 mL/Hr) IV Continuous <Continuous>  dextrose 5%. 1000 milliLiter(s) (60 mL/Hr) IV Continuous <Continuous>  fentaNYL   Infusion. 0.555 MICROgram(s)/kG/Hr (4.72 mL/Hr) IV Continuous <Continuous>  insulin lispro (ADMELOG) corrective regimen sliding scale   SubCutaneous three times a day before meals  ketamine Infusion. 0.25 mG/kG/Hr (2.13 mL/Hr) IV Continuous <Continuous>  meropenem  IVPB 1000 milliGRAM(s) IV Intermittent every 12 hours  midazolam Infusion 0.02 mG/kG/Hr (1.7 mL/Hr) IV Continuous <Continuous>  norepinephrine Infusion 0.05 MICROgram(s)/kG/Min (7.97 mL/Hr) IV Continuous <Continuous>  pantoprazole   Suspension 40 milliGRAM(s) Oral daily  polyethylene glycol 3350 17 Gram(s) Oral every 12 hours  propofol Infusion 11.098 MICROgram(s)/kG/Min (5.66 mL/Hr) IV Continuous <Continuous>  senna 2 Tablet(s) Oral at bedtime  sodium bicarbonate 650 milliGRAM(s) Oral every 8 hours  vancomycin  IVPB 1000 milliGRAM(s) IV Intermittent every 24 hours  vitamin A &amp; D Ointment 1 Application(s) Topical daily    MEDICATIONS  (PRN):  acetaminophen     Tablet .. 650 milliGRAM(s) Oral every 6 hours PRN Temp greater or equal to 38C (100.4F)  albuterol    90 MICROgram(s) HFA Inhaler 4 Puff(s) Inhalation every 4 hours PRN Shortness of Breath and/or Wheezing  ipratropium 17 MICROgram(s) HFA Inhaler 4 Puff(s) Inhalation <User Schedule> PRN -  midazolam Injectable 2 milliGRAM(s) IV Push every 4 hours PRN Agitation      cxr noted

## 2023-06-03 NOTE — PROGRESS NOTE ADULT - SUBJECTIVE AND OBJECTIVE BOX
Patient is a 42y old  Female who presents with a chief complaint of AHRF (2023 12:35)    HPI:  42 year-old female with PMH of Asthma, HTN? presents with complaint of cough x3 days and SOB. During my encounter pt with dyspnea and increased work of breathing and chest pain, unable to properly provide accurate history. She states that her cough has been progressively worsening over the past 3 days, endorses hemoptysis since yesterday. She states she had a friend who came over and stayed with for more than a week who was sick recently, but she does not know if she recently travelled out of [facility] or not. She also admits that her children has been sick with Sore throat and fever for past 3 days. She admits to fever, chills, sever Chest pain which has gotten worse since she came to the ED. She took 1x Tamiflu yesterday. She otherwise denies diarrhea, constipation, urinary symptoms. She is not vaccinated for Influenza or COVID-19.     In ED  /87, , RR 28, T 100.8 F, 98% on 15 L NRB  Labs significant for wbc 16.7K with Neutrophilic Predominance and L shift, Hgb 11.4, MCV 61.6, Cr 2.0, BUN 23, Alk Phos 125/AST 62/ALT 27, Lac 4.9> 3.9  RVP + Influenza B      CT Angio Chest PE Protocol w/ IV Cont   1. Bilateral patchy groundglass nodularity with dominant confluent left lower lobe opacification with numerous cavitary lesions. Additional contralateral right lower lobe cavitary 1 cm nodule. Findings compatible with cavitary pneumonia in the appropriate clinical setting; differential diagnosis includes tuberculosis.  2. Confluent ill-defined left hilar and mediastinal adenopathy, likely reactive, without dominant lymph node delineated.  3. No evidence of pulmonary embolism.    s/p 1x Rocephin, Meropenem, 3 L LR in ED    Pt admitted to SDU for further managements, during my encounter pt is very agitated, in acute distress. Pt received appropriate pain control with IV Morphine, s/p Xanax 1 mg 1x for agitation and anxiety, despite pain control and adequate fluid resuscitation pt remained tachycardic, tachypneic. STAT EKG reviewed with Cardiology team on call After discussion with Cardiology team, Pt received 1x Adenosine 6 mg IV push with no change. Case discussed with Critical Care team, decision was made for Intubation and Pt upgraded to MICU.   (24 May 2023 00:58)       INTERVAL HPI/OVERNIGHT EVENTS:   On Nimbex  Fentanyl Versed  Trial off Nimbex this am; pt did not tolerate    Subjective:    ICU Vital Signs Last 24 Hrs  T(C): 37.6 (2023 16:00), Max: 38.4 (2023 12:00)  T(F): 99.7 (2023 16:00), Max: 101.1 (2023 12:00)  HR: 126 (2023 16:00) (123 - 144)  BP: 112/61 (2023 16:00) (109/55 - 178/103)  BP(mean): 81 (2023 16:00) (78 - 130)  ABP: --  ABP(mean): --  RR: 30 (2023 16:00) (13 - 33)  SpO2: 98% (2023 16:00) (96% - 100%)    O2 Parameters below as of 2023 20:00  Patient On (Oxygen Delivery Method): ventilator    O2 Concentration (%): 40      I&O's Summary    2023 07:01  -  2023 07:00  --------------------------------------------------------  IN: 3448.1 mL / OUT: 2835 mL / NET: 613.1 mL    2023 07:01  -  2023 17:24  --------------------------------------------------------  IN: 1762.9 mL / OUT: 1200 mL / NET: 562.9 mL      Mode: AC/ CMV (Assist Control/ Continuous Mandatory Ventilation)  RR (machine): 30  TV (machine): 350  FiO2: 40  PEEP: 8  ITime: 1  MAP: 19  PIP: 40      Daily     Daily Weight in k.2 (2023 06:00)    Adult Advanced Hemodynamics Last 24 Hrs  CVP(mm Hg): --  CVP(cm H2O): --  CO: --  CI: --  PA: --  PA(mean): --  PCWP: --  SVR: --  SVRI: --  PVR: --  PVRI: --    EKG/Telemetry Events:    MEDICATIONS  (STANDING):  acyclovir IVPB 125 milliGRAM(s) IV Intermittent every 24 hours  chlorhexidine 0.12% Liquid 15 milliLiter(s) Oral Mucosa every 12 hours  chlorhexidine 2% Cloths 1 Application(s) Topical daily  chlorhexidine 2% Cloths 1 Application(s) Topical <User Schedule>  cisatracurium Infusion 3 MICROgram(s)/kG/Min (15.3 mL/Hr) IV Continuous <Continuous>  dexMEDEtomidine Infusion 0.2 MICROgram(s)/kG/Hr (4.25 mL/Hr) IV Continuous <Continuous>  dextrose 5%. 1000 milliLiter(s) (60 mL/Hr) IV Continuous <Continuous>  fentaNYL   Infusion. 0.555 MICROgram(s)/kG/Hr (4.72 mL/Hr) IV Continuous <Continuous>  insulin lispro (ADMELOG) corrective regimen sliding scale   SubCutaneous three times a day before meals  ipratropium 17 MICROgram(s) HFA Inhaler 1 Puff(s) Inhalation every 6 hours  ketamine Infusion. 0.25 mG/kG/Hr (2.13 mL/Hr) IV Continuous <Continuous>  meropenem  IVPB 1000 milliGRAM(s) IV Intermittent every 12 hours  midazolam Infusion 0.02 mG/kG/Hr (1.7 mL/Hr) IV Continuous <Continuous>  norepinephrine Infusion 0.05 MICROgram(s)/kG/Min (7.97 mL/Hr) IV Continuous <Continuous>  pantoprazole   Suspension 40 milliGRAM(s) Oral daily  polyethylene glycol 3350 17 Gram(s) Oral every 12 hours  propofol Infusion 11.098 MICROgram(s)/kG/Min (5.66 mL/Hr) IV Continuous <Continuous>  senna 2 Tablet(s) Oral at bedtime  vancomycin  IVPB 1000 milliGRAM(s) IV Intermittent every 24 hours  vitamin A &amp; D Ointment 1 Application(s) Topical daily    MEDICATIONS  (PRN):  acetaminophen     Tablet .. 650 milliGRAM(s) Oral every 6 hours PRN Temp greater or equal to 38C (100.4F)  albuterol    90 MICROgram(s) HFA Inhaler 4 Puff(s) Inhalation every 4 hours PRN Shortness of Breath and/or Wheezing  ipratropium 17 MICROgram(s) HFA Inhaler 4 Puff(s) Inhalation <User Schedule> PRN -  midazolam Injectable 2 milliGRAM(s) IV Push every 4 hours PRN Agitation      PHYSICAL EXAM:  GENERAL:   HEAD:  Atraumatic, Normocephalic  EYES: EOMI, PERRLA, conjunctiva and sclera clear  NECK: Supple, No JVD, Normal thyroid, no enlarged nodes  NERVOUS SYSTEM:  Alert & Awake.   CHEST/LUNG: B/L good air entry; No rales, rhonchi, or wheezing  HEART: S1S2 normal, no S3, Regular rate and rhythm; No murmurs  ABDOMEN: Soft, Nontender, Nondistended; Bowel sounds present  EXTREMITIES:  2+ Peripheral Pulses, No clubbing, cyanosis, or edema  LYMPH: No lymphadenopathy noted  SKIN: No rashes or lesions    LABS:                        7.8    22.35 )-----------( 270      ( 2023 05:23 )             26.0     06-03    145  |  110  |  76<HH>  ----------------------------<  155<H>  3.8   |  26  |  2.0<H>    Ca    7.8<L>      2023 14:45  Mg     1.8     06-03    TPro  6.5  /  Alb  2.3<L>  /  TBili  0.3  /  DBili  x   /  AST  15  /  ALT  10  /  AlkPhos  77  06-03    LIVER FUNCTIONS - ( 2023 05:23 )  Alb: 2.3 g/dL / Pro: 6.5 g/dL / ALK PHOS: 77 U/L / ALT: 10 U/L / AST: 15 U/L / GGT: x             CAPILLARY BLOOD GLUCOSE      POCT Blood Glucose.: 120 mg/dL (2023 06:29)  POCT Blood Glucose.: 110 mg/dL (2023 18:05)    ABG - ( 2023 13:55 )  pH, Arterial: 7.35  pH, Blood: x     /  pCO2: 49    /  pO2: 96    / HCO3: 27    / Base Excess: 1.3   /  SaO2: 98.3              Creatine Kinase, Serum: 64 U/L ( @ 05:23)    CARDIAC MARKERS ( 2023 05:23 )  x     / x     / 64 U/L / x     / x      CARDIAC MARKERS ( 2023 05:00 )  x     / x     / 83 U/L / x     / x                RADIOLOGY & ADDITIONAL TESTS:  < from: Xray Chest 1 View- PORTABLE-Routine (Xray Chest 1 View- PORTABLE-Routine in AM.) (23 @ 06:43) >  Impression:    Stable diffuse left lung opacification        --- End of Report ---      < end of copied text >  < from: CT Chest No Cont (23 @ 15:19) >  IMPRESSION:      Small left pleural effusion with overall essentially unchanged exam.    --- End of Report ---      < end of copied text >  < from: CT Chest No Cont (23 @ 14:14) >  IMPRESSION:    Diffusely increased number and size of bilateral cavitary lesions with   the left lower lobe now demonstrating a consolidative process of the   entirelobe.      Additional scattered bilateral ground glass opacities are noted. Findings   are compatible with progressing pneumonia.    --- End of Report ---    < end of copied text >        Care Discussed with Consultants/Other Providers [ x] YES  [ ] NO

## 2023-06-03 NOTE — PROGRESS NOTE ADULT - ASSESSMENT
42 year old female with PMHx Asthma, HTN presents with cough and SOB x3 days and hemoptysis. CT chest showing cavitary pneumonia. Cultures +MRSA bacteremia. Intubated; failed SBT. On Zyvox and Tamiflu. Repeat CT showing worsening PNA.    # Acute hypoxemic respiratory failure 2/2 cavitary MRSA PNA  # MRSA bacteremia, repeat cultures NGTD x3  # +Influenza B  # hx Asthma   - CT Angio Chest PE Protocol: Bilateral patchy groundglass nodularity with dominant confluent left lower lobe opacification with numerous cavitary lesions. Additional contralateral right lower lobe cavitary 1 cm nodule. Findings compatible with cavitary pneumonia in the appropriate clinical setting; differential diagnosis includes tuberculosis.  - Strep/Legionella negative, HIV negative   - intubated 5/23, self-extubated 5/25, re-intubated 5/25  - s/p Mupirocin BID x5 days  - blood cultures +MRSA  - blood cultures 5/28-30: NGTD x3  - ECHO: no vegetations. EF 65%, mild TR and pulm HTN  - repeat CT chest: Diffusely increased number and size of bilateral cavitary lesions with the left lower lobe now demonstrating a consolidative process of the entirelobe.  - D-dimer 1665, LE duplex negative for DVT  - pro-mya 48 > 5.8  - s/p MASTER 5/31: No evidence of valvular vegetations or intracardiac abscesses to support IE. Pulmonary hepatisation incidental finding.   - ABx per ID: c/w Vancomycin 500 qd, s/p Tamiflu 30mg q12 (day 10/10)  - start Acyclovir 125 mg q24 for suspected Shingles (vesicles on LUE)  - start Meropenem 1g q12 for persistent fevers  - PCR skin scraping negative   - repeat ET cultures negative   - repeat CT chest: Small left pleural effusion with overall essentially unchanged exam.  - s/p chest tube placement connected to suction     # LANCE   - Nephro following; post-infectious GN vs. ATN vs. pulmonary renal syndrome  - Cr stable around 3 > 2.6  - renal U/S: negative   - CK trending down 1483>39>41  - UA: +proteins and blood, Pr/Cr 1.3 (H)  - CHARLES+ 1:640, hep/HIV negative  - c/w PO bicarb 650 mg q8  - no acute indication for RRT    # Hypernatremia, improved   - increase free water flush to 300 q4  - trend BMP    # Anemia  - trend CBC, active type and screen, transfuse <7  - DIC panel negative   - Dimer 1665, LE Duplex negative for DVT  - hold heparin drip. SCDs  - s/p 3u pRBCs; Hgb 7.8  - PTT wnl  - gastric lavage negative   - mild amount of blood w/ suctioning  - pt is menstruating   - f/u CTAP to r/o retroperitoneal bleed    #DVT ppx: SCDs  #GI ppx: Pantoprazole 40mg qd  #Diet: NPO w/ tube feed w/ Reglan  #Activity: bedrest   #Dispo: MICU

## 2023-06-04 LAB
ALBUMIN SERPL ELPH-MCNC: 2 G/DL — LOW (ref 3.5–5.2)
ALP SERPL-CCNC: 83 U/L — SIGNIFICANT CHANGE UP (ref 30–115)
ALT FLD-CCNC: 9 U/L — SIGNIFICANT CHANGE UP (ref 0–41)
ANION GAP SERPL CALC-SCNC: 10 MMOL/L — SIGNIFICANT CHANGE UP (ref 7–14)
ANION GAP SERPL CALC-SCNC: 10 MMOL/L — SIGNIFICANT CHANGE UP (ref 7–14)
AST SERPL-CCNC: 17 U/L — SIGNIFICANT CHANGE UP (ref 0–41)
BASOPHILS # BLD AUTO: 0.07 K/UL — SIGNIFICANT CHANGE UP (ref 0–0.2)
BASOPHILS NFR BLD AUTO: 0.3 % — SIGNIFICANT CHANGE UP (ref 0–1)
BILIRUB SERPL-MCNC: 0.2 MG/DL — SIGNIFICANT CHANGE UP (ref 0.2–1.2)
BUN SERPL-MCNC: 69 MG/DL — CRITICAL HIGH (ref 10–20)
BUN SERPL-MCNC: 74 MG/DL — CRITICAL HIGH (ref 10–20)
CALCIUM SERPL-MCNC: 7.8 MG/DL — LOW (ref 8.4–10.5)
CALCIUM SERPL-MCNC: 7.9 MG/DL — LOW (ref 8.4–10.4)
CHLORIDE SERPL-SCNC: 109 MMOL/L — SIGNIFICANT CHANGE UP (ref 98–110)
CHLORIDE SERPL-SCNC: 112 MMOL/L — HIGH (ref 98–110)
CK SERPL-CCNC: 45 U/L — SIGNIFICANT CHANGE UP (ref 0–225)
CO2 SERPL-SCNC: 25 MMOL/L — SIGNIFICANT CHANGE UP (ref 17–32)
CO2 SERPL-SCNC: 26 MMOL/L — SIGNIFICANT CHANGE UP (ref 17–32)
CREAT SERPL-MCNC: 1.5 MG/DL — SIGNIFICANT CHANGE UP (ref 0.7–1.5)
CREAT SERPL-MCNC: 1.7 MG/DL — HIGH (ref 0.7–1.5)
CULTURE RESULTS: SIGNIFICANT CHANGE UP
EGFR: 38 ML/MIN/1.73M2 — LOW
EGFR: 44 ML/MIN/1.73M2 — LOW
EOSINOPHIL # BLD AUTO: 0.13 K/UL — SIGNIFICANT CHANGE UP (ref 0–0.7)
EOSINOPHIL NFR BLD AUTO: 0.5 % — SIGNIFICANT CHANGE UP (ref 0–8)
GAS PNL BLDA: SIGNIFICANT CHANGE UP
GLUCOSE BLDC GLUCOMTR-MCNC: 117 MG/DL — HIGH (ref 70–99)
GLUCOSE BLDC GLUCOMTR-MCNC: 127 MG/DL — HIGH (ref 70–99)
GLUCOSE BLDC GLUCOMTR-MCNC: 128 MG/DL — HIGH (ref 70–99)
GLUCOSE BLDC GLUCOMTR-MCNC: 216 MG/DL — HIGH (ref 70–99)
GLUCOSE SERPL-MCNC: 106 MG/DL — HIGH (ref 70–99)
GLUCOSE SERPL-MCNC: 111 MG/DL — HIGH (ref 70–99)
HCT VFR BLD CALC: 24 % — LOW (ref 37–47)
HGB BLD-MCNC: 7.3 G/DL — LOW (ref 12–16)
IMM GRANULOCYTES NFR BLD AUTO: 1.9 % — HIGH (ref 0.1–0.3)
LYMPHOCYTES # BLD AUTO: 1.15 K/UL — LOW (ref 1.2–3.4)
LYMPHOCYTES # BLD AUTO: 4.6 % — LOW (ref 20.5–51.1)
MAGNESIUM SERPL-MCNC: 1.5 MG/DL — LOW (ref 1.8–2.4)
MCHC RBC-ENTMCNC: 20.9 PG — LOW (ref 27–31)
MCHC RBC-ENTMCNC: 30.4 G/DL — LOW (ref 32–37)
MCV RBC AUTO: 68.6 FL — LOW (ref 81–99)
MONOCYTES # BLD AUTO: 0.88 K/UL — HIGH (ref 0.1–0.6)
MONOCYTES NFR BLD AUTO: 3.5 % — SIGNIFICANT CHANGE UP (ref 1.7–9.3)
NEUTROPHILS # BLD AUTO: 22.25 K/UL — HIGH (ref 1.4–6.5)
NEUTROPHILS NFR BLD AUTO: 89.2 % — HIGH (ref 42.2–75.2)
NRBC # BLD: 0 /100 WBCS — SIGNIFICANT CHANGE UP (ref 0–0)
PLATELET # BLD AUTO: 284 K/UL — SIGNIFICANT CHANGE UP (ref 130–400)
PMV BLD: 11.3 FL — HIGH (ref 7.4–10.4)
POTASSIUM SERPL-MCNC: 3.8 MMOL/L — SIGNIFICANT CHANGE UP (ref 3.5–5)
POTASSIUM SERPL-MCNC: 3.8 MMOL/L — SIGNIFICANT CHANGE UP (ref 3.5–5)
POTASSIUM SERPL-SCNC: 3.8 MMOL/L — SIGNIFICANT CHANGE UP (ref 3.5–5)
POTASSIUM SERPL-SCNC: 3.8 MMOL/L — SIGNIFICANT CHANGE UP (ref 3.5–5)
PROT SERPL-MCNC: 6.1 G/DL — SIGNIFICANT CHANGE UP (ref 6–8)
RBC # BLD: 3.5 M/UL — LOW (ref 4.2–5.4)
RBC # FLD: 26.9 % — HIGH (ref 11.5–14.5)
SODIUM SERPL-SCNC: 145 MMOL/L — SIGNIFICANT CHANGE UP (ref 135–146)
SODIUM SERPL-SCNC: 147 MMOL/L — HIGH (ref 135–146)
SPECIMEN SOURCE: SIGNIFICANT CHANGE UP
VANCOMYCIN TROUGH SERPL-MCNC: 11.2 UG/ML — HIGH (ref 5–10)
VANCOMYCIN TROUGH SERPL-MCNC: 11.9 UG/ML — HIGH (ref 5–10)
WBC # BLD: 24.96 K/UL — HIGH (ref 4.8–10.8)
WBC # FLD AUTO: 24.96 K/UL — HIGH (ref 4.8–10.8)

## 2023-06-04 PROCEDURE — 99291 CRITICAL CARE FIRST HOUR: CPT

## 2023-06-04 PROCEDURE — 71045 X-RAY EXAM CHEST 1 VIEW: CPT | Mod: 26

## 2023-06-04 PROCEDURE — 93010 ELECTROCARDIOGRAM REPORT: CPT

## 2023-06-04 PROCEDURE — 93970 EXTREMITY STUDY: CPT | Mod: 26

## 2023-06-04 RX ORDER — MAGNESIUM SULFATE 500 MG/ML
2 VIAL (ML) INJECTION
Refills: 0 | Status: COMPLETED | OUTPATIENT
Start: 2023-06-04 | End: 2023-06-04

## 2023-06-04 RX ORDER — METOPROLOL TARTRATE 50 MG
10 TABLET ORAL ONCE
Refills: 0 | Status: COMPLETED | OUTPATIENT
Start: 2023-06-04 | End: 2023-06-04

## 2023-06-04 RX ORDER — AMIODARONE HYDROCHLORIDE 400 MG/1
150 TABLET ORAL ONCE
Refills: 0 | Status: COMPLETED | OUTPATIENT
Start: 2023-06-04 | End: 2023-06-04

## 2023-06-04 RX ORDER — AMIODARONE HYDROCHLORIDE 400 MG/1
1 TABLET ORAL
Qty: 450 | Refills: 0 | Status: DISCONTINUED | OUTPATIENT
Start: 2023-06-04 | End: 2023-06-05

## 2023-06-04 RX ORDER — AMIODARONE HYDROCHLORIDE 400 MG/1
0.5 TABLET ORAL
Qty: 450 | Refills: 0 | Status: COMPLETED | OUTPATIENT
Start: 2023-06-04 | End: 2023-06-05

## 2023-06-04 RX ORDER — VANCOMYCIN HCL 1 G
1250 VIAL (EA) INTRAVENOUS EVERY 24 HOURS
Refills: 0 | Status: DISCONTINUED | OUTPATIENT
Start: 2023-06-05 | End: 2023-06-12

## 2023-06-04 RX ORDER — POTASSIUM CHLORIDE 20 MEQ
40 PACKET (EA) ORAL ONCE
Refills: 0 | Status: COMPLETED | OUTPATIENT
Start: 2023-06-04 | End: 2023-06-04

## 2023-06-04 RX ADMIN — Medication 1 PUFF(S): at 15:07

## 2023-06-04 RX ADMIN — AMIODARONE HYDROCHLORIDE 16.7 MG/MIN: 400 TABLET ORAL at 11:07

## 2023-06-04 RX ADMIN — Medication 250 MILLIGRAM(S): at 10:09

## 2023-06-04 RX ADMIN — Medication 1 PUFF(S): at 08:23

## 2023-06-04 RX ADMIN — CHLORHEXIDINE GLUCONATE 15 MILLILITER(S): 213 SOLUTION TOPICAL at 05:23

## 2023-06-04 RX ADMIN — FENTANYL CITRATE 4.72 MICROGRAM(S)/KG/HR: 50 INJECTION INTRAVENOUS at 02:20

## 2023-06-04 RX ADMIN — CHLORHEXIDINE GLUCONATE 15 MILLILITER(S): 213 SOLUTION TOPICAL at 18:29

## 2023-06-04 RX ADMIN — MIDAZOLAM HYDROCHLORIDE 1.7 MG/KG/HR: 1 INJECTION, SOLUTION INTRAMUSCULAR; INTRAVENOUS at 18:28

## 2023-06-04 RX ADMIN — AMIODARONE HYDROCHLORIDE 600 MILLIGRAM(S): 400 TABLET ORAL at 04:57

## 2023-06-04 RX ADMIN — Medication 25 GRAM(S): at 06:31

## 2023-06-04 RX ADMIN — MEROPENEM 100 MILLIGRAM(S): 1 INJECTION INTRAVENOUS at 18:28

## 2023-06-04 RX ADMIN — CHLORHEXIDINE GLUCONATE 1 APPLICATION(S): 213 SOLUTION TOPICAL at 05:24

## 2023-06-04 RX ADMIN — Medication 25 GRAM(S): at 12:28

## 2023-06-04 RX ADMIN — Medication 10 MILLIGRAM(S): at 04:56

## 2023-06-04 RX ADMIN — Medication 650 MILLIGRAM(S): at 20:41

## 2023-06-04 RX ADMIN — Medication 25 GRAM(S): at 08:44

## 2023-06-04 RX ADMIN — AMIODARONE HYDROCHLORIDE 33.3 MG/MIN: 400 TABLET ORAL at 06:08

## 2023-06-04 RX ADMIN — MIDAZOLAM HYDROCHLORIDE 1.7 MG/KG/HR: 1 INJECTION, SOLUTION INTRAMUSCULAR; INTRAVENOUS at 10:09

## 2023-06-04 RX ADMIN — MEROPENEM 100 MILLIGRAM(S): 1 INJECTION INTRAVENOUS at 05:23

## 2023-06-04 RX ADMIN — FENTANYL CITRATE 4.72 MICROGRAM(S)/KG/HR: 50 INJECTION INTRAVENOUS at 18:28

## 2023-06-04 RX ADMIN — Medication 1 APPLICATION(S): at 12:30

## 2023-06-04 RX ADMIN — PANTOPRAZOLE SODIUM 40 MILLIGRAM(S): 20 TABLET, DELAYED RELEASE ORAL at 12:28

## 2023-06-04 RX ADMIN — Medication 40 MILLIEQUIVALENT(S): at 07:08

## 2023-06-04 RX ADMIN — Medication 650 MILLIGRAM(S): at 20:56

## 2023-06-04 NOTE — PROGRESS NOTE ADULT - SUBJECTIVE AND OBJECTIVE BOX
Over Night Events: events noted, remain critically ill, still intubated, ventilated, low grade fever, tachycardia/ rapid A fib on amiodarone, versed, fentanyl, nimbex, Ct drainage noted  PHYSICAL EXAM    ICU Vital Signs Last 24 Hrs  T(C): 37.4 (04 Jun 2023 04:00), Max: 38.4 (03 Jun 2023 12:00)  T(F): 99.3 (04 Jun 2023 04:00), Max: 101.1 (03 Jun 2023 12:00)  HR: 102 (04 Jun 2023 06:00) (101 - 141)  BP: 108/57 (04 Jun 2023 06:00) (108/57 - 157/70)  BP(mean): 79 (04 Jun 2023 06:00) (78 - 108)  RR: 30 (04 Jun 2023 06:00) (13 - 33)  SpO2: 98% (04 Jun 2023 06:00) (94% - 100%)    O2 Parameters below as of 04 Jun 2023 04:00  Patient On (Oxygen Delivery Method): ventilator    O2 Concentration (%): 40        General: ill looking  HEENT: ETT           Lungs: dec bs bases  Cardiovascular: Regular   Abdomen: Soft, Positive BS  Extremities: No clubbing   paralyzed  LUE rash/ back      06-03-23 @ 07:01  -  06-04-23 @ 07:00  --------------------------------------------------------  IN:    Amiodarone: 99 mL    Cisatracurium: 30.6 mL    Cisatracurium: 321.3 mL    Enteral Tube Flush: 1800 mL    FentaNYL: 977.5 mL    Midazolam: 194.4 mL    Peptamen A.F.: 1150 mL  Total IN: 4572.8 mL    OUT:    Chest Tube (mL): 25 mL    Indwelling Catheter - Urethral (mL): 1200 mL    Voided (mL): 1700 mL  Total OUT: 2925 mL    Total NET: 1647.8 mL          LABS:                          7.3    24.96 )-----------( 284      ( 04 Jun 2023 04:40 )             24.0                                               06-04    147<H>  |  112<H>  |  69<HH>  ----------------------------<  106<H>  3.8   |  25  |  1.5    Ca    7.9<L>      04 Jun 2023 04:40  Mg     1.5     06-04    TPro  6.1  /  Alb  2.0<L>  /  TBili  0.2  /  DBili  x   /  AST  17  /  ALT  9   /  AlkPhos  83  06-04                                                 CARDIAC MARKERS ( 04 Jun 2023 04:40 )  x     / x     / 45 U/L / x     / x      CARDIAC MARKERS ( 03 Jun 2023 05:23 )  x     / x     / 64 U/L / x     / x                                                LIVER FUNCTIONS - ( 04 Jun 2023 04:40 )  Alb: 2.0 g/dL / Pro: 6.1 g/dL / ALK PHOS: 83 U/L / ALT: 9 U/L / AST: 17 U/L / GGT: x                                                  Culture - Body Fluid with Gram Stain (collected 02 Jun 2023 17:52)  Source: Pleural Fl Pleural Fluid  Gram Stain (03 Jun 2023 02:05):    polymorphonuclear leukocytes seen    No organisms seen    by cytocentrifuge  Preliminary Report (03 Jun 2023 19:50):    No growth    Culture - Acid Fast - Body Fluid w/Smear (collected 02 Jun 2023 17:52)  Source: .Body Fluid Other, Chest Tube    Culture - Blood (collected 02 Jun 2023 05:00)  Source: .Blood Blood  Preliminary Report (03 Jun 2023 16:01):    No growth to date.                                                   Mode: AC/ CMV (Assist Control/ Continuous Mandatory Ventilation)  RR (machine): 30  TV (machine): 350  FiO2: 40  PEEP: 8  ITime: 1  MAP: 16  PIP: 30                                      ABG - ( 04 Jun 2023 03:46 )  pH, Arterial: 7.41  pH, Blood: x     /  pCO2: 43    /  pO2: 91    / HCO3: 27    / Base Excess: 2.3   /  SaO2: 98.0      p/p 42/30          MEDICATIONS  (STANDING):  acyclovir IVPB 125 milliGRAM(s) IV Intermittent every 24 hours  aMIOdarone Infusion 1 mG/Min (33.3 mL/Hr) IV Continuous <Continuous>  aMIOdarone Infusion 0.5 mG/Min (16.7 mL/Hr) IV Continuous <Continuous>  chlorhexidine 0.12% Liquid 15 milliLiter(s) Oral Mucosa every 12 hours  chlorhexidine 2% Cloths 1 Application(s) Topical <User Schedule>  chlorhexidine 2% Cloths 1 Application(s) Topical daily  cisatracurium Infusion 3 MICROgram(s)/kG/Min (15.3 mL/Hr) IV Continuous <Continuous>  dexMEDEtomidine Infusion 0.2 MICROgram(s)/kG/Hr (4.25 mL/Hr) IV Continuous <Continuous>  dextrose 5%. 1000 milliLiter(s) (60 mL/Hr) IV Continuous <Continuous>  fentaNYL   Infusion. 0.555 MICROgram(s)/kG/Hr (4.72 mL/Hr) IV Continuous <Continuous>  insulin lispro (ADMELOG) corrective regimen sliding scale   SubCutaneous three times a day before meals  ipratropium 17 MICROgram(s) HFA Inhaler 1 Puff(s) Inhalation every 6 hours  ketamine Infusion. 0.25 mG/kG/Hr (2.13 mL/Hr) IV Continuous <Continuous>  magnesium sulfate  IVPB 2 Gram(s) IV Intermittent every 2 hours  meropenem  IVPB 1000 milliGRAM(s) IV Intermittent every 12 hours  midazolam Infusion 0.02 mG/kG/Hr (1.7 mL/Hr) IV Continuous <Continuous>  norepinephrine Infusion 0.05 MICROgram(s)/kG/Min (7.97 mL/Hr) IV Continuous <Continuous>  pantoprazole   Suspension 40 milliGRAM(s) Oral daily  polyethylene glycol 3350 17 Gram(s) Oral every 12 hours  propofol Infusion 11.098 MICROgram(s)/kG/Min (5.66 mL/Hr) IV Continuous <Continuous>  senna 2 Tablet(s) Oral at bedtime  vancomycin  IVPB 1000 milliGRAM(s) IV Intermittent every 24 hours  vitamin A &amp; D Ointment 1 Application(s) Topical daily    MEDICATIONS  (PRN):  acetaminophen     Tablet .. 650 milliGRAM(s) Oral every 6 hours PRN Temp greater or equal to 38C (100.4F)  albuterol    90 MICROgram(s) HFA Inhaler 4 Puff(s) Inhalation every 4 hours PRN Shortness of Breath and/or Wheezing  ipratropium 17 MICROgram(s) HFA Inhaler 4 Puff(s) Inhalation <User Schedule> PRN -  midazolam Injectable 2 milliGRAM(s) IV Push every 4 hours PRN Agitation      CXR reviewed

## 2023-06-04 NOTE — PROGRESS NOTE ADULT - ASSESSMENT
IMPRESSION:    Acute hypoxemic respiratory failure still intubated, ventilated on 40%  Severe cavitary CAP MRSA/ ro loculated/ sp repeat CT sp Chest tube  MRSA bacteremia / MASTER -  Anemia sp tx  hemoptysis   New onset A FIB  LANCE non oliguric improving  hypernatremia Improving  HO asthma   Influenza B     PLAN:    CNS: daily SAT hold nimbex    HEENT: Oral care.  ET care     PULMONARY:  HOB @ 45 degrees.  Aspiration precautions.  ARDS net MV setting.  Monitor PPL and DP., chest ct, CT sx, keep SaO2 92 TO 96%    CARDIOVASCULAR:  ECHO/ MASTER noted, free water    GI: GI prophylaxis.  OG Feeding to target.  Adjust Bowel regimen.  reglan as tolerated fup CT AP noted    RENAL:  Follow up lytes.  Correct as needed.     INFECTIOUS DISEASE: ABX PER id    HEMATOLOGICAL:  DVT prophylaxis.  trend CBC     ENDOCRINE:  Follow up FS.  Insulin protocol if needed.      MICU     l IJ 6/2

## 2023-06-04 NOTE — CHART NOTE - NSCHARTNOTEFT_GEN_A_CORE
Pt noted to have HR 200s, EKG consistent with Afib RVR, 10mg Metoprolol IVP administered and pt HR went to 130s. EKG still showing A fib with RVR, started pt on amio drip      EP consulted, primary team to follow up in AM

## 2023-06-04 NOTE — PROGRESS NOTE ADULT - SUBJECTIVE AND OBJECTIVE BOX
Nephrology Progress Note    ZEYNEP ROSSI  MRN-[id number]  42y  Female    S:  Patient is seen and examined, events over the last 24h noted.    O:  Allergies:  aspirin (Short breath; Anaphylaxis)    Hospital Medications:   MEDICATIONS  (STANDING):  aMIOdarone Infusion 1 mG/Min (33.3 mL/Hr) IV Continuous <Continuous>  aMIOdarone Infusion 0.5 mG/Min (16.7 mL/Hr) IV Continuous <Continuous>  chlorhexidine 0.12% Liquid 15 milliLiter(s) Oral Mucosa every 12 hours  chlorhexidine 2% Cloths 1 Application(s) Topical daily  chlorhexidine 2% Cloths 1 Application(s) Topical <User Schedule>  dexMEDEtomidine Infusion 0.2 MICROgram(s)/kG/Hr (4.25 mL/Hr) IV Continuous <Continuous>  dextrose 5%. 1000 milliLiter(s) (60 mL/Hr) IV Continuous <Continuous>  fentaNYL   Infusion. 0.555 MICROgram(s)/kG/Hr (4.72 mL/Hr) IV Continuous <Continuous>  insulin lispro (ADMELOG) corrective regimen sliding scale   SubCutaneous three times a day before meals  ipratropium 17 MICROgram(s) HFA Inhaler 1 Puff(s) Inhalation every 6 hours  ketamine Infusion. 0.25 mG/kG/Hr (2.13 mL/Hr) IV Continuous <Continuous>  meropenem  IVPB 1000 milliGRAM(s) IV Intermittent every 12 hours  midazolam Infusion 0.02 mG/kG/Hr (1.7 mL/Hr) IV Continuous <Continuous>  norepinephrine Infusion 0.05 MICROgram(s)/kG/Min (7.97 mL/Hr) IV Continuous <Continuous>  pantoprazole   Suspension 40 milliGRAM(s) Oral daily  polyethylene glycol 3350 17 Gram(s) Oral every 12 hours  propofol Infusion 11.098 MICROgram(s)/kG/Min (5.66 mL/Hr) IV Continuous <Continuous>  senna 2 Tablet(s) Oral at bedtime  vancomycin  IVPB 1000 milliGRAM(s) IV Intermittent every 24 hours  vitamin A &amp; D Ointment 1 Application(s) Topical daily    MEDICATIONS  (PRN):  acetaminophen     Tablet .. 650 milliGRAM(s) Oral every 6 hours PRN Temp greater or equal to 38C (100.4F)  albuterol    90 MICROgram(s) HFA Inhaler 4 Puff(s) Inhalation every 4 hours PRN Shortness of Breath and/or Wheezing  ipratropium 17 MICROgram(s) HFA Inhaler 4 Puff(s) Inhalation <User Schedule> PRN -  midazolam Injectable 2 milliGRAM(s) IV Push every 4 hours PRN Agitation    Home Medications:      VITALS:  Daily     Daily Weight in k (2023 06:00)  T(F): 99.3 (23 @ 16:00), Max: 99.3 (23 @ 04:00)  HR: 128 (23 @ 19:00)  BP: 121/59 (23 @ 19:00)  RR: 33 (23 @ 19:00)  SpO2: 96% (23 @ 19:00)  Wt(kg): --  I&O's Detail    2023 07:  -  2023 07:00  --------------------------------------------------------  IN:    Amiodarone: 99 mL    Cisatracurium: 30.6 mL    Cisatracurium: 321.3 mL    Enteral Tube Flush: 1800 mL    FentaNYL: 977.5 mL    Midazolam: 194.4 mL    Peptamen A.F.: 1150 mL  Total IN: 4572.8 mL    OUT:    Chest Tube (mL): 25 mL    Indwelling Catheter - Urethral (mL): 1200 mL    Voided (mL): 1700 mL  Total OUT: 2925 mL    Total NET: 1647.8 mL      2023 07:  -  2023 20:50  --------------------------------------------------------  IN:    Amiodarone: 132 mL    Amiodarone: 133.6 mL    Cisatracurium: 45.9 mL    Enteral Tube Flush: 900 mL    FentaNYL: 510 mL    IV PiggyBack: 350 mL    Midazolam: 102 mL    Peptamen A.F.: 600 mL    PRBCs (Packed Red Blood Cells): 50 mL  Total IN: 2823.5 mL    OUT:    Chest Tube (mL): 15 mL    Dexmedetomidine: 0 mL    Ketamine: 0 mL    Norepinephrine: 0 mL    Propofol: 0 mL    Voided (mL): 1400 mL  Total OUT: 1415 mL    Total NET: 1408.5 mL        I&O's Summary    2023 07:  -  2023 07:00  --------------------------------------------------------  IN: 4572.8 mL / OUT: 2925 mL / NET: 1647.8 mL    2023 07:01  -  2023 20:50  --------------------------------------------------------  IN: 2823.5 mL / OUT: 1415 mL / NET: 1408.5 mL          PHYSICAL EXAM:  Gen: intubated/ventilated  Resp: b/l breath sounds  Card: S1/S2  Abd: soft  Extremities: no edema      LABS:  ABG - ( 2023 03:46 )  pH, Arterial: 7.41  pH, Blood: x     /  pCO2: 43    /  pO2: 91    / HCO3: 27    / Base Excess: 2.3   /  SaO2: 98.0            147<H>  |  112<H>  |  69<HH>  ----------------------------<  106<H>  3.8   |  25  |  1.5    Ca    7.9<L>      2023 04:40  Mg     1.5         TPro  6.1  /  Alb  2.0<L>  /  TBili  0.2  /  DBili      /  AST  17  /  ALT  9   /  AlkPhos  83      Phosphorus Level, Serum: 5.0 mg/dL (23 @ 12:05)  Phosphorus Level, Serum: 5.4 mg/dL (23 @ 08:10)                          7.3    24.96 )-----------( 284      ( 2023 04:40 )             24.0     Mean Cell Volume: 68.6 fL (23 @ 04:40)      Culture Results:   No growth ( @ 17:52)  Culture Results:   No growth to date. ( @ 05:00)    Creatinine trend:  Creatinine, Serum: 1.5 mg/dL (23 @ 04:40)  Creatinine, Serum: 1.7 mg/dL (23 @ 23:16)  Creatinine, Serum: 2.0 mg/dL (23 @ 14:45)  Creatinine, Serum: 2.2 mg/dL (23 @ 05:23)  Creatinine, Serum: 2.6 mg/dL (23 @ 23:32)

## 2023-06-04 NOTE — PROGRESS NOTE ADULT - ASSESSMENT
42/F with PMH of asthma and HTN, admitted with SOB, developed LANCE. Acute hypoxemic resp failure requiring intubation  Bilateral patchy groundglass nodularity with dominant confluent left  lower lobe opacification with numerous cavitary lesions. Additional contralateral right lower lobe cavitary 1 cm nodule. Findings compatible  with cavitary pneumonia/Tb?  Non massive hemoptysis   MRSA bacteremia, prerenal FeNa, suspicion for post infectious GN/ pleural effusion   Kidney sono neg for obstruction  LANCE prerenal vs ATN vs pulmo renal syndrome  s/p CT with IV contrast 5/23 -  non oliguric   creat down-trending  follow IP   w/up for GN noted negative  replete free water  replete lytes as needed  dose vancomycin by level    Nephrology signing off.  Recall as needed

## 2023-06-04 NOTE — PROGRESS NOTE ADULT - ASSESSMENT
ASSESSMENT:  Patient is a 42F w/ pmh of asthma and htn admitted 5/24 with ahrh secondary to MRSA PNA still intubated and spiking fevers. CT chest 5/27 revealing b/l cavitary lesions w/ additional left consolidation and effusion. Bedside ultrasound revealing septated left effusion, repeat CT chest redemonstrated cavitary lesions as well as left sided loculated effusion.    PLAN:  - s/p left 16F chest tube placement on 6/2  - CT on suction, no air leak   - Monitor CT outputs, including for airleaks   - Daily CXRs   - management per ICU team     CT Surgery following, Spectra [phone] for questions or concerns

## 2023-06-04 NOTE — PROGRESS NOTE ADULT - SUBJECTIVE AND OBJECTIVE BOX
GENERAL SURGERY PROGRESS NOTE    Patient: ZEYNEP ROSSI , 42y ([birth year])Female   MRN: [id number]  Location: [address]  Visit: 05-23-23 Inpatient  Date: 06-04-23 @ 00:42    No acute events reported. Patient remains critically ill, intubated and sedated. Left chest tube remains in place to suction with minimal output. No airleak noted.     PAST MEDICAL & SURGICAL HISTORY:    Vitals:   T(F): 99 (06-04-23 @ 00:00), Max: 101.1 (06-03-23 @ 12:00)  HR: 118 (06-04-23 @ 00:00)  BP: 126/64 (06-04-23 @ 00:00)  RR: 30 (06-04-23 @ 00:00)  SpO2: 99% (06-04-23 @ 00:00)  Mode: AC/ CMV (Assist Control/ Continuous Mandatory Ventilation), RR (machine): 30, TV (machine): 350, FiO2: 40, PEEP: 8, ITime: 1, MAP: 16, PIP: 31    Diet, NPO with Tube Feed:   Tube Feeding Modality: Orogastric  Peptamen Intense VHP  Total Volume for 24 Hours (mL): 1200  Continuous  Starting Tube Feed Rate mL per Hour: 20  Increase Tube Feed Rate by (mL): 20     Every 4 hours  Until Goal Tube Feed Rate (mL per Hour): 50  Tube Feed Duration (in Hours): 24  Tube Feed Start Time: 12:00  Free Water Flush   Total Volume per Flush (mL): 300   Frequency: Every 4 Hours  Diet, NPO after Midnight:      NPO Start Date: 30-May-2023,   NPO Start Time: 23:59  Except Medications      Fluids:     I & O's:    06-02-23 @ 07:01  -  06-03-23 @ 07:00  --------------------------------------------------------  IN:    Cisatracurium: 351.9 mL    Dexmedetomidine: 66.8 mL    dextrose 5%: 720 mL    FentaNYL: 915.5 mL    FentaNYL: 42.5 mL    IV PiggyBack: 400 mL    Ketamine: 34 mL    Midazolam: 179.5 mL    Midazolam: 12.9 mL    Peptamen A.F.: 725 mL  Total IN: 3448.1 mL    OUT:    Chest Tube (mL): 50 mL    Indwelling Catheter - Urethral (mL): 2785 mL  Total OUT: 2835 mL    Total NET: 613.1 mL    PHYSICAL EXAM:  General: NAD, sedated and paralyzed   HEENT: NCAT, TRAM, EOMI, Trachea ML, Neck supple  Cardiac: RRR S1, S2, no Murmurs, rubs or gallops  Respiratory: mechanically vented, Left CT to sxn  Abdomen: Soft, non-distended  Vascular: Pulses 2+ throughout, extremities well perfused  Skin: Warm/dry, normal color, no jaundice    MEDICATIONS  (STANDING):  acyclovir IVPB 125 milliGRAM(s) IV Intermittent every 24 hours  chlorhexidine 0.12% Liquid 15 milliLiter(s) Oral Mucosa every 12 hours  chlorhexidine 2% Cloths 1 Application(s) Topical <User Schedule>  chlorhexidine 2% Cloths 1 Application(s) Topical daily  cisatracurium Infusion 3 MICROgram(s)/kG/Min (15.3 mL/Hr) IV Continuous <Continuous>  dexMEDEtomidine Infusion 0.2 MICROgram(s)/kG/Hr (4.25 mL/Hr) IV Continuous <Continuous>  dextrose 5%. 1000 milliLiter(s) (60 mL/Hr) IV Continuous <Continuous>  fentaNYL   Infusion. 0.555 MICROgram(s)/kG/Hr (4.72 mL/Hr) IV Continuous <Continuous>  insulin lispro (ADMELOG) corrective regimen sliding scale   SubCutaneous three times a day before meals  ipratropium 17 MICROgram(s) HFA Inhaler 1 Puff(s) Inhalation every 6 hours  ketamine Infusion. 0.25 mG/kG/Hr (2.13 mL/Hr) IV Continuous <Continuous>  meropenem  IVPB 1000 milliGRAM(s) IV Intermittent every 12 hours  midazolam Infusion 0.02 mG/kG/Hr (1.7 mL/Hr) IV Continuous <Continuous>  norepinephrine Infusion 0.05 MICROgram(s)/kG/Min (7.97 mL/Hr) IV Continuous <Continuous>  pantoprazole   Suspension 40 milliGRAM(s) Oral daily  polyethylene glycol 3350 17 Gram(s) Oral every 12 hours  propofol Infusion 11.098 MICROgram(s)/kG/Min (5.66 mL/Hr) IV Continuous <Continuous>  senna 2 Tablet(s) Oral at bedtime  vancomycin  IVPB 1000 milliGRAM(s) IV Intermittent every 24 hours  vitamin A &amp; D Ointment 1 Application(s) Topical daily    MEDICATIONS  (PRN):  acetaminophen     Tablet .. 650 milliGRAM(s) Oral every 6 hours PRN Temp greater or equal to 38C (100.4F)  albuterol    90 MICROgram(s) HFA Inhaler 4 Puff(s) Inhalation every 4 hours PRN Shortness of Breath and/or Wheezing  ipratropium 17 MICROgram(s) HFA Inhaler 4 Puff(s) Inhalation <User Schedule> PRN -  midazolam Injectable 2 milliGRAM(s) IV Push every 4 hours PRN Agitation      DVT PROPHYLAXIS:   GI PROPHYLAXIS: pantoprazole   Suspension 40 milliGRAM(s) Oral daily    ANTICOAGULATION:   ANTIBIOTICS:  acyclovir IVPB 125 milliGRAM(s)  meropenem  IVPB 1000 milliGRAM(s)  vancomycin  IVPB 1000 milliGRAM(s)    LAB/STUDIES:  Labs:  CAPILLARY BLOOD GLUCOSE    POCT Blood Glucose.: 117 mg/dL (04 Jun 2023 00:07)  POCT Blood Glucose.: 120 mg/dL (03 Jun 2023 06:29)                          7.8    22.35 )-----------( 270      ( 03 Jun 2023 05:23 )             26.0       Auto Neutrophil %: 87.3 % (06-03-23 @ 05:23)  Auto Immature Granulocyte %: 1.6 % (06-03-23 @ 05:23)    06-03    145  |  110  |  76<HH>  ----------------------------<  155<H>  3.8   |  26  |  2.0<H>      Calcium, Total Serum: 7.8 mg/dL (06-03-23 @ 14:45)      LFTs:             6.5  | 0.3  | 15       ------------------[77      ( 03 Jun 2023 05:23 )  2.3  | x    | 10          Lipase:x      Amylase:x         Blood Gas Arterial, Lactate: 1.00 mmol/L (06-03-23 @ 13:55)  Blood Gas Arterial, Lactate: 0.80 mmol/L (06-03-23 @ 04:07)  Blood Gas Arterial, Lactate: 1.00 mmol/L (06-02-23 @ 16:09)  Blood Gas Arterial, Lactate: 0.90 mmol/L (06-02-23 @ 14:30)  Blood Gas Arterial, Lactate: 0.80 mmol/L (06-02-23 @ 03:08)  Blood Gas Arterial, Lactate: 0.90 mmol/L (06-01-23 @ 03:30)    ABG - ( 03 Jun 2023 13:55 )  pH: 7.35  /  pCO2: 49    /  pO2: 96    / HCO3: 27    / Base Excess: 1.3   /  SaO2: 98.3            ABG - ( 03 Jun 2023 04:07 )  pH: 7.35  /  pCO2: 48    /  pO2: 93    / HCO3: 26    / Base Excess: 0.4   /  SaO2: 97.8            ABG - ( 02 Jun 2023 16:09 )  pH: 7.34  /  pCO2: 48    /  pO2: 98    / HCO3: 26    / Base Excess: -0.4  /  SaO2: 98.2              Coags:    CARDIAC MARKERS ( 03 Jun 2023 05:23 )  x     / x     / 64 U/L / x     / x      CARDIAC MARKERS ( 02 Jun 2023 05:00 )  x     / x     / 83 U/L / x     / x                  Culture - Body Fluid with Gram Stain (collected 02 Jun 2023 17:52)  Source: Pleural Fl Pleural Fluid  Gram Stain (03 Jun 2023 02:05):    polymorphonuclear leukocytes seen    No organisms seen    by cytocentrifuge  Preliminary Report (03 Jun 2023 19:50):    No growth    Culture - Acid Fast - Body Fluid w/Smear (collected 02 Jun 2023 17:52)  Source: .Body Fluid Other, Chest Tube    Culture - Blood (collected 02 Jun 2023 05:00)  Source: .Blood Blood  Preliminary Report (03 Jun 2023 16:01):    No growth to date.    Culture - Blood (collected 01 Jun 2023 04:59)  Source: .Blood None  Preliminary Report (02 Jun 2023 19:02):    No growth to date.    Culture - Sputum (collected 01 Jun 2023 02:15)  Source: ET Tube ET Tube  Gram Stain (01 Jun 2023 17:36):    Moderate polymorphonuclear leukocytes per low power field    No Squamous epithelial cells per low power field    Rare Gram positive cocci in pairs per oil power field  Final Report (03 Jun 2023 12:27):    Normal Respiratory Laila present    IMAGING:    < from: Xray Chest 1 View- PORTABLE-Routine (Xray Chest 1 View- PORTABLE-Routine in AM.) (06.03.23 @ 06:48) >  Impression:    Low lung volume.    Left lung opacity, unchanged.    Support tubes and lines as above.    --- End of Report ---    < end of copied text >

## 2023-06-04 NOTE — CONSULT NOTE ADULT - SUBJECTIVE AND OBJECTIVE BOX
Patient is a 42y old  Female who presents with a chief complaint of AHRF (2023 08:06)    HPI: Patient is a 42 year-old female with PMH of Asthma, HTN? presents with complaint of cough x3 days and SOB. During my encounter pt with dyspnea and increased work of breathing and chest pain, unable to properly provide accurate history. She states that her cough has been progressively worsening over the past 3 days, endorses hemoptysis since yesterday. She states she had a friend who came over and stayed with for more than a week who was sick recently, but she does not know if she recently travelled out of [facility] or not. She also admits that her children has been sick with Sore throat and fever for past 3 days. She admits to fever, chills, sever Chest pain which has gotten worse since she came to the ED. She took 1x Tamiflu yesterday. She otherwise denies diarrhea, constipation, urinary symptoms. Patient found to have AHRH secondary to MRSA PNA still intubated and spiking fevers. CT chest  revealing b/l cavitary lesions w/ additional left consolidation and effusion. Bedside ultrasound revealing septated left effusion, repeat CT chest redemonstrated cavitary lesions as well as left sided loculated effusion. CT surgery placed chest tube, patient still intubated. Now with new onset AF RVR, converted to sinus rhythm.    PAST MEDICAL & SURGICAL HISTORY:  HTN  Asthma    PREVIOUS DIAGNOSTIC TESTING:      ECHO  FINDINGS:  < from: TTE Echo Complete w/o Contrast w/ Doppler (23 @ 08:47) >  Summary:   1. LV Ejection Fraction by Evans's Method with a biplane EF of 65 %.   2. Normal global left ventricular systolic function.   3. Mildly increased LV wall thickness.   4. Mildly reduced RV systolic function.   5. Mild mitral annular calcification.   6. Mild thickening of the anterior and posterior mitral valve leaflets.   7. Moderate tricuspid regurgitation.   8. Estimated pulmonary artery systolic pressure is 42.3 mmHg assuming a   right atrial pressure of 8 mmHg, which is consistent with mild pulmonary   hypertension.   9. Normal left atrial size.  10. Normal right atrial size.  11. Large pleural effusion in the left lateral region.    < end of copied text >    STRESS  FINDINGS:    CATHETERIZATION  FINDINGS:    ELECTROPHYSIOLOGY STUDY  FINDINGS:    CAROTID ULTRASOUND:  FINDINGS    VENOUS DUPLEX SCAN:  FINDINGS:    CHEST CT PULMONARY ANGIO with IV Contrast:  FINDINGS:  < from: CT Angio Chest PE Protocol w/ IV Cont (23 @ 22:06) >  IMPRESSION:    1. Bilateral patchy groundglass nodularity with dominant confluent left   lower lobe opacification with numerous cavitary lesions. Additional   contralateral right lower lobe cavitary 1 cm nodule. Findings compatible   with cavitary pneumonia in the appropriate clinical setting; differential   diagnosis includes tuberculosis.    2. Confluent ill-defined left hilar and mediastinal adenopathy, likely   reactive, without dominant lymph node delineated.    3. No evidence of pulmonary embolism.    < end of copied text >      MEDICATIONS  (STANDING):  acyclovir IVPB 125 milliGRAM(s) IV Intermittent every 24 hours  aMIOdarone Infusion 1 mG/Min (33.3 mL/Hr) IV Continuous <Continuous>  aMIOdarone Infusion 0.5 mG/Min (16.7 mL/Hr) IV Continuous <Continuous>  chlorhexidine 0.12% Liquid 15 milliLiter(s) Oral Mucosa every 12 hours  chlorhexidine 2% Cloths 1 Application(s) Topical <User Schedule>  chlorhexidine 2% Cloths 1 Application(s) Topical daily  dexMEDEtomidine Infusion 0.2 MICROgram(s)/kG/Hr (4.25 mL/Hr) IV Continuous <Continuous>  dextrose 5%. 1000 milliLiter(s) (60 mL/Hr) IV Continuous <Continuous>  fentaNYL   Infusion. 0.555 MICROgram(s)/kG/Hr (4.72 mL/Hr) IV Continuous <Continuous>  insulin lispro (ADMELOG) corrective regimen sliding scale   SubCutaneous three times a day before meals  ipratropium 17 MICROgram(s) HFA Inhaler 1 Puff(s) Inhalation every 6 hours  ketamine Infusion. 0.25 mG/kG/Hr (2.13 mL/Hr) IV Continuous <Continuous>  magnesium sulfate  IVPB 2 Gram(s) IV Intermittent every 2 hours  meropenem  IVPB 1000 milliGRAM(s) IV Intermittent every 12 hours  midazolam Infusion 0.02 mG/kG/Hr (1.7 mL/Hr) IV Continuous <Continuous>  norepinephrine Infusion 0.05 MICROgram(s)/kG/Min (7.97 mL/Hr) IV Continuous <Continuous>  pantoprazole   Suspension 40 milliGRAM(s) Oral daily  polyethylene glycol 3350 17 Gram(s) Oral every 12 hours  propofol Infusion 11.098 MICROgram(s)/kG/Min (5.66 mL/Hr) IV Continuous <Continuous>  senna 2 Tablet(s) Oral at bedtime  vancomycin  IVPB 1000 milliGRAM(s) IV Intermittent every 24 hours  vitamin A &amp; D Ointment 1 Application(s) Topical daily    MEDICATIONS  (PRN):  acetaminophen     Tablet .. 650 milliGRAM(s) Oral every 6 hours PRN Temp greater or equal to 38C (100.4F)  albuterol    90 MICROgram(s) HFA Inhaler 4 Puff(s) Inhalation every 4 hours PRN Shortness of Breath and/or Wheezing  ipratropium 17 MICROgram(s) HFA Inhaler 4 Puff(s) Inhalation <User Schedule> PRN -  midazolam Injectable 2 milliGRAM(s) IV Push every 4 hours PRN Agitation      FAMILY HISTORY: No significant hx    SOCIAL HISTORY: No smoking, ETOH or illicit drug use    Past Surgical History: No significant hx    Allergies:  aspirin (Short breath; Anaphylaxis)      REVIEW OF SYSTEMS: Unable to assess 2/2 mental status      Vital Signs Last 24 Hrs  T(C): 37.1 (2023 08:00), Max: 37.6 (2023 16:00)  T(F): 98.7 (2023 08:00), Max: 99.7 (2023 16:00)  HR: 123 (2023 11:00) (101 - 141)  BP: 144/69 (2023 11:00) (108/57 - 157/70)  BP(mean): 99 (2023 11:00) (78 - 104)  RR: 30 (2023 11:00) (13 - 30)  SpO2: 96% (2023 11:00) (94% - 100%)    Parameters below as of 2023 08:00  Patient On (Oxygen Delivery Method): ventilator    O2 Concentration (%): 40    PHYSICAL EXAM:  GENERAL: Intubated, sedated  NECK: Supple, No JVD   HEART: Regular rhythm, tachycardia; No murmurs, rubs, or gallops.  PULMONARY: Clear to auscultation and perfusion.  No rales, wheezing, or rhonchi bilaterally.  EXTREMITIES:  2+ Peripheral Pulses, no LE edema BL  NEUROLOGICAL: Responds to painful stimuli      INTERPRETATION OF TELEMETRY: Sinus tachycardia 120 bpm    ECG:  < from: 12 Lead ECG (06.02.23 @ 12:37) >  Systolic  mmHg    Diastolic BP 91 mmHg    Ventricular Rate 139 BPM    Atrial Rate 139 BPM    P-R Interval 126 ms    QRS Duration 120 ms    Q-T Interval 338 ms    QTC Calculation(Bazett) 514 ms    P Axis 43 degrees    R Axis 5 degrees    T Axis 20 degrees    Diagnosis Line Sinus tachycardia  Right bundle branch block  Minimal voltage criteria for LVH, may be normal variant  Nonspecific T wave abnormality  Abnormal ECG    Confirmed by Damir Hua ([id number]) on 2023 2:04:26 PM    < end of copied text >        I&O's Detail    2023 07:01  -  2023 07:00  --------------------------------------------------------  IN:    Amiodarone: 99 mL    Cisatracurium: 30.6 mL    Cisatracurium: 321.3 mL    Enteral Tube Flush: 1800 mL    FentaNYL: 977.5 mL    Midazolam: 194.4 mL    Peptamen A.F.: 1150 mL  Total IN: 4572.8 mL    OUT:    Chest Tube (mL): 25 mL    Indwelling Catheter - Urethral (mL): 1200 mL    Voided (mL): 1700 mL  Total OUT: 2925 mL    Total NET: 1647.8 mL      2023 07:01  -  2023 12:05  --------------------------------------------------------  IN:    Amiodarone: 16.7 mL    Amiodarone: 132 mL    Cisatracurium: 45.9 mL    Enteral Tube Flush: 300 mL    FentaNYL: 212.5 mL    IV PiggyBack: 300 mL    Midazolam: 42.5 mL    Peptamen A.F.: 250 mL  Total IN: 1299.6 mL    OUT:    Dexmedetomidine: 0 mL    Ketamine: 0 mL    Norepinephrine: 0 mL    Propofol: 0 mL    Voided (mL): 650 mL  Total OUT: 650 mL    Total NET: 649.6 mL          LABS:                        7.3    24.96 )-----------( 284      ( 2023 04:40 )             24.0     06-04    147<H>  |  112<H>  |  69<HH>  ----------------------------<  106<H>  3.8   |  25  |  1.5    Ca    7.9<L>      2023 04:40  Mg     1.5     06-04    TPro  6.1  /  Alb  2.0<L>  /  TBili  0.2  /  DBili  x   /  AST  17  /  ALT  9   /  AlkPhos  83  06-04    CARDIAC MARKERS ( 2023 04:40 )  x     / x     / 45 U/L / x     / x      CARDIAC MARKERS ( 2023 05:23 )  x     / x     / 64 U/L / x     / x              BNP  I&O's Detail    2023 07:01  -  2023 07:00  --------------------------------------------------------  IN:    Amiodarone: 99 mL    Cisatracurium: 30.6 mL    Cisatracurium: 321.3 mL    Enteral Tube Flush: 1800 mL    FentaNYL: 977.5 mL    Midazolam: 194.4 mL    Peptamen A.F.: 1150 mL  Total IN: 4572.8 mL    OUT:    Chest Tube (mL): 25 mL    Indwelling Catheter - Urethral (mL): 1200 mL    Voided (mL): 1700 mL  Total OUT: 2925 mL    Total NET: 1647.8 mL      2023 07:01  -  2023 12:05  --------------------------------------------------------  IN:    Amiodarone: 16.7 mL    Amiodarone: 132 mL    Cisatracurium: 45.9 mL    Enteral Tube Flush: 300 mL    FentaNYL: 212.5 mL    IV PiggyBack: 300 mL    Midazolam: 42.5 mL    Peptamen A.F.: 250 mL  Total IN: 1299.6 mL    OUT:    Dexmedetomidine: 0 mL    Ketamine: 0 mL    Norepinephrine: 0 mL    Propofol: 0 mL    Voided (mL): 650 mL  Total OUT: 650 mL    Total NET: 649.6 mL        Daily     Daily Weight in k (2023 06:00)    RADIOLOGY & ADDITIONAL STUDIES: Patient is a 42y old  Female who presents with a chief complaint of AHRF (2023 08:06)    HPI: Patient is a 42 year-old female with PMH of Asthma, HTN? presents with complaint of cough x3 days and SOB. During my encounter pt with dyspnea and increased work of breathing and chest pain, unable to properly provide accurate history. She states that her cough has been progressively worsening over the past 3 days, endorses hemoptysis since yesterday. She states she had a friend who came over and stayed with for more than a week who was sick recently, but she does not know if she recently travelled out of [facility] or not. She also admits that her children has been sick with Sore throat and fever for past 3 days. She admits to fever, chills, sever Chest pain which has gotten worse since she came to the ED. She took 1x Tamiflu yesterday. She otherwise denies diarrhea, constipation, urinary symptoms. Patient found to have AHRH secondary to MRSA PNA still intubated and spiking fevers. CT chest  revealing b/l cavitary lesions w/ additional left consolidation and effusion. Bedside ultrasound revealing septated left effusion, repeat CT chest redemonstrated cavitary lesions as well as left sided loculated effusion. CT surgery placed chest tube, patient still intubated. Now with new onset AF RVR, converted to sinus rhythm.    PAST MEDICAL & SURGICAL HISTORY:  HTN  Asthma    PREVIOUS DIAGNOSTIC TESTING:      ECHO  FINDINGS:  < from: TTE Echo Complete w/o Contrast w/ Doppler (23 @ 08:47) >  Summary:   1. LV Ejection Fraction by Evans's Method with a biplane EF of 65 %.   2. Normal global left ventricular systolic function.   3. Mildly increased LV wall thickness.   4. Mildly reduced RV systolic function.   5. Mild mitral annular calcification.   6. Mild thickening of the anterior and posterior mitral valve leaflets.   7. Moderate tricuspid regurgitation.   8. Estimated pulmonary artery systolic pressure is 42.3 mmHg assuming a   right atrial pressure of 8 mmHg, which is consistent with mild pulmonary   hypertension.   9. Normal left atrial size.  10. Normal right atrial size.  11. Large pleural effusion in the left lateral region.    < end of copied text >    STRESS  FINDINGS:    CATHETERIZATION  FINDINGS:    ELECTROPHYSIOLOGY STUDY  FINDINGS:    CAROTID ULTRASOUND:  FINDINGS    VENOUS DUPLEX SCAN:  FINDINGS:    CHEST CT PULMONARY ANGIO with IV Contrast:  FINDINGS:  < from: CT Angio Chest PE Protocol w/ IV Cont (23 @ 22:06) >  IMPRESSION:    1. Bilateral patchy groundglass nodularity with dominant confluent left   lower lobe opacification with numerous cavitary lesions. Additional   contralateral right lower lobe cavitary 1 cm nodule. Findings compatible   with cavitary pneumonia in the appropriate clinical setting; differential   diagnosis includes tuberculosis.    2. Confluent ill-defined left hilar and mediastinal adenopathy, likely   reactive, without dominant lymph node delineated.    3. No evidence of pulmonary embolism.    < end of copied text >      MEDICATIONS  (STANDING):  acyclovir IVPB 125 milliGRAM(s) IV Intermittent every 24 hours  aMIOdarone Infusion 1 mG/Min (33.3 mL/Hr) IV Continuous <Continuous>  aMIOdarone Infusion 0.5 mG/Min (16.7 mL/Hr) IV Continuous <Continuous>  chlorhexidine 0.12% Liquid 15 milliLiter(s) Oral Mucosa every 12 hours  chlorhexidine 2% Cloths 1 Application(s) Topical <User Schedule>  chlorhexidine 2% Cloths 1 Application(s) Topical daily  dexMEDEtomidine Infusion 0.2 MICROgram(s)/kG/Hr (4.25 mL/Hr) IV Continuous <Continuous>  dextrose 5%. 1000 milliLiter(s) (60 mL/Hr) IV Continuous <Continuous>  fentaNYL   Infusion. 0.555 MICROgram(s)/kG/Hr (4.72 mL/Hr) IV Continuous <Continuous>  insulin lispro (ADMELOG) corrective regimen sliding scale   SubCutaneous three times a day before meals  ipratropium 17 MICROgram(s) HFA Inhaler 1 Puff(s) Inhalation every 6 hours  ketamine Infusion. 0.25 mG/kG/Hr (2.13 mL/Hr) IV Continuous <Continuous>  magnesium sulfate  IVPB 2 Gram(s) IV Intermittent every 2 hours  meropenem  IVPB 1000 milliGRAM(s) IV Intermittent every 12 hours  midazolam Infusion 0.02 mG/kG/Hr (1.7 mL/Hr) IV Continuous <Continuous>  norepinephrine Infusion 0.05 MICROgram(s)/kG/Min (7.97 mL/Hr) IV Continuous <Continuous>  pantoprazole   Suspension 40 milliGRAM(s) Oral daily  polyethylene glycol 3350 17 Gram(s) Oral every 12 hours  propofol Infusion 11.098 MICROgram(s)/kG/Min (5.66 mL/Hr) IV Continuous <Continuous>  senna 2 Tablet(s) Oral at bedtime  vancomycin  IVPB 1000 milliGRAM(s) IV Intermittent every 24 hours  vitamin A &amp; D Ointment 1 Application(s) Topical daily    MEDICATIONS  (PRN):  acetaminophen     Tablet .. 650 milliGRAM(s) Oral every 6 hours PRN Temp greater or equal to 38C (100.4F)  albuterol    90 MICROgram(s) HFA Inhaler 4 Puff(s) Inhalation every 4 hours PRN Shortness of Breath and/or Wheezing  ipratropium 17 MICROgram(s) HFA Inhaler 4 Puff(s) Inhalation <User Schedule> PRN -  midazolam Injectable 2 milliGRAM(s) IV Push every 4 hours PRN Agitation      FAMILY HISTORY: No significant hx    SOCIAL HISTORY: No smoking, ETOH or illicit drug use    Past Surgical History: No significant hx    Allergies:  aspirin (Short breath; Anaphylaxis)      REVIEW OF SYSTEMS: Unable to assess 2/2 mental status      Vital Signs Last 24 Hrs  T(C): 37.1 (2023 08:00), Max: 37.6 (2023 16:00)  T(F): 98.7 (2023 08:00), Max: 99.7 (2023 16:00)  HR: 123 (2023 11:00) (101 - 141)  BP: 144/69 (2023 11:00) (108/57 - 157/70)  BP(mean): 99 (2023 11:00) (78 - 104)  RR: 30 (2023 11:00) (13 - 30)  SpO2: 96% (2023 11:00) (94% - 100%)    Parameters below as of 2023 08:00  Patient On (Oxygen Delivery Method): ventilator    O2 Concentration (%): 40    PHYSICAL EXAM:  GENERAL: Intubated, sedated  NECK: Supple   HEART: Regular rhythm, tachycardia; No murmurs, rubs, or gallops.  PULMONARY: Coarse anterior breath sounds  EXTREMITIES:  2+ Peripheral Pulses, no LE edema BL  NEUROLOGICAL: Responds to painful stimuli      INTERPRETATION OF TELEMETRY: Sinus tachycardia 120 bpm    ECG:  < from: 12 Lead ECG (23 @ 12:37) >  Systolic  mmHg    Diastolic BP 91 mmHg    Ventricular Rate 139 BPM    Atrial Rate 139 BPM    P-R Interval 126 ms    QRS Duration 120 ms    Q-T Interval 338 ms    QTC Calculation(Bazett) 514 ms    P Axis 43 degrees    R Axis 5 degrees    T Axis 20 degrees    Diagnosis Line Sinus tachycardia  Right bundle branch block  Minimal voltage criteria for LVH, may be normal variant  Nonspecific T wave abnormality  Abnormal ECG    Confirmed by Damir Hua ([id number]) on 2023 2:04:26 PM    < end of copied text >        I&O's Detail    2023 07:01  -  2023 07:00  --------------------------------------------------------  IN:    Amiodarone: 99 mL    Cisatracurium: 30.6 mL    Cisatracurium: 321.3 mL    Enteral Tube Flush: 1800 mL    FentaNYL: 977.5 mL    Midazolam: 194.4 mL    Peptamen A.F.: 1150 mL  Total IN: 4572.8 mL    OUT:    Chest Tube (mL): 25 mL    Indwelling Catheter - Urethral (mL): 1200 mL    Voided (mL): 1700 mL  Total OUT: 2925 mL    Total NET: 1647.8 mL      2023 07:01  -  2023 12:05  --------------------------------------------------------  IN:    Amiodarone: 16.7 mL    Amiodarone: 132 mL    Cisatracurium: 45.9 mL    Enteral Tube Flush: 300 mL    FentaNYL: 212.5 mL    IV PiggyBack: 300 mL    Midazolam: 42.5 mL    Peptamen A.F.: 250 mL  Total IN: 1299.6 mL    OUT:    Dexmedetomidine: 0 mL    Ketamine: 0 mL    Norepinephrine: 0 mL    Propofol: 0 mL    Voided (mL): 650 mL  Total OUT: 650 mL    Total NET: 649.6 mL          LABS:                        7.3    24.96 )-----------( 284      ( 2023 04:40 )             24.0     06-04    147<H>  |  112<H>  |  69<HH>  ----------------------------<  106<H>  3.8   |  25  |  1.5    Ca    7.9<L>      2023 04:40  Mg     1.5     06-04    TPro  6.1  /  Alb  2.0<L>  /  TBili  0.2  /  DBili  x   /  AST  17  /  ALT  9   /  AlkPhos  83  06-04    CARDIAC MARKERS ( 2023 04:40 )  x     / x     / 45 U/L / x     / x      CARDIAC MARKERS ( 2023 05:23 )  x     / x     / 64 U/L / x     / x              BNP  I&O's Detail    2023 07:01  -  2023 07:00  --------------------------------------------------------  IN:    Amiodarone: 99 mL    Cisatracurium: 30.6 mL    Cisatracurium: 321.3 mL    Enteral Tube Flush: 1800 mL    FentaNYL: 977.5 mL    Midazolam: 194.4 mL    Peptamen A.F.: 1150 mL  Total IN: 4572.8 mL    OUT:    Chest Tube (mL): 25 mL    Indwelling Catheter - Urethral (mL): 1200 mL    Voided (mL): 1700 mL  Total OUT: 2925 mL    Total NET: 1647.8 mL      2023 07:01  -  2023 12:05  --------------------------------------------------------  IN:    Amiodarone: 16.7 mL    Amiodarone: 132 mL    Cisatracurium: 45.9 mL    Enteral Tube Flush: 300 mL    FentaNYL: 212.5 mL    IV PiggyBack: 300 mL    Midazolam: 42.5 mL    Peptamen A.F.: 250 mL  Total IN: 1299.6 mL    OUT:    Dexmedetomidine: 0 mL    Ketamine: 0 mL    Norepinephrine: 0 mL    Propofol: 0 mL    Voided (mL): 650 mL  Total OUT: 650 mL    Total NET: 649.6 mL        Daily     Daily Weight in k (2023 06:00)    RADIOLOGY & ADDITIONAL STUDIES:

## 2023-06-04 NOTE — PHARMACOTHERAPY INTERVENTION NOTE - COMMENTS
SCr 1.5, vanco level 11.2, calculation per precise PK-recommended increasing vanco 1.25g IV daily ~10am  -recheck level 6/8 prior to 4th dose

## 2023-06-04 NOTE — CONSULT NOTE ADULT - ASSESSMENT
42F w/ pmh of asthma and htn admitted 5/24 with ahrh secondary to MRSA PNA still intubated and spiking fevers. CT chest 5/27 revealing b/l cavitary lesions w/ additional left consolidation and effusion. Bedside ultrasound revealing septated left effusion, repeat CT chest redemonstrated cavitary lesions as well as left sided loculated effusion.    Impression:  New Onset AF RVR, now Sinus Tachycardia  MRSA PNA with Cavitary Lesions  Anemia  Pleural Effusion sp Chest Tube  Sepsis  HTN  Asthma    Plan:  - Cont Amio drip, when completed can switch to Amio PO 200mg BID x 2 wks f/b 200mg daily  - Start OAC for stroke prevention  - Check LFTS/TFT's  - Daily EKG to assess QTc  - Workup anemia  - Treat underlying infection as this will continue to exacerbate AF RVR  - Recall EP as needed [id number]

## 2023-06-05 LAB
ALBUMIN SERPL ELPH-MCNC: 2.1 G/DL — LOW (ref 3.5–5.2)
ALP SERPL-CCNC: 97 U/L — SIGNIFICANT CHANGE UP (ref 30–115)
ALT FLD-CCNC: 11 U/L — SIGNIFICANT CHANGE UP (ref 0–41)
ANION GAP SERPL CALC-SCNC: 11 MMOL/L — SIGNIFICANT CHANGE UP (ref 7–14)
APPEARANCE UR: CLEAR — SIGNIFICANT CHANGE UP
APTT BLD: 28.8 SEC — SIGNIFICANT CHANGE UP (ref 27–39.2)
AST SERPL-CCNC: 24 U/L — SIGNIFICANT CHANGE UP (ref 0–41)
BACTERIA # UR AUTO: ABNORMAL
BASOPHILS # BLD AUTO: 0.08 K/UL — SIGNIFICANT CHANGE UP (ref 0–0.2)
BASOPHILS NFR BLD AUTO: 0.3 % — SIGNIFICANT CHANGE UP (ref 0–1)
BILIRUB SERPL-MCNC: 0.4 MG/DL — SIGNIFICANT CHANGE UP (ref 0.2–1.2)
BILIRUB UR-MCNC: NEGATIVE — SIGNIFICANT CHANGE UP
BUN SERPL-MCNC: 56 MG/DL — HIGH (ref 10–20)
CALCIUM SERPL-MCNC: 8 MG/DL — LOW (ref 8.4–10.4)
CHLORIDE SERPL-SCNC: 107 MMOL/L — SIGNIFICANT CHANGE UP (ref 98–110)
CK SERPL-CCNC: 49 U/L — SIGNIFICANT CHANGE UP (ref 0–225)
CO2 SERPL-SCNC: 22 MMOL/L — SIGNIFICANT CHANGE UP (ref 17–32)
COLOR SPEC: YELLOW — SIGNIFICANT CHANGE UP
COMMENT - URINE: SIGNIFICANT CHANGE UP
CREAT SERPL-MCNC: 1.2 MG/DL — SIGNIFICANT CHANGE UP (ref 0.7–1.5)
CULTURE RESULTS: SIGNIFICANT CHANGE UP
DIFF PNL FLD: ABNORMAL
DRUG SCREEN, SERUM: ABNORMAL
EGFR: 58 ML/MIN/1.73M2 — LOW
EOSINOPHIL # BLD AUTO: 0.11 K/UL — SIGNIFICANT CHANGE UP (ref 0–0.7)
EOSINOPHIL NFR BLD AUTO: 0.4 % — SIGNIFICANT CHANGE UP (ref 0–8)
EPI CELLS # UR: 2 /HPF — SIGNIFICANT CHANGE UP (ref 0–5)
FIBRINOGEN PPP-MCNC: >700 MG/DL — HIGH (ref 204.4–570.6)
GAS PNL BLDA: SIGNIFICANT CHANGE UP
GAS PNL BLDA: SIGNIFICANT CHANGE UP
GLUCOSE BLDC GLUCOMTR-MCNC: 123 MG/DL — HIGH (ref 70–99)
GLUCOSE BLDC GLUCOMTR-MCNC: 128 MG/DL — HIGH (ref 70–99)
GLUCOSE BLDC GLUCOMTR-MCNC: 136 MG/DL — HIGH (ref 70–99)
GLUCOSE BLDC GLUCOMTR-MCNC: 239 MG/DL — HIGH (ref 70–99)
GLUCOSE SERPL-MCNC: 92 MG/DL — SIGNIFICANT CHANGE UP (ref 70–99)
GLUCOSE UR QL: NEGATIVE — SIGNIFICANT CHANGE UP
HCT VFR BLD CALC: 22.2 % — LOW (ref 37–47)
HCT VFR BLD CALC: 24.7 % — LOW (ref 37–47)
HGB BLD-MCNC: 6.9 G/DL — CRITICAL LOW (ref 12–16)
HGB BLD-MCNC: 7.7 G/DL — LOW (ref 12–16)
IMM GRANULOCYTES NFR BLD AUTO: 1.8 % — HIGH (ref 0.1–0.3)
INR BLD: 1.31 RATIO — HIGH (ref 0.65–1.3)
KETONES UR-MCNC: NEGATIVE — SIGNIFICANT CHANGE UP
LEUKOCYTE ESTERASE UR-ACNC: NEGATIVE — SIGNIFICANT CHANGE UP
LYMPHOCYTES # BLD AUTO: 1.27 K/UL — SIGNIFICANT CHANGE UP (ref 1.2–3.4)
LYMPHOCYTES # BLD AUTO: 4.8 % — LOW (ref 20.5–51.1)
MAGNESIUM SERPL-MCNC: 1.8 MG/DL — SIGNIFICANT CHANGE UP (ref 1.8–2.4)
MCHC RBC-ENTMCNC: 21.2 PG — LOW (ref 27–31)
MCHC RBC-ENTMCNC: 21.9 PG — LOW (ref 27–31)
MCHC RBC-ENTMCNC: 31.1 G/DL — LOW (ref 32–37)
MCHC RBC-ENTMCNC: 31.2 G/DL — LOW (ref 32–37)
MCV RBC AUTO: 68.3 FL — LOW (ref 81–99)
MCV RBC AUTO: 70.4 FL — LOW (ref 81–99)
MONOCYTES # BLD AUTO: 0.69 K/UL — HIGH (ref 0.1–0.6)
MONOCYTES NFR BLD AUTO: 2.6 % — SIGNIFICANT CHANGE UP (ref 1.7–9.3)
NEUTROPHILS # BLD AUTO: 23.98 K/UL — HIGH (ref 1.4–6.5)
NEUTROPHILS NFR BLD AUTO: 90.1 % — HIGH (ref 42.2–75.2)
NITRITE UR-MCNC: NEGATIVE — SIGNIFICANT CHANGE UP
NRBC # BLD: 0 /100 WBCS — SIGNIFICANT CHANGE UP (ref 0–0)
NRBC # BLD: 0 /100 WBCS — SIGNIFICANT CHANGE UP (ref 0–0)
PH UR: 6 — SIGNIFICANT CHANGE UP (ref 5–8)
PLATELET # BLD AUTO: 331 K/UL — SIGNIFICANT CHANGE UP (ref 130–400)
PLATELET # BLD AUTO: 360 K/UL — SIGNIFICANT CHANGE UP (ref 130–400)
PMV BLD: 10.8 FL — HIGH (ref 7.4–10.4)
PMV BLD: 11 FL — HIGH (ref 7.4–10.4)
POST UNIT NUMBER: SIGNIFICANT CHANGE UP
POTASSIUM SERPL-MCNC: 4.3 MMOL/L — SIGNIFICANT CHANGE UP (ref 3.5–5)
POTASSIUM SERPL-SCNC: 4.3 MMOL/L — SIGNIFICANT CHANGE UP (ref 3.5–5)
PROT SERPL-MCNC: 6 G/DL — SIGNIFICANT CHANGE UP (ref 6–8)
PROT UR-MCNC: ABNORMAL
PROTHROM AB SERPL-ACNC: 15 SEC — HIGH (ref 9.95–12.87)
RBC # BLD: 3.25 M/UL — LOW (ref 4.2–5.4)
RBC # BLD: 3.51 M/UL — LOW (ref 4.2–5.4)
RBC # FLD: 27.3 % — HIGH (ref 11.5–14.5)
RBC # FLD: 27.9 % — HIGH (ref 11.5–14.5)
RBC CASTS # UR COMP ASSIST: 10 /HPF — HIGH (ref 0–4)
SODIUM SERPL-SCNC: 140 MMOL/L — SIGNIFICANT CHANGE UP (ref 135–146)
SP GR SPEC: 1.01 — SIGNIFICANT CHANGE UP (ref 1.01–1.03)
SPECIMEN SOURCE: SIGNIFICANT CHANGE UP
TRIGL SERPL-MCNC: 204 MG/DL — HIGH
UROBILINOGEN FLD QL: SIGNIFICANT CHANGE UP
WBC # BLD: 26.6 K/UL — HIGH (ref 4.8–10.8)
WBC # BLD: 27.41 K/UL — HIGH (ref 4.8–10.8)
WBC # FLD AUTO: 26.6 K/UL — HIGH (ref 4.8–10.8)
WBC # FLD AUTO: 27.41 K/UL — HIGH (ref 4.8–10.8)
WBC UR QL: 3 /HPF — SIGNIFICANT CHANGE UP (ref 0–5)

## 2023-06-05 PROCEDURE — 71250 CT THORAX DX C-: CPT | Mod: 26

## 2023-06-05 PROCEDURE — 86078 PHYS BLOOD BANK SERV REACTJ: CPT

## 2023-06-05 PROCEDURE — 99291 CRITICAL CARE FIRST HOUR: CPT

## 2023-06-05 PROCEDURE — 71045 X-RAY EXAM CHEST 1 VIEW: CPT | Mod: 26

## 2023-06-05 RX ORDER — ALTEPLASE 100 MG
10 KIT INTRAVENOUS ONCE
Refills: 0 | Status: DISCONTINUED | OUTPATIENT
Start: 2023-06-05 | End: 2023-06-05

## 2023-06-05 RX ORDER — AMIODARONE HYDROCHLORIDE 400 MG/1
0.5 TABLET ORAL
Qty: 450 | Refills: 0 | Status: DISCONTINUED | OUTPATIENT
Start: 2023-06-05 | End: 2023-06-08

## 2023-06-05 RX ORDER — DORNASE ALFA 1 MG/ML
5 SOLUTION RESPIRATORY (INHALATION) ONCE
Refills: 0 | Status: DISCONTINUED | OUTPATIENT
Start: 2023-06-05 | End: 2023-06-05

## 2023-06-05 RX ORDER — DIPHENHYDRAMINE HCL 50 MG
50 CAPSULE ORAL ONCE
Refills: 0 | Status: DISCONTINUED | OUTPATIENT
Start: 2023-06-05 | End: 2023-06-05

## 2023-06-05 RX ORDER — SODIUM CHLORIDE 9 MG/ML
30 INJECTION INTRAMUSCULAR; INTRAVENOUS; SUBCUTANEOUS ONCE
Refills: 0 | Status: DISCONTINUED | OUTPATIENT
Start: 2023-06-05 | End: 2023-06-05

## 2023-06-05 RX ORDER — DIPHENHYDRAMINE HCL 50 MG
50 CAPSULE ORAL ONCE
Refills: 0 | Status: COMPLETED | OUTPATIENT
Start: 2023-06-05 | End: 2023-06-05

## 2023-06-05 RX ADMIN — ALBUTEROL 4 PUFF(S): 90 AEROSOL, METERED ORAL at 07:47

## 2023-06-05 RX ADMIN — MEROPENEM 100 MILLIGRAM(S): 1 INJECTION INTRAVENOUS at 05:08

## 2023-06-05 RX ADMIN — MEROPENEM 100 MILLIGRAM(S): 1 INJECTION INTRAVENOUS at 17:54

## 2023-06-05 RX ADMIN — Medication 650 MILLIGRAM(S): at 05:19

## 2023-06-05 RX ADMIN — Medication 166.67 MILLIGRAM(S): at 12:17

## 2023-06-05 RX ADMIN — MIDAZOLAM HYDROCHLORIDE 1.7 MG/KG/HR: 1 INJECTION, SOLUTION INTRAMUSCULAR; INTRAVENOUS at 12:17

## 2023-06-05 RX ADMIN — Medication 2: at 06:40

## 2023-06-05 RX ADMIN — POLYETHYLENE GLYCOL 3350 17 GRAM(S): 17 POWDER, FOR SOLUTION ORAL at 21:09

## 2023-06-05 RX ADMIN — PANTOPRAZOLE SODIUM 40 MILLIGRAM(S): 20 TABLET, DELAYED RELEASE ORAL at 12:18

## 2023-06-05 RX ADMIN — Medication 50 MILLIGRAM(S): at 12:15

## 2023-06-05 RX ADMIN — Medication 650 MILLIGRAM(S): at 03:07

## 2023-06-05 RX ADMIN — CHLORHEXIDINE GLUCONATE 15 MILLILITER(S): 213 SOLUTION TOPICAL at 17:49

## 2023-06-05 RX ADMIN — FENTANYL CITRATE 4.72 MICROGRAM(S)/KG/HR: 50 INJECTION INTRAVENOUS at 12:17

## 2023-06-05 RX ADMIN — AMIODARONE HYDROCHLORIDE 16.7 MG/MIN: 400 TABLET ORAL at 06:48

## 2023-06-05 RX ADMIN — CHLORHEXIDINE GLUCONATE 1 APPLICATION(S): 213 SOLUTION TOPICAL at 05:18

## 2023-06-05 RX ADMIN — Medication 60 MILLIGRAM(S): at 12:15

## 2023-06-05 RX ADMIN — Medication 1 PUFF(S): at 07:47

## 2023-06-05 RX ADMIN — SENNA PLUS 2 TABLET(S): 8.6 TABLET ORAL at 21:09

## 2023-06-05 RX ADMIN — CHLORHEXIDINE GLUCONATE 15 MILLILITER(S): 213 SOLUTION TOPICAL at 05:13

## 2023-06-05 NOTE — CHART NOTE - NSCHARTNOTEFT_GEN_A_CORE
Registered Dietitian Follow-Up     Patient Profile Reviewed                           Yes [x]   No []     Nutrition History Previously Obtained        Yes []  No [x]       Pertinent Subjective Information: Limited to chart review at this time as pt intubated and unable to provide hx.     Pertinent Medical Interventions: Pt continues to be managed for Acute hypoxemic respiratory failure, Severe cavitary CAP MRSA, Small parapneumonic effusion SP Chest tube.       Diet order:   Diet, NPO with Tube Feed:   Tube Feeding Modality: Orogastric  Peptamen Intense VHP  Total Volume for 24 Hours (mL): 1200  Continuous  Starting Tube Feed Rate {mL per Hour}: 20  Increase Tube Feed Rate by (mL): 20     Every 4 hours  Until Goal Tube Feed Rate (mL per Hour): 50  Tube Feed Duration (in Hours): 24  Tube Feed Start Time: 12:00  Free Water Flush   Total Volume per Flush (mL): 300   Frequency: Every 4 Hours (23 @ 07:51) [Active]  Diet, NPO after Midnight:      NPO Start Date: 30-May-2023,   NPO Start Time: 23:59  Except Medications (23 @ 20:50) [Active]    Anthropometrics:  Height (cm): 160 (23 @ 08:15)  Weight (kg): 85 (23 @ 08:15)  BMI (kg/m2): 33.2 (23 @ 08:15)  IBW: 52.1 kg  UBW: N/A    Daily Weight in k.2 (-), Weight in k (), Weight in k.2 (), Weight in k (), Weight in k.4 (), Weight in k (), Weight in k.4 (30)  Weight Change: fluctuation likely reflective of fluid shifts    MEDICATIONS  (STANDING):  aMIOdarone Infusion 0.5 mG/Min (16.7 mL/Hr) IV Continuous <Continuous>  chlorhexidine 0.12% Liquid 15 milliLiter(s) Oral Mucosa every 12 hours  chlorhexidine 2% Cloths 1 Application(s) Topical <User Schedule>  chlorhexidine 2% Cloths 1 Application(s) Topical daily  dexMEDEtomidine Infusion 0.2 MICROgram(s)/kG/Hr (4.25 mL/Hr) IV Continuous <Continuous>  fentaNYL   Infusion. 0.555 MICROgram(s)/kG/Hr (4.72 mL/Hr) IV Continuous <Continuous>  insulin lispro (ADMELOG) corrective regimen sliding scale   SubCutaneous three times a day before meals  ketamine Infusion. 0.25 mG/kG/Hr (2.13 mL/Hr) IV Continuous <Continuous>  meropenem  IVPB 1000 milliGRAM(s) IV Intermittent every 12 hours  *methylPREDNISolone sodium succinate Injectable 60 milliGRAM(s) IV Push daily  midazolam Infusion 0.02 mG/kG/Hr (1.7 mL/Hr) IV Continuous <Continuous>  pantoprazole   Suspension 40 milliGRAM(s) Oral daily  polyethylene glycol 3350 17 Gram(s) Oral every 12 hours  senna 2 Tablet(s) Oral at bedtime  vancomycin  IVPB 1250 milliGRAM(s) IV Intermittent every 24 hours  vitamin A &amp; D Ointment 1 Application(s) Topical daily    MEDICATIONS  (PRN):  acetaminophen     Tablet .. 650 milliGRAM(s) Oral every 6 hours PRN Temp greater or equal to 38C (100.4F)    Pertinent Labs:                        7.7    27.41 )-----------( 360      ( 2023 10:35 )             24.7     -05 @ 05:00: Na 140, BUN 56<H>, Cr 1.2, BG 92, K+ 4.3, Phos --, Mg 1.8, Alk Phos 97, ALT/SGPT 11, AST/SGOT 24, HbA1c --    Triglycerides: 204 () <- 1009 ()    Finger Sticks:  POCT Blood Glucose.: 128 mg/dL ( @ 12:16)  POCT Blood Glucose.: 239 mg/dL ( @ 05:47)  POCT Blood Glucose.: 216 mg/dL ( @ 23:31)  POCT Blood Glucose.: 123 mg/dL ( @ 18:39)    Physical Findings:  - Appearance: sedated  - GI function: abdomen obese; last bowel movement    - Tubes: OGT  - Oral/Mouth cavity: NPO  - Skin: surgical incision   - Edema: 2+ edema to left arm; 1+ generalized edema     Nutrition Requirements:  Weight Used: dosing weight 85kg; IBW 52.1 kg     Estimated Energy Needs    Continue [x]  Adjust []  Energy Recommendations: 935-1190 kcal/day - 11-14 kcal/kg IBW        Estimated Protein Needs    Continue [x]  Adjust []  Protein Recommendations: 104 gm/day - 2 g/kg IBW        Estimated Fluid Needs        Continue [x]  Adjust []  Fluid Recommendations: 2975 mL/day - adjusted fluid needs for BMI>30    Nutrient Intake: current TF provides 1200kcal, 110g protein, 1008+1800ml free water    [x] Previous Nutrition Diagnosis:  Increased Nutrient Needs (protein)            [x] Ongoing          [] Resolved     Nutrition Education:      Goal/Expected Outcome: Pt to meet >90% & <105% estimated needs via EN in 3-5 days. RD to follow as per high risk protocol.     Indicator/Monitoring: Monitor diet order, kcal intake, body composition, NFPE, labs  (lytes, BG, renal indices)    Recommendation:  Cont with current TF order    Patient assessed at high nutrition risk.  RD spectra [phone], also available on Teams.

## 2023-06-05 NOTE — PROGRESS NOTE ADULT - SUBJECTIVE AND OBJECTIVE BOX
SUBJECTIVE:  HPI:  42 year-old female with PMH of Asthma, HTN? presents with complaint of cough x3 days and SOB. During my encounter pt with dyspnea and increased work of breathing and chest pain, unable to properly provide accurate history. She states that her cough has been progressively worsening over the past 3 days, endorses hemoptysis since yesterday. She states she had a friend who came over and stayed with for more than a week who was sick recently, but she does not know if she recently travelled out of [facility] or not. She also admits that her children has been sick with Sore throat and fever for past 3 days. She admits to fever, chills, sever Chest pain which has gotten worse since she came to the ED. She took 1x Tamiflu yesterday. She otherwise denies diarrhea, constipation, urinary symptoms. She is not vaccinated for Influenza or COVID-19.     In ED  /87, , RR 28, T 100.8 F, 98% on 15 L NRB  Labs significant for wbc 16.7K with Neutrophilic Predominance and L shift, Hgb 11.4, MCV 61.6, Cr 2.0, BUN 23, Alk Phos 125/AST 62/ALT 27, Lac 4.9> 3.9  RVP + Influenza B      CT Angio Chest PE Protocol w/ IV Cont   1. Bilateral patchy groundglass nodularity with dominant confluent left lower lobe opacification with numerous cavitary lesions. Additional contralateral right lower lobe cavitary 1 cm nodule. Findings compatible with cavitary pneumonia in the appropriate clinical setting; differential diagnosis includes tuberculosis.  2. Confluent ill-defined left hilar and mediastinal adenopathy, likely reactive, without dominant lymph node delineated.  3. No evidence of pulmonary embolism.    s/p 1x Rocephin, Meropenem, 3 L LR in ED    Pt admitted to SDU for further managements, during my encounter pt is very agitated, in acute distress. Pt received appropriate pain control with IV Morphine, s/p Xanax 1 mg 1x for agitation and anxiety, despite pain control and adequate fluid resuscitation pt remained tachycardic, tachypneic. STAT EKG reviewed with Cardiology team on call After discussion with Cardiology team, Pt received 1x Adenosine 6 mg IV push with no change. Case discussed with Critical Care team, decision was made for Intubation and Pt upgraded to MICU.   (24 May 2023 00:58)      Patient is a 42y old Female who presents with a chief complaint of AHRF (2023 08:58)    Currently admitted to medicine with the primary diagnosis of Pneumonia       Today is hospital day 13d.     PAST MEDICAL & SURGICAL HISTORY      ALLERGIES:  aspirin (Short breath; Anaphylaxis)    MEDICATIONS:  ACTIVE MEDICATIONS  acetaminophen     Tablet .. 650 milliGRAM(s) Oral every 6 hours PRN  aMIOdarone Infusion 0.5 mG/Min IV Continuous <Continuous>  chlorhexidine 0.12% Liquid 15 milliLiter(s) Oral Mucosa every 12 hours  chlorhexidine 2% Cloths 1 Application(s) Topical <User Schedule>  chlorhexidine 2% Cloths 1 Application(s) Topical daily  dexMEDEtomidine Infusion 0.2 MICROgram(s)/kG/Hr IV Continuous <Continuous>  fentaNYL   Infusion. 0.555 MICROgram(s)/kG/Hr IV Continuous <Continuous>  insulin lispro (ADMELOG) corrective regimen sliding scale   SubCutaneous three times a day before meals  ketamine Infusion. 0.25 mG/kG/Hr IV Continuous <Continuous>  meropenem  IVPB 1000 milliGRAM(s) IV Intermittent every 12 hours  methylPREDNISolone sodium succinate Injectable 60 milliGRAM(s) IV Push daily  midazolam Infusion 0.02 mG/kG/Hr IV Continuous <Continuous>  pantoprazole   Suspension 40 milliGRAM(s) Oral daily  polyethylene glycol 3350 17 Gram(s) Oral every 12 hours  senna 2 Tablet(s) Oral at bedtime  vancomycin  IVPB 1250 milliGRAM(s) IV Intermittent every 24 hours  vitamin A &amp; D Ointment 1 Application(s) Topical daily      VITALS:   T(F): 100.4  HR: 118  BP: 134/63  RR: 36  SpO2: 99%    LABS:                        7.7    27.41 )-----------( 360      ( 2023 10:35 )             24.7     06-05    140  |  107  |  56<H>  ----------------------------<  92  4.3   |  22  |  1.2    Ca    8.0<L>      2023 05:00  Mg     1.8     06-05    TPro  6.0  /  Alb  2.1<L>  /  TBili  0.4  /  DBili  x   /  AST  24  /  ALT  11  /  AlkPhos  97  06-05    PT/INR - ( 2023 10:35 )   PT: 15.00 sec;   INR: 1.31 ratio         PTT - ( 2023 10:35 )  PTT:28.8 sec  Urinalysis Basic - ( 2023 10:35 )    Color: Yellow / Appearance: Clear / S.010 / pH: x  Gluc: x / Ketone: Negative  / Bili: Negative / Urobili: <2 mg/dL   Blood: x / Protein: 30 mg/dL / Nitrite: Negative   Leuk Esterase: Negative / RBC: 10 /HPF / WBC 3 /HPF   Sq Epi: x / Non Sq Epi: x / Bacteria: Few      ABG - ( 2023 03:16 )  pH, Arterial: 7.45  pH, Blood: x     /  pCO2: 36    /  pO2: 84    / HCO3: 25    / Base Excess: 1.1   /  SaO2: 98.0              Creatine Kinase, Serum: 49 U/L (23 @ 05:00)      Culture - Body Fluid with Gram Stain (collected 2023 17:52)  Source: Pleural Fl Pleural Fluid  Gram Stain (2023 02:05):    polymorphonuclear leukocytes seen    No organisms seen    by cytocentrifuge  Preliminary Report (2023 19:50):    No growth    Culture - Acid Fast - Body Fluid w/Smear (collected 2023 17:52)  Source: .Body Fluid Other, Chest Tube    Culture - Fungal, Body Fluid (collected 2023 17:52)  Source: .Body Fluid Other, Chest tube  Preliminary Report (2023 10:22):    Testing in progress      CARDIAC MARKERS ( 2023 05:00 )  x     / x     / 49 U/L / x     / x      CARDIAC MARKERS ( 2023 04:40 )  x     / x     / 45 U/L / x     / x              PHYSICAL EXAM:  GEN: No acute distress  LUNGS: Clear to auscultation bilaterally   HEART: S1/S2 present.    ABD: Soft, non-tender, non-distended.   EXT: No pedal edema, warm to touch, no discoloration  NEURO: AAOX3    Indwelling Urethral Catheter:     Connect To:  Straight Drainage/Gravity    Indication:  Urine Output Monitoring in Critically Ill (23 @ 06:48) (not performed) [Active]  Indwelling Urethral Catheter:     Connect To:  Straight Drainage/Gravity    Indication:  Urine Output Monitoring in Critically Ill (23 @ 06:10) (not performed) [Active]  Indwelling Urethral Catheter:     Connect To:  Straight Drainage/Gravity    Indication:  Urine Output Monitoring in Critically Ill (23 @ 06:07) (not performed) [Active]  Indwelling Urethral Catheter:     Connect To:  Straight Drainage/Gravity    Indication:  Urine Output Monitoring in Critically Ill (23 @ 05:49) (not performed) [Active]

## 2023-06-05 NOTE — PROGRESS NOTE ADULT - ASSESSMENT
IMPRESSION:    Acute hypoxemic respiratory failure   Severe cavitary CAP MRSA  Small parapneumonic effusion SP Chest tube.    MRSA bacteremia resolved   MASTER negative   Anemia sp tx  New onset A FIB rate controlled   LANCE non oliguric improving  Hypernatremia Improving  HO asthma   Influenza B     PLAN:    CNS:  SAT after CT chest.      HEENT: Oral care.  ET care     PULMONARY:  HOB @ 45 degrees.  Aspiration precautions.  ARDS net MV setting.  RR 26.  Monitor PPL and DP..  Chest Chest NC,  Keep SaO2 92 TO 96%    CARDIOVASCULAR:  ECHO/ MASTER noted.  Avoid overload     GI: GI prophylaxis.  OG Feeding to target.  Adjust Bowel regimen.  Reglan as tolerated.      RENAL:  Follow up lytes.  Correct as needed.     INFECTIOUS DISEASE: ABX Per ID.  Might need to change Meropenem.      HEMATOLOGICAL:  DVT prophylaxis.  Trend CBC.  2 units PRBC after after pre medication     ENDOCRINE:  Follow up FS.  Insulin protocol if needed.  Solumedrol 60 mg daily.      MICU     l IJ 6/2    Bladder scans.      Prognosis remains guarded

## 2023-06-05 NOTE — PROVIDER CONTACT NOTE (OTHER) - RECOMMENDATIONS
----- Message from Zulay Pena MD sent at 5/2/2017 10:09 AM CDT -----  Alejo,  I am assuming this can wait until Dr. Carver returns. Please leave in his in box.  
transfusion stopped, stat labs

## 2023-06-05 NOTE — PROGRESS NOTE ADULT - ASSESSMENT
IMPRESSION:    Acute hypoxemic respiratory failure   Severe cavitary CAP MRSA  Small parapneumonic effusion SP Chest tube.    MRSA bacteremia resolved   MASTER negative   Anemia sp tx  New onset A FIB rate controlled   LANCE non oliguric improving  Hypernatremia Improving  HO asthma   Influenza B     PLAN:    CNS:  SAT after CT chest.  Fentanyl, precedex, ketamine    HEENT: Oral care.  ET care     PULMONARY:  HOB @ 45 degrees.  Aspiration precautions.  ARDS net MV setting.  RR 26.  Monitor PPL and DP. Keep SaO2 92 TO 96%.   Since 6/1/2023  Non-cont CT Chest --> Breathing artifact limits lung parenchymal assessment. Post placement left pleural catheter, satisfactory position. Cavitary consolidation left lower lobe, similar to earlier study. Assessment for adjacent pleural effusion limited due to streak artifact , however appears to have decreased in size since placement of pleural tube. Multiple additional multi lobar cavitary nodules are present, not significantly changed from previous study. No pneumothorax  Surgery wants tPA as chest tube not draining well - will hold off now given low hgb     CARDIOVASCULAR:  ECHO/ MASTER noted.  Avoid overload. DC free water    GI: GI prophylaxis.  OG Feeding to target.  Adjust Bowel regimen.  Reglan as tolerated.      RENAL:  Follow up lytes.  Correct as needed.     INFECTIOUS DISEASE: ABX Per ID.  Might need to change Meropenem.      HEMATOLOGICAL:  DVT prophylaxis.  Trend CBC.  2 units PRBC after after pre medication (solumedrol, benadryl)    ENDOCRINE:  Follow up FS.  Insulin protocol if needed.  Solumedrol 60 mg daily.

## 2023-06-05 NOTE — PROGRESS NOTE ADULT - SUBJECTIVE AND OBJECTIVE BOX
Patient is a 42y old  Female who presents with a chief complaint of AHRF (04 Jun 2023 20:50)        Over Night Events:  Remains critically ill on MV.  Sedated.  Off pressors.  off paralysis for 18 hours.         ROS:     All ROS are negative except HPI         PHYSICAL EXAM    ICU Vital Signs Last 24 Hrs  T(C): 38.1 (05 Jun 2023 08:00), Max: 38.5 (04 Jun 2023 20:00)  T(F): 100.5 (05 Jun 2023 08:00), Max: 101.3 (04 Jun 2023 20:00)  HR: 112 (05 Jun 2023 08:00) (102 - 130)  BP: 128/60 (05 Jun 2023 08:00) (114/54 - 150/77)  BP(mean): 86 (05 Jun 2023 08:00) (78 - 113)  ABP: --  ABP(mean): --  RR: 28 (05 Jun 2023 08:00) (21 - 37)  SpO2: 100% (05 Jun 2023 08:00) (92% - 100%)    O2 Parameters below as of 05 Jun 2023 08:00  Patient On (Oxygen Delivery Method): ventilator    O2 Concentration (%): 40        CONSTITUTIONAL:  Well nourished. In  NAD    ENT:   Airway patent,   Mouth with normal mucosa.   No thrush    EYES:   Pupils equal,   Round and reactive to light.    CARDIAC:   Normal rate,   Regular rhythm.    No edema      Vascular:  Normal systolic impulse  No Carotid bruits    RESPIRATORY:   No wheezing  Bilateral BS  Normal chest expansion  Not tachypneic,  No use of accessory muscles    GASTROINTESTINAL:  Abdomen soft,   Non-tender,   No guarding,   + BS    MUSCULOSKELETAL:   Range of motion is not limited,  No clubbing, cyanosis    NEUROLOGICAL:   Sedated     SKIN:   Skin normal color for race,   Rash         06-04-23 @ 07:01  -  06-05-23 @ 07:00  --------------------------------------------------------  IN:    Amiodarone: 132 mL    Amiodarone: 317.3 mL    Cisatracurium: 45.9 mL    Enteral Tube Flush: 900 mL    FentaNYL: 964.9 mL    IV PiggyBack: 350 mL    Midazolam: 191.1 mL    Peptamen A.F.: 600 mL    PRBCs (Packed Red Blood Cells): 50 mL  Total IN: 3551.2 mL    OUT:    Chest Tube (mL): 45 mL    Dexmedetomidine: 0 mL    Ketamine: 0 mL    Norepinephrine: 0 mL    Propofol: 0 mL    Voided (mL): 2800 mL  Total OUT: 2845 mL    Total NET: 706.2 mL      06-05-23 @ 07:01  -  06-05-23 @ 08:59  --------------------------------------------------------  IN:    Amiodarone: 16.7 mL    FentaNYL: 38.3 mL    Midazolam: 6.3 mL    Peptamen A.F.: 50 mL  Total IN: 111.3 mL    OUT:    Dexmedetomidine: 0 mL    Ketamine: 0 mL    Norepinephrine: 0 mL    Voided (mL): 400 mL  Total OUT: 400 mL    Total NET: -288.7 mL          LABS:                            6.9    26.60 )-----------( 331      ( 05 Jun 2023 05:00 )             22.2                                               06-05    140  |  107  |  56<H>  ----------------------------<  92  4.3   |  22  |  1.2    Ca    8.0<L>      05 Jun 2023 05:00  Mg     1.8     06-05    TPro  6.0  /  Alb  2.1<L>  /  TBili  0.4  /  DBili  x   /  AST  24  /  ALT  11  /  AlkPhos  97  06-05                                                 CARDIAC MARKERS ( 05 Jun 2023 05:00 )  x     / x     / 49 U/L / x     / x      CARDIAC MARKERS ( 04 Jun 2023 04:40 )  x     / x     / 45 U/L / x     / x                                                LIVER FUNCTIONS - ( 05 Jun 2023 05:00 )  Alb: 2.1 g/dL / Pro: 6.0 g/dL / ALK PHOS: 97 U/L / ALT: 11 U/L / AST: 24 U/L / GGT: x                                                  Culture - Body Fluid with Gram Stain (collected 02 Jun 2023 17:52)  Source: Pleural Fl Pleural Fluid  Gram Stain (03 Jun 2023 02:05):    polymorphonuclear leukocytes seen    No organisms seen    by cytocentrifuge  Preliminary Report (03 Jun 2023 19:50):    No growth    Culture - Acid Fast - Body Fluid w/Smear (collected 02 Jun 2023 17:52)  Source: .Body Fluid Other, Chest Tube                                                   Mode: AC/ CMV (Assist Control/ Continuous Mandatory Ventilation)  RR (machine): 30  TV (machine): 350  FiO2: 40  PEEP: 8  ITime: 1  MAP: 15  PIP: 39                                      ABG - ( 05 Jun 2023 03:16 )  pH, Arterial: 7.45  pH, Blood: x     /  pCO2: 36    /  pO2: 84    / HCO3: 25    / Base Excess: 1.1   /  SaO2: 98.0  Lac 0.7               MEDICATIONS  (STANDING):  aMIOdarone Infusion 1 mG/Min (33.3 mL/Hr) IV Continuous <Continuous>  aMIOdarone Infusion 0.5 mG/Min (16.7 mL/Hr) IV Continuous <Continuous>  chlorhexidine 0.12% Liquid 15 milliLiter(s) Oral Mucosa every 12 hours  chlorhexidine 2% Cloths 1 Application(s) Topical daily  chlorhexidine 2% Cloths 1 Application(s) Topical <User Schedule>  dexMEDEtomidine Infusion 0.2 MICROgram(s)/kG/Hr (4.25 mL/Hr) IV Continuous <Continuous>  dextrose 5%. 1000 milliLiter(s) (60 mL/Hr) IV Continuous <Continuous>  fentaNYL   Infusion. 0.555 MICROgram(s)/kG/Hr (4.72 mL/Hr) IV Continuous <Continuous>  insulin lispro (ADMELOG) corrective regimen sliding scale   SubCutaneous three times a day before meals  ipratropium 17 MICROgram(s) HFA Inhaler 1 Puff(s) Inhalation every 6 hours  ketamine Infusion. 0.25 mG/kG/Hr (2.13 mL/Hr) IV Continuous <Continuous>  meropenem  IVPB 1000 milliGRAM(s) IV Intermittent every 12 hours  midazolam Infusion 0.02 mG/kG/Hr (1.7 mL/Hr) IV Continuous <Continuous>  norepinephrine Infusion 0.05 MICROgram(s)/kG/Min (7.97 mL/Hr) IV Continuous <Continuous>  pantoprazole   Suspension 40 milliGRAM(s) Oral daily  polyethylene glycol 3350 17 Gram(s) Oral every 12 hours  propofol Infusion 11.098 MICROgram(s)/kG/Min (5.66 mL/Hr) IV Continuous <Continuous>  senna 2 Tablet(s) Oral at bedtime  vancomycin  IVPB 1250 milliGRAM(s) IV Intermittent every 24 hours  vitamin A &amp; D Ointment 1 Application(s) Topical daily    MEDICATIONS  (PRN):  acetaminophen     Tablet .. 650 milliGRAM(s) Oral every 6 hours PRN Temp greater or equal to 38C (100.4F)  albuterol    90 MICROgram(s) HFA Inhaler 4 Puff(s) Inhalation every 4 hours PRN Shortness of Breath and/or Wheezing  ipratropium 17 MICROgram(s) HFA Inhaler 4 Puff(s) Inhalation <User Schedule> PRN -  midazolam Injectable 2 milliGRAM(s) IV Push every 4 hours PRN Agitation      New X-rays reviewed:                                                                                  ECHO

## 2023-06-06 LAB
ALBUMIN SERPL ELPH-MCNC: 2 G/DL — LOW (ref 3.5–5.2)
ALP SERPL-CCNC: 91 U/L — SIGNIFICANT CHANGE UP (ref 30–115)
ALT FLD-CCNC: 13 U/L — SIGNIFICANT CHANGE UP (ref 0–41)
ANION GAP SERPL CALC-SCNC: 13 MMOL/L — SIGNIFICANT CHANGE UP (ref 7–14)
ANION GAP SERPL CALC-SCNC: 9 MMOL/L — SIGNIFICANT CHANGE UP (ref 7–14)
AST SERPL-CCNC: 22 U/L — SIGNIFICANT CHANGE UP (ref 0–41)
BASOPHILS # BLD AUTO: 0.06 K/UL — SIGNIFICANT CHANGE UP (ref 0–0.2)
BASOPHILS NFR BLD AUTO: 0.3 % — SIGNIFICANT CHANGE UP (ref 0–1)
BILIRUB SERPL-MCNC: 0.3 MG/DL — SIGNIFICANT CHANGE UP (ref 0.2–1.2)
BUN SERPL-MCNC: 48 MG/DL — HIGH (ref 10–20)
BUN SERPL-MCNC: 51 MG/DL — HIGH (ref 10–20)
CALCIUM SERPL-MCNC: 8.1 MG/DL — LOW (ref 8.4–10.5)
CALCIUM SERPL-MCNC: 8.3 MG/DL — LOW (ref 8.4–10.5)
CHLORIDE SERPL-SCNC: 105 MMOL/L — SIGNIFICANT CHANGE UP (ref 98–110)
CHLORIDE SERPL-SCNC: 108 MMOL/L — SIGNIFICANT CHANGE UP (ref 98–110)
CO2 SERPL-SCNC: 21 MMOL/L — SIGNIFICANT CHANGE UP (ref 17–32)
CO2 SERPL-SCNC: 23 MMOL/L — SIGNIFICANT CHANGE UP (ref 17–32)
CREAT SERPL-MCNC: 0.9 MG/DL — SIGNIFICANT CHANGE UP (ref 0.7–1.5)
CREAT SERPL-MCNC: 0.9 MG/DL — SIGNIFICANT CHANGE UP (ref 0.7–1.5)
CULTURE RESULTS: SIGNIFICANT CHANGE UP
CULTURE RESULTS: SIGNIFICANT CHANGE UP
EGFR: 82 ML/MIN/1.73M2 — SIGNIFICANT CHANGE UP
EGFR: 82 ML/MIN/1.73M2 — SIGNIFICANT CHANGE UP
EOSINOPHIL # BLD AUTO: 0.05 K/UL — SIGNIFICANT CHANGE UP (ref 0–0.7)
EOSINOPHIL NFR BLD AUTO: 0.3 % — SIGNIFICANT CHANGE UP (ref 0–8)
GAS PNL BLDA: SIGNIFICANT CHANGE UP
GAS PNL BLDA: SIGNIFICANT CHANGE UP
GLUCOSE BLDC GLUCOMTR-MCNC: 106 MG/DL — HIGH (ref 70–99)
GLUCOSE BLDC GLUCOMTR-MCNC: 151 MG/DL — HIGH (ref 70–99)
GLUCOSE SERPL-MCNC: 103 MG/DL — HIGH (ref 70–99)
GLUCOSE SERPL-MCNC: 147 MG/DL — HIGH (ref 70–99)
HCT VFR BLD CALC: 24.9 % — LOW (ref 37–47)
HGB BLD-MCNC: 8 G/DL — LOW (ref 12–16)
IMM GRANULOCYTES NFR BLD AUTO: 1.1 % — HIGH (ref 0.1–0.3)
LYMPHOCYTES # BLD AUTO: 1.32 K/UL — SIGNIFICANT CHANGE UP (ref 1.2–3.4)
LYMPHOCYTES # BLD AUTO: 6.8 % — LOW (ref 20.5–51.1)
MAGNESIUM SERPL-MCNC: 1.7 MG/DL — LOW (ref 1.8–2.4)
MCHC RBC-ENTMCNC: 22.9 PG — LOW (ref 27–31)
MCHC RBC-ENTMCNC: 32.1 G/DL — SIGNIFICANT CHANGE UP (ref 32–37)
MCV RBC AUTO: 71.1 FL — LOW (ref 81–99)
MONOCYTES # BLD AUTO: 0.7 K/UL — HIGH (ref 0.1–0.6)
MONOCYTES NFR BLD AUTO: 3.6 % — SIGNIFICANT CHANGE UP (ref 1.7–9.3)
NEUTROPHILS # BLD AUTO: 17.12 K/UL — HIGH (ref 1.4–6.5)
NEUTROPHILS NFR BLD AUTO: 87.9 % — HIGH (ref 42.2–75.2)
NRBC # BLD: 0 /100 WBCS — SIGNIFICANT CHANGE UP (ref 0–0)
PLATELET # BLD AUTO: 362 K/UL — SIGNIFICANT CHANGE UP (ref 130–400)
PMV BLD: 10.4 FL — SIGNIFICANT CHANGE UP (ref 7.4–10.4)
POTASSIUM SERPL-MCNC: 4.4 MMOL/L — SIGNIFICANT CHANGE UP (ref 3.5–5)
POTASSIUM SERPL-MCNC: 4.6 MMOL/L — SIGNIFICANT CHANGE UP (ref 3.5–5)
POTASSIUM SERPL-SCNC: 4.4 MMOL/L — SIGNIFICANT CHANGE UP (ref 3.5–5)
POTASSIUM SERPL-SCNC: 4.6 MMOL/L — SIGNIFICANT CHANGE UP (ref 3.5–5)
PROT SERPL-MCNC: 6 G/DL — SIGNIFICANT CHANGE UP (ref 6–8)
RBC # BLD: 3.5 M/UL — LOW (ref 4.2–5.4)
RBC # FLD: 26.7 % — HIGH (ref 11.5–14.5)
SODIUM SERPL-SCNC: 139 MMOL/L — SIGNIFICANT CHANGE UP (ref 135–146)
SODIUM SERPL-SCNC: 140 MMOL/L — SIGNIFICANT CHANGE UP (ref 135–146)
SPECIMEN SOURCE: SIGNIFICANT CHANGE UP
SPECIMEN SOURCE: SIGNIFICANT CHANGE UP
WBC # BLD: 19.47 K/UL — HIGH (ref 4.8–10.8)
WBC # FLD AUTO: 19.47 K/UL — HIGH (ref 4.8–10.8)

## 2023-06-06 PROCEDURE — 71045 X-RAY EXAM CHEST 1 VIEW: CPT | Mod: 26

## 2023-06-06 PROCEDURE — 71045 X-RAY EXAM CHEST 1 VIEW: CPT | Mod: 26,77

## 2023-06-06 PROCEDURE — 99291 CRITICAL CARE FIRST HOUR: CPT

## 2023-06-06 RX ORDER — METHADONE HYDROCHLORIDE 40 MG/1
10 TABLET ORAL ONCE
Refills: 0 | Status: DISCONTINUED | OUTPATIENT
Start: 2023-06-06 | End: 2023-06-06

## 2023-06-06 RX ORDER — SODIUM CHLORIDE 9 MG/ML
30 INJECTION INTRAMUSCULAR; INTRAVENOUS; SUBCUTANEOUS ONCE
Refills: 0 | Status: COMPLETED | OUTPATIENT
Start: 2023-06-06 | End: 2023-06-06

## 2023-06-06 RX ORDER — DEXAMETHASONE 0.5 MG/5ML
10 ELIXIR ORAL ONCE
Refills: 0 | Status: COMPLETED | OUTPATIENT
Start: 2023-06-06 | End: 2023-06-06

## 2023-06-06 RX ORDER — FENTANYL CITRATE 50 UG/ML
100 INJECTION INTRAVENOUS ONCE
Refills: 0 | Status: DISCONTINUED | OUTPATIENT
Start: 2023-06-06 | End: 2023-06-06

## 2023-06-06 RX ORDER — FUROSEMIDE 40 MG
40 TABLET ORAL ONCE
Refills: 0 | Status: COMPLETED | OUTPATIENT
Start: 2023-06-06 | End: 2023-06-06

## 2023-06-06 RX ORDER — DORNASE ALFA 1 MG/ML
3 SOLUTION RESPIRATORY (INHALATION) ONCE
Refills: 0 | Status: COMPLETED | OUTPATIENT
Start: 2023-06-06 | End: 2023-06-06

## 2023-06-06 RX ORDER — MEROPENEM 1 G/30ML
1000 INJECTION INTRAVENOUS EVERY 8 HOURS
Refills: 0 | Status: COMPLETED | OUTPATIENT
Start: 2023-06-06 | End: 2023-06-08

## 2023-06-06 RX ORDER — FUROSEMIDE 40 MG
40 TABLET ORAL DAILY
Refills: 0 | Status: DISCONTINUED | OUTPATIENT
Start: 2023-06-07 | End: 2023-06-16

## 2023-06-06 RX ORDER — MAGNESIUM SULFATE 500 MG/ML
2 VIAL (ML) INJECTION ONCE
Refills: 0 | Status: COMPLETED | OUTPATIENT
Start: 2023-06-06 | End: 2023-06-06

## 2023-06-06 RX ORDER — ALTEPLASE 100 MG
6 KIT INTRAVENOUS ONCE
Refills: 0 | Status: COMPLETED | OUTPATIENT
Start: 2023-06-06 | End: 2023-06-06

## 2023-06-06 RX ORDER — FENTANYL CITRATE 50 UG/ML
0.5 INJECTION INTRAVENOUS
Qty: 2500 | Refills: 0 | Status: DISCONTINUED | OUTPATIENT
Start: 2023-06-06 | End: 2023-06-06

## 2023-06-06 RX ADMIN — POLYETHYLENE GLYCOL 3350 17 GRAM(S): 17 POWDER, FOR SOLUTION ORAL at 21:04

## 2023-06-06 RX ADMIN — FENTANYL CITRATE 4.72 MICROGRAM(S)/KG/HR: 50 INJECTION INTRAVENOUS at 06:04

## 2023-06-06 RX ADMIN — SODIUM CHLORIDE 30 MILLILITER(S): 9 INJECTION INTRAMUSCULAR; INTRAVENOUS; SUBCUTANEOUS at 16:15

## 2023-06-06 RX ADMIN — SENNA PLUS 2 TABLET(S): 8.6 TABLET ORAL at 21:03

## 2023-06-06 RX ADMIN — Medication 1 APPLICATION(S): at 12:20

## 2023-06-06 RX ADMIN — MEROPENEM 100 MILLIGRAM(S): 1 INJECTION INTRAVENOUS at 22:00

## 2023-06-06 RX ADMIN — Medication 25 GRAM(S): at 10:40

## 2023-06-06 RX ADMIN — Medication 60 MILLIGRAM(S): at 05:23

## 2023-06-06 RX ADMIN — CHLORHEXIDINE GLUCONATE 15 MILLILITER(S): 213 SOLUTION TOPICAL at 19:03

## 2023-06-06 RX ADMIN — Medication 166.67 MILLIGRAM(S): at 12:21

## 2023-06-06 RX ADMIN — Medication 1: at 12:21

## 2023-06-06 RX ADMIN — CHLORHEXIDINE GLUCONATE 1 APPLICATION(S): 213 SOLUTION TOPICAL at 05:24

## 2023-06-06 RX ADMIN — DORNASE ALFA 3 MILLIGRAM(S): 1 SOLUTION RESPIRATORY (INHALATION) at 16:15

## 2023-06-06 RX ADMIN — FENTANYL CITRATE 100 MICROGRAM(S): 50 INJECTION INTRAVENOUS at 12:12

## 2023-06-06 RX ADMIN — PANTOPRAZOLE SODIUM 40 MILLIGRAM(S): 20 TABLET, DELAYED RELEASE ORAL at 12:21

## 2023-06-06 RX ADMIN — CHLORHEXIDINE GLUCONATE 15 MILLILITER(S): 213 SOLUTION TOPICAL at 05:23

## 2023-06-06 RX ADMIN — METHADONE HYDROCHLORIDE 10 MILLIGRAM(S): 40 TABLET ORAL at 12:20

## 2023-06-06 RX ADMIN — Medication 40 MILLIGRAM(S): at 10:40

## 2023-06-06 RX ADMIN — Medication 102 MILLIGRAM(S): at 10:39

## 2023-06-06 RX ADMIN — ALTEPLASE 6 MILLIGRAM(S): KIT at 16:15

## 2023-06-06 RX ADMIN — FENTANYL CITRATE 100 MICROGRAM(S): 50 INJECTION INTRAVENOUS at 11:56

## 2023-06-06 RX ADMIN — MEROPENEM 100 MILLIGRAM(S): 1 INJECTION INTRAVENOUS at 06:02

## 2023-06-06 RX ADMIN — MEROPENEM 100 MILLIGRAM(S): 1 INJECTION INTRAVENOUS at 16:16

## 2023-06-06 NOTE — PROGRESS NOTE ADULT - ASSESSMENT
IMPRESSION:    Acute hypoxemic respiratory failure   Severe cavitary CAP MRSA  Small parapneumonic effusion SP Chest tube.    MRSA bacteremia resolved   MASTER negative   Anemia sp transfusion   New onset A FIB rate controlled   LANCE non oliguric improving  Hypernatremia Improving  HO asthma   Influenza B     PLAN:    CNS:  SAT    HEENT: Oral care.  ET care     PULMONARY:  HOB @ 45 degrees.  Aspiration precautions.  ARDS net MV setting.  RR 26.  Monitor PPL and DP..  Chest Chest NC noted.  Keep SaO2 92 TO 96%.  SBT    CARDIOVASCULAR:  ECHO/ MASTER noted.  Avoid overload.  Lasix 40 mg daily     GI: GI prophylaxis.  Hold OG Feeding for SBT.  Adjust Bowel regimen.  Reglan as tolerated.      RENAL:  Follow up lytes.  Correct as needed.     INFECTIOUS DISEASE: ABX Per ID.  Might need to change Meropenem.      HEMATOLOGICAL:  DVT prophylaxis.  Trend CBC.     ENDOCRINE:  Follow up FS.  Insulin protocol if needed.  Solumedrol 60 mg daily.      MICU     l IJ 6/2    Bladder scans.      Prognosis remains guarded

## 2023-06-06 NOTE — PROGRESS NOTE ADULT - SUBJECTIVE AND OBJECTIVE BOX
Overnight events  - low grade fever  - cx NG    ICU Vital Signs Last 24 Hrs  T(C): 36.6 (2023 04:00), Max: 38 (2023 12:00)  T(F): 97.9 (2023 04:00), Max: 100.4 (2023 12:00)  HR: 70 (2023 07:43) (69 - 122)  BP: 187/88 (2023 07:00) (104/53 - 187/88)  BP(mean): 127 (2023 07:00) (71 - 127)  ABP: --  ABP(mean): --  RR: 25 (2023 07:15) (18 - 36)  SpO2: 98% (2023 07:43) (95% - 100%)    O2 Parameters below as of 2023 05:00  Patient On (Oxygen Delivery Method): ventilator    General: intubated  HEENT: NCAT  CV: RRR  Lungs: symmetric chest expansion, decreased BS at bases, CT L  Abd: Soft  Skin: back, LUE rash no real change  Neuro: sedated  Lines: no phlebitis     23 @ 07:01  -  23 @ 07:00  --------------------------------------------------------  IN:    Amiodarone: 384.1 mL    Dexmedetomidine: 333.6 mL    Enteral Tube Flush: 1000 mL    FentaNYL: 975.5 mL    IV PiggyBack: 350 mL    Ketamine: 55 mL    Midazolam: 106.2 mL    Peptamen A.F.: 1150 mL    PRBCs (Packed Red Blood Cells): 311 mL    Vital High Protein: 120 mL  Total IN: 4785.4 mL    OUT:    Amiodarone: 0 mL    Chest Tube (mL): 22 mL    Norepinephrine: 0 mL    Propofol: 0 mL    Rectal Tube (mL): 825 mL    Voided (mL): 2950 mL  Total OUT: 3797 mL    Total NET: 988.4 mL      23 @ 07:01  -  23 @ 08:41  --------------------------------------------------------  IN:    Amiodarone: 16.7 mL    Dexmedetomidine: 23.1 mL    FentaNYL: 46.3 mL    Ketamine: 6.5 mL    Midazolam: 5.6 mL    Peptamen A.F.: 50 mL  Total IN: 148.2 mL    OUT:    Voided (mL): 700 mL  Total OUT: 700 mL    Total NET: -551.8 mL          LABS:                            8.0    19.47 )-----------( 362      ( 2023 04:35 )             24.9                                               06-06    140  |  108  |  48<H>  ----------------------------<  103<H>  4.6   |  23  |  0.9    Ca    8.1<L>      2023 04:35  Mg     1.7     -    TPro  6.0  /  Alb  2.0<L>  /  TBili  0.3  /  DBili  x   /  AST  22  /  ALT  13  /  AlkPhos  91  06-06      PT/INR - ( 2023 10:35 )   PT: 15.00 sec;   INR: 1.31 ratio         PTT - ( 2023 10:35 )  PTT:28.8 sec                                       Urinalysis Basic - ( 2023 10:35 )    Color: Yellow / Appearance: Clear / S.010 / pH: x  Gluc: x / Ketone: Negative  / Bili: Negative / Urobili: <2 mg/dL   Blood: x / Protein: 30 mg/dL / Nitrite: Negative   Leuk Esterase: Negative / RBC: 10 /HPF / WBC 3 /HPF   Sq Epi: x / Non Sq Epi: x / Bacteria: Few        CARDIAC MARKERS ( 2023 05:00 )  x     / x     / 49 U/L / x     / x                                                LIVER FUNCTIONS - ( 2023 04:35 )  Alb: 2.0 g/dL / Pro: 6.0 g/dL / ALK PHOS: 91 U/L / ALT: 13 U/L / AST: 22 U/L / GGT: x                                                                                               Mode: AC/ CMV (Assist Control/ Continuous Mandatory Ventilation)  RR (machine): 26  TV (machine): 350  FiO2: 40  PEEP: 8  ITime: 1  MAP: 17  PIP: 28                                      ABG - ( 2023 03:15 )  pH, Arterial: 7.43  pH, Blood: x     /  pCO2: 36    /  pO2: 69    / HCO3: 24    / Base Excess: -0.3  /  SaO2: 95.7                MEDICATIONS  (STANDING):  aMIOdarone Infusion 0.5 mG/Min (16.7 mL/Hr) IV Continuous <Continuous>  chlorhexidine 0.12% Liquid 15 milliLiter(s) Oral Mucosa every 12 hours  chlorhexidine 2% Cloths 1 Application(s) Topical <User Schedule>  chlorhexidine 2% Cloths 1 Application(s) Topical daily  dexMEDEtomidine Infusion 0.2 MICROgram(s)/kG/Hr (4.25 mL/Hr) IV Continuous <Continuous>  fentaNYL   Infusion. 0.555 MICROgram(s)/kG/Hr (4.72 mL/Hr) IV Continuous <Continuous>  insulin lispro (ADMELOG) corrective regimen sliding scale   SubCutaneous three times a day before meals  ketamine Infusion. 0.25 mG/kG/Hr (2.13 mL/Hr) IV Continuous <Continuous>  meropenem  IVPB 1000 milliGRAM(s) IV Intermittent every 12 hours  methylPREDNISolone sodium succinate Injectable 60 milliGRAM(s) IV Push daily  midazolam Infusion 0.02 mG/kG/Hr (1.7 mL/Hr) IV Continuous <Continuous>  pantoprazole   Suspension 40 milliGRAM(s) Oral daily  polyethylene glycol 3350 17 Gram(s) Oral every 12 hours  senna 2 Tablet(s) Oral at bedtime  vancomycin  IVPB 1250 milliGRAM(s) IV Intermittent every 24 hours  vitamin A &amp; D Ointment 1 Application(s) Topical daily    MEDICATIONS  (PRN):  acetaminophen     Tablet .. 650 milliGRAM(s) Oral every 6 hours PRN Temp greater or equal to 38C (100.4F)

## 2023-06-06 NOTE — PROGRESS NOTE ADULT - ASSESSMENT
ASSESSMENT:  42y F w/ left pleural effusion, s/p chest tube placement      PLAN:  Chest tube to suction  Daily AM cxr  monitor Chest tube output  trend cbc, transfuse as needed     spectra [phone]

## 2023-06-06 NOTE — CHART NOTE - NSCHARTNOTEFT_GEN_A_CORE
Alteplase and Dornase injected into 16F chest tube and tube is now clamped, off sxn. (approx 3:30 pm)     Plan:   unclamp tube and put back to sxn in 4 hrs (7:30 pm). Please record output immediately after tube is put back to sxn. Get another CXR at that time.     Any questions please call [phone]

## 2023-06-06 NOTE — PROGRESS NOTE ADULT - SUBJECTIVE AND OBJECTIVE BOX
Patient is a 42y old  Female who presents with a chief complaint of AHRF (2023 11:46)    HPI:  42 year-old female with PMH of Asthma, HTN? presents with complaint of cough x3 days and SOB. During my encounter pt with dyspnea and increased work of breathing and chest pain, unable to properly provide accurate history. She states that her cough has been progressively worsening over the past 3 days, endorses hemoptysis since yesterday. She states she had a friend who came over and stayed with for more than a week who was sick recently, but she does not know if she recently travelled out of [facility] or not. She also admits that her children has been sick with Sore throat and fever for past 3 days. She admits to fever, chills, sever Chest pain which has gotten worse since she came to the ED. She took 1x Tamiflu yesterday. She otherwise denies diarrhea, constipation, urinary symptoms. She is not vaccinated for Influenza or COVID-19.     In ED  /87, , RR 28, T 100.8 F, 98% on 15 L NRB  Labs significant for wbc 16.7K with Neutrophilic Predominance and L shift, Hgb 11.4, MCV 61.6, Cr 2.0, BUN 23, Alk Phos 125/AST 62/ALT 27, Lac 4.9> 3.9  RVP + Influenza B      CT Angio Chest PE Protocol w/ IV Cont   1. Bilateral patchy groundglass nodularity with dominant confluent left lower lobe opacification with numerous cavitary lesions. Additional contralateral right lower lobe cavitary 1 cm nodule. Findings compatible with cavitary pneumonia in the appropriate clinical setting; differential diagnosis includes tuberculosis.  2. Confluent ill-defined left hilar and mediastinal adenopathy, likely reactive, without dominant lymph node delineated.  3. No evidence of pulmonary embolism.    s/p 1x Rocephin, Meropenem, 3 L LR in ED    Pt admitted to SDU for further managements, during my encounter pt is very agitated, in acute distress. Pt received appropriate pain control with IV Morphine, s/p Xanax 1 mg 1x for agitation and anxiety, despite pain control and adequate fluid resuscitation pt remained tachycardic, tachypneic. STAT EKG reviewed with Cardiology team on call After discussion with Cardiology team, Pt received 1x Adenosine 6 mg IV push with no change. Case discussed with Critical Care team, decision was made for Intubation and Pt upgraded to MICU.   (24 May 2023 00:58)       INTERVAL HPI/OVERNIGHT EVENTS:   No overnight events   Afebrile, hemodynamically stable     Subjective:    ICU Vital Signs Last 24 Hrs  T(C): 37.4 (2023 12:00), Max: 37.9 (2023 16:00)  T(F): 99.3 (2023 12:00), Max: 100.3 (2023 16:00)  HR: 57 (2023 12:45) (57 - 117)  BP: 175/84 (2023 12:45) (104/53 - 198/91)  BP(mean): 120 (2023 12:45) (71 - 131)  ABP: --  ABP(mean): --  RR: 20 (2023 12:45) (18 - 42)  SpO2: 96% (2023 12:45) (94% - 100%)    O2 Parameters below as of 2023 13:00  Patient On (Oxygen Delivery Method): ventilator    O2 Concentration (%): 40      I&O's Summary    2023 07:01  -  2023 07:00  --------------------------------------------------------  IN: 4785.4 mL / OUT: 3797 mL / NET: 988.4 mL    2023 07:01  -  2023 13:06  --------------------------------------------------------  IN: 508.7 mL / OUT: 3400 mL / NET: -2891.3 mL      Mode: CPAP with PS  FiO2: 40  PEEP: 8  PS: 6  MAP: 10  PIP: 16      Daily     Daily Weight in k.7 (2023 05:30)    Adult Advanced Hemodynamics Last 24 Hrs  CVP(mm Hg): --  CVP(cm H2O): --  CO: --  CI: --  PA: --  PA(mean): --  PCWP: --  SVR: --  SVRI: --  PVR: --  PVRI: --    EKG/Telemetry Events:    MEDICATIONS  (STANDING):  alteplase  Injectable for Pleural Effusion 6 milliGRAM(s) IntraPleural. once  aMIOdarone Infusion 0.5 mG/Min (16.7 mL/Hr) IV Continuous <Continuous>  chlorhexidine 0.12% Liquid 15 milliLiter(s) Oral Mucosa every 12 hours  chlorhexidine 2% Cloths 1 Application(s) Topical daily  chlorhexidine 2% Cloths 1 Application(s) Topical <User Schedule>  dexMEDEtomidine Infusion 0.2 MICROgram(s)/kG/Hr (4.25 mL/Hr) IV Continuous <Continuous>  dornase joseph Solution for Pleural Effusion 3 milliGRAM(s) IntraPleural. once  fentaNYL   Infusion. 0.555 MICROgram(s)/kG/Hr (4.72 mL/Hr) IV Continuous <Continuous>  furosemide   Injectable 40 milliGRAM(s) IV Push daily  insulin lispro (ADMELOG) corrective regimen sliding scale   SubCutaneous three times a day before meals  ketamine Infusion. 0.25 mG/kG/Hr (2.13 mL/Hr) IV Continuous <Continuous>  meropenem  IVPB 1000 milliGRAM(s) IV Intermittent every 8 hours  methylPREDNISolone sodium succinate Injectable 60 milliGRAM(s) IV Push daily  midazolam Infusion 0.02 mG/kG/Hr (1.7 mL/Hr) IV Continuous <Continuous>  pantoprazole   Suspension 40 milliGRAM(s) Oral daily  polyethylene glycol 3350 17 Gram(s) Oral every 12 hours  senna 2 Tablet(s) Oral at bedtime  sodium chloride 0.9% Solution for Pleural Effusion 30 milliLiter(s) IntraPleural. once  vancomycin  IVPB 1250 milliGRAM(s) IV Intermittent every 24 hours  vitamin A &amp; D Ointment 1 Application(s) Topical daily    MEDICATIONS  (PRN):  acetaminophen     Tablet .. 650 milliGRAM(s) Oral every 6 hours PRN Temp greater or equal to 38C (100.4F)      PHYSICAL EXAM:  GENERAL:   HEAD:  Atraumatic, Normocephalic  EYES: EOMI, PERRLA, conjunctiva and sclera clear  NECK: Supple, No JVD, Normal thyroid, no enlarged nodes  NERVOUS SYSTEM:  Alert & Awake.   CHEST/LUNG: B/L good air entry; No rales, rhonchi, or wheezing  HEART: S1S2 normal, no S3, Regular rate and rhythm; No murmurs  ABDOMEN: Soft, Nontender, Nondistended; Bowel sounds present  EXTREMITIES:  2+ Peripheral Pulses, No clubbing, cyanosis, or edema  LYMPH: No lymphadenopathy noted  SKIN: No rashes or lesions    LABS:                        8.0    19.47 )-----------( 362      ( 2023 04:35 )             24.9     06-06    140  |  108  |  48<H>  ----------------------------<  103<H>  4.6   |  23  |  0.9    Ca    8.1<L>      2023 04:35  Mg     1.7     06-06    TPro  6.0  /  Alb  2.0<L>  /  TBili  0.3  /  DBili  x   /  AST  22  /  ALT  13  /  AlkPhos  91  06-06    LIVER FUNCTIONS - ( 2023 04:35 )  Alb: 2.0 g/dL / Pro: 6.0 g/dL / ALK PHOS: 91 U/L / ALT: 13 U/L / AST: 22 U/L / GGT: x           PT/INR - ( 2023 10:35 )   PT: 15.00 sec;   INR: 1.31 ratio         PTT - ( 2023 10:35 )  PTT:28.8 sec  CAPILLARY BLOOD GLUCOSE      POCT Blood Glucose.: 151 mg/dL (2023 12:19)  POCT Blood Glucose.: 106 mg/dL (2023 06:45)  POCT Blood Glucose.: 136 mg/dL (2023 16:56)    ABG - ( 2023 03:15 )  pH, Arterial: 7.43  pH, Blood: x     /  pCO2: 36    /  pO2: 69    / HCO3: 24    / Base Excess: -0.3  /  SaO2: 95.7                CARDIAC MARKERS ( 2023 05:00 )  x     / x     / 49 U/L / x     / x          Urinalysis Basic - ( 2023 10:35 )    Color: Yellow / Appearance: Clear / S.010 / pH: x  Gluc: x / Ketone: Negative  / Bili: Negative / Urobili: <2 mg/dL   Blood: x / Protein: 30 mg/dL / Nitrite: Negative   Leuk Esterase: Negative / RBC: 10 /HPF / WBC 3 /HPF   Sq Epi: x / Non Sq Epi: x / Bacteria: Few          RADIOLOGY & ADDITIONAL TESTS:  < from: Xray Chest 1 View- PORTABLE-Routine (Xray Chest 1 View- PORTABLE-Routine in AM.) (23 @ 07:04) >  Impression:    New masslike right lower lung field opacity    Stable diffuse left lung opacities    --- End of Report ---    < end of copied text >  < from: CT Chest No Cont (23 @ 10:49) >    IMPRESSION:    Since 2023    Breathing artifact limits lung parenchymal assessment    Post placement left pleural catheter, satisfactory position.    Cavitary consolidation left lower lobe, similar to earlier study.   Assessment for adjacent pleural effusion limited due to streak artifact ,   however appears to have decreased in size since placement of pleural tube.      Multiple additional multi lobar cavitary nodules are present, not   significantly changed from previous study.,    No pneumothorax    --- End of Report ---    < end of copied text >  < from: [facility] Duplex Lower Ext Vein Scan, Bilat (23 @ 20:31) >  IMPRESSION:  No evidence of deep venous thrombosis in either lower extremity.    Left peroneal vein is not visualized      < end of copied text >          Care Discussed with Consultants/Other Providers [ x] YES  [ ] NO

## 2023-06-06 NOTE — PROGRESS NOTE ADULT - ASSESSMENT
42 year old female with PMHx Asthma, HTN presents with cough and SOB x3 days and hemoptysis. CT chest showing cavitary pneumonia. Cultures +MRSA bacteremia. Intubated; failed SBT. On Zyvox and Tamiflu. Repeat CT showing worsening PNA.    # Acute hypoxemic respiratory failure 2/2 cavitary MRSA PNA  # MRSA bacteremia, repeat cultures NGTD x3  # +Influenza B  # hx Asthma   - CT Angio Chest PE Protocol: Bilateral patchy groundglass nodularity with dominant confluent left lower lobe opacification with numerous cavitary lesions. Additional contralateral right lower lobe cavitary 1 cm nodule. Findings compatible with cavitary pneumonia in the appropriate clinical setting; differential diagnosis includes tuberculosis.  - Strep/Legionella negative, HIV negative   - intubated 5/23, self-extubated 5/25, re-intubated 5/25  - s/p Mupirocin BID x5 days  - blood cultures +MRSA  - blood cultures 5/28-30: NGTD x3  - ECHO: no vegetations. EF 65%, mild TR and pulm HTN  - repeat CT chest: Diffusely increased number and size of bilateral cavitary lesions with the left lower lobe now demonstrating a consolidative process of the entirelobe.  - D-dimer 1665, LE duplex negative for DVT  - pro-mya 48 > 5.8  - s/p MASTER 5/31: No evidence of valvular vegetations or intracardiac abscesses to support IE. Pulmonary hepatisation incidental finding.   - ABx per ID: c/w Vancomycin 500 qd, s/p Tamiflu 30mg q12 (day 10/10)  - Meropenem 1g q8 for persistent fevers  - PCR skin scraping negative   - repeat ET cultures negative   - repeat CT chest: Small left pleural effusion with overall essentially unchanged exam.  - s/p chest tube placement connected to suction --> tPA today  - start 40mg Lasix daily   - SBT/SAT    # LANCE   - Nephro following; post-infectious GN vs. ATN vs. pulmonary renal syndrome  - Cr stable around 3 > 2.6  - renal U/S: negative   - CK trending down 1483>39>41  - UA: +proteins and blood, Pr/Cr 1.3 (H)  - CHARLES+ 1:640, hep/HIV negative  - c/w PO bicarb 650 mg q8  - no acute indication for RRT    # Anemia  - trend CBC, active type and screen, transfuse <7  - DIC panel negative   - Dimer 1665, LE Duplex negative for DVT  - hold heparin drip. SCDs  - s/p 3u pRBCs; Hgb 7.8  - CTAP: r/o retroperitoneal bleed    #DVT ppx: SCDs  #GI ppx: Pantoprazole 40mg qd  #Diet: NPO w/ tube feed w/ Reglan  #Activity: bedrest   #Dispo: MICU

## 2023-06-06 NOTE — PROGRESS NOTE ADULT - SUBJECTIVE AND OBJECTIVE BOX
GENERAL SURGERY PROGRESS NOTE    Patient: ZEYNEP ROSSI , 42y (80)Female   MRN: [id number]  Location: [address]  Visit: 23 Inpatient  Date: 23 @ 02:35    Procedure/Dx/Injuries: left pleural effusion, s/p chest tube placement    Events of past 24 hours: hemoglobin dropped to 6.9, s/p blood transfusion, hemoglobin now 7.7    PAST MEDICAL & SURGICAL HISTORY:      Vitals:   T(F): 98.4 (23 @ 00:00), Max: 100.5 (23 @ 07:49)  HR: 75 (23 @ 00:30)  BP: 128/66 (23 @ 00:30)  RR: 25 (23 @ 00:30)  SpO2: 98% (23 @ 00:30)  Mode: AC/ CMV (Assist Control/ Continuous Mandatory Ventilation), RR (machine): 26, TV (machine): 350, FiO2: 40, PEEP: 8, ITime: 1, MAP: 12, PIP: 20    Diet, NPO with Tube Feed:   Tube Feeding Modality: Orogastric  Peptamen Intense VHP  Total Volume for 24 Hours (mL): 1200  Continuous  Starting Tube Feed Rate mL per Hour: 20  Increase Tube Feed Rate by (mL): 20     Every 4 hours  Until Goal Tube Feed Rate (mL per Hour): 50  Tube Feed Duration (in Hours): 24  Tube Feed Start Time: 12:00  Free Water Flush   Total Volume per Flush (mL): 300   Frequency: Every 4 Hours  Diet, NPO after Midnight:      NPO Start Date: 30-May-2023,   NPO Start Time: 23:59  Except Medications      Fluids:     I & O's:    23 @ 07:01  -  23 @ 07:00  --------------------------------------------------------  IN:    Amiodarone: 132 mL    Amiodarone: 317.3 mL    Cisatracurium: 45.9 mL    Enteral Tube Flush: 900 mL    FentaNYL: 964.9 mL    IV PiggyBack: 350 mL    Midazolam: 191.1 mL    Peptamen A.F.: 600 mL    PRBCs (Packed Red Blood Cells): 50 mL  Total IN: 3551.2 mL    OUT:    Chest Tube (mL): 45 mL    Dexmedetomidine: 0 mL    Ketamine: 0 mL    Norepinephrine: 0 mL    Propofol: 0 mL    Voided (mL): 2800 mL  Total OUT: 2845 mL    Total NET: 706.2 mL        Bowel Movement: : [] YES [] NO  Flatus: : [] YES [] NO    PHYSICAL EXAM:  General: NAD,   Cardiac: S1, S2,   Respiratory: left chest tube to suction  Abdomen: Soft, non-distended,     MEDICATIONS  (STANDING):  aMIOdarone Infusion 0.5 mG/Min (16.7 mL/Hr) IV Continuous <Continuous>  chlorhexidine 0.12% Liquid 15 milliLiter(s) Oral Mucosa every 12 hours  chlorhexidine 2% Cloths 1 Application(s) Topical <User Schedule>  chlorhexidine 2% Cloths 1 Application(s) Topical daily  dexMEDEtomidine Infusion 0.2 MICROgram(s)/kG/Hr (4.25 mL/Hr) IV Continuous <Continuous>  fentaNYL   Infusion. 0.555 MICROgram(s)/kG/Hr (4.72 mL/Hr) IV Continuous <Continuous>  insulin lispro (ADMELOG) corrective regimen sliding scale   SubCutaneous three times a day before meals  ketamine Infusion. 0.25 mG/kG/Hr (2.13 mL/Hr) IV Continuous <Continuous>  meropenem  IVPB 1000 milliGRAM(s) IV Intermittent every 12 hours  methylPREDNISolone sodium succinate Injectable 60 milliGRAM(s) IV Push daily  midazolam Infusion 0.02 mG/kG/Hr (1.7 mL/Hr) IV Continuous <Continuous>  pantoprazole   Suspension 40 milliGRAM(s) Oral daily  polyethylene glycol 3350 17 Gram(s) Oral every 12 hours  senna 2 Tablet(s) Oral at bedtime  vancomycin  IVPB 1250 milliGRAM(s) IV Intermittent every 24 hours  vitamin A &amp; D Ointment 1 Application(s) Topical daily    MEDICATIONS  (PRN):  acetaminophen     Tablet .. 650 milliGRAM(s) Oral every 6 hours PRN Temp greater or equal to 38C (100.4F)      DVT PROPHYLAXIS:   GI PROPHYLAXIS: pantoprazole   Suspension 40 milliGRAM(s) Oral daily    ANTICOAGULATION:   ANTIBIOTICS:  meropenem  IVPB 1000 milliGRAM(s)  vancomycin  IVPB 1250 milliGRAM(s)            LAB/STUDIES:  Labs:  CAPILLARY BLOOD GLUCOSE      POCT Blood Glucose.: 136 mg/dL (2023 16:56)  POCT Blood Glucose.: 128 mg/dL (2023 12:16)  POCT Blood Glucose.: 239 mg/dL (2023 05:47)                          7.7    27.41 )-----------( 360      ( 2023 10:35 )             24.7       Auto Neutrophil %: 90.1 % (23 @ 05:00)  Auto Immature Granulocyte %: 1.8 % (23 @ 05:00)        140  |  107  |  56<H>  ----------------------------<  92  4.3   |  22  |  1.2      Calcium, Total Serum: 8.0 mg/dL (23 @ 05:00)      LFTs:             6.0  | 0.4  | 24       ------------------[97      ( 2023 05:00 )  2.1  | x    | 11          Lipase:x      Amylase:x         Blood Gas Arterial, Lactate: 1.10 mmol/L (23 @ 14:06)  Blood Gas Arterial, Lactate: 0.70 mmol/L (23 @ 03:16)  Blood Gas Arterial, Lactate: 0.70 mmol/L (23 @ 03:46)  Blood Gas Arterial, Lactate: 1.00 mmol/L (23 @ 13:55)  Blood Gas Arterial, Lactate: 0.80 mmol/L (23 @ 04:07)    ABG - ( 2023 14:06 )  pH: 7.42  /  pCO2: 38    /  pO2: 106   / HCO3: 25    / Base Excess: 0.2   /  SaO2: 98.7            ABG - ( 2023 03:16 )  pH: 7.45  /  pCO2: 36    /  pO2: 84    / HCO3: 25    / Base Excess: 1.1   /  SaO2: 98.0            ABG - ( 2023 03:46 )  pH: 7.41  /  pCO2: 43    /  pO2: 91    / HCO3: 27    / Base Excess: 2.3   /  SaO2: 98.0              Coags:     15.00  ----< 1.31    ( 2023 10:35 )     28.8        CARDIAC MARKERS ( 2023 05:00 )  x     / x     / 49 U/L / x     / x      CARDIAC MARKERS ( 2023 04:40 )  x     / x     / 45 U/L / x     / x              Urinalysis Basic - ( 2023 10:35 )    Color: Yellow / Appearance: Clear / S.010 / pH: x  Gluc: x / Ketone: Negative  / Bili: Negative / Urobili: <2 mg/dL   Blood: x / Protein: 30 mg/dL / Nitrite: Negative   Leuk Esterase: Negative / RBC: 10 /HPF / WBC 3 /HPF   Sq Epi: x / Non Sq Epi: x / Bacteria: Few      IMAGING:  < from: Xray Chest 1 View- PORTABLE-Urgent (Xray Chest 1 View- PORTABLE-Urgent .) (23 @ 11:57) >  FINDINGS/  IMPRESSION:    ET tube is 3 to 4 cm above the noe. NG tube coursing below the left   hemidiaphragm out of the field-of-view. Unchanged left IJ venous catheter.    Unchanged left lung effusion/opacities. Unchanged right opacities No   pneumothorax.    Stable cardiomediastinal silhouette.    Unchanged osseous structures.    < end of copied text >      ACCESS/ DEVICES:  [x ] Peripheral IV  [ ] Central Venous Line	[ ] R	[ ] L	[ ] IJ	[ ] Fem	[ ] SC	Placed:   [ ] Arterial Line		[ ] R	[ ] L	[ ] Fem	[ ] Rad	[ ] Ax	Placed:   [ ] PICC:					[ ] Mediport  [ ] Urinary Catheter,  Date Placed:   [ ] Chest tube: [ ] Right, [ ] Left  [ ] CARTER/Anjum Drains

## 2023-06-06 NOTE — PROGRESS NOTE ADULT - SUBJECTIVE AND OBJECTIVE BOX
Patient is a 42y old  Female who presents with a chief complaint of AHRF (2023 02:34)        Over Night Events:  remains critically ill on MV.  Sedated.  Off pressors.          ROS:     All ROS are negative except HPI         PHYSICAL EXAM    ICU Vital Signs Last 24 Hrs  T(C): 36.6 (2023 04:00), Max: 38 (2023 12:00)  T(F): 97.9 (2023 04:00), Max: 100.4 (2023 12:00)  HR: 70 (2023 07:43) (69 - 122)  BP: 187/88 (2023 07:00) (104/53 - 187/88)  BP(mean): 127 (2023 07:00) (71 - 127)  ABP: --  ABP(mean): --  RR: 25 (2023 07:15) (18 - 36)  SpO2: 98% (2023 07:43) (95% - 100%)    O2 Parameters below as of 2023 05:00  Patient On (Oxygen Delivery Method): ventilator    O2 Concentration (%): 40        CONSTITUTIONAL:  In NAD    ENT:   Airway patent,   Mouth with normal mucosa.     EYES:   Pupils equal,   Round and reactive to light.    CARDIAC:   Normal rate,   Regular rhythm.    Edema      RESPIRATORY:   No wheezing  Bilateral BS  Normal chest expansion  Not tachypneic,  No use of accessory muscles    GASTROINTESTINAL:  Abdomen soft,   Non-tender,   No guarding,   + BS    MUSCULOSKELETAL:   Range of motion is not limited,  No clubbing, cyanosis    NEUROLOGICAL:   Sedated     SKIN:   Skin normal color for race,   No evidence of rash.      23 @ 07:01  -  23 @ 07:00  --------------------------------------------------------  IN:    Amiodarone: 384.1 mL    Dexmedetomidine: 333.6 mL    Enteral Tube Flush: 1000 mL    FentaNYL: 975.5 mL    IV PiggyBack: 350 mL    Ketamine: 55 mL    Midazolam: 106.2 mL    Peptamen A.F.: 1150 mL    PRBCs (Packed Red Blood Cells): 311 mL    Vital High Protein: 120 mL  Total IN: 4785.4 mL    OUT:    Amiodarone: 0 mL    Chest Tube (mL): 22 mL    Norepinephrine: 0 mL    Propofol: 0 mL    Rectal Tube (mL): 825 mL    Voided (mL): 2950 mL  Total OUT: 3797 mL    Total NET: 988.4 mL      23 @ 07:01  -  23 @ 08:41  --------------------------------------------------------  IN:    Amiodarone: 16.7 mL    Dexmedetomidine: 23.1 mL    FentaNYL: 46.3 mL    Ketamine: 6.5 mL    Midazolam: 5.6 mL    Peptamen A.F.: 50 mL  Total IN: 148.2 mL    OUT:    Voided (mL): 700 mL  Total OUT: 700 mL    Total NET: -551.8 mL          LABS:                            8.0    19.47 )-----------( 362      ( 2023 04:35 )             24.9                                               06-06    140  |  108  |  48<H>  ----------------------------<  103<H>  4.6   |  23  |  0.9    Ca    8.1<L>      2023 04:35  Mg     1.7     -    TPro  6.0  /  Alb  2.0<L>  /  TBili  0.3  /  DBili  x   /  AST  22  /  ALT  13  /  AlkPhos  91  06-06      PT/INR - ( 2023 10:35 )   PT: 15.00 sec;   INR: 1.31 ratio         PTT - ( 2023 10:35 )  PTT:28.8 sec                                       Urinalysis Basic - ( 2023 10:35 )    Color: Yellow / Appearance: Clear / S.010 / pH: x  Gluc: x / Ketone: Negative  / Bili: Negative / Urobili: <2 mg/dL   Blood: x / Protein: 30 mg/dL / Nitrite: Negative   Leuk Esterase: Negative / RBC: 10 /HPF / WBC 3 /HPF   Sq Epi: x / Non Sq Epi: x / Bacteria: Few        CARDIAC MARKERS ( 2023 05:00 )  x     / x     / 49 U/L / x     / x                                                LIVER FUNCTIONS - ( 2023 04:35 )  Alb: 2.0 g/dL / Pro: 6.0 g/dL / ALK PHOS: 91 U/L / ALT: 13 U/L / AST: 22 U/L / GGT: x                                                                                               Mode: AC/ CMV (Assist Control/ Continuous Mandatory Ventilation)  RR (machine): 26  TV (machine): 350  FiO2: 40  PEEP: 8  ITime: 1  MAP: 17  PIP: 28                                      ABG - ( 2023 03:15 )  pH, Arterial: 7.43  pH, Blood: x     /  pCO2: 36    /  pO2: 69    / HCO3: 24    / Base Excess: -0.3  /  SaO2: 95.7                MEDICATIONS  (STANDING):  aMIOdarone Infusion 0.5 mG/Min (16.7 mL/Hr) IV Continuous <Continuous>  chlorhexidine 0.12% Liquid 15 milliLiter(s) Oral Mucosa every 12 hours  chlorhexidine 2% Cloths 1 Application(s) Topical <User Schedule>  chlorhexidine 2% Cloths 1 Application(s) Topical daily  dexMEDEtomidine Infusion 0.2 MICROgram(s)/kG/Hr (4.25 mL/Hr) IV Continuous <Continuous>  fentaNYL   Infusion. 0.555 MICROgram(s)/kG/Hr (4.72 mL/Hr) IV Continuous <Continuous>  insulin lispro (ADMELOG) corrective regimen sliding scale   SubCutaneous three times a day before meals  ketamine Infusion. 0.25 mG/kG/Hr (2.13 mL/Hr) IV Continuous <Continuous>  meropenem  IVPB 1000 milliGRAM(s) IV Intermittent every 12 hours  methylPREDNISolone sodium succinate Injectable 60 milliGRAM(s) IV Push daily  midazolam Infusion 0.02 mG/kG/Hr (1.7 mL/Hr) IV Continuous <Continuous>  pantoprazole   Suspension 40 milliGRAM(s) Oral daily  polyethylene glycol 3350 17 Gram(s) Oral every 12 hours  senna 2 Tablet(s) Oral at bedtime  vancomycin  IVPB 1250 milliGRAM(s) IV Intermittent every 24 hours  vitamin A &amp; D Ointment 1 Application(s) Topical daily    MEDICATIONS  (PRN):  acetaminophen     Tablet .. 650 milliGRAM(s) Oral every 6 hours PRN Temp greater or equal to 38C (100.4F)      New X-rays reviewed:                                                                                  ECHO

## 2023-06-06 NOTE — PROGRESS NOTE ADULT - ASSESSMENT
ASSESSMENT  42 year-old female with PMH of Asthma, HTN? presents with complaint of cough x3 days and SOB. During my encounter pt with dyspnea and increased work of breathing and chest pain, unable to properly provide accurate history. She states that her cough has been progressively worsening over the past 3 days, endorses hemoptysis since yesterday.     IMPRESSION  #Rash only LUE , back  doubt drug rash without it being diffuse  initially appeared with small vesicles, not typical of shingles but HSV/VZV sent and is NEG  appears more like a contact dermatitis  #Fever    6/2 BCX NGTD    6/2 pleural fluid     6/1 DTA   Normal Respiratory Laila present    5/31 BCX NGTD     5/30 BCX NGTD   #Flu B with cavitary PNA, with MRSA bacteremia and cavitary PNA    5/29 BCX NGTD     5/28 BCX NGTD  x2    5/27 BCX +     5/26 BCX +    5/24 DTA MRSA    5/24 fungal ETT   Few Yeast, Fungitell: 41 (05-24-23 @ 06:10)-- unremarkable     5/23 BCX MRSA 3/4 bottles    MRSA PCR +     MASTER no vegetations   < from: CT Chest No Cont (05.29.23 @ 14:14) >  Diffusely increased number and size of bilateral cavitary lesions with   the left lower lobe now demonstrating a consolidative process of the   entirelobe.  Additional scattered bilateral ground glass opacities are noted. Findings   are compatible with progressing pneumonia.  WBC 16--> 3 12% Bands, Severe sepsis on admission  Doubt TB (lower lobe)  Acute hypoxemic resp failure requiring intubation  < from: CT Angio Chest PE Protocol w/ IV Cont (05.23.23 @ 22:06) >  1. Bilateral patchy groundglass nodularity with dominant confluent left   lower lobe opacification with numerous cavitary lesions. Additional   contralateral right lower lobe cavitary 1 cm nodule. Findings compatible   with cavitary pneumonia in the appropriate clinical setting; differential   diagnosis includes tuberculosis.  2. Confluent ill-defined left hilar and mediastinal adenopathy, likely   reactive, without dominant lymph node delineated.  3. No evidence of pulmonary embolism.  #Lactic acidosis  #LANCE  Creatinine, Serum: 3.0 mg/dL (06.01.23 @ 04:59)  #Hyponatremia      RECOMMENDATIONS  - CTS following  - Meropenem 1g q12h IV   - Continue Vanc dosing per ID pharmacy   - Guarded prognosis    If any questions, please call or send a message on [employer] Teams  Please continue to update ID with any pertinent new laboratory or radiographic findings  Spectra [phone]

## 2023-06-07 LAB
ALBUMIN SERPL ELPH-MCNC: 2.2 G/DL — LOW (ref 3.5–5.2)
ALP SERPL-CCNC: 92 U/L — SIGNIFICANT CHANGE UP (ref 30–115)
ALT FLD-CCNC: 24 U/L — SIGNIFICANT CHANGE UP (ref 0–41)
ANION GAP SERPL CALC-SCNC: 10 MMOL/L — SIGNIFICANT CHANGE UP (ref 7–14)
AST SERPL-CCNC: 32 U/L — SIGNIFICANT CHANGE UP (ref 0–41)
BASE EXCESS BLDA CALC-SCNC: -0.3 MMOL/L — SIGNIFICANT CHANGE UP (ref -2–3)
BASOPHILS # BLD AUTO: 0.04 K/UL — SIGNIFICANT CHANGE UP (ref 0–0.2)
BASOPHILS NFR BLD AUTO: 0.2 % — SIGNIFICANT CHANGE UP (ref 0–1)
BILIRUB SERPL-MCNC: 0.2 MG/DL — SIGNIFICANT CHANGE UP (ref 0.2–1.2)
BUN SERPL-MCNC: 47 MG/DL — HIGH (ref 10–20)
CALCIUM SERPL-MCNC: 8.2 MG/DL — LOW (ref 8.4–10.5)
CHLORIDE SERPL-SCNC: 107 MMOL/L — SIGNIFICANT CHANGE UP (ref 98–110)
CO2 SERPL-SCNC: 23 MMOL/L — SIGNIFICANT CHANGE UP (ref 17–32)
CREAT SERPL-MCNC: 0.8 MG/DL — SIGNIFICANT CHANGE UP (ref 0.7–1.5)
CULTURE RESULTS: SIGNIFICANT CHANGE UP
CULTURE RESULTS: SIGNIFICANT CHANGE UP
EGFR: 94 ML/MIN/1.73M2 — SIGNIFICANT CHANGE UP
EOSINOPHIL # BLD AUTO: 0.03 K/UL — SIGNIFICANT CHANGE UP (ref 0–0.7)
EOSINOPHIL NFR BLD AUTO: 0.2 % — SIGNIFICANT CHANGE UP (ref 0–8)
GAS PNL BLDA: SIGNIFICANT CHANGE UP
GLUCOSE BLDC GLUCOMTR-MCNC: 106 MG/DL — HIGH (ref 70–99)
GLUCOSE SERPL-MCNC: 90 MG/DL — SIGNIFICANT CHANGE UP (ref 70–99)
HCO3 BLDA-SCNC: 22 MMOL/L — SIGNIFICANT CHANGE UP (ref 21–28)
HCT VFR BLD CALC: 27.1 % — LOW (ref 37–47)
HGB BLD-MCNC: 8.5 G/DL — LOW (ref 12–16)
HOROWITZ INDEX BLDA+IHG-RTO: 40 — SIGNIFICANT CHANGE UP
IMM GRANULOCYTES NFR BLD AUTO: 1 % — HIGH (ref 0.1–0.3)
LYMPHOCYTES # BLD AUTO: 1.42 K/UL — SIGNIFICANT CHANGE UP (ref 1.2–3.4)
LYMPHOCYTES # BLD AUTO: 8 % — LOW (ref 20.5–51.1)
MAGNESIUM SERPL-MCNC: 1.6 MG/DL — LOW (ref 1.8–2.4)
MCHC RBC-ENTMCNC: 22.2 PG — LOW (ref 27–31)
MCHC RBC-ENTMCNC: 31.4 G/DL — LOW (ref 32–37)
MCV RBC AUTO: 70.8 FL — LOW (ref 81–99)
MONOCYTES # BLD AUTO: 0.93 K/UL — HIGH (ref 0.1–0.6)
MONOCYTES NFR BLD AUTO: 5.2 % — SIGNIFICANT CHANGE UP (ref 1.7–9.3)
NEUTROPHILS # BLD AUTO: 15.18 K/UL — HIGH (ref 1.4–6.5)
NEUTROPHILS NFR BLD AUTO: 85.4 % — HIGH (ref 42.2–75.2)
NRBC # BLD: 0 /100 WBCS — SIGNIFICANT CHANGE UP (ref 0–0)
PCO2 BLDA: 30 MMHG — SIGNIFICANT CHANGE UP (ref 25–48)
PH BLDA: 7.48 — HIGH (ref 7.35–7.45)
PLATELET # BLD AUTO: 504 K/UL — HIGH (ref 130–400)
PMV BLD: 9.9 FL — SIGNIFICANT CHANGE UP (ref 7.4–10.4)
PO2 BLDA: 153 MMHG — HIGH (ref 83–108)
POTASSIUM SERPL-MCNC: 4.2 MMOL/L — SIGNIFICANT CHANGE UP (ref 3.5–5)
POTASSIUM SERPL-SCNC: 4.2 MMOL/L — SIGNIFICANT CHANGE UP (ref 3.5–5)
PROT SERPL-MCNC: 6.4 G/DL — SIGNIFICANT CHANGE UP (ref 6–8)
RBC # BLD: 3.83 M/UL — LOW (ref 4.2–5.4)
RBC # FLD: 27.2 % — HIGH (ref 11.5–14.5)
SAO2 % BLDA: 99 % — HIGH (ref 94–98)
SODIUM SERPL-SCNC: 140 MMOL/L — SIGNIFICANT CHANGE UP (ref 135–146)
SPECIMEN SOURCE: SIGNIFICANT CHANGE UP
SPECIMEN SOURCE: SIGNIFICANT CHANGE UP
WBC # BLD: 17.77 K/UL — HIGH (ref 4.8–10.8)
WBC # FLD AUTO: 17.77 K/UL — HIGH (ref 4.8–10.8)

## 2023-06-07 PROCEDURE — 71045 X-RAY EXAM CHEST 1 VIEW: CPT | Mod: 26

## 2023-06-07 PROCEDURE — 99291 CRITICAL CARE FIRST HOUR: CPT

## 2023-06-07 RX ORDER — KETAMINE HYDROCHLORIDE 100 MG/ML
0.23 INJECTION INTRAMUSCULAR; INTRAVENOUS
Qty: 1000 | Refills: 0 | Status: DISCONTINUED | OUTPATIENT
Start: 2023-06-07 | End: 2023-06-08

## 2023-06-07 RX ORDER — MAGNESIUM SULFATE 500 MG/ML
2 VIAL (ML) INJECTION ONCE
Refills: 0 | Status: DISCONTINUED | OUTPATIENT
Start: 2023-06-07 | End: 2023-06-07

## 2023-06-07 RX ORDER — DORNASE ALFA 1 MG/ML
3 SOLUTION RESPIRATORY (INHALATION) ONCE
Refills: 0 | Status: COMPLETED | OUTPATIENT
Start: 2023-06-07 | End: 2023-06-07

## 2023-06-07 RX ORDER — MAGNESIUM SULFATE 500 MG/ML
2 VIAL (ML) INJECTION
Refills: 0 | Status: COMPLETED | OUTPATIENT
Start: 2023-06-07 | End: 2023-06-07

## 2023-06-07 RX ORDER — METHADONE HYDROCHLORIDE 40 MG/1
10 TABLET ORAL EVERY 8 HOURS
Refills: 0 | Status: DISCONTINUED | OUTPATIENT
Start: 2023-06-07 | End: 2023-06-08

## 2023-06-07 RX ORDER — METHADONE HYDROCHLORIDE 40 MG/1
15 TABLET ORAL ONCE
Refills: 0 | Status: DISCONTINUED | OUTPATIENT
Start: 2023-06-07 | End: 2023-06-07

## 2023-06-07 RX ORDER — AMIODARONE HYDROCHLORIDE 400 MG/1
200 TABLET ORAL
Refills: 0 | Status: DISCONTINUED | OUTPATIENT
Start: 2023-06-07 | End: 2023-06-21

## 2023-06-07 RX ORDER — FENTANYL CITRATE 50 UG/ML
50 INJECTION INTRAVENOUS ONCE
Refills: 0 | Status: DISCONTINUED | OUTPATIENT
Start: 2023-06-07 | End: 2023-06-07

## 2023-06-07 RX ORDER — SODIUM CHLORIDE 9 MG/ML
30 INJECTION INTRAMUSCULAR; INTRAVENOUS; SUBCUTANEOUS ONCE
Refills: 0 | Status: COMPLETED | OUTPATIENT
Start: 2023-06-07 | End: 2023-06-07

## 2023-06-07 RX ORDER — ALTEPLASE 100 MG
6 KIT INTRAVENOUS ONCE
Refills: 0 | Status: COMPLETED | OUTPATIENT
Start: 2023-06-07 | End: 2023-06-07

## 2023-06-07 RX ADMIN — CHLORHEXIDINE GLUCONATE 15 MILLILITER(S): 213 SOLUTION TOPICAL at 05:32

## 2023-06-07 RX ADMIN — AMIODARONE HYDROCHLORIDE 200 MILLIGRAM(S): 400 TABLET ORAL at 08:58

## 2023-06-07 RX ADMIN — CHLORHEXIDINE GLUCONATE 1 APPLICATION(S): 213 SOLUTION TOPICAL at 05:34

## 2023-06-07 RX ADMIN — FENTANYL CITRATE 4.72 MICROGRAM(S)/KG/HR: 50 INJECTION INTRAVENOUS at 00:19

## 2023-06-07 RX ADMIN — DEXMEDETOMIDINE HYDROCHLORIDE IN 0.9% SODIUM CHLORIDE 4.25 MICROGRAM(S)/KG/HR: 4 INJECTION INTRAVENOUS at 00:19

## 2023-06-07 RX ADMIN — POLYETHYLENE GLYCOL 3350 17 GRAM(S): 17 POWDER, FOR SOLUTION ORAL at 22:44

## 2023-06-07 RX ADMIN — ALTEPLASE 6 MILLIGRAM(S): KIT at 17:10

## 2023-06-07 RX ADMIN — Medication 40 MILLIGRAM(S): at 05:32

## 2023-06-07 RX ADMIN — MEROPENEM 100 MILLIGRAM(S): 1 INJECTION INTRAVENOUS at 05:33

## 2023-06-07 RX ADMIN — POLYETHYLENE GLYCOL 3350 17 GRAM(S): 17 POWDER, FOR SOLUTION ORAL at 08:59

## 2023-06-07 RX ADMIN — Medication 25 GRAM(S): at 08:59

## 2023-06-07 RX ADMIN — Medication 166.67 MILLIGRAM(S): at 11:52

## 2023-06-07 RX ADMIN — METHADONE HYDROCHLORIDE 10 MILLIGRAM(S): 40 TABLET ORAL at 22:49

## 2023-06-07 RX ADMIN — SODIUM CHLORIDE 30 MILLILITER(S): 9 INJECTION INTRAMUSCULAR; INTRAVENOUS; SUBCUTANEOUS at 18:10

## 2023-06-07 RX ADMIN — FENTANYL CITRATE 4.72 MICROGRAM(S)/KG/HR: 50 INJECTION INTRAVENOUS at 04:21

## 2023-06-07 RX ADMIN — Medication 1 APPLICATION(S): at 11:52

## 2023-06-07 RX ADMIN — DEXMEDETOMIDINE HYDROCHLORIDE IN 0.9% SODIUM CHLORIDE 4.25 MICROGRAM(S)/KG/HR: 4 INJECTION INTRAVENOUS at 04:21

## 2023-06-07 RX ADMIN — PANTOPRAZOLE SODIUM 40 MILLIGRAM(S): 20 TABLET, DELAYED RELEASE ORAL at 11:52

## 2023-06-07 RX ADMIN — Medication 25 GRAM(S): at 10:35

## 2023-06-07 RX ADMIN — KETAMINE HYDROCHLORIDE 2.13 MG/KG/HR: 100 INJECTION INTRAMUSCULAR; INTRAVENOUS at 00:18

## 2023-06-07 RX ADMIN — Medication 2 MILLIGRAM(S): at 21:52

## 2023-06-07 RX ADMIN — MEROPENEM 100 MILLIGRAM(S): 1 INJECTION INTRAVENOUS at 15:25

## 2023-06-07 RX ADMIN — AMIODARONE HYDROCHLORIDE 200 MILLIGRAM(S): 400 TABLET ORAL at 18:11

## 2023-06-07 RX ADMIN — DORNASE ALFA 3 MILLIGRAM(S): 1 SOLUTION RESPIRATORY (INHALATION) at 17:10

## 2023-06-07 RX ADMIN — SENNA PLUS 2 TABLET(S): 8.6 TABLET ORAL at 22:44

## 2023-06-07 RX ADMIN — KETAMINE HYDROCHLORIDE 2.13 MG/KG/HR: 100 INJECTION INTRAMUSCULAR; INTRAVENOUS at 06:24

## 2023-06-07 RX ADMIN — MEROPENEM 100 MILLIGRAM(S): 1 INJECTION INTRAVENOUS at 22:49

## 2023-06-07 RX ADMIN — METHADONE HYDROCHLORIDE 10 MILLIGRAM(S): 40 TABLET ORAL at 15:28

## 2023-06-07 RX ADMIN — CHLORHEXIDINE GLUCONATE 15 MILLILITER(S): 213 SOLUTION TOPICAL at 18:11

## 2023-06-07 RX ADMIN — METHADONE HYDROCHLORIDE 15 MILLIGRAM(S): 40 TABLET ORAL at 08:58

## 2023-06-07 RX ADMIN — Medication 60 MILLIGRAM(S): at 05:32

## 2023-06-07 NOTE — CONSULT NOTE ADULT - ASSESSMENT
Rash most consistent with an eczematous eruption...not a drug eruption.    No therapy needed at this time. If it worsens would consider a topical steroid.

## 2023-06-07 NOTE — PROGRESS NOTE ADULT - ASSESSMENT
IMPRESSION:    Acute hypoxemic respiratory failure   Severe cavitary CAP MRSA  Small parapneumonic effusion SP Chest tube.    MRSA bacteremia resolved   MASTER negative   Anemia sp transfusion   New onset A FIB rate controlled   LANCE non oliguric improving  Hypernatremia Improving  HO asthma   Influenza B     PLAN:    CNS:  SAT    HEENT: Oral care.  ET care     PULMONARY:  HOB @ 45 degrees.  Aspiration precautions.  ARDS net MV setting.  RR 26.  Monitor PPL and DP..  Chest Chest NC noted.  Keep SaO2 92 TO 96%.  SBT    CARDIOVASCULAR:  ECHO/ MASTER noted.  Avoid overload.  Continue Lasix 40 mg daily     GI: GI prophylaxis.  Hold OG Feeding for SBT.  Adjust Bowel regimen.  Reglan as tolerated.      RENAL:  Follow up lytes.  Correct as needed.     INFECTIOUS DISEASE: ABX Per ID.      HEMATOLOGICAL:  DVT prophylaxis.  Trend CBC.     ENDOCRINE:  Follow up FS.  Insulin protocol if needed.  Solumedrol 60 mg daily.      MICU     l IJ 6/2    Bladder scans.      Prognosis remains guarded  IMPRESSION:    Acute hypoxemic respiratory failure   Severe cavitary CAP MRSA  Small parapneumonic effusion SP Chest tube.    MRSA bacteremia resolved   MASTER negative   Anemia sp transfusion   New onset A FIB rate controlled   LANCE non oliguric improving  Hypernatremia Improving  HO asthma   Influenza B     PLAN:    CNS:  SAT.  Methadone for weaning.      HEENT: Oral care.  ET care     PULMONARY:  HOB @ 45 degrees.  Aspiration precautions.  ARDS net MV setting.  RR 26.  Monitor PPL and DP..  Chest Chest NC noted.  Keep SaO2 92 TO 96%.  SBT    CARDIOVASCULAR:  ECHO/ MASTER noted.  Avoid overload.  Continue Lasix 40 mg daily     GI: GI prophylaxis.  Hold OG Feeding for SBT.  Adjust Bowel regimen.  Reglan as tolerated.      RENAL:  Follow up lytes.  Correct as needed.     INFECTIOUS DISEASE: ABX Per ID.      HEMATOLOGICAL:  DVT prophylaxis.  Trend CBC.     ENDOCRINE:  Follow up FS.  Insulin protocol if needed.  Solumedrol 60 mg daily.      MICU     l IJ 6/2    Bladder scans.      Prognosis remains guarded

## 2023-06-07 NOTE — CONSULT NOTE ADULT - SUBJECTIVE AND OBJECTIVE BOX
42 year old female with PMHx Asthma, HTN presents with cough and SOB x3 days and hemoptysis. CT chest showing cavitary pneumonia. Cultures +MRSA bacteremia. Intubated; failed SBT. On Zyvox and Tamiflu. Repeat CT showing worsening PNA.    Noted to have a rash on the left arm and back. Rash on arm crusted several days ago and was treated for herpes zoster but had negative culture.    PHYSICAL EXAMINATION: Faintly erythematous patch left upper arm with crusting over lateral aspect and minimal crusting anteriorly

## 2023-06-07 NOTE — PROGRESS NOTE ADULT - ASSESSMENT
42 year old female with PMHx Asthma, HTN presents with cough and SOB x3 days and hemoptysis. CT chest showing cavitary pneumonia. Cultures +MRSA bacteremia. Intubated; failed SBT. On Zyvox and Tamiflu. Repeat CT showing worsening PNA.    # Acute hypoxemic respiratory failure 2/2 cavitary MRSA PNA  # MRSA bacteremia, repeat cultures NGTD x3  # +Influenza B  # hx Asthma   - CT Angio Chest PE Protocol: Bilateral patchy groundglass nodularity with dominant confluent left lower lobe opacification with numerous cavitary lesions. Additional contralateral right lower lobe cavitary 1 cm nodule. Findings compatible with cavitary pneumonia in the appropriate clinical setting; differential diagnosis includes tuberculosis.  - Strep/Legionella negative, HIV negative   - intubated 5/23, self-extubated 5/25, re-intubated 5/25  - s/p Mupirocin BID x5 days  - blood cultures +MRSA  - blood cultures 5/28-30: NGTD x3  - ECHO: no vegetations. EF 65%, mild TR and pulm HTN  - repeat CT chest: Diffusely increased number and size of bilateral cavitary lesions with the left lower lobe now demonstrating a consolidative process of the entirelobe.  - D-dimer 1665, LE duplex negative for DVT  - pro-mya 48 > 5.8  - s/p AMSTER 5/31: No evidence of valvular vegetations or intracardiac abscesses to support IE. Pulmonary hepatisation incidental finding.   - s/p Tamiflu 30mg q12  - c/w Vancomycin (dosing per pharmacy) and Meropenem 1g q8   - PCR skin scraping negative for HSV --> d/c Acyclovir   - possible drug induced rash ? Meropenem  - f/u Derm consult  - repeat ET cultures negative   - repeat CT chest: Small left pleural effusion with overall essentially unchanged exam.  - s/p chest tube placement connected to suction --> tPA 6/6  - start 40mg Lasix daily   - SBT/SAT    # LANCE   - Nephro following; post-infectious GN vs. ATN vs. pulmonary renal syndrome  - Cr stable around 3 > 2.6  - renal U/S: negative   - CK trending down 1483>39>41  - UA: +proteins and blood, Pr/Cr 1.3 (H)  - CHARLES+ 1:640, hep/HIV negative  - c/w PO bicarb 650 mg q8  - no acute indication for RRT    # Anemia  - trend CBC, active type and screen, transfuse <7  - DIC panel negative   - Dimer 1665, LE Duplex negative for DVT  - hold heparin drip. SCDs  - s/p 3u pRBCs; Hgb 7.8  - CTAP: r/o retroperitoneal bleed    #DVT ppx: SCDs  #GI ppx: Pantoprazole 40mg qd  #Diet: NPO w/ tube feed w/ Reglan  #Activity: bedrest   #Dispo: MICU     42 year old female with PMHx Asthma, HTN presents with cough and SOB x3 days and hemoptysis. CT chest showing cavitary pneumonia. Cultures +MRSA bacteremia. Intubated; failed SBT. On Zyvox and Tamiflu. Repeat CT showing worsening PNA.    # Acute hypoxemic respiratory failure 2/2 cavitary MRSA PNA  # MRSA bacteremia, repeat cultures NGTD x3  # +Influenza B  # hx Asthma   - CT Angio Chest PE Protocol: Bilateral patchy groundglass nodularity with dominant confluent left lower lobe opacification with numerous cavitary lesions. Additional contralateral right lower lobe cavitary 1 cm nodule. Findings compatible with cavitary pneumonia in the appropriate clinical setting; differential diagnosis includes tuberculosis.  - Strep/Legionella negative, HIV negative   - intubated 5/23, self-extubated 5/25, re-intubated 5/25  - s/p Mupirocin BID x5 days  - blood cultures +MRSA  - blood cultures 5/28-30: NGTD x3  - ECHO: no vegetations. EF 65%, mild TR and pulm HTN  - repeat CT chest: Diffusely increased number and size of bilateral cavitary lesions with the left lower lobe now demonstrating a consolidative process of the entirelobe.  - D-dimer 1665, LE duplex negative for DVT  - pro-mya 48 > 5.8  - s/p MASTER 5/31: No evidence of valvular vegetations or intracardiac abscesses to support IE. Pulmonary hepatisation incidental finding.   - s/p Tamiflu 30mg q12  - c/w Vancomycin (dosing per pharmacy) and Meropenem 1g q8   - PCR skin scraping negative for HSV --> d/c Acyclovir   - possible drug induced rash ? Meropenem  - f/u Derm consult  - repeat ET cultures negative   - repeat CT chest: Small left pleural effusion with overall essentially unchanged exam.  - s/p chest tube placement connected to suction --> tPA 6/6  - start 40mg Lasix daily   - SBT/SAT    # New onset A-fib w/ RVR  - s/p Amio drip  - EP consulted  - start Amio 200mg BID for 2 weeks --> then Amio 200mg daily     # LANCE   - Nephro following; post-infectious GN vs. ATN vs. pulmonary renal syndrome  - Cr stable around 3 > 2.6  - renal U/S: negative   - CK trending down 1483>39>41  - UA: +proteins and blood, Pr/Cr 1.3 (H)  - CHARLES+ 1:640, hep/HIV negative  - c/w PO bicarb 650 mg q8  - no acute indication for RRT    # Anemia  - trend CBC, active type and screen, transfuse <7  - DIC panel negative   - Dimer 1665, LE Duplex negative for DVT  - hold heparin drip. SCDs  - s/p 3u pRBCs; Hgb 7.8  - CTAP: r/o retroperitoneal bleed    #DVT ppx: SCDs  #GI ppx: Pantoprazole 40mg qd  #Diet: NPO w/ tube feed w/ Reglan  #Activity: bedrest   #Dispo: MICU

## 2023-06-07 NOTE — PROGRESS NOTE ADULT - SUBJECTIVE AND OBJECTIVE BOX
Patient is a 42y old  Female who presents with a chief complaint of AHRF (2023 07:40)        Over Night Events:  remains critically ill on MV.  Sedated.  Off pressors.          ROS:     All ROS are negative except HPI         PHYSICAL EXAM    ICU Vital Signs Last 24 Hrs  T(C): 37.4 (2023 08:00), Max: 37.4 (2023 12:00)  T(F): 99.3 (2023 08:00), Max: 99.3 (2023 12:00)  HR: 92 (2023 08:15) (54 - 121)  BP: 134/63 (2023 08:15) (112/62 - 215/103)  BP(mean): 91 (2023 08:15) (80 - 146)  ABP: --  ABP(mean): --  RR: 18 (2023 08:15) (18 - 42)  SpO2: 97% (2023 08:15) (94% - 100%)    O2 Parameters below as of 2023 08:00  Patient On (Oxygen Delivery Method): ventilator    O2 Concentration (%): 40        CONSTITUTIONAL:  Well nourished.  NAD    ENT:   Airway patent,   Mouth with normal mucosa.   ET     EYES:   Pupils equal,   Round and reactive to light.    CARDIAC:   Normal rate,   Regular rhythm.        RESPIRATORY:   No wheezing  Bilateral BS  Normal chest expansion  Not tachypneic,  No use of accessory muscles    GASTROINTESTINAL:  Abdomen soft,   Non-tender,   No guarding,   + BS    MUSCULOSKELETAL:   Range of motion is not limited,  No clubbing, cyanosis    NEUROLOGICAL:   Sedated  Moves upper extremities  Does not follow commands     SKIN:   Skin normal color for race,   No evidence of rash.          23 @ 07:01  -  23 @ 07:00  --------------------------------------------------------  IN:    Amiodarone: 400.8 mL    Dexmedetomidine: 647.9 mL    Enteral Tube Flush: 400 mL    FentaNYL: 681.7 mL    IV PiggyBack: 450 mL    Ketamine: 51.9 mL    Ketamine: 18.6 mL    Midazolam: 7.9 mL    Peptamen A.F.: 950 mL  Total IN: 3608.8 mL    OUT:    Chest Tube (mL): 188 mL    FentaNYL: 0 mL    Rectal Tube (mL): 1600 mL    Voided (mL): 4150 mL  Total OUT: 5938 mL    Total NET: -2329.2 mL      23 @ 07:01  -  23 @ 08:38  --------------------------------------------------------  IN:    Amiodarone: 16.7 mL    Dexmedetomidine: 34.6 mL    FentaNYL: 46.4 mL    Ketamine: 13.9 mL    Peptamen A.F.: 50 mL  Total IN: 161.6 mL    OUT:    Midazolam: 0 mL    Voided (mL): 1100 mL  Total OUT: 1100 mL    Total NET: -938.4 mL          LABS:                            8.5    17.77 )-----------( 504      ( 2023 04:55 )             27.1                                               06-    140  |  107  |  47<H>  ----------------------------<  90  4.2   |  23  |  0.8    Ca    8.2<L>      2023 04:55  Mg     1.6     -07    TPro  6.4  /  Alb  2.2<L>  /  TBili  0.2  /  DBili  x   /  AST  32  /  ALT  24  /  AlkPhos  92  06-07      PT/INR - ( 2023 10:35 )   PT: 15.00 sec;   INR: 1.31 ratio         PTT - ( 2023 10:35 )  PTT:28.8 sec                                       Urinalysis Basic - ( 2023 10:35 )    Color: Yellow / Appearance: Clear / S.010 / pH: x  Gluc: x / Ketone: Negative  / Bili: Negative / Urobili: <2 mg/dL   Blood: x / Protein: 30 mg/dL / Nitrite: Negative   Leuk Esterase: Negative / RBC: 10 /HPF / WBC 3 /HPF   Sq Epi: x / Non Sq Epi: x / Bacteria: Few                                                  LIVER FUNCTIONS - ( 2023 04:55 )  Alb: 2.2 g/dL / Pro: 6.4 g/dL / ALK PHOS: 92 U/L / ALT: 24 U/L / AST: 32 U/L / GGT: x                                                  Culture - Urine (collected 2023 10:35)  Source: Clean Catch Clean Catch (Midstream)  Final Report (2023 18:49):    10,000 - 49,000 CFU/mL Candida albicans "Susceptibilities not performed"                                                   Mode: AC/ CMV (Assist Control/ Continuous Mandatory Ventilation)  RR (machine): 26  TV (machine): 350  FiO2: 40  PEEP: 8  ITime: 1  MAP: 12  PIP: 19                                      ABG - ( 2023 02:55 )  pH, Arterial: 7.48  pH, Blood: x     /  pCO2: 30    /  pO2: 153   / HCO3: 22    / Base Excess: -0.3  /  SaO2: 99.0    PPL 16              MEDICATIONS  (STANDING):  alteplase  Injectable for Pleural Effusion 6 milliGRAM(s) IntraPleural. once  aMIOdarone    Tablet 200 milliGRAM(s) Oral two times a day  aMIOdarone Infusion 0.5 mG/Min (16.7 mL/Hr) IV Continuous <Continuous>  chlorhexidine 0.12% Liquid 15 milliLiter(s) Oral Mucosa every 12 hours  chlorhexidine 2% Cloths 1 Application(s) Topical <User Schedule>  chlorhexidine 2% Cloths 1 Application(s) Topical daily  dexMEDEtomidine Infusion 0.2 MICROgram(s)/kG/Hr (4.25 mL/Hr) IV Continuous <Continuous>  dornase joseph Solution for Pleural Effusion 3 milliGRAM(s) IntraPleural. once  fentaNYL   Infusion. 0.555 MICROgram(s)/kG/Hr (4.72 mL/Hr) IV Continuous <Continuous>  furosemide   Injectable 40 milliGRAM(s) IV Push daily  insulin lispro (ADMELOG) corrective regimen sliding scale   SubCutaneous three times a day before meals  ketamine Infusion. 0.23 mG/kG/Hr (2.13 mL/Hr) IV Continuous <Continuous>  magnesium sulfate  IVPB 2 Gram(s) IV Intermittent once  meropenem  IVPB 1000 milliGRAM(s) IV Intermittent every 8 hours  methylPREDNISolone sodium succinate Injectable 60 milliGRAM(s) IV Push daily  midazolam Infusion 0.02 mG/kG/Hr (1.7 mL/Hr) IV Continuous <Continuous>  pantoprazole   Suspension 40 milliGRAM(s) Oral daily  polyethylene glycol 3350 17 Gram(s) Oral every 12 hours  senna 2 Tablet(s) Oral at bedtime  sodium chloride 0.9% Solution for Pleural Effusion 30 milliLiter(s) IntraPleural. once  vancomycin  IVPB 1250 milliGRAM(s) IV Intermittent every 24 hours  vitamin A &amp; D Ointment 1 Application(s) Topical daily    MEDICATIONS  (PRN):  acetaminophen     Tablet .. 650 milliGRAM(s) Oral every 6 hours PRN Temp greater or equal to 38C (100.4F)      New X-rays reviewed:                                                                                  ECHO

## 2023-06-07 NOTE — PROGRESS NOTE ADULT - SUBJECTIVE AND OBJECTIVE BOX
Patient is a 42y old  Female who presents with a chief complaint of AHRF (2023 03:42)    HPI:  42 year-old female with PMH of Asthma, HTN? presents with complaint of cough x3 days and SOB. During my encounter pt with dyspnea and increased work of breathing and chest pain, unable to properly provide accurate history. She states that her cough has been progressively worsening over the past 3 days, endorses hemoptysis since yesterday. She states she had a friend who came over and stayed with for more than a week who was sick recently, but she does not know if she recently travelled out of [facility] or not. She also admits that her children has been sick with Sore throat and fever for past 3 days. She admits to fever, chills, sever Chest pain which has gotten worse since she came to the ED. She took 1x Tamiflu yesterday. She otherwise denies diarrhea, constipation, urinary symptoms. She is not vaccinated for Influenza or COVID-19.     In ED  /87, , RR 28, T 100.8 F, 98% on 15 L NRB  Labs significant for wbc 16.7K with Neutrophilic Predominance and L shift, Hgb 11.4, MCV 61.6, Cr 2.0, BUN 23, Alk Phos 125/AST 62/ALT 27, Lac 4.9> 3.9  RVP + Influenza B      CT Angio Chest PE Protocol w/ IV Cont   1. Bilateral patchy groundglass nodularity with dominant confluent left lower lobe opacification with numerous cavitary lesions. Additional contralateral right lower lobe cavitary 1 cm nodule. Findings compatible with cavitary pneumonia in the appropriate clinical setting; differential diagnosis includes tuberculosis.  2. Confluent ill-defined left hilar and mediastinal adenopathy, likely reactive, without dominant lymph node delineated.  3. No evidence of pulmonary embolism.    s/p 1x Rocephin, Meropenem, 3 L LR in ED    Pt admitted to SDU for further managements, during my encounter pt is very agitated, in acute distress. Pt received appropriate pain control with IV Morphine, s/p Xanax 1 mg 1x for agitation and anxiety, despite pain control and adequate fluid resuscitation pt remained tachycardic, tachypneic. STAT EKG reviewed with Cardiology team on call After discussion with Cardiology team, Pt received 1x Adenosine 6 mg IV push with no change. Case discussed with Critical Care team, decision was made for Intubation and Pt upgraded to MICU.   (24 May 2023 00:58)       INTERVAL HPI/OVERNIGHT EVENTS:   No overnight events   Afebrile, hemodynamically stable   Receiving 1u washed pRBC this am    Subjective:    ICU Vital Signs Last 24 Hrs  T(C): 37.1 (2023 05:00), Max: 37.4 (2023 12:00)  T(F): 98.8 (2023 05:00), Max: 99.3 (2023 12:00)  HR: 80 (2023 06:30) (54 - 121)  BP: 112/62 (2023 06:30) (112/62 - 215/103)  BP(mean): 80 (2023 06:30) (80 - 146)  ABP: --  ABP(mean): --  RR: 19 (2023 06:30) (18 - 42)  SpO2: 96% (2023 06:30) (94% - 100%)    O2 Parameters below as of 2023 06:00  Patient On (Oxygen Delivery Method): ventilator    O2 Concentration (%): 40      I&O's Summary    2023 07:01  -  2023 07:00  --------------------------------------------------------  IN: 3501.8 mL / OUT: 5938 mL / NET: -2436.2 mL      Mode: AC/ CMV (Assist Control/ Continuous Mandatory Ventilation)  RR (machine): 26  TV (machine): 350  FiO2: 40  PEEP: 8  ITime: 1  MAP: 14  PIP: 24      Daily     Daily     Adult Advanced Hemodynamics Last 24 Hrs  CVP(mm Hg): --  CVP(cm H2O): --  CO: --  CI: --  PA: --  PA(mean): --  PCWP: --  SVR: --  SVRI: --  PVR: --  PVRI: --    EKG/Telemetry Events:    MEDICATIONS  (STANDING):  aMIOdarone Infusion 0.5 mG/Min (16.7 mL/Hr) IV Continuous <Continuous>  chlorhexidine 0.12% Liquid 15 milliLiter(s) Oral Mucosa every 12 hours  chlorhexidine 2% Cloths 1 Application(s) Topical <User Schedule>  chlorhexidine 2% Cloths 1 Application(s) Topical daily  dexMEDEtomidine Infusion 0.2 MICROgram(s)/kG/Hr (4.25 mL/Hr) IV Continuous <Continuous>  fentaNYL   Infusion. 0.555 MICROgram(s)/kG/Hr (4.72 mL/Hr) IV Continuous <Continuous>  furosemide   Injectable 40 milliGRAM(s) IV Push daily  insulin lispro (ADMELOG) corrective regimen sliding scale   SubCutaneous three times a day before meals  ketamine Infusion. 0.23 mG/kG/Hr (2.13 mL/Hr) IV Continuous <Continuous>  magnesium sulfate  IVPB 2 Gram(s) IV Intermittent once  meropenem  IVPB 1000 milliGRAM(s) IV Intermittent every 8 hours  methylPREDNISolone sodium succinate Injectable 60 milliGRAM(s) IV Push daily  midazolam Infusion 0.02 mG/kG/Hr (1.7 mL/Hr) IV Continuous <Continuous>  pantoprazole   Suspension 40 milliGRAM(s) Oral daily  polyethylene glycol 3350 17 Gram(s) Oral every 12 hours  senna 2 Tablet(s) Oral at bedtime  vancomycin  IVPB 1250 milliGRAM(s) IV Intermittent every 24 hours  vitamin A &amp; D Ointment 1 Application(s) Topical daily    MEDICATIONS  (PRN):  acetaminophen     Tablet .. 650 milliGRAM(s) Oral every 6 hours PRN Temp greater or equal to 38C (100.4F)      PHYSICAL EXAM:  GENERAL:   HEAD:  Atraumatic, Normocephalic  EYES: EOMI, PERRLA, conjunctiva and sclera clear  NECK: Supple, No JVD, Normal thyroid, no enlarged nodes  NERVOUS SYSTEM:  Alert & Awake.   CHEST/LUNG: B/L good air entry; No rales, rhonchi, or wheezing  HEART: S1S2 normal, no S3, Regular rate and rhythm; No murmurs  ABDOMEN: Soft, Nontender, Nondistended; Bowel sounds present  EXTREMITIES:  2+ Peripheral Pulses, No clubbing, cyanosis, or edema  LYMPH: No lymphadenopathy noted  SKIN: No rashes or lesions    LABS:                        8.5    17.77 )-----------( 504      ( 2023 04:55 )             27.1     06-07    140  |  107  |  47<H>  ----------------------------<  90  4.2   |  23  |  0.8    Ca    8.2<L>      2023 04:55  Mg     1.6         TPro  6.4  /  Alb  2.2<L>  /  TBili  0.2  /  DBili  x   /  AST  32  /  ALT  24  /  AlkPhos  92      LIVER FUNCTIONS - ( 2023 04:55 )  Alb: 2.2 g/dL / Pro: 6.4 g/dL / ALK PHOS: 92 U/L / ALT: 24 U/L / AST: 32 U/L / GGT: x           PT/INR - ( 2023 10:35 )   PT: 15.00 sec;   INR: 1.31 ratio         PTT - ( 2023 10:35 )  PTT:28.8 sec  CAPILLARY BLOOD GLUCOSE      POCT Blood Glucose.: 106 mg/dL (2023 05:56)  POCT Blood Glucose.: 151 mg/dL (2023 12:19)    ABG - ( 2023 02:55 )  pH, Arterial: 7.48  pH, Blood: x     /  pCO2: 30    /  pO2: 153   / HCO3: 22    / Base Excess: -0.3  /  SaO2: 99.0                    Urinalysis Basic - ( 2023 10:35 )    Color: Yellow / Appearance: Clear / S.010 / pH: x  Gluc: x / Ketone: Negative  / Bili: Negative / Urobili: <2 mg/dL   Blood: x / Protein: 30 mg/dL / Nitrite: Negative   Leuk Esterase: Negative / RBC: 10 /HPF / WBC 3 /HPF   Sq Epi: x / Non Sq Epi: x / Bacteria: Few          RADIOLOGY & ADDITIONAL TESTS:  < from: Xray Chest 1 View- PORTABLE-Routine (Xray Chest 1 View- PORTABLE-Routine in AM.) (23 @ 07:04) >  Impression:    New masslike right lower lung field opacity    Stable diffuse left lung opacities    --- End of Report ---    < end of copied text >  < from: CT Chest No Cont (23 @ 10:49) >    IMPRESSION:    Since 2023    Breathing artifact limits lung parenchymal assessment    Post placement left pleural catheter, satisfactory position.    Cavitary consolidation left lower lobe, similar to earlier study.   Assessment for adjacent pleural effusion limited due to streak artifact ,   however appears to have decreased in size since placement of pleural tube.      Multiple additional multi lobar cavitary nodules are present, not   significantly changed from previous study.,    No pneumothorax    --- End of Report ---    < end of copied text >  < from: [facility] Duplex Lower Ext Vein Scan, Bilat (23 @ 20:31) >  IMPRESSION:  No evidence of deep venous thrombosis in either lower extremity.    Left peroneal vein is not visualized      < end of copied text >          Care Discussed with Consultants/Other Providers [ x] YES  [ ] NO

## 2023-06-07 NOTE — PROGRESS NOTE ADULT - ASSESSMENT
ASSESSMENT:  42y F w/ left pleural effusion, s/p chest tube placement, s/p tpa placement on 6/6      PLAN:  Chest tube to suction  Daily AM cxr  monitor Chest tube output    spectra [phone]

## 2023-06-07 NOTE — PROGRESS NOTE ADULT - ASSESSMENT
ASSESSMENT  42 year-old female with PMH of Asthma, HTN? presents with complaint of cough x3 days and SOB. During my encounter pt with dyspnea and increased work of breathing and chest pain, unable to properly provide accurate history. She states that her cough has been progressively worsening over the past 3 days, endorses hemoptysis since yesterday.     IMPRESSION  #Rash only LUE , back  doubt drug rash without it being diffuse  initially appeared with small vesicles, not typical of shingles but HSV/VZV sent and is NEG  appears more like a contact dermatitis  #Fever    6/2 BCX NGTD    6/2 pleural fluid     6/1 DTA   Normal Respiratory Laila present    5/31 BCX NGTD     5/30 BCX NGTD   #Flu B with cavitary PNA, with MRSA bacteremia and cavitary PNA    5/29 BCX NGTD     5/28 BCX NGTD  x2    5/27 BCX +     5/26 BCX +    5/24 DTA MRSA    5/24 fungal ETT   Few Yeast, Fungitell: 41 (05-24-23 @ 06:10)-- unremarkable     5/23 BCX MRSA 3/4 bottles    MRSA PCR +     MASTER no vegetations   < from: CT Chest No Cont (05.29.23 @ 14:14) >  Diffusely increased number and size of bilateral cavitary lesions with   the left lower lobe now demonstrating a consolidative process of the   entirelobe.  Additional scattered bilateral ground glass opacities are noted. Findings   are compatible with progressing pneumonia.  WBC 16--> 3 12% Bands, Severe sepsis on admission  Doubt TB (lower lobe)  Acute hypoxemic resp failure requiring intubation  < from: CT Angio Chest PE Protocol w/ IV Cont (05.23.23 @ 22:06) >  1. Bilateral patchy groundglass nodularity with dominant confluent left   lower lobe opacification with numerous cavitary lesions. Additional   contralateral right lower lobe cavitary 1 cm nodule. Findings compatible   with cavitary pneumonia in the appropriate clinical setting; differential   diagnosis includes tuberculosis.  2. Confluent ill-defined left hilar and mediastinal adenopathy, likely   reactive, without dominant lymph node delineated.  3. No evidence of pulmonary embolism.  #Lactic acidosis  #LANCE  Creatinine, Serum: 3.0 mg/dL (06.01.23 @ 04:59)  #Hyponatremia      RECOMMENDATIONS  - CTS following  - Meropenem 1g q12h IV   - Continue Vanc dosing per ID pharmacy   - Guarded prognosis    If any questions, please call or send a message on [employer] Teams  Please continue to update ID with any pertinent new laboratory or radiographic findings  Spectra [phone]

## 2023-06-07 NOTE — PROGRESS NOTE ADULT - SUBJECTIVE AND OBJECTIVE BOX
GENERAL SURGERY PROGRESS NOTE    Patient: ZEYNEP ROSSI , 42y (80)Female   MRN: [id number]  Location: [address]  Visit: 23 Inpatient  Date: 23 @ 03:42      Procedure/Dx/Injuries: left pleural effusion s/p chest tube placement     Events of past 24 hours: s/p placement of tpa in the chest tube     PAST MEDICAL & SURGICAL HISTORY:      Vitals:   T(F): 98.2 (23 @ 00:00), Max: 99.3 (23 @ 12:00)  HR: 72 (23 @ 02:20)  BP: 147/76 (23 @ 00:15)  RR: 22 (23 @ 00:15)  SpO2: 97% (23 @ 02:20)  Mode: AC/ CMV (Assist Control/ Continuous Mandatory Ventilation), RR (machine): 26, TV (machine): 350, FiO2: 40, PEEP: 8, ITime: 1, MAP: 14, PIP: 24    Diet, NPO with Tube Feed:   Tube Feeding Modality: Orogastric  Peptamen Intense VHP  Total Volume for 24 Hours (mL): 1200  Continuous  Starting Tube Feed Rate mL per Hour: 20  Increase Tube Feed Rate by (mL): 20     Every 4 hours  Until Goal Tube Feed Rate (mL per Hour): 50  Tube Feed Duration (in Hours): 24  Tube Feed Start Time: 12:00  Free Water Flush   Total Volume per Flush (mL): 100   Frequency: Every 4 Hours  Diet, NPO after Midnight:      NPO Start Date: 30-May-2023,   NPO Start Time: 23:59  Except Medications      Fluids:     I & O's:    23 @ 07:01  -  23 @ 07:00  --------------------------------------------------------  IN:    Amiodarone: 384.1 mL    Dexmedetomidine: 333.6 mL    Enteral Tube Flush: 1000 mL    FentaNYL: 975.5 mL    IV PiggyBack: 350 mL    Ketamine: 55 mL    Midazolam: 106.2 mL    Peptamen A.F.: 1150 mL    PRBCs (Packed Red Blood Cells): 311 mL    Vital High Protein: 120 mL  Total IN: 4785.4 mL    OUT:    Amiodarone: 0 mL    Chest Tube (mL): 22 mL    Norepinephrine: 0 mL    Propofol: 0 mL    Rectal Tube (mL): 825 mL    Voided (mL): 2950 mL  Total OUT: 3797 mL    Total NET: 988.4 mL        Bowel Movement: : [] YES [] NO  Flatus: : [] YES [] NO    PHYSICAL EXAM:  General: NAD,   Cardiac: S1, S2,   Respiratory: left chest tube to suction  Abdomen: Soft, non-distended,     MEDICATIONS  (STANDING):  aMIOdarone Infusion 0.5 mG/Min (16.7 mL/Hr) IV Continuous <Continuous>  chlorhexidine 0.12% Liquid 15 milliLiter(s) Oral Mucosa every 12 hours  chlorhexidine 2% Cloths 1 Application(s) Topical <User Schedule>  chlorhexidine 2% Cloths 1 Application(s) Topical daily  dexMEDEtomidine Infusion 0.2 MICROgram(s)/kG/Hr (4.25 mL/Hr) IV Continuous <Continuous>  fentaNYL   Infusion. 0.555 MICROgram(s)/kG/Hr (4.72 mL/Hr) IV Continuous <Continuous>  furosemide   Injectable 40 milliGRAM(s) IV Push daily  insulin lispro (ADMELOG) corrective regimen sliding scale   SubCutaneous three times a day before meals  meropenem  IVPB 1000 milliGRAM(s) IV Intermittent every 8 hours  methylPREDNISolone sodium succinate Injectable 60 milliGRAM(s) IV Push daily  midazolam Infusion 0.02 mG/kG/Hr (1.7 mL/Hr) IV Continuous <Continuous>  pantoprazole   Suspension 40 milliGRAM(s) Oral daily  polyethylene glycol 3350 17 Gram(s) Oral every 12 hours  senna 2 Tablet(s) Oral at bedtime  vancomycin  IVPB 1250 milliGRAM(s) IV Intermittent every 24 hours  vitamin A &amp; D Ointment 1 Application(s) Topical daily    MEDICATIONS  (PRN):  acetaminophen     Tablet .. 650 milliGRAM(s) Oral every 6 hours PRN Temp greater or equal to 38C (100.4F)      DVT PROPHYLAXIS:   GI PROPHYLAXIS: pantoprazole   Suspension 40 milliGRAM(s) Oral daily    ANTICOAGULATION:   ANTIBIOTICS:  meropenem  IVPB 1000 milliGRAM(s)  vancomycin  IVPB 1250 milliGRAM(s)            LAB/STUDIES:  Labs:  CAPILLARY BLOOD GLUCOSE      POCT Blood Glucose.: 151 mg/dL (2023 12:19)  POCT Blood Glucose.: 106 mg/dL (2023 06:45)                          8.0    19.47 )-----------( 362      ( 2023 04:35 )             24.9       Auto Neutrophil %: 87.9 % (23 @ 04:35)  Auto Immature Granulocyte %: 1.1 % (23 @ 04:35)        139  |  105  |  51<H>  ----------------------------<  147<H>  4.4   |  21  |  0.9      Calcium, Total Serum: 8.3 mg/dL (23 @ 18:44)      LFTs:             6.0  | 0.3  | 22       ------------------[91      ( 2023 04:35 )  2.0  | x    | 13          Lipase:x      Amylase:x         Blood Gas Arterial, Lactate: 0.80 mmol/L (23 @ 03:15)  Blood Gas Arterial, Lactate: 1.10 mmol/L (23 @ 14:06)  Blood Gas Arterial, Lactate: 0.70 mmol/L (23 @ 03:16)  Blood Gas Arterial, Lactate: 0.70 mmol/L (23 @ 03:46)    ABG - ( 2023 03:15 )  pH: 7.43  /  pCO2: 36    /  pO2: 69    / HCO3: 24    / Base Excess: -0.3  /  SaO2: 95.7      ABG - ( 2023 14:06 )  pH: 7.42  /  pCO2: 38    /  pO2: 106   / HCO3: 25    / Base Excess: 0.2   /  SaO2: 98.7        ABG - ( 2023 03:16 )  pH: 7.45  /  pCO2: 36    /  pO2: 84    / HCO3: 25    / Base Excess: 1.1   /  SaO2: 98.0        Coags:     15.00  ----< 1.31    ( 2023 10:35 )     28.8        CARDIAC MARKERS ( 2023 05:00 )  x     / x     / 49 U/L / x     / x        Urinalysis Basic - ( 2023 10:35 )    Color: Yellow / Appearance: Clear / S.010 / pH: x  Gluc: x / Ketone: Negative  / Bili: Negative / Urobili: <2 mg/dL   Blood: x / Protein: 30 mg/dL / Nitrite: Negative   Leuk Esterase: Negative / RBC: 10 /HPF / WBC 3 /HPF   Sq Epi: x / Non Sq Epi: x / Bacteria: Few        Culture - Urine (collected 2023 10:35)  Source: Clean Catch Clean Catch (Midstream)  Final Report (2023 18:49):    10,000 - 49,000 CFU/mL Candida albicans "Susceptibilities not performed"    IMAGING:  < from: Xray Chest 1 View- PORTABLE-Routine (Xray Chest 1 View- PORTABLE-Routine in AM.) (23 @ 07:04) >  Impression:    New masslike right lower lung field opacity    Stable diffuse left lung opacities    < end of copied text >

## 2023-06-07 NOTE — PROGRESS NOTE ADULT - SUBJECTIVE AND OBJECTIVE BOX
OVERNIGHT events  - Afebrile     ROS   unable to obtain history secondary to patient's mental status and/or sedation     PHYSICAL EXAM    ICU Vital Signs Last 24 Hrs  T(C): 37.4 (2023 08:00), Max: 37.4 (2023 12:00)  T(F): 99.3 (2023 08:00), Max: 99.3 (2023 12:00)  HR: 92 (2023 08:15) (54 - 121)  BP: 134/63 (2023 08:15) (112/62 - 215/103)  BP(mean): 91 (2023 08:15) (80 - 146)  ABP: --  ABP(mean): --  RR: 18 (2023 08:15) (18 - 42)  SpO2: 97% (2023 08:15) (94% - 100%)    O2 Parameters below as of 2023 08:00  Patient On (Oxygen Delivery Method): ventilator    O2 Concentration (%): 40    General: intubated  HEENT: NCAT  CV: RRR  Lungs: symmetric chest expansion, decreased BS at bases  Abd: Soft  Skin: stable rash LUE back, more scaling now   Neuro: sedated  Lines: no phlebitis         23 @ 07:01  -  23 @ 07:00  --------------------------------------------------------  IN:    Amiodarone: 400.8 mL    Dexmedetomidine: 647.9 mL    Enteral Tube Flush: 400 mL    FentaNYL: 681.7 mL    IV PiggyBack: 450 mL    Ketamine: 51.9 mL    Ketamine: 18.6 mL    Midazolam: 7.9 mL    Peptamen A.F.: 950 mL  Total IN: 3608.8 mL    OUT:    Chest Tube (mL): 188 mL    FentaNYL: 0 mL    Rectal Tube (mL): 1600 mL    Voided (mL): 4150 mL  Total OUT: 5938 mL    Total NET: -2329.2 mL      23 @ 07:01  -  23 @ 08:38  --------------------------------------------------------  IN:    Amiodarone: 16.7 mL    Dexmedetomidine: 34.6 mL    FentaNYL: 46.4 mL    Ketamine: 13.9 mL    Peptamen A.F.: 50 mL  Total IN: 161.6 mL    OUT:    Midazolam: 0 mL    Voided (mL): 1100 mL  Total OUT: 1100 mL    Total NET: -938.4 mL          LABS:                            8.5    17.77 )-----------( 504      ( 2023 04:55 )             27.1                                               06-07    140  |  107  |  47<H>  ----------------------------<  90  4.2   |  23  |  0.8    Ca    8.2<L>      2023 04:55  Mg     1.6     06-07    TPro  6.4  /  Alb  2.2<L>  /  TBili  0.2  /  DBili  x   /  AST  32  /  ALT  24  /  AlkPhos  92  06-07      PT/INR - ( 2023 10:35 )   PT: 15.00 sec;   INR: 1.31 ratio         PTT - ( 2023 10:35 )  PTT:28.8 sec                                       Urinalysis Basic - ( 2023 10:35 )    Color: Yellow / Appearance: Clear / S.010 / pH: x  Gluc: x / Ketone: Negative  / Bili: Negative / Urobili: <2 mg/dL   Blood: x / Protein: 30 mg/dL / Nitrite: Negative   Leuk Esterase: Negative / RBC: 10 /HPF / WBC 3 /HPF   Sq Epi: x / Non Sq Epi: x / Bacteria: Few                                                  LIVER FUNCTIONS - ( 2023 04:55 )  Alb: 2.2 g/dL / Pro: 6.4 g/dL / ALK PHOS: 92 U/L / ALT: 24 U/L / AST: 32 U/L / GGT: x                                                  Culture - Urine (collected 2023 10:35)  Source: Clean Catch Clean Catch (Midstream)  Final Report (2023 18:49):    10,000 - 49,000 CFU/mL Candida albicans "Susceptibilities not performed"                                                   Mode: AC/ CMV (Assist Control/ Continuous Mandatory Ventilation)  RR (machine): 26  TV (machine): 350  FiO2: 40  PEEP: 8  ITime: 1  MAP: 12  PIP: 19                                      ABG - ( 2023 02:55 )  pH, Arterial: 7.48  pH, Blood: x     /  pCO2: 30    /  pO2: 153   / HCO3: 22    / Base Excess: -0.3  /  SaO2: 99.0    PPL 16              MEDICATIONS  (STANDING):  alteplase  Injectable for Pleural Effusion 6 milliGRAM(s) IntraPleural. once  aMIOdarone    Tablet 200 milliGRAM(s) Oral two times a day  aMIOdarone Infusion 0.5 mG/Min (16.7 mL/Hr) IV Continuous <Continuous>  chlorhexidine 0.12% Liquid 15 milliLiter(s) Oral Mucosa every 12 hours  chlorhexidine 2% Cloths 1 Application(s) Topical <User Schedule>  chlorhexidine 2% Cloths 1 Application(s) Topical daily  dexMEDEtomidine Infusion 0.2 MICROgram(s)/kG/Hr (4.25 mL/Hr) IV Continuous <Continuous>  dornase joseph Solution for Pleural Effusion 3 milliGRAM(s) IntraPleural. once  fentaNYL   Infusion. 0.555 MICROgram(s)/kG/Hr (4.72 mL/Hr) IV Continuous <Continuous>  furosemide   Injectable 40 milliGRAM(s) IV Push daily  insulin lispro (ADMELOG) corrective regimen sliding scale   SubCutaneous three times a day before meals  ketamine Infusion. 0.23 mG/kG/Hr (2.13 mL/Hr) IV Continuous <Continuous>  magnesium sulfate  IVPB 2 Gram(s) IV Intermittent once  meropenem  IVPB 1000 milliGRAM(s) IV Intermittent every 8 hours  methylPREDNISolone sodium succinate Injectable 60 milliGRAM(s) IV Push daily  midazolam Infusion 0.02 mG/kG/Hr (1.7 mL/Hr) IV Continuous <Continuous>  pantoprazole   Suspension 40 milliGRAM(s) Oral daily  polyethylene glycol 3350 17 Gram(s) Oral every 12 hours  senna 2 Tablet(s) Oral at bedtime  sodium chloride 0.9% Solution for Pleural Effusion 30 milliLiter(s) IntraPleural. once  vancomycin  IVPB 1250 milliGRAM(s) IV Intermittent every 24 hours  vitamin A &amp; D Ointment 1 Application(s) Topical daily    MEDICATIONS  (PRN):  acetaminophen     Tablet .. 650 milliGRAM(s) Oral every 6 hours PRN Temp greater or equal to 38C (100.4F)

## 2023-06-08 LAB
ALBUMIN SERPL ELPH-MCNC: 2.1 G/DL — LOW (ref 3.5–5.2)
ALP SERPL-CCNC: 83 U/L — SIGNIFICANT CHANGE UP (ref 30–115)
ALT FLD-CCNC: 30 U/L — SIGNIFICANT CHANGE UP (ref 0–41)
ANION GAP SERPL CALC-SCNC: 7 MMOL/L — SIGNIFICANT CHANGE UP (ref 7–14)
AST SERPL-CCNC: 32 U/L — SIGNIFICANT CHANGE UP (ref 0–41)
BASE EXCESS BLDA CALC-SCNC: 1.4 MMOL/L — SIGNIFICANT CHANGE UP (ref -2–3)
BASOPHILS # BLD AUTO: 0.03 K/UL — SIGNIFICANT CHANGE UP (ref 0–0.2)
BASOPHILS NFR BLD AUTO: 0.4 % — SIGNIFICANT CHANGE UP (ref 0–1)
BILIRUB SERPL-MCNC: 0.2 MG/DL — SIGNIFICANT CHANGE UP (ref 0.2–1.2)
BLD GP AB SCN SERPL QL: SIGNIFICANT CHANGE UP
BUN SERPL-MCNC: 38 MG/DL — HIGH (ref 10–20)
CALCIUM SERPL-MCNC: 7.5 MG/DL — LOW (ref 8.4–10.4)
CHLORIDE SERPL-SCNC: 112 MMOL/L — HIGH (ref 98–110)
CO2 SERPL-SCNC: 22 MMOL/L — SIGNIFICANT CHANGE UP (ref 17–32)
CREAT SERPL-MCNC: 0.8 MG/DL — SIGNIFICANT CHANGE UP (ref 0.7–1.5)
EGFR: 94 ML/MIN/1.73M2 — SIGNIFICANT CHANGE UP
EOSINOPHIL # BLD AUTO: 0.04 K/UL — SIGNIFICANT CHANGE UP (ref 0–0.7)
EOSINOPHIL NFR BLD AUTO: 0.5 % — SIGNIFICANT CHANGE UP (ref 0–8)
GAS PNL BLDA: SIGNIFICANT CHANGE UP
GAS PNL BLDA: SIGNIFICANT CHANGE UP
GLUCOSE BLDC GLUCOMTR-MCNC: 128 MG/DL — HIGH (ref 70–99)
GLUCOSE BLDC GLUCOMTR-MCNC: 132 MG/DL — HIGH (ref 70–99)
GLUCOSE BLDC GLUCOMTR-MCNC: 153 MG/DL — HIGH (ref 70–99)
GLUCOSE BLDC GLUCOMTR-MCNC: 94 MG/DL — SIGNIFICANT CHANGE UP (ref 70–99)
GLUCOSE SERPL-MCNC: 103 MG/DL — HIGH (ref 70–99)
HCO3 BLDA-SCNC: 25 MMOL/L — SIGNIFICANT CHANGE UP (ref 21–28)
HCT VFR BLD CALC: 17.7 % — LOW (ref 37–47)
HCT VFR BLD CALC: 27.6 % — LOW (ref 37–47)
HGB BLD-MCNC: 5.4 G/DL — CRITICAL LOW (ref 12–16)
HGB BLD-MCNC: 8.5 G/DL — LOW (ref 12–16)
HOROWITZ INDEX BLDA+IHG-RTO: 40 — SIGNIFICANT CHANGE UP
IMM GRANULOCYTES NFR BLD AUTO: 1 % — HIGH (ref 0.1–0.3)
LYMPHOCYTES # BLD AUTO: 0.98 K/UL — LOW (ref 1.2–3.4)
LYMPHOCYTES # BLD AUTO: 12.4 % — LOW (ref 20.5–51.1)
MAGNESIUM SERPL-MCNC: 1.6 MG/DL — LOW (ref 1.8–2.4)
MCHC RBC-ENTMCNC: 22.1 PG — LOW (ref 27–31)
MCHC RBC-ENTMCNC: 22.2 PG — LOW (ref 27–31)
MCHC RBC-ENTMCNC: 30.5 G/DL — LOW (ref 32–37)
MCHC RBC-ENTMCNC: 30.8 G/DL — LOW (ref 32–37)
MCV RBC AUTO: 71.7 FL — LOW (ref 81–99)
MCV RBC AUTO: 72.8 FL — LOW (ref 81–99)
MONOCYTES # BLD AUTO: 0.67 K/UL — HIGH (ref 0.1–0.6)
MONOCYTES NFR BLD AUTO: 8.5 % — SIGNIFICANT CHANGE UP (ref 1.7–9.3)
NEUTROPHILS # BLD AUTO: 6.09 K/UL — SIGNIFICANT CHANGE UP (ref 1.4–6.5)
NEUTROPHILS NFR BLD AUTO: 77.2 % — HIGH (ref 42.2–75.2)
NRBC # BLD: 0 /100 WBCS — SIGNIFICANT CHANGE UP (ref 0–0)
NRBC # BLD: 0 /100 WBCS — SIGNIFICANT CHANGE UP (ref 0–0)
PCO2 BLDA: 35 MMHG — SIGNIFICANT CHANGE UP (ref 25–48)
PH BLDA: 7.46 — HIGH (ref 7.35–7.45)
PLATELET # BLD AUTO: 307 K/UL — SIGNIFICANT CHANGE UP (ref 130–400)
PLATELET # BLD AUTO: 535 K/UL — HIGH (ref 130–400)
PMV BLD: 10 FL — SIGNIFICANT CHANGE UP (ref 7.4–10.4)
PMV BLD: 9.8 FL — SIGNIFICANT CHANGE UP (ref 7.4–10.4)
PO2 BLDA: 90 MMHG — SIGNIFICANT CHANGE UP (ref 83–108)
POTASSIUM SERPL-MCNC: 3.9 MMOL/L — SIGNIFICANT CHANGE UP (ref 3.5–5)
POTASSIUM SERPL-SCNC: 3.9 MMOL/L — SIGNIFICANT CHANGE UP (ref 3.5–5)
PROT SERPL-MCNC: 5.6 G/DL — LOW (ref 6–8)
RBC # BLD: 2.43 M/UL — LOW (ref 4.2–5.4)
RBC # BLD: 3.85 M/UL — LOW (ref 4.2–5.4)
RBC # FLD: 27.2 % — HIGH (ref 11.5–14.5)
RBC # FLD: 27.9 % — HIGH (ref 11.5–14.5)
SAO2 % BLDA: 99.7 % — HIGH (ref 94–98)
SODIUM SERPL-SCNC: 141 MMOL/L — SIGNIFICANT CHANGE UP (ref 135–146)
VANCOMYCIN TROUGH SERPL-MCNC: 12 UG/ML — HIGH (ref 5–10)
VANCOMYCIN TROUGH SERPL-MCNC: 8.9 UG/ML — SIGNIFICANT CHANGE UP (ref 5–10)
WBC # BLD: 12.78 K/UL — HIGH (ref 4.8–10.8)
WBC # BLD: 7.89 K/UL — SIGNIFICANT CHANGE UP (ref 4.8–10.8)
WBC # FLD AUTO: 12.78 K/UL — HIGH (ref 4.8–10.8)
WBC # FLD AUTO: 7.89 K/UL — SIGNIFICANT CHANGE UP (ref 4.8–10.8)

## 2023-06-08 PROCEDURE — 71045 X-RAY EXAM CHEST 1 VIEW: CPT | Mod: 26

## 2023-06-08 PROCEDURE — 99291 CRITICAL CARE FIRST HOUR: CPT

## 2023-06-08 RX ORDER — MAGNESIUM SULFATE 500 MG/ML
2 VIAL (ML) INJECTION
Refills: 0 | Status: COMPLETED | OUTPATIENT
Start: 2023-06-08 | End: 2023-06-08

## 2023-06-08 RX ORDER — KETAMINE HYDROCHLORIDE 100 MG/ML
0.23 INJECTION INTRAMUSCULAR; INTRAVENOUS
Qty: 1000 | Refills: 0 | Status: DISCONTINUED | OUTPATIENT
Start: 2023-06-08 | End: 2023-06-15

## 2023-06-08 RX ORDER — FENTANYL CITRATE 50 UG/ML
0.51 INJECTION INTRAVENOUS
Qty: 2500 | Refills: 0 | Status: DISCONTINUED | OUTPATIENT
Start: 2023-06-08 | End: 2023-06-15

## 2023-06-08 RX ADMIN — MEROPENEM 100 MILLIGRAM(S): 1 INJECTION INTRAVENOUS at 06:36

## 2023-06-08 RX ADMIN — PANTOPRAZOLE SODIUM 40 MILLIGRAM(S): 20 TABLET, DELAYED RELEASE ORAL at 12:40

## 2023-06-08 RX ADMIN — Medication 40 MILLIGRAM(S): at 06:33

## 2023-06-08 RX ADMIN — AMIODARONE HYDROCHLORIDE 200 MILLIGRAM(S): 400 TABLET ORAL at 18:12

## 2023-06-08 RX ADMIN — Medication 650 MILLIGRAM(S): at 23:22

## 2023-06-08 RX ADMIN — CHLORHEXIDINE GLUCONATE 15 MILLILITER(S): 213 SOLUTION TOPICAL at 06:32

## 2023-06-08 RX ADMIN — Medication 25 GRAM(S): at 11:09

## 2023-06-08 RX ADMIN — AMIODARONE HYDROCHLORIDE 200 MILLIGRAM(S): 400 TABLET ORAL at 06:33

## 2023-06-08 RX ADMIN — SENNA PLUS 2 TABLET(S): 8.6 TABLET ORAL at 22:48

## 2023-06-08 RX ADMIN — Medication 650 MILLIGRAM(S): at 23:07

## 2023-06-08 RX ADMIN — Medication 1 APPLICATION(S): at 12:42

## 2023-06-08 RX ADMIN — CHLORHEXIDINE GLUCONATE 1 APPLICATION(S): 213 SOLUTION TOPICAL at 06:32

## 2023-06-08 RX ADMIN — Medication 60 MILLIGRAM(S): at 06:32

## 2023-06-08 RX ADMIN — MEROPENEM 100 MILLIGRAM(S): 1 INJECTION INTRAVENOUS at 23:19

## 2023-06-08 RX ADMIN — POLYETHYLENE GLYCOL 3350 17 GRAM(S): 17 POWDER, FOR SOLUTION ORAL at 22:48

## 2023-06-08 RX ADMIN — CHLORHEXIDINE GLUCONATE 15 MILLILITER(S): 213 SOLUTION TOPICAL at 18:11

## 2023-06-08 RX ADMIN — METHADONE HYDROCHLORIDE 10 MILLIGRAM(S): 40 TABLET ORAL at 06:36

## 2023-06-08 RX ADMIN — Medication 166.67 MILLIGRAM(S): at 12:40

## 2023-06-08 RX ADMIN — POLYETHYLENE GLYCOL 3350 17 GRAM(S): 17 POWDER, FOR SOLUTION ORAL at 09:23

## 2023-06-08 RX ADMIN — MEROPENEM 100 MILLIGRAM(S): 1 INJECTION INTRAVENOUS at 15:08

## 2023-06-08 RX ADMIN — Medication 25 GRAM(S): at 09:22

## 2023-06-08 NOTE — PROGRESS NOTE ADULT - ASSESSMENT
ASSESSMENT  42 year-old female with PMH of Asthma, HTN? presents with complaint of cough x3 days and SOB. During my encounter pt with dyspnea and increased work of breathing and chest pain, unable to properly provide accurate history. She states that her cough has been progressively worsening over the past 3 days, endorses hemoptysis since yesterday.     IMPRESSION  #Rash only LUE , back  doubt drug rash without it being diffuse  initially appeared with small vesicles, not typical of shingles but HSV/VZV sent and is NEG  appears more like a contact dermatitis  #Fever    6/2 BCX NGTD    6/2 pleural fluid     6/1 DTA   Normal Respiratory Laila present    5/31 BCX NGTD     5/30 BCX NGTD   #Flu B with cavitary PNA, with MRSA bacteremia and cavitary PNA    5/29 BCX NGTD     5/28 BCX NGTD  x2    5/27 BCX +     5/26 BCX +    5/24 DTA MRSA    5/24 fungal ETT   Few Yeast, Fungitell: 41 (05-24-23 @ 06:10)-- unremarkable     5/23 BCX MRSA 3/4 bottles    MRSA PCR +     MASTRE no vegetations   < from: CT Chest No Cont (05.29.23 @ 14:14) >  Diffusely increased number and size of bilateral cavitary lesions with   the left lower lobe now demonstrating a consolidative process of the   entirelobe.  Additional scattered bilateral ground glass opacities are noted. Findings   are compatible with progressing pneumonia.  WBC 16--> 3 12% Bands, Severe sepsis on admission  Doubt TB (lower lobe)  Acute hypoxemic resp failure requiring intubation  < from: CT Angio Chest PE Protocol w/ IV Cont (05.23.23 @ 22:06) >  1. Bilateral patchy groundglass nodularity with dominant confluent left   lower lobe opacification with numerous cavitary lesions. Additional   contralateral right lower lobe cavitary 1 cm nodule. Findings compatible   with cavitary pneumonia in the appropriate clinical setting; differential   diagnosis includes tuberculosis.  2. Confluent ill-defined left hilar and mediastinal adenopathy, likely   reactive, without dominant lymph node delineated.  3. No evidence of pulmonary embolism.  #Lactic acidosis  #LANCE  Creatinine, Serum: 3.0 mg/dL (06.01.23 @ 04:59)  #Hyponatremia      RECOMMENDATIONS  - CTS following  - Meropenem 1g q12h IV empiric 7 days end today 6/8, no GNR in repeat CX  - Continue Vanc dosing per ID pharmacy   - Guarded prognosis    If any questions, please call or send a message on [employer] Teams  Please continue to update ID with any pertinent new laboratory or radiographic findings  Spectra [phone]

## 2023-06-08 NOTE — PHARMACOTHERAPY INTERVENTION NOTE - COMMENTS
Patient on vancomycin 1250mg IV q24h for the treatment of MRSA bacteremia secondary to pneumonia per Dr. Chance. Vancomycin level on 6/8 at 0440 was 12.0 mg/L, and vancomycin level on 6/8 at 1210 was 8.9 mg/L. Per [employer] Bayesian vancomycin dosing software, current regimen of vancomycin 1250mg IV q24h predicts a therapeutic steady-state AUC/RUSSELL of 445.83 mg/L*hr (goal 400-600). Recommended continuing vancomycin 1250mg IV q24h, and obtaining a repeat vancomycin level on 6/12 with AM labs to reassess. If Scr increases by 0.3 mg/dL or greater, please obtain a vancomycin trough level sooner.

## 2023-06-08 NOTE — PROGRESS NOTE ADULT - ASSESSMENT
ASSESSMENT:  42y F w/ left pleural effusion, s/p chest tube placement, s/p tpa placement on 6/6 and 6/7      PLAN:  Chest tube to suction  Daily AM cxr  monitor Chest tube output  rest of the care per ICU team     spectra [phone]

## 2023-06-08 NOTE — PROGRESS NOTE ADULT - SUBJECTIVE AND OBJECTIVE BOX
Patient is a 42y old  Female who presents with a chief complaint of AHRF (2023 09:00)    HPI:  42 year-old female with PMH of Asthma, HTN? presents with complaint of cough x3 days and SOB. During my encounter pt with dyspnea and increased work of breathing and chest pain, unable to properly provide accurate history. She states that her cough has been progressively worsening over the past 3 days, endorses hemoptysis since yesterday. She states she had a friend who came over and stayed with for more than a week who was sick recently, but she does not know if she recently travelled out of [facility] or not. She also admits that her children has been sick with Sore throat and fever for past 3 days. She admits to fever, chills, sever Chest pain which has gotten worse since she came to the ED. She took 1x Tamiflu yesterday. She otherwise denies diarrhea, constipation, urinary symptoms. She is not vaccinated for Influenza or COVID-19.     In ED  /87, , RR 28, T 100.8 F, 98% on 15 L NRB  Labs significant for wbc 16.7K with Neutrophilic Predominance and L shift, Hgb 11.4, MCV 61.6, Cr 2.0, BUN 23, Alk Phos 125/AST 62/ALT 27, Lac 4.9> 3.9  RVP + Influenza B      CT Angio Chest PE Protocol w/ IV Cont   1. Bilateral patchy groundglass nodularity with dominant confluent left lower lobe opacification with numerous cavitary lesions. Additional contralateral right lower lobe cavitary 1 cm nodule. Findings compatible with cavitary pneumonia in the appropriate clinical setting; differential diagnosis includes tuberculosis.  2. Confluent ill-defined left hilar and mediastinal adenopathy, likely reactive, without dominant lymph node delineated.  3. No evidence of pulmonary embolism.    s/p 1x Rocephin, Meropenem, 3 L LR in ED    Pt admitted to SDU for further managements, during my encounter pt is very agitated, in acute distress. Pt received appropriate pain control with IV Morphine, s/p Xanax 1 mg 1x for agitation and anxiety, despite pain control and adequate fluid resuscitation pt remained tachycardic, tachypneic. STAT EKG reviewed with Cardiology team on call After discussion with Cardiology team, Pt received 1x Adenosine 6 mg IV push with no change. Case discussed with Critical Care team, decision was made for Intubation and Pt upgraded to MICU.   (24 May 2023 00:58)       INTERVAL HPI/OVERNIGHT EVENTS:   No overnight events   Afebrile, hemodynamically stable     Subjective:    ICU Vital Signs Last 24 Hrs  T(C): 36.4 (2023 08:00), Max: 37.1 (2023 04:00)  T(F): 97.5 (2023 08:00), Max: 98.7 (2023 04:00)  HR: 91 (2023 11:00) (60 - 105)  BP: 118/68 (2023 11:00) (97/54 - 226/101)  BP(mean): 86 (:) (69 - 162)  ABP: --  ABP(mean): --  RR: 16 (2023 11:00) (16 - 38)  SpO2: 98% (:00) (96% - 100%)    O2 Parameters below as of 2023 08:00  Patient On (Oxygen Delivery Method): ventilator    O2 Concentration (%): 40      I&O's Summary    2023 07:01  -  2023 07:00  --------------------------------------------------------  IN: 3587.4 mL / OUT: 4940 mL / NET: -1352.6 mL    2023 07:01  -  2023 12:23  --------------------------------------------------------  IN: 355.2 mL / OUT: 1500 mL / NET: -1144.8 mL      Mode: AC/ CMV (Assist Control/ Continuous Mandatory Ventilation)  RR (machine): 16  TV (machine): 400  FiO2: 40  PEEP: 8      Daily     Daily Weight in k.6 (2023 04:00)    Adult Advanced Hemodynamics Last 24 Hrs  CVP(mm Hg): --  CVP(cm H2O): --  CO: --  CI: --  PA: --  PA(mean): --  PCWP: --  SVR: --  SVRI: --  PVR: --  PVRI: --    EKG/Telemetry Events:    MEDICATIONS  (STANDING):  aMIOdarone    Tablet 200 milliGRAM(s) Oral two times a day  chlorhexidine 0.12% Liquid 15 milliLiter(s) Oral Mucosa every 12 hours  chlorhexidine 2% Cloths 1 Application(s) Topical daily  chlorhexidine 2% Cloths 1 Application(s) Topical <User Schedule>  dexMEDEtomidine Infusion 0.2 MICROgram(s)/kG/Hr (4.25 mL/Hr) IV Continuous <Continuous>  fentaNYL   Infusion. 0.555 MICROgram(s)/kG/Hr (4.72 mL/Hr) IV Continuous <Continuous>  furosemide   Injectable 40 milliGRAM(s) IV Push daily  insulin lispro (ADMELOG) corrective regimen sliding scale   SubCutaneous three times a day before meals  ketamine Infusion. 0.23 mG/kG/Hr (2.13 mL/Hr) IV Continuous <Continuous>  meropenem  IVPB 1000 milliGRAM(s) IV Intermittent every 8 hours  pantoprazole   Suspension 40 milliGRAM(s) Oral daily  polyethylene glycol 3350 17 Gram(s) Oral every 12 hours  senna 2 Tablet(s) Oral at bedtime  vancomycin  IVPB 1250 milliGRAM(s) IV Intermittent every 24 hours  vitamin A &amp; D Ointment 1 Application(s) Topical daily    MEDICATIONS  (PRN):  acetaminophen     Tablet .. 650 milliGRAM(s) Oral every 6 hours PRN Temp greater or equal to 38C (100.4F)      PHYSICAL EXAM:  GENERAL:   HEAD:  Atraumatic, Normocephalic  EYES: EOMI, PERRLA, conjunctiva and sclera clear  NECK: Supple, No JVD, Normal thyroid, no enlarged nodes  NERVOUS SYSTEM:  Alert & Awake.   CHEST/LUNG: B/L good air entry; No rales, rhonchi, or wheezing  HEART: S1S2 normal, no S3, Regular rate and rhythm; No murmurs  ABDOMEN: Soft, Nontender, Nondistended; Bowel sounds present  EXTREMITIES:  2+ Peripheral Pulses, No clubbing, cyanosis, or edema  LYMPH: No lymphadenopathy noted  SKIN: No rashes or lesions    LABS:                        8.5    12.78 )-----------( 535      ( 2023 05:15 )             27.6     06-08    141  |  112<H>  |  38<H>  ----------------------------<  103<H>  3.9   |  22  |  0.8    Ca    7.5<L>      2023 04:40  Mg     1.6         TPro  5.6<L>  /  Alb  2.1<L>  /  TBili  0.2  /  DBili  x   /  AST  32  /  ALT  30  /  AlkPhos  83  08    LIVER FUNCTIONS - ( 2023 04:40 )  Alb: 2.1 g/dL / Pro: 5.6 g/dL / ALK PHOS: 83 U/L / ALT: 30 U/L / AST: 32 U/L / GGT: x             CAPILLARY BLOOD GLUCOSE      POCT Blood Glucose.: 132 mg/dL (2023 06:40)    ABG - ( 2023 03:24 )  pH, Arterial: 7.46  pH, Blood: x     /  pCO2: 35    /  pO2: 90    / HCO3: 25    / Base Excess: 1.4   /  SaO2: 99.7                          RADIOLOGY & ADDITIONAL TESTS:    < from: Xray Chest 1 View- PORTABLE-Routine (Xray Chest 1 View- PORTABLE-Routine in AM.) (23 @ 07:04) >  Impression:    New masslike right lower lung field opacity    Stable diffuse left lung opacities    --- End of Report ---    < end of copied text >  < from: CT Chest No Cont (23 @ 10:49) >    IMPRESSION:    Since 2023    Breathing artifact limits lung parenchymal assessment    Post placement left pleural catheter, satisfactory position.    Cavitary consolidation left lower lobe, similar to earlier study.   Assessment for adjacent pleural effusion limited due to streak artifact ,   however appears to have decreased in size since placement of pleural tube.      Multiple additional multi lobar cavitary nodules are present, not   significantly changed from previous study.,    No pneumothorax    --- End of Report ---    < end of copied text >  < from: [facility] Duplex Lower Ext Vein Scan, Bilat (23 @ 20:31) >  IMPRESSION:  No evidence of deep venous thrombosis in either lower extremity.    Left peroneal vein is not visualized      < end of copied text >          Care Discussed with Consultants/Other Providers [ x] YES  [ ] NO           Patient is a 42y old  Female who presents with a chief complaint of AHRF (2023 09:00)    HPI:  42 year-old female with PMH of Asthma, HTN? presents with complaint of cough x3 days and SOB. During my encounter pt with dyspnea and increased work of breathing and chest pain, unable to properly provide accurate history. She states that her cough has been progressively worsening over the past 3 days, endorses hemoptysis since yesterday. She states she had a friend who came over and stayed with for more than a week who was sick recently, but she does not know if she recently travelled out of [facility] or not. She also admits that her children has been sick with Sore throat and fever for past 3 days. She admits to fever, chills, sever Chest pain which has gotten worse since she came to the ED. She took 1x Tamiflu yesterday. She otherwise denies diarrhea, constipation, urinary symptoms. She is not vaccinated for Influenza or COVID-19.     In ED  /87, , RR 28, T 100.8 F, 98% on 15 L NRB  Labs significant for wbc 16.7K with Neutrophilic Predominance and L shift, Hgb 11.4, MCV 61.6, Cr 2.0, BUN 23, Alk Phos 125/AST 62/ALT 27, Lac 4.9> 3.9  RVP + Influenza B      CT Angio Chest PE Protocol w/ IV Cont   1. Bilateral patchy groundglass nodularity with dominant confluent left lower lobe opacification with numerous cavitary lesions. Additional contralateral right lower lobe cavitary 1 cm nodule. Findings compatible with cavitary pneumonia in the appropriate clinical setting; differential diagnosis includes tuberculosis.  2. Confluent ill-defined left hilar and mediastinal adenopathy, likely reactive, without dominant lymph node delineated.  3. No evidence of pulmonary embolism.    s/p 1x Rocephin, Meropenem, 3 L LR in ED    Pt admitted to SDU for further managements, during my encounter pt is very agitated, in acute distress. Pt received appropriate pain control with IV Morphine, s/p Xanax 1 mg 1x for agitation and anxiety, despite pain control and adequate fluid resuscitation pt remained tachycardic, tachypneic. STAT EKG reviewed with Cardiology team on call After discussion with Cardiology team, Pt received 1x Adenosine 6 mg IV push with no change. Case discussed with Critical Care team, decision was made for Intubation and Pt upgraded to MICU.   (24 May 2023 00:58)       INTERVAL HPI/OVERNIGHT EVENTS:   No overnight events   Afebrile, hemodynamically stable   Sedated with Precedex, Fentanyl, Ketamine     Subjective:    ICU Vital Signs Last 24 Hrs  T(C): 36.4 (2023 08:00), Max: 37.1 (2023 04:00)  T(F): 97.5 (2023 08:00), Max: 98.7 (2023 04:00)  HR: 91 (2023 11:00) (60 - 105)  BP: 118/68 (2023 11:00) (97/54 - 226/101)  BP(mean): 86 (:) (69 - 162)  ABP: --  ABP(mean): --  RR: 16 (2023 11:00) (16 - 38)  SpO2: 98% (:00) (96% - 100%)    O2 Parameters below as of 2023 08:00  Patient On (Oxygen Delivery Method): ventilator    O2 Concentration (%): 40      I&O's Summary    2023 07:01  -  2023 07:00  --------------------------------------------------------  IN: 3587.4 mL / OUT: 4940 mL / NET: -1352.6 mL    2023 07:01  -  2023 12:23  --------------------------------------------------------  IN: 355.2 mL / OUT: 1500 mL / NET: -1144.8 mL      Mode: AC/ CMV (Assist Control/ Continuous Mandatory Ventilation)  RR (machine): 16  TV (machine): 400  FiO2: 40  PEEP: 8      Daily     Daily Weight in k.6 (2023 04:00)    Adult Advanced Hemodynamics Last 24 Hrs  CVP(mm Hg): --  CVP(cm H2O): --  CO: --  CI: --  PA: --  PA(mean): --  PCWP: --  SVR: --  SVRI: --  PVR: --  PVRI: --    EKG/Telemetry Events:    MEDICATIONS  (STANDING):  aMIOdarone    Tablet 200 milliGRAM(s) Oral two times a day  chlorhexidine 0.12% Liquid 15 milliLiter(s) Oral Mucosa every 12 hours  chlorhexidine 2% Cloths 1 Application(s) Topical daily  chlorhexidine 2% Cloths 1 Application(s) Topical <User Schedule>  dexMEDEtomidine Infusion 0.2 MICROgram(s)/kG/Hr (4.25 mL/Hr) IV Continuous <Continuous>  fentaNYL   Infusion. 0.555 MICROgram(s)/kG/Hr (4.72 mL/Hr) IV Continuous <Continuous>  furosemide   Injectable 40 milliGRAM(s) IV Push daily  insulin lispro (ADMELOG) corrective regimen sliding scale   SubCutaneous three times a day before meals  ketamine Infusion. 0.23 mG/kG/Hr (2.13 mL/Hr) IV Continuous <Continuous>  meropenem  IVPB 1000 milliGRAM(s) IV Intermittent every 8 hours  pantoprazole   Suspension 40 milliGRAM(s) Oral daily  polyethylene glycol 3350 17 Gram(s) Oral every 12 hours  senna 2 Tablet(s) Oral at bedtime  vancomycin  IVPB 1250 milliGRAM(s) IV Intermittent every 24 hours  vitamin A &amp; D Ointment 1 Application(s) Topical daily    MEDICATIONS  (PRN):  acetaminophen     Tablet .. 650 milliGRAM(s) Oral every 6 hours PRN Temp greater or equal to 38C (100.4F)      PHYSICAL EXAM:  GENERAL:   HEAD:  Atraumatic, Normocephalic  EYES: EOMI, PERRLA, conjunctiva and sclera clear  NECK: Supple, No JVD, Normal thyroid, no enlarged nodes  NERVOUS SYSTEM:  Alert & Awake.   CHEST/LUNG: B/L good air entry; No rales, rhonchi, or wheezing  HEART: S1S2 normal, no S3, Regular rate and rhythm; No murmurs  ABDOMEN: Soft, Nontender, Nondistended; Bowel sounds present  EXTREMITIES:  2+ Peripheral Pulses, No clubbing, cyanosis, or edema  LYMPH: No lymphadenopathy noted  SKIN: No rashes or lesions    LABS:                        8.5    12.78 )-----------( 535      ( 2023 05:15 )             27.6     -08    141  |  112<H>  |  38<H>  ----------------------------<  103<H>  3.9   |  22  |  0.8    Ca    7.5<L>      2023 04:40  Mg     1.6         TPro  5.6<L>  /  Alb  2.1<L>  /  TBili  0.2  /  DBili  x   /  AST  32  /  ALT  30  /  AlkPhos  83      LIVER FUNCTIONS - ( 2023 04:40 )  Alb: 2.1 g/dL / Pro: 5.6 g/dL / ALK PHOS: 83 U/L / ALT: 30 U/L / AST: 32 U/L / GGT: x             CAPILLARY BLOOD GLUCOSE      POCT Blood Glucose.: 132 mg/dL (2023 06:40)    ABG - ( 2023 03:24 )  pH, Arterial: 7.46  pH, Blood: x     /  pCO2: 35    /  pO2: 90    / HCO3: 25    / Base Excess: 1.4   /  SaO2: 99.7                          RADIOLOGY & ADDITIONAL TESTS:    < from: Xray Chest 1 View- PORTABLE-Routine (Xray Chest 1 View- PORTABLE-Routine in AM.) (23 @ 07:04) >  Impression:    New masslike right lower lung field opacity    Stable diffuse left lung opacities    --- End of Report ---    < end of copied text >  < from: CT Chest No Cont (23 @ 10:49) >    IMPRESSION:    Since 2023    Breathing artifact limits lung parenchymal assessment    Post placement left pleural catheter, satisfactory position.    Cavitary consolidation left lower lobe, similar to earlier study.   Assessment for adjacent pleural effusion limited due to streak artifact ,   however appears to have decreased in size since placement of pleural tube.      Multiple additional multi lobar cavitary nodules are present, not   significantly changed from previous study.,    No pneumothorax    --- End of Report ---    < end of copied text >  < from: [facility] Duplex Lower Ext Vein Scan, Bilat (06.04.23 @ 20:31) >  IMPRESSION:  No evidence of deep venous thrombosis in either lower extremity.    Left peroneal vein is not visualized      < end of copied text >          Care Discussed with Consultants/Other Providers [ x] YES  [ ] NO

## 2023-06-08 NOTE — PROGRESS NOTE ADULT - SUBJECTIVE AND OBJECTIVE BOX
GENERAL SURGERY PROGRESS NOTE    Patient: ZEYNEP ROSSI , 42y ([birth year])Female   MRN: [id number]  Location: [address]  Visit: 05-23-23 Inpatient  Date: 06-08-23 @ 00:19    Procedure/Dx/Injuries: left pleural effusion s/p chest tube placement     Events of past 24 hours: s/p placement of tpa in the chest tube. chest tube clamped for 4 hours, 50 cc of drainage after unclamped     PAST MEDICAL & SURGICAL HISTORY:      Vitals:   T(F): 98.6 (06-07-23 @ 20:00), Max: 99.3 (06-07-23 @ 08:00)  HR: 79 (06-07-23 @ 22:00)  BP: 103/55 (06-07-23 @ 22:00)  RR: 22 (06-07-23 @ 22:00)  SpO2: 96% (06-07-23 @ 22:00)  Mode: AC/ CMV (Assist Control/ Continuous Mandatory Ventilation), RR (machine): 20, TV (machine): 400, FiO2: 40, PEEP: 8, ITime: 1, MAP: 14, PIP: 22    Diet, NPO with Tube Feed:   Tube Feeding Modality: Orogastric  Peptamen Intense VHP  Total Volume for 24 Hours (mL): 1200  Continuous  Starting Tube Feed Rate mL per Hour: 20  Increase Tube Feed Rate by (mL): 20     Every 4 hours  Until Goal Tube Feed Rate (mL per Hour): 50  Tube Feed Duration (in Hours): 24  Tube Feed Start Time: 12:00  Free Water Flush   Total Volume per Flush (mL): 100   Frequency: Every 4 Hours  Diet, NPO after Midnight:      NPO Start Date: 30-May-2023,   NPO Start Time: 23:59  Except Medications      Fluids:     I & O's:    06-06-23 @ 07:01  -  06-07-23 @ 07:00  --------------------------------------------------------  IN:    Amiodarone: 400.8 mL    Dexmedetomidine: 647.9 mL    Enteral Tube Flush: 400 mL    FentaNYL: 681.7 mL    IV PiggyBack: 450 mL    Ketamine: 51.9 mL    Ketamine: 18.6 mL    Midazolam: 7.9 mL    Peptamen A.F.: 950 mL  Total IN: 3608.8 mL    OUT:    Chest Tube (mL): 188 mL    FentaNYL: 0 mL    Rectal Tube (mL): 1600 mL    Voided (mL): 4150 mL  Total OUT: 5938 mL    Total NET: -2329.2 mL    Bowel Movement: : [] YES [] NO  Flatus: : [] YES [] NO    PHYSICAL EXAM:  General: NAD,   Cardiac: S1, S2,   Respiratory: left chest tube to suction  Abdomen: Soft, non-distended,     MEDICATIONS  (STANDING):  aMIOdarone    Tablet 200 milliGRAM(s) Oral two times a day  aMIOdarone Infusion 0.5 mG/Min (16.7 mL/Hr) IV Continuous <Continuous>  chlorhexidine 0.12% Liquid 15 milliLiter(s) Oral Mucosa every 12 hours  chlorhexidine 2% Cloths 1 Application(s) Topical <User Schedule>  chlorhexidine 2% Cloths 1 Application(s) Topical daily  dexMEDEtomidine Infusion 0.2 MICROgram(s)/kG/Hr (4.25 mL/Hr) IV Continuous <Continuous>  fentaNYL   Infusion. 0.555 MICROgram(s)/kG/Hr (4.72 mL/Hr) IV Continuous <Continuous>  furosemide   Injectable 40 milliGRAM(s) IV Push daily  insulin lispro (ADMELOG) corrective regimen sliding scale   SubCutaneous three times a day before meals  ketamine Infusion. 0.23 mG/kG/Hr (2.13 mL/Hr) IV Continuous <Continuous>  meropenem  IVPB 1000 milliGRAM(s) IV Intermittent every 8 hours  methadone    Tablet 10 milliGRAM(s) Oral every 8 hours  methylPREDNISolone sodium succinate Injectable 60 milliGRAM(s) IV Push daily  midazolam Infusion 0.02 mG/kG/Hr (1.7 mL/Hr) IV Continuous <Continuous>  pantoprazole   Suspension 40 milliGRAM(s) Oral daily  polyethylene glycol 3350 17 Gram(s) Oral every 12 hours  senna 2 Tablet(s) Oral at bedtime  vancomycin  IVPB 1250 milliGRAM(s) IV Intermittent every 24 hours  vitamin A &amp; D Ointment 1 Application(s) Topical daily    MEDICATIONS  (PRN):  acetaminophen     Tablet .. 650 milliGRAM(s) Oral every 6 hours PRN Temp greater or equal to 38C (100.4F)      DVT PROPHYLAXIS:   GI PROPHYLAXIS: pantoprazole   Suspension 40 milliGRAM(s) Oral daily    ANTICOAGULATION:   ANTIBIOTICS:  meropenem  IVPB 1000 milliGRAM(s)  vancomycin  IVPB 1250 milliGRAM(s)        Isolation Precautions:     Isolation Type: CONTACT;  Indication for Isolation: MRSA    Isolation Type: CONTACT;  Indication for Isolation: in the blood (06-07-23 @ 08:37)      LAB/STUDIES:  Labs:  CAPILLARY BLOOD GLUCOSE      POCT Blood Glucose.: 106 mg/dL (07 Jun 2023 05:56)                          8.5    17.77 )-----------( 504      ( 07 Jun 2023 04:55 )             27.1       Auto Neutrophil %: 85.4 % (06-07-23 @ 04:55)  Auto Immature Granulocyte %: 1.0 % (06-07-23 @ 04:55)    06-07    140  |  107  |  47<H>  ----------------------------<  90  4.2   |  23  |  0.8      Calcium, Total Serum: 8.2 mg/dL (06-07-23 @ 04:55)      LFTs:             6.4  | 0.2  | 32       ------------------[92      ( 07 Jun 2023 04:55 )  2.2  | x    | 24          Lipase:x      Amylase:x         Blood Gas Arterial, Lactate: 0.80 mmol/L (06-07-23 @ 11:15)  Blood Gas Arterial, Lactate: 0.90 mmol/L (06-07-23 @ 02:55)  Blood Gas Arterial, Lactate: 0.80 mmol/L (06-06-23 @ 03:15)  Blood Gas Arterial, Lactate: 1.10 mmol/L (06-05-23 @ 14:06)  Blood Gas Arterial, Lactate: 0.70 mmol/L (06-05-23 @ 03:16)    ABG - ( 07 Jun 2023 11:15 )  pH: 7.46  /  pCO2: 33    /  pO2: 119   / HCO3: 24    / Base Excess: x     /  SaO2: 99.4            ABG - ( 07 Jun 2023 02:55 )  pH: 7.48  /  pCO2: 30    /  pO2: 153   / HCO3: 22    / Base Excess: -0.3  /  SaO2: 99.0            ABG - ( 06 Jun 2023 03:15 )  pH: 7.43  /  pCO2: 36    /  pO2: 69    / HCO3: 24    / Base Excess: -0.3  /  SaO2: 95.7              Coags:                Culture - Urine (collected 05 Jun 2023 10:35)  Source: Clean Catch Clean Catch (Midstream)  Final Report (06 Jun 2023 18:49):    10,000 - 49,000 CFU/mL Candida albicans "Susceptibilities not performed"      IMAGING:      ACCESS/ DEVICES:  [ ] Peripheral IV  [ ] Central Venous Line	[ ] R	[ ] L	[ ] IJ	[ ] Fem	[ ] SC	Placed:   [ ] Arterial Line		[ ] R	[ ] L	[ ] Fem	[ ] Rad	[ ] Ax	Placed:   [ ] PICC:					[ ] Mediport  [ ] Urinary Catheter,  Date Placed:   [ ] Chest tube: [ ] Right, [ ] Left  [ ] CARTER/Anjum Drains

## 2023-06-08 NOTE — PROGRESS NOTE ADULT - ASSESSMENT
IMPRESSION:    Acute hypoxemic respiratory failure   Severe cavitary CAP MRSA  Small parapneumonic effusion SP Chest tube.    MRSA bacteremia resolved   MASTER negative   Anemia sp transfusion   New onset A FIB rate controlled   LANCE non oliguric improving  Hypernatremia Improving  HO asthma   Influenza B     PLAN:    CNS:  SAT. DC methadone.  Will need it after trach       HEENT: Oral care.  ET care     PULMONARY:  HOB @ 45 degrees.  Aspiration precautions.  ARDS net MV setting.  RR 26.  Monitor PPL and DP.  Keep SaO2 92 TO 96%.  Favor trach.      CARDIOVASCULAR:  ECHO/ MASTER noted.  Avoid overload.  Continue Lasix 40 mg daily     GI: GI prophylaxis.  OG Feeding  Adjust Bowel regimen.  Reglan as tolerated.      RENAL:  Follow up lytes.  Correct as needed.     INFECTIOUS DISEASE: ABX Per ID.  DXW ID.  Last day of Devonte today     HEMATOLOGICAL:  DVT prophylaxis.  Trend CBC.     ENDOCRINE:  Follow up FS.  Insulin protocol if needed.  Solumedrol 40 mg daily.      MICU     l IJ 6/2    Bladder scans.      Prognosis remains guarded

## 2023-06-08 NOTE — PROGRESS NOTE ADULT - SUBJECTIVE AND OBJECTIVE BOX
Overnight Events  Afebrile    ROS  unable to obtain history secondary to patient's mental status and/or sedation     PHYSICAL EXAM    ICU Vital Signs Last 24 Hrs  T(C): 37.1 (08 Jun 2023 04:00), Max: 37.1 (08 Jun 2023 04:00)  T(F): 98.7 (08 Jun 2023 04:00), Max: 98.7 (08 Jun 2023 04:00)  HR: 86 (08 Jun 2023 07:40) (60 - 105)  BP: 147/81 (08 Jun 2023 07:00) (97/54 - 226/101)  BP(mean): 108 (08 Jun 2023 07:00) (69 - 162)  ABP: --  ABP(mean): --  RR: 24 (08 Jun 2023 07:00) (16 - 38)  SpO2: 96% (08 Jun 2023 07:40) (94% - 100%)    O2 Parameters below as of 07 Jun 2023 19:00  Patient On (Oxygen Delivery Method): ventilator    O2 Concentration (%): 40        General: intubated  HEENT: NCAT  CV: RRR  Lungs: symmetric chest expansion, decreased BS at bases  Abd: Soft  Skin: no rash  Neuro: sedated  Lines: no phlebitis   CT        06-07-23 @ 07:01  -  06-08-23 @ 07:00  --------------------------------------------------------  IN:    Amiodarone: 50.1 mL    Dexmedetomidine: 830.4 mL    Enteral Tube Flush: 350 mL    FentaNYL: 352.3 mL    Free Water: 300 mL    IV PiggyBack: 400 mL    Ketamine: 454.6 mL    Peptamen A.F.: 850 mL  Total IN: 3587.4 mL    OUT:    Chest Tube (mL): 90 mL    Midazolam: 0 mL    Rectal Tube (mL): 250 mL    Voided (mL): 4600 mL  Total OUT: 4940 mL    Total NET: -1352.6 mL          LABS:                            8.5    12.78 )-----------( 535      ( 08 Jun 2023 05:15 )             27.6                                               06-08    141  |  112<H>  |  38<H>  ----------------------------<  103<H>  3.9   |  22  |  0.8    Ca    7.5<L>      08 Jun 2023 04:40  Mg     1.6     06-08    TPro  5.6<L>  /  Alb  2.1<L>  /  TBili  0.2  /  DBili  x   /  AST  32  /  ALT  30  /  AlkPhos  83  06-08                                                                                           LIVER FUNCTIONS - ( 08 Jun 2023 04:40 )  Alb: 2.1 g/dL / Pro: 5.6 g/dL / ALK PHOS: 83 U/L / ALT: 30 U/L / AST: 32 U/L / GGT: x                                                  Culture - Urine (collected 05 Jun 2023 10:35)  Source: Clean Catch Clean Catch (Midstream)  Final Report (06 Jun 2023 18:49):    10,000 - 49,000 CFU/mL Candida albicans "Susceptibilities not performed"                                                   Mode: AC/ CMV (Assist Control/ Continuous Mandatory Ventilation)  RR (machine): 20  TV (machine): 400  FiO2: 40  PEEP: 8  ITime: 1  MAP: 13  PIP: 25                                      ABG - ( 08 Jun 2023 03:24 )  pH, Arterial: 7.46  pH, Blood: x     /  pCO2: 35    /  pO2: 90    / HCO3: 25    / Base Excess: 1.4   /  SaO2: 99.7                MEDICATIONS  (STANDING):  aMIOdarone    Tablet 200 milliGRAM(s) Oral two times a day  aMIOdarone Infusion 0.5 mG/Min (16.7 mL/Hr) IV Continuous <Continuous>  chlorhexidine 0.12% Liquid 15 milliLiter(s) Oral Mucosa every 12 hours  chlorhexidine 2% Cloths 1 Application(s) Topical daily  chlorhexidine 2% Cloths 1 Application(s) Topical <User Schedule>  dexMEDEtomidine Infusion 0.2 MICROgram(s)/kG/Hr (4.25 mL/Hr) IV Continuous <Continuous>  fentaNYL   Infusion. 0.555 MICROgram(s)/kG/Hr (4.72 mL/Hr) IV Continuous <Continuous>  furosemide   Injectable 40 milliGRAM(s) IV Push daily  insulin lispro (ADMELOG) corrective regimen sliding scale   SubCutaneous three times a day before meals  ketamine Infusion. 0.23 mG/kG/Hr (2.13 mL/Hr) IV Continuous <Continuous>  magnesium sulfate  IVPB 2 Gram(s) IV Intermittent every 2 hours  meropenem  IVPB 1000 milliGRAM(s) IV Intermittent every 8 hours  methadone    Tablet 10 milliGRAM(s) Oral every 8 hours  methylPREDNISolone sodium succinate Injectable 60 milliGRAM(s) IV Push daily  midazolam Infusion 0.02 mG/kG/Hr (1.7 mL/Hr) IV Continuous <Continuous>  pantoprazole   Suspension 40 milliGRAM(s) Oral daily  polyethylene glycol 3350 17 Gram(s) Oral every 12 hours  senna 2 Tablet(s) Oral at bedtime  vancomycin  IVPB 1250 milliGRAM(s) IV Intermittent every 24 hours  vitamin A &amp; D Ointment 1 Application(s) Topical daily    MEDICATIONS  (PRN):  acetaminophen     Tablet .. 650 milliGRAM(s) Oral every 6 hours PRN Temp greater or equal to 38C (100.4F)

## 2023-06-08 NOTE — PROGRESS NOTE ADULT - ASSESSMENT
42 year old female with PMHx Asthma, HTN presents with cough and SOB x3 days and hemoptysis. CT chest showing cavitary pneumonia. Cultures +MRSA bacteremia. Intubated; failed SBT. On Zyvox and Tamiflu. Repeat CT showing worsening PNA.    # Acute hypoxemic respiratory failure 2/2 cavitary MRSA PNA  # MRSA bacteremia, repeat cultures NGTD x3  # +Influenza B  # hx Asthma   - CT Angio Chest PE Protocol: Bilateral patchy groundglass nodularity with dominant confluent left lower lobe opacification with numerous cavitary lesions. Additional contralateral right lower lobe cavitary 1 cm nodule. Findings compatible with cavitary pneumonia in the appropriate clinical setting; differential diagnosis includes tuberculosis.  - Strep/Legionella negative, HIV negative   - intubated 5/23, self-extubated 5/25, re-intubated 5/25  - s/p Mupirocin BID x5 days  - blood cultures +MRSA  - blood cultures 5/28-30: NGTD x3  - ECHO: no vegetations. EF 65%, mild TR and pulm HTN  - repeat CT chest: Diffusely increased number and size of bilateral cavitary lesions with the left lower lobe now demonstrating a consolidative process of the entirelobe.  - D-dimer 1665, LE duplex negative for DVT  - pro-mya 48 > 5.8  - s/p MASTER 5/31: No evidence of valvular vegetations or intracardiac abscesses to support IE. Pulmonary hepatisation incidental finding.   - s/p Tamiflu 30mg q12  - c/w Vancomycin (dosing per pharmacy) and Meropenem 1g q8 (last dose 6/8)  - PCR skin scraping negative for HSV --> d/c Acyclovir   - repeat ET cultures negative   - repeat CT chest: Small left pleural effusion with overall essentially unchanged exam.  - s/p chest tube placement connected to suction --> tPA 6/6 and 6/7  - c/w 40mg Lasix daily   - c/w Solumedrol 40mg qd  - SBT/SAT  - consult CT surgery for trach    # New onset A-fib w/ RVR  - s/p Amio drip  - EP consulted  - start Amio 200mg BID for 2 weeks --> then Amio 200mg daily     # LANCE   - Nephro following; post-infectious GN vs. ATN vs. pulmonary renal syndrome  - Cr stable around 3 > 2.6  - renal U/S: negative   - CK trending down 1483>39>41  - UA: +proteins and blood, Pr/Cr 1.3 (H)  - CHARLES+ 1:640, hep/HIV negative  - c/w PO bicarb 650 mg q8  - no acute indication for RRT    # Anemia  - trend CBC, active type and screen, transfuse <7  - DIC panel negative   - Dimer 1665, LE Duplex negative for DVT  - hold heparin drip. SCDs  - s/p 3u pRBCs; Hgb 7.8  - CTAP: r/o retroperitoneal bleed    #DVT ppx: SCDs  #GI ppx: Pantoprazole 40mg qd  #Diet: NPO w/ tube feed w/ Reglan  #Activity: bedrest   #Dispo: MICU

## 2023-06-08 NOTE — PROGRESS NOTE ADULT - SUBJECTIVE AND OBJECTIVE BOX
Patient is a 42y old  Female who presents with a chief complaint of AHRF (08 Jun 2023 00:19)        Over Night Events:  remains critically ill on MV.  Sedated.  Off pressors.          ROS:     All ROS are negative except HPI         PHYSICAL EXAM    ICU Vital Signs Last 24 Hrs  T(C): 37.1 (08 Jun 2023 04:00), Max: 37.1 (08 Jun 2023 04:00)  T(F): 98.7 (08 Jun 2023 04:00), Max: 98.7 (08 Jun 2023 04:00)  HR: 86 (08 Jun 2023 07:40) (60 - 105)  BP: 147/81 (08 Jun 2023 07:00) (97/54 - 226/101)  BP(mean): 108 (08 Jun 2023 07:00) (69 - 162)  ABP: --  ABP(mean): --  RR: 24 (08 Jun 2023 07:00) (16 - 38)  SpO2: 96% (08 Jun 2023 07:40) (94% - 100%)    O2 Parameters below as of 07 Jun 2023 19:00  Patient On (Oxygen Delivery Method): ventilator    O2 Concentration (%): 40        CONSTITUTIONAL:  In  NAD    ENT:   Airway patent,   Mouth with normal mucosa.   Et     EYES:   Pupils equal,   Round and reactive to light.    CARDIAC:   Normal rate,   Regular rhythm.      RESPIRATORY:   No wheezing  Bilateral BS  Normal chest expansion  Not tachypneic,  No use of accessory muscles    GASTROINTESTINAL:  Abdomen soft,   Non-tender,   No guarding,   + BS    MUSCULOSKELETAL:   Range of motion is not limited,  No clubbing, cyanosis    NEUROLOGICAL:   Sedated     SKIN:   Skin normal color for race,   No evidence of rash.    PSYCHIATRIC:   No apparent risk to self or others.          06-07-23 @ 07:01  -  06-08-23 @ 07:00  --------------------------------------------------------  IN:    Amiodarone: 50.1 mL    Dexmedetomidine: 830.4 mL    Enteral Tube Flush: 350 mL    FentaNYL: 352.3 mL    Free Water: 300 mL    IV PiggyBack: 400 mL    Ketamine: 454.6 mL    Peptamen A.F.: 850 mL  Total IN: 3587.4 mL    OUT:    Chest Tube (mL): 90 mL    Midazolam: 0 mL    Rectal Tube (mL): 250 mL    Voided (mL): 4600 mL  Total OUT: 4940 mL    Total NET: -1352.6 mL          LABS:                            8.5    12.78 )-----------( 535      ( 08 Jun 2023 05:15 )             27.6                                               06-08    141  |  112<H>  |  38<H>  ----------------------------<  103<H>  3.9   |  22  |  0.8    Ca    7.5<L>      08 Jun 2023 04:40  Mg     1.6     06-08    TPro  5.6<L>  /  Alb  2.1<L>  /  TBili  0.2  /  DBili  x   /  AST  32  /  ALT  30  /  AlkPhos  83  06-08                                                                                           LIVER FUNCTIONS - ( 08 Jun 2023 04:40 )  Alb: 2.1 g/dL / Pro: 5.6 g/dL / ALK PHOS: 83 U/L / ALT: 30 U/L / AST: 32 U/L / GGT: x                                                  Culture - Urine (collected 05 Jun 2023 10:35)  Source: Clean Catch Clean Catch (Midstream)  Final Report (06 Jun 2023 18:49):    10,000 - 49,000 CFU/mL Candida albicans "Susceptibilities not performed"                                                   Mode: AC/ CMV (Assist Control/ Continuous Mandatory Ventilation)  RR (machine): 20  TV (machine): 400  FiO2: 40  PEEP: 8  ITime: 1  MAP: 13  PIP: 25                                      ABG - ( 08 Jun 2023 03:24 )  pH, Arterial: 7.46  pH, Blood: x     /  pCO2: 35    /  pO2: 90    / HCO3: 25    / Base Excess: 1.4   /  SaO2: 99.7                MEDICATIONS  (STANDING):  aMIOdarone    Tablet 200 milliGRAM(s) Oral two times a day  aMIOdarone Infusion 0.5 mG/Min (16.7 mL/Hr) IV Continuous <Continuous>  chlorhexidine 0.12% Liquid 15 milliLiter(s) Oral Mucosa every 12 hours  chlorhexidine 2% Cloths 1 Application(s) Topical daily  chlorhexidine 2% Cloths 1 Application(s) Topical <User Schedule>  dexMEDEtomidine Infusion 0.2 MICROgram(s)/kG/Hr (4.25 mL/Hr) IV Continuous <Continuous>  fentaNYL   Infusion. 0.555 MICROgram(s)/kG/Hr (4.72 mL/Hr) IV Continuous <Continuous>  furosemide   Injectable 40 milliGRAM(s) IV Push daily  insulin lispro (ADMELOG) corrective regimen sliding scale   SubCutaneous three times a day before meals  ketamine Infusion. 0.23 mG/kG/Hr (2.13 mL/Hr) IV Continuous <Continuous>  magnesium sulfate  IVPB 2 Gram(s) IV Intermittent every 2 hours  meropenem  IVPB 1000 milliGRAM(s) IV Intermittent every 8 hours  methadone    Tablet 10 milliGRAM(s) Oral every 8 hours  methylPREDNISolone sodium succinate Injectable 60 milliGRAM(s) IV Push daily  midazolam Infusion 0.02 mG/kG/Hr (1.7 mL/Hr) IV Continuous <Continuous>  pantoprazole   Suspension 40 milliGRAM(s) Oral daily  polyethylene glycol 3350 17 Gram(s) Oral every 12 hours  senna 2 Tablet(s) Oral at bedtime  vancomycin  IVPB 1250 milliGRAM(s) IV Intermittent every 24 hours  vitamin A &amp; D Ointment 1 Application(s) Topical daily    MEDICATIONS  (PRN):  acetaminophen     Tablet .. 650 milliGRAM(s) Oral every 6 hours PRN Temp greater or equal to 38C (100.4F)      New X-rays reviewed:                                                                                  ECHO

## 2023-06-09 LAB
ALBUMIN SERPL ELPH-MCNC: 2.2 G/DL — LOW (ref 3.5–5.2)
ALP SERPL-CCNC: 91 U/L — SIGNIFICANT CHANGE UP (ref 30–115)
ALT FLD-CCNC: 27 U/L — SIGNIFICANT CHANGE UP (ref 0–41)
ANION GAP SERPL CALC-SCNC: 7 MMOL/L — SIGNIFICANT CHANGE UP (ref 7–14)
AST SERPL-CCNC: 21 U/L — SIGNIFICANT CHANGE UP (ref 0–41)
BASE EXCESS BLDA CALC-SCNC: 1 MMOL/L — SIGNIFICANT CHANGE UP (ref -2–3)
BASOPHILS # BLD AUTO: 0.09 K/UL — SIGNIFICANT CHANGE UP (ref 0–0.2)
BASOPHILS NFR BLD AUTO: 0.6 % — SIGNIFICANT CHANGE UP (ref 0–1)
BILIRUB SERPL-MCNC: 0.3 MG/DL — SIGNIFICANT CHANGE UP (ref 0.2–1.2)
BUN SERPL-MCNC: 34 MG/DL — HIGH (ref 10–20)
CALCIUM SERPL-MCNC: 8.2 MG/DL — LOW (ref 8.4–10.4)
CHLORIDE SERPL-SCNC: 111 MMOL/L — HIGH (ref 98–110)
CO2 SERPL-SCNC: 26 MMOL/L — SIGNIFICANT CHANGE UP (ref 17–32)
CREAT SERPL-MCNC: 0.8 MG/DL — SIGNIFICANT CHANGE UP (ref 0.7–1.5)
EGFR: 94 ML/MIN/1.73M2 — SIGNIFICANT CHANGE UP
EOSINOPHIL # BLD AUTO: 0.13 K/UL — SIGNIFICANT CHANGE UP (ref 0–0.7)
EOSINOPHIL NFR BLD AUTO: 0.9 % — SIGNIFICANT CHANGE UP (ref 0–8)
GAS PNL BLDA: SIGNIFICANT CHANGE UP
GLUCOSE BLDC GLUCOMTR-MCNC: 108 MG/DL — HIGH (ref 70–99)
GLUCOSE BLDC GLUCOMTR-MCNC: 120 MG/DL — HIGH (ref 70–99)
GLUCOSE BLDC GLUCOMTR-MCNC: 124 MG/DL — HIGH (ref 70–99)
GLUCOSE SERPL-MCNC: 104 MG/DL — HIGH (ref 70–99)
HCO3 BLDA-SCNC: 26 MMOL/L — SIGNIFICANT CHANGE UP (ref 21–28)
HCT VFR BLD CALC: 27.9 % — LOW (ref 37–47)
HGB BLD-MCNC: 8.4 G/DL — LOW (ref 12–16)
HOROWITZ INDEX BLDA+IHG-RTO: 40 — SIGNIFICANT CHANGE UP
IMM GRANULOCYTES NFR BLD AUTO: 1 % — HIGH (ref 0.1–0.3)
LYMPHOCYTES # BLD AUTO: 14.9 % — LOW (ref 20.5–51.1)
LYMPHOCYTES # BLD AUTO: 2.14 K/UL — SIGNIFICANT CHANGE UP (ref 1.2–3.4)
MAGNESIUM SERPL-MCNC: 1.8 MG/DL — SIGNIFICANT CHANGE UP (ref 1.8–2.4)
MCHC RBC-ENTMCNC: 22.2 PG — LOW (ref 27–31)
MCHC RBC-ENTMCNC: 30.1 G/DL — LOW (ref 32–37)
MCV RBC AUTO: 73.6 FL — LOW (ref 81–99)
MONOCYTES # BLD AUTO: 1.34 K/UL — HIGH (ref 0.1–0.6)
MONOCYTES NFR BLD AUTO: 9.4 % — HIGH (ref 1.7–9.3)
NEUTROPHILS # BLD AUTO: 10.49 K/UL — HIGH (ref 1.4–6.5)
NEUTROPHILS NFR BLD AUTO: 73.2 % — SIGNIFICANT CHANGE UP (ref 42.2–75.2)
NRBC # BLD: 0 /100 WBCS — SIGNIFICANT CHANGE UP (ref 0–0)
PCO2 BLDA: 42 MMHG — SIGNIFICANT CHANGE UP (ref 25–48)
PH BLDA: 7.4 — SIGNIFICANT CHANGE UP (ref 7.35–7.45)
PLATELET # BLD AUTO: 542 K/UL — HIGH (ref 130–400)
PMV BLD: 9.9 FL — SIGNIFICANT CHANGE UP (ref 7.4–10.4)
PO2 BLDA: 85 MMHG — SIGNIFICANT CHANGE UP (ref 83–108)
POTASSIUM SERPL-MCNC: 3.9 MMOL/L — SIGNIFICANT CHANGE UP (ref 3.5–5)
POTASSIUM SERPL-SCNC: 3.9 MMOL/L — SIGNIFICANT CHANGE UP (ref 3.5–5)
PROT SERPL-MCNC: 6.1 G/DL — SIGNIFICANT CHANGE UP (ref 6–8)
RBC # BLD: 3.79 M/UL — LOW (ref 4.2–5.4)
RBC # FLD: 28.1 % — HIGH (ref 11.5–14.5)
SAO2 % BLDA: 97.5 % — SIGNIFICANT CHANGE UP (ref 94–98)
SODIUM SERPL-SCNC: 144 MMOL/L — SIGNIFICANT CHANGE UP (ref 135–146)
WBC # BLD: 14.33 K/UL — HIGH (ref 4.8–10.8)
WBC # FLD AUTO: 14.33 K/UL — HIGH (ref 4.8–10.8)

## 2023-06-09 PROCEDURE — 99291 CRITICAL CARE FIRST HOUR: CPT

## 2023-06-09 PROCEDURE — 71045 X-RAY EXAM CHEST 1 VIEW: CPT | Mod: 26

## 2023-06-09 RX ORDER — PROPOFOL 10 MG/ML
19.96 INJECTION, EMULSION INTRAVENOUS
Qty: 1000 | Refills: 0 | Status: DISCONTINUED | OUTPATIENT
Start: 2023-06-09 | End: 2023-06-09

## 2023-06-09 RX ORDER — METRONIDAZOLE 500 MG
500 TABLET ORAL EVERY 8 HOURS
Refills: 0 | Status: DISCONTINUED | OUTPATIENT
Start: 2023-06-09 | End: 2023-06-27

## 2023-06-09 RX ORDER — PROPOFOL 10 MG/ML
19.96 INJECTION, EMULSION INTRAVENOUS
Qty: 1000 | Refills: 0 | Status: DISCONTINUED | OUTPATIENT
Start: 2023-06-09 | End: 2023-06-23

## 2023-06-09 RX ORDER — PROPOFOL 10 MG/ML
20 INJECTION, EMULSION INTRAVENOUS
Qty: 1000 | Refills: 0 | Status: DISCONTINUED | OUTPATIENT
Start: 2023-06-09 | End: 2023-06-09

## 2023-06-09 RX ORDER — SODIUM CHLORIDE 9 MG/ML
500 INJECTION, SOLUTION INTRAVENOUS ONCE
Refills: 0 | Status: COMPLETED | OUTPATIENT
Start: 2023-06-09 | End: 2023-06-09

## 2023-06-09 RX ORDER — SODIUM CHLORIDE 9 MG/ML
250 INJECTION, SOLUTION INTRAVENOUS ONCE
Refills: 0 | Status: COMPLETED | OUTPATIENT
Start: 2023-06-09 | End: 2023-06-09

## 2023-06-09 RX ADMIN — PROPOFOL 11.1 MICROGRAM(S)/KG/MIN: 10 INJECTION, EMULSION INTRAVENOUS at 09:05

## 2023-06-09 RX ADMIN — SODIUM CHLORIDE 1000 MILLILITER(S): 9 INJECTION, SOLUTION INTRAVENOUS at 11:30

## 2023-06-09 RX ADMIN — POLYETHYLENE GLYCOL 3350 17 GRAM(S): 17 POWDER, FOR SOLUTION ORAL at 09:05

## 2023-06-09 RX ADMIN — AMIODARONE HYDROCHLORIDE 200 MILLIGRAM(S): 400 TABLET ORAL at 05:26

## 2023-06-09 RX ADMIN — DEXMEDETOMIDINE HYDROCHLORIDE IN 0.9% SODIUM CHLORIDE 4.25 MICROGRAM(S)/KG/HR: 4 INJECTION INTRAVENOUS at 22:05

## 2023-06-09 RX ADMIN — Medication 40 MILLIGRAM(S): at 05:24

## 2023-06-09 RX ADMIN — PROPOFOL 11.1 MICROGRAM(S)/KG/MIN: 10 INJECTION, EMULSION INTRAVENOUS at 22:09

## 2023-06-09 RX ADMIN — Medication 650 MILLIGRAM(S): at 09:07

## 2023-06-09 RX ADMIN — POLYETHYLENE GLYCOL 3350 17 GRAM(S): 17 POWDER, FOR SOLUTION ORAL at 21:06

## 2023-06-09 RX ADMIN — Medication 650 MILLIGRAM(S): at 10:00

## 2023-06-09 RX ADMIN — Medication 500 MILLIGRAM(S): at 21:06

## 2023-06-09 RX ADMIN — Medication 166.67 MILLIGRAM(S): at 13:49

## 2023-06-09 RX ADMIN — Medication 1 APPLICATION(S): at 11:56

## 2023-06-09 RX ADMIN — Medication 500 MILLIGRAM(S): at 13:50

## 2023-06-09 RX ADMIN — SENNA PLUS 2 TABLET(S): 8.6 TABLET ORAL at 21:06

## 2023-06-09 RX ADMIN — CHLORHEXIDINE GLUCONATE 15 MILLILITER(S): 213 SOLUTION TOPICAL at 17:22

## 2023-06-09 RX ADMIN — CHLORHEXIDINE GLUCONATE 15 MILLILITER(S): 213 SOLUTION TOPICAL at 05:25

## 2023-06-09 RX ADMIN — Medication 40 MILLIGRAM(S): at 05:25

## 2023-06-09 RX ADMIN — SODIUM CHLORIDE 500 MILLILITER(S): 9 INJECTION, SOLUTION INTRAVENOUS at 12:00

## 2023-06-09 RX ADMIN — AMIODARONE HYDROCHLORIDE 200 MILLIGRAM(S): 400 TABLET ORAL at 17:22

## 2023-06-09 RX ADMIN — CHLORHEXIDINE GLUCONATE 1 APPLICATION(S): 213 SOLUTION TOPICAL at 05:24

## 2023-06-09 RX ADMIN — PANTOPRAZOLE SODIUM 40 MILLIGRAM(S): 20 TABLET, DELAYED RELEASE ORAL at 11:56

## 2023-06-09 NOTE — PROGRESS NOTE ADULT - SUBJECTIVE AND OBJECTIVE BOX
FLO ZEYNEP  42y, Female  Allergy: aspirin (Short breath; Anaphylaxis)      LOS  17d    CHIEF COMPLAINT: AHRF (09 Jun 2023 08:33)      INTERVAL EVENTS/HPI  - T(F): , Max: 100.8 (06-09-23 @ 08:00) fever, off pressors  - WBC Count: 14.33 (06-09-23 @ 04:50)  WBC Count: 12.78 (06-08-23 @ 05:15)     - Creatinine, Serum: 0.8 (06-09-23 @ 04:50)  Creatinine, Serum: 0.8 (06-08-23 @ 04:40)     -   -   -     ROS  cannot obtain secondary to patient's sedation and/or mental status    VITALS:  T(F): 100.8, Max: 100.8 (06-09-23 @ 08:00)  HR: 97  BP: 128/71  RR: 16Vital Signs Last 24 Hrs  T(C): 38.2 (09 Jun 2023 08:00), Max: 38.2 (09 Jun 2023 08:00)  T(F): 100.8 (09 Jun 2023 08:00), Max: 100.8 (09 Jun 2023 08:00)  HR: 97 (09 Jun 2023 08:00) (77 - 110)  BP: 128/71 (09 Jun 2023 08:00) (95/52 - 199/103)  BP(mean): 94 (09 Jun 2023 08:00) (70 - 138)  RR: 16 (09 Jun 2023 08:00) (15 - 29)  SpO2: 96% (09 Jun 2023 08:00) (96% - 100%)    Parameters below as of 09 Jun 2023 08:00  Patient On (Oxygen Delivery Method): ventilator    O2 Concentration (%): 40    PHYSICAL EXAM:  Gen: Intubated  CV: RRR  Lungs: Decreased BS at bases CT  Abd: Soft  Neuro: Sedated  rash stable  Lines clean, no phlebitis    FH: Non-contributory  Social Hx: Non-contributory    TESTS & MEASUREMENTS:                        8.4    14.33 )-----------( 542      ( 09 Jun 2023 04:50 )             27.9     06-09    144  |  111<H>  |  34<H>  ----------------------------<  104<H>  3.9   |  26  |  0.8    Ca    8.2<L>      09 Jun 2023 04:50  Mg     1.8     06-09    TPro  6.1  /  Alb  2.2<L>  /  TBili  0.3  /  DBili  x   /  AST  21  /  ALT  27  /  AlkPhos  91  06-09      LIVER FUNCTIONS - ( 09 Jun 2023 04:50 )  Alb: 2.2 g/dL / Pro: 6.1 g/dL / ALK PHOS: 91 U/L / ALT: 27 U/L / AST: 21 U/L / GGT: x               Culture - Urine (collected 06-05-23 @ 10:35)  Source: Clean Catch Clean Catch (Midstream)  Final Report (06-06-23 @ 18:49):    10,000 - 49,000 CFU/mL Candida albicans "Susceptibilities not performed"    Culture - Body Fluid with Gram Stain (collected 06-02-23 @ 17:52)  Source: Pleural Fl Pleural Fluid  Gram Stain (06-03-23 @ 02:05):    polymorphonuclear leukocytes seen    No organisms seen    by cytocentrifuge  Final Report (06-07-23 @ 21:09):    No growth at 5 days    Culture - Acid Fast - Body Fluid w/Smear (collected 06-02-23 @ 17:52)  Source: .Body Fluid Other, Chest Tube  Preliminary Report (06-07-23 @ 15:08):    Culture is being performed.    Culture - Fungal, Body Fluid (collected 06-02-23 @ 17:52)  Source: .Body Fluid Other, Chest tube  Preliminary Report (06-07-23 @ 15:03):    Culture is being performed. Fungal cultures are held for 4 weeks.    Culture - Blood (collected 06-02-23 @ 05:00)  Source: .Blood Blood  Final Report (06-07-23 @ 16:01):    No Growth Final    Culture - Blood (collected 06-01-23 @ 04:59)  Source: .Blood None  Final Report (06-06-23 @ 19:00):    No Growth Final    Culture - Sputum (collected 06-01-23 @ 02:15)  Source: ET Tube ET Tube  Gram Stain (06-01-23 @ 17:36):    Moderate polymorphonuclear leukocytes per low power field    No Squamous epithelial cells per low power field    Rare Gram positive cocci in pairs per oil power field  Final Report (06-03-23 @ 12:27):    Normal Respiratory Laila present    Culture - Blood (collected 05-31-23 @ 04:48)  Source: .Blood None  Final Report (06-05-23 @ 16:00):    No Growth Final    Culture - Blood (collected 05-30-23 @ 04:30)  Source: .Blood Blood  Final Report (06-04-23 @ 14:01):    No Growth Final    Culture - Blood (collected 05-29-23 @ 05:55)  Source: .Blood Blood  Final Report (06-03-23 @ 14:01):    No Growth Final    Culture - Blood (collected 05-28-23 @ 16:00)  Source: .Blood Blood-Peripheral  Final Report (06-03-23 @ 03:00):    No Growth Final    Culture - Blood (collected 05-28-23 @ 12:00)  Source: .Blood Blood  Final Report (06-02-23 @ 21:00):    No Growth Final    Culture - Blood (collected 05-27-23 @ 06:05)  Source: .Blood Blood  Gram Stain (05-28-23 @ 17:02):    Growth in aerobic bottle: Gram Positive Cocci in Clusters  Final Report (05-30-23 @ 09:16):    Growth in aerobic bottle: Staphylococcus aureus    See previous culture [id number]    Culture - Blood (collected 05-27-23 @ 06:05)  Source: .Blood Blood  Gram Stain (05-29-23 @ 13:29):    Growth in aerobic bottle: Gram Positive Cocci in Clusters    Growth in anaerobic bottle: Gram Positive Cocci in Clusters  Final Report (05-30-23 @ 11:11):    Growth in aerobic and anaerobic bottles: Methicillin Resistant    Staphylococcus aureus  Organism: Methicillin resistant Staphylococcus aureus (05-30-23 @ 11:11)  Organism: Methicillin resistant Staphylococcus aureus (05-30-23 @ 11:11)      Method Type: RUSSELL      -  Ampicillin/Sulbactam: R 16/8      -  Cefazolin: R >16      -  Clindamycin: S 0.5      -  Daptomycin: S 1      -  Erythromycin: R >4      -  Gentamicin: S <=1 Should not be used as monotherapy      -  Linezolid: S 2      -  Oxacillin: R >2      -  Penicillin: R >8      -  Rifampin: S <=1 Should not be used as monotherapy      -  Tetracycline: S <=1      -  Trimethoprim/Sulfamethoxazole: S <=0.5/9.5      -  Vancomycin: S 2    Culture - Blood (collected 05-26-23 @ 06:09)  Source: .Blood Blood  Gram Stain (05-29-23 @ 00:35):    Growth in anaerobic bottle: Gram Positive Cocci in Clusters  Final Report (05-31-23 @ 08:25):    Growth in anaerobic bottle: Methicillin Resistant Staphylococcus aureus  Organism: Methicillin resistant Staphylococcus aureus (05-31-23 @ 08:25)  Organism: Methicillin resistant Staphylococcus aureus (05-31-23 @ 08:25)      Method Type: RUSSELL      -  Ampicillin/Sulbactam: R 16/8      -  Cefazolin: R >16      -  Clindamycin: S 0.5      -  Daptomycin: S 1      -  Erythromycin: I 4      -  Gentamicin: S <=1 Should not be used as monotherapy      -  Linezolid: S 4      -  Oxacillin: R >2      -  Penicillin: R >8      -  Rifampin: S <=1 Should not be used as monotherapy      -  Tetracycline: S <=1      -  Trimethoprim/Sulfamethoxazole: S <=0.5/9.5      -  Vancomycin: S 2    Culture - Sputum (collected 05-24-23 @ 19:17)  Source: Trach Asp Tracheal Aspirate  Gram Stain (05-25-23 @ 07:31):    No polymorphonuclear leukocytes per low power field    No Squamous epithelial cells per low power field    Numerous Gram positive cocci in pairs per oil power field  Final Report (05-27-23 @ 08:45):    Numerous Methicillin Resistant Staphylococcus aureus    Normal Respiratory Laila present  Organism: Methicillin resistant Staphylococcus aureus (05-27-23 @ 08:45)  Organism: Methicillin resistant Staphylococcus aureus (05-27-23 @ 08:45)      Method Type: RUSSELL      -  Ampicillin/Sulbactam: R 16/8      -  Cefazolin: R >16      -  Clindamycin: S <=0.25      -  Erythromycin: R >4      -  Gentamicin: S <=1 Should not be used as monotherapy      -  Linezolid: S 2      -  Oxacillin: R >2      -  Penicillin: R >8      -  Rifampin: S <=1 Should not be used as monotherapy      -  Tetracycline: S <=1      -  Trimethoprim/Sulfamethoxazole: S <=0.5/9.5      -  Vancomycin: S 2    Culture - Acid Fast - Sputum w/Smear (collected 05-24-23 @ 19:17)  Source: Trach Asp Tracheal Aspirate  Preliminary Report (06-03-23 @ 15:06):    No acid-fast bacilli isolated at 1 week. ****Acid-fast cultures are held    for 6 weeks.****    Culture - Fungal, Sputum (collected 05-24-23 @ 19:17)  Source: Trach Asp Tracheal Aspirate  Preliminary Report (05-29-23 @ 07:07):    Few Yeast    Culture - Blood (collected 05-24-23 @ 06:10)  Source: .Blood None  Gram Stain (05-25-23 @ 11:18):    Growth in aerobic bottle: Gram Positive Cocci in Clusters    Growth in anaerobic bottle: Gram Positive Cocci in Clusters  Final Report (05-26-23 @ 08:13):    Growth in aerobic and anaerobic bottles: Methicillin Resistant    Staphylococcus aureus    See previous culture [id number]    Culture - Blood (collected 05-23-23 @ 20:37)  Source: .Blood Blood-Peripheral  Gram Stain (05-24-23 @ 17:51):    Growth in aerobic bottle: Gram Positive Cocci in Clusters    Growth in anaerobic bottle: Gram Positive Cocci in Clusters  Final Report (05-26-23 @ 08:14):    Growth in aerobic and anaerobic bottles: Methicillin Resistant    Staphylococcus aureus    See previous culture [id number]    Culture - Blood (collected 05-23-23 @ 20:37)  Source: .Blood Blood-Peripheral  Gram Stain (05-24-23 @ 14:24):    Growth in aerobic bottle: Gram Positive Cocci in Clusters  Final Report (05-26-23 @ 08:11):    Growth in aerobic bottle: Methicillin Resistant Staphylococcus aureus    ***Blood Panel PCR results on this specimen are available    approximately 3 hours after the Gram stain result.***    Gram stain, PCR, and/or culture results may not always    correspond due to difference in methodologies.    ************************************************************    This PCR assay was performed by multiplex PCR. This    Assay tests for 66 bacterial and resistance gene targets.    Please refer to the [email or web address] test directory    at [email or web address] for details.  Organism: Blood Culture PCR  Methicillin resistant Staphylococcus aureus (05-26-23 @ 08:11)  Organism: Methicillin resistant Staphylococcus aureus (05-26-23 @ 08:11)      Method Type: RUSSELL      -  Ampicillin/Sulbactam: R 16/8      -  Cefazolin: R 16      -  Clindamycin: S <=0.25      -  Daptomycin: S 0.5      -  Erythromycin: R >4      -  Gentamicin: S <=1 Should not be used as monotherapy      -  Linezolid: S 2      -  Oxacillin: R >2      -  Penicillin: R >8      -  Rifampin: S <=1 Should not be used as monotherapy      -  Tetracycline: S <=1      -  Trimethoprim/Sulfamethoxazole: S <=0.5/9.5      -  Vancomycin: S 2  Organism: Blood Culture PCR (05-26-23 @ 08:11)      Method Type: PCR      -  Methicillin resistant Staphylococcus aureus (MRSA): Detec            INFECTIOUS DISEASES TESTING  Procalcitonin, Serum: 1.76 (06-03-23 @ 05:23)  Procalcitonin, Serum: 5.80 (05-30-23 @ 11:39)  HIV-1/2 Combo Result: Nonreact (05-27-23 @ 05:47)  MRSA PCR Result.: Positive (05-24-23 @ 19:17)  Legionella Antigen, Urine: Negative (05-24-23 @ 19:16)  Procalcitonin, Serum: 48.30 (05-24-23 @ 06:10)  Fungitell: 41 (05-24-23 @ 06:10)      INFLAMMATORY MARKERS      RADIOLOGY & ADDITIONAL TESTS:  I have personally reviewed the last available Chest xray  CXR      CT      CARDIOLOGY TESTING  12 Lead ECG:   Ventricular Rate 142 BPM    Atrial Rate 187 BPM    QRS Duration 120 ms    Q-T Interval 306 ms    QTC Calculation(Bazett) 470 ms    R Axis 151 degrees    T Axis 132 degrees    Diagnosis Line Atrial fibrillation with rapid ventricular response  Rightbundle branch block  Abnormal ECG    Confirmed by PANFILO COVINGTON MD ([id number]) on 6/6/2023 6:53:11 AM (06-04-23 @ 04:47)  12 Lead ECG:   Ventricular Rate 205 BPM    Atrial Rate 202 BPM    QRS Duration 114 ms    Q-T Interval 228 ms    QTC Calculation(Bazett) 421 ms    R Axis 154 degrees    T Axis 213 degrees    Diagnosis Line Atrial fibrillation with rapid ventricular response  Rightaxis deviation  Incomplete right bundle branch block  Right ventricular hypertrophy with repolarization abnormality  Nonspecific ST-T changes  Abnormal ECG    Confirmed by Tyrell Olivarez ([id number]) on 6/5/2023 8:33:58 AM (06-04-23 @ 04:41)      MEDICATIONS  aMIOdarone    Tablet 200  chlorhexidine 0.12% Liquid 15  chlorhexidine 2% Cloths 1  chlorhexidine 2% Cloths 1  dexMEDEtomidine Infusion 0.2  fentaNYL   Infusion. 0.509  furosemide   Injectable 40  insulin lispro (ADMELOG) corrective regimen sliding scale   ketamine Infusion. 0.23  pantoprazole   Suspension 40  polyethylene glycol 3350 17  propofol Infusion 20  senna 2  vancomycin  IVPB 1250  vitamin A &amp; D Ointment 1      WEIGHT  Weight (kg): 92.7 (06-06-23 @ 04:00)  Creatinine, Serum: 0.8 mg/dL (06-09-23 @ 04:50)      ANTIBIOTICS:  vancomycin  IVPB 1250 milliGRAM(s) IV Intermittent every 24 hours      All available historical records have been reviewed

## 2023-06-09 NOTE — PROGRESS NOTE ADULT - ASSESSMENT
IMPRESSION:    Acute hypoxemic respiratory failure   Severe cavitary CAP MRSA  Small parapneumonic effusion SP Chest tube.    MRSA bacteremia resolved   MASTER negative   Anemia sp transfusion   New onset A FIB rate controlled   LANCE non oliguric improving  Hypernatremia Improving  HO asthma   Influenza B     PLAN:    CNS:  SAT. Add propofol low dose Monitor TG     HEENT: Oral care.  ET care     PULMONARY:  HOB @ 45 degrees.  Aspiration precautions.  ARDS net MV setting.  RR 26.  Monitor PPL and DP.  Keep SaO2 92 TO 96%.  Awaiting trach.      CARDIOVASCULAR:  ECHO/ MASTER noted.  Avoid overload.  Continue Lasix 40 mg daily     GI: GI prophylaxis.  OG Feeding  Adjust Bowel regimen.  Reglan as tolerated.      RENAL:  Follow up lytes.  Correct as needed.     INFECTIOUS DISEASE: ABX Per ID. Monitor Vanc levels.  Chest tube per CTS.  Possible DC Today     HEMATOLOGICAL:  DVT prophylaxis.  Trend CBC.     ENDOCRINE:  Follow up FS.  Insulin protocol if needed.  Solumedrol 40 mg daily for 2 more days .      MICU     l IJ 6/2    Bladder scans.      Prognosis remains guarded

## 2023-06-09 NOTE — PROGRESS NOTE ADULT - SUBJECTIVE AND OBJECTIVE BOX
Patient is a 42y old  Female who presents with a chief complaint of AHRF (2023 09:31)    HPI:  42 year-old female with PMH of Asthma, HTN? presents with complaint of cough x3 days and SOB. During my encounter pt with dyspnea and increased work of breathing and chest pain, unable to properly provide accurate history. She states that her cough has been progressively worsening over the past 3 days, endorses hemoptysis since yesterday. She states she had a friend who came over and stayed with for more than a week who was sick recently, but she does not know if she recently travelled out of [facility] or not. She also admits that her children has been sick with Sore throat and fever for past 3 days. She admits to fever, chills, sever Chest pain which has gotten worse since she came to the ED. She took 1x Tamiflu yesterday. She otherwise denies diarrhea, constipation, urinary symptoms. She is not vaccinated for Influenza or COVID-19.     In ED  /87, , RR 28, T 100.8 F, 98% on 15 L NRB  Labs significant for wbc 16.7K with Neutrophilic Predominance and L shift, Hgb 11.4, MCV 61.6, Cr 2.0, BUN 23, Alk Phos 125/AST 62/ALT 27, Lac 4.9> 3.9  RVP + Influenza B      CT Angio Chest PE Protocol w/ IV Cont   1. Bilateral patchy groundglass nodularity with dominant confluent left lower lobe opacification with numerous cavitary lesions. Additional contralateral right lower lobe cavitary 1 cm nodule. Findings compatible with cavitary pneumonia in the appropriate clinical setting; differential diagnosis includes tuberculosis.  2. Confluent ill-defined left hilar and mediastinal adenopathy, likely reactive, without dominant lymph node delineated.  3. No evidence of pulmonary embolism.    s/p 1x Rocephin, Meropenem, 3 L LR in ED    Pt admitted to SDU for further managements, during my encounter pt is very agitated, in acute distress. Pt received appropriate pain control with IV Morphine, s/p Xanax 1 mg 1x for agitation and anxiety, despite pain control and adequate fluid resuscitation pt remained tachycardic, tachypneic. STAT EKG reviewed with Cardiology team on call After discussion with Cardiology team, Pt received 1x Adenosine 6 mg IV push with no change. Case discussed with Critical Care team, decision was made for Intubation and Pt upgraded to MICU.   (24 May 2023 00:58)       INTERVAL HPI/OVERNIGHT EVENTS:   No overnight events   Hemodynamically stable  Fever 101.8 this am  Sedated with Precedex Fentanyl Ketamine     Subjective:    ICU Vital Signs Last 24 Hrs  T(C): 38.3 (2023 11:00), Max: 38.3 (2023 11:00)  T(F): 101 (2023 11:00), Max: 101 (2023 11:00)  HR: 91 (2023 11:06) (77 - 110)  BP: 91/53 (2023 11:06) (73/45 - 199/103)  BP(mean): 67 (2023 11:06) (55 - 138)  ABP: --  ABP(mean): --  RR: 21 (2023 11:06) (14 - 29)  SpO2: 99% (2023 11:06) (95% - 100%)    O2 Parameters below as of 2023 08:00  Patient On (Oxygen Delivery Method): ventilator    O2 Concentration (%): 40      I&O's Summary    2023 07:01  -  2023 07:00  --------------------------------------------------------  IN: 3661.1 mL / OUT: 4645 mL / NET: -983.9 mL    2023 07:01  -  2023 11:11  --------------------------------------------------------  IN: 703.6 mL / OUT: 1400 mL / NET: -696.4 mL      Mode: AC/ CMV (Assist Control/ Continuous Mandatory Ventilation)  RR (machine): 16  TV (machine): 400  FiO2: 40  PEEP: 8  ITime: 1  MAP: 13  PIP: 35      Daily     Daily Weight in k.6 (2023 06:00)    Adult Advanced Hemodynamics Last 24 Hrs  CVP(mm Hg): --  CVP(cm H2O): --  CO: --  CI: --  PA: --  PA(mean): --  PCWP: --  SVR: --  SVRI: --  PVR: --  PVRI: --    EKG/Telemetry Events:    MEDICATIONS  (STANDING):  aMIOdarone    Tablet 200 milliGRAM(s) Oral two times a day  chlorhexidine 0.12% Liquid 15 milliLiter(s) Oral Mucosa every 12 hours  chlorhexidine 2% Cloths 1 Application(s) Topical daily  chlorhexidine 2% Cloths 1 Application(s) Topical <User Schedule>  dexMEDEtomidine Infusion 0.2 MICROgram(s)/kG/Hr (4.25 mL/Hr) IV Continuous <Continuous>  fentaNYL   Infusion. 0.509 MICROgram(s)/kG/Hr (4.72 mL/Hr) IV Continuous <Continuous>  furosemide   Injectable 40 milliGRAM(s) IV Push daily  insulin lispro (ADMELOG) corrective regimen sliding scale   SubCutaneous three times a day before meals  ketamine Infusion. 0.23 mG/kG/Hr (2.13 mL/Hr) IV Continuous <Continuous>  metroNIDAZOLE    Tablet 500 milliGRAM(s) Oral every 8 hours  pantoprazole   Suspension 40 milliGRAM(s) Oral daily  polyethylene glycol 3350 17 Gram(s) Oral every 12 hours  propofol Infusion 20 MICROgram(s)/kG/Min (11.1 mL/Hr) IV Continuous <Continuous>  senna 2 Tablet(s) Oral at bedtime  vancomycin  IVPB 1250 milliGRAM(s) IV Intermittent every 24 hours  vitamin A &amp; D Ointment 1 Application(s) Topical daily    MEDICATIONS  (PRN):  acetaminophen     Tablet .. 650 milliGRAM(s) Oral every 6 hours PRN Temp greater or equal to 38C (100.4F)      PHYSICAL EXAM:  GENERAL:   HEAD:  Atraumatic, Normocephalic  EYES: EOMI, PERRLA, conjunctiva and sclera clear  NECK: Supple, No JVD, Normal thyroid, no enlarged nodes  NERVOUS SYSTEM:  Alert & Awake.   CHEST/LUNG: B/L good air entry; No rales, rhonchi, or wheezing  HEART: S1S2 normal, no S3, Regular rate and rhythm; No murmurs  ABDOMEN: Soft, Nontender, Nondistended; Bowel sounds present  EXTREMITIES:  2+ Peripheral Pulses, No clubbing, cyanosis, or edema  LYMPH: No lymphadenopathy noted  SKIN: No rashes or lesions    LABS:                        8.4    14.33 )-----------( 542      ( 2023 04:50 )             27.9         144  |  111<H>  |  34<H>  ----------------------------<  104<H>  3.9   |  26  |  0.8    Ca    8.2<L>      2023 04:50  Mg     1.8         TPro  6.1  /  Alb  2.2<L>  /  TBili  0.3  /  DBili  x   /  AST  21  /  ALT  27  /  AlkPhos  91      LIVER FUNCTIONS - ( 2023 04:50 )  Alb: 2.2 g/dL / Pro: 6.1 g/dL / ALK PHOS: 91 U/L / ALT: 27 U/L / AST: 21 U/L / GGT: x             CAPILLARY BLOOD GLUCOSE      POCT Blood Glucose.: 120 mg/dL (2023 08:05)  POCT Blood Glucose.: 94 mg/dL (2023 17:53)  POCT Blood Glucose.: 128 mg/dL (2023 12:23)    ABG - ( 2023 03:43 )  pH, Arterial: 7.40  pH, Blood: x     /  pCO2: 42    /  pO2: 85    / HCO3: 26    / Base Excess: 1.0   /  SaO2: 97.5                          RADIOLOGY & ADDITIONAL TESTS:  < from: Xray Chest 1 View- PORTABLE-Routine (Xray Chest 1 View- PORTABLE-Routine in AM.) (23 @ 07:04) >  Impression:    New masslike right lower lung field opacity    Stable diffuse left lung opacities    --- End of Report ---    < end of copied text >  < from: CT Chest No Cont (23 @ 10:49) >    IMPRESSION:    Since 2023    Breathing artifact limits lung parenchymal assessment    Post placement left pleural catheter, satisfactory position.    Cavitary consolidation left lower lobe, similar to earlier study.   Assessment for adjacent pleural effusion limited due to streak artifact ,   however appears to have decreased in size since placement of pleural tube.      Multiple additional multi lobar cavitary nodules are present, not   significantly changed from previous study.,    No pneumothorax    --- End of Report ---    < end of copied text >  < from: VA Duplex Lower Ext Vein Scan, Bilat (23 @ 20:31) >  IMPRESSION:  No evidence of deep venous thrombosis in either lower extremity.    Left peroneal vein is not visualized      < end of copied text >        Care Discussed with Consultants/Other Providers [ x] YES  [ ] NO

## 2023-06-09 NOTE — PROGRESS NOTE ADULT - ASSESSMENT
ASSESSMENT  42 year-old female with PMH of Asthma, HTN? presents with complaint of cough x3 days and SOB. During my encounter pt with dyspnea and increased work of breathing and chest pain, unable to properly provide accurate history. She states that her cough has been progressively worsening over the past 3 days, endorses hemoptysis since yesterday.     IMPRESSION  #Rash only LUE , back  doubt drug rash without it being diffuse  initially appeared with small vesicles, not typical of shingles but HSV/VZV sent and is NEG  #Fever    6/5   10,000 - 49,000 CFU/mL Candida albicans "Susceptibilities not performed"- colonizer    6/2 BCX NGTD    6/2 pleural fluid     6/1 DTA   Normal Respiratory Laila present    5/31 BCX NGTD     5/30 BCX NGTD   #Flu B with cavitary PNA, with MRSA bacteremia and cavitary PNA, parapneumonic effusion s/p CT     5/29 BCX NGTD     5/28 BCX NGTD  x2    5/27 BCX +     5/26 BCX +    5/24 DTA MRSA    5/24 fungal ETT   Few Yeast, Fungitell: 41 (05-24-23 @ 06:10)-- unremarkable     5/23 BCX MRSA 3/4 bottles    MRSA PCR +     MASTER no vegetations   < from: CT Chest No Cont (05.29.23 @ 14:14) >  Diffusely increased number and size of bilateral cavitary lesions with   the left lower lobe now demonstrating a consolidative process of the   entirelobe.  Additional scattered bilateral ground glass opacities are noted. Findings   are compatible with progressing pneumonia.  WBC 16--> 3 12% Bands, Severe sepsis on admission  Doubt TB (lower lobe)  Acute hypoxemic resp failure requiring intubation  < from: CT Angio Chest PE Protocol w/ IV Cont (05.23.23 @ 22:06) >  1. Bilateral patchy groundglass nodularity with dominant confluent left   lower lobe opacification with numerous cavitary lesions. Additional   contralateral right lower lobe cavitary 1 cm nodule. Findings compatible   with cavitary pneumonia in the appropriate clinical setting; differential   diagnosis includes tuberculosis.  2. Confluent ill-defined left hilar and mediastinal adenopathy, likely   reactive, without dominant lymph node delineated.  3. No evidence of pulmonary embolism.  #Lactic acidosis  #LANCE  Creatinine, Serum: 3.0 mg/dL (06.01.23 @ 04:59)  #Hyponatremia      RECOMMENDATIONS  - CTS following  - Continue Vanc dosing per ID pharmacy   - ADD flagyl 500mg TID PO for anaerobic coverage   - Anticipate prolonged antibiotics course 4-6 weeks  - Guarded prognosis    If any questions, please call or send a message on [employer] Teams  Please continue to update ID with any pertinent new laboratory or radiographic findings  Spectra [phone]

## 2023-06-09 NOTE — PROGRESS NOTE ADULT - SUBJECTIVE AND OBJECTIVE BOX
Patient is a 42y old  Female who presents with a chief complaint of AHRF (09 Jun 2023 00:30)        Over Night Events:  Remains critically ill on MV.  Sedated.  off pressors.          ROS:     All ROS are negative except HPI         PHYSICAL EXAM    ICU Vital Signs Last 24 Hrs  T(C): 38.2 (09 Jun 2023 08:00), Max: 38.2 (09 Jun 2023 08:00)  T(F): 100.8 (09 Jun 2023 08:00), Max: 100.8 (09 Jun 2023 08:00)  HR: 97 (09 Jun 2023 08:00) (77 - 110)  BP: 128/71 (09 Jun 2023 08:00) (95/52 - 199/103)  BP(mean): 94 (09 Jun 2023 08:00) (70 - 138)  ABP: --  ABP(mean): --  RR: 16 (09 Jun 2023 08:00) (15 - 29)  SpO2: 96% (09 Jun 2023 08:00) (96% - 100%)    O2 Parameters below as of 09 Jun 2023 08:00  Patient On (Oxygen Delivery Method): ventilator    O2 Concentration (%): 40        CONSTITUTIONAL:  In   NAD    ENT:   Airway patent,   Mouth with normal mucosa.   ET     EYES:   Pupils equal,   Round and reactive to light.    CARDIAC:   Tachycardic   Regular rhythm.        RESPIRATORY:   No wheezing  Bilateral BS  Normal chest expansion  Not tachypneic,  No use of accessory muscles    GASTROINTESTINAL:  Abdomen soft,   Non-tender,   No guarding,   + BS    MUSCULOSKELETAL:   Range of motion is not limited,  No clubbing, cyanosis    NEUROLOGICAL:   Sedated     SKIN:   Skin normal color for race,   No evidence of rash.    PSYCHIATRIC:   No apparent risk to self or others.          06-08-23 @ 07:01  -  06-09-23 @ 07:00  --------------------------------------------------------  IN:    Dexmedetomidine: 832.8 mL    Enteral Tube Flush: 200 mL    FentaNYL: 111 mL    FentaNYL: 569.1 mL    Free Water: 300 mL    IV PiggyBack: 450 mL    Ketamine: 139.2 mL    Ketamine: 459 mL    Peptamen A.F.: 600 mL  Total IN: 3661.1 mL    OUT:    Chest Tube (mL): 45 mL    Rectal Tube (mL): 0 mL    Voided (mL): 4600 mL  Total OUT: 4645 mL    Total NET: -983.9 mL      06-09-23 @ 07:01  -  06-09-23 @ 08:34  --------------------------------------------------------  IN:    Dexmedetomidine: 34.8 mL    FentaNYL: 46.3 mL    Ketamine: 32.4 mL    Peptamen A.F.: 50 mL  Total IN: 163.5 mL    OUT:    Voided (mL): 600 mL  Total OUT: 600 mL    Total NET: -436.5 mL          LABS:                            8.4    14.33 )-----------( 542      ( 09 Jun 2023 04:50 )             27.9                                               06-09    144  |  111<H>  |  34<H>  ----------------------------<  104<H>  3.9   |  26  |  0.8    Ca    8.2<L>      09 Jun 2023 04:50  Mg     1.8     06-09    TPro  6.1  /  Alb  2.2<L>  /  TBili  0.3  /  DBili  x   /  AST  21  /  ALT  27  /  AlkPhos  91  06-09                                                                                           LIVER FUNCTIONS - ( 09 Jun 2023 04:50 )  Alb: 2.2 g/dL / Pro: 6.1 g/dL / ALK PHOS: 91 U/L / ALT: 27 U/L / AST: 21 U/L / GGT: x                                                                                               Mode: AC/ CMV (Assist Control/ Continuous Mandatory Ventilation)  RR (machine): 16  TV (machine): 400  FiO2: 40  PEEP: 8  ITime: 1  MAP: 13  PIP: 35                                      ABG - ( 09 Jun 2023 03:43 )  pH, Arterial: 7.40  pH, Blood: x     /  pCO2: 42    /  pO2: 85    / HCO3: 26    / Base Excess: 1.0   /  SaO2: 97.5               MEDICATIONS  (STANDING):  aMIOdarone    Tablet 200 milliGRAM(s) Oral two times a day  chlorhexidine 0.12% Liquid 15 milliLiter(s) Oral Mucosa every 12 hours  chlorhexidine 2% Cloths 1 Application(s) Topical daily  chlorhexidine 2% Cloths 1 Application(s) Topical <User Schedule>  dexMEDEtomidine Infusion 0.2 MICROgram(s)/kG/Hr (4.25 mL/Hr) IV Continuous <Continuous>  fentaNYL   Infusion. 0.509 MICROgram(s)/kG/Hr (4.72 mL/Hr) IV Continuous <Continuous>  furosemide   Injectable 40 milliGRAM(s) IV Push daily  insulin lispro (ADMELOG) corrective regimen sliding scale   SubCutaneous three times a day before meals  ketamine Infusion. 0.23 mG/kG/Hr (2.13 mL/Hr) IV Continuous <Continuous>  methylPREDNISolone sodium succinate Injectable 40 milliGRAM(s) IV Push daily  pantoprazole   Suspension 40 milliGRAM(s) Oral daily  polyethylene glycol 3350 17 Gram(s) Oral every 12 hours  senna 2 Tablet(s) Oral at bedtime  vancomycin  IVPB 1250 milliGRAM(s) IV Intermittent every 24 hours  vitamin A &amp; D Ointment 1 Application(s) Topical daily    MEDICATIONS  (PRN):  acetaminophen     Tablet .. 650 milliGRAM(s) Oral every 6 hours PRN Temp greater or equal to 38C (100.4F)      New X-rays reviewed:                                                                                  ECHO

## 2023-06-09 NOTE — PROGRESS NOTE ADULT - SUBJECTIVE AND OBJECTIVE BOX
GENERAL SURGERY PROGRESS NOTE    Patient: ZEYNEP ROSSI , 42y ([birth year])Female   MRN: [id number]  Location: [address]  Visit: 05-23-23 Inpatient  Date: 06-09-23 @ 00:31    Hospital Day #: 9    Procedure/Dx/Injuries: left pleural effusion s/p chest tube placement     Events of past 24 hours: CT to sx, Vent 40/8    PAST MEDICAL & SURGICAL HISTORY:      Vitals:   T(F): 99.8 (06-08-23 @ 20:00), Max: 99.8 (06-08-23 @ 20:00)  HR: 83 (06-08-23 @ 21:00)  BP: 105/59 (06-08-23 @ 21:00)  RR: 17 (06-08-23 @ 21:00)  SpO2: 98% (06-08-23 @ 21:00)  Mode: AC/ CMV (Assist Control/ Continuous Mandatory Ventilation), RR (machine): 16, TV (machine): 400, FiO2: 40, PEEP: 8, ITime: 1, MAP: 13, PIP: 24    Diet, NPO with Tube Feed:   Tube Feeding Modality: Orogastric  Peptamen Intense VHP  Total Volume for 24 Hours (mL): 1200  Continuous  Starting Tube Feed Rate mL per Hour: 20  Increase Tube Feed Rate by (mL): 20     Every 4 hours  Until Goal Tube Feed Rate (mL per Hour): 50  Tube Feed Duration (in Hours): 24  Tube Feed Start Time: 12:00  Free Water Flush   Total Volume per Flush (mL): 100   Frequency: Every 4 Hours  Diet, NPO after Midnight:      NPO Start Date: 30-May-2023,   NPO Start Time: 23:59  Except Medications      Fluids:     I & O's:    06-07-23 @ 07:01  -  06-08-23 @ 07:00  --------------------------------------------------------  IN:    Amiodarone: 50.1 mL    Dexmedetomidine: 830.4 mL    Enteral Tube Flush: 350 mL    FentaNYL: 352.3 mL    Free Water: 300 mL    IV PiggyBack: 400 mL    Ketamine: 454.6 mL    Peptamen A.F.: 850 mL  Total IN: 3587.4 mL    OUT:    Chest Tube (mL): 90 mL    Midazolam: 0 mL    Rectal Tube (mL): 250 mL    Voided (mL): 4600 mL  Total OUT: 4940 mL    Total NET: -1352.6 mL      PHYSICAL EXAM:  General: NAD, AAOx3, calm and cooperative  HEENT: NCAT, TRAM, EOMI, Trachea ML, Neck supple  Cardiac: RRR S1, S2, no Murmurs, rubs or gallops  Respiratory: CTAB, normal respiratory effort, breath sounds equal BL, no wheeze, rhonchi or crackles  Abdomen: Soft, non-distended, non-tender, no rebound, no guarding. +BS.  Musculoskeletal: Strength 5/5 BL UE/LE, ROM intact, compartments soft  Neuro: Sensation grossly intact and equal throughout, no focal deficits  Vascular: Pulses 2+ throughout, extremities well perfused  Skin: Warm/dry, normal color, no jaundice      MEDICATIONS  (STANDING):  aMIOdarone    Tablet 200 milliGRAM(s) Oral two times a day  chlorhexidine 0.12% Liquid 15 milliLiter(s) Oral Mucosa every 12 hours  chlorhexidine 2% Cloths 1 Application(s) Topical daily  chlorhexidine 2% Cloths 1 Application(s) Topical <User Schedule>  dexMEDEtomidine Infusion 0.2 MICROgram(s)/kG/Hr (4.25 mL/Hr) IV Continuous <Continuous>  fentaNYL   Infusion. 0.509 MICROgram(s)/kG/Hr (4.72 mL/Hr) IV Continuous <Continuous>  furosemide   Injectable 40 milliGRAM(s) IV Push daily  insulin lispro (ADMELOG) corrective regimen sliding scale   SubCutaneous three times a day before meals  ketamine Infusion. 0.23 mG/kG/Hr (2.13 mL/Hr) IV Continuous <Continuous>  methylPREDNISolone sodium succinate Injectable 40 milliGRAM(s) IV Push daily  pantoprazole   Suspension 40 milliGRAM(s) Oral daily  polyethylene glycol 3350 17 Gram(s) Oral every 12 hours  senna 2 Tablet(s) Oral at bedtime  vancomycin  IVPB 1250 milliGRAM(s) IV Intermittent every 24 hours  vitamin A &amp; D Ointment 1 Application(s) Topical daily    MEDICATIONS  (PRN):  acetaminophen     Tablet .. 650 milliGRAM(s) Oral every 6 hours PRN Temp greater or equal to 38C (100.4F)      DVT PROPHYLAXIS:   GI PROPHYLAXIS: pantoprazole   Suspension 40 milliGRAM(s) Oral daily    ANTICOAGULATION:   ANTIBIOTICS:  vancomycin  IVPB 1250 milliGRAM(s)            LAB/STUDIES:  Labs:  CAPILLARY BLOOD GLUCOSE      POCT Blood Glucose.: 94 mg/dL (08 Jun 2023 17:53)  POCT Blood Glucose.: 128 mg/dL (08 Jun 2023 12:23)  POCT Blood Glucose.: 132 mg/dL (08 Jun 2023 06:40)                          8.5    12.78 )-----------( 535      ( 08 Jun 2023 05:15 )             27.6       Auto Neutrophil %: 77.2 % (06-08-23 @ 04:40)  Auto Immature Granulocyte %: 1.0 % (06-08-23 @ 04:40)    06-08    141  |  112<H>  |  38<H>  ----------------------------<  103<H>  3.9   |  22  |  0.8      Calcium, Total Serum: 7.5 mg/dL (06-08-23 @ 04:40)      LFTs:             5.6  | 0.2  | 32       ------------------[83      ( 08 Jun 2023 04:40 )  2.1  | x    | 30          Lipase:x      Amylase:x         Blood Gas Arterial, Lactate: 1.50 mmol/L (06-08-23 @ 15:38)  Blood Gas Arterial, Lactate: 1.00 mmol/L (06-08-23 @ 03:24)  Blood Gas Arterial, Lactate: 0.80 mmol/L (06-07-23 @ 11:15)  Blood Gas Arterial, Lactate: 0.90 mmol/L (06-07-23 @ 02:55)  Blood Gas Arterial, Lactate: 0.80 mmol/L (06-06-23 @ 03:15)    ABG - ( 08 Jun 2023 15:38 )  pH: 7.43  /  pCO2: 41    /  pO2: 93    / HCO3: 27    / Base Excess: 2.6   /  SaO2: 97.6            ABG - ( 08 Jun 2023 03:24 )  pH: 7.46  /  pCO2: 35    /  pO2: 90    / HCO3: 25    / Base Excess: 1.4   /  SaO2: 99.7            ABG - ( 07 Jun 2023 11:15 )  pH: 7.46  /  pCO2: 33    /  pO2: 119   / HCO3: 24    / Base Excess: x     /  SaO2: 99.4        ASSESSMENT:  42y F w/ left pleural effusion, s/p chest tube placement, s/p tpa placement on 6/6 and 6/7    PLAN:  Chest tube to suction  Daily AM cxr  monitor Chest tube output  rest of the care per ICU team     spectra [id number]       GENERAL SURGERY PROGRESS NOTE    Patient: ZEYNEP ROSSI , 42y ([birth year])Female   MRN: [id number]  Location: [address]  Visit: 05-23-23 Inpatient  Date: 06-09-23 @ 00:31    Hospital Day #: 9    Procedure/Dx/Injuries: left pleural effusion s/p chest tube placement     Events of past 24 hours: CT to sx, Vent 40/8    PAST MEDICAL & SURGICAL HISTORY:      Vitals:   T(F): 99.8 (06-08-23 @ 20:00), Max: 99.8 (06-08-23 @ 20:00)  HR: 83 (06-08-23 @ 21:00)  BP: 105/59 (06-08-23 @ 21:00)  RR: 17 (06-08-23 @ 21:00)  SpO2: 98% (06-08-23 @ 21:00)  Mode: AC/ CMV (Assist Control/ Continuous Mandatory Ventilation), RR (machine): 16, TV (machine): 400, FiO2: 40, PEEP: 8, ITime: 1, MAP: 13, PIP: 24    Diet, NPO with Tube Feed:   Tube Feeding Modality: Orogastric  Peptamen Intense VHP  Total Volume for 24 Hours (mL): 1200  Continuous  Starting Tube Feed Rate mL per Hour: 20  Increase Tube Feed Rate by (mL): 20     Every 4 hours  Until Goal Tube Feed Rate (mL per Hour): 50  Tube Feed Duration (in Hours): 24  Tube Feed Start Time: 12:00  Free Water Flush   Total Volume per Flush (mL): 100   Frequency: Every 4 Hours  Diet, NPO after Midnight:      NPO Start Date: 30-May-2023,   NPO Start Time: 23:59  Except Medications      Fluids:     I & O's:    06-07-23 @ 07:01  -  06-08-23 @ 07:00  --------------------------------------------------------  IN:    Amiodarone: 50.1 mL    Dexmedetomidine: 830.4 mL    Enteral Tube Flush: 350 mL    FentaNYL: 352.3 mL    Free Water: 300 mL    IV PiggyBack: 400 mL    Ketamine: 454.6 mL    Peptamen A.F.: 850 mL  Total IN: 3587.4 mL    OUT:    Chest Tube (mL): 90 mL    Midazolam: 0 mL    Rectal Tube (mL): 250 mL    Voided (mL): 4600 mL  Total OUT: 4940 mL    Total NET: -1352.6 mL      PHYSICAL EXAM:  General: NAD, AAOx3, calm and cooperative  HEENT: NCAT, TRAM, EOMI, Trachea ML, Neck supple  Cardiac: RRR S1, S2, no Murmurs, rubs or gallops  Respiratory: CTAB, normal respiratory effort, breath sounds equal BL, no wheeze, rhonchi or crackles  Abdomen: Soft, non-distended, non-tender, no rebound, no guarding. +BS.  Musculoskeletal: Strength 5/5 BL UE/LE, ROM intact, compartments soft  Neuro: Sensation grossly intact and equal throughout, no focal deficits  Vascular: Pulses 2+ throughout, extremities well perfused  Skin: Warm/dry, normal color, no jaundice      MEDICATIONS  (STANDING):  aMIOdarone    Tablet 200 milliGRAM(s) Oral two times a day  chlorhexidine 0.12% Liquid 15 milliLiter(s) Oral Mucosa every 12 hours  chlorhexidine 2% Cloths 1 Application(s) Topical daily  chlorhexidine 2% Cloths 1 Application(s) Topical <User Schedule>  dexMEDEtomidine Infusion 0.2 MICROgram(s)/kG/Hr (4.25 mL/Hr) IV Continuous <Continuous>  fentaNYL   Infusion. 0.509 MICROgram(s)/kG/Hr (4.72 mL/Hr) IV Continuous <Continuous>  furosemide   Injectable 40 milliGRAM(s) IV Push daily  insulin lispro (ADMELOG) corrective regimen sliding scale   SubCutaneous three times a day before meals  ketamine Infusion. 0.23 mG/kG/Hr (2.13 mL/Hr) IV Continuous <Continuous>  methylPREDNISolone sodium succinate Injectable 40 milliGRAM(s) IV Push daily  pantoprazole   Suspension 40 milliGRAM(s) Oral daily  polyethylene glycol 3350 17 Gram(s) Oral every 12 hours  senna 2 Tablet(s) Oral at bedtime  vancomycin  IVPB 1250 milliGRAM(s) IV Intermittent every 24 hours  vitamin A &amp; D Ointment 1 Application(s) Topical daily    MEDICATIONS  (PRN):  acetaminophen     Tablet .. 650 milliGRAM(s) Oral every 6 hours PRN Temp greater or equal to 38C (100.4F)      DVT PROPHYLAXIS:   GI PROPHYLAXIS: pantoprazole   Suspension 40 milliGRAM(s) Oral daily    ANTICOAGULATION:   ANTIBIOTICS:  vancomycin  IVPB 1250 milliGRAM(s)            LAB/STUDIES:  Labs:  CAPILLARY BLOOD GLUCOSE  POCT Blood Glucose.: 94 mg/dL (08 Jun 2023 17:53)  POCT Blood Glucose.: 128 mg/dL (08 Jun 2023 12:23)  POCT Blood Glucose.: 132 mg/dL (08 Jun 2023 06:40)                          8.5    12.78 )-----------( 535      ( 08 Jun 2023 05:15 )             27.6       Auto Neutrophil %: 77.2 % (06-08-23 @ 04:40)  Auto Immature Granulocyte %: 1.0 % (06-08-23 @ 04:40)    06-08    141  |  112<H>  |  38<H>  ----------------------------<  103<H>  3.9   |  22  |  0.8      Calcium, Total Serum: 7.5 mg/dL (06-08-23 @ 04:40)      LFTs:             5.6  | 0.2  | 32       ------------------[83      ( 08 Jun 2023 04:40 )  2.1  | x    | 30          Lipase:x      Amylase:x        Blood Gas Arterial, Lactate: 1.50 mmol/L (06-08-23 @ 15:38)  Blood Gas Arterial, Lactate: 1.00 mmol/L (06-08-23 @ 03:24)  Blood Gas Arterial, Lactate: 0.80 mmol/L (06-07-23 @ 11:15)  Blood Gas Arterial, Lactate: 0.90 mmol/L (06-07-23 @ 02:55)  Blood Gas Arterial, Lactate: 0.80 mmol/L (06-06-23 @ 03:15)    ABG - ( 08 Jun 2023 15:38 )  pH: 7.43  /  pCO2: 41    /  pO2: 93    / HCO3: 27    / Base Excess: 2.6   /  SaO2: 97.6            ABG - ( 08 Jun 2023 03:24 )  pH: 7.46  /  pCO2: 35    /  pO2: 90    / HCO3: 25    / Base Excess: 1.4   /  SaO2: 99.7            ABG - ( 07 Jun 2023 11:15 )  pH: 7.46  /  pCO2: 33    /  pO2: 119   / HCO3: 24    / Base Excess: x     /  SaO2: 99.4        ASSESSMENT:  42y F w/ left pleural effusion, s/p chest tube placement, s/p tpa placement on 6/6 and 6/7    PLAN:  - Trach & PEG planning, possible Tuesday 6/13. Will follow up.  - Keep chest tube to suction  - CXR QAM  - Monitor chest tube output  - Care per primary team     [phone]       GENERAL SURGERY PROGRESS NOTE    Patient: ZEYNEP ROSSI , 42y ([birth year])Female   MRN: [id number]  Location: [address]  Visit: 05-23-23 Inpatient  Date: 06-09-23 @ 00:31    Hospital Day #: 9    Procedure/Dx/Injuries: left pleural effusion s/p chest tube placement     Events of past 24 hours: CT to sx, Vent 40/8    PAST MEDICAL & SURGICAL HISTORY:      Vitals:   T(F): 99.8 (06-08-23 @ 20:00), Max: 99.8 (06-08-23 @ 20:00)  HR: 83 (06-08-23 @ 21:00)  BP: 105/59 (06-08-23 @ 21:00)  RR: 17 (06-08-23 @ 21:00)  SpO2: 98% (06-08-23 @ 21:00)  Mode: AC/ CMV (Assist Control/ Continuous Mandatory Ventilation), RR (machine): 16, TV (machine): 400, FiO2: 40, PEEP: 8, ITime: 1, MAP: 13, PIP: 24    Diet, NPO with Tube Feed:   Tube Feeding Modality: Orogastric  Peptamen Intense VHP  Total Volume for 24 Hours (mL): 1200  Continuous  Starting Tube Feed Rate mL per Hour: 20  Increase Tube Feed Rate by (mL): 20     Every 4 hours  Until Goal Tube Feed Rate (mL per Hour): 50  Tube Feed Duration (in Hours): 24  Tube Feed Start Time: 12:00  Free Water Flush   Total Volume per Flush (mL): 100   Frequency: Every 4 Hours  Diet, NPO after Midnight:      NPO Start Date: 30-May-2023,   NPO Start Time: 23:59  Except Medications      Fluids:     I & O's:    06-07-23 @ 07:01  -  06-08-23 @ 07:00  --------------------------------------------------------  IN:    Amiodarone: 50.1 mL    Dexmedetomidine: 830.4 mL    Enteral Tube Flush: 350 mL    FentaNYL: 352.3 mL    Free Water: 300 mL    IV PiggyBack: 400 mL    Ketamine: 454.6 mL    Peptamen A.F.: 850 mL  Total IN: 3587.4 mL    OUT:    Chest Tube (mL): 90 mL    Midazolam: 0 mL    Rectal Tube (mL): 250 mL    Voided (mL): 4600 mL  Total OUT: 4940 mL    Total NET: -1352.6 mL      PHYSICAL EXAM:  General: NAD, AAOx3, calm and cooperative  HEENT: NCAT, TRAM, EOMI, Trachea ML, Neck supple  Cardiac: RRR S1, S2, no Murmurs, rubs or gallops  Respiratory: CTAB, normal respiratory effort, breath sounds equal BL, no wheeze, rhonchi or crackles  Abdomen: Soft, non-distended, non-tender, no rebound, no guarding. +BS.  Musculoskeletal: Strength 5/5 BL UE/LE, ROM intact, compartments soft  Neuro: Sensation grossly intact and equal throughout, no focal deficits  Vascular: Pulses 2+ throughout, extremities well perfused  Skin: Warm/dry, normal color, no jaundice      MEDICATIONS  (STANDING):  aMIOdarone    Tablet 200 milliGRAM(s) Oral two times a day  chlorhexidine 0.12% Liquid 15 milliLiter(s) Oral Mucosa every 12 hours  chlorhexidine 2% Cloths 1 Application(s) Topical daily  chlorhexidine 2% Cloths 1 Application(s) Topical <User Schedule>  dexMEDEtomidine Infusion 0.2 MICROgram(s)/kG/Hr (4.25 mL/Hr) IV Continuous <Continuous>  fentaNYL   Infusion. 0.509 MICROgram(s)/kG/Hr (4.72 mL/Hr) IV Continuous <Continuous>  furosemide   Injectable 40 milliGRAM(s) IV Push daily  insulin lispro (ADMELOG) corrective regimen sliding scale   SubCutaneous three times a day before meals  ketamine Infusion. 0.23 mG/kG/Hr (2.13 mL/Hr) IV Continuous <Continuous>  methylPREDNISolone sodium succinate Injectable 40 milliGRAM(s) IV Push daily  pantoprazole   Suspension 40 milliGRAM(s) Oral daily  polyethylene glycol 3350 17 Gram(s) Oral every 12 hours  senna 2 Tablet(s) Oral at bedtime  vancomycin  IVPB 1250 milliGRAM(s) IV Intermittent every 24 hours  vitamin A &amp; D Ointment 1 Application(s) Topical daily    MEDICATIONS  (PRN):  acetaminophen     Tablet .. 650 milliGRAM(s) Oral every 6 hours PRN Temp greater or equal to 38C (100.4F)      DVT PROPHYLAXIS:   GI PROPHYLAXIS: pantoprazole   Suspension 40 milliGRAM(s) Oral daily    ANTICOAGULATION:   ANTIBIOTICS:  vancomycin  IVPB 1250 milliGRAM(s)            LAB/STUDIES:  Labs:  CAPILLARY BLOOD GLUCOSE  POCT Blood Glucose.: 94 mg/dL (08 Jun 2023 17:53)  POCT Blood Glucose.: 128 mg/dL (08 Jun 2023 12:23)  POCT Blood Glucose.: 132 mg/dL (08 Jun 2023 06:40)                          8.5    12.78 )-----------( 535      ( 08 Jun 2023 05:15 )             27.6       Auto Neutrophil %: 77.2 % (06-08-23 @ 04:40)  Auto Immature Granulocyte %: 1.0 % (06-08-23 @ 04:40)    06-08    141  |  112<H>  |  38<H>  ----------------------------<  103<H>  3.9   |  22  |  0.8      Calcium, Total Serum: 7.5 mg/dL (06-08-23 @ 04:40)      LFTs:             5.6  | 0.2  | 32       ------------------[83      ( 08 Jun 2023 04:40 )  2.1  | x    | 30          Lipase:x      Amylase:x        Blood Gas Arterial, Lactate: 1.50 mmol/L (06-08-23 @ 15:38)  Blood Gas Arterial, Lactate: 1.00 mmol/L (06-08-23 @ 03:24)  Blood Gas Arterial, Lactate: 0.80 mmol/L (06-07-23 @ 11:15)  Blood Gas Arterial, Lactate: 0.90 mmol/L (06-07-23 @ 02:55)  Blood Gas Arterial, Lactate: 0.80 mmol/L (06-06-23 @ 03:15)    ABG - ( 08 Jun 2023 15:38 )  pH: 7.43  /  pCO2: 41    /  pO2: 93    / HCO3: 27    / Base Excess: 2.6   /  SaO2: 97.6        ABG - ( 08 Jun 2023 03:24 )  pH: 7.46  /  pCO2: 35    /  pO2: 90    / HCO3: 25    / Base Excess: 1.4   /  SaO2: 99.7        ABG - ( 07 Jun 2023 11:15 )  pH: 7.46  /  pCO2: 33    /  pO2: 119   / HCO3: 24    / Base Excess: x     /  SaO2: 99.4        ASSESSMENT:  42y F w/ left pleural effusion, s/p chest tube placement, s/p tpa placement on 6/6 and 6/7    PLAN:  - Trach & PEG planning, possible Tuesday 6/13. Plan for preop on Monday, 6/12. Will follow up.  - Keep chest tube to suction  - AM CXR daily  - Monitor chest tube output  - Care per primary team     [phone]

## 2023-06-09 NOTE — PROGRESS NOTE ADULT - ASSESSMENT
42 year old female with PMHx Asthma, HTN presents with cough and SOB x3 days and hemoptysis. CT chest showing cavitary pneumonia. Cultures +MRSA bacteremia. Intubated; failed SBT. On Zyvox and Tamiflu. Repeat CT showing worsening PNA.    # Acute hypoxemic respiratory failure 2/2 cavitary MRSA PNA  # MRSA bacteremia, repeat cultures NGTD x3  # +Influenza B  # hx Asthma   - CT Angio Chest PE Protocol: Bilateral patchy groundglass nodularity with dominant confluent left lower lobe opacification with numerous cavitary lesions. Additional contralateral right lower lobe cavitary 1 cm nodule. Findings compatible with cavitary pneumonia in the appropriate clinical setting; differential diagnosis includes tuberculosis.  - Strep/Legionella negative, HIV negative   - intubated 5/23, self-extubated 5/25, re-intubated 5/25  - s/p Mupirocin BID x5 days  - blood cultures +MRSA  - blood cultures 5/28-30: NGTD x3  - ECHO: no vegetations. EF 65%, mild TR and pulm HTN  - repeat CT chest: Diffusely increased number and size of bilateral cavitary lesions with the left lower lobe now demonstrating a consolidative process of the entirelobe.  - D-dimer 1665, LE duplex negative for DVT  - pro-mya 48 > 5.8  - s/p MASTER 5/31: No evidence of valvular vegetations or intracardiac abscesses to support IE. Pulmonary hepatisation incidental finding.   - s/p Tamiflu 30mg q12  - c/w Vancomycin (dosing per pharmacy) and Meropenem 1g q8 (last dose 6/8)  - PCR skin scraping negative for HSV --> d/c Acyclovir   - repeat ET cultures negative   - repeat CT chest: Small left pleural effusion with overall essentially unchanged exam.  - s/p chest tube placement connected to suction --> tPA 6/6 and 6/7  - c/w 40mg Lasix daily   - d/c Solumedrol 40mg qd  - SBT/SAT  - CT surgery for trach/PEG; possible Tuesday 6/13. Plan for preop on Monday, 6/12  - c/w chest tube to suction  - start Flagyl 500mg q8 for anaerobic coverage   - start Propofol and wean fentanyl   - trend triglycerides daily     # New onset A-fib w/ RVR  - s/p Amio drip  - EP consulted  - start Amio 200mg BID for 2 weeks --> then Amio 200mg daily     # LANCE, resolved   - Nephro following; post-infectious GN vs. ATN vs. pulmonary renal syndrome  - Cr 3 > 0.7  - renal U/S: negative   - CK trending down 1483>39>41  - UA: +proteins and blood, Pr/Cr 1.3 (H)  - CHARLES+ 1:640, hep/HIV negative  - no acute indication for RRT    # Anemia  - trend CBC, active type and screen, transfuse <7  - DIC panel negative   - Dimer 1665, LE Duplex negative for DVT  - hold heparin drip. SCDs  - s/p 3u pRBCs; Hgb stable around 7/8  - CTAP: r/o retroperitoneal bleed    #DVT ppx: SCDs  #GI ppx: Pantoprazole 40mg qd  #Diet: NPO w/ tube feed w/ Reglan  #Activity: bedrest   #Dispo: MICU

## 2023-06-10 LAB
ALBUMIN SERPL ELPH-MCNC: 2.2 G/DL — LOW (ref 3.5–5.2)
ALP SERPL-CCNC: 92 U/L — SIGNIFICANT CHANGE UP (ref 30–115)
ALT FLD-CCNC: 23 U/L — SIGNIFICANT CHANGE UP (ref 0–41)
ANION GAP SERPL CALC-SCNC: 8 MMOL/L — SIGNIFICANT CHANGE UP (ref 7–14)
AST SERPL-CCNC: 21 U/L — SIGNIFICANT CHANGE UP (ref 0–41)
BASE EXCESS BLDA CALC-SCNC: 2.9 MMOL/L — SIGNIFICANT CHANGE UP (ref -2–3)
BASOPHILS # BLD AUTO: 0.05 K/UL — SIGNIFICANT CHANGE UP (ref 0–0.2)
BASOPHILS NFR BLD AUTO: 0.4 % — SIGNIFICANT CHANGE UP (ref 0–1)
BILIRUB SERPL-MCNC: <0.2 MG/DL — SIGNIFICANT CHANGE UP (ref 0.2–1.2)
BUN SERPL-MCNC: 32 MG/DL — HIGH (ref 10–20)
CALCIUM SERPL-MCNC: 8.2 MG/DL — LOW (ref 8.4–10.5)
CHLORIDE SERPL-SCNC: 109 MMOL/L — SIGNIFICANT CHANGE UP (ref 98–110)
CO2 SERPL-SCNC: 24 MMOL/L — SIGNIFICANT CHANGE UP (ref 17–32)
CREAT SERPL-MCNC: 0.7 MG/DL — SIGNIFICANT CHANGE UP (ref 0.7–1.5)
EGFR: 111 ML/MIN/1.73M2 — SIGNIFICANT CHANGE UP
EOSINOPHIL # BLD AUTO: 0.25 K/UL — SIGNIFICANT CHANGE UP (ref 0–0.7)
EOSINOPHIL NFR BLD AUTO: 2.2 % — SIGNIFICANT CHANGE UP (ref 0–8)
GLUCOSE BLDC GLUCOMTR-MCNC: 106 MG/DL — HIGH (ref 70–99)
GLUCOSE BLDC GLUCOMTR-MCNC: 114 MG/DL — HIGH (ref 70–99)
GLUCOSE BLDC GLUCOMTR-MCNC: 99 MG/DL — SIGNIFICANT CHANGE UP (ref 70–99)
GLUCOSE BLDC GLUCOMTR-MCNC: 99 MG/DL — SIGNIFICANT CHANGE UP (ref 70–99)
GLUCOSE SERPL-MCNC: 100 MG/DL — HIGH (ref 70–99)
HCO3 BLDA-SCNC: 28 MMOL/L — SIGNIFICANT CHANGE UP (ref 21–28)
HCT VFR BLD CALC: 26.3 % — LOW (ref 37–47)
HGB BLD-MCNC: 7.9 G/DL — LOW (ref 12–16)
HOROWITZ INDEX BLDA+IHG-RTO: 40 — SIGNIFICANT CHANGE UP
IMM GRANULOCYTES NFR BLD AUTO: 0.9 % — HIGH (ref 0.1–0.3)
LYMPHOCYTES # BLD AUTO: 1.84 K/UL — SIGNIFICANT CHANGE UP (ref 1.2–3.4)
LYMPHOCYTES # BLD AUTO: 16.2 % — LOW (ref 20.5–51.1)
MAGNESIUM SERPL-MCNC: 1.4 MG/DL — LOW (ref 1.8–2.4)
MCHC RBC-ENTMCNC: 22.3 PG — LOW (ref 27–31)
MCHC RBC-ENTMCNC: 30 G/DL — LOW (ref 32–37)
MCV RBC AUTO: 74.1 FL — LOW (ref 81–99)
MONOCYTES # BLD AUTO: 0.88 K/UL — HIGH (ref 0.1–0.6)
MONOCYTES NFR BLD AUTO: 7.7 % — SIGNIFICANT CHANGE UP (ref 1.7–9.3)
NEUTROPHILS # BLD AUTO: 8.27 K/UL — HIGH (ref 1.4–6.5)
NEUTROPHILS NFR BLD AUTO: 72.6 % — SIGNIFICANT CHANGE UP (ref 42.2–75.2)
NRBC # BLD: 0 /100 WBCS — SIGNIFICANT CHANGE UP (ref 0–0)
PCO2 BLDA: 43 MMHG — SIGNIFICANT CHANGE UP (ref 25–48)
PH BLDA: 7.42 — SIGNIFICANT CHANGE UP (ref 7.35–7.45)
PLATELET # BLD AUTO: 482 K/UL — HIGH (ref 130–400)
PMV BLD: 9.5 FL — SIGNIFICANT CHANGE UP (ref 7.4–10.4)
PO2 BLDA: 110 MMHG — HIGH (ref 83–108)
POTASSIUM SERPL-MCNC: 3.9 MMOL/L — SIGNIFICANT CHANGE UP (ref 3.5–5)
POTASSIUM SERPL-SCNC: 3.9 MMOL/L — SIGNIFICANT CHANGE UP (ref 3.5–5)
PROT SERPL-MCNC: 5.8 G/DL — LOW (ref 6–8)
RBC # BLD: 3.55 M/UL — LOW (ref 4.2–5.4)
RBC # FLD: 28.2 % — HIGH (ref 11.5–14.5)
SAO2 % BLDA: 99.1 % — HIGH (ref 94–98)
SODIUM SERPL-SCNC: 141 MMOL/L — SIGNIFICANT CHANGE UP (ref 135–146)
TRIGL SERPL-MCNC: 219 MG/DL — HIGH
WBC # BLD: 11.39 K/UL — HIGH (ref 4.8–10.8)
WBC # FLD AUTO: 11.39 K/UL — HIGH (ref 4.8–10.8)

## 2023-06-10 PROCEDURE — 71045 X-RAY EXAM CHEST 1 VIEW: CPT | Mod: 26

## 2023-06-10 PROCEDURE — 99291 CRITICAL CARE FIRST HOUR: CPT

## 2023-06-10 RX ORDER — MAGNESIUM SULFATE 500 MG/ML
2 VIAL (ML) INJECTION ONCE
Refills: 0 | Status: COMPLETED | OUTPATIENT
Start: 2023-06-10 | End: 2023-06-10

## 2023-06-10 RX ADMIN — AMIODARONE HYDROCHLORIDE 200 MILLIGRAM(S): 400 TABLET ORAL at 06:02

## 2023-06-10 RX ADMIN — KETAMINE HYDROCHLORIDE 2.13 MG/KG/HR: 100 INJECTION INTRAMUSCULAR; INTRAVENOUS at 06:16

## 2023-06-10 RX ADMIN — Medication 500 MILLIGRAM(S): at 13:20

## 2023-06-10 RX ADMIN — Medication 25 GRAM(S): at 09:10

## 2023-06-10 RX ADMIN — AMIODARONE HYDROCHLORIDE 200 MILLIGRAM(S): 400 TABLET ORAL at 17:02

## 2023-06-10 RX ADMIN — PANTOPRAZOLE SODIUM 40 MILLIGRAM(S): 20 TABLET, DELAYED RELEASE ORAL at 12:01

## 2023-06-10 RX ADMIN — CHLORHEXIDINE GLUCONATE 15 MILLILITER(S): 213 SOLUTION TOPICAL at 17:02

## 2023-06-10 RX ADMIN — KETAMINE HYDROCHLORIDE 2.13 MG/KG/HR: 100 INJECTION INTRAMUSCULAR; INTRAVENOUS at 00:43

## 2023-06-10 RX ADMIN — DEXMEDETOMIDINE HYDROCHLORIDE IN 0.9% SODIUM CHLORIDE 4.25 MICROGRAM(S)/KG/HR: 4 INJECTION INTRAVENOUS at 00:45

## 2023-06-10 RX ADMIN — CHLORHEXIDINE GLUCONATE 1 APPLICATION(S): 213 SOLUTION TOPICAL at 06:04

## 2023-06-10 RX ADMIN — FENTANYL CITRATE 4.72 MICROGRAM(S)/KG/HR: 50 INJECTION INTRAVENOUS at 06:03

## 2023-06-10 RX ADMIN — DEXMEDETOMIDINE HYDROCHLORIDE IN 0.9% SODIUM CHLORIDE 4.25 MICROGRAM(S)/KG/HR: 4 INJECTION INTRAVENOUS at 04:07

## 2023-06-10 RX ADMIN — Medication 500 MILLIGRAM(S): at 06:16

## 2023-06-10 RX ADMIN — DEXMEDETOMIDINE HYDROCHLORIDE IN 0.9% SODIUM CHLORIDE 4.25 MICROGRAM(S)/KG/HR: 4 INJECTION INTRAVENOUS at 06:08

## 2023-06-10 RX ADMIN — PROPOFOL 11.1 MICROGRAM(S)/KG/MIN: 10 INJECTION, EMULSION INTRAVENOUS at 04:08

## 2023-06-10 RX ADMIN — CHLORHEXIDINE GLUCONATE 15 MILLILITER(S): 213 SOLUTION TOPICAL at 06:03

## 2023-06-10 RX ADMIN — Medication 40 MILLIGRAM(S): at 06:03

## 2023-06-10 RX ADMIN — Medication 166.67 MILLIGRAM(S): at 12:01

## 2023-06-10 RX ADMIN — Medication 1 APPLICATION(S): at 13:16

## 2023-06-10 RX ADMIN — Medication 500 MILLIGRAM(S): at 22:05

## 2023-06-10 RX ADMIN — PROPOFOL 11.1 MICROGRAM(S)/KG/MIN: 10 INJECTION, EMULSION INTRAVENOUS at 06:08

## 2023-06-10 NOTE — PROGRESS NOTE ADULT - SUBJECTIVE AND OBJECTIVE BOX
GENERAL SURGERY PROGRESS NOTE    Patient: ZEYNEP ROSSI , 42y ([birth year])Female   MRN: [id number]  Location: [address]  Visit: 05-23-23 Inpatient  Date: 06-10-23 @ 00:22    Hospital Day #10    Procedure/Dx/Injuries: left pleural effusion s/p chest tube placement     Events of past 24 hours: CT to sx, no airleak, 40/8, GTT ketamine, precedex, fentanyl, TF @ 50, Ct output 10cc over 24hrs    PAST MEDICAL & SURGICAL HISTORY:      Vitals:   T(F): 98.5 (06-09-23 @ 20:00), Max: 101 (06-09-23 @ 11:00)  HR: 78 (06-09-23 @ 23:13)  BP: 93/52 (06-09-23 @ 23:13)  RR: 23 (06-09-23 @ 23:13)  SpO2: 98% (06-09-23 @ 23:13)  Mode: AC/ CMV (Assist Control/ Continuous Mandatory Ventilation), RR (machine): 16, TV (machine): 400, FiO2: 40, PEEP: 8, MAP: 14, PIP: 29    Diet, NPO with Tube Feed:   Tube Feeding Modality: Orogastric  Peptamen Intense VHP  Total Volume for 24 Hours (mL): 1200  Continuous  Starting Tube Feed Rate mL per Hour: 20  Increase Tube Feed Rate by (mL): 20     Every 4 hours  Until Goal Tube Feed Rate (mL per Hour): 50  Tube Feed Duration (in Hours): 24  Tube Feed Start Time: 12:00  Free Water Flush   Total Volume per Flush (mL): 100   Frequency: Every 4 Hours  Diet, NPO after Midnight:      NPO Start Date: 30-May-2023,   NPO Start Time: 23:59  Except Medications      Fluids:     I & O's:    06-08-23 @ 07:01  -  06-09-23 @ 07:00  --------------------------------------------------------  IN:    Dexmedetomidine: 832.8 mL    Enteral Tube Flush: 200 mL    FentaNYL: 111 mL    FentaNYL: 569.1 mL    Free Water: 300 mL    IV PiggyBack: 450 mL    Ketamine: 139.2 mL    Ketamine: 459 mL    Peptamen A.F.: 600 mL  Total IN: 3661.1 mL    OUT:    Chest Tube (mL): 45 mL    Rectal Tube (mL): 0 mL    Voided (mL): 4600 mL  Total OUT: 4645 mL    Total NET: -983.9 mL    PHYSICAL EXAM:  General: NAD, AAOx3, calm and cooperative  HEENT: NCAT, TRAM, EOMI, Trachea ML, Neck supple  Cardiac: RRR S1, S2, no Murmurs, rubs or gallops  Respiratory: CTAB, normal respiratory effort, breath sounds equal BL, no wheeze, rhonchi or crackles  Abdomen: Soft, non-distended, non-tender, no rebound, no guarding. +BS.  Musculoskeletal: Strength 5/5 BL UE/LE, ROM intact, compartments soft  Neuro: Sensation grossly intact and equal throughout, no focal deficits  Vascular: Pulses 2+ throughout, extremities well perfused  Skin: Warm/dry, normal color, no jaundice      MEDICATIONS  (STANDING):  aMIOdarone    Tablet 200 milliGRAM(s) Oral two times a day  chlorhexidine 0.12% Liquid 15 milliLiter(s) Oral Mucosa every 12 hours  chlorhexidine 2% Cloths 1 Application(s) Topical <User Schedule>  chlorhexidine 2% Cloths 1 Application(s) Topical daily  dexMEDEtomidine Infusion 0.2 MICROgram(s)/kG/Hr (4.25 mL/Hr) IV Continuous <Continuous>  fentaNYL   Infusion. 0.509 MICROgram(s)/kG/Hr (4.72 mL/Hr) IV Continuous <Continuous>  furosemide   Injectable 40 milliGRAM(s) IV Push daily  insulin lispro (ADMELOG) corrective regimen sliding scale   SubCutaneous three times a day before meals  ketamine Infusion. 0.23 mG/kG/Hr (2.13 mL/Hr) IV Continuous <Continuous>  metroNIDAZOLE    Tablet 500 milliGRAM(s) Oral every 8 hours  pantoprazole   Suspension 40 milliGRAM(s) Oral daily  polyethylene glycol 3350 17 Gram(s) Oral every 12 hours  propofol Infusion 19.957 MICROgram(s)/kG/Min (11.1 mL/Hr) IV Continuous <Continuous>  senna 2 Tablet(s) Oral at bedtime  vancomycin  IVPB 1250 milliGRAM(s) IV Intermittent every 24 hours  vitamin A &amp; D Ointment 1 Application(s) Topical daily    MEDICATIONS  (PRN):  acetaminophen     Tablet .. 650 milliGRAM(s) Oral every 6 hours PRN Temp greater or equal to 38C (100.4F)      DVT PROPHYLAXIS:   GI PROPHYLAXIS: pantoprazole   Suspension 40 milliGRAM(s) Oral daily    ANTICOAGULATION:   ANTIBIOTICS:  metroNIDAZOLE    Tablet 500 milliGRAM(s)  vancomycin  IVPB 1250 milliGRAM(s)            LAB/STUDIES:  Labs:  CAPILLARY BLOOD GLUCOSE      POCT Blood Glucose.: 108 mg/dL (09 Jun 2023 17:25)  POCT Blood Glucose.: 124 mg/dL (09 Jun 2023 12:02)  POCT Blood Glucose.: 120 mg/dL (09 Jun 2023 08:05)                          8.4    14.33 )-----------( 542      ( 09 Jun 2023 04:50 )             27.9       Auto Neutrophil %: 73.2 % (06-09-23 @ 04:50)  Auto Immature Granulocyte %: 1.0 % (06-09-23 @ 04:50)    06-09    144  |  111<H>  |  34<H>  ----------------------------<  104<H>  3.9   |  26  |  0.8      Calcium, Total Serum: 8.2 mg/dL (06-09-23 @ 04:50)      LFTs:             6.1  | 0.3  | 21       ------------------[91      ( 09 Jun 2023 04:50 )  2.2  | x    | 27          Lipase:x      Amylase:x         Blood Gas Arterial, Lactate: 0.70 mmol/L (06-09-23 @ 03:43)  Blood Gas Arterial, Lactate: 1.50 mmol/L (06-08-23 @ 15:38)  Blood Gas Arterial, Lactate: 1.00 mmol/L (06-08-23 @ 03:24)  Blood Gas Arterial, Lactate: 0.80 mmol/L (06-07-23 @ 11:15)  Blood Gas Arterial, Lactate: 0.90 mmol/L (06-07-23 @ 02:55)    ABG - ( 09 Jun 2023 03:43 )  pH: 7.40  /  pCO2: 42    /  pO2: 85    / HCO3: 26    / Base Excess: 1.0   /  SaO2: 97.5            ABG - ( 08 Jun 2023 15:38 )  pH: 7.43  /  pCO2: 41    /  pO2: 93    / HCO3: 27    / Base Excess: 2.6   /  SaO2: 97.6            ABG - ( 08 Jun 2023 03:24 )  pH: 7.46  /  pCO2: 35    /  pO2: 90    / HCO3: 25    / Base Excess: 1.4   /  SaO2: 99.7          ASSESSMENT:  42y F w/ left pleural effusion, s/p chest tube placement, s/p tpa placement on 6/6 and 6/7    PLAN:  - Trach & PEG planning, possible Tuesday 6/13. Plan for preop on Monday, 6/12. Will follow up.  - Keep chest tube to suction  - AM CXR daily  - Monitor chest tube output  - Care per primary team     [phone]       GENERAL SURGERY PROGRESS NOTE    Patient: ZEYNEP ROSSI , 42y ([birth year])Female   MRN: [id number]  Location: [address]  Visit: 05-23-23 Inpatient  Date: 06-10-23 @ 00:22    Hospital Day #10    Procedure/Dx/Injuries: left pleural effusion s/p chest tube placement     Events of past 24 hours: CT to sx, no airleak, 40/8, GTT ketamine, precedex, fentanyl, TF @ 50, Ct output 10cc over 24hrs    PAST MEDICAL & SURGICAL HISTORY:      Vitals:   T(F): 98.5 (06-09-23 @ 20:00), Max: 101 (06-09-23 @ 11:00)  HR: 78 (06-09-23 @ 23:13)  BP: 93/52 (06-09-23 @ 23:13)  RR: 23 (06-09-23 @ 23:13)  SpO2: 98% (06-09-23 @ 23:13)  Mode: AC/ CMV (Assist Control/ Continuous Mandatory Ventilation), RR (machine): 16, TV (machine): 400, FiO2: 40, PEEP: 8, MAP: 14, PIP: 29    Diet, NPO with Tube Feed:   Tube Feeding Modality: Orogastric  Peptamen Intense VHP  Total Volume for 24 Hours (mL): 1200  Continuous  Starting Tube Feed Rate mL per Hour: 20  Increase Tube Feed Rate by (mL): 20     Every 4 hours  Until Goal Tube Feed Rate (mL per Hour): 50  Tube Feed Duration (in Hours): 24  Tube Feed Start Time: 12:00  Free Water Flush   Total Volume per Flush (mL): 100   Frequency: Every 4 Hours  Diet, NPO after Midnight:      NPO Start Date: 30-May-2023,   NPO Start Time: 23:59  Except Medications      Fluids:     I & O's:    06-08-23 @ 07:01  -  06-09-23 @ 07:00  --------------------------------------------------------  IN:    Dexmedetomidine: 832.8 mL    Enteral Tube Flush: 200 mL    FentaNYL: 111 mL    FentaNYL: 569.1 mL    Free Water: 300 mL    IV PiggyBack: 450 mL    Ketamine: 139.2 mL    Ketamine: 459 mL    Peptamen A.F.: 600 mL  Total IN: 3661.1 mL    OUT:    Chest Tube (mL): 45 mL    Rectal Tube (mL): 0 mL    Voided (mL): 4600 mL  Total OUT: 4645 mL    Total NET: -983.9 mL    PHYSICAL EXAM:  General: Sedated, intubated  HEENT: NCAT, TRAM, EOMI, Trachea ML, Neck supple  Cardiac: RRR S1, S2, no Murmurs, rubs or gallops  Respiratory: Ventilated. B/L equal chest rise  Abdomen: Non-distended  Musculoskeletal: Compartments soft  Vascular: Extremities well perfused  Skin: Warm/dry, normal color, no jaundice      MEDICATIONS  (STANDING):  aMIOdarone    Tablet 200 milliGRAM(s) Oral two times a day  chlorhexidine 0.12% Liquid 15 milliLiter(s) Oral Mucosa every 12 hours  chlorhexidine 2% Cloths 1 Application(s) Topical <User Schedule>  chlorhexidine 2% Cloths 1 Application(s) Topical daily  dexMEDEtomidine Infusion 0.2 MICROgram(s)/kG/Hr (4.25 mL/Hr) IV Continuous <Continuous>  fentaNYL   Infusion. 0.509 MICROgram(s)/kG/Hr (4.72 mL/Hr) IV Continuous <Continuous>  furosemide   Injectable 40 milliGRAM(s) IV Push daily  insulin lispro (ADMELOG) corrective regimen sliding scale   SubCutaneous three times a day before meals  ketamine Infusion. 0.23 mG/kG/Hr (2.13 mL/Hr) IV Continuous <Continuous>  metroNIDAZOLE    Tablet 500 milliGRAM(s) Oral every 8 hours  pantoprazole   Suspension 40 milliGRAM(s) Oral daily  polyethylene glycol 3350 17 Gram(s) Oral every 12 hours  propofol Infusion 19.957 MICROgram(s)/kG/Min (11.1 mL/Hr) IV Continuous <Continuous>  senna 2 Tablet(s) Oral at bedtime  vancomycin  IVPB 1250 milliGRAM(s) IV Intermittent every 24 hours  vitamin A &amp; D Ointment 1 Application(s) Topical daily    MEDICATIONS  (PRN):  acetaminophen     Tablet .. 650 milliGRAM(s) Oral every 6 hours PRN Temp greater or equal to 38C (100.4F)      DVT PROPHYLAXIS:   GI PROPHYLAXIS: pantoprazole   Suspension 40 milliGRAM(s) Oral daily    ANTICOAGULATION:   ANTIBIOTICS:  metroNIDAZOLE    Tablet 500 milliGRAM(s)  vancomycin  IVPB 1250 milliGRAM(s)            LAB/STUDIES:  Labs:  CAPILLARY BLOOD GLUCOSE      POCT Blood Glucose.: 108 mg/dL (09 Jun 2023 17:25)  POCT Blood Glucose.: 124 mg/dL (09 Jun 2023 12:02)  POCT Blood Glucose.: 120 mg/dL (09 Jun 2023 08:05)                          8.4    14.33 )-----------( 542      ( 09 Jun 2023 04:50 )             27.9       Auto Neutrophil %: 73.2 % (06-09-23 @ 04:50)  Auto Immature Granulocyte %: 1.0 % (06-09-23 @ 04:50)    06-09    144  |  111<H>  |  34<H>  ----------------------------<  104<H>  3.9   |  26  |  0.8      Calcium, Total Serum: 8.2 mg/dL (06-09-23 @ 04:50)      LFTs:             6.1  | 0.3  | 21       ------------------[91      ( 09 Jun 2023 04:50 )  2.2  | x    | 27          Lipase:x      Amylase:x         Blood Gas Arterial, Lactate: 0.70 mmol/L (06-09-23 @ 03:43)  Blood Gas Arterial, Lactate: 1.50 mmol/L (06-08-23 @ 15:38)  Blood Gas Arterial, Lactate: 1.00 mmol/L (06-08-23 @ 03:24)  Blood Gas Arterial, Lactate: 0.80 mmol/L (06-07-23 @ 11:15)  Blood Gas Arterial, Lactate: 0.90 mmol/L (06-07-23 @ 02:55)    ABG - ( 09 Jun 2023 03:43 )  pH: 7.40  /  pCO2: 42    /  pO2: 85    / HCO3: 26    / Base Excess: 1.0   /  SaO2: 97.5            ABG - ( 08 Jun 2023 15:38 )  pH: 7.43  /  pCO2: 41    /  pO2: 93    / HCO3: 27    / Base Excess: 2.6   /  SaO2: 97.6            ABG - ( 08 Jun 2023 03:24 )  pH: 7.46  /  pCO2: 35    /  pO2: 90    / HCO3: 25    / Base Excess: 1.4   /  SaO2: 99.7          ASSESSMENT:  42y F w/ left pleural effusion, s/p chest tube placement, s/p tpa placement on 6/6 and 6/7    PLAN:  - Trach & PEG planning, possible Tuesday 6/13. Plan for preop on Monday, 6/12. Will follow up.  - Keep chest tube to suction  - AM CXR daily  - Monitor chest tube output  - Care per primary team     [phone]

## 2023-06-10 NOTE — PROGRESS NOTE ADULT - SUBJECTIVE AND OBJECTIVE BOX
Patient is a 42y old  Female who presents with a chief complaint of AHRF (10 Sidney 2023 08:51)    HPI:  42 year-old female with PMH of Asthma, HTN? presents with complaint of cough x3 days and SOB. During my encounter pt with dyspnea and increased work of breathing and chest pain, unable to properly provide accurate history. She states that her cough has been progressively worsening over the past 3 days, endorses hemoptysis since yesterday. She states she had a friend who came over and stayed with for more than a week who was sick recently, but she does not know if she recently travelled out of [facility] or not. She also admits that her children has been sick with Sore throat and fever for past 3 days. She admits to fever, chills, sever Chest pain which has gotten worse since she came to the ED. She took 1x Tamiflu yesterday. She otherwise denies diarrhea, constipation, urinary symptoms. She is not vaccinated for Influenza or COVID-19.     In ED  /87, , RR 28, T 100.8 F, 98% on 15 L NRB  Labs significant for wbc 16.7K with Neutrophilic Predominance and L shift, Hgb 11.4, MCV 61.6, Cr 2.0, BUN 23, Alk Phos 125/AST 62/ALT 27, Lac 4.9> 3.9  RVP + Influenza B      CT Angio Chest PE Protocol w/ IV Cont   1. Bilateral patchy groundglass nodularity with dominant confluent left lower lobe opacification with numerous cavitary lesions. Additional contralateral right lower lobe cavitary 1 cm nodule. Findings compatible with cavitary pneumonia in the appropriate clinical setting; differential diagnosis includes tuberculosis.  2. Confluent ill-defined left hilar and mediastinal adenopathy, likely reactive, without dominant lymph node delineated.  3. No evidence of pulmonary embolism.    s/p 1x Rocephin, Meropenem, 3 L LR in ED    Pt admitted to SDU for further managements, during my encounter pt is very agitated, in acute distress. Pt received appropriate pain control with IV Morphine, s/p Xanax 1 mg 1x for agitation and anxiety, despite pain control and adequate fluid resuscitation pt remained tachycardic, tachypneic. STAT EKG reviewed with Cardiology team on call After discussion with Cardiology team, Pt received 1x Adenosine 6 mg IV push with no change. Case discussed with Critical Care team, decision was made for Intubation and Pt upgraded to MICU.   (24 May 2023 00:58)       INTERVAL HPI/OVERNIGHT EVENTS:   No overnight events   Afebrile, hemodynamically stable     Subjective:    ICU Vital Signs Last 24 Hrs  T(C): 37.3 (10 Sidney 2023 12:00), Max: 37.6 (10 Sidney 2023 08:00)  T(F): 99.2 (10 Sidney 2023 12:00), Max: 99.6 (10 Sidney 2023 08:00)  HR: 103 (10 Sidney 2023 13:) (76 - 104)  BP: 133/72 (10 Sidney 2023 13:00) (87/53 - 152/70)  BP(mean): 94 (10 Sidney 2023 13:) (62 - 102)  ABP: --  ABP(mean): --  RR: 20 (10 Sidney 2023 13:00) (18 - 32)  SpO2: 98% (10 Sidney 2023 13:00) (94% - 100%)    O2 Parameters below as of 10 Sidney 2023 12:00  Patient On (Oxygen Delivery Method): ventilator    O2 Concentration (%): 40      I&O's Summary    2023 07:01  -  10 Sidney 2023 07:00  --------------------------------------------------------  IN: 5356.3 mL / OUT: 2920 mL / NET: 2436.3 mL    10 Sidney 2023 07:01  -  10 Sidney 2023 13:58  --------------------------------------------------------  IN: 1152.3 mL / OUT: 1460 mL / NET: -307.7 mL      Mode: AC/ CMV (Assist Control/ Continuous Mandatory Ventilation)  RR (machine): 16  TV (machine): 400  FiO2: 40  PEEP: 8  MAP: 13  PIP: 30      Daily     Daily Weight in k (10 Sidney 2023 04:00)    Adult Advanced Hemodynamics Last 24 Hrs  CVP(mm Hg): --  CVP(cm H2O): --  CO: --  CI: --  PA: --  PA(mean): --  PCWP: --  SVR: --  SVRI: --  PVR: --  PVRI: --    EKG/Telemetry Events:    MEDICATIONS  (STANDING):  aMIOdarone    Tablet 200 milliGRAM(s) Oral two times a day  chlorhexidine 0.12% Liquid 15 milliLiter(s) Oral Mucosa every 12 hours  chlorhexidine 2% Cloths 1 Application(s) Topical <User Schedule>  chlorhexidine 2% Cloths 1 Application(s) Topical daily  dexMEDEtomidine Infusion 0.2 MICROgram(s)/kG/Hr (4.25 mL/Hr) IV Continuous <Continuous>  fentaNYL   Infusion. 0.509 MICROgram(s)/kG/Hr (4.72 mL/Hr) IV Continuous <Continuous>  furosemide   Injectable 40 milliGRAM(s) IV Push daily  insulin lispro (ADMELOG) corrective regimen sliding scale   SubCutaneous three times a day before meals  ketamine Infusion. 0.23 mG/kG/Hr (2.13 mL/Hr) IV Continuous <Continuous>  metroNIDAZOLE    Tablet 500 milliGRAM(s) Oral every 8 hours  pantoprazole   Suspension 40 milliGRAM(s) Oral daily  propofol Infusion 19.957 MICROgram(s)/kG/Min (11.1 mL/Hr) IV Continuous <Continuous>  vancomycin  IVPB 1250 milliGRAM(s) IV Intermittent every 24 hours  vitamin A &amp; D Ointment 1 Application(s) Topical daily    MEDICATIONS  (PRN):  acetaminophen     Tablet .. 650 milliGRAM(s) Oral every 6 hours PRN Temp greater or equal to 38C (100.4F)      PHYSICAL EXAM:  GENERAL:   HEAD:  Atraumatic, Normocephalic  EYES: EOMI, PERRLA, conjunctiva and sclera clear  NECK: Supple, No JVD, Normal thyroid, no enlarged nodes  NERVOUS SYSTEM:  Alert & Awake.   CHEST/LUNG: B/L good air entry; No rales, rhonchi, or wheezing  HEART: S1S2 normal, no S3, Regular rate and rhythm; No murmurs  ABDOMEN: Soft, Nontender, Nondistended; Bowel sounds present  EXTREMITIES:  2+ Peripheral Pulses, No clubbing, cyanosis, or edema  LYMPH: No lymphadenopathy noted  SKIN: No rashes or lesions    LABS:                        7.9    11.39 )-----------( 482      ( 10 Sidney 2023 04:48 )             26.3     06-10    141  |  109  |  32<H>  ----------------------------<  100<H>  3.9   |  24  |  0.7    Ca    8.2<L>      10 Sidney 2023 04:48  Mg     1.4     10    TPro  5.8<L>  /  Alb  2.2<L>  /  TBili  <0.2  /  DBili  x   /  AST  21  /  ALT  23  /  AlkPhos  92  06-10    LIVER FUNCTIONS - ( 10 Sidney 2023 04:48 )  Alb: 2.2 g/dL / Pro: 5.8 g/dL / ALK PHOS: 92 U/L / ALT: 23 U/L / AST: 21 U/L / GGT: x             CAPILLARY BLOOD GLUCOSE      POCT Blood Glucose.: 106 mg/dL (10 Sidney 2023 10:47)  POCT Blood Glucose.: 114 mg/dL (10 Sidney 2023 08:25)  POCT Blood Glucose.: 108 mg/dL (2023 17:25)    ABG - ( 10 Sidney 2023 03:31 )  pH, Arterial: 7.42  pH, Blood: x     /  pCO2: 43    /  pO2: 110   / HCO3: 28    / Base Excess: 2.9   /  SaO2: 99.1                          RADIOLOGY & ADDITIONAL TESTS:    < from: Xray Chest 1 View- PORTABLE-Routine (Xray Chest 1 View- PORTABLE-Routine in AM.) (23 @ 07:04) >  Impression:    New masslike right lower lung field opacity    Stable diffuse left lung opacities    --- End of Report ---    < end of copied text >  < from: CT Chest No Cont (23 @ 10:49) >    IMPRESSION:    Since 2023    Breathing artifact limits lung parenchymal assessment    Post placement left pleural catheter, satisfactory position.    Cavitary consolidation left lower lobe, similar to earlier study.   Assessment for adjacent pleural effusion limited due to streak artifact ,   however appears to have decreased in size since placement of pleural tube.      Multiple additional multi lobar cavitary nodules are present, not   significantly changed from previous study.,    No pneumothorax    --- End of Report ---    < end of copied text >  < from: [facility] Duplex Lower Ext Vein Scan, Bilat (23 @ 20:31) >  IMPRESSION:  No evidence of deep venous thrombosis in either lower extremity.    Left peroneal vein is not visualized      < end of copied text >        Care Discussed with Consultants/Other Providers [ x] YES  [ ] NO

## 2023-06-10 NOTE — PROGRESS NOTE ADULT - SUBJECTIVE AND OBJECTIVE BOX
Patient is a 42y old  Female who presents with a chief complaint of AHRF (10 Sidney 2023 00:21)        Over Night Events:    No events  On multiple sedation  Failed multiple trials   Febrile to 101         ROS:  See HPI    PHYSICAL EXAM    ICU Vital Signs Last 24 Hrs  T(C): 36.8 (10 Sidney 2023 05:05), Max: 38.3 (09 Jun 2023 11:00)  T(F): 98.3 (10 Sidney 2023 05:05), Max: 101 (09 Jun 2023 11:00)  HR: 80 (10 Sidney 2023 07:30) (76 - 97)  BP: 112/58 (10 Sidney 2023 07:00) (73/45 - 155/70)  BP(mean): 80 (10 Sidney 2023 07:00) (55 - 101)  ABP: --  ABP(mean): --  RR: 20 (10 Sidney 2023 07:00) (14 - 32)  SpO2: 98% (10 Sidney 2023 07:30) (94% - 100%)    O2 Parameters below as of 10 Sidney 2023 07:00      O2 Concentration (%): 40        General: intubated, sedated  HEENT: TRAM             Lymphatic system: No cervical LN   Lungs: Bilateral BS, coarse   Cardiovascular: Regular   Gastrointestinal: Soft, Positive BS  Extremities: No clubbing.  Moves extremities.  Full Range of motion   Skin: Warm, intact  Neurological: No motor or sensory deficit       06-09-23 @ 07:01  -  06-10-23 @ 07:00  --------------------------------------------------------  IN:    Dexmedetomidine: 716.8 mL    Enteral Tube Flush: 165 mL    FentaNYL: 583.6 mL    Free Water: 600 mL    IV PiggyBack: 250 mL    Ketamine: 722.1 mL    Lactated Ringers Bolus: 750 mL    Peptamen A.F.: 1200 mL    Propofol: 66.1 mL    Propofol: 302.7 mL  Total IN: 5356.3 mL    OUT:    Chest Tube (mL): 10 mL    Rectal Tube (mL): 60 mL    Voided (mL): 2850 mL  Total OUT: 2920 mL    Total NET: 2436.3 mL          LABS:                            7.9    11.39 )-----------( 482      ( 10 Sidney 2023 04:48 )             26.3                                               06-10    141  |  109  |  32<H>  ----------------------------<  100<H>  3.9   |  24  |  0.7    Ca    8.2<L>      10 Sidney 2023 04:48  Mg     1.4     06-10    TPro  5.8<L>  /  Alb  2.2<L>  /  TBili  <0.2  /  DBili  x   /  AST  21  /  ALT  23  /  AlkPhos  92  06-10                                                                                           LIVER FUNCTIONS - ( 10 Sidney 2023 04:48 )  Alb: 2.2 g/dL / Pro: 5.8 g/dL / ALK PHOS: 92 U/L / ALT: 23 U/L / AST: 21 U/L / GGT: x                                                                                               Mode: AC/ CMV (Assist Control/ Continuous Mandatory Ventilation)  RR (machine): 16  TV (machine): 400  FiO2: 40  PEEP: 8  MAP: 13  PIP: 30                                      ABG - ( 10 Sidney 2023 03:31 )  pH, Arterial: 7.42  pH, Blood: x     /  pCO2: 43    /  pO2: 110   / HCO3: 28    / Base Excess: 2.9   /  SaO2: 99.1                MEDICATIONS  (STANDING):  aMIOdarone    Tablet 200 milliGRAM(s) Oral two times a day  chlorhexidine 0.12% Liquid 15 milliLiter(s) Oral Mucosa every 12 hours  chlorhexidine 2% Cloths 1 Application(s) Topical <User Schedule>  chlorhexidine 2% Cloths 1 Application(s) Topical daily  dexMEDEtomidine Infusion 0.2 MICROgram(s)/kG/Hr (4.25 mL/Hr) IV Continuous <Continuous>  fentaNYL   Infusion. 0.509 MICROgram(s)/kG/Hr (4.72 mL/Hr) IV Continuous <Continuous>  furosemide   Injectable 40 milliGRAM(s) IV Push daily  insulin lispro (ADMELOG) corrective regimen sliding scale   SubCutaneous three times a day before meals  ketamine Infusion. 0.23 mG/kG/Hr (2.13 mL/Hr) IV Continuous <Continuous>  metroNIDAZOLE    Tablet 500 milliGRAM(s) Oral every 8 hours  pantoprazole   Suspension 40 milliGRAM(s) Oral daily  polyethylene glycol 3350 17 Gram(s) Oral every 12 hours  propofol Infusion 19.957 MICROgram(s)/kG/Min (11.1 mL/Hr) IV Continuous <Continuous>  senna 2 Tablet(s) Oral at bedtime  vancomycin  IVPB 1250 milliGRAM(s) IV Intermittent every 24 hours  vitamin A &amp; D Ointment 1 Application(s) Topical daily    MEDICATIONS  (PRN):  acetaminophen     Tablet .. 650 milliGRAM(s) Oral every 6 hours PRN Temp greater or equal to 38C (100.4F)      Xrays:                                                                                     ECHO

## 2023-06-10 NOTE — PROGRESS NOTE ADULT - ASSESSMENT
IMPRESSION:    Acute hypoxemic respiratory failure   Severe cavitary CAP MRSA  Small parapneumonic effusion SP Chest tube.    MRSA bacteremia resolved   MASTER negative   Anemia sp transfusion   New onset A FIB rate controlled   LACNE non oliguric improving  Hypernatremia Improving  HO asthma   Influenza B     PLAN:    CNS:  SAT. Add propofol low dose Monitor TG weekly.     HEENT: Oral care.  ET care. Pull ETT 1 cm.    PULMONARY:  HOB @ 45 degrees.  Aspiration precautions.  No change in vent settings.  Monitor PPL and DP.  Keep SaO2 92 TO 96%.  Awaiting trach.      CARDIOVASCULAR:  ECHO/ MASTER noted.  Avoid overload.  Continue Lasix 40 mg daily     GI: GI prophylaxis.  OG Feeding.  DC bowel regimen.     RENAL:  Follow up lytes.  Correct as needed.     INFECTIOUS DISEASE: ABX Per ID. Monitor Vanc levels.  Chest tube per CTS.  Remains febrile.  Recheck blood culture; DTA culture.     HEMATOLOGICAL:  DVT prophylaxis.  Trend CBC.     ENDOCRINE:  Follow up FS.  Insulin protocol if needed.      MICU     l IJ 6/2    Prognosis remains guarded.     Plan for trach and peg next week.

## 2023-06-11 LAB
ALBUMIN SERPL ELPH-MCNC: 2 G/DL — LOW (ref 3.5–5.2)
ALP SERPL-CCNC: 103 U/L — SIGNIFICANT CHANGE UP (ref 30–115)
ALT FLD-CCNC: 21 U/L — SIGNIFICANT CHANGE UP (ref 0–41)
ANION GAP SERPL CALC-SCNC: 6 MMOL/L — LOW (ref 7–14)
AST SERPL-CCNC: 21 U/L — SIGNIFICANT CHANGE UP (ref 0–41)
BASE EXCESS BLDA CALC-SCNC: 0.3 MMOL/L — SIGNIFICANT CHANGE UP (ref -2–3)
BASOPHILS # BLD AUTO: 0.03 K/UL — SIGNIFICANT CHANGE UP (ref 0–0.2)
BASOPHILS NFR BLD AUTO: 0.3 % — SIGNIFICANT CHANGE UP (ref 0–1)
BILIRUB SERPL-MCNC: <0.2 MG/DL — SIGNIFICANT CHANGE UP (ref 0.2–1.2)
BUN SERPL-MCNC: 31 MG/DL — HIGH (ref 10–20)
CALCIUM SERPL-MCNC: 8 MG/DL — LOW (ref 8.4–10.5)
CHLORIDE SERPL-SCNC: 110 MMOL/L — SIGNIFICANT CHANGE UP (ref 98–110)
CO2 SERPL-SCNC: 25 MMOL/L — SIGNIFICANT CHANGE UP (ref 17–32)
CREAT SERPL-MCNC: 0.7 MG/DL — SIGNIFICANT CHANGE UP (ref 0.7–1.5)
EGFR: 111 ML/MIN/1.73M2 — SIGNIFICANT CHANGE UP
EOSINOPHIL # BLD AUTO: 0.24 K/UL — SIGNIFICANT CHANGE UP (ref 0–0.7)
EOSINOPHIL NFR BLD AUTO: 2.3 % — SIGNIFICANT CHANGE UP (ref 0–8)
GLUCOSE BLDC GLUCOMTR-MCNC: 106 MG/DL — HIGH (ref 70–99)
GLUCOSE BLDC GLUCOMTR-MCNC: 108 MG/DL — HIGH (ref 70–99)
GLUCOSE BLDC GLUCOMTR-MCNC: 114 MG/DL — HIGH (ref 70–99)
GLUCOSE SERPL-MCNC: 93 MG/DL — SIGNIFICANT CHANGE UP (ref 70–99)
GRAM STN FLD: SIGNIFICANT CHANGE UP
HCO3 BLDA-SCNC: 25 MMOL/L — SIGNIFICANT CHANGE UP (ref 21–28)
HCT VFR BLD CALC: 23.2 % — LOW (ref 37–47)
HCT VFR BLD CALC: 23.8 % — LOW (ref 37–47)
HGB BLD-MCNC: 7.2 G/DL — LOW (ref 12–16)
HGB BLD-MCNC: 7.3 G/DL — LOW (ref 12–16)
HOROWITZ INDEX BLDA+IHG-RTO: 40 — SIGNIFICANT CHANGE UP
IMM GRANULOCYTES NFR BLD AUTO: 0.9 % — HIGH (ref 0.1–0.3)
LYMPHOCYTES # BLD AUTO: 1.71 K/UL — SIGNIFICANT CHANGE UP (ref 1.2–3.4)
LYMPHOCYTES # BLD AUTO: 16.7 % — LOW (ref 20.5–51.1)
MAGNESIUM SERPL-MCNC: 1.6 MG/DL — LOW (ref 1.8–2.4)
MCHC RBC-ENTMCNC: 22.6 PG — LOW (ref 27–31)
MCHC RBC-ENTMCNC: 22.7 PG — LOW (ref 27–31)
MCHC RBC-ENTMCNC: 30.7 G/DL — LOW (ref 32–37)
MCHC RBC-ENTMCNC: 31 G/DL — LOW (ref 32–37)
MCV RBC AUTO: 73.2 FL — LOW (ref 81–99)
MCV RBC AUTO: 73.7 FL — LOW (ref 81–99)
MONOCYTES # BLD AUTO: 0.92 K/UL — HIGH (ref 0.1–0.6)
MONOCYTES NFR BLD AUTO: 9 % — SIGNIFICANT CHANGE UP (ref 1.7–9.3)
NEUTROPHILS # BLD AUTO: 7.24 K/UL — HIGH (ref 1.4–6.5)
NEUTROPHILS NFR BLD AUTO: 70.8 % — SIGNIFICANT CHANGE UP (ref 42.2–75.2)
NRBC # BLD: 0 /100 WBCS — SIGNIFICANT CHANGE UP (ref 0–0)
NRBC # BLD: 0 /100 WBCS — SIGNIFICANT CHANGE UP (ref 0–0)
PCO2 BLDA: 38 MMHG — SIGNIFICANT CHANGE UP (ref 25–48)
PH BLDA: 7.42 — SIGNIFICANT CHANGE UP (ref 7.35–7.45)
PLATELET # BLD AUTO: 385 K/UL — SIGNIFICANT CHANGE UP (ref 130–400)
PLATELET # BLD AUTO: 427 K/UL — HIGH (ref 130–400)
PMV BLD: 9.2 FL — SIGNIFICANT CHANGE UP (ref 7.4–10.4)
PMV BLD: 9.5 FL — SIGNIFICANT CHANGE UP (ref 7.4–10.4)
PO2 BLDA: 85 MMHG — SIGNIFICANT CHANGE UP (ref 83–108)
POTASSIUM SERPL-MCNC: 3.9 MMOL/L — SIGNIFICANT CHANGE UP (ref 3.5–5)
POTASSIUM SERPL-SCNC: 3.9 MMOL/L — SIGNIFICANT CHANGE UP (ref 3.5–5)
PROT SERPL-MCNC: 5.4 G/DL — LOW (ref 6–8)
RBC # BLD: 3.17 M/UL — LOW (ref 4.2–5.4)
RBC # BLD: 3.23 M/UL — LOW (ref 4.2–5.4)
RBC # FLD: 28.3 % — HIGH (ref 11.5–14.5)
RBC # FLD: 28.6 % — HIGH (ref 11.5–14.5)
SAO2 % BLDA: 97.4 % — SIGNIFICANT CHANGE UP (ref 94–98)
SODIUM SERPL-SCNC: 141 MMOL/L — SIGNIFICANT CHANGE UP (ref 135–146)
SPECIMEN SOURCE: SIGNIFICANT CHANGE UP
TRIGL SERPL-MCNC: 231 MG/DL — HIGH
WBC # BLD: 10.23 K/UL — SIGNIFICANT CHANGE UP (ref 4.8–10.8)
WBC # BLD: 10.54 K/UL — SIGNIFICANT CHANGE UP (ref 4.8–10.8)
WBC # FLD AUTO: 10.23 K/UL — SIGNIFICANT CHANGE UP (ref 4.8–10.8)
WBC # FLD AUTO: 10.54 K/UL — SIGNIFICANT CHANGE UP (ref 4.8–10.8)

## 2023-06-11 PROCEDURE — 99291 CRITICAL CARE FIRST HOUR: CPT

## 2023-06-11 PROCEDURE — 71045 X-RAY EXAM CHEST 1 VIEW: CPT | Mod: 26

## 2023-06-11 RX ORDER — MAGNESIUM SULFATE 500 MG/ML
2 VIAL (ML) INJECTION ONCE
Refills: 0 | Status: DISCONTINUED | OUTPATIENT
Start: 2023-06-11 | End: 2023-06-11

## 2023-06-11 RX ORDER — MAGNESIUM SULFATE 500 MG/ML
2 VIAL (ML) INJECTION
Refills: 0 | Status: COMPLETED | OUTPATIENT
Start: 2023-06-11 | End: 2023-06-11

## 2023-06-11 RX ADMIN — Medication 500 MILLIGRAM(S): at 21:18

## 2023-06-11 RX ADMIN — Medication 500 MILLIGRAM(S): at 05:37

## 2023-06-11 RX ADMIN — PANTOPRAZOLE SODIUM 40 MILLIGRAM(S): 20 TABLET, DELAYED RELEASE ORAL at 12:27

## 2023-06-11 RX ADMIN — PROPOFOL 11.1 MICROGRAM(S)/KG/MIN: 10 INJECTION, EMULSION INTRAVENOUS at 10:06

## 2023-06-11 RX ADMIN — FENTANYL CITRATE 4.72 MICROGRAM(S)/KG/HR: 50 INJECTION INTRAVENOUS at 10:06

## 2023-06-11 RX ADMIN — CHLORHEXIDINE GLUCONATE 1 APPLICATION(S): 213 SOLUTION TOPICAL at 06:01

## 2023-06-11 RX ADMIN — Medication 650 MILLIGRAM(S): at 20:18

## 2023-06-11 RX ADMIN — AMIODARONE HYDROCHLORIDE 200 MILLIGRAM(S): 400 TABLET ORAL at 05:37

## 2023-06-11 RX ADMIN — DEXMEDETOMIDINE HYDROCHLORIDE IN 0.9% SODIUM CHLORIDE 4.25 MICROGRAM(S)/KG/HR: 4 INJECTION INTRAVENOUS at 10:06

## 2023-06-11 RX ADMIN — Medication 25 GRAM(S): at 06:10

## 2023-06-11 RX ADMIN — CHLORHEXIDINE GLUCONATE 1 APPLICATION(S): 213 SOLUTION TOPICAL at 05:32

## 2023-06-11 RX ADMIN — Medication 650 MILLIGRAM(S): at 20:48

## 2023-06-11 RX ADMIN — CHLORHEXIDINE GLUCONATE 15 MILLILITER(S): 213 SOLUTION TOPICAL at 17:03

## 2023-06-11 RX ADMIN — CHLORHEXIDINE GLUCONATE 15 MILLILITER(S): 213 SOLUTION TOPICAL at 05:37

## 2023-06-11 RX ADMIN — Medication 166.67 MILLIGRAM(S): at 12:27

## 2023-06-11 RX ADMIN — Medication 25 GRAM(S): at 08:50

## 2023-06-11 RX ADMIN — AMIODARONE HYDROCHLORIDE 200 MILLIGRAM(S): 400 TABLET ORAL at 17:03

## 2023-06-11 RX ADMIN — Medication 500 MILLIGRAM(S): at 14:57

## 2023-06-11 RX ADMIN — Medication 1 APPLICATION(S): at 12:27

## 2023-06-11 RX ADMIN — Medication 40 MILLIGRAM(S): at 05:37

## 2023-06-11 NOTE — PROGRESS NOTE ADULT - ASSESSMENT
IMPRESSION:    Acute hypoxemic respiratory failure   Severe cavitary CAP MRSA  Small parapneumonic effusion SP Chest tube.    MRSA bacteremia resolved   MASTER negative   Anemia sp transfusion   New onset A FIB rate controlled   LANCE non oliguric improving  Hypernatremia Improving  HO asthma   Influenza B     PLAN:    CNS:  Multiple attempts to wean off sedation with no success; she also got a trial of methadone. Wean off theketamine. TG level weekly.     HEENT: Oral care.  ET care.     PULMONARY:  HOB @ 45 degrees.  Aspiration precautions.  No change in vent settings.  Monitor PPL and DP.  Keep SaO2 92 TO 96%.  Awaiting trach. CXR noted with worsening left infiltrate; extensive secretions.       CARDIOVASCULAR:  ECHO/ MASTER noted.  Avoid overload.  Continue Lasix 40 mg daily     GI: GI prophylaxis.  OGT Feeding.  Bowel regimen PRN.     RENAL:  Follow up lytes.  Correct as needed.     INFECTIOUS DISEASE: ABX Per ID. Monitor Vanc levels. On PO flagyl per ID as well.  Chest tube per CTS.  Recheck blood culture; DTA culture.     HEMATOLOGICAL:  DVT prophylaxis.  CBC in the afternoon and transfuse if less than 7.     ENDOCRINE:  Follow up FS.  Insulin protocol if needed.      Requires ICU care    l IJ 6/2    Prognosis is poor.     Plan for trach and peg next week.

## 2023-06-11 NOTE — PROGRESS NOTE ADULT - SUBJECTIVE AND OBJECTIVE BOX
GENERAL SURGERY PROGRESS NOTE    Patient: ZEYNEP ROSSI , 42y ([birth year])Female   MRN: [id number]  Location: [address]  Visit: 05-23-23 Inpatient  Date: 06-11-23 @ 02:52    Hospital Day #: 11    Procedure/Dx/Injuries: MRSA PNA    Events of past 24 hours: Patient on Fentanyl, Precedex, Propofol, and Ketamine. TF at 50. Chest tube to suction with no air leak. Vent settings were 40/8.    PAST MEDICAL & SURGICAL HISTORY:      Vitals:   T(F): 98.7 (06-10-23 @ 20:00), Max: 99.6 (06-10-23 @ 08:00)  HR: 78 (06-10-23 @ 23:00)  BP: 103/57 (06-10-23 @ 23:00)  RR: 24 (06-10-23 @ 23:00)  SpO2: 98% (06-10-23 @ 23:00)  Mode: AC/ CMV (Assist Control/ Continuous Mandatory Ventilation), RR (machine): 16, TV (machine): 400, FiO2: 40, PEEP: 8, ITime: 1, MAP: 14, PIP: 27    Diet, NPO with Tube Feed:   Tube Feeding Modality: Orogastric  Peptamen Intense VHP  Total Volume for 24 Hours (mL): 1200  Continuous  Starting Tube Feed Rate mL per Hour: 20  Increase Tube Feed Rate by (mL): 20     Every 4 hours  Until Goal Tube Feed Rate (mL per Hour): 50  Tube Feed Duration (in Hours): 24  Tube Feed Start Time: 12:00  Free Water Flush   Total Volume per Flush (mL): 100   Frequency: Every 4 Hours  Diet, NPO after Midnight:      NPO Start Date: 30-May-2023,   NPO Start Time: 23:59  Except Medications      Fluids:     I & O's:    06-09-23 @ 07:01  -  06-10-23 @ 07:00  --------------------------------------------------------  IN:    Dexmedetomidine: 716.8 mL    Enteral Tube Flush: 165 mL    FentaNYL: 583.6 mL    Free Water: 600 mL    IV PiggyBack: 250 mL    Ketamine: 722.1 mL    Lactated Ringers Bolus: 750 mL    Peptamen A.F.: 1200 mL    Propofol: 66.1 mL    Propofol: 302.7 mL  Total IN: 5356.3 mL    OUT:    Chest Tube (mL): 10 mL    Rectal Tube (mL): 60 mL    Voided (mL): 2850 mL  Total OUT: 2920 mL    Total NET: 2436.3 mL    PHYSICAL EXAM:  General: Sedated, intubated  HEENT: NCAT, Trachea ML  Cardiac: RRR S1, S2, no Murmurs, rubs or gallops  Respiratory: Ventilated; Chest tube to suction, no AL  Abdomen: Non-distended  Musculoskeletal: Compartments soft  Vascular: Extremities well perfused  Skin: Warm/dry, normal color, no jaundice    MEDICATIONS  (STANDING):  aMIOdarone    Tablet 200 milliGRAM(s) Oral two times a day  chlorhexidine 0.12% Liquid 15 milliLiter(s) Oral Mucosa every 12 hours  chlorhexidine 2% Cloths 1 Application(s) Topical daily  chlorhexidine 2% Cloths 1 Application(s) Topical <User Schedule>  dexMEDEtomidine Infusion 0.2 MICROgram(s)/kG/Hr (4.25 mL/Hr) IV Continuous <Continuous>  fentaNYL   Infusion. 0.509 MICROgram(s)/kG/Hr (4.72 mL/Hr) IV Continuous <Continuous>  furosemide   Injectable 40 milliGRAM(s) IV Push daily  insulin lispro (ADMELOG) corrective regimen sliding scale   SubCutaneous three times a day before meals  ketamine Infusion. 0.23 mG/kG/Hr (2.13 mL/Hr) IV Continuous <Continuous>  metroNIDAZOLE    Tablet 500 milliGRAM(s) Oral every 8 hours  pantoprazole   Suspension 40 milliGRAM(s) Oral daily  propofol Infusion 19.957 MICROgram(s)/kG/Min (11.1 mL/Hr) IV Continuous <Continuous>  vancomycin  IVPB 1250 milliGRAM(s) IV Intermittent every 24 hours  vitamin A &amp; D Ointment 1 Application(s) Topical daily    MEDICATIONS  (PRN):  acetaminophen     Tablet .. 650 milliGRAM(s) Oral every 6 hours PRN Temp greater or equal to 38C (100.4F)      DVT PROPHYLAXIS:   GI PROPHYLAXIS: pantoprazole   Suspension 40 milliGRAM(s) Oral daily    ANTICOAGULATION:   ANTIBIOTICS:  metroNIDAZOLE    Tablet 500 milliGRAM(s)  vancomycin  IVPB 1250 milliGRAM(s)    LAB/STUDIES:  Labs:  CAPILLARY BLOOD GLUCOSE  POCT Blood Glucose.: 99 mg/dL (10 Sidney 2023 22:56)  POCT Blood Glucose.: 99 mg/dL (10 Sidney 2023 15:54)  POCT Blood Glucose.: 106 mg/dL (10 Sidney 2023 10:47)  POCT Blood Glucose.: 114 mg/dL (10 Sidney 2023 08:25)                          7.9    11.39 )-----------( 482      ( 10 Sidney 2023 04:48 )             26.3       Auto Neutrophil %: 72.6 % (06-10-23 @ 04:48)  Auto Immature Granulocyte %: 0.9 % (06-10-23 @ 04:48)    06-10    141  |  109  |  32<H>  ----------------------------<  100<H>  3.9   |  24  |  0.7      Calcium, Total Serum: 8.2 mg/dL (06-10-23 @ 04:48)      LFTs:             5.8  | <0.2 | 21       ------------------[92      ( 10 Sidney 2023 04:48 )  2.2  | x    | 23          Lipase:x      Amylase:x        Blood Gas Arterial, Lactate: 0.70 mmol/L (06-10-23 @ 03:31)  Blood Gas Arterial, Lactate: 0.70 mmol/L (06-09-23 @ 03:43)  Blood Gas Arterial, Lactate: 1.50 mmol/L (06-08-23 @ 15:38)  Blood Gas Arterial, Lactate: 1.00 mmol/L (06-08-23 @ 03:24)    ABG - ( 10 Sidney 2023 03:31 )  pH: 7.42  /  pCO2: 43    /  pO2: 110   / HCO3: 28    / Base Excess: 2.9   /  SaO2: 99.1            ABG - ( 09 Jun 2023 03:43 )  pH: 7.40  /  pCO2: 42    /  pO2: 85    / HCO3: 26    / Base Excess: 1.0   /  SaO2: 97.5            ABG - ( 08 Jun 2023 15:38 )  pH: 7.43  /  pCO2: 41    /  pO2: 93    / HCO3: 27    / Base Excess: 2.6   /  SaO2: 97.6              Coags:      IMAGING:  < from: Xray Chest 1 View- PORTABLE-Routine (Xray Chest 1 View- PORTABLE-Routine in AM.) (06.10.23 @ 07:31) >  Impression:    Diminishing left greater than right opacities.    ET tube less than 2 cm above noe. Repositioning suggested.    --- End of Report ---  < end of copied text >    ASSESSMENT:  42yF w/ left pleural effusion, s/p chest tube placement, s/p tpa placement on 6/6 and 6/7    PLAN:  - Trach & PEG Tuesday 6/13. Plan for preop on Monday, 6/12.  - Keep chest tube to suction, -AL  - AM CXR daily  - Monitor chest tube output  - Care per primary team     [phone]

## 2023-06-11 NOTE — PROGRESS NOTE ADULT - SUBJECTIVE AND OBJECTIVE BOX
Patient is a 42y old  Female who presents with a chief complaint of AHRF (11 Jun 2023 02:51)        Over Night Events:    Remains intubated  No fevers   Remains on 4 sedatives         ROS:  See HPI    PHYSICAL EXAM    ICU Vital Signs Last 24 Hrs  T(C): 37.3 (11 Jun 2023 08:00), Max: 37.3 (10 Sidney 2023 12:00)  T(F): 99.1 (11 Jun 2023 08:00), Max: 99.2 (10 Sidney 2023 12:00)  HR: 82 (11 Jun 2023 09:00) (71 - 114)  BP: 104/59 (11 Jun 2023 09:00) (91/51 - 141/76)  BP(mean): 76 (11 Jun 2023 09:00) (65 - 102)  ABP: --  ABP(mean): --  RR: 27 (11 Jun 2023 09:00) (18 - 30)  SpO2: 99% (11 Jun 2023 09:00) (94% - 100%)    O2 Parameters below as of 11 Jun 2023 04:00  Patient On (Oxygen Delivery Method): ventilator    O2 Concentration (%): 40        General: Intubated, sedated   HEENT: TRAM             Lymphatic system: No cervical LN   Lungs: Bilateral BS, left chest tube in place   Cardiovascular: Regular   Gastrointestinal: Soft, Positive BS  Extremities: No clubbing.  Moves extremities.  Full Range of motion   Skin: Warm, intact  Neurological: No motor or sensory deficit       06-10-23 @ 07:01  -  06-11-23 @ 07:00  --------------------------------------------------------  IN:    Dexmedetomidine: 696.7 mL    FentaNYL: 879.8 mL    Free Water: 625 mL    IV PiggyBack: 350 mL    Ketamine: 889.2 mL    Peptamen A.F.: 1200 mL    Propofol: 554.8 mL  Total IN: 5195.5 mL    OUT:    Chest Tube (mL): 20 mL    Rectal Tube (mL): 500 mL    Voided (mL): 2700 mL  Total OUT: 3220 mL    Total NET: 1975.5 mL      06-11-23 @ 07:01  -  06-11-23 @ 09:49  --------------------------------------------------------  IN:    Dexmedetomidine: 69.6 mL    FentaNYL: 74.2 mL    Free Water: 50 mL    Ketamine: 74.2 mL    Peptamen A.F.: 100 mL    Propofol: 44.4 mL  Total IN: 412.4 mL    OUT:    Chest Tube (mL): 0 mL  Total OUT: 0 mL    Total NET: 412.4 mL          LABS:                            7.3    10.23 )-----------( 427      ( 11 Jun 2023 04:45 )             23.8                                               06-11    141  |  110  |  31<H>  ----------------------------<  93  3.9   |  25  |  0.7    Ca    8.0<L>      11 Jun 2023 04:45  Mg     1.6     06-11    TPro  5.4<L>  /  Alb  2.0<L>  /  TBili  <0.2  /  DBili  x   /  AST  21  /  ALT  21  /  AlkPhos  103  06-11                                                                                           LIVER FUNCTIONS - ( 11 Jun 2023 04:45 )  Alb: 2.0 g/dL / Pro: 5.4 g/dL / ALK PHOS: 103 U/L / ALT: 21 U/L / AST: 21 U/L / GGT: x                                                                                               Mode: AC/ CMV (Assist Control/ Continuous Mandatory Ventilation)  RR (machine): 16  TV (machine): 400  FiO2: 40  PEEP: 8  ITime: 1  MAP: 14  PIP: 34                                      ABG - ( 11 Jun 2023 03:37 )  pH, Arterial: 7.42  pH, Blood: x     /  pCO2: 38    /  pO2: 85    / HCO3: 25    / Base Excess: 0.3   /  SaO2: 97.4                MEDICATIONS  (STANDING):  aMIOdarone    Tablet 200 milliGRAM(s) Oral two times a day  chlorhexidine 0.12% Liquid 15 milliLiter(s) Oral Mucosa every 12 hours  chlorhexidine 2% Cloths 1 Application(s) Topical daily  chlorhexidine 2% Cloths 1 Application(s) Topical <User Schedule>  dexMEDEtomidine Infusion 0.2 MICROgram(s)/kG/Hr (4.25 mL/Hr) IV Continuous <Continuous>  fentaNYL   Infusion. 0.509 MICROgram(s)/kG/Hr (4.72 mL/Hr) IV Continuous <Continuous>  furosemide   Injectable 40 milliGRAM(s) IV Push daily  insulin lispro (ADMELOG) corrective regimen sliding scale   SubCutaneous three times a day before meals  ketamine Infusion. 0.23 mG/kG/Hr (2.13 mL/Hr) IV Continuous <Continuous>  metroNIDAZOLE    Tablet 500 milliGRAM(s) Oral every 8 hours  pantoprazole   Suspension 40 milliGRAM(s) Oral daily  propofol Infusion 19.957 MICROgram(s)/kG/Min (11.1 mL/Hr) IV Continuous <Continuous>  vancomycin  IVPB 1250 milliGRAM(s) IV Intermittent every 24 hours  vitamin A &amp; D Ointment 1 Application(s) Topical daily    MEDICATIONS  (PRN):  acetaminophen     Tablet .. 650 milliGRAM(s) Oral every 6 hours PRN Temp greater or equal to 38C (100.4F)      Xrays: Left infiltrate worsening; ETT ok                                                                                      ECHO

## 2023-06-12 LAB
ALBUMIN SERPL ELPH-MCNC: 2 G/DL — LOW (ref 3.5–5.2)
ALP SERPL-CCNC: 161 U/L — HIGH (ref 30–115)
ALT FLD-CCNC: 33 U/L — SIGNIFICANT CHANGE UP (ref 0–41)
ANION GAP SERPL CALC-SCNC: 10 MMOL/L — SIGNIFICANT CHANGE UP (ref 7–14)
AST SERPL-CCNC: 52 U/L — HIGH (ref 0–41)
BASE EXCESS BLDA CALC-SCNC: -0.2 MMOL/L — SIGNIFICANT CHANGE UP (ref -2–3)
BASOPHILS # BLD AUTO: 0.05 K/UL — SIGNIFICANT CHANGE UP (ref 0–0.2)
BASOPHILS NFR BLD AUTO: 0.4 % — SIGNIFICANT CHANGE UP (ref 0–1)
BILIRUB SERPL-MCNC: 0.2 MG/DL — SIGNIFICANT CHANGE UP (ref 0.2–1.2)
BUN SERPL-MCNC: 27 MG/DL — HIGH (ref 10–20)
CALCIUM SERPL-MCNC: 7.9 MG/DL — LOW (ref 8.4–10.5)
CHLORIDE SERPL-SCNC: 107 MMOL/L — SIGNIFICANT CHANGE UP (ref 98–110)
CO2 SERPL-SCNC: 22 MMOL/L — SIGNIFICANT CHANGE UP (ref 17–32)
CREAT SERPL-MCNC: 0.6 MG/DL — LOW (ref 0.7–1.5)
EGFR: 115 ML/MIN/1.73M2 — SIGNIFICANT CHANGE UP
EOSINOPHIL # BLD AUTO: 0.25 K/UL — SIGNIFICANT CHANGE UP (ref 0–0.7)
EOSINOPHIL NFR BLD AUTO: 2 % — SIGNIFICANT CHANGE UP (ref 0–8)
GLUCOSE BLDC GLUCOMTR-MCNC: 103 MG/DL — HIGH (ref 70–99)
GLUCOSE BLDC GLUCOMTR-MCNC: 116 MG/DL — HIGH (ref 70–99)
GLUCOSE BLDC GLUCOMTR-MCNC: 117 MG/DL — HIGH (ref 70–99)
GLUCOSE SERPL-MCNC: 111 MG/DL — HIGH (ref 70–99)
HCO3 BLDA-SCNC: 25 MMOL/L — SIGNIFICANT CHANGE UP (ref 21–28)
HCT VFR BLD CALC: 24.8 % — LOW (ref 37–47)
HGB BLD-MCNC: 7.7 G/DL — LOW (ref 12–16)
HOROWITZ INDEX BLDA+IHG-RTO: 40 — SIGNIFICANT CHANGE UP
IMM GRANULOCYTES NFR BLD AUTO: 1.1 % — HIGH (ref 0.1–0.3)
LYMPHOCYTES # BLD AUTO: 1.49 K/UL — SIGNIFICANT CHANGE UP (ref 1.2–3.4)
LYMPHOCYTES # BLD AUTO: 12 % — LOW (ref 20.5–51.1)
MAGNESIUM SERPL-MCNC: 1.6 MG/DL — LOW (ref 1.8–2.4)
MCHC RBC-ENTMCNC: 22.8 PG — LOW (ref 27–31)
MCHC RBC-ENTMCNC: 31 G/DL — LOW (ref 32–37)
MCV RBC AUTO: 73.6 FL — LOW (ref 81–99)
MONOCYTES # BLD AUTO: 0.82 K/UL — HIGH (ref 0.1–0.6)
MONOCYTES NFR BLD AUTO: 6.6 % — SIGNIFICANT CHANGE UP (ref 1.7–9.3)
NEUTROPHILS # BLD AUTO: 9.63 K/UL — HIGH (ref 1.4–6.5)
NEUTROPHILS NFR BLD AUTO: 77.9 % — HIGH (ref 42.2–75.2)
NRBC # BLD: 0 /100 WBCS — SIGNIFICANT CHANGE UP (ref 0–0)
PCO2 BLDA: 41 MMHG — SIGNIFICANT CHANGE UP (ref 25–48)
PH BLDA: 7.39 — SIGNIFICANT CHANGE UP (ref 7.35–7.45)
PLATELET # BLD AUTO: 409 K/UL — HIGH (ref 130–400)
PMV BLD: 9.4 FL — SIGNIFICANT CHANGE UP (ref 7.4–10.4)
PO2 BLDA: 117 MMHG — HIGH (ref 83–108)
POTASSIUM SERPL-MCNC: 3.7 MMOL/L — SIGNIFICANT CHANGE UP (ref 3.5–5)
POTASSIUM SERPL-SCNC: 3.7 MMOL/L — SIGNIFICANT CHANGE UP (ref 3.5–5)
PROT SERPL-MCNC: 5.6 G/DL — LOW (ref 6–8)
RBC # BLD: 3.37 M/UL — LOW (ref 4.2–5.4)
RBC # FLD: 28.8 % — HIGH (ref 11.5–14.5)
SAO2 % BLDA: 98.4 % — HIGH (ref 94–98)
SODIUM SERPL-SCNC: 139 MMOL/L — SIGNIFICANT CHANGE UP (ref 135–146)
TRIGL SERPL-MCNC: 304 MG/DL — HIGH
VANCOMYCIN FLD-MCNC: 8.5 UG/ML — SIGNIFICANT CHANGE UP (ref 5–10)
WBC # BLD: 12.38 K/UL — HIGH (ref 4.8–10.8)
WBC # FLD AUTO: 12.38 K/UL — HIGH (ref 4.8–10.8)

## 2023-06-12 PROCEDURE — 99291 CRITICAL CARE FIRST HOUR: CPT

## 2023-06-12 PROCEDURE — 71045 X-RAY EXAM CHEST 1 VIEW: CPT | Mod: 26

## 2023-06-12 RX ORDER — MIDAZOLAM HYDROCHLORIDE 1 MG/ML
2 INJECTION, SOLUTION INTRAMUSCULAR; INTRAVENOUS ONCE
Refills: 0 | Status: DISCONTINUED | OUTPATIENT
Start: 2023-06-12 | End: 2023-06-12

## 2023-06-12 RX ORDER — VANCOMYCIN HCL 1 G
1500 VIAL (EA) INTRAVENOUS EVERY 24 HOURS
Refills: 0 | Status: DISCONTINUED | OUTPATIENT
Start: 2023-06-13 | End: 2023-06-19

## 2023-06-12 RX ORDER — MAGNESIUM SULFATE 500 MG/ML
2 VIAL (ML) INJECTION ONCE
Refills: 0 | Status: COMPLETED | OUTPATIENT
Start: 2023-06-12 | End: 2023-06-12

## 2023-06-12 RX ADMIN — Medication 500 MILLIGRAM(S): at 21:17

## 2023-06-12 RX ADMIN — AMIODARONE HYDROCHLORIDE 200 MILLIGRAM(S): 400 TABLET ORAL at 05:37

## 2023-06-12 RX ADMIN — Medication 40 MILLIGRAM(S): at 05:37

## 2023-06-12 RX ADMIN — Medication 500 MILLIGRAM(S): at 05:37

## 2023-06-12 RX ADMIN — Medication 25 GRAM(S): at 09:40

## 2023-06-12 RX ADMIN — AMIODARONE HYDROCHLORIDE 200 MILLIGRAM(S): 400 TABLET ORAL at 21:18

## 2023-06-12 RX ADMIN — CHLORHEXIDINE GLUCONATE 15 MILLILITER(S): 213 SOLUTION TOPICAL at 18:20

## 2023-06-12 RX ADMIN — CHLORHEXIDINE GLUCONATE 1 APPLICATION(S): 213 SOLUTION TOPICAL at 05:37

## 2023-06-12 RX ADMIN — PANTOPRAZOLE SODIUM 40 MILLIGRAM(S): 20 TABLET, DELAYED RELEASE ORAL at 11:39

## 2023-06-12 RX ADMIN — Medication 500 MILLIGRAM(S): at 14:23

## 2023-06-12 RX ADMIN — MIDAZOLAM HYDROCHLORIDE 2 MILLIGRAM(S): 1 INJECTION, SOLUTION INTRAMUSCULAR; INTRAVENOUS at 15:00

## 2023-06-12 RX ADMIN — CHLORHEXIDINE GLUCONATE 15 MILLILITER(S): 213 SOLUTION TOPICAL at 05:37

## 2023-06-12 RX ADMIN — Medication 1 APPLICATION(S): at 11:35

## 2023-06-12 RX ADMIN — Medication 25 GRAM(S): at 19:20

## 2023-06-12 RX ADMIN — Medication 166.67 MILLIGRAM(S): at 11:33

## 2023-06-12 NOTE — PROGRESS NOTE ADULT - SUBJECTIVE AND OBJECTIVE BOX
Patient is a 42y old  Female who presents with a chief complaint of AHRF (12 Jun 2023 08:13)      INTERVAL HPI/OVERNIGHT EVENTS:   Fever to tmax 100,6, no other events    ICU Vital Signs Last 24 Hrs  T(C): 36.1 (12 Jun 2023 08:00), Max: 38.3 (11 Jun 2023 18:00)  T(F): 96.9 (12 Jun 2023 08:00), Max: 100.9 (11 Jun 2023 18:00)  HR: 73 (12 Jun 2023 08:17) (68 - 92)  BP: 123/76 (12 Jun 2023 08:00) (97/59 - 156/91)  BP(mean): 93 (12 Jun 2023 08:00) (72 - 117)  ABP: --  ABP(mean): --  RR: 32 (12 Jun 2023 08:00) (15 - 35)  SpO2: 100% (12 Jun 2023 08:17) (97% - 100%)    O2 Parameters below as of 12 Jun 2023 08:00  Patient On (Oxygen Delivery Method): ventilator    O2 Concentration (%): 40      I&O's Summary    11 Jun 2023 07:01  -  12 Jun 2023 07:00  --------------------------------------------------------  IN: 4996.6 mL / OUT: 3405 mL / NET: 1591.6 mL    12 Jun 2023 07:01  -  12 Jun 2023 09:15  --------------------------------------------------------  IN: 277.5 mL / OUT: 600 mL / NET: -322.5 mL      Mode: AC/ CMV (Assist Control/ Continuous Mandatory Ventilation)  RR (machine): 16  TV (machine): 400  FiO2: 40  PEEP: 8  ITime: 1  MAP: 16  PIP: 29      LABS:                        7.7    12.38 )-----------( 409      ( 12 Jun 2023 04:55 )             24.8     06-12    139  |  107  |  27<H>  ----------------------------<  111<H>  3.7   |  22  |  0.6<L>    Ca    7.9<L>      12 Jun 2023 04:55  Mg     1.6     06-12    TPro  5.6<L>  /  Alb  2.0<L>  /  TBili  0.2  /  DBili  x   /  AST  52<H>  /  ALT  33  /  AlkPhos  161<H>  06-12        CAPILLARY BLOOD GLUCOSE      POCT Blood Glucose.: 117 mg/dL (12 Jun 2023 06:09)  POCT Blood Glucose.: 106 mg/dL (11 Jun 2023 17:01)  POCT Blood Glucose.: 108 mg/dL (11 Jun 2023 11:53)    ABG - ( 12 Jun 2023 03:10 )  pH, Arterial: 7.39  pH, Blood: x     /  pCO2: 41    /  pO2: 117   / HCO3: 25    / Base Excess: -0.2  /  SaO2: 98.4                MEDS:  acetaminophen     Tablet .. 650 milliGRAM(s) Oral every 6 hours PRN  aMIOdarone    Tablet 200 milliGRAM(s) Oral two times a day  chlorhexidine 0.12% Liquid 15 milliLiter(s) Oral Mucosa every 12 hours  chlorhexidine 2% Cloths 1 Application(s) Topical <User Schedule>  chlorhexidine 2% Cloths 1 Application(s) Topical daily  dexMEDEtomidine Infusion 0.2 MICROgram(s)/kG/Hr IV Continuous <Continuous>  fentaNYL   Infusion. 0.509 MICROgram(s)/kG/Hr IV Continuous <Continuous>  furosemide   Injectable 40 milliGRAM(s) IV Push daily  insulin lispro (ADMELOG) corrective regimen sliding scale   SubCutaneous three times a day before meals  ketamine Infusion. 0.23 mG/kG/Hr IV Continuous <Continuous>  magnesium sulfate  IVPB 2 Gram(s) IV Intermittent once  metroNIDAZOLE    Tablet 500 milliGRAM(s) Oral every 8 hours  pantoprazole   Suspension 40 milliGRAM(s) Oral daily  propofol Infusion 19.957 MICROgram(s)/kG/Min IV Continuous <Continuous>  vancomycin  IVPB 1250 milliGRAM(s) IV Intermittent every 24 hours  vitamin A &amp; D Ointment 1 Application(s) Topical daily      RADIOLOGY & ADDITIONAL TESTS:    MICRO:    Culture - Sputum (collected 06-11-23 @ 11:47)  Source: Trach Asp Tracheal Aspirate  Gram Stain (06-11-23 @ 23:26):    Numerous polymorphonuclear leukocytes per low power field    Rare Squamous epithelial cells per low power field    No organisms seen per oil power field        Consultant(s) Notes Reviewed:  [x ] YES  [ ] NO    MEDICATIONS  (STANDING):  aMIOdarone    Tablet 200 milliGRAM(s) Oral two times a day  chlorhexidine 0.12% Liquid 15 milliLiter(s) Oral Mucosa every 12 hours  chlorhexidine 2% Cloths 1 Application(s) Topical <User Schedule>  chlorhexidine 2% Cloths 1 Application(s) Topical daily  dexMEDEtomidine Infusion 0.2 MICROgram(s)/kG/Hr (4.25 mL/Hr) IV Continuous <Continuous>  fentaNYL   Infusion. 0.509 MICROgram(s)/kG/Hr (4.72 mL/Hr) IV Continuous <Continuous>  furosemide   Injectable 40 milliGRAM(s) IV Push daily  insulin lispro (ADMELOG) corrective regimen sliding scale   SubCutaneous three times a day before meals  ketamine Infusion. 0.23 mG/kG/Hr (2.13 mL/Hr) IV Continuous <Continuous>  magnesium sulfate  IVPB 2 Gram(s) IV Intermittent once  metroNIDAZOLE    Tablet 500 milliGRAM(s) Oral every 8 hours  pantoprazole   Suspension 40 milliGRAM(s) Oral daily  propofol Infusion 19.957 MICROgram(s)/kG/Min (11.1 mL/Hr) IV Continuous <Continuous>  vancomycin  IVPB 1250 milliGRAM(s) IV Intermittent every 24 hours  vitamin A &amp; D Ointment 1 Application(s) Topical daily    MEDICATIONS  (PRN):  acetaminophen     Tablet .. 650 milliGRAM(s) Oral every 6 hours PRN Temp greater or equal to 38C (100.4F)      PHYSICAL EXAM:  GENERAL: sedated, intubated, vented  HEAD: Atraumatic, Normocephalic  NECK: Supple  CHEST/LUNG: dec breath sounds l', left chest tube to suction  HEART: S1S2 normal, no S3  ABDOMEN: Soft, Nontender, Nondistended; Bowel sounds present  EXTREMITIES:  2+ Peripheral Pulses  LYMPH: No lymphadenopathy noted  SKIN: No rashes or lesions      Care Discussed with Consultants/Other Providers [ x] YES  [ ] NO

## 2023-06-12 NOTE — PHARMACOTHERAPY INTERVENTION NOTE - COMMENTS
vanco level 8.5- calculation precise PK recommended increasing vancomycin 1500mg IV daily 6/13 @ 1200  -recheck level prior to 4th dose

## 2023-06-12 NOTE — PROGRESS NOTE ADULT - ASSESSMENT
IMPRESSION:    Acute hypoxemic respiratory failure still intubated  Severe cavitary CAP MRSA  Small parapneumonic effusion SP Chest tube.    MRSA bacteremia resolved   MASTER negative   Anemia sp transfusion   New onset A FIB rate controlled   LANCE non oliguric improving  Hypernatremia Improving  HO asthma   Influenza B     PLAN:    CNS:  Multiple attempts to wean off sedation with no success; will trach and start po    HEENT: Oral care.  ET care.     PULMONARY:  HOB @ 45 degrees.  Aspiration precautions.  No change in vent settings.  Monitor PPL and DP.  Keep SaO2 92 TO 96%.  Awaiting trac    CARDIOVASCULAR:  ECHO/ MASTER noted.  Avoid overload.  Continue Lasix 40 mg daily     GI: GI prophylaxis.  OGT Feeding.  Bowel regimen PRN.     RENAL:  Follow up lytes.  Correct as needed.     INFECTIOUS DISEASE: ABX Per ID. Monitor Vanc levels. On PO flagyl per ID as well.     HEMATOLOGICAL:  DVT prophylaxis.  CBC in the afternoon and transfuse if less than 7.     ENDOCRINE:  Follow up FS.  Insulin protocol if needed.      Requires ICU care    l IJ 6/2 change    Prognosis is poor.     Plan for trach and peg next week.

## 2023-06-12 NOTE — PROGRESS NOTE ADULT - ASSESSMENT
42yF w/ left pleural effusion, s/p chest tube placement, s/p tpa placement on 6/6 and 6/7.    PLAN:  - Trach & PEG Tuesday 6/13. Plan for preop on Monday, 6/12.  - Keep chest tube to suction, -AL  - AM CXR daily  - Monitor chest tube output  - Care per primary team     Thoracic Surgery  [facility] [id number]

## 2023-06-12 NOTE — PROGRESS NOTE ADULT - SUBJECTIVE AND OBJECTIVE BOX
THORACIC SURGERY PROGRESS NOTE    Patient: ZEYNEP ROSSI , 42y ([birth year])Female   MRN: [id number]  Location: [address]  Visit: 05-23-23 Inpatient  Date: 06-12-23 @ 02:14    Admission: Pneumonia due to infectious organism    24 Hour Events:  No acute events. Remains on vent 0/8    Vitals:  T(F): 99.1 (06-12-23 @ 00:00), Max: 100.9 (06-11-23 @ 18:00)  HR: 74 (06-12-23 @ 01:21)  BP: 106/62 (06-12-23 @ 00:00)  RR: 27 (06-12-23 @ 01:00)  SpO2: 100% (06-12-23 @ 01:21)  Mode: AC/ CMV (Assist Control/ Continuous Mandatory Ventilation), RR (machine): 16, TV (machine): 400, FiO2: 40, PEEP: 8, ITime: 1, MAP: 15, PIP: 25  Diet, NPO with Tube Feed:   Tube Feeding Modality: Orogastric  Peptamen Intense VHP  Total Volume for 24 Hours (mL): 1200  Continuous  Starting Tube Feed Rate mL per Hour: 20  Increase Tube Feed Rate by (mL): 20     Every 4 hours  Until Goal Tube Feed Rate (mL per Hour): 50  Tube Feed Duration (in Hours): 24  Tube Feed Start Time: 12:00  Free Water Flush   Total Volume per Flush (mL): 100   Frequency: Every 4 Hours  Diet, NPO after Midnight:      NPO Start Date: 30-May-2023,   NPO Start Time: 23:59  Except Medications    Fluids:   I & O's:    06-10-23 @ 07:01  -  06-11-23 @ 07:00  --------------------------------------------------------  IN:    Dexmedetomidine: 696.7 mL    FentaNYL: 879.8 mL    Free Water: 625 mL    IV PiggyBack: 350 mL    Ketamine: 889.2 mL    Peptamen A.F.: 1200 mL    Propofol: 554.8 mL  Total IN: 5195.5 mL    OUT:    Chest Tube (mL): 20 mL    Rectal Tube (mL): 500 mL    Voided (mL): 2700 mL  Total OUT: 3220 mL    Total NET: 1975.5 mL    PHYSICAL EXAM:  General: NAD  Cardiac: RRR  Respiratory: ETT in place, on ventilator  Abdomen: Soft, non-distended, non-tender, no rebound, no guarding.  Musculoskeletal: FROM in b/l UE and LE  Neuro: Sensation grossly intact and equal throughout, no focal deficits  Skin: Warm/dry, normal color, no jaundice    MEDICATIONS  (STANDING):  aMIOdarone    Tablet 200 milliGRAM(s) Oral two times a day  chlorhexidine 0.12% Liquid 15 milliLiter(s) Oral Mucosa every 12 hours  chlorhexidine 2% Cloths 1 Application(s) Topical daily  chlorhexidine 2% Cloths 1 Application(s) Topical <User Schedule>  dexMEDEtomidine Infusion 0.2 MICROgram(s)/kG/Hr (4.25 mL/Hr) IV Continuous <Continuous>  fentaNYL   Infusion. 0.509 MICROgram(s)/kG/Hr (4.72 mL/Hr) IV Continuous <Continuous>  furosemide   Injectable 40 milliGRAM(s) IV Push daily  insulin lispro (ADMELOG) corrective regimen sliding scale   SubCutaneous three times a day before meals  ketamine Infusion. 0.23 mG/kG/Hr (2.13 mL/Hr) IV Continuous <Continuous>  metroNIDAZOLE    Tablet 500 milliGRAM(s) Oral every 8 hours  pantoprazole   Suspension 40 milliGRAM(s) Oral daily  propofol Infusion 19.957 MICROgram(s)/kG/Min (11.1 mL/Hr) IV Continuous <Continuous>  vancomycin  IVPB 1250 milliGRAM(s) IV Intermittent every 24 hours  vitamin A &amp; D Ointment 1 Application(s) Topical daily    MEDICATIONS  (PRN):  acetaminophen     Tablet .. 650 milliGRAM(s) Oral every 6 hours PRN Temp greater or equal to 38C (100.4F)    DVT PROPHYLAXIS:   GI PROPHYLAXIS: pantoprazole   Suspension 40 milliGRAM(s) Oral daily    ANTICOAGULATION:   ANTIBIOTICS:  metroNIDAZOLE    Tablet 500 milliGRAM(s)  vancomycin  IVPB 1250 milliGRAM(s)    LAB/STUDIES:  Labs:  CAPILLARY BLOOD GLUCOSE    POCT Blood Glucose.: 106 mg/dL (11 Jun 2023 17:01)  POCT Blood Glucose.: 108 mg/dL (11 Jun 2023 11:53)  POCT Blood Glucose.: 114 mg/dL (11 Jun 2023 08:11)                          7.2    10.54 )-----------( 385      ( 11 Jun 2023 16:36 )             23.2       Auto Neutrophil %: 70.8 % (06-11-23 @ 04:45)  Auto Immature Granulocyte %: 0.9 % (06-11-23 @ 04:45)    06-11    141  |  110  |  31<H>  ----------------------------<  93  3.9   |  25  |  0.7      Calcium, Total Serum: 8.0 mg/dL (06-11-23 @ 04:45)      LFTs:             5.4  | <0.2 | 21       ------------------[103     ( 11 Jun 2023 04:45 )  2.0  | x    | 21          Lipase:x      Amylase:x         Blood Gas Arterial, Lactate: 0.60 mmol/L (06-11-23 @ 03:37)  Blood Gas Arterial, Lactate: 0.70 mmol/L (06-10-23 @ 03:31)  Blood Gas Arterial, Lactate: 0.70 mmol/L (06-09-23 @ 03:43)    ABG - ( 11 Jun 2023 03:37 )  pH: 7.42  /  pCO2: 38    /  pO2: 85    / HCO3: 25    / Base Excess: 0.3   /  SaO2: 97.4      ABG - ( 10 Sidney 2023 03:31 )  pH: 7.42  /  pCO2: 43    /  pO2: 110   / HCO3: 28    / Base Excess: 2.9   /  SaO2: 99.1      ABG - ( 09 Jun 2023 03:43 )  pH: 7.40  /  pCO2: 42    /  pO2: 85    / HCO3: 26    / Base Excess: 1.0   /  SaO2: 97.5      Coags:    Culture - Sputum (collected 11 Jun 2023 11:47)  Source: Trach Asp Tracheal Aspirate  Gram Stain (11 Jun 2023 23:26):    Numerous polymorphonuclear leukocytes per low power field    Rare Squamous epithelial cells per low power field    No organisms seen per oil power field    Culture - Blood (collected 10 Sidney 2023 11:41)  Source: .Blood None  Preliminary Report (11 Jun 2023 20:02):    No growth to date.

## 2023-06-12 NOTE — CHART NOTE - NSCHARTNOTEFT_GEN_A_CORE
Patient: ZEYNEP ROSSI  MRN: [id number]  42y Female  Location: [address]  23 @ 07:59    Vitals:  T(F): 97.8 (23 @ 04:00), Max: 100.9 (23 @ 18:00)  HR: 70 (23 @ 06:00) (68 - 92)  BP: 114/70 (23 @ 06:00) (91/51 - 156/91)  RR: 23 (23 @ 06:00) (15 - 35)  SpO2: 100% (23 @ 06:00) (97% - 100%)    Procedure: Tracheostomy and PEG Tube Placement   Consent in Chart: [  ] Yes [  ] No - Will Check   Diet:   Fluids:   EK Lead ECG:   Ventricular Rate 142 BPM    Atrial Rate 187 BPM    QRS Duration 120 ms    Q-T Interval 306 ms    QTC Calculation(Bazett) 470 ms    R Axis 151 degrees    T Axis 132 degrees    Diagnosis Line Atrial fibrillation with rapid ventricular response  Rightbundle branch block  Abnormal ECG    Confirmed by PANFILO COVINGTON MD ([id number]) on 2023 6:53:11 AM (23 @ 04:47)    CXR:  Xray Chest 1 View- PORTABLE-Routine:   ACC: [id number] EXAM:  XR CHEST PORTABLE ROUTINE 1V   ORDERED BY: RAJI ALAMO     PROCEDURE DATE:  2023          INTERPRETATION:  Clinical History / Reason for exam: Follow-up.    Comparison : Chest radiograph [place] 10, 2023.    Technique/Positioning: Low lung volume.    Findings:    Support devices: ETT with its tip above the noe, NGT with its tip   below the diaphragm and left IJ line with tip overlying the SVC.    Cardiac/mediastinum/hilum: Stable.    Lung parenchyma/Pleura: Left lung opacity, unchanged. No pneumothorax is   seen.    Skeleton/soft tissues: Stable.    Impression:    Low lung volume.    Left lung opacity, unchanged.    Support tubes and lines as above.    --- End of Report ---            AMANDA VELARDE MD; Attending Radiologist  This document has been electronically signed. 2023 12:20PM (23 @ 06:59)    Pre-OP Labs:  CAPILLARY BLOOD GLUCOSE      POCT Blood Glucose.: 117 mg/dL (2023 06:09)  POCT Blood Glucose.: 106 mg/dL (2023 17:01)  POCT Blood Glucose.: 108 mg/dL (2023 11:53)  POCT Blood Glucose.: 114 mg/dL (2023 08:11)                          7.7    12.38 )-----------( 409      ( 2023 04:55 )             24.8         139  |  107  |  27<H>  ----------------------------<  111<H>  3.7   |  22  |  0.6<L>    Ca    7.9<L>      2023 04:55  Mg     1.6         TPro  5.6<L>  /  Alb  2.0<L>  /  TBili  0.2  /  DBili  x   /  AST  52<H>  /  ALT  33  /  AlkPhos  161<H>          Type & Screen: Please obtain     Anticoagulation/Antiplatelet:    PLAN:   8 PM labs: CBC with Differential, BMP, Mg, Phos, INR/PT, PTT, Type and Screen  NPO after midnight

## 2023-06-12 NOTE — PROGRESS NOTE ADULT - SUBJECTIVE AND OBJECTIVE BOX
Over Night Events: event noted, remain critically ill, still intubated, ventilated, on ketamine, propofol, precedex, fentanyl. for [facility]    PHYSICAL EXAM    ICU Vital Signs Last 24 Hrs  T(C): 36.6 (12 Jun 2023 04:00), Max: 38.3 (11 Jun 2023 18:00)  T(F): 97.8 (12 Jun 2023 04:00), Max: 100.9 (11 Jun 2023 18:00)  HR: 70 (12 Jun 2023 06:00) (68 - 92)  BP: 114/70 (12 Jun 2023 06:00) (97/59 - 156/91)  BP(mean): 87 (12 Jun 2023 06:00) (72 - 117)  RR: 23 (12 Jun 2023 06:00) (15 - 35)  SpO2: 100% (12 Jun 2023 06:00) (97% - 100%)    O2 Parameters below as of 12 Jun 2023 06:00  Patient On (Oxygen Delivery Method): ventilator    O2 Concentration (%): 40        General: ill looking  Lungs: BL rhonchi  Cardiovascular: Regular   Abdomen: Soft, Positive BS  Extremities: No clubbing   sedated    06-11-23 @ 07:01  -  06-12-23 @ 07:00  --------------------------------------------------------  IN:    Dexmedetomidine: 835.2 mL    Enteral Tube Flush: 100 mL    FentaNYL: 890.4 mL    Free Water: 600 mL    Ketamine: 731.8 mL    Peptamen A.F.: 1200 mL    Propofol: 639.2 mL  Total IN: 4996.6 mL    OUT:    Chest Tube (mL): 5 mL    Rectal Tube (mL): 400 mL    Voided (mL): 3000 mL  Total OUT: 3405 mL    Total NET: 1591.6 mL          LABS:                          7.7    12.38 )-----------( 409      ( 12 Jun 2023 04:55 )             24.8                                               06-12    139  |  107  |  27<H>  ----------------------------<  111<H>  3.7   |  22  |  0.6<L>    Ca    7.9<L>      12 Jun 2023 04:55  Mg     1.6     06-12    TPro  5.6<L>  /  Alb  2.0<L>  /  TBili  0.2  /  DBili  x   /  AST  52<H>  /  ALT  33  /  AlkPhos  161<H>  06-12                                                                                           LIVER FUNCTIONS - ( 12 Jun 2023 04:55 )  Alb: 2.0 g/dL / Pro: 5.6 g/dL / ALK PHOS: 161 U/L / ALT: 33 U/L / AST: 52 U/L / GGT: x                                                  Culture - Sputum (collected 11 Jun 2023 11:47)  Source: Trach Asp Tracheal Aspirate  Gram Stain (11 Jun 2023 23:26):    Numerous polymorphonuclear leukocytes per low power field    Rare Squamous epithelial cells per low power field    No organisms seen per oil power field    Culture - Blood (collected 10 Sidney 2023 11:41)  Source: .Blood None  Preliminary Report (11 Jun 2023 20:02):    No growth to date.                                                   Mode: AC/ CMV (Assist Control/ Continuous Mandatory Ventilation)  RR (machine): 16  TV (machine): 400  FiO2: 40  PEEP: 8  ITime: 1  MAP: 15  PIP: 25                                      ABG - ( 12 Jun 2023 03:10 )  pH, Arterial: 7.39  pH, Blood: x     /  pCO2: 41    /  pO2: 117   / HCO3: 25    / Base Excess: -0.2  /  SaO2: 98.4                MEDICATIONS  (STANDING):  aMIOdarone    Tablet 200 milliGRAM(s) Oral two times a day  chlorhexidine 0.12% Liquid 15 milliLiter(s) Oral Mucosa every 12 hours  chlorhexidine 2% Cloths 1 Application(s) Topical daily  chlorhexidine 2% Cloths 1 Application(s) Topical <User Schedule>  dexMEDEtomidine Infusion 0.2 MICROgram(s)/kG/Hr (4.25 mL/Hr) IV Continuous <Continuous>  fentaNYL   Infusion. 0.509 MICROgram(s)/kG/Hr (4.72 mL/Hr) IV Continuous <Continuous>  furosemide   Injectable 40 milliGRAM(s) IV Push daily  insulin lispro (ADMELOG) corrective regimen sliding scale   SubCutaneous three times a day before meals  ketamine Infusion. 0.23 mG/kG/Hr (2.13 mL/Hr) IV Continuous <Continuous>  metroNIDAZOLE    Tablet 500 milliGRAM(s) Oral every 8 hours  pantoprazole   Suspension 40 milliGRAM(s) Oral daily  propofol Infusion 19.957 MICROgram(s)/kG/Min (11.1 mL/Hr) IV Continuous <Continuous>  vancomycin  IVPB 1250 milliGRAM(s) IV Intermittent every 24 hours  vitamin A &amp; D Ointment 1 Application(s) Topical daily    MEDICATIONS  (PRN):  acetaminophen     Tablet .. 650 milliGRAM(s) Oral every 6 hours PRN Temp greater or equal to 38C (100.4F)    CXR reviewed

## 2023-06-12 NOTE — PROGRESS NOTE ADULT - ASSESSMENT
42 year old female with PMHx Asthma, HTN presents with cough and SOB x3 days and hemoptysis. CT chest showing cavitary pneumonia. Cultures +MRSA bacteremia. Intubated; failed SBT. On Zyvox and Tamiflu. Repeat CT showing worsening PNA.    # Acute hypoxemic respiratory failure 2/2 cavitary MRSA PNA  # MRSA bacteremia, repeat cultures NGTD x3  # +Influenza B  # hx Asthma   - CT Angio Chest PE Protocol: Bilateral patchy groundglass nodularity with dominant confluent left lower lobe opacification with numerous cavitary lesions. Additional contralateral right lower lobe cavitary 1 cm nodule. Findings compatible with cavitary pneumonia in the appropriate clinical setting; differential diagnosis includes tuberculosis.  - Strep/Legionella negative, HIV negative   - intubated 5/23, self-extubated 5/25, re-intubated 5/25  - s/p Mupirocin BID x5 days  - blood cultures +MRSA  - blood cultures 5/28-30: NGTD x3  - ECHO: no vegetations. EF 65%, mild TR and pulm HTN  - repeat CT chest: Diffusely increased number and size of bilateral cavitary lesions with the left lower lobe now demonstrating a consolidative process of the entirelobe.  - D-dimer 1665, LE duplex negative for DVT  - pro-mya 48 > 5.8  - s/p MASTER 5/31: No evidence of valvular vegetations or intracardiac abscesses to support IE. Pulmonary hepatisation incidental finding.   - s/p Tamiflu 30mg q12  - c/w Vancomycin (dosing per pharmacy) and Meropenem 1g q8 (last dose 6/8)  - PCR skin scraping negative for HSV --> d/c Acyclovir   - repeat ET cultures negative   - repeat CT chest: Small left pleural effusion with overall essentially unchanged exam.  - s/p chest tube placement connected to suction --> tPA 6/6 and 6/7  - c/w 40mg Lasix daily   - d/c Solumedrol 40mg qd  - SBT/SAT  - CT surgery for trach/PEG; possible Tuesday 6/13. Plan for preop on Monday, 6/12  - c/w chest tube to suction  - start Flagyl 500mg q8 for anaerobic coverage   - start Propofol and wean fentanyl   - trend triglycerides daily     # New onset A-fib w/ RVR  - s/p Amio drip  - EP consulted  - start Amio 200mg BID for 2 weeks --> then Amio 200mg daily     # LANCE, resolved   - Nephro following; post-infectious GN vs. ATN vs. pulmonary renal syndrome  - Cr 3 > 0.7  - renal U/S: negative   - CK trending down 1483>39>41  - UA: +proteins and blood, Pr/Cr 1.3 (H)  - CHARLES+ 1:640, hep/HIV negative  - no acute indication for RRT    # Anemia  - trend CBC, active type and screen, transfuse <7  - DIC panel negative   - Dimer 1665, LE Duplex negative for DVT  - hold heparin drip. SCDs  - s/p 3u pRBCs; Hgb stable around 7/8  - CTAP: r/o retroperitoneal bleed    #DVT ppx: SCDs  #GI ppx: Pantoprazole 40mg qd  #Diet: NPO w/ tube feed w/ Reglan  #Activity: bedrest   #Dispo: MICU       42 year old female with PMHx Asthma, HTN presents with cough and SOB x3 days and hemoptysis. CT chest showing cavitary pneumonia. Cultures +MRSA bacteremia. Intubated; failed SBT. On Zyvox and Tamiflu. Repeat CT showing worsening PNA.    # Acute hypoxemic respiratory failure 2/2 cavitary MRSA PNA  # MRSA bacteremia, repeat cultures NGTD x3  # +Influenza B  # hx Asthma   - CT Angio Chest PE Protocol: Bilateral patchy groundglass nodularity with dominant confluent left lower lobe opacification with numerous cavitary lesions. Additional contralateral right lower lobe cavitary 1 cm nodule. Findings compatible with cavitary pneumonia in the appropriate clinical setting; differential diagnosis includes tuberculosis.  - Strep/Legionella negative, HIV negative   - intubated 5/23, self-extubated 5/25, re-intubated 5/25  - s/p Mupirocin BID x5 days  - blood cultures +MRSA  - blood cultures 5/28-30: NGTD x3  - ECHO: no vegetations. EF 65%, mild TR and pulm HTN  - repeat CT chest: Diffusely increased number and size of bilateral cavitary lesions with the left lower lobe now demonstrating a consolidative process of the entire lobe.  - D-dimer 1665, LE duplex negative for DVT  - pro-mya 48 > 5.8  - s/p MASTER 5/31: No evidence of valvular vegetations or intracardiac abscesses to support IE. Pulmonary hepatisation incidental finding.   - s/p Tamiflu 30mg q12  - c/w Vancomycin (dosing per pharmacy) and Meropenem 1g q8 (last dose 6/8)  - PCR skin scraping negative for HSV --> d/c Acyclovir   - repeat ET cultures negative   - repeat CT chest: Small left pleural effusion with overall essentially unchanged exam.  - s/p chest tube placement connected to suction --> tPA 6/6 and 6/7  - c/w 40mg Lasix daily   - d/c Solumedrol 40mg qd  - SBT/SAT  - CT surgery for trach/PEG; possible Tuesday 6/13. Plan for preop on Monday, 6/12  - c/w chest tube to suction  - start Flagyl 500mg q8 for anaerobic coverage   - start Propofol and wean fentanyl   - trend triglycerides daily     # New onset A-fib w/ RVR  - s/p Amio drip  - EP consulted  - start Amio 200mg BID for 2 weeks --> then Amio 200mg daily     # LANCE, resolved   - Nephro following; post-infectious GN vs. ATN vs. pulmonary renal syndrome  - Cr 3 > 0.7  - renal U/S: negative   - CK trending down 1483>39>41  - UA: +proteins and blood, Pr/Cr 1.3 (H)  - CHARLES+ 1:640, hep/HIV negative  - no acute indication for RRT    # Anemia  - trend CBC, active type and screen, transfuse <7  - DIC panel negative   - Dimer 1665, LE Duplex negative for DVT  - hold heparin drip. SCDs  - s/p 3u pRBCs; Hgb stable around 7/8  - CTAP: r/o retroperitoneal bleed    #DVT ppx: SCDs  #GI ppx: Pantoprazole 40mg qd  #Diet: NPO w/ tube feed w/ Reglan  #Activity: bedrest   #Dispo: MICU       42 year old female with PMHx Asthma, HTN presents with cough and SOB x3 days and hemoptysis. CT chest showing cavitary pneumonia. Cultures +MRSA bacteremia. Intubated; failed SBT. On Zyvox and Tamiflu. Repeat CT showing worsening PNA.    # Acute hypoxemic respiratory failure 2/2 cavitary MRSA PNA  # MRSA bacteremia, repeat cultures NGTD x3  # +Influenza B  # hx Asthma   - CT Angio Chest PE Protocol: Bilateral patchy groundglass nodularity with dominant confluent left lower lobe opacification with numerous cavitary lesions. Additional contralateral right lower lobe cavitary 1 cm nodule. Findings compatible with cavitary pneumonia in the appropriate clinical setting; differential diagnosis includes tuberculosis.  - Strep/Legionella negative, HIV negative   - intubated 5/23, self-extubated 5/25, re-intubated 5/25  - s/p Mupirocin BID x5 days  - blood cultures +MRSA  - blood cultures 5/28-30: NGTD x3  - ECHO: no vegetations. EF 65%, mild TR and pulm HTN  - repeat CT chest: Diffusely increased number and size of bilateral cavitary lesions with the left lower lobe now demonstrating a consolidative process of the entire lobe.  - D-dimer 1665, LE duplex negative for DVT  - pro-mya 48 > 5.8  - s/p MASTER 5/31: No evidence of valvular vegetations or intracardiac abscesses to support IE. Pulmonary hepatisation incidental finding.   - s/p Tamiflu 30mg q12  - c/w Vancomycin (dosing per pharmacy), Flagyl added 500mg q8 for anaerobic coverage  monitor trough levels  - completed course of Devonte ended 6/8  - PCR skin scraping negative for HSV --> d/c Acyclovir   - repeat ET cultures negative   - repeat CT chest: Small left pleural effusion with overall essentially unchanged exam.  - s/p chest tube placement connected to suction --> tPA 6/6 and 6/7  - CT Sx following: trach and peg scheduled for tmrw  preop labs ordered, consent obtained from mother  - c/w 40mg Lasix daily   - d/c Solumedrol 40mg qd  - SBT/SAT- continues to fail  - on fentanyl 4, propofol 50, ketamine 3, precedex 1.5--> wean fentanyl, daily TG level for propofol    # New onset A-fib w/ RVR  - s/p Amio drip  - EP consulted  - c/w Amio 200mg BID for 2 weeks --> then Amio 200mg daily     # LANCE, resolved   - Nephro following; post-infectious GN vs. ATN vs. pulmonary renal syndrome  - Cr 3 > 0.7  - renal U/S: negative   - CK trending down 1483>39>41  - UA: +proteins and blood, Pr/Cr 1.3 (H)  - CHARLES+ 1:640, hep/HIV negative  - no acute indication for RRT    # Anemia  - trend CBC, active type and screen, transfuse <7  - DIC panel negative   - Dimer 1665, LE Duplex negative for DVT  - hold heparin drip. SCDs  - s/p 3u pRBCs; Hgb stable around 7/8  - CTAP: r/o retroperitoneal bleed    #DVT ppx: SCDs  #GI ppx: Pantoprazole 40mg qd  #Diet: NPO w/ tube feed w/ Reglan  #Activity: bedrest   #Dispo: MICU       42 year old female with PMHx Asthma, HTN presents with cough and SOB x3 days and hemoptysis. CT chest showing cavitary pneumonia. Cultures +MRSA bacteremia. Intubated; failed SBT. On Zyvox and Tamiflu. Repeat CT showing worsening PNA.    # Acute hypoxemic respiratory failure 2/2 cavitary MRSA PNA  # MRSA bacteremia, repeat cultures NGTD x3  # +Influenza B  # hx Asthma   - CT Angio Chest PE Protocol: Bilateral patchy groundglass nodularity with dominant confluent left lower lobe opacification with numerous cavitary lesions. Additional contralateral right lower lobe cavitary 1 cm nodule. Findings compatible with cavitary pneumonia in the appropriate clinical setting; differential diagnosis includes tuberculosis.  - Strep/Legionella negative, HIV negative   - intubated 5/23, self-extubated 5/25, re-intubated 5/25  - s/p Mupirocin BID x5 days  - blood cultures +MRSA  - blood cultures 5/28-30: NGTD x3  - ECHO: no vegetations. EF 65%, mild TR and pulm HTN  - repeat CT chest: Diffusely increased number and size of bilateral cavitary lesions with the left lower lobe now demonstrating a consolidative process of the entire lobe.  - D-dimer 1665, LE duplex negative for DVT  - pro-mya 48 > 5.8  - s/p MASTER 5/31: No evidence of valvular vegetations or intracardiac abscesses to support IE. Pulmonary hepatisation incidental finding.   - s/p Tamiflu 30mg q12  - c/w Vancomycin (dosing per pharmacy), Flagyl added 500mg q8 for anaerobic coverage  monitor trough levels  - completed course of Devonte ended 6/8  - PCR skin scraping negative for HSV --> d/c Acyclovir   - repeat ET cultures negative   - repeat CT chest: Small left pleural effusion with overall essentially unchanged exam.  - s/p chest tube placement connected to suction --> tPA 6/6 and 6/7  - CT Sx following: trach and peg scheduled for tmrw  preop labs ordered, consent obtained from mother  - c/w 40mg Lasix daily   - d/c Solumedrol 40mg qd  - SBT/SAT- continues to fail  - on fentanyl 4, propofol 50, ketamine 3, precedex 1.5--> wean fentanyl, daily TG level for propofol    # New onset A-fib w/ RVR  - s/p Amio drip  - EP consulted  - c/w Amio 200mg BID for 2 weeks --> then Amio 200mg daily     # LANCE, resolved   - Nephro following; post-infectious GN vs. ATN vs. pulmonary renal syndrome  - Cr 3 > 0.7  - renal U/S: negative   - CK trending down 1483>39>41  - UA: +proteins and blood, Pr/Cr 1.3 (H)  - CHARLES+ 1:640, hep/HIV negative  - no acute indication for RRT    # Anemia  - trend CBC, active type and screen, transfuse <7  - DIC panel negative   - Dimer 1665, LE Duplex negative for DVT  - hold heparin drip. SCDs  - s/p 3u pRBCs; Hgb stable around 7/8  - CTAP: r/o retroperitoneal bleed    #Lines- L' IJ CVC since 06/02, will remove/replace following procedure tmrw  #DVT ppx: SCDs  #GI ppx: Pantoprazole 40mg qd  #Diet: NPO w/ tube feed w/ Reglan  #Activity: bedrest   #Dispo: MICU

## 2023-06-13 LAB
ALBUMIN SERPL ELPH-MCNC: 1.9 G/DL — LOW (ref 3.5–5.2)
ALBUMIN SERPL ELPH-MCNC: 2.2 G/DL — LOW (ref 3.5–5.2)
ALP SERPL-CCNC: 253 U/L — HIGH (ref 30–115)
ALP SERPL-CCNC: 328 U/L — HIGH (ref 30–115)
ALT FLD-CCNC: 58 U/L — HIGH (ref 0–41)
ALT FLD-CCNC: 70 U/L — HIGH (ref 0–41)
ANION GAP SERPL CALC-SCNC: 10 MMOL/L — SIGNIFICANT CHANGE UP (ref 7–14)
ANION GAP SERPL CALC-SCNC: 10 MMOL/L — SIGNIFICANT CHANGE UP (ref 7–14)
APTT BLD: 32.9 SEC — SIGNIFICANT CHANGE UP (ref 27–39.2)
AST SERPL-CCNC: 72 U/L — HIGH (ref 0–41)
AST SERPL-CCNC: 91 U/L — HIGH (ref 0–41)
BASE EXCESS BLDA CALC-SCNC: -1.8 MMOL/L — SIGNIFICANT CHANGE UP (ref -2–3)
BASOPHILS # BLD AUTO: 0.05 K/UL — SIGNIFICANT CHANGE UP (ref 0–0.2)
BASOPHILS # BLD AUTO: 0.08 K/UL — SIGNIFICANT CHANGE UP (ref 0–0.2)
BASOPHILS NFR BLD AUTO: 0.4 % — SIGNIFICANT CHANGE UP (ref 0–1)
BASOPHILS NFR BLD AUTO: 0.5 % — SIGNIFICANT CHANGE UP (ref 0–1)
BILIRUB SERPL-MCNC: 0.2 MG/DL — SIGNIFICANT CHANGE UP (ref 0.2–1.2)
BILIRUB SERPL-MCNC: 0.2 MG/DL — SIGNIFICANT CHANGE UP (ref 0.2–1.2)
BLD GP AB SCN SERPL QL: SIGNIFICANT CHANGE UP
BUN SERPL-MCNC: 20 MG/DL — SIGNIFICANT CHANGE UP (ref 10–20)
BUN SERPL-MCNC: 22 MG/DL — HIGH (ref 10–20)
CALCIUM SERPL-MCNC: 8.1 MG/DL — LOW (ref 8.4–10.5)
CALCIUM SERPL-MCNC: 8.1 MG/DL — LOW (ref 8.4–10.5)
CHLORIDE SERPL-SCNC: 104 MMOL/L — SIGNIFICANT CHANGE UP (ref 98–110)
CHLORIDE SERPL-SCNC: 107 MMOL/L — SIGNIFICANT CHANGE UP (ref 98–110)
CO2 SERPL-SCNC: 22 MMOL/L — SIGNIFICANT CHANGE UP (ref 17–32)
CO2 SERPL-SCNC: 23 MMOL/L — SIGNIFICANT CHANGE UP (ref 17–32)
CREAT SERPL-MCNC: 0.5 MG/DL — LOW (ref 0.7–1.5)
CREAT SERPL-MCNC: 0.5 MG/DL — LOW (ref 0.7–1.5)
CULTURE RESULTS: SIGNIFICANT CHANGE UP
EGFR: 120 ML/MIN/1.73M2 — SIGNIFICANT CHANGE UP
EGFR: 120 ML/MIN/1.73M2 — SIGNIFICANT CHANGE UP
EOSINOPHIL # BLD AUTO: 0.25 K/UL — SIGNIFICANT CHANGE UP (ref 0–0.7)
EOSINOPHIL # BLD AUTO: 0.31 K/UL — SIGNIFICANT CHANGE UP (ref 0–0.7)
EOSINOPHIL NFR BLD AUTO: 1.9 % — SIGNIFICANT CHANGE UP (ref 0–8)
EOSINOPHIL NFR BLD AUTO: 2.1 % — SIGNIFICANT CHANGE UP (ref 0–8)
GAS PNL BLDA: SIGNIFICANT CHANGE UP
GLUCOSE BLDC GLUCOMTR-MCNC: 105 MG/DL — HIGH (ref 70–99)
GLUCOSE BLDC GLUCOMTR-MCNC: 85 MG/DL — SIGNIFICANT CHANGE UP (ref 70–99)
GLUCOSE BLDC GLUCOMTR-MCNC: 87 MG/DL — SIGNIFICANT CHANGE UP (ref 70–99)
GLUCOSE SERPL-MCNC: 84 MG/DL — SIGNIFICANT CHANGE UP (ref 70–99)
GLUCOSE SERPL-MCNC: 84 MG/DL — SIGNIFICANT CHANGE UP (ref 70–99)
HCO3 BLDA-SCNC: 24 MMOL/L — SIGNIFICANT CHANGE UP (ref 21–28)
HCT VFR BLD CALC: 23.2 % — LOW (ref 37–47)
HCT VFR BLD CALC: 25.6 % — LOW (ref 37–47)
HGB BLD-MCNC: 7.1 G/DL — LOW (ref 12–16)
HGB BLD-MCNC: 7.8 G/DL — LOW (ref 12–16)
HOROWITZ INDEX BLDA+IHG-RTO: 40 — SIGNIFICANT CHANGE UP
IMM GRANULOCYTES NFR BLD AUTO: 1.4 % — HIGH (ref 0.1–0.3)
IMM GRANULOCYTES NFR BLD AUTO: 1.4 % — HIGH (ref 0.1–0.3)
INR BLD: 1.31 RATIO — HIGH (ref 0.65–1.3)
LYMPHOCYTES # BLD AUTO: 1.34 K/UL — SIGNIFICANT CHANGE UP (ref 1.2–3.4)
LYMPHOCYTES # BLD AUTO: 1.97 K/UL — SIGNIFICANT CHANGE UP (ref 1.2–3.4)
LYMPHOCYTES # BLD AUTO: 10.1 % — LOW (ref 20.5–51.1)
LYMPHOCYTES # BLD AUTO: 13.5 % — LOW (ref 20.5–51.1)
MAGNESIUM SERPL-MCNC: 1.6 MG/DL — LOW (ref 1.8–2.4)
MAGNESIUM SERPL-MCNC: 1.9 MG/DL — SIGNIFICANT CHANGE UP (ref 1.8–2.4)
MCHC RBC-ENTMCNC: 22.5 PG — LOW (ref 27–31)
MCHC RBC-ENTMCNC: 22.5 PG — LOW (ref 27–31)
MCHC RBC-ENTMCNC: 30.5 G/DL — LOW (ref 32–37)
MCHC RBC-ENTMCNC: 30.6 G/DL — LOW (ref 32–37)
MCV RBC AUTO: 73.7 FL — LOW (ref 81–99)
MCV RBC AUTO: 73.8 FL — LOW (ref 81–99)
MONOCYTES # BLD AUTO: 0.92 K/UL — HIGH (ref 0.1–0.6)
MONOCYTES # BLD AUTO: 1 K/UL — HIGH (ref 0.1–0.6)
MONOCYTES NFR BLD AUTO: 6.8 % — SIGNIFICANT CHANGE UP (ref 1.7–9.3)
MONOCYTES NFR BLD AUTO: 6.9 % — SIGNIFICANT CHANGE UP (ref 1.7–9.3)
NEUTROPHILS # BLD AUTO: 10.57 K/UL — HIGH (ref 1.4–6.5)
NEUTROPHILS # BLD AUTO: 11.05 K/UL — HIGH (ref 1.4–6.5)
NEUTROPHILS NFR BLD AUTO: 75.7 % — HIGH (ref 42.2–75.2)
NEUTROPHILS NFR BLD AUTO: 79.3 % — HIGH (ref 42.2–75.2)
NRBC # BLD: 0 /100 WBCS — SIGNIFICANT CHANGE UP (ref 0–0)
NRBC # BLD: 0 /100 WBCS — SIGNIFICANT CHANGE UP (ref 0–0)
PCO2 BLDA: 42 MMHG — SIGNIFICANT CHANGE UP (ref 25–48)
PH BLDA: 7.36 — SIGNIFICANT CHANGE UP (ref 7.35–7.45)
PHOSPHATE SERPL-MCNC: 3.7 MG/DL — SIGNIFICANT CHANGE UP (ref 2.1–4.9)
PLATELET # BLD AUTO: 377 K/UL — SIGNIFICANT CHANGE UP (ref 130–400)
PLATELET # BLD AUTO: 440 K/UL — HIGH (ref 130–400)
PMV BLD: 9.6 FL — SIGNIFICANT CHANGE UP (ref 7.4–10.4)
PMV BLD: 9.6 FL — SIGNIFICANT CHANGE UP (ref 7.4–10.4)
PO2 BLDA: 98 MMHG — SIGNIFICANT CHANGE UP (ref 83–108)
POTASSIUM SERPL-MCNC: 3.4 MMOL/L — LOW (ref 3.5–5)
POTASSIUM SERPL-MCNC: 3.8 MMOL/L — SIGNIFICANT CHANGE UP (ref 3.5–5)
POTASSIUM SERPL-SCNC: 3.4 MMOL/L — LOW (ref 3.5–5)
POTASSIUM SERPL-SCNC: 3.8 MMOL/L — SIGNIFICANT CHANGE UP (ref 3.5–5)
PROT SERPL-MCNC: 5.5 G/DL — LOW (ref 6–8)
PROT SERPL-MCNC: 5.9 G/DL — LOW (ref 6–8)
PROTHROM AB SERPL-ACNC: 15 SEC — HIGH (ref 9.95–12.87)
RBC # BLD: 3.15 M/UL — LOW (ref 4.2–5.4)
RBC # BLD: 3.47 M/UL — LOW (ref 4.2–5.4)
RBC # FLD: 28.7 % — HIGH (ref 11.5–14.5)
RBC # FLD: 29.1 % — HIGH (ref 11.5–14.5)
SAO2 % BLDA: 98.3 % — HIGH (ref 94–98)
SODIUM SERPL-SCNC: 137 MMOL/L — SIGNIFICANT CHANGE UP (ref 135–146)
SODIUM SERPL-SCNC: 139 MMOL/L — SIGNIFICANT CHANGE UP (ref 135–146)
SPECIMEN SOURCE: SIGNIFICANT CHANGE UP
WBC # BLD: 13.31 K/UL — HIGH (ref 4.8–10.8)
WBC # BLD: 14.61 K/UL — HIGH (ref 4.8–10.8)
WBC # FLD AUTO: 13.31 K/UL — HIGH (ref 4.8–10.8)
WBC # FLD AUTO: 14.61 K/UL — HIGH (ref 4.8–10.8)

## 2023-06-13 PROCEDURE — 93970 EXTREMITY STUDY: CPT | Mod: 26

## 2023-06-13 PROCEDURE — 71045 X-RAY EXAM CHEST 1 VIEW: CPT | Mod: 26,76

## 2023-06-13 PROCEDURE — 99291 CRITICAL CARE FIRST HOUR: CPT

## 2023-06-13 RX ORDER — POTASSIUM CHLORIDE 20 MEQ
40 PACKET (EA) ORAL EVERY 4 HOURS
Refills: 0 | Status: COMPLETED | OUTPATIENT
Start: 2023-06-13 | End: 2023-06-13

## 2023-06-13 RX ORDER — HEPARIN SODIUM 5000 [USP'U]/ML
5000 INJECTION INTRAVENOUS; SUBCUTANEOUS ONCE
Refills: 0 | Status: COMPLETED | OUTPATIENT
Start: 2023-06-13 | End: 2023-06-13

## 2023-06-13 RX ORDER — MIDAZOLAM HYDROCHLORIDE 1 MG/ML
2 INJECTION, SOLUTION INTRAMUSCULAR; INTRAVENOUS ONCE
Refills: 0 | Status: DISCONTINUED | OUTPATIENT
Start: 2023-06-13 | End: 2023-06-13

## 2023-06-13 RX ORDER — MAGNESIUM SULFATE 500 MG/ML
2 VIAL (ML) INJECTION ONCE
Refills: 0 | Status: COMPLETED | OUTPATIENT
Start: 2023-06-13 | End: 2023-06-13

## 2023-06-13 RX ADMIN — CHLORHEXIDINE GLUCONATE 15 MILLILITER(S): 213 SOLUTION TOPICAL at 05:03

## 2023-06-13 RX ADMIN — CHLORHEXIDINE GLUCONATE 15 MILLILITER(S): 213 SOLUTION TOPICAL at 17:35

## 2023-06-13 RX ADMIN — Medication 300 MILLIGRAM(S): at 13:15

## 2023-06-13 RX ADMIN — Medication 500 MILLIGRAM(S): at 13:11

## 2023-06-13 RX ADMIN — Medication 40 MILLIEQUIVALENT(S): at 17:58

## 2023-06-13 RX ADMIN — CHLORHEXIDINE GLUCONATE 1 APPLICATION(S): 213 SOLUTION TOPICAL at 05:03

## 2023-06-13 RX ADMIN — AMIODARONE HYDROCHLORIDE 200 MILLIGRAM(S): 400 TABLET ORAL at 05:03

## 2023-06-13 RX ADMIN — AMIODARONE HYDROCHLORIDE 200 MILLIGRAM(S): 400 TABLET ORAL at 17:35

## 2023-06-13 RX ADMIN — MIDAZOLAM HYDROCHLORIDE 2 MILLIGRAM(S): 1 INJECTION, SOLUTION INTRAMUSCULAR; INTRAVENOUS at 04:35

## 2023-06-13 RX ADMIN — Medication 40 MILLIGRAM(S): at 05:03

## 2023-06-13 RX ADMIN — Medication 40 MILLIEQUIVALENT(S): at 21:59

## 2023-06-13 RX ADMIN — Medication 500 MILLIGRAM(S): at 21:59

## 2023-06-13 RX ADMIN — Medication 1 APPLICATION(S): at 13:10

## 2023-06-13 RX ADMIN — PANTOPRAZOLE SODIUM 40 MILLIGRAM(S): 20 TABLET, DELAYED RELEASE ORAL at 13:08

## 2023-06-13 RX ADMIN — Medication 25 GRAM(S): at 17:58

## 2023-06-13 RX ADMIN — Medication 500 MILLIGRAM(S): at 05:03

## 2023-06-13 RX ADMIN — KETAMINE HYDROCHLORIDE 2.13 MG/KG/HR: 100 INJECTION INTRAMUSCULAR; INTRAVENOUS at 15:27

## 2023-06-13 NOTE — PROCEDURE NOTE - NSPOSTPRCRAD_GEN_A_CORE
at level of noe/central line located in the/central line located in the superior vena cava/post-procedure radiography performed

## 2023-06-13 NOTE — PROGRESS NOTE ADULT - SUBJECTIVE AND OBJECTIVE BOX
GENERAL SURGERY PROGRESS NOTE    Patient: ZEYNEP ROSSI , 42y ([birth year])Female   MRN: [id number]  Location: [address]  Visit: 05-23-23 Inpatient  Date: 06-13-23 @ 04:12      Procedure/Dx/Injuries: needs trach and PEG    PAST MEDICAL & SURGICAL HISTORY:      Vitals:   T(F): 99.3 (06-12-23 @ 20:00), Max: 99.3 (06-12-23 @ 20:00)  HR: 75 (06-13-23 @ 03:00)  BP: 98/54 (06-13-23 @ 03:00)  RR: 17 (06-13-23 @ 03:00)  SpO2: 100% (06-13-23 @ 03:00)  Mode: AC/ CMV (Assist Control/ Continuous Mandatory Ventilation), RR (machine): 16, TV (machine): 400, FiO2: 40, PEEP: 8, ITime: 1, MAP: 13, PIP: 29    Diet, NPO after Midnight:      NPO Start Date: 12-Jun-2023,   NPO Start Time: 23:59  Except Medications  Diet, NPO with Tube Feed:   Tube Feeding Modality: Orogastric  Peptamen Intense VHP  Total Volume for 24 Hours (mL): 1200  Continuous  Starting Tube Feed Rate mL per Hour: 20  Increase Tube Feed Rate by (mL): 20     Every 4 hours  Until Goal Tube Feed Rate (mL per Hour): 50  Tube Feed Duration (in Hours): 24  Tube Feed Start Time: 12:00  Free Water Flush   Total Volume per Flush (mL): 100   Frequency: Every 4 Hours  Diet, NPO after Midnight:      NPO Start Date: 30-May-2023,   NPO Start Time: 23:59  Except Medications      Fluids:     I & O's:    06-11-23 @ 07:01  -  06-12-23 @ 07:00  --------------------------------------------------------  IN:    Dexmedetomidine: 835.2 mL    Enteral Tube Flush: 100 mL    FentaNYL: 890.4 mL    Free Water: 600 mL    Ketamine: 731.8 mL    Peptamen A.F.: 1200 mL    Propofol: 639.2 mL  Total IN: 4996.6 mL    OUT:    Chest Tube (mL): 5 mL    Rectal Tube (mL): 400 mL    Voided (mL): 3000 mL  Total OUT: 3405 mL    Total NET: 1591.6 mL        Bowel Movement: : [] YES [] NO  Flatus: : [] YES [] NO    PHYSICAL EXAM:  General: NAD, intubated  Cardiac:  S1, S2,   Respiratory: bilateral breath sounds  Abdomen: Soft, non-distended,    MEDICATIONS  (STANDING):  aMIOdarone    Tablet 200 milliGRAM(s) Oral two times a day  chlorhexidine 0.12% Liquid 15 milliLiter(s) Oral Mucosa every 12 hours  chlorhexidine 2% Cloths 1 Application(s) Topical <User Schedule>  chlorhexidine 2% Cloths 1 Application(s) Topical daily  dexMEDEtomidine Infusion 0.2 MICROgram(s)/kG/Hr (4.25 mL/Hr) IV Continuous <Continuous>  fentaNYL   Infusion. 0.509 MICROgram(s)/kG/Hr (4.72 mL/Hr) IV Continuous <Continuous>  furosemide   Injectable 40 milliGRAM(s) IV Push daily  insulin lispro (ADMELOG) corrective regimen sliding scale   SubCutaneous three times a day before meals  ketamine Infusion. 0.23 mG/kG/Hr (2.13 mL/Hr) IV Continuous <Continuous>  metroNIDAZOLE    Tablet 500 milliGRAM(s) Oral every 8 hours  pantoprazole   Suspension 40 milliGRAM(s) Oral daily  propofol Infusion 19.957 MICROgram(s)/kG/Min (11.1 mL/Hr) IV Continuous <Continuous>  vancomycin  IVPB 1500 milliGRAM(s) IV Intermittent every 24 hours  vitamin A &amp; D Ointment 1 Application(s) Topical daily    MEDICATIONS  (PRN):  acetaminophen     Tablet .. 650 milliGRAM(s) Oral every 6 hours PRN Temp greater or equal to 38C (100.4F)      DVT PROPHYLAXIS:   GI PROPHYLAXIS: pantoprazole   Suspension 40 milliGRAM(s) Oral daily    ANTICOAGULATION:   ANTIBIOTICS:  metroNIDAZOLE    Tablet 500 milliGRAM(s)  vancomycin  IVPB 1500 milliGRAM(s)            LAB/STUDIES:  Labs:  CAPILLARY BLOOD GLUCOSE      POCT Blood Glucose.: 103 mg/dL (12 Jun 2023 18:24)  POCT Blood Glucose.: 116 mg/dL (12 Jun 2023 11:31)  POCT Blood Glucose.: 117 mg/dL (12 Jun 2023 06:09)                          7.7    12.38 )-----------( 409      ( 12 Jun 2023 04:55 )             24.8       Auto Neutrophil %: 77.9 % (06-12-23 @ 04:55)  Auto Immature Granulocyte %: 1.1 % (06-12-23 @ 04:55)    06-12    139  |  107  |  27<H>  ----------------------------<  111<H>  3.7   |  22  |  0.6<L>      Calcium, Total Serum: 7.9 mg/dL (06-12-23 @ 04:55)      LFTs:             5.6  | 0.2  | 52       ------------------[161     ( 12 Jun 2023 04:55 )  2.0  | x    | 33          Lipase:x      Amylase:x         Blood Gas Arterial, Lactate: 0.50 mmol/L (06-12-23 @ 03:10)  Blood Gas Arterial, Lactate: 0.60 mmol/L (06-11-23 @ 03:37)    ABG - ( 12 Jun 2023 03:10 )  pH: 7.39  /  pCO2: 41    /  pO2: 117   / HCO3: 25    / Base Excess: -0.2  /  SaO2: 98.4            ABG - ( 11 Jun 2023 03:37 )  pH: 7.42  /  pCO2: 38    /  pO2: 85    / HCO3: 25    / Base Excess: 0.3   /  SaO2: 97.4            ABG - ( 10 [place] 2023 03:31 )  pH: 7.42  /  pCO2: 43    /  pO2: 110   / HCO3: 28    / Base Excess: 2.9   /  SaO2: 99.1        Culture - Sputum (collected 11 Jun 2023 11:47)  Source: Trach Asp Tracheal Aspirate  Gram Stain (11 Jun 2023 23:26):    Numerous polymorphonuclear leukocytes per low power field    Rare Squamous epithelial cells per low power field    No organisms seen per oil power field  Preliminary Report (12 Jun 2023 15:28):    Normal Respiratory Laila present    Culture - Blood (collected 10 [place] 2023 11:41)  Source: .Blood None  Preliminary Report (11 Jun 2023 20:02):    No growth to date.      IMAGING:  < from: Xray Chest 1 View- PORTABLE-Routine (Xray Chest 1 View- PORTABLE-Routine in AM.) (06.12.23 @ 06:13) >  Impression:    Left-sided opacities and effusion without significant change.    < end of copied text >

## 2023-06-13 NOTE — ADVANCED PRACTICE NURSE CONSULT - ASSESSMENT
History of Present Illness:   42 year-old female with PMH of Asthma, HTN? presents with complaint of cough x3 days and SOB. During my encounter pt with dyspnea and increased work of breathing and chest pain, unable to properly provide accurate history. She states that her cough has been progressively worsening over the past 3 days, endorses hemoptysis since yesterday. She states she had a friend who came over and stayed with for more than a week who was sick recently, but she does not know if she recently travelled out of [facility] or not. She also admits that her children has been sick with Sore throat and fever for past 3 days. She admits to fever, chills, sever Chest pain which has gotten worse since she came to the ED. She took 1x Tamiflu yesterday. She otherwise denies diarrhea, constipation, urinary symptoms. She is not vaccinated for Influenza or COVID-19.     Allergies and Intolerances:        Allergies:  	No Known Allergies:     Home Medications:   * Outpatient Medication Status not yet specified    Patient History:    Social History:  · Substance use	No     Tobacco Screening:  · Core Measure Site	No    Patient received lying in bed. Intubated and sedated. Limited mobility. High risk for pressure injury.    Type of Wound: Deep Tissue Injury  Location: Sacrococcygeal Region  Tunneling /Undermining: No  Wound bed: Dark purple nonblanchable tissue, blood filled blister to area present  Wound edges: Intact  Periwound: Intact  Wound exudate: None  Wound odor: No  Induration, erythema, warmth: No  Wound pain: No    Skin to bilateral heels intact at time of assessment.

## 2023-06-13 NOTE — PROGRESS NOTE ADULT - ASSESSMENT
ASSESSMENT:  42yF w/ left pleural effusion, s/p chest tube placement, s/p tpa placement on 6/6 and 6/7    PLAN:  - OR for Trach & PEG Tuesday today   - keep NPO, IVF  - follow up preop labs, replete as needed   - chest tube to suction,  - AM CXR daily  - Monitor chest tube output    Thoracic Surgery  SPECTRA [id number]

## 2023-06-13 NOTE — CHART NOTE - NSCHARTNOTEFT_GEN_A_CORE
Registered Dietitian Follow-Up     Patient Profile Reviewed                           Yes [x]   No []     Nutrition History Previously Obtained        Yes []  No [x]       Pertinent Medical Interventions: Pt continues to be managed for respiratory failure, Severe cavitary CAP MRSA, Small parapneumonic effusion SP Chest tube and LANCE. For trach/PEG tomorrow->NPO @midnight.      Diet order: Diet, NPO after Midnight:      NPO Start Date: 2023,   NPO Start Time: 23:59  Except Medications (23 @ 09:20) [Active]  Diet, NPO with Tube Feed:   Tube Feeding Modality: Orogastric  Peptamen Intense VHP  Total Volume for 24 Hours (mL): 1200  Continuous  Starting Tube Feed Rate {mL per Hour}: 20  Increase Tube Feed Rate by (mL): 20     Every 4 hours  Until Goal Tube Feed Rate (mL per Hour): 50  Tube Feed Duration (in Hours): 24  Tube Feed Start Time: 12:00  Free Water Flush   Total Volume per Flush (mL): 100   Frequency: Every 4 Hours (23 @ 08:49) [Active]  Diet, NPO after Midnight:      NPO Start Date: 30-May-2023,   NPO Start Time: 23:59  Except Medications (23 @ 20:50) [Active]    Anthropometrics:  Height (cm): 160 (23 @ 08:15)  Weight (kg): 85 (23 @ 08:15)  BMI (kg/m2): 33.2 (23 @ 08:15)  IBW: 52.1 kg  UBW: N/A    Daily Weight in k.2 (), Weight in k.7 (), Weight in k.5 (), Weight in k (06-10), Weight in k.6 (), Weight in k.6 (), Weight in k.8 ()  Weight Change: trending us likely reflective of fluid retention     MEDICATIONS  (STANDING):  aMIOdarone    Tablet 200 milliGRAM(s) Oral two times a day  chlorhexidine 0.12% Liquid 15 milliLiter(s) Oral Mucosa every 12 hours  chlorhexidine 2% Cloths 1 Application(s) Topical daily  chlorhexidine 2% Cloths 1 Application(s) Topical <User Schedule>  dexMEDEtomidine Infusion 0.2 MICROgram(s)/kG/Hr (4.25 mL/Hr) IV Continuous <Continuous>  fentaNYL   Infusion. 0.509 MICROgram(s)/kG/Hr (4.72 mL/Hr) IV Continuous <Continuous>  furosemide   Injectable 40 milliGRAM(s) IV Push daily  insulin lispro (ADMELOG) corrective regimen sliding scale   SubCutaneous three times a day before meals  ketamine Infusion. 0.23 mG/kG/Hr (2.13 mL/Hr) IV Continuous <Continuous>  metroNIDAZOLE    Tablet 500 milliGRAM(s) Oral every 8 hours  pantoprazole   Suspension 40 milliGRAM(s) Oral daily  propofol Infusion 19.957 MICROgram(s)/kG/Min IV Continuous <Continuous>  -- 27.8ml/hr = 734kcal/day   vancomycin  IVPB 1500 milliGRAM(s) IV Intermittent every 24 hours  vitamin A &amp; D Ointment 1 Application(s) Topical daily    MEDICATIONS  (PRN):  acetaminophen     Tablet .. 650 milliGRAM(s) Oral every 6 hours PRN Temp greater or equal to 38C (100.4F)    Pertinent Labs:  @ 04:50: Na 139, BUN 22<H>, Cr 0.5<L>, BG 84, K+ 3.8, Phos 3.7, Mg 1.9, Alk Phos 253<H>, ALT/SGPT 58<H>, AST/SGOT 72<H>, HbA1c --    Finger Sticks:  POCT Blood Glucose.: 105 mg/dL ( @ 06:44)  POCT Blood Glucose.: 103 mg/dL ( @ 18:24)    Physical Findings:  - Appearance: sedated  - GI function: abdomen rounded; last bowel movement --liquid/loose bowel movements since   - Tubes: OG tube  - Oral/Mouth cavity: NPO   - Skin: left upper arm skin tear; sacral DTI  - Edema: 1+ generalized edema      Nutrition Requirements:  Weight Used: dosing weight 85kg; IBW 52.1 kg     Estimated Energy Needs    Continue [x]  Adjust []  Energy Recommendations: 935-1190 kcal/day - 11-14 kcal/kg IBW     Estimated Protein Needs    Continue [x]  Adjust []  Protein Recommendations: 104 gm/day - 2 g/kg IBW     Estimated Fluid Needs        Continue [x]  Adjust []  Fluid Recommendations: 2975 mL/day - adjusted fluid needs for BMI>30    Nutrient Intake: current TF provides 1200kcal, 110g protein, 1008+1800ml free water    [x] Previous Nutrition Diagnosis:  Increased Nutrient Needs (protein)            [x] Ongoing          [] Resolved     Nutrition Education: N/A     Goal/Expected Outcome: Pt to meet >90% & <105% estimated needs via EN in 3-5 days. RD to follow as per high risk protocol.     Indicator/Monitoring: Monitor diet order, kcal intake, body composition, NFPE, labs  (lytes, BG, renal indices)    Recommendation:  Peptamen Intense VHP, decrease to 20ml/hr. Add No Carb Prosource (1pkg = 15gms Protein) 1pkt 4x/day, Banatrol 2x/day. -- will provide 800kcal, 104g protein, 403ml free water. +734kcal/day from propofol.     Patient assessed at high nutrition risk.  RD spectra [phone], also available on Teams. Registered Dietitian Follow-Up     Patient Profile Reviewed                           Yes [x]   No []     Nutrition History Previously Obtained        Yes []  No [x]       Pertinent Medical Interventions: Pt continues to be managed for respiratory failure, Severe cavitary CAP MRSA, Small parapneumonic effusion SP Chest tube and LANCE. For trach/PEG tomorrow->NPO @midnight.      Diet order: Diet, NPO after Midnight:      NPO Start Date: 2023,   NPO Start Time: 23:59  Except Medications (23 @ 09:20) [Active]  Diet, NPO with Tube Feed:   Tube Feeding Modality: Orogastric  Peptamen Intense VHP  Total Volume for 24 Hours (mL): 1200  Continuous  Starting Tube Feed Rate {mL per Hour}: 20  Increase Tube Feed Rate by (mL): 20     Every 4 hours  Until Goal Tube Feed Rate (mL per Hour): 50  Tube Feed Duration (in Hours): 24  Tube Feed Start Time: 12:00  Free Water Flush   Total Volume per Flush (mL): 100   Frequency: Every 4 Hours (23 @ 08:49) [Active]  Diet, NPO after Midnight:      NPO Start Date: 30-May-2023,   NPO Start Time: 23:59  Except Medications (23 @ 20:50) [Active]    Anthropometrics:  Height (cm): 160 (23 @ 08:15)  Weight (kg): 85 (23 @ 08:15)  BMI (kg/m2): 33.2 (23 @ 08:15)  IBW: 52.1 kg  UBW: N/A    Daily Weight in k.2 (), Weight in k.7 (), Weight in k.5 (), Weight in k (06-10), Weight in k.6 (), Weight in k.6 (), Weight in k.8 ()  Weight Change: trending us likely reflective of fluid retention     MEDICATIONS  (STANDING):  aMIOdarone    Tablet 200 milliGRAM(s) Oral two times a day  chlorhexidine 0.12% Liquid 15 milliLiter(s) Oral Mucosa every 12 hours  chlorhexidine 2% Cloths 1 Application(s) Topical daily  chlorhexidine 2% Cloths 1 Application(s) Topical <User Schedule>  dexMEDEtomidine Infusion 0.2 MICROgram(s)/kG/Hr (4.25 mL/Hr) IV Continuous <Continuous>  fentaNYL   Infusion. 0.509 MICROgram(s)/kG/Hr (4.72 mL/Hr) IV Continuous <Continuous>  furosemide   Injectable 40 milliGRAM(s) IV Push daily  insulin lispro (ADMELOG) corrective regimen sliding scale   SubCutaneous three times a day before meals  ketamine Infusion. 0.23 mG/kG/Hr (2.13 mL/Hr) IV Continuous <Continuous>  metroNIDAZOLE    Tablet 500 milliGRAM(s) Oral every 8 hours  pantoprazole   Suspension 40 milliGRAM(s) Oral daily  propofol Infusion 19.957 MICROgram(s)/kG/Min IV Continuous <Continuous>  -- 27.8ml/hr = 734kcal/day   vancomycin  IVPB 1500 milliGRAM(s) IV Intermittent every 24 hours  vitamin A &amp; D Ointment 1 Application(s) Topical daily    MEDICATIONS  (PRN):  acetaminophen     Tablet .. 650 milliGRAM(s) Oral every 6 hours PRN Temp greater or equal to 38C (100.4F)    Pertinent Labs:                         7.8    14.61 )-----------( 440      ( 2023 04:50 )             25.6      @ 04:50: Na 139, BUN 22<H>, Cr 0.5<L>, BG 84, K+ 3.8, Phos 3.7, Mg 1.9, Alk Phos 253<H>, ALT/SGPT 58<H>, AST/SGOT 72<H>, HbA1c --    Finger Sticks:  POCT Blood Glucose.: 105 mg/dL ( @ 06:44)  POCT Blood Glucose.: 103 mg/dL ( @ 18:24)    I&O's Detail  2023 07:01  -  2023 07:00  --------------------------------------------------------  IN:    Dexmedetomidine: 835.2 mL    Enteral Tube Flush: 400 mL    FentaNYL: 973.2 mL    Free Water: 500 mL    IV PiggyBack: 50 mL    Ketamine: 986.4 mL    Peptamen A.F.: 500 mL    Propofol: 667.2 mL  Total IN: 4912 mL    OUT:    Chest Tube (mL): 0 mL    Intermittent Catheterization - Urethral (mL): 420 mL    Rectal Tube (mL): 650 mL    Voided (mL): 3300 mL  Total OUT: 4370 mL    Total NET: 542 mL    Physical Findings:  - Appearance: sedated  - GI function: abdomen rounded; last bowel movement --liquid/loose bowel movements since   - Tubes: OG tube  - Oral/Mouth cavity: NPO   - Skin: left upper arm skin tear; sacral DTI  - Edema: 1+ generalized edema      Nutrition Requirements:  Weight Used: dosing weight 85kg; IBW 52.1 kg     Estimated Energy Needs    Continue [x]  Adjust []  Energy Recommendations: 935-1190 kcal/day - 11-14 kcal/kg IBW     Estimated Protein Needs    Continue [x]  Adjust []  Protein Recommendations: 104 gm/day - 2 g/kg IBW     Estimated Fluid Needs        Continue [x]  Adjust []  Fluid Recommendations: 2975 mL/day - adjusted fluid needs for BMI>30    Nutrient Intake: current TF provides 1200kcal, 110g protein, 1008+1800ml free water    [x] Previous Nutrition Diagnosis:  Increased Nutrient Needs (protein)            [x] Ongoing          [] Resolved     Nutrition Education: N/A     Goal/Expected Outcome: Pt to meet >90% & <105% estimated needs via EN in 3-5 days. RD to follow as per high risk protocol.     Indicator/Monitoring: Monitor diet order, kcal intake, body composition, NFPE, labs  (lytes, BG, renal indices)    Recommendation:  - Peptamen Intense VHP, decrease to 20ml/hr. Add No Carb Prosource (1pkg = 15gms Protein) 1pkt 4x/day, Banatrol 2x/day. -- will provide 800kcal, 104g protein, 403ml free water. +734kcal/day from propofol.   - add multivitamin with minerals     Patient assessed at high nutrition risk.  RD spectra [phone], also available on Teams.

## 2023-06-13 NOTE — PROCEDURE NOTE - NSPROCDETAILS_GEN_ALL_CORE
guidewire recovered/lumen(s) aspirated and flushed/sterile dressing applied/sterile technique, catheter placed/ultrasound guidance with use of sterile gel and probe cove
Seldinger technique/dressing applied/secured in place/sterile dressing applied/supine position/Trendelenburg position/percutaneous/thoracostomy tube placed percutaneously
patient pre-oxygenated, tube inserted, placement confirmed
guidewire recovered/lumen(s) aspirated and flushed/sterile dressing applied/sterile technique, catheter placed/ultrasound guidance with use of sterile gel and probe cove

## 2023-06-13 NOTE — PROGRESS NOTE ADULT - SUBJECTIVE AND OBJECTIVE BOX
Over Night Events: events noted, remain critically ill, still intubated, ventilated, on propofol, fent, precedex, ketamine, or trach  PHYSICAL EXAM    ICU Vital Signs Last 24 Hrs  T(C): 36.8 (13 Jun 2023 04:00), Max: 37.4 (12 Jun 2023 20:00)  T(F): 98.3 (13 Jun 2023 04:00), Max: 99.3 (12 Jun 2023 20:00)  HR: 76 (13 Jun 2023 07:00) (71 - 84)  BP: 109/60 (13 Jun 2023 07:00) (94/51 - 136/71)  BP(mean): 78 (13 Jun 2023 07:00) (65 - 101)  RR: 24 (13 Jun 2023 07:00) (17 - 38)  SpO2: 100% (13 Jun 2023 07:00) (98% - 100%)    O2 Parameters below as of 13 Jun 2023 07:00  Patient On (Oxygen Delivery Method): ventilator    O2 Concentration (%): 40        General: ill looking  ett  Lungs: Bilateral BS  Cardiovascular: Regular   Abdomen: Soft, Positive BS  Extremities: No clubbing   sedated      06-12-23 @ 07:01  -  06-13-23 @ 07:00  --------------------------------------------------------  IN:    Dexmedetomidine: 835.2 mL    Enteral Tube Flush: 400 mL    FentaNYL: 973.2 mL    Free Water: 500 mL    IV PiggyBack: 50 mL    Ketamine: 986.4 mL    Peptamen A.F.: 500 mL    Propofol: 667.2 mL  Total IN: 4912 mL    OUT:    Chest Tube (mL): 0 mL    Intermittent Catheterization - Urethral (mL): 420 mL    Rectal Tube (mL): 650 mL    Voided (mL): 3300 mL  Total OUT: 4370 mL    Total NET: 542 mL          LABS:                          7.8    14.61 )-----------( 440      ( 13 Jun 2023 04:50 )             25.6                                               06-13    139  |  107  |  22<H>  ----------------------------<  84  3.8   |  22  |  0.5<L>    Ca    8.1<L>      13 Jun 2023 04:50  Phos  3.7     06-13  Mg     1.9     06-13    TPro  5.9<L>  /  Alb  2.2<L>  /  TBili  0.2  /  DBili  x   /  AST  72<H>  /  ALT  58<H>  /  AlkPhos  253<H>  06-13      PT/INR - ( 13 Jun 2023 04:50 )   PT: 15.00 sec;   INR: 1.31 ratio         PTT - ( 13 Jun 2023 04:50 )  PTT:32.9 sec                                                                                     LIVER FUNCTIONS - ( 13 Jun 2023 04:50 )  Alb: 2.2 g/dL / Pro: 5.9 g/dL / ALK PHOS: 253 U/L / ALT: 58 U/L / AST: 72 U/L / GGT: x                                                  Culture - Sputum (collected 11 Jun 2023 11:47)  Source: Trach Asp Tracheal Aspirate  Gram Stain (11 Jun 2023 23:26):    Numerous polymorphonuclear leukocytes per low power field    Rare Squamous epithelial cells per low power field    No organisms seen per oil power field  Preliminary Report (12 Jun 2023 15:28):    Normal Respiratory Laila present    Culture - Blood (collected 10 Sidney 2023 11:41)  Source: .Blood None  Preliminary Report (11 Jun 2023 20:02):    No growth to date.                                                   Mode: AC/ CMV (Assist Control/ Continuous Mandatory Ventilation)  RR (machine): 16  TV (machine): 400  FiO2: 40  PEEP: 8  ITime: 1  MAP: 15  PIP: 46                                      ABG - ( 13 Jun 2023 03:40 )  pH, Arterial: 7.36  pH, Blood: x     /  pCO2: 42    /  pO2: 98    / HCO3: 24    / Base Excess: -1.8  /  SaO2: 98.3                MEDICATIONS  (STANDING):  aMIOdarone    Tablet 200 milliGRAM(s) Oral two times a day  chlorhexidine 0.12% Liquid 15 milliLiter(s) Oral Mucosa every 12 hours  chlorhexidine 2% Cloths 1 Application(s) Topical daily  chlorhexidine 2% Cloths 1 Application(s) Topical <User Schedule>  dexMEDEtomidine Infusion 0.2 MICROgram(s)/kG/Hr (4.25 mL/Hr) IV Continuous <Continuous>  fentaNYL   Infusion. 0.509 MICROgram(s)/kG/Hr (4.72 mL/Hr) IV Continuous <Continuous>  furosemide   Injectable 40 milliGRAM(s) IV Push daily  insulin lispro (ADMELOG) corrective regimen sliding scale   SubCutaneous three times a day before meals  ketamine Infusion. 0.23 mG/kG/Hr (2.13 mL/Hr) IV Continuous <Continuous>  metroNIDAZOLE    Tablet 500 milliGRAM(s) Oral every 8 hours  pantoprazole   Suspension 40 milliGRAM(s) Oral daily  propofol Infusion 19.957 MICROgram(s)/kG/Min (11.1 mL/Hr) IV Continuous <Continuous>  vancomycin  IVPB 1500 milliGRAM(s) IV Intermittent every 24 hours  vitamin A &amp; D Ointment 1 Application(s) Topical daily    MEDICATIONS  (PRN):  acetaminophen     Tablet .. 650 milliGRAM(s) Oral every 6 hours PRN Temp greater or equal to 38C (100.4F)    CXR reivewed

## 2023-06-13 NOTE — PROCEDURE NOTE - NSINDICATIONS_GEN_A_CORE
respiratory distress
critical illness/hemodynamic monitoring/venous access/volume resuscitation
pleural effusion

## 2023-06-13 NOTE — PROCEDURAL SAFETY CHECKLIST WITH OR WITHOUT SEDATION - NSPREPROCEDSEDAT_GEN_ALL_CORE
without sedation
pt sedated with ketamine, fentanyl, and precedex/with sedation
with sedation
with sedation

## 2023-06-13 NOTE — PROGRESS NOTE ADULT - SUBJECTIVE AND OBJECTIVE BOX
Patient is a 42y old  Female who presents with a chief complaint of AHRF (13 Jun 2023 07:55)      INTERVAL HPI/OVERNIGHT EVENTS:   No overnight events       ICU Vital Signs Last 24 Hrs  T(C): 36.8 (13 Jun 2023 04:00), Max: 37.4 (12 Jun 2023 20:00)  T(F): 98.3 (13 Jun 2023 04:00), Max: 99.3 (12 Jun 2023 20:00)  HR: 76 (13 Jun 2023 07:00) (72 - 84)  BP: 109/60 (13 Jun 2023 07:00) (94/51 - 136/71)  BP(mean): 78 (13 Jun 2023 07:00) (65 - 101)  ABP: --  ABP(mean): --  RR: 24 (13 Jun 2023 07:00) (17 - 38)  SpO2: 100% (13 Jun 2023 07:00) (98% - 100%)    O2 Parameters below as of 13 Jun 2023 07:00  Patient On (Oxygen Delivery Method): ventilator    O2 Concentration (%): 40      I&O's Summary    12 Jun 2023 07:01  -  13 Jun 2023 07:00  --------------------------------------------------------  IN: 4912 mL / OUT: 4370 mL / NET: 542 mL      Mode: AC/ CMV (Assist Control/ Continuous Mandatory Ventilation)  RR (machine): 16  TV (machine): 400  FiO2: 40  PEEP: 8  ITime: 1  MAP: 15  PIP: 46      LABS:                        7.8    14.61 )-----------( 440      ( 13 Jun 2023 04:50 )             25.6     06-13    139  |  107  |  22<H>  ----------------------------<  84  3.8   |  22  |  0.5<L>    Ca    8.1<L>      13 Jun 2023 04:50  Phos  3.7     06-13  Mg     1.9     06-13    TPro  5.9<L>  /  Alb  2.2<L>  /  TBili  0.2  /  DBili  x   /  AST  72<H>  /  ALT  58<H>  /  AlkPhos  253<H>  06-13    PT/INR - ( 13 Jun 2023 04:50 )   PT: 15.00 sec;   INR: 1.31 ratio         PTT - ( 13 Jun 2023 04:50 )  PTT:32.9 sec    CAPILLARY BLOOD GLUCOSE      POCT Blood Glucose.: 105 mg/dL (13 Jun 2023 06:44)  POCT Blood Glucose.: 103 mg/dL (12 Jun 2023 18:24)  POCT Blood Glucose.: 116 mg/dL (12 Jun 2023 11:31)    ABG - ( 13 Jun 2023 03:40 )  pH, Arterial: 7.36  pH, Blood: x     /  pCO2: 42    /  pO2: 98    / HCO3: 24    / Base Excess: -1.8  /  SaO2: 98.3                MEDS:  acetaminophen     Tablet .. 650 milliGRAM(s) Oral every 6 hours PRN  aMIOdarone    Tablet 200 milliGRAM(s) Oral two times a day  chlorhexidine 0.12% Liquid 15 milliLiter(s) Oral Mucosa every 12 hours  chlorhexidine 2% Cloths 1 Application(s) Topical <User Schedule>  chlorhexidine 2% Cloths 1 Application(s) Topical daily  dexMEDEtomidine Infusion 0.2 MICROgram(s)/kG/Hr IV Continuous <Continuous>  fentaNYL   Infusion. 0.509 MICROgram(s)/kG/Hr IV Continuous <Continuous>  furosemide   Injectable 40 milliGRAM(s) IV Push daily  insulin lispro (ADMELOG) corrective regimen sliding scale   SubCutaneous three times a day before meals  ketamine Infusion. 0.23 mG/kG/Hr IV Continuous <Continuous>  metroNIDAZOLE    Tablet 500 milliGRAM(s) Oral every 8 hours  pantoprazole   Suspension 40 milliGRAM(s) Oral daily  propofol Infusion 19.957 MICROgram(s)/kG/Min IV Continuous <Continuous>  vancomycin  IVPB 1500 milliGRAM(s) IV Intermittent every 24 hours  vitamin A &amp; D Ointment 1 Application(s) Topical daily      RADIOLOGY & ADDITIONAL TESTS:    MICRO:      Consultant(s) Notes Reviewed:  [x ] YES  [ ] NO    MEDICATIONS  (STANDING):  aMIOdarone    Tablet 200 milliGRAM(s) Oral two times a day  chlorhexidine 0.12% Liquid 15 milliLiter(s) Oral Mucosa every 12 hours  chlorhexidine 2% Cloths 1 Application(s) Topical <User Schedule>  chlorhexidine 2% Cloths 1 Application(s) Topical daily  dexMEDEtomidine Infusion 0.2 MICROgram(s)/kG/Hr (4.25 mL/Hr) IV Continuous <Continuous>  fentaNYL   Infusion. 0.509 MICROgram(s)/kG/Hr (4.72 mL/Hr) IV Continuous <Continuous>  furosemide   Injectable 40 milliGRAM(s) IV Push daily  insulin lispro (ADMELOG) corrective regimen sliding scale   SubCutaneous three times a day before meals  ketamine Infusion. 0.23 mG/kG/Hr (2.13 mL/Hr) IV Continuous <Continuous>  metroNIDAZOLE    Tablet 500 milliGRAM(s) Oral every 8 hours  pantoprazole   Suspension 40 milliGRAM(s) Oral daily  propofol Infusion 19.957 MICROgram(s)/kG/Min (11.1 mL/Hr) IV Continuous <Continuous>  vancomycin  IVPB 1500 milliGRAM(s) IV Intermittent every 24 hours  vitamin A &amp; D Ointment 1 Application(s) Topical daily    MEDICATIONS  (PRN):  acetaminophen     Tablet .. 650 milliGRAM(s) Oral every 6 hours PRN Temp greater or equal to 38C (100.4F)      PHYSICAL EXAM:  GENERAL: vented, sedated  HEAD: Atraumatic, Normocephalic  NECK: Supple  CHEST/LUNG: dec breath sounds L>R, l'-sided chest tube to suction  HEART: S1S2 normal, no S3  ABDOMEN: Soft, NT/ND  EXTREMITIES:  2+ Peripheral Pulses, No clubbing, cyanosis, or edema  LYMPH: No lymphadenopathy noted  SKIN: No rashes or lesions      Care Discussed with Consultants/Other Providers [ x] YES  [ ] NO

## 2023-06-13 NOTE — PROGRESS NOTE ADULT - ASSESSMENT
42 year old female with PMHx Asthma, HTN presents with cough and SOB x3 days and hemoptysis. CT chest showing cavitary pneumonia. Cultures +MRSA bacteremia. Intubated; failed SBT. On Zyvox and Tamiflu. Repeat CT showing worsening PNA.    # Acute hypoxemic respiratory failure 2/2 cavitary MRSA PNA  # MRSA bacteremia, repeat cultures NGTD x3  # +Influenza B  # hx Asthma   - CT Angio Chest PE Protocol: Bilateral patchy groundglass nodularity with dominant confluent left lower lobe opacification with numerous cavitary lesions. Additional contralateral right lower lobe cavitary 1 cm nodule. Findings compatible with cavitary pneumonia in the appropriate clinical setting; differential diagnosis includes tuberculosis.  - Strep/Legionella negative, HIV negative   - intubated 5/23, self-extubated 5/25, re-intubated 5/25  - s/p Mupirocin BID x5 days  - blood cultures +MRSA  - blood cultures 5/28-30: NGTD x3  - ECHO: no vegetations. EF 65%, mild TR and pulm HTN  - repeat CT chest: Diffusely increased number and size of bilateral cavitary lesions with the left lower lobe now demonstrating a consolidative process of the entire lobe.  - D-dimer 1665, LE duplex negative for DVT  - pro-mya 48 > 5.8  - s/p MASTER 5/31: No evidence of valvular vegetations or intracardiac abscesses to support IE. Pulmonary hepatisation incidental finding.   - s/p Tamiflu 30mg q12  - c/w Vancomycin (dosing per pharmacy), Flagyl added 500mg q8 for anaerobic coverage  monitor trough levels  - completed course of Devonte ended 6/8  - PCR skin scraping negative for HSV --> d/c Acyclovir   - repeat ET cultures negative   - repeat CT chest: Small left pleural effusion with overall essentially unchanged exam.  - s/p chest tube placement connected to suction --> tPA 6/6 and 6/7  - CT Sx following: trach and peg scheduled for today  - c/w 40mg Lasix daily   - d/c Solumedrol 40mg qd  - SBT/SAT- continues to fail  - on fentanyl 4, propofol 50, ketamine 3, precedex 1.5--> will wean post-op    # New onset A-fib w/ RVR  - s/p Amio drip  - EP consulted  - c/w Amio 200mg BID for 2 weeks --> then Amio 200mg daily     # LANCE, resolved   - Nephro following; post-infectious GN vs. ATN vs. pulmonary renal syndrome  - Cr 3 > 0.7  - renal U/S: negative   - CK trending down 1483>39>41  - UA: +proteins and blood, Pr/Cr 1.3 (H)  - CHARLES+ 1:640, hep/HIV negative  - no acute indication for RRT    # Anemia- stable  - trend CBC, active type and screen, transfuse <7  - DIC panel negative   - Dimer 1665, LE Duplex negative for DVT  - hold heparin drip. SCDs  - s/p 3u pRBCs; Hgb stable around 7/8  - CTAP: r/o retroperitoneal bleed  - will order rpt duplex and start DVT ppx post-op today    #Lines- L' IJ CVC since 06/02, will remove/replace following procedure tmrw  #DVT ppx: SCDs  #GI ppx: Pantoprazole 40mg qd  #Diet: NPO for procedure, will restart post-op  #Activity: bedrest   #Dispo: MICU

## 2023-06-13 NOTE — PROGRESS NOTE ADULT - ASSESSMENT
IMPRESSION:    Acute hypoxemic respiratory failure still intubated  Severe cavitary CAP MRSA  Small parapneumonic effusion SP Chest tube.    MRSA bacteremia resolved   MASTER negative   Anemia sp transfusion   New onset A FIB rate controlled   LANCE non oliguric improving  Hypernatremia Improving  HO asthma   Influenza B     PLAN:    CNS:  Multiple attempts to wean off sedation with no success;    HEENT: Oral care.  ET care.     PULMONARY:  HOB @ 45 degrees.  Aspiration precautions.  No change in vent settings.  Monitor PPL and DP.  Keep SaO2 92 TO 96%.  Awaiting trac    CARDIOVASCULAR:  ECHO/ MASTER noted.  Avoid overload.  Continue Lasix 40 mg daily     GI: GI prophylaxis.  OGT Feeding.  Bowel regimen PRN.     RENAL:  Follow up lytes.  Correct as needed.     INFECTIOUS DISEASE: ABX Per ID. Monitor Vanc levels. On PO flagyl per ID as well.     HEMATOLOGICAL:  DVT prophylaxis.  CBC in the afternoon and transfuse if less than 7. LE doppler    ENDOCRINE:  Follow up FS.  Insulin protocol if needed.  TG    Requires ICU care    l IJ 6/2 change    Prognosis is poor.     Plan for trach and peg next week.

## 2023-06-13 NOTE — ADVANCED PRACTICE NURSE CONSULT - RECOMMEDATIONS
Cleanse wound with soap and water.   Pat dry apply triad, gauze and Allevyn twice a day and prn for soiling.   Maintain pressure injury prevention.   Keep skin clean.   Maintain incontinence care.   Monitor wound for changes and notify provider   Case discussed with primary RN

## 2023-06-14 LAB
ALBUMIN SERPL ELPH-MCNC: 2.1 G/DL — LOW (ref 3.5–5.2)
ALP SERPL-CCNC: 437 U/L — HIGH (ref 30–115)
ALT FLD-CCNC: 103 U/L — HIGH (ref 0–41)
ANION GAP SERPL CALC-SCNC: 10 MMOL/L — SIGNIFICANT CHANGE UP (ref 7–14)
AST SERPL-CCNC: 145 U/L — HIGH (ref 0–41)
BASE EXCESS BLDA CALC-SCNC: -1.7 MMOL/L — SIGNIFICANT CHANGE UP (ref -2–3)
BASOPHILS # BLD AUTO: 0.05 K/UL — SIGNIFICANT CHANGE UP (ref 0–0.2)
BASOPHILS NFR BLD AUTO: 0.4 % — SIGNIFICANT CHANGE UP (ref 0–1)
BILIRUB SERPL-MCNC: <0.2 MG/DL — SIGNIFICANT CHANGE UP (ref 0.2–1.2)
BUN SERPL-MCNC: 18 MG/DL — SIGNIFICANT CHANGE UP (ref 10–20)
CALCIUM SERPL-MCNC: 8.3 MG/DL — LOW (ref 8.4–10.5)
CHLORIDE SERPL-SCNC: 107 MMOL/L — SIGNIFICANT CHANGE UP (ref 98–110)
CO2 SERPL-SCNC: 21 MMOL/L — SIGNIFICANT CHANGE UP (ref 17–32)
CREAT SERPL-MCNC: 0.5 MG/DL — LOW (ref 0.7–1.5)
EGFR: 120 ML/MIN/1.73M2 — SIGNIFICANT CHANGE UP
EOSINOPHIL # BLD AUTO: 0.22 K/UL — SIGNIFICANT CHANGE UP (ref 0–0.7)
EOSINOPHIL NFR BLD AUTO: 1.7 % — SIGNIFICANT CHANGE UP (ref 0–8)
GAS PNL BLDA: SIGNIFICANT CHANGE UP
GLUCOSE BLDC GLUCOMTR-MCNC: 86 MG/DL — SIGNIFICANT CHANGE UP (ref 70–99)
GLUCOSE BLDC GLUCOMTR-MCNC: 93 MG/DL — SIGNIFICANT CHANGE UP (ref 70–99)
GLUCOSE BLDC GLUCOMTR-MCNC: 94 MG/DL — SIGNIFICANT CHANGE UP (ref 70–99)
GLUCOSE BLDC GLUCOMTR-MCNC: 95 MG/DL — SIGNIFICANT CHANGE UP (ref 70–99)
GLUCOSE SERPL-MCNC: 83 MG/DL — SIGNIFICANT CHANGE UP (ref 70–99)
HCG UR QL: NEGATIVE — SIGNIFICANT CHANGE UP
HCO3 BLDA-SCNC: 23 MMOL/L — SIGNIFICANT CHANGE UP (ref 21–28)
HCT VFR BLD CALC: 23.5 % — LOW (ref 37–47)
HGB BLD-MCNC: 7.1 G/DL — LOW (ref 12–16)
HOROWITZ INDEX BLDA+IHG-RTO: 40 — SIGNIFICANT CHANGE UP
IMM GRANULOCYTES NFR BLD AUTO: 1 % — HIGH (ref 0.1–0.3)
LYMPHOCYTES # BLD AUTO: 1.14 K/UL — LOW (ref 1.2–3.4)
LYMPHOCYTES # BLD AUTO: 8.6 % — LOW (ref 20.5–51.1)
MAGNESIUM SERPL-MCNC: 1.8 MG/DL — SIGNIFICANT CHANGE UP (ref 1.8–2.4)
MCHC RBC-ENTMCNC: 22.6 PG — LOW (ref 27–31)
MCHC RBC-ENTMCNC: 30.2 G/DL — LOW (ref 32–37)
MCV RBC AUTO: 74.8 FL — LOW (ref 81–99)
MONOCYTES # BLD AUTO: 0.96 K/UL — HIGH (ref 0.1–0.6)
MONOCYTES NFR BLD AUTO: 7.2 % — SIGNIFICANT CHANGE UP (ref 1.7–9.3)
NEUTROPHILS # BLD AUTO: 10.79 K/UL — HIGH (ref 1.4–6.5)
NEUTROPHILS NFR BLD AUTO: 81.1 % — HIGH (ref 42.2–75.2)
NRBC # BLD: 0 /100 WBCS — SIGNIFICANT CHANGE UP (ref 0–0)
PCO2 BLDA: 38 MMHG — SIGNIFICANT CHANGE UP (ref 25–48)
PH BLDA: 7.39 — SIGNIFICANT CHANGE UP (ref 7.35–7.45)
PLATELET # BLD AUTO: 390 K/UL — SIGNIFICANT CHANGE UP (ref 130–400)
PMV BLD: 9.5 FL — SIGNIFICANT CHANGE UP (ref 7.4–10.4)
PO2 BLDA: 139 MMHG — HIGH (ref 83–108)
POTASSIUM SERPL-MCNC: 4.4 MMOL/L — SIGNIFICANT CHANGE UP (ref 3.5–5)
POTASSIUM SERPL-SCNC: 4.4 MMOL/L — SIGNIFICANT CHANGE UP (ref 3.5–5)
PROT SERPL-MCNC: 5.6 G/DL — LOW (ref 6–8)
RBC # BLD: 3.14 M/UL — LOW (ref 4.2–5.4)
RBC # FLD: 29.1 % — HIGH (ref 11.5–14.5)
SAO2 % BLDA: 99.6 % — HIGH (ref 94–98)
SODIUM SERPL-SCNC: 138 MMOL/L — SIGNIFICANT CHANGE UP (ref 135–146)
TRIGL SERPL-MCNC: 325 MG/DL — HIGH
WBC # BLD: 13.29 K/UL — HIGH (ref 4.8–10.8)
WBC # FLD AUTO: 13.29 K/UL — HIGH (ref 4.8–10.8)

## 2023-06-14 PROCEDURE — 31600 PLANNED TRACHEOSTOMY: CPT

## 2023-06-14 PROCEDURE — 43246 EGD PLACE GASTROSTOMY TUBE: CPT | Mod: AS

## 2023-06-14 PROCEDURE — 43246 EGD PLACE GASTROSTOMY TUBE: CPT

## 2023-06-14 PROCEDURE — 99291 CRITICAL CARE FIRST HOUR: CPT

## 2023-06-14 PROCEDURE — 71045 X-RAY EXAM CHEST 1 VIEW: CPT | Mod: 26

## 2023-06-14 PROCEDURE — 71045 X-RAY EXAM CHEST 1 VIEW: CPT | Mod: 26,77

## 2023-06-14 RX ORDER — HEPARIN SODIUM 5000 [USP'U]/ML
5000 INJECTION INTRAVENOUS; SUBCUTANEOUS EVERY 12 HOURS
Refills: 0 | Status: DISCONTINUED | OUTPATIENT
Start: 2023-06-14 | End: 2023-06-22

## 2023-06-14 RX ORDER — MIDAZOLAM HYDROCHLORIDE 1 MG/ML
2 INJECTION, SOLUTION INTRAMUSCULAR; INTRAVENOUS ONCE
Refills: 0 | Status: DISCONTINUED | OUTPATIENT
Start: 2023-06-14 | End: 2023-06-14

## 2023-06-14 RX ADMIN — CHLORHEXIDINE GLUCONATE 1 APPLICATION(S): 213 SOLUTION TOPICAL at 05:20

## 2023-06-14 RX ADMIN — HEPARIN SODIUM 5000 UNIT(S): 5000 INJECTION INTRAVENOUS; SUBCUTANEOUS at 00:22

## 2023-06-14 RX ADMIN — Medication 40 MILLIGRAM(S): at 05:23

## 2023-06-14 RX ADMIN — CHLORHEXIDINE GLUCONATE 15 MILLILITER(S): 213 SOLUTION TOPICAL at 05:23

## 2023-06-14 RX ADMIN — Medication 500 MILLIGRAM(S): at 05:22

## 2023-06-14 RX ADMIN — Medication 1 APPLICATION(S): at 12:43

## 2023-06-14 RX ADMIN — PANTOPRAZOLE SODIUM 40 MILLIGRAM(S): 20 TABLET, DELAYED RELEASE ORAL at 12:41

## 2023-06-14 RX ADMIN — Medication 300 MILLIGRAM(S): at 12:43

## 2023-06-14 RX ADMIN — HEPARIN SODIUM 5000 UNIT(S): 5000 INJECTION INTRAVENOUS; SUBCUTANEOUS at 22:10

## 2023-06-14 RX ADMIN — AMIODARONE HYDROCHLORIDE 200 MILLIGRAM(S): 400 TABLET ORAL at 05:23

## 2023-06-14 RX ADMIN — Medication 500 MILLIGRAM(S): at 13:46

## 2023-06-14 NOTE — PROGRESS NOTE ADULT - ASSESSMENT
42 year old female with PMHx Asthma, HTN presents with cough and SOB x3 days and hemoptysis. CT chest showing cavitary pneumonia. Cultures +MRSA bacteremia. Intubated; failed SBT. On Zyvox and Tamiflu. Repeat CT showing worsening PNA.    # Acute hypoxemic respiratory failure 2/2 cavitary MRSA PNA  # MRSA bacteremia, repeat cultures NGTD x3  # +Influenza B  # hx Asthma   - CT Angio Chest PE Protocol: Bilateral patchy groundglass nodularity with dominant confluent left lower lobe opacification with numerous cavitary lesions. Additional contralateral right lower lobe cavitary 1 cm nodule. Findings compatible with cavitary pneumonia in the appropriate clinical setting; differential diagnosis includes tuberculosis.  - Strep/Legionella negative, HIV negative   - intubated 5/23, self-extubated 5/25, re-intubated 5/25  - s/p Mupirocin BID x5 days  - blood cultures +MRSA  - blood cultures 5/28-30: NGTD x3  - ECHO: no vegetations. EF 65%, mild TR and pulm HTN  - repeat CT chest: Diffusely increased number and size of bilateral cavitary lesions with the left lower lobe now demonstrating a consolidative process of the entire lobe.  - D-dimer 1665, LE duplex negative for DVT  - pro-mya 48 > 5.8  - s/p MASTER 5/31: No evidence of valvular vegetations or intracardiac abscesses to support IE. Pulmonary hepatisation incidental finding.   - s/p Tamiflu 30mg q12  - c/w Vancomycin (dosing per pharmacy), Flagyl added 500mg q8 for anaerobic coverage  monitor trough levels  - completed course of Devonte ended 6/8  - PCR skin scraping negative for HSV --> d/c Acyclovir   - repeat ET cultures negative   - repeat CT chest: Small left pleural effusion with overall essentially unchanged exam.  - s/p chest tube placement connected to suction --> tPA 6/6 and 6/7  - CT Sx following: trach and peg scheduled for today  - c/w 40mg Lasix daily   - d/c Solumedrol 40mg qd  - SBT/SAT- continues to fail  - on fentanyl 4, propofol 50, ketamine 3, precedex 1.5--> will wean post-op    # New onset A-fib w/ RVR  - s/p Amio drip  - EP consulted  - c/w Amio 200mg BID for 2 weeks --> then Amio 200mg daily     # LANCE, resolved   - Nephro following; post-infectious GN vs. ATN vs. pulmonary renal syndrome  - Cr 3 > 0.7  - renal U/S: negative   - CK trending down 1483>39>41  - UA: +proteins and blood, Pr/Cr 1.3 (H)  - CHARLES+ 1:640, hep/HIV negative  - no acute indication for RRT    # Anemia- stable  - trend CBC, active type and screen, transfuse <7  - DIC panel negative   - Dimer 1665, LE Duplex negative for DVT  - hold heparin drip. SCDs  - s/p 3u pRBCs; Hgb stable around 7/8  - CTAP: r/o retroperitoneal bleed  - will order rpt duplex and start DVT ppx post-op today    #Lines- L' IJ CVC since 06/02, will remove/replace following procedure tmrw  #DVT ppx: SCDs  #GI ppx: Pantoprazole 40mg qd  #Diet: NPO for procedure, will restart post-op  #Activity: bedrest   #Dispo: MICU   42 year old female with PMHx Asthma, HTN presents with cough and SOB x3 days and hemoptysis. CT chest showing cavitary pneumonia. Cultures +MRSA bacteremia. Intubated; failed SBT. On Zyvox and Tamiflu. Repeat CT showing worsening PNA.    # Acute hypoxemic respiratory failure 2/2 cavitary MRSA PNA  # MRSA bacteremia, repeat cultures NGTD x3  # +Influenza B  # hx Asthma   - CT Angio Chest PE Protocol: Bilateral patchy groundglass nodularity with dominant confluent left lower lobe opacification with numerous cavitary lesions. Additional contralateral right lower lobe cavitary 1 cm nodule. Findings compatible with cavitary pneumonia in the appropriate clinical setting; differential diagnosis includes tuberculosis.  - Strep/Legionella negative, HIV negative   - intubated 5/23, self-extubated 5/25, re-intubated 5/25  - s/p Mupirocin BID x5 days  - blood cultures +MRSA  - blood cultures 5/28-30: NGTD x3  - ECHO: no vegetations. EF 65%, mild TR and pulm HTN  - repeat CT chest: Diffusely increased number and size of bilateral cavitary lesions with the left lower lobe now demonstrating a consolidative process of the entire lobe.  - D-dimer 1665, LE duplex negative for DVT  - pro-mya 48 > 5.8  - s/p MASTER 5/31: No evidence of valvular vegetations or intracardiac abscesses to support IE. Pulmonary hepatisation incidental finding.   - s/p Tamiflu 30mg q12  - c/w Vancomycin (dosing per pharmacy), Flagyl added 500mg q8 for anaerobic coverage  monitor trough levels  - completed course of Devonte ended 6/8  - PCR skin scraping negative for HSV --> d/c Acyclovir   - repeat ET cultures negative   - repeat CT chest: Small left pleural effusion with overall essentially unchanged exam.  - s/p chest tube placement connected to suction --> tPA 6/6 and 6/7  - CT Sx following: trach and peg scheduled for today  - c/w 40mg Lasix daily   - d/c Solumedrol 40mg qd  - SBT/SAT- continues to fail  - on fentanyl 4, propofol 50, ketamine 3, precedex 1.5--> will wean post-op  - DVT ppx to be resumed post-op    # New onset A-fib w/ RVR  - s/p Amio drip  - EP consulted  - c/w Amio 200mg BID for 2 weeks --> then Amio 200mg daily     # LANCE, resolved   - Nephro following; post-infectious GN vs. ATN vs. pulmonary renal syndrome  - Cr 3 > 0.7  - renal U/S: negative   - CK trending down 1483>39>41  - UA: +proteins and blood, Pr/Cr 1.3 (H)  - CHARLES+ 1:640, hep/HIV negative  - no acute indication for RRT    # Anemia- stable  - trend CBC, active type and screen, transfuse <7  - DIC panel negative   - Dimer 1665, LE Duplex negative for DVT  - hold heparin drip. SCDs  - s/p 3u pRBCs; Hgb stable around 7/8  - CTAP: r/o retroperitoneal bleed  - will order rpt duplex and start DVT ppx post-op today    #Lines- R' IJ 06/13, chest tube removed  #DVT ppx: SCDs  #GI ppx: Pantoprazole 40mg qd  #Diet: NPO for procedure, scheduled for today  #Activity: bedrest   #Dispo: MICU

## 2023-06-14 NOTE — PROGRESS NOTE ADULT - ASSESSMENT
IMPRESSION:    Acute hypoxemic respiratory failure still intubated  Severe cavitary CAP MRSA  Small parapneumonic effusion SP Chest tube NOW DC  MRSA bacteremia resolved   MASTER negative   Anemia sp transfusion   New onset A FIB rate controlled   LANCE non oliguric improving  Hypernatremia Improving  HO asthma   Influenza B     PLAN:    CNS:  Multiple attempts to wean off sedation with no success;    HEENT: Oral care.  ET care.     PULMONARY:  HOB @ 45 degrees.  Aspiration precautions.  No change in vent settings.  Monitor PPL and DP.  Keep SaO2 92 TO 96%.  Awaiting trac    CARDIOVASCULAR:  ECHO/ MASTER noted.  Avoid overload.  Continue Lasix 40 mg daily     GI: GI prophylaxis.  OGT Feeding.  Bowel regimen PRN.     RENAL:  Follow up lytes.  Correct as needed.     INFECTIOUS DISEASE: ABX Per ID.    HEMATOLOGICAL:  DVT prophylaxis.  CBC in the afternoon and transfuse if less than 7. LE doppler    ENDOCRINE:  Follow up FS.  Insulin protocol if needed.  TG    Requires ICU care    R IJ 6/13     Prognosis is poor.     Plan for trach and peg next week.

## 2023-06-14 NOTE — PROGRESS NOTE ADULT - SUBJECTIVE AND OBJECTIVE BOX
GENERAL SURGERY PROGRESS NOTE    Patient: ZEYNEP ROSSI , 42y ([birth year])Female   MRN: [id number]  Location: [address]  Visit: 05-23-23 Inpatient  Date: 06-14-23 @ 08:13    Hospital Day #: 14    Procedure/Dx/Injuries: MRSA PNA    Events of past 24 hours: Chest tube removed 6/14 AM. Due to OR scheduled, patient unable to get Trach & PEG yesterday. Added on to OR today. Booked for Friday.    PAST MEDICAL & SURGICAL HISTORY:      Vitals:   T(F): 98.4 (06-14-23 @ 08:00), Max: 98.6 (06-13-23 @ 12:00)  HR: 81 (06-14-23 @ 08:00)  BP: 140/78 (06-14-23 @ 08:00)  RR: 21 (06-14-23 @ 08:00)  SpO2: 96% (06-14-23 @ 08:00)  Mode: AC/ CMV (Assist Control/ Continuous Mandatory Ventilation), RR (machine): 16, TV (machine): 400, FiO2: 40, PEEP: 8, PS: , ITime: 1, MAP: 12, PIP: 29    Diet, NPO after Midnight:      NPO Start Date: 13-Jun-2023,   NPO Start Time: 23:59  Diet, NPO with Tube Feed:   Tube Feeding Modality: Orogastric  Peptamen Intense VHP  Total Volume for 24 Hours (mL): 480  Continuous  Starting Tube Feed Rate mL per Hour: 20  Until Goal Tube Feed Rate (mL per Hour): 20  Tube Feed Duration (in Hours): 24  Tube Feed Start Time: 13:30  Free Water Flush   Total Volume per Flush (mL): 100   Frequency: Every 4 Hours  No Carb Prosource (1pkg = 15gms Protein)     Qty per Day:  4  Banatrol TF     Qty per Day:  2      Fluids:     I & O's:    06-13-23 @ 07:01  -  06-14-23 @ 07:00  --------------------------------------------------------  IN:    Dexmedetomidine: 787.9 mL    Enteral Tube Flush: 110 mL    FentaNYL: 950.4 mL    IV PiggyBack: 550 mL    Ketamine: 528.3 mL    Propofol: 667.2 mL  Total IN: 3593.8 mL    OUT:    Chest Tube (mL): 90 mL    Peptamen A.F.: 0 mL    Rectal Tube (mL): 1300 mL    Voided (mL): 2470 mL  Total OUT: 3860 mL    Total NET: -266.2 mL    PHYSICAL EXAM:  General: NAD, intubated  Cardiac:  S1, S2,   Respiratory: Dressing C/D/I, Equal chest rise  Abdomen: Soft, non-distended    MEDICATIONS  (STANDING):  aMIOdarone    Tablet 200 milliGRAM(s) Oral two times a day  chlorhexidine 0.12% Liquid 15 milliLiter(s) Oral Mucosa every 12 hours  chlorhexidine 2% Cloths 1 Application(s) Topical daily  chlorhexidine 2% Cloths 1 Application(s) Topical <User Schedule>  dexMEDEtomidine Infusion 0.2 MICROgram(s)/kG/Hr (4.25 mL/Hr) IV Continuous <Continuous>  fentaNYL   Infusion. 0.509 MICROgram(s)/kG/Hr (4.72 mL/Hr) IV Continuous <Continuous>  furosemide   Injectable 40 milliGRAM(s) IV Push daily  insulin lispro (ADMELOG) corrective regimen sliding scale   SubCutaneous three times a day before meals  ketamine Infusion. 0.23 mG/kG/Hr (2.13 mL/Hr) IV Continuous <Continuous>  metroNIDAZOLE    Tablet 500 milliGRAM(s) Oral every 8 hours  pantoprazole   Suspension 40 milliGRAM(s) Oral daily  propofol Infusion 19.957 MICROgram(s)/kG/Min (11.1 mL/Hr) IV Continuous <Continuous>  vancomycin  IVPB 1500 milliGRAM(s) IV Intermittent every 24 hours  vitamin A &amp; D Ointment 1 Application(s) Topical daily    MEDICATIONS  (PRN):  acetaminophen     Tablet .. 650 milliGRAM(s) Oral every 6 hours PRN Temp greater or equal to 38C (100.4F)      DVT PROPHYLAXIS:   GI PROPHYLAXIS: pantoprazole   Suspension 40 milliGRAM(s) Oral daily    ANTICOAGULATION:   ANTIBIOTICS:  metroNIDAZOLE    Tablet 500 milliGRAM(s)  vancomycin  IVPB 1500 milliGRAM(s)      LAB/STUDIES:  Labs:  CAPILLARY BLOOD GLUCOSE  POCT Blood Glucose.: 94 mg/dL (14 Jun 2023 06:50)  POCT Blood Glucose.: 93 mg/dL (14 Jun 2023 01:18)  POCT Blood Glucose.: 85 mg/dL (13 Jun 2023 18:01)  POCT Blood Glucose.: 87 mg/dL (13 Jun 2023 13:05)                          7.1    13.29 )-----------( 390      ( 14 Jun 2023 05:00 )             23.5       Auto Neutrophil %: 81.1 % (06-14-23 @ 05:00)  Auto Immature Granulocyte %: 1.0 % (06-14-23 @ 05:00)  Auto Neutrophil %: 79.3 % (06-13-23 @ 17:05)  Auto Immature Granulocyte %: 1.4 % (06-13-23 @ 17:05)    06-14    138  |  107  |  18  ----------------------------<  83  4.4   |  21  |  0.5<L>      Calcium, Total Serum: 8.3 mg/dL (06-14-23 @ 05:00)      LFTs:             5.6  | <0.2 | 145      ------------------[437     ( 14 Jun 2023 05:00 )  2.1  | x    | 103         Lipase:x      Amylase:x        Blood Gas Arterial, Lactate: 0.60 mmol/L (06-14-23 @ 03:29)  Blood Gas Arterial, Lactate: 0.50 mmol/L (06-13-23 @ 03:40)  Blood Gas Arterial, Lactate: 0.50 mmol/L (06-12-23 @ 03:10)    ABG - ( 14 Jun 2023 03:29 )  pH: 7.39  /  pCO2: 38    /  pO2: 139   / HCO3: 23    / Base Excess: -1.7  /  SaO2: 99.6            ABG - ( 13 Jun 2023 03:40 )  pH: 7.36  /  pCO2: 42    /  pO2: 98    / HCO3: 24    / Base Excess: -1.8  /  SaO2: 98.3            ABG - ( 12 Jun 2023 03:10 )  pH: 7.39  /  pCO2: 41    /  pO2: 117   / HCO3: 25    / Base Excess: -0.2  /  SaO2: 98.4              Coags:     15.00  ----< 1.31    ( 13 Jun 2023 04:50 )     32.9          Culture - Sputum (collected 11 Jun 2023 11:47)  Source: Trach Asp Tracheal Aspirate  Gram Stain (11 Jun 2023 23:26):    Numerous polymorphonuclear leukocytes per low power field    Rare Squamous epithelial cells per low power field    No organisms seen per oil power field  Final Report (13 Jun 2023 16:58):    Normal Respiratory Laila present    IMAGING:  < from: Xray Chest 1 View- PORTABLE-Routine (Xray Chest 1 View- PORTABLE-Routine in AM.) (06.13.23 @ 05:57) >  Impression:  Stable support devices.  Stable to slight increase left pleural effusion/opacity. No pneumothorax.    --- End of Report ---  < end of copied text >    · Assessment	  42yF w/ left pleural effusion, s/p chest tube placement, s/p tpa placement on 6/6 and 6/7    PLAN:  - Added on for Trach & PEG today  - Booked for Friday if OR time does not allow  - Keep NPO, IVF  - Chest tube removed 6/14 AM. Post-pull CXR  - Daily AM CXR    Thoracic Surgery  SPECTRA [id number]

## 2023-06-14 NOTE — PROGRESS NOTE ADULT - SUBJECTIVE AND OBJECTIVE BOX
Over Night Events: events noted, remain critically ill, no trach yet, intermittently agitated, sp dc CT, on ketamine, precedex, fentanyl and propofol  PHYSICAL EXAM    ICU Vital Signs Last 24 Hrs  T(C): 36.3 (14 Jun 2023 04:00), Max: 37 (13 Jun 2023 12:00)  T(F): 97.3 (14 Jun 2023 04:00), Max: 98.6 (13 Jun 2023 12:00)  HR: 80 (14 Jun 2023 07:00) (74 - 93)  BP: 115/68 (14 Jun 2023 07:00) (98/57 - 148/87)  BP(mean): 86 (14 Jun 2023 07:00) (74 - 110)  RR: 18 (14 Jun 2023 07:00) (14 - 47)  SpO2: 100% (14 Jun 2023 07:00) (98% - 100%)    O2 Parameters below as of 14 Jun 2023 04:00  Patient On (Oxygen Delivery Method): ventilator    O2 Concentration (%): 40        General: ill looking  Lungs: dec bs l base  Cardiovascular: Regular   Abdomen: Soft, Positive BS  Extremities: No clubbing   not following commands      06-13-23 @ 07:01  -  06-14-23 @ 07:00  --------------------------------------------------------  IN:    Dexmedetomidine: 787.9 mL    Enteral Tube Flush: 110 mL    FentaNYL: 950.4 mL    IV PiggyBack: 550 mL    Ketamine: 528.3 mL    Propofol: 667.2 mL  Total IN: 3593.8 mL    OUT:    Chest Tube (mL): 90 mL    Peptamen A.F.: 0 mL    Rectal Tube (mL): 1300 mL    Voided (mL): 2470 mL  Total OUT: 3860 mL    Total NET: -266.2 mL          LABS:                          7.1    13.29 )-----------( 390      ( 14 Jun 2023 05:00 )             23.5                                               06-14    138  |  107  |  18  ----------------------------<  83  4.4   |  21  |  0.5<L>    Ca    8.3<L>      14 Jun 2023 05:00  Phos  3.7     06-13  Mg     1.8     06-14    TPro  5.6<L>  /  Alb  2.1<L>  /  TBili  <0.2  /  DBili  x   /  AST  145<H>  /  ALT  103<H>  /  AlkPhos  437<H>  06-14      PT/INR - ( 13 Jun 2023 04:50 )   PT: 15.00 sec;   INR: 1.31 ratio         PTT - ( 13 Jun 2023 04:50 )  PTT:32.9 sec                                                                                     LIVER FUNCTIONS - ( 14 Jun 2023 05:00 )  Alb: 2.1 g/dL / Pro: 5.6 g/dL / ALK PHOS: 437 U/L / ALT: 103 U/L / AST: 145 U/L / GGT: x                                                  Culture - Sputum (collected 11 Jun 2023 11:47)  Source: Trach Asp Tracheal Aspirate  Gram Stain (11 Jun 2023 23:26):    Numerous polymorphonuclear leukocytes per low power field    Rare Squamous epithelial cells per low power field    No organisms seen per oil power field  Final Report (13 Jun 2023 16:58):    Normal Respiratory Laila present                                                   Mode: AC/ CMV (Assist Control/ Continuous Mandatory Ventilation)  RR (machine): 16  TV (machine): 400  FiO2: 40  PEEP: 8  PS:   ITime: 1  MAP: 12  PIP: 29                                      ABG - ( 14 Jun 2023 03:29 )  pH, Arterial: 7.39  pH, Blood: x     /  pCO2: 38    /  pO2: 139   / HCO3: 23    / Base Excess: -1.7  /  SaO2: 99.6                MEDICATIONS  (STANDING):  aMIOdarone    Tablet 200 milliGRAM(s) Oral two times a day  chlorhexidine 0.12% Liquid 15 milliLiter(s) Oral Mucosa every 12 hours  chlorhexidine 2% Cloths 1 Application(s) Topical daily  chlorhexidine 2% Cloths 1 Application(s) Topical <User Schedule>  dexMEDEtomidine Infusion 0.2 MICROgram(s)/kG/Hr (4.25 mL/Hr) IV Continuous <Continuous>  fentaNYL   Infusion. 0.509 MICROgram(s)/kG/Hr (4.72 mL/Hr) IV Continuous <Continuous>  furosemide   Injectable 40 milliGRAM(s) IV Push daily  insulin lispro (ADMELOG) corrective regimen sliding scale   SubCutaneous three times a day before meals  ketamine Infusion. 0.23 mG/kG/Hr (2.13 mL/Hr) IV Continuous <Continuous>  metroNIDAZOLE    Tablet 500 milliGRAM(s) Oral every 8 hours  pantoprazole   Suspension 40 milliGRAM(s) Oral daily  propofol Infusion 19.957 MICROgram(s)/kG/Min (11.1 mL/Hr) IV Continuous <Continuous>  vancomycin  IVPB 1500 milliGRAM(s) IV Intermittent every 24 hours  vitamin A &amp; D Ointment 1 Application(s) Topical daily    MEDICATIONS  (PRN):  acetaminophen     Tablet .. 650 milliGRAM(s) Oral every 6 hours PRN Temp greater or equal to 38C (100.4F)    CXR noted

## 2023-06-14 NOTE — CHART NOTE - NSCHARTNOTEFT_GEN_A_CORE
Post Operative Note  Patient: ZEYNEP ROSSI 42y (1980) Female   MRN: [id number]  Location: [address]  Visit: 05-23-23 Inpatient  Date: 06-14-23 @ 21:40    Procedure:  S/P trach and PEG      Objective:  Vitals: T(F): 98.4 (06-14-23 @ 08:00), Max: 98.5 (06-14-23 @ 00:00)  HR: 87 (06-14-23 @ 21:00)  BP: 120/71 (06-14-23 @ 21:00) (102/59 - 163/96)  RR: 21 (06-14-23 @ 21:00)  SpO2: 100% (06-14-23 @ 21:00)  Vent Settings: Mode: AC/ CMV (Assist Control/ Continuous Mandatory Ventilation), RR (machine): 16, TV (machine): 400, FiO2: 40, PEEP: 8, MAP: 12, PIP: 26    In:   06-13-23 @ 07:01  -  06-14-23 @ 07:00  --------------------------------------------------------  IN: 3593.8 mL    06-14-23 @ 07:01  -  06-14-23 @ 21:40  --------------------------------------------------------  IN: 973.1 mL      IV Fluids:     Out:   06-13-23 @ 07:01  -  06-14-23 @ 07:00  --------------------------------------------------------  OUT: 3860 mL    06-14-23 @ 07:01  -  06-14-23 @ 21:40  --------------------------------------------------------  OUT: 1200 mL      EBL:     Voided Urine:   06-13-23 @ 07:01  -  06-14-23 @ 07:00  --------------------------------------------------------  OUT: 3860 mL    06-14-23 @ 07:01  -  06-14-23 @ 21:40  --------------------------------------------------------  OUT: 1200 mL      Weaver Catheter: yes no   Drains:   CARTER:    ,   Chest Tube:   06-13-23 @ 07:01  -  06-14-23 @ 07:00  --------------------------------------------------------  OUT: 90 mL       NG Tube:       Physical Examination:  General Appearance: NAD,   HEENT: trach in place. no bleeding or discharge noted  Heart: RRR  Lungs: bilateral breath sounds  Abdomen:  Soft, nondistended. PEG with Dressings in place, clean, dry and intact, no signs of active bleeding/drainage    Medications: [Standing]  acetaminophen     Tablet .. 650 milliGRAM(s) Oral every 6 hours PRN  aMIOdarone    Tablet 200 milliGRAM(s) Oral two times a day  chlorhexidine 0.12% Liquid 15 milliLiter(s) Oral Mucosa every 12 hours  chlorhexidine 2% Cloths 1 Application(s) Topical daily  chlorhexidine 2% Cloths 1 Application(s) Topical <User Schedule>  dexMEDEtomidine Infusion 0.2 MICROgram(s)/kG/Hr IV Continuous <Continuous>  fentaNYL   Infusion. 0.509 MICROgram(s)/kG/Hr IV Continuous <Continuous>  furosemide   Injectable 40 milliGRAM(s) IV Push daily  heparin   Injectable 5000 Unit(s) SubCutaneous every 12 hours  insulin lispro (ADMELOG) corrective regimen sliding scale   SubCutaneous three times a day before meals  ketamine Infusion. 0.23 mG/kG/Hr IV Continuous <Continuous>  metroNIDAZOLE    Tablet 500 milliGRAM(s) Oral every 8 hours  pantoprazole   Suspension 40 milliGRAM(s) Oral daily  propofol Infusion 19.957 MICROgram(s)/kG/Min IV Continuous <Continuous>  vancomycin  IVPB 1500 milliGRAM(s) IV Intermittent every 24 hours  vitamin A &amp; D Ointment 1 Application(s) Topical daily    Medications: [PRN]  acetaminophen     Tablet .. 650 milliGRAM(s) Oral every 6 hours PRN  aMIOdarone    Tablet 200 milliGRAM(s) Oral two times a day  chlorhexidine 0.12% Liquid 15 milliLiter(s) Oral Mucosa every 12 hours  chlorhexidine 2% Cloths 1 Application(s) Topical <User Schedule>  chlorhexidine 2% Cloths 1 Application(s) Topical daily  dexMEDEtomidine Infusion 0.2 MICROgram(s)/kG/Hr IV Continuous <Continuous>  fentaNYL   Infusion. 0.509 MICROgram(s)/kG/Hr IV Continuous <Continuous>  furosemide   Injectable 40 milliGRAM(s) IV Push daily  heparin   Injectable 5000 Unit(s) SubCutaneous every 12 hours  insulin lispro (ADMELOG) corrective regimen sliding scale   SubCutaneous three times a day before meals  ketamine Infusion. 0.23 mG/kG/Hr IV Continuous <Continuous>  metroNIDAZOLE    Tablet 500 milliGRAM(s) Oral every 8 hours  pantoprazole   Suspension 40 milliGRAM(s) Oral daily  propofol Infusion 19.957 MICROgram(s)/kG/Min IV Continuous <Continuous>  vancomycin  IVPB 1500 milliGRAM(s) IV Intermittent every 24 hours  vitamin A &amp; D Ointment 1 Application(s) Topical daily      DVT PROPHYLAXIS: heparin   Injectable 5000 Unit(s) SubCutaneous every 12 hours    GI PROPHYLAXIS: pantoprazole   Suspension 40 milliGRAM(s) Oral daily    ANTICOAGULATION:   ANTIBIOTICS:  metroNIDAZOLE    Tablet 500 milliGRAM(s)  vancomycin  IVPB 1500 milliGRAM(s)      Labs:                        7.1    13.29 )-----------( 390      ( 14 Jun 2023 05:00 )             23.5     06-14    138  |  107  |  18  ----------------------------<  83  4.4   |  21  |  0.5<L>    Ca    8.3<L>      14 Jun 2023 05:00  Phos  3.7     06-13  Mg     1.8     06-14    TPro  5.6<L>  /  Alb  2.1<L>  /  TBili  <0.2  /  DBili  x   /  AST  145<H>  /  ALT  103<H>  /  AlkPhos  437<H>  06-14    PT/INR - ( 13 Jun 2023 04:50 )   PT: 15.00 sec;   INR: 1.31 ratio         PTT - ( 13 Jun 2023 04:50 )  PTT:32.9 sec        Imaging:  No post-op imaging studies    Assessment:  42yF s/p trach and PEG    Plan:  - thoracic team to follow up in AM regarding when can patient be started on tube feeds   - Monitor wound and dressing for changes, redress as needed.    Date/Time: 06-14-23 @ 21:40

## 2023-06-14 NOTE — PROGRESS NOTE ADULT - SUBJECTIVE AND OBJECTIVE BOX
Patient is a 42y old  Female who presents with a chief complaint of AHRF (14 Jun 2023 08:13)      INTERVAL HPI/OVERNIGHT EVENTS:   No overnight events   Afebrile, hemodynamically stable     ICU Vital Signs Last 24 Hrs  T(C): 36.9 (14 Jun 2023 08:00), Max: 37 (13 Jun 2023 12:00)  T(F): 98.4 (14 Jun 2023 08:00), Max: 98.6 (13 Jun 2023 12:00)  HR: 86 (14 Jun 2023 08:12) (74 - 93)  BP: 140/78 (14 Jun 2023 08:00) (98/57 - 148/87)  BP(mean): 103 (14 Jun 2023 08:00) (74 - 110)  ABP: --  ABP(mean): --  RR: 21 (14 Jun 2023 08:00) (14 - 47)  SpO2: 99% (14 Jun 2023 08:12) (96% - 100%)    O2 Parameters below as of 14 Jun 2023 08:00  Patient On (Oxygen Delivery Method): ventilator    O2 Concentration (%): 40      I&O's Summary    13 Jun 2023 07:01  -  14 Jun 2023 07:00  --------------------------------------------------------  IN: 3593.8 mL / OUT: 3860 mL / NET: -266.2 mL    14 Jun 2023 07:01  -  14 Jun 2023 09:58  --------------------------------------------------------  IN: 194.6 mL / OUT: 0 mL / NET: 194.6 mL      Mode: AC/ CMV (Assist Control/ Continuous Mandatory Ventilation)  RR (machine): 16  TV (machine): 400  FiO2: 40  PEEP: 8  ITime: 1  MAP: 12  PIP: 27      LABS:                        7.1    13.29 )-----------( 390      ( 14 Jun 2023 05:00 )             23.5     06-14    138  |  107  |  18  ----------------------------<  83  4.4   |  21  |  0.5<L>    Ca    8.3<L>      14 Jun 2023 05:00  Phos  3.7     06-13  Mg     1.8     06-14    TPro  5.6<L>  /  Alb  2.1<L>  /  TBili  <0.2  /  DBili  x   /  AST  145<H>  /  ALT  103<H>  /  AlkPhos  437<H>  06-14    PT/INR - ( 13 Jun 2023 04:50 )   PT: 15.00 sec;   INR: 1.31 ratio         PTT - ( 13 Jun 2023 04:50 )  PTT:32.9 sec    CAPILLARY BLOOD GLUCOSE      POCT Blood Glucose.: 94 mg/dL (14 Jun 2023 06:50)  POCT Blood Glucose.: 93 mg/dL (14 Jun 2023 01:18)  POCT Blood Glucose.: 85 mg/dL (13 Jun 2023 18:01)  POCT Blood Glucose.: 87 mg/dL (13 Jun 2023 13:05)    ABG - ( 14 Jun 2023 03:29 )  pH, Arterial: 7.39  pH, Blood: x     /  pCO2: 38    /  pO2: 139   / HCO3: 23    / Base Excess: -1.7  /  SaO2: 99.6                MEDS:  acetaminophen     Tablet .. 650 milliGRAM(s) Oral every 6 hours PRN  aMIOdarone    Tablet 200 milliGRAM(s) Oral two times a day  chlorhexidine 0.12% Liquid 15 milliLiter(s) Oral Mucosa every 12 hours  chlorhexidine 2% Cloths 1 Application(s) Topical daily  chlorhexidine 2% Cloths 1 Application(s) Topical <User Schedule>  dexMEDEtomidine Infusion 0.2 MICROgram(s)/kG/Hr IV Continuous <Continuous>  fentaNYL   Infusion. 0.509 MICROgram(s)/kG/Hr IV Continuous <Continuous>  furosemide   Injectable 40 milliGRAM(s) IV Push daily  insulin lispro (ADMELOG) corrective regimen sliding scale   SubCutaneous three times a day before meals  ketamine Infusion. 0.23 mG/kG/Hr IV Continuous <Continuous>  metroNIDAZOLE    Tablet 500 milliGRAM(s) Oral every 8 hours  pantoprazole   Suspension 40 milliGRAM(s) Oral daily  propofol Infusion 19.957 MICROgram(s)/kG/Min IV Continuous <Continuous>  vancomycin  IVPB 1500 milliGRAM(s) IV Intermittent every 24 hours  vitamin A &amp; D Ointment 1 Application(s) Topical daily      RADIOLOGY & ADDITIONAL TESTS:    MICRO:      Consultant(s) Notes Reviewed:  [x ] YES  [ ] NO    MEDICATIONS  (STANDING):  aMIOdarone    Tablet 200 milliGRAM(s) Oral two times a day  chlorhexidine 0.12% Liquid 15 milliLiter(s) Oral Mucosa every 12 hours  chlorhexidine 2% Cloths 1 Application(s) Topical <User Schedule>  chlorhexidine 2% Cloths 1 Application(s) Topical daily  dexMEDEtomidine Infusion 0.2 MICROgram(s)/kG/Hr (4.25 mL/Hr) IV Continuous <Continuous>  fentaNYL   Infusion. 0.509 MICROgram(s)/kG/Hr (4.72 mL/Hr) IV Continuous <Continuous>  furosemide   Injectable 40 milliGRAM(s) IV Push daily  insulin lispro (ADMELOG) corrective regimen sliding scale   SubCutaneous three times a day before meals  ketamine Infusion. 0.23 mG/kG/Hr (2.13 mL/Hr) IV Continuous <Continuous>  metroNIDAZOLE    Tablet 500 milliGRAM(s) Oral every 8 hours  pantoprazole   Suspension 40 milliGRAM(s) Oral daily  propofol Infusion 19.957 MICROgram(s)/kG/Min (11.1 mL/Hr) IV Continuous <Continuous>  vancomycin  IVPB 1500 milliGRAM(s) IV Intermittent every 24 hours  vitamin A &amp; D Ointment 1 Application(s) Topical daily    MEDICATIONS  (PRN):  acetaminophen     Tablet .. 650 milliGRAM(s) Oral every 6 hours PRN Temp greater or equal to 38C (100.4F)      PHYSICAL EXAM:  GENERAL: vented, sedated, r/i IJ, diggs  HEAD: Atraumatic, Normocephalic  NECK: Supple  CHEST/LUNG: dec breath sounds L>R, chest tube removed  HEART: S1S2 normal, no S3  ABDOMEN: Soft, NT/ND  EXTREMITIES:  2+ Peripheral Pulses, No clubbing, cyanosis, or edema  LYMPH: No lymphadenopathy noted  SKIN: No rashes or lesions      Care Discussed with Consultants/Other Providers [ x] YES  [ ] NO Patient is a 42y old  Female who presents with a chief complaint of AHRF (14 Jun 2023 08:13)      INTERVAL HPI/OVERNIGHT EVENTS:   No overnight events   Afebrile, hemodynamically stable     ICU Vital Signs Last 24 Hrs  T(C): 36.9 (14 Jun 2023 08:00), Max: 37 (13 Jun 2023 12:00)  T(F): 98.4 (14 Jun 2023 08:00), Max: 98.6 (13 Jun 2023 12:00)  HR: 86 (14 Jun 2023 08:12) (74 - 93)  BP: 140/78 (14 Jun 2023 08:00) (98/57 - 148/87)  BP(mean): 103 (14 Jun 2023 08:00) (74 - 110)  ABP: --  ABP(mean): --  RR: 21 (14 Jun 2023 08:00) (14 - 47)  SpO2: 99% (14 Jun 2023 08:12) (96% - 100%)    O2 Parameters below as of 14 Jun 2023 08:00  Patient On (Oxygen Delivery Method): ventilator    O2 Concentration (%): 40      I&O's Summary    13 Jun 2023 07:01  -  14 Jun 2023 07:00  --------------------------------------------------------  IN: 3593.8 mL / OUT: 3860 mL / NET: -266.2 mL    14 Jun 2023 07:01  -  14 Jun 2023 09:58  --------------------------------------------------------  IN: 194.6 mL / OUT: 0 mL / NET: 194.6 mL      Mode: AC/ CMV (Assist Control/ Continuous Mandatory Ventilation)  RR (machine): 16  TV (machine): 400  FiO2: 40  PEEP: 8  ITime: 1  MAP: 12  PIP: 27      LABS:                        7.1    13.29 )-----------( 390      ( 14 Jun 2023 05:00 )             23.5     06-14    138  |  107  |  18  ----------------------------<  83  4.4   |  21  |  0.5<L>    Ca    8.3<L>      14 Jun 2023 05:00  Phos  3.7     06-13  Mg     1.8     06-14    TPro  5.6<L>  /  Alb  2.1<L>  /  TBili  <0.2  /  DBili  x   /  AST  145<H>  /  ALT  103<H>  /  AlkPhos  437<H>  06-14    PT/INR - ( 13 Jun 2023 04:50 )   PT: 15.00 sec;   INR: 1.31 ratio         PTT - ( 13 Jun 2023 04:50 )  PTT:32.9 sec    CAPILLARY BLOOD GLUCOSE      POCT Blood Glucose.: 94 mg/dL (14 Jun 2023 06:50)  POCT Blood Glucose.: 93 mg/dL (14 Jun 2023 01:18)  POCT Blood Glucose.: 85 mg/dL (13 Jun 2023 18:01)  POCT Blood Glucose.: 87 mg/dL (13 Jun 2023 13:05)    ABG - ( 14 Jun 2023 03:29 )  pH, Arterial: 7.39  pH, Blood: x     /  pCO2: 38    /  pO2: 139   / HCO3: 23    / Base Excess: -1.7  /  SaO2: 99.6                MEDS:  acetaminophen     Tablet .. 650 milliGRAM(s) Oral every 6 hours PRN  aMIOdarone    Tablet 200 milliGRAM(s) Oral two times a day  chlorhexidine 0.12% Liquid 15 milliLiter(s) Oral Mucosa every 12 hours  chlorhexidine 2% Cloths 1 Application(s) Topical daily  chlorhexidine 2% Cloths 1 Application(s) Topical <User Schedule>  dexMEDEtomidine Infusion 0.2 MICROgram(s)/kG/Hr IV Continuous <Continuous>  fentaNYL   Infusion. 0.509 MICROgram(s)/kG/Hr IV Continuous <Continuous>  furosemide   Injectable 40 milliGRAM(s) IV Push daily  insulin lispro (ADMELOG) corrective regimen sliding scale   SubCutaneous three times a day before meals  ketamine Infusion. 0.23 mG/kG/Hr IV Continuous <Continuous>  metroNIDAZOLE    Tablet 500 milliGRAM(s) Oral every 8 hours  pantoprazole   Suspension 40 milliGRAM(s) Oral daily  propofol Infusion 19.957 MICROgram(s)/kG/Min IV Continuous <Continuous>  vancomycin  IVPB 1500 milliGRAM(s) IV Intermittent every 24 hours  vitamin A &amp; D Ointment 1 Application(s) Topical daily      RADIOLOGY & ADDITIONAL TESTS:    MICRO:      Consultant(s) Notes Reviewed:  [x ] YES  [ ] NO    MEDICATIONS  (STANDING):  aMIOdarone    Tablet 200 milliGRAM(s) Oral two times a day  chlorhexidine 0.12% Liquid 15 milliLiter(s) Oral Mucosa every 12 hours  chlorhexidine 2% Cloths 1 Application(s) Topical <User Schedule>  chlorhexidine 2% Cloths 1 Application(s) Topical daily  dexMEDEtomidine Infusion 0.2 MICROgram(s)/kG/Hr (4.25 mL/Hr) IV Continuous <Continuous>  fentaNYL   Infusion. 0.509 MICROgram(s)/kG/Hr (4.72 mL/Hr) IV Continuous <Continuous>  furosemide   Injectable 40 milliGRAM(s) IV Push daily  insulin lispro (ADMELOG) corrective regimen sliding scale   SubCutaneous three times a day before meals  ketamine Infusion. 0.23 mG/kG/Hr (2.13 mL/Hr) IV Continuous <Continuous>  metroNIDAZOLE    Tablet 500 milliGRAM(s) Oral every 8 hours  pantoprazole   Suspension 40 milliGRAM(s) Oral daily  propofol Infusion 19.957 MICROgram(s)/kG/Min (11.1 mL/Hr) IV Continuous <Continuous>  vancomycin  IVPB 1500 milliGRAM(s) IV Intermittent every 24 hours  vitamin A &amp; D Ointment 1 Application(s) Topical daily    MEDICATIONS  (PRN):  acetaminophen     Tablet .. 650 milliGRAM(s) Oral every 6 hours PRN Temp greater or equal to 38C (100.4F)      PHYSICAL EXAM:  GENERAL: vented, sedated, r/i IJ, diggs, dignishield  HEAD: Atraumatic, Normocephalic  NECK: Supple  CHEST/LUNG: dec breath sounds L>R, chest tube removed  HEART: S1S2 normal, no S3  ABDOMEN: Soft, NT/ND  EXTREMITIES:  2+ Peripheral Pulses, No clubbing, cyanosis, or edema  LYMPH: No lymphadenopathy noted  SKIN: No rashes or lesions      Care Discussed with Consultants/Other Providers [ x] YES  [ ] NO

## 2023-06-15 LAB
ALBUMIN SERPL ELPH-MCNC: 2.1 G/DL — LOW (ref 3.5–5.2)
ALP SERPL-CCNC: 463 U/L — HIGH (ref 30–115)
ALT FLD-CCNC: 88 U/L — HIGH (ref 0–41)
ANION GAP SERPL CALC-SCNC: 9 MMOL/L — SIGNIFICANT CHANGE UP (ref 7–14)
AST SERPL-CCNC: 79 U/L — HIGH (ref 0–41)
BASE EXCESS BLDA CALC-SCNC: -1.2 MMOL/L — SIGNIFICANT CHANGE UP (ref -2–3)
BASOPHILS # BLD AUTO: 0.05 K/UL — SIGNIFICANT CHANGE UP (ref 0–0.2)
BASOPHILS NFR BLD AUTO: 0.4 % — SIGNIFICANT CHANGE UP (ref 0–1)
BILIRUB SERPL-MCNC: 0.2 MG/DL — SIGNIFICANT CHANGE UP (ref 0.2–1.2)
BUN SERPL-MCNC: 15 MG/DL — SIGNIFICANT CHANGE UP (ref 10–20)
CALCIUM SERPL-MCNC: 8.3 MG/DL — LOW (ref 8.4–10.4)
CHLORIDE SERPL-SCNC: 107 MMOL/L — SIGNIFICANT CHANGE UP (ref 98–110)
CO2 SERPL-SCNC: 23 MMOL/L — SIGNIFICANT CHANGE UP (ref 17–32)
CREAT SERPL-MCNC: <0.5 MG/DL — LOW (ref 0.7–1.5)
CULTURE RESULTS: SIGNIFICANT CHANGE UP
EGFR: 127 ML/MIN/1.73M2 — SIGNIFICANT CHANGE UP
EOSINOPHIL # BLD AUTO: 0.09 K/UL — SIGNIFICANT CHANGE UP (ref 0–0.7)
EOSINOPHIL NFR BLD AUTO: 0.7 % — SIGNIFICANT CHANGE UP (ref 0–8)
GAS PNL BLDA: SIGNIFICANT CHANGE UP
GLUCOSE BLDC GLUCOMTR-MCNC: 88 MG/DL — SIGNIFICANT CHANGE UP (ref 70–99)
GLUCOSE BLDC GLUCOMTR-MCNC: 96 MG/DL — SIGNIFICANT CHANGE UP (ref 70–99)
GLUCOSE SERPL-MCNC: 86 MG/DL — SIGNIFICANT CHANGE UP (ref 70–99)
HCO3 BLDA-SCNC: 24 MMOL/L — SIGNIFICANT CHANGE UP (ref 21–28)
HCT VFR BLD CALC: 22.4 % — LOW (ref 37–47)
HCT VFR BLD CALC: 23.1 % — LOW (ref 37–47)
HGB BLD-MCNC: 7 G/DL — LOW (ref 12–16)
HGB BLD-MCNC: 7.2 G/DL — LOW (ref 12–16)
HOROWITZ INDEX BLDA+IHG-RTO: 40 — SIGNIFICANT CHANGE UP
IMM GRANULOCYTES NFR BLD AUTO: 1 % — HIGH (ref 0.1–0.3)
LYMPHOCYTES # BLD AUTO: 1.2 K/UL — SIGNIFICANT CHANGE UP (ref 1.2–3.4)
LYMPHOCYTES # BLD AUTO: 8.9 % — LOW (ref 20.5–51.1)
MAGNESIUM SERPL-MCNC: 1.4 MG/DL — LOW (ref 1.8–2.4)
MCHC RBC-ENTMCNC: 22.8 PG — LOW (ref 27–31)
MCHC RBC-ENTMCNC: 22.9 PG — LOW (ref 27–31)
MCHC RBC-ENTMCNC: 31.2 G/DL — LOW (ref 32–37)
MCHC RBC-ENTMCNC: 31.3 G/DL — LOW (ref 32–37)
MCV RBC AUTO: 73 FL — LOW (ref 81–99)
MCV RBC AUTO: 73.3 FL — LOW (ref 81–99)
MONOCYTES # BLD AUTO: 0.94 K/UL — HIGH (ref 0.1–0.6)
MONOCYTES NFR BLD AUTO: 7 % — SIGNIFICANT CHANGE UP (ref 1.7–9.3)
NEUTROPHILS # BLD AUTO: 11 K/UL — HIGH (ref 1.4–6.5)
NEUTROPHILS NFR BLD AUTO: 82 % — HIGH (ref 42.2–75.2)
NRBC # BLD: 0 /100 WBCS — SIGNIFICANT CHANGE UP (ref 0–0)
NRBC # BLD: 0 /100 WBCS — SIGNIFICANT CHANGE UP (ref 0–0)
PCO2 BLDA: 39 MMHG — SIGNIFICANT CHANGE UP (ref 25–48)
PH BLDA: 7.39 — SIGNIFICANT CHANGE UP (ref 7.35–7.45)
PLATELET # BLD AUTO: 374 K/UL — SIGNIFICANT CHANGE UP (ref 130–400)
PLATELET # BLD AUTO: 385 K/UL — SIGNIFICANT CHANGE UP (ref 130–400)
PMV BLD: 9.5 FL — SIGNIFICANT CHANGE UP (ref 7.4–10.4)
PMV BLD: 9.9 FL — SIGNIFICANT CHANGE UP (ref 7.4–10.4)
PO2 BLDA: 140 MMHG — HIGH (ref 83–108)
POTASSIUM SERPL-MCNC: 3.8 MMOL/L — SIGNIFICANT CHANGE UP (ref 3.5–5)
POTASSIUM SERPL-SCNC: 3.8 MMOL/L — SIGNIFICANT CHANGE UP (ref 3.5–5)
PROT SERPL-MCNC: 5.7 G/DL — LOW (ref 6–8)
RBC # BLD: 3.07 M/UL — LOW (ref 4.2–5.4)
RBC # BLD: 3.15 M/UL — LOW (ref 4.2–5.4)
RBC # FLD: 28.8 % — HIGH (ref 11.5–14.5)
RBC # FLD: 28.8 % — HIGH (ref 11.5–14.5)
SAO2 % BLDA: 99.2 % — HIGH (ref 94–98)
SODIUM SERPL-SCNC: 139 MMOL/L — SIGNIFICANT CHANGE UP (ref 135–146)
SPECIMEN SOURCE: SIGNIFICANT CHANGE UP
TRIGL SERPL-MCNC: 286 MG/DL — HIGH
WBC # BLD: 13.42 K/UL — HIGH (ref 4.8–10.8)
WBC # BLD: 14.45 K/UL — HIGH (ref 4.8–10.8)
WBC # FLD AUTO: 13.42 K/UL — HIGH (ref 4.8–10.8)
WBC # FLD AUTO: 14.45 K/UL — HIGH (ref 4.8–10.8)

## 2023-06-15 PROCEDURE — 99291 CRITICAL CARE FIRST HOUR: CPT

## 2023-06-15 PROCEDURE — 76705 ECHO EXAM OF ABDOMEN: CPT | Mod: 26

## 2023-06-15 PROCEDURE — 71045 X-RAY EXAM CHEST 1 VIEW: CPT | Mod: 26

## 2023-06-15 RX ORDER — MAGNESIUM SULFATE 500 MG/ML
2 VIAL (ML) INJECTION EVERY 4 HOURS
Refills: 0 | Status: COMPLETED | OUTPATIENT
Start: 2023-06-15 | End: 2023-06-15

## 2023-06-15 RX ORDER — METHADONE HYDROCHLORIDE 40 MG/1
10 TABLET ORAL EVERY 8 HOURS
Refills: 0 | Status: DISCONTINUED | OUTPATIENT
Start: 2023-06-15 | End: 2023-06-22

## 2023-06-15 RX ADMIN — CHLORHEXIDINE GLUCONATE 1 APPLICATION(S): 213 SOLUTION TOPICAL at 06:55

## 2023-06-15 RX ADMIN — DEXMEDETOMIDINE HYDROCHLORIDE IN 0.9% SODIUM CHLORIDE 4.25 MICROGRAM(S)/KG/HR: 4 INJECTION INTRAVENOUS at 12:41

## 2023-06-15 RX ADMIN — Medication 1 APPLICATION(S): at 12:01

## 2023-06-15 RX ADMIN — KETAMINE HYDROCHLORIDE 2.13 MG/KG/HR: 100 INJECTION INTRAMUSCULAR; INTRAVENOUS at 03:04

## 2023-06-15 RX ADMIN — CHLORHEXIDINE GLUCONATE 15 MILLILITER(S): 213 SOLUTION TOPICAL at 06:55

## 2023-06-15 RX ADMIN — AMIODARONE HYDROCHLORIDE 200 MILLIGRAM(S): 400 TABLET ORAL at 18:22

## 2023-06-15 RX ADMIN — Medication 1: at 07:16

## 2023-06-15 RX ADMIN — FENTANYL CITRATE 4.72 MICROGRAM(S)/KG/HR: 50 INJECTION INTRAVENOUS at 02:25

## 2023-06-15 RX ADMIN — CHLORHEXIDINE GLUCONATE 1 APPLICATION(S): 213 SOLUTION TOPICAL at 06:58

## 2023-06-15 RX ADMIN — CHLORHEXIDINE GLUCONATE 15 MILLILITER(S): 213 SOLUTION TOPICAL at 18:22

## 2023-06-15 RX ADMIN — PROPOFOL 11.1 MICROGRAM(S)/KG/MIN: 10 INJECTION, EMULSION INTRAVENOUS at 07:56

## 2023-06-15 RX ADMIN — METHADONE HYDROCHLORIDE 10 MILLIGRAM(S): 40 TABLET ORAL at 22:19

## 2023-06-15 RX ADMIN — Medication 25 GRAM(S): at 11:00

## 2023-06-15 RX ADMIN — AMIODARONE HYDROCHLORIDE 200 MILLIGRAM(S): 400 TABLET ORAL at 07:54

## 2023-06-15 RX ADMIN — HEPARIN SODIUM 5000 UNIT(S): 5000 INJECTION INTRAVENOUS; SUBCUTANEOUS at 11:59

## 2023-06-15 RX ADMIN — Medication 25 GRAM(S): at 06:56

## 2023-06-15 RX ADMIN — Medication 500 MILLIGRAM(S): at 07:54

## 2023-06-15 RX ADMIN — DEXMEDETOMIDINE HYDROCHLORIDE IN 0.9% SODIUM CHLORIDE 4.25 MICROGRAM(S)/KG/HR: 4 INJECTION INTRAVENOUS at 07:56

## 2023-06-15 RX ADMIN — Medication 300 MILLIGRAM(S): at 12:00

## 2023-06-15 RX ADMIN — PANTOPRAZOLE SODIUM 40 MILLIGRAM(S): 20 TABLET, DELAYED RELEASE ORAL at 11:58

## 2023-06-15 RX ADMIN — Medication 40 MILLIGRAM(S): at 06:55

## 2023-06-15 RX ADMIN — Medication 500 MILLIGRAM(S): at 22:19

## 2023-06-15 RX ADMIN — Medication 500 MILLIGRAM(S): at 13:20

## 2023-06-15 RX ADMIN — PROPOFOL 11.1 MICROGRAM(S)/KG/MIN: 10 INJECTION, EMULSION INTRAVENOUS at 03:59

## 2023-06-15 RX ADMIN — HEPARIN SODIUM 5000 UNIT(S): 5000 INJECTION INTRAVENOUS; SUBCUTANEOUS at 22:19

## 2023-06-15 NOTE — PROVIDER CONTACT NOTE (OTHER) - ASSESSMENT
Pt moving mouth in a way that also moved OGT.
output color now creamy white/yellow similar to feed having been started
patient , 100.5 rectal temp

## 2023-06-15 NOTE — PROGRESS NOTE ADULT - SUBJECTIVE AND OBJECTIVE BOX
GENERAL SURGERY PROGRESS NOTE    Patient: ZEYNEP ROSSI , 42y ([birth year])Female   MRN: [id number]  Location: [address]  Visit: 05-23-23 Inpatient  Date: 06-15-23 @ 09:43    Hospital Day #: 15  Post-Op Day #: 1    Procedure/Dx/Injuries: S/P Trach & PEG    Events of past 24 hours: Chest tube removed. Post-pull CXR stable. Trach & PEG done.     PAST MEDICAL & SURGICAL HISTORY:      Vitals:   T(F): 98.7 (06-15-23 @ 06:00), Max: 98.7 (06-15-23 @ 06:00)  HR: 75 (06-15-23 @ 09:00)  BP: 103/60 (06-15-23 @ 09:00)  RR: 24 (06-15-23 @ 09:00)  SpO2: 99% (06-15-23 @ 09:00)  Mode: AC/ CMV (Assist Control/ Continuous Mandatory Ventilation), RR (machine): 16, TV (machine): 400, FiO2: 40, PEEP: 8, ITime: 1, MAP: 12, PIP: 28    Diet, NPO after Midnight:      NPO Start Date: 13-Jun-2023,   NPO Start Time: 23:59  Diet, NPO with Tube Feed:   Tube Feeding Modality: Orogastric  Peptamen Intense VHP  Total Volume for 24 Hours (mL): 480  Continuous  Starting Tube Feed Rate mL per Hour: 20  Until Goal Tube Feed Rate (mL per Hour): 20  Tube Feed Duration (in Hours): 24  Tube Feed Start Time: 13:30  Free Water Flush   Total Volume per Flush (mL): 100   Frequency: Every 4 Hours  No Carb Prosource (1pkg = 15gms Protein)     Qty per Day:  4  Banatrol TF     Qty per Day:  2      Fluids:     I & O's:    06-14-23 @ 07:01  -  06-15-23 @ 07:00  --------------------------------------------------------  IN:    Dexmedetomidine: 537.7 mL    FentaNYL: 635.6 mL    Ketamine: 389.4 mL    Propofol: 584 mL  Total IN: 2146.7 mL    OUT:    PEGJ (Percutaneous Endoscopic Gastrojejunostomy) Tube (mL): 250 mL    Rectal Tube (mL): 700 mL    Voided (mL): 1100 mL  Total OUT: 2050 mL    Total NET: 96.7 mL    PHYSICAL EXAM:  General: Sedated, NAD  HEENT: Trach in place. NCAT  Cardiac: RRR S1, S2, no Murmurs, rubs or gallops  Respiratory: Ventilated. B/L equal chest rise  Abdomen: PEG in place. Non-distended, soft  Musculoskeletal: Compartments soft  Vascular: Extremities well perfused  Skin: Warm/dry, normal color, no jaundice  Incision/wound: Dressings in place, clean, dry and intact    MEDICATIONS  (STANDING):  aMIOdarone    Tablet 200 milliGRAM(s) Oral two times a day  chlorhexidine 0.12% Liquid 15 milliLiter(s) Oral Mucosa every 12 hours  chlorhexidine 2% Cloths 1 Application(s) Topical daily  chlorhexidine 2% Cloths 1 Application(s) Topical <User Schedule>  dexMEDEtomidine Infusion 0.2 MICROgram(s)/kG/Hr (4.25 mL/Hr) IV Continuous <Continuous>  fentaNYL   Infusion. 0.509 MICROgram(s)/kG/Hr (4.72 mL/Hr) IV Continuous <Continuous>  furosemide   Injectable 40 milliGRAM(s) IV Push daily  heparin   Injectable 5000 Unit(s) SubCutaneous every 12 hours  insulin lispro (ADMELOG) corrective regimen sliding scale   SubCutaneous three times a day before meals  ketamine Infusion. 0.23 mG/kG/Hr (2.13 mL/Hr) IV Continuous <Continuous>  magnesium sulfate  IVPB 2 Gram(s) IV Intermittent every 4 hours  metroNIDAZOLE    Tablet 500 milliGRAM(s) Oral every 8 hours  pantoprazole   Suspension 40 milliGRAM(s) Oral daily  propofol Infusion 19.957 MICROgram(s)/kG/Min (11.1 mL/Hr) IV Continuous <Continuous>  vancomycin  IVPB 1500 milliGRAM(s) IV Intermittent every 24 hours  vitamin A &amp; D Ointment 1 Application(s) Topical daily    MEDICATIONS  (PRN):  acetaminophen     Tablet .. 650 milliGRAM(s) Oral every 6 hours PRN Temp greater or equal to 38C (100.4F)  LORazepam   Injectable 2 milliGRAM(s) IV Push every 2 hours PRN Agitation      DVT PROPHYLAXIS: heparin   Injectable 5000 Unit(s) SubCutaneous every 12 hours    GI PROPHYLAXIS: pantoprazole   Suspension 40 milliGRAM(s) Oral daily    ANTICOAGULATION:   ANTIBIOTICS:  metroNIDAZOLE    Tablet 500 milliGRAM(s)  vancomycin  IVPB 1500 milliGRAM(s)    LAB/STUDIES:  Labs:  CAPILLARY BLOOD GLUCOSE  POCT Blood Glucose.: 95 mg/dL (14 Jun 2023 23:50)  POCT Blood Glucose.: 86 mg/dL (14 Jun 2023 12:27)                          7.0    13.42 )-----------( 385      ( 15 [place] 2023 05:48 )             22.4       Auto Neutrophil %: 82.0 % (06-15-23 @ 05:48)  Auto Immature Granulocyte %: 1.0 % (06-15-23 @ 05:48)    06-15    139  |  107  |  15  ----------------------------<  86  3.8   |  23  |  <0.5<L>      Calcium, Total Serum: 8.3 mg/dL (06-15-23 @ 05:48)      LFTs:             5.7  | 0.2  | 79       ------------------[463     ( 15 [place] 2023 05:48 )  2.1  | x    | 88          Lipase:x      Amylase:x        Blood Gas Arterial, Lactate: 0.50 mmol/L (06-15-23 @ 03:39)  Blood Gas Arterial, Lactate: 0.60 mmol/L (06-14-23 @ 03:29)  Blood Gas Arterial, Lactate: 0.50 mmol/L (06-13-23 @ 03:40)    ABG - ( 15 [place] 2023 03:39 )  pH: 7.39  /  pCO2: 39    /  pO2: 140   / HCO3: 24    / Base Excess: -1.2  /  SaO2: 99.2        ABG - ( 14 Jun 2023 03:29 )  pH: 7.39  /  pCO2: 38    /  pO2: 139   / HCO3: 23    / Base Excess: -1.7  /  SaO2: 99.6        ABG - ( 13 Jun 2023 03:40 )  pH: 7.36  /  pCO2: 42    /  pO2: 98    / HCO3: 24    / Base Excess: -1.8  /  SaO2: 98.3        Coags:      IMAGING:  < from: Xray Chest 1 View- PORTABLE-Routine (Xray Chest 1 View- PORTABLE-Routine in AM.) (06.15.23 @ 05:53) >  Impression:    Stable bilateral lung opacities, left greater than right    --- End of Report ---  < end of copied text >    < from: Xray Chest 1 View- PORTABLE-Urgent (Xray Chest 1 View- PORTABLE-Urgent .) (06.14.23 @ 07:10) >  Impression:    Unchanged bilateral opacities, left pleural effusion. No pneumothorax.  Chest tube removal. Unchanged additional support devices.    --- End of Report ---  < end of copied text >    < from: Xray Chest 1 View- PORTABLE-Routine (Xray Chest 1 View- PORTABLE-Routine in AM.) (06.14.23 @ 06:14) >  Impression:    Left lower lung field opacity, slightly worse.    Support tubes and lines as above.    --- End of Report ---  < end of copied text >    · Assessment	  42yF w/ left pleural effusion, s/p chest tube placement, s/p tpa placement on 6/6 and 6/7    PLAN:  - S/P Trach & PEG 6/14  - Will remove trach sutures 6/24  - May start trickle feeds through J, advance as tolerated (NO MEDS THROUGH J)  - Meds through G (NO FEEDS THROUGH G)  - Care per primary team  - Please recall Thoracic Surgery if needed    Thoracic Surgery  [id number]

## 2023-06-15 NOTE — PROVIDER CONTACT NOTE (OTHER) - REASON
change in output color from gastrostomy site
Pt increased agitation, IVs removed
Blood transfusion reaction

## 2023-06-15 NOTE — PROGRESS NOTE ADULT - ASSESSMENT
IMPRESSION:    Acute hypoxemic respiratory failure sp trach  Severe cavitary CAP MRSA  Small parapneumonic effusion SP Chest tube NOW DC  MRSA bacteremia resolved   MASTER negative   Anemia sp transfusion   New onset A FIB rate controlled   LANCE non oliguric improving  Hypernatremia Improving  HO asthma   Influenza B     PLAN:    CNS:  start methadone, dc ketamine, dec propofol and fentanyl, add seroquel/ monitor QT    HEENT: Oral care.  ET care.     PULMONARY:  HOB @ 45 degrees.  Aspiration precautions.  No change in vent settings.  Monitor PPL and DP.  Keep SaO2 92 TO 96%.     CARDIOVASCULAR:  ECHO/ MASTER noted.  Avoid overload.  Continue Lasix 40 mg daily     GI: GI prophylaxis.  OGT Feeding.  Bowel regimen PRN.     RENAL:  Follow up lytes.  Correct as needed.     INFECTIOUS DISEASE: ABX Per ID.    HEMATOLOGICAL:  DVT prophylaxis.  CBC in the afternoon and transfuse if less than 7. LE doppler -    ENDOCRINE:  Follow up FS.  Insulin protocol if needed.    Requires ICU care    R IJ 6/13     Prognosis is poor.

## 2023-06-15 NOTE — PROGRESS NOTE ADULT - ASSESSMENT
42 year old female with PMHx Asthma, HTN presents with cough and SOB x3 days and hemoptysis. CT chest showing cavitary pneumonia. Cultures +MRSA bacteremia. Intubated; failed SBT. On Zyvox and Tamiflu. Repeat CT showing worsening PNA.    # Acute hypoxemic respiratory failure 2/2 cavitary MRSA PNA  # MRSA bacteremia, repeat cultures NGTD x3  # +Influenza B  # hx Asthma   - CT Angio Chest PE Protocol: Bilateral patchy groundglass nodularity with dominant confluent left lower lobe opacification with numerous cavitary lesions  - intubated 5/23, self-extubated 5/25, re-intubated 5/25  - repeat CT chest: Diffusely increased number and size of bilateral cavitary lesions with the left lower lobe now demonstrating a consolidative process of the entire lobe  - blood cultures +MRSA, s/p course of jimbo, currently on vanco and flagyl  cultures 5/28-30: NGTD x3  - ECHO: no vegetations. EF 65%, mild TR and pulm HTN  - s/p MASTER 5/31: No evidence of valvular vegetations or intracardiac abscesses to support IE. Pulmonary hepatisation incidental finding.   - D-dimer 1665, LE duplex negative for DVT  - pro-mya 48 > 5.8  - flu +, s/p Tamiflu 30mg q12  - repeat ET cultures negative   - repeat CT chest: Small left pleural effusion with overall essentially unchanged exam.  - c/w Vancomycin (dosing per pharmacy), Flagyl added 500mg q8 for anaerobic coverage  monitor trough levels  - s/p chest tube placement connected to suction --> tPA 6/6 and 6/7, removed 06/14   - now s/p trach and peg, f/u CTSx when ok to GJ  still on 4 sedatives (ketamine, prop, precedex, fentanyl), will start to wean as tolerated:  wean ketamine  convert fentanyl to methadone dose, ativan prn pushes for agitation  plan to add seroquel tmrw  will order daily EKGs, monitor QTc      # New onset A-fib w/ RVR  - s/p Amio drip  - EP consulted  - c/w Amio 200mg BID for 2 weeks --> then Amio 200mg daily (switch date 06/21)    # LANCE, resolved   - Nephro following; post-infectious GN vs. ATN vs. pulmonary renal syndrome  - Cr 3 > 0.7  - renal U/S: negative   - CK trending down 1483>39>41  - UA: +proteins and blood, Pr/Cr 1.3 (H)  - CHARLES+ 1:640, hep/HIV negative  - no acute indication for RRT    # Anemia- stable  - trend CBC, active type and screen, transfuse <7  - DIC panel negative   - Dimer 1665, LE Duplex negative for DVT  - hold heparin drip. SCDs  - s/p 3u pRBCs; Hgb stable around 7/8  - CTAP: r/o retroperitoneal bleed  - will order rpt duplex and start DVT ppx post-op today    #Lines- R' IJ 06/13, chest tube removed  #DVT ppx: heparin ppx  #GI ppx: Pantoprazole 40mg qd  #Diet: NPO for now, will restart feeds once cleared to use peg by CTSx  #Activity: bedrest   #Dispo: MICU

## 2023-06-15 NOTE — PROGRESS NOTE ADULT - SUBJECTIVE AND OBJECTIVE BOX
Patient is a 42y old  Female who presents with a chief complaint of AHRF (15 Sidney 2023 08:01)      INTERVAL HPI/OVERNIGHT EVENTS: none    ICU Vital Signs Last 24 Hrs  T(C): 37.1 (15 Sidney 2023 06:00), Max: 37.1 (15 Sidney 2023 06:00)  T(F): 98.7 (15 Sidney 2023 06:00), Max: 98.7 (15 Sidney 2023 06:00)  HR: 74 (15 Sidney 2023 08:00) (74 - 115)  BP: 109/63 (15 Sidney 2023 06:00) (98/60 - 163/96)  BP(mean): 81 (15 Sidney 2023 06:00) (74 - 122)  ABP: --  ABP(mean): --  RR: 21 (15 Sidney 2023 06:00) (13 - 32)  SpO2: 100% (15 Sidney 2023 08:00) (92% - 100%)    O2 Parameters below as of 15 Sidney 2023 06:00  Patient On (Oxygen Delivery Method): ventilator    O2 Concentration (%): 40      I&O's Summary    14 Jun 2023 07:01  -  15 Sidney 2023 07:00  --------------------------------------------------------  IN: 2146.7 mL / OUT: 2050 mL / NET: 96.7 mL      Mode: AC/ CMV (Assist Control/ Continuous Mandatory Ventilation)  RR (machine): 16  TV (machine): 400  FiO2: 40  PEEP: 8  ITime: 1  MAP: 12  PIP: 28      LABS:                        7.0    13.42 )-----------( 385      ( 15 Sidney 2023 05:48 )             22.4     06-15    139  |  107  |  15  ----------------------------<  86  3.8   |  23  |  <0.5<L>    Ca    8.3<L>      15 Sidney 2023 05:48  Mg     1.4     06-15    TPro  5.7<L>  /  Alb  2.1<L>  /  TBili  0.2  /  DBili  x   /  AST  79<H>  /  ALT  88<H>  /  AlkPhos  463<H>  06-15        CAPILLARY BLOOD GLUCOSE      POCT Blood Glucose.: 95 mg/dL (14 Jun 2023 23:50)  POCT Blood Glucose.: 86 mg/dL (14 Jun 2023 12:27)    ABG - ( 15 Sidney 2023 03:39 )  pH, Arterial: 7.39  pH, Blood: x     /  pCO2: 39    /  pO2: 140   / HCO3: 24    / Base Excess: -1.2  /  SaO2: 99.2                MEDS:  acetaminophen     Tablet .. 650 milliGRAM(s) Oral every 6 hours PRN  aMIOdarone    Tablet 200 milliGRAM(s) Oral two times a day  chlorhexidine 0.12% Liquid 15 milliLiter(s) Oral Mucosa every 12 hours  chlorhexidine 2% Cloths 1 Application(s) Topical <User Schedule>  chlorhexidine 2% Cloths 1 Application(s) Topical daily  dexMEDEtomidine Infusion 0.2 MICROgram(s)/kG/Hr IV Continuous <Continuous>  fentaNYL   Infusion. 0.509 MICROgram(s)/kG/Hr IV Continuous <Continuous>  furosemide   Injectable 40 milliGRAM(s) IV Push daily  heparin   Injectable 5000 Unit(s) SubCutaneous every 12 hours  insulin lispro (ADMELOG) corrective regimen sliding scale   SubCutaneous three times a day before meals  ketamine Infusion. 0.23 mG/kG/Hr IV Continuous <Continuous>  LORazepam   Injectable 2 milliGRAM(s) IV Push every 2 hours PRN  magnesium sulfate  IVPB 2 Gram(s) IV Intermittent every 4 hours  metroNIDAZOLE    Tablet 500 milliGRAM(s) Oral every 8 hours  pantoprazole   Suspension 40 milliGRAM(s) Oral daily  propofol Infusion 19.957 MICROgram(s)/kG/Min IV Continuous <Continuous>  vancomycin  IVPB 1500 milliGRAM(s) IV Intermittent every 24 hours  vitamin A &amp; D Ointment 1 Application(s) Topical daily      RADIOLOGY & ADDITIONAL TESTS:    MICRO:      Consultant(s) Notes Reviewed:  [x ] YES  [ ] NO    MEDICATIONS  (STANDING):  aMIOdarone    Tablet 200 milliGRAM(s) Oral two times a day  chlorhexidine 0.12% Liquid 15 milliLiter(s) Oral Mucosa every 12 hours  chlorhexidine 2% Cloths 1 Application(s) Topical daily  chlorhexidine 2% Cloths 1 Application(s) Topical <User Schedule>  dexMEDEtomidine Infusion 0.2 MICROgram(s)/kG/Hr (4.25 mL/Hr) IV Continuous <Continuous>  fentaNYL   Infusion. 0.509 MICROgram(s)/kG/Hr (4.72 mL/Hr) IV Continuous <Continuous>  furosemide   Injectable 40 milliGRAM(s) IV Push daily  heparin   Injectable 5000 Unit(s) SubCutaneous every 12 hours  insulin lispro (ADMELOG) corrective regimen sliding scale   SubCutaneous three times a day before meals  ketamine Infusion. 0.23 mG/kG/Hr (2.13 mL/Hr) IV Continuous <Continuous>  magnesium sulfate  IVPB 2 Gram(s) IV Intermittent every 4 hours  metroNIDAZOLE    Tablet 500 milliGRAM(s) Oral every 8 hours  pantoprazole   Suspension 40 milliGRAM(s) Oral daily  propofol Infusion 19.957 MICROgram(s)/kG/Min (11.1 mL/Hr) IV Continuous <Continuous>  vancomycin  IVPB 1500 milliGRAM(s) IV Intermittent every 24 hours  vitamin A &amp; D Ointment 1 Application(s) Topical daily    MEDICATIONS  (PRN):  acetaminophen     Tablet .. 650 milliGRAM(s) Oral every 6 hours PRN Temp greater or equal to 38C (100.4F)  LORazepam   Injectable 2 milliGRAM(s) IV Push every 2 hours PRN Agitation      PHYSICAL EXAM:  GENERAL: vented, sedated, r/i IJ, diggs, dignishield, now trach and peg  HEAD: Atraumatic, Normocephalic  NECK: Supple  CHEST/LUNG: dec breath sounds L>R  HEART: S1S2 normal, no S3  ABDOMEN: Soft, NT/ND  EXTREMITIES:  2+ Peripheral Pulses, No clubbing, cyanosis, or edema  LYMPH: No lymphadenopathy noted  SKIN: No rashes or lesions        Care Discussed with Consultants/Other Providers [ x] YES  [ ] NO Patient is a 42y old  Female who presents with a chief complaint of AHRF (15 Sidney 2023 08:01)      INTERVAL HPI/OVERNIGHT EVENTS: none    ICU Vital Signs Last 24 Hrs  T(C): 37.1 (15 Sidney 2023 06:00), Max: 37.1 (15 Sidney 2023 06:00)  T(F): 98.7 (15 Sidney 2023 06:00), Max: 98.7 (15 Sidney 2023 06:00)  HR: 74 (15 Sidney 2023 08:00) (74 - 115)  BP: 109/63 (15 Sidney 2023 06:00) (98/60 - 163/96)  BP(mean): 81 (15 Sidney 2023 06:00) (74 - 122)  ABP: --  ABP(mean): --  RR: 21 (15 Sidney 2023 06:00) (13 - 32)  SpO2: 100% (15 Sidney 2023 08:00) (92% - 100%)    O2 Parameters below as of 15 Sidney 2023 06:00  Patient On (Oxygen Delivery Method): ventilator    O2 Concentration (%): 40      I&O's Summary    14 Jun 2023 07:01  -  15 Sidney 2023 07:00  --------------------------------------------------------  IN: 2146.7 mL / OUT: 2050 mL / NET: 96.7 mL      Mode: AC/ CMV (Assist Control/ Continuous Mandatory Ventilation)  RR (machine): 16  TV (machine): 400  FiO2: 40  PEEP: 8  ITime: 1  MAP: 12  PIP: 28      LABS:                        7.0    13.42 )-----------( 385      ( 15 Sidney 2023 05:48 )             22.4     06-15    139  |  107  |  15  ----------------------------<  86  3.8   |  23  |  <0.5<L>    Ca    8.3<L>      15 Sidney 2023 05:48  Mg     1.4     06-15    TPro  5.7<L>  /  Alb  2.1<L>  /  TBili  0.2  /  DBili  x   /  AST  79<H>  /  ALT  88<H>  /  AlkPhos  463<H>  06-15        CAPILLARY BLOOD GLUCOSE      POCT Blood Glucose.: 95 mg/dL (14 Jun 2023 23:50)  POCT Blood Glucose.: 86 mg/dL (14 Jun 2023 12:27)    ABG - ( 15 Sidney 2023 03:39 )  pH, Arterial: 7.39  pH, Blood: x     /  pCO2: 39    /  pO2: 140   / HCO3: 24    / Base Excess: -1.2  /  SaO2: 99.2                MEDS:  acetaminophen     Tablet .. 650 milliGRAM(s) Oral every 6 hours PRN  aMIOdarone    Tablet 200 milliGRAM(s) Oral two times a day  chlorhexidine 0.12% Liquid 15 milliLiter(s) Oral Mucosa every 12 hours  chlorhexidine 2% Cloths 1 Application(s) Topical <User Schedule>  chlorhexidine 2% Cloths 1 Application(s) Topical daily  dexMEDEtomidine Infusion 0.2 MICROgram(s)/kG/Hr IV Continuous <Continuous>  fentaNYL   Infusion. 0.509 MICROgram(s)/kG/Hr IV Continuous <Continuous>  furosemide   Injectable 40 milliGRAM(s) IV Push daily  heparin   Injectable 5000 Unit(s) SubCutaneous every 12 hours  insulin lispro (ADMELOG) corrective regimen sliding scale   SubCutaneous three times a day before meals  ketamine Infusion. 0.23 mG/kG/Hr IV Continuous <Continuous>  LORazepam   Injectable 2 milliGRAM(s) IV Push every 2 hours PRN  magnesium sulfate  IVPB 2 Gram(s) IV Intermittent every 4 hours  metroNIDAZOLE    Tablet 500 milliGRAM(s) Oral every 8 hours  pantoprazole   Suspension 40 milliGRAM(s) Oral daily  propofol Infusion 19.957 MICROgram(s)/kG/Min IV Continuous <Continuous>  vancomycin  IVPB 1500 milliGRAM(s) IV Intermittent every 24 hours  vitamin A &amp; D Ointment 1 Application(s) Topical daily      RADIOLOGY & ADDITIONAL TESTS:    MICRO:      Consultant(s) Notes Reviewed:  [x ] YES  [ ] NO    MEDICATIONS  (STANDING):  aMIOdarone    Tablet 200 milliGRAM(s) Oral two times a day  chlorhexidine 0.12% Liquid 15 milliLiter(s) Oral Mucosa every 12 hours  chlorhexidine 2% Cloths 1 Application(s) Topical daily  chlorhexidine 2% Cloths 1 Application(s) Topical <User Schedule>  dexMEDEtomidine Infusion 0.2 MICROgram(s)/kG/Hr (4.25 mL/Hr) IV Continuous <Continuous>  fentaNYL   Infusion. 0.509 MICROgram(s)/kG/Hr (4.72 mL/Hr) IV Continuous <Continuous>  furosemide   Injectable 40 milliGRAM(s) IV Push daily  heparin   Injectable 5000 Unit(s) SubCutaneous every 12 hours  insulin lispro (ADMELOG) corrective regimen sliding scale   SubCutaneous three times a day before meals  ketamine Infusion. 0.23 mG/kG/Hr (2.13 mL/Hr) IV Continuous <Continuous>  magnesium sulfate  IVPB 2 Gram(s) IV Intermittent every 4 hours  metroNIDAZOLE    Tablet 500 milliGRAM(s) Oral every 8 hours  pantoprazole   Suspension 40 milliGRAM(s) Oral daily  propofol Infusion 19.957 MICROgram(s)/kG/Min (11.1 mL/Hr) IV Continuous <Continuous>  vancomycin  IVPB 1500 milliGRAM(s) IV Intermittent every 24 hours  vitamin A &amp; D Ointment 1 Application(s) Topical daily    MEDICATIONS  (PRN):  acetaminophen     Tablet .. 650 milliGRAM(s) Oral every 6 hours PRN Temp greater or equal to 38C (100.4F)  LORazepam   Injectable 2 milliGRAM(s) IV Push every 2 hours PRN Agitation      PHYSICAL EXAM:  GENERAL: vented, sedated, r/i IJ, diggs, dignishield, now trach and peg  HEAD: Atraumatic, Normocephalic  NECK: Supple  CHEST/LUNG: dec breath sounds L>R  HEART: S1S2 normal, no S3  ABDOMEN: Soft, NT/ND  EXTREMITIES:  2+ Peripheral Pulses, No clubbing, cyanosis, or edema  LYMPH: No lymphadenopathy noted  SKIN: stage 2 lower back dti        Care Discussed with Consultants/Other Providers [ x] YES  [ ] NO

## 2023-06-15 NOTE — PROGRESS NOTE ADULT - SUBJECTIVE AND OBJECTIVE BOX
Over Night Events: events noted, remain critically ill, sp trach, on ketamine, precedex, prop 50, fentanyl 3.5    PHYSICAL EXAM    ICU Vital Signs Last 24 Hrs  T(C): 37.1 (15 Sidney 2023 06:00), Max: 37.1 (15 Sidney 2023 06:00)  T(F): 98.7 (15 Sidney 2023 06:00), Max: 98.7 (15 Sidney 2023 06:00)  HR: 75 (15 Sidney 2023 06:00) (75 - 115)  BP: 109/63 (15 Sidney 2023 06:00) (98/60 - 163/96)  BP(mean): 81 (15 Sidney 2023 06:00) (74 - 122)  RR: 21 (15 Sidney 2023 06:00) (13 - 32)  SpO2: 99% (15 Sidney 2023 06:00) (92% - 100%)    O2 Parameters below as of 15 Sidney 2023 06:00  Patient On (Oxygen Delivery Method): ventilator    O2 Concentration (%): 40        General: ILL LOOKING  HEENT: trach  Lungs: dec bs l base  Cardiovascular: Regular   Abdomen: Soft, Positive BS  Extremities: No clubbing   not following commands      06-14-23 @ 07:01  -  06-15-23 @ 07:00  --------------------------------------------------------  IN:    Dexmedetomidine: 537.7 mL    FentaNYL: 635.6 mL    Ketamine: 389.4 mL    Propofol: 584 mL  Total IN: 2146.7 mL    OUT:    PEGJ (Percutaneous Endoscopic Gastrojejunostomy) Tube (mL): 250 mL    Rectal Tube (mL): 700 mL    Voided (mL): 1100 mL  Total OUT: 2050 mL    Total NET: 96.7 mL          LABS:                          7.0    13.42 )-----------( 385      ( 15 Sidney 2023 05:48 )             22.4                                               06-15    139  |  107  |  15  ----------------------------<  86  3.8   |  23  |  <0.5<L>    Ca    8.3<L>      15 Sidney 2023 05:48  Mg     1.4     06-15    TPro  5.7<L>  /  Alb  2.1<L>  /  TBili  0.2  /  DBili  x   /  AST  79<H>  /  ALT  88<H>  /  AlkPhos  463<H>  06-15                                                                                           LIVER FUNCTIONS - ( 15 Sidney 2023 05:48 )  Alb: 2.1 g/dL / Pro: 5.7 g/dL / ALK PHOS: 463 U/L / ALT: 88 U/L / AST: 79 U/L / GGT: x                                                                                               Mode: AC/ CMV (Assist Control/ Continuous Mandatory Ventilation)  RR (machine): 16  TV (machine): 400  FiO2: 40  PEEP: 8  ITime: 1  MAP: 12  PIP: 29                                      ABG - ( 15 Sidney 2023 03:39 )  pH, Arterial: 7.39  pH, Blood: x     /  pCO2: 39    /  pO2: 140   / HCO3: 24    / Base Excess: -1.2  /  SaO2: 99.2                MEDICATIONS  (STANDING):  aMIOdarone    Tablet 200 milliGRAM(s) Oral two times a day  chlorhexidine 0.12% Liquid 15 milliLiter(s) Oral Mucosa every 12 hours  chlorhexidine 2% Cloths 1 Application(s) Topical <User Schedule>  chlorhexidine 2% Cloths 1 Application(s) Topical daily  dexMEDEtomidine Infusion 0.2 MICROgram(s)/kG/Hr (4.25 mL/Hr) IV Continuous <Continuous>  fentaNYL   Infusion. 0.509 MICROgram(s)/kG/Hr (4.72 mL/Hr) IV Continuous <Continuous>  furosemide   Injectable 40 milliGRAM(s) IV Push daily  heparin   Injectable 5000 Unit(s) SubCutaneous every 12 hours  insulin lispro (ADMELOG) corrective regimen sliding scale   SubCutaneous three times a day before meals  ketamine Infusion. 0.23 mG/kG/Hr (2.13 mL/Hr) IV Continuous <Continuous>  magnesium sulfate  IVPB 2 Gram(s) IV Intermittent every 4 hours  metroNIDAZOLE    Tablet 500 milliGRAM(s) Oral every 8 hours  pantoprazole   Suspension 40 milliGRAM(s) Oral daily  propofol Infusion 19.957 MICROgram(s)/kG/Min (11.1 mL/Hr) IV Continuous <Continuous>  vancomycin  IVPB 1500 milliGRAM(s) IV Intermittent every 24 hours  vitamin A &amp; D Ointment 1 Application(s) Topical daily    MEDICATIONS  (PRN):  acetaminophen     Tablet .. 650 milliGRAM(s) Oral every 6 hours PRN Temp greater or equal to 38C (100.4F)    cxr reviewed

## 2023-06-15 NOTE — PROVIDER CONTACT NOTE (OTHER) - BACKGROUND
Pt remained on B/L wrist restraints however due to movement, restraints dislodged IVs
Patient receiving blood transfusion approximately 45 minutes into infusion patient noted with chamnge in vitals. Patient intubated/sedated unable to verbalize.
pt with PEG tube to gravity drainage. color has been green

## 2023-06-15 NOTE — PROVIDER CONTACT NOTE (OTHER) - ACTION/TREATMENT ORDERED:
G tube clamped and PGY 1 notifying surgery team
stat labs ordered, stat labs drawn
MD at bedside, placing IV immediately to re-start sedation, and ordered STAT midazolam IVP upon IV placement completion. OGT has been advanced, pending CXR, will hold feeds for now.

## 2023-06-16 LAB
ALBUMIN SERPL ELPH-MCNC: 2.3 G/DL — LOW (ref 3.5–5.2)
ALP SERPL-CCNC: 481 U/L — HIGH (ref 30–115)
ALT FLD-CCNC: 69 U/L — HIGH (ref 0–41)
ANION GAP SERPL CALC-SCNC: 13 MMOL/L — SIGNIFICANT CHANGE UP (ref 7–14)
ANION GAP SERPL CALC-SCNC: 8 MMOL/L — SIGNIFICANT CHANGE UP (ref 7–14)
AST SERPL-CCNC: 54 U/L — HIGH (ref 0–41)
BASE EXCESS BLDA CALC-SCNC: 0.8 MMOL/L — SIGNIFICANT CHANGE UP (ref -2–3)
BASOPHILS # BLD AUTO: 0.07 K/UL — SIGNIFICANT CHANGE UP (ref 0–0.2)
BASOPHILS NFR BLD AUTO: 0.4 % — SIGNIFICANT CHANGE UP (ref 0–1)
BILIRUB SERPL-MCNC: 0.3 MG/DL — SIGNIFICANT CHANGE UP (ref 0.2–1.2)
BLD GP AB SCN SERPL QL: SIGNIFICANT CHANGE UP
BUN SERPL-MCNC: 12 MG/DL — SIGNIFICANT CHANGE UP (ref 10–20)
BUN SERPL-MCNC: 14 MG/DL — SIGNIFICANT CHANGE UP (ref 10–20)
CALCIUM SERPL-MCNC: 8.1 MG/DL — LOW (ref 8.4–10.5)
CALCIUM SERPL-MCNC: 8.2 MG/DL — LOW (ref 8.4–10.4)
CHLORIDE SERPL-SCNC: 101 MMOL/L — SIGNIFICANT CHANGE UP (ref 98–110)
CHLORIDE SERPL-SCNC: 102 MMOL/L — SIGNIFICANT CHANGE UP (ref 98–110)
CO2 SERPL-SCNC: 23 MMOL/L — SIGNIFICANT CHANGE UP (ref 17–32)
CO2 SERPL-SCNC: 28 MMOL/L — SIGNIFICANT CHANGE UP (ref 17–32)
CREAT SERPL-MCNC: <0.5 MG/DL — LOW (ref 0.7–1.5)
CREAT SERPL-MCNC: <0.5 MG/DL — LOW (ref 0.7–1.5)
EGFR: 127 ML/MIN/1.73M2 — SIGNIFICANT CHANGE UP
EGFR: 136 ML/MIN/1.73M2 — SIGNIFICANT CHANGE UP
EOSINOPHIL # BLD AUTO: 0.08 K/UL — SIGNIFICANT CHANGE UP (ref 0–0.7)
EOSINOPHIL NFR BLD AUTO: 0.5 % — SIGNIFICANT CHANGE UP (ref 0–8)
GAS PNL BLDA: SIGNIFICANT CHANGE UP
GLUCOSE BLDC GLUCOMTR-MCNC: 112 MG/DL — HIGH (ref 70–99)
GLUCOSE BLDC GLUCOMTR-MCNC: 118 MG/DL — HIGH (ref 70–99)
GLUCOSE BLDC GLUCOMTR-MCNC: 123 MG/DL — HIGH (ref 70–99)
GLUCOSE SERPL-MCNC: 114 MG/DL — HIGH (ref 70–99)
GLUCOSE SERPL-MCNC: 96 MG/DL — SIGNIFICANT CHANGE UP (ref 70–99)
HCO3 BLDA-SCNC: 24 MMOL/L — SIGNIFICANT CHANGE UP (ref 21–28)
HCT VFR BLD CALC: 22.9 % — LOW (ref 37–47)
HGB BLD-MCNC: 7 G/DL — LOW (ref 12–16)
HOROWITZ INDEX BLDA+IHG-RTO: 40 — SIGNIFICANT CHANGE UP
IMM GRANULOCYTES NFR BLD AUTO: 1.1 % — HIGH (ref 0.1–0.3)
LYMPHOCYTES # BLD AUTO: 1.05 K/UL — LOW (ref 1.2–3.4)
LYMPHOCYTES # BLD AUTO: 6.5 % — LOW (ref 20.5–51.1)
MAGNESIUM SERPL-MCNC: 1.5 MG/DL — LOW (ref 1.8–2.4)
MAGNESIUM SERPL-MCNC: 1.8 MG/DL — SIGNIFICANT CHANGE UP (ref 1.8–2.4)
MCHC RBC-ENTMCNC: 22.4 PG — LOW (ref 27–31)
MCHC RBC-ENTMCNC: 30.6 G/DL — LOW (ref 32–37)
MCV RBC AUTO: 73.2 FL — LOW (ref 81–99)
MONOCYTES # BLD AUTO: 1.19 K/UL — HIGH (ref 0.1–0.6)
MONOCYTES NFR BLD AUTO: 7.4 % — SIGNIFICANT CHANGE UP (ref 1.7–9.3)
NEUTROPHILS # BLD AUTO: 13.53 K/UL — HIGH (ref 1.4–6.5)
NEUTROPHILS NFR BLD AUTO: 84.1 % — HIGH (ref 42.2–75.2)
NRBC # BLD: 0 /100 WBCS — SIGNIFICANT CHANGE UP (ref 0–0)
PCO2 BLDA: 34 MMHG — SIGNIFICANT CHANGE UP (ref 25–48)
PH BLDA: 7.46 — HIGH (ref 7.35–7.45)
PLATELET # BLD AUTO: 402 K/UL — HIGH (ref 130–400)
PMV BLD: 10.7 FL — HIGH (ref 7.4–10.4)
PO2 BLDA: 140 MMHG — HIGH (ref 83–108)
POTASSIUM SERPL-MCNC: 3.2 MMOL/L — LOW (ref 3.5–5)
POTASSIUM SERPL-MCNC: 3.5 MMOL/L — SIGNIFICANT CHANGE UP (ref 3.5–5)
POTASSIUM SERPL-SCNC: 3.2 MMOL/L — LOW (ref 3.5–5)
POTASSIUM SERPL-SCNC: 3.5 MMOL/L — SIGNIFICANT CHANGE UP (ref 3.5–5)
PROT SERPL-MCNC: 6.1 G/DL — SIGNIFICANT CHANGE UP (ref 6–8)
RBC # BLD: 3.13 M/UL — LOW (ref 4.2–5.4)
RBC # FLD: SIGNIFICANT CHANGE UP % (ref 11.5–14.5)
SAO2 % BLDA: 99.3 % — HIGH (ref 94–98)
SODIUM SERPL-SCNC: 137 MMOL/L — SIGNIFICANT CHANGE UP (ref 135–146)
SODIUM SERPL-SCNC: 138 MMOL/L — SIGNIFICANT CHANGE UP (ref 135–146)
TRIGL SERPL-MCNC: 336 MG/DL — HIGH
VANCOMYCIN TROUGH SERPL-MCNC: 5.5 UG/ML — SIGNIFICANT CHANGE UP (ref 5–10)
VANCOMYCIN TROUGH SERPL-MCNC: 8.9 UG/ML — SIGNIFICANT CHANGE UP (ref 5–10)
WBC # BLD: 16.1 K/UL — HIGH (ref 4.8–10.8)
WBC # FLD AUTO: 16.1 K/UL — HIGH (ref 4.8–10.8)

## 2023-06-16 PROCEDURE — 99291 CRITICAL CARE FIRST HOUR: CPT

## 2023-06-16 PROCEDURE — 93010 ELECTROCARDIOGRAM REPORT: CPT

## 2023-06-16 PROCEDURE — 71045 X-RAY EXAM CHEST 1 VIEW: CPT | Mod: 26

## 2023-06-16 RX ORDER — QUETIAPINE FUMARATE 200 MG/1
50 TABLET, FILM COATED ORAL EVERY 12 HOURS
Refills: 0 | Status: DISCONTINUED | OUTPATIENT
Start: 2023-06-16 | End: 2023-06-17

## 2023-06-16 RX ORDER — FENTANYL CITRATE 50 UG/ML
50 INJECTION INTRAVENOUS ONCE
Refills: 0 | Status: DISCONTINUED | OUTPATIENT
Start: 2023-06-16 | End: 2023-06-16

## 2023-06-16 RX ORDER — MAGNESIUM SULFATE 500 MG/ML
2 VIAL (ML) INJECTION ONCE
Refills: 0 | Status: COMPLETED | OUTPATIENT
Start: 2023-06-16 | End: 2023-06-16

## 2023-06-16 RX ORDER — POTASSIUM CHLORIDE 20 MEQ
20 PACKET (EA) ORAL
Refills: 0 | Status: DISCONTINUED | OUTPATIENT
Start: 2023-06-16 | End: 2023-06-16

## 2023-06-16 RX ORDER — MAGNESIUM SULFATE 500 MG/ML
2 VIAL (ML) INJECTION EVERY 4 HOURS
Refills: 0 | Status: COMPLETED | OUTPATIENT
Start: 2023-06-16 | End: 2023-06-16

## 2023-06-16 RX ORDER — POTASSIUM CHLORIDE 20 MEQ
40 PACKET (EA) ORAL EVERY 4 HOURS
Refills: 0 | Status: DISCONTINUED | OUTPATIENT
Start: 2023-06-16 | End: 2023-06-16

## 2023-06-16 RX ORDER — POTASSIUM CHLORIDE 20 MEQ
40 PACKET (EA) ORAL EVERY 4 HOURS
Refills: 0 | Status: COMPLETED | OUTPATIENT
Start: 2023-06-16 | End: 2023-06-16

## 2023-06-16 RX ADMIN — HEPARIN SODIUM 5000 UNIT(S): 5000 INJECTION INTRAVENOUS; SUBCUTANEOUS at 22:19

## 2023-06-16 RX ADMIN — Medication 500 MILLIGRAM(S): at 05:37

## 2023-06-16 RX ADMIN — PROPOFOL 11.1 MICROGRAM(S)/KG/MIN: 10 INJECTION, EMULSION INTRAVENOUS at 12:27

## 2023-06-16 RX ADMIN — FENTANYL CITRATE 50 MICROGRAM(S): 50 INJECTION INTRAVENOUS at 13:31

## 2023-06-16 RX ADMIN — Medication 1 APPLICATION(S): at 12:25

## 2023-06-16 RX ADMIN — FENTANYL CITRATE 50 MICROGRAM(S): 50 INJECTION INTRAVENOUS at 23:53

## 2023-06-16 RX ADMIN — AMIODARONE HYDROCHLORIDE 200 MILLIGRAM(S): 400 TABLET ORAL at 05:37

## 2023-06-16 RX ADMIN — CHLORHEXIDINE GLUCONATE 15 MILLILITER(S): 213 SOLUTION TOPICAL at 17:38

## 2023-06-16 RX ADMIN — Medication 300 MILLIGRAM(S): at 15:43

## 2023-06-16 RX ADMIN — Medication 25 GRAM(S): at 12:27

## 2023-06-16 RX ADMIN — Medication 25 GRAM(S): at 13:57

## 2023-06-16 RX ADMIN — Medication 2 MILLIGRAM(S): at 22:31

## 2023-06-16 RX ADMIN — Medication 25 GRAM(S): at 17:39

## 2023-06-16 RX ADMIN — Medication 40 MILLIGRAM(S): at 05:37

## 2023-06-16 RX ADMIN — Medication 2 MILLIGRAM(S): at 12:29

## 2023-06-16 RX ADMIN — Medication 40 MILLIEQUIVALENT(S): at 13:55

## 2023-06-16 RX ADMIN — Medication 2 MILLIGRAM(S): at 05:37

## 2023-06-16 RX ADMIN — Medication 2 MILLIGRAM(S): at 17:41

## 2023-06-16 RX ADMIN — METHADONE HYDROCHLORIDE 10 MILLIGRAM(S): 40 TABLET ORAL at 13:54

## 2023-06-16 RX ADMIN — Medication 40 MILLIEQUIVALENT(S): at 17:37

## 2023-06-16 RX ADMIN — METHADONE HYDROCHLORIDE 10 MILLIGRAM(S): 40 TABLET ORAL at 22:19

## 2023-06-16 RX ADMIN — HEPARIN SODIUM 5000 UNIT(S): 5000 INJECTION INTRAVENOUS; SUBCUTANEOUS at 12:24

## 2023-06-16 RX ADMIN — FENTANYL CITRATE 50 MICROGRAM(S): 50 INJECTION INTRAVENOUS at 23:38

## 2023-06-16 RX ADMIN — QUETIAPINE FUMARATE 50 MILLIGRAM(S): 200 TABLET, FILM COATED ORAL at 17:38

## 2023-06-16 RX ADMIN — Medication 500 MILLIGRAM(S): at 22:19

## 2023-06-16 RX ADMIN — CHLORHEXIDINE GLUCONATE 1 APPLICATION(S): 213 SOLUTION TOPICAL at 07:14

## 2023-06-16 RX ADMIN — Medication 500 MILLIGRAM(S): at 13:52

## 2023-06-16 RX ADMIN — CHLORHEXIDINE GLUCONATE 15 MILLILITER(S): 213 SOLUTION TOPICAL at 05:37

## 2023-06-16 RX ADMIN — PANTOPRAZOLE SODIUM 40 MILLIGRAM(S): 20 TABLET, DELAYED RELEASE ORAL at 12:25

## 2023-06-16 RX ADMIN — METHADONE HYDROCHLORIDE 10 MILLIGRAM(S): 40 TABLET ORAL at 05:37

## 2023-06-16 RX ADMIN — AMIODARONE HYDROCHLORIDE 200 MILLIGRAM(S): 400 TABLET ORAL at 17:38

## 2023-06-16 RX ADMIN — Medication 2 MILLIGRAM(S): at 20:01

## 2023-06-16 RX ADMIN — Medication 2 MILLIGRAM(S): at 09:13

## 2023-06-16 NOTE — PROGRESS NOTE ADULT - ASSESSMENT
ASSESSMENT  42 year-old female with PMH of Asthma, HTN? presents with complaint of cough x3 days and SOB. During my encounter pt with dyspnea and increased work of breathing and chest pain, unable to properly provide accurate history. She states that her cough has been progressively worsening over the past 3 days, endorses hemoptysis since yesterday.     IMPRESSION  #Rash only LUE , back  doubt drug rash without it being diffuse  initially appeared with small vesicles, not typical of shingles but HSV/VZV sent and is NEG  #Fever    6/11 sputum NG     6/5   10,000 - 49,000 CFU/mL Candida albicans "Susceptibilities not performed"- colonizer    6/2 BCX NGTD    6/2 pleural fluid     6/1 DTA   Normal Respiratory Laila present    5/31 BCX NGTD     5/30 BCX NGTD   #Flu B with cavitary PNA, with MRSA bacteremia and cavitary PNA, parapneumonic effusion s/p CT     5/29 BCX NGTD     5/28 BCX NGTD  x2    5/27 BCX +     5/26 BCX +    5/24 DTA MRSA    5/24 fungal ETT   Few Yeast, Fungitell: 41 (05-24-23 @ 06:10)-- unremarkable     5/23 BCX MRSA 3/4 bottles    MRSA PCR +     MASTER no vegetations   < from: CT Chest No Cont (05.29.23 @ 14:14) >  Diffusely increased number and size of bilateral cavitary lesions with   the left lower lobe now demonstrating a consolidative process of the   entirelobe.  Additional scattered bilateral ground glass opacities are noted. Findings   are compatible with progressing pneumonia.  WBC 16--> 3 12% Bands, Severe sepsis on admission  Doubt TB (lower lobe)  Acute hypoxemic resp failure requiring intubation  < from: CT Angio Chest PE Protocol w/ IV Cont (05.23.23 @ 22:06) >  1. Bilateral patchy groundglass nodularity with dominant confluent left   lower lobe opacification with numerous cavitary lesions. Additional   contralateral right lower lobe cavitary 1 cm nodule. Findings compatible   with cavitary pneumonia in the appropriate clinical setting; differential   diagnosis includes tuberculosis.  2. Confluent ill-defined left hilar and mediastinal adenopathy, likely   reactive, without dominant lymph node delineated.  3. No evidence of pulmonary embolism.  #Lactic acidosis  #LANCE, resolved  #Hyponatremia      RECOMMENDATIONS  - CTS following  - Continue Vanc dosing per ID pharmacy   - Continue flagyl 500mg TID PO   - Anticipate prolonged antibiotics course 4-6 weeks  - Guarded prognosis    If any questions, please call or send a message on [facility] Teams  Please continue to update ID with any pertinent new laboratory or radiographic findings  Spectra [id number]

## 2023-06-16 NOTE — PROGRESS NOTE ADULT - SUBJECTIVE AND OBJECTIVE BOX
ROSSIZEYNEP  42y, Female  Allergy: aspirin (Short breath; Anaphylaxis)      LOS  24d    CHIEF COMPLAINT: AHRF (16 Jun 2023 09:19)      INTERVAL EVENTS/HPI  - T(F): , Max: 99.1 (06-16-23 @ 04:00)  - WBC Count: 16.10 (06-16-23 @ 05:35) uptrending   WBC Count: 14.45 (06-15-23 @ 17:26)     - Creatinine, Serum: <0.5 (06-16-23 @ 05:35)  Creatinine, Serum: <0.5 (06-15-23 @ 05:48)     -   -   -     ROS  cannot obtain secondary to patient's sedation and/or mental status    VITALS:  T(F): 99.1, Max: 99.1 (06-16-23 @ 04:00)  HR: 78  BP: 132/95  RR: 31Vital Signs Last 24 Hrs  T(C): 37.3 (16 Jun 2023 04:00), Max: 37.3 (16 Jun 2023 04:00)  T(F): 99.1 (16 Jun 2023 04:00), Max: 99.1 (16 Jun 2023 04:00)  HR: 78 (16 Jun 2023 08:01) (71 - 92)  BP: 132/95 (16 Jun 2023 07:00) (96/59 - 172/89)  BP(mean): 110 (16 Jun 2023 07:00) (72 - 138)  RR: 31 (16 Jun 2023 05:00) (18 - 34)  SpO2: 100% (16 Jun 2023 08:01) (98% - 100%)    Parameters below as of 15 Sidney 2023 20:00  Patient On (Oxygen Delivery Method): ventilator    O2 Concentration (%): 40    PHYSICAL EXAM:  Gen: Intubated  CV: RRR  Lungs: Decreased BS at bases  Abd: Soft  Neuro: Sedated  Lines clean, no phlebitis    FH: Non-contributory  Social Hx: Non-contributory    TESTS & MEASUREMENTS:                        7.0    16.10 )-----------( 402      ( 16 Jun 2023 05:35 )             22.9     06-16    137  |  101  |  14  ----------------------------<  96  3.2<L>   |  23  |  <0.5<L>    Ca    8.2<L>      16 Jun 2023 05:35  Mg     1.5     06-16    TPro  6.1  /  Alb  2.3<L>  /  TBili  0.3  /  DBili  x   /  AST  54<H>  /  ALT  69<H>  /  AlkPhos  481<H>  06-16      LIVER FUNCTIONS - ( 16 Jun 2023 05:35 )  Alb: 2.3 g/dL / Pro: 6.1 g/dL / ALK PHOS: 481 U/L / ALT: 69 U/L / AST: 54 U/L / GGT: x               Culture - Sputum (collected 06-11-23 @ 11:47)  Source: Trach Asp Tracheal Aspirate  Gram Stain (06-11-23 @ 23:26):    Numerous polymorphonuclear leukocytes per low power field    Rare Squamous epithelial cells per low power field    No organisms seen per oil power field  Final Report (06-13-23 @ 16:58):    Normal Respiratory Laila present    Culture - Blood (collected 06-10-23 @ 11:41)  Source: .Blood None  Final Report (06-15-23 @ 20:01):    No Growth Final    Culture - Urine (collected 06-05-23 @ 10:35)  Source: Clean Catch Clean Catch (Midstream)  Final Report (06-06-23 @ 18:49):    10,000 - 49,000 CFU/mL Candida albicans "Susceptibilities not performed"    Culture - Body Fluid with Gram Stain (collected 06-02-23 @ 17:52)  Source: Pleural Fl Pleural Fluid  Gram Stain (06-03-23 @ 02:05):    polymorphonuclear leukocytes seen    No organisms seen    by cytocentrifuge  Final Report (06-07-23 @ 21:09):    No growth at 5 days    Culture - Acid Fast - Body Fluid w/Smear (collected 06-02-23 @ 17:52)  Source: .Body Fluid Other, Chest Tube  Preliminary Report (06-10-23 @ 15:07):    No acid-fast bacilli isolated at 1 week. ****Acid-fast cultures are held    for 6 weeks.****    Culture - Fungal, Body Fluid (collected 06-02-23 @ 17:52)  Source: .Body Fluid Other, Chest tube  Preliminary Report (06-13-23 @ 12:19):    Rare Mold Like Fungus    Culture - Blood (collected 06-02-23 @ 05:00)  Source: .Blood Blood  Final Report (06-07-23 @ 16:01):    No Growth Final    Culture - Blood (collected 06-01-23 @ 04:59)  Source: .Blood None  Final Report (06-06-23 @ 19:00):    No Growth Final    Culture - Sputum (collected 06-01-23 @ 02:15)  Source: ET Tube ET Tube  Gram Stain (06-01-23 @ 17:36):    Moderate polymorphonuclear leukocytes per low power field    No Squamous epithelial cells per low power field    Rare Gram positive cocci in pairs per oil power field  Final Report (06-03-23 @ 12:27):    Normal Respiratory Laila present    Culture - Blood (collected 05-31-23 @ 04:48)  Source: .Blood None  Final Report (06-05-23 @ 16:00):    No Growth Final    Culture - Blood (collected 05-30-23 @ 04:30)  Source: .Blood Blood  Final Report (06-04-23 @ 14:01):    No Growth Final    Culture - Blood (collected 05-29-23 @ 05:55)  Source: .Blood Blood  Final Report (06-03-23 @ 14:01):    No Growth Final    Culture - Blood (collected 05-28-23 @ 16:00)  Source: .Blood Blood-Peripheral  Final Report (06-03-23 @ 03:00):    No Growth Final    Culture - Blood (collected 05-28-23 @ 12:00)  Source: .Blood Blood  Final Report (06-02-23 @ 21:00):    No Growth Final    Culture - Blood (collected 05-27-23 @ 06:05)  Source: .Blood Blood  Gram Stain (05-28-23 @ 17:02):    Growth in aerobic bottle: Gram Positive Cocci in Clusters  Final Report (05-30-23 @ 09:16):    Growth in aerobic bottle: Staphylococcus aureus    See previous culture [id number]    Culture - Blood (collected 05-27-23 @ 06:05)  Source: .Blood Blood  Gram Stain (05-29-23 @ 13:29):    Growth in aerobic bottle: Gram Positive Cocci in Clusters    Growth in anaerobic bottle: Gram Positive Cocci in Clusters  Final Report (05-30-23 @ 11:11):    Growth in aerobic and anaerobic bottles: Methicillin Resistant    Staphylococcus aureus  Organism: Methicillin resistant Staphylococcus aureus (05-30-23 @ 11:11)  Organism: Methicillin resistant Staphylococcus aureus (05-30-23 @ 11:11)      Method Type: RUSSELL      -  Ampicillin/Sulbactam: R 16/8      -  Cefazolin: R >16      -  Clindamycin: S 0.5      -  Daptomycin: S 1      -  Erythromycin: R >4      -  Gentamicin: S <=1 Should not be used as monotherapy      -  Linezolid: S 2      -  Oxacillin: R >2      -  Penicillin: R >8      -  Rifampin: S <=1 Should not be used as monotherapy      -  Tetracycline: S <=1      -  Trimethoprim/Sulfamethoxazole: S <=0.5/9.5      -  Vancomycin: S 2    Culture - Blood (collected 05-26-23 @ 06:09)  Source: .Blood Blood  Gram Stain (05-29-23 @ 00:35):    Growth in anaerobic bottle: Gram Positive Cocci in Clusters  Final Report (05-31-23 @ 08:25):    Growth in anaerobic bottle: Methicillin Resistant Staphylococcus aureus  Organism: Methicillin resistant Staphylococcus aureus (05-31-23 @ 08:25)  Organism: Methicillin resistant Staphylococcus aureus (05-31-23 @ 08:25)      Method Type: RUSSELL      -  Ampicillin/Sulbactam: R 16/8      -  Cefazolin: R >16      -  Clindamycin: S 0.5      -  Daptomycin: S 1      -  Erythromycin: I 4      -  Gentamicin: S <=1 Should not be used as monotherapy      -  Linezolid: S 4      -  Oxacillin: R >2      -  Penicillin: R >8      -  Rifampin: S <=1 Should not be used as monotherapy      -  Tetracycline: S <=1      -  Trimethoprim/Sulfamethoxazole: S <=0.5/9.5      -  Vancomycin: S 2    Culture - Sputum (collected 05-24-23 @ 19:17)  Source: Trach Asp Tracheal Aspirate  Gram Stain (05-25-23 @ 07:31):    No polymorphonuclear leukocytes per low power field    No Squamous epithelial cells per low power field    Numerous Gram positive cocci in pairs per oil power field  Final Report (05-27-23 @ 08:45):    Numerous Methicillin Resistant Staphylococcus aureus    Normal Respiratory Laila present  Organism: Methicillin resistant Staphylococcus aureus (05-27-23 @ 08:45)  Organism: Methicillin resistant Staphylococcus aureus (05-27-23 @ 08:45)      Method Type: RUSSELL      -  Ampicillin/Sulbactam: R 16/8      -  Cefazolin: R >16      -  Clindamycin: S <=0.25      -  Erythromycin: R >4      -  Gentamicin: S <=1 Should not be used as monotherapy      -  Linezolid: S 2      -  Oxacillin: R >2      -  Penicillin: R >8      -  Rifampin: S <=1 Should not be used as monotherapy      -  Tetracycline: S <=1      -  Trimethoprim/Sulfamethoxazole: S <=0.5/9.5      -  Vancomycin: S 2    Culture - Acid Fast - Sputum w/Smear (collected 05-24-23 @ 19:17)  Source: Trach Asp Tracheal Aspirate  Preliminary Report (06-10-23 @ 15:06):    No acid-fast bacilli isolated at 2 weeks.    Culture - Fungal, Sputum (collected 05-24-23 @ 19:17)  Source: Trach Asp Tracheal Aspirate  Preliminary Report (05-29-23 @ 07:07):    Few Yeast    Culture - Blood (collected 05-24-23 @ 06:10)  Source: .Blood None  Gram Stain (05-25-23 @ 11:18):    Growth in aerobic bottle: Gram Positive Cocci in Clusters    Growth in anaerobic bottle: Gram Positive Cocci in Clusters  Final Report (05-26-23 @ 08:13):    Growth in aerobic and anaerobic bottles: Methicillin Resistant    Staphylococcus aureus    See previous culture [id number]    Culture - Blood (collected 05-23-23 @ 20:37)  Source: .Blood Blood-Peripheral  Gram Stain (05-24-23 @ 17:51):    Growth in aerobic bottle: Gram Positive Cocci in Clusters    Growth in anaerobic bottle: Gram Positive Cocci in Clusters  Final Report (05-26-23 @ 08:14):    Growth in aerobic and anaerobic bottles: Methicillin Resistant    Staphylococcus aureus    See previous culture [id number]    Culture - Blood (collected 05-23-23 @ 20:37)  Source: .Blood Blood-Peripheral  Gram Stain (05-24-23 @ 14:24):    Growth in aerobic bottle: Gram Positive Cocci in Clusters  Final Report (05-26-23 @ 08:11):    Growth in aerobic bottle: Methicillin Resistant Staphylococcus aureus    ***Blood Panel PCR results on this specimen are available    approximately 3 hours after the Gram stain result.***    Gram stain, PCR, and/or culture results may not always    correspond due to difference in methodologies.    ************************************************************    This PCR assay was performed by multiplex PCR. This    Assay tests for 66 bacterial and resistance gene targets.    Please refer to the [facility] test directory    at [email or web address] for details.  Organism: Blood Culture PCR  Methicillin resistant Staphylococcus aureus (05-26-23 @ 08:11)  Organism: Methicillin resistant Staphylococcus aureus (05-26-23 @ 08:11)      Method Type: RUSSELL      -  Ampicillin/Sulbactam: R 16/8      -  Cefazolin: R 16      -  Clindamycin: S <=0.25      -  Daptomycin: S 0.5      -  Erythromycin: R >4      -  Gentamicin: S <=1 Should not be used as monotherapy      -  Linezolid: S 2      -  Oxacillin: R >2      -  Penicillin: R >8      -  Rifampin: S <=1 Should not be used as monotherapy      -  Tetracycline: S <=1      -  Trimethoprim/Sulfamethoxazole: S <=0.5/9.5      -  Vancomycin: S 2  Organism: Blood Culture PCR (05-26-23 @ 08:11)      Method Type: PCR      -  Methicillin resistant Staphylococcus aureus (MRSA): Detec            INFECTIOUS DISEASES TESTING  Procalcitonin, Serum: 1.76 (06-03-23 @ 05:23)  Procalcitonin, Serum: 5.80 (05-30-23 @ 11:39)  HIV-1/2 Combo Result: Nonreact (05-27-23 @ 05:47)  MRSA PCR Result.: Positive (05-24-23 @ 19:17)  Legionella Antigen, Urine: Negative (05-24-23 @ 19:16)  Procalcitonin, Serum: 48.30 (05-24-23 @ 06:10)  Fungitell: 41 (05-24-23 @ 06:10)      INFLAMMATORY MARKERS      RADIOLOGY & ADDITIONAL TESTS:  I have personally reviewed the last available Chest xray  CXR  Xray Chest 1 View- PORTABLE-Urgent:   ACC: [id number] EXAM:  XR CHEST PORTABLE URGENT 1V   ORDERED BY: TATI BINGHAM     PROCEDURE DATE:  06/14/2023          INTERPRETATION:  Clinical History / Reason for exam: Chest tube removal.    Comparison : Chest radiograph 6/14/2023.    Technique/Positioning: Single frontal chest x-ray obtained.    Findings:    Support devices: Chest tube removal. Unchanged endotracheal tube, enteric   tube, right central venous catheter.    Cardiac/mediastinum/hilum: Unchanged.    Lung parenchyma/Pleura: Unchanged bilateral opacities, left pleural   effusion. No pneumothorax.    Skeleton/soft tissues: Unchanged.    Impression:    Unchanged bilateral opacities, left pleural effusion. No pneumothorax.  Chest tube removal. Unchanged additional support devices.    --- End of Report ---            YARITZA WILDE MD; Attending Radiologist  This document has been electronically signed. Jun 14 2023  9:12AM (06-14-23 @ 07:10)      CT      CARDIOLOGY TESTING  12 Lead ECG:   Ventricular Rate 142 BPM    Atrial Rate 187 BPM    QRS Duration 120 ms    Q-T Interval 306 ms    QTC Calculation(Bazett) 470 ms    R Axis 151 degrees    T Axis 132 degrees    Diagnosis Line Atrial fibrillation with rapid ventricular response  Rightbundle branch block  Abnormal ECG    Confirmed by PANFILO COVINGTON MD ([id number]) on 6/6/2023 6:53:11 AM (06-04-23 @ 04:47)  12 Lead ECG:   Ventricular Rate 205 BPM    Atrial Rate 202 BPM    QRS Duration 114 ms    Q-T Interval 228 ms    QTC Calculation(Bazett) 421 ms    R Axis 154 degrees    T Axis 213 degrees    Diagnosis Line Atrial fibrillation with rapid ventricular response  Rightaxis deviation  Incomplete right bundle branch block  Right ventricular hypertrophy with repolarization abnormality  Nonspecific ST-T changes  Abnormal ECG    Confirmed by Tyrell Olivarez ([id number]) on 6/5/2023 8:33:58 AM (06-04-23 @ 04:41)      MEDICATIONS  aMIOdarone    Tablet 200  chlorhexidine 0.12% Liquid 15  chlorhexidine 2% Cloths 1  chlorhexidine 2% Cloths 1  dexMEDEtomidine Infusion 0.2  heparin   Injectable 5000  insulin lispro (ADMELOG) corrective regimen sliding scale   magnesium sulfate  IVPB 2  methadone    Tablet 10  metroNIDAZOLE    Tablet 500  pantoprazole   Suspension 40  potassium chloride    Tablet ER 40  propofol Infusion 19.957  QUEtiapine 50  vancomycin  IVPB 1500  vitamin A &amp; D Ointment 1      WEIGHT  Weight (kg): 85 (06-14-23 @ 16:22)  Creatinine, Serum: <0.5 mg/dL (06-16-23 @ 05:35)      ANTIBIOTICS:  metroNIDAZOLE    Tablet 500 milliGRAM(s) Oral every 8 hours  vancomycin  IVPB 1500 milliGRAM(s) IV Intermittent every 24 hours      All available historical records have been reviewed   ROSSI, TINA  42y, Female  Allergy: aspirin (Short breath; Anaphylaxis)      LOS  24d    CHIEF COMPLAINT: AHRF (16 Jun 2023 09:19)      INTERVAL EVENTS/HPI  - T(F): , Max: 99.1 (06-16-23 @ 04:00)  - WBC Count: 16.10 (06-16-23 @ 05:35) uptrending   WBC Count: 14.45 (06-15-23 @ 17:26)     - Creatinine, Serum: <0.5 (06-16-23 @ 05:35)  Creatinine, Serum: <0.5 (06-15-23 @ 05:48)     -   -   -     ROS  cannot obtain secondary to patient's sedation and/or mental status    VITALS:  T(F): 99.1, Max: 99.1 (06-16-23 @ 04:00)  HR: 78  BP: 132/95  RR: 31Vital Signs Last 24 Hrs  T(C): 37.3 (16 Jun 2023 04:00), Max: 37.3 (16 Jun 2023 04:00)  T(F): 99.1 (16 Jun 2023 04:00), Max: 99.1 (16 Jun 2023 04:00)  HR: 78 (16 Jun 2023 08:01) (71 - 92)  BP: 132/95 (16 Jun 2023 07:00) (96/59 - 172/89)  BP(mean): 110 (16 Jun 2023 07:00) (72 - 138)  RR: 31 (16 Jun 2023 05:00) (18 - 34)  SpO2: 100% (16 Jun 2023 08:01) (98% - 100%)    Parameters below as of 15 [place] 2023 20:00  Patient On (Oxygen Delivery Method): ventilator    O2 Concentration (%): 40    PHYSICAL EXAM:  Gen: trach/ vent  CV: RRR  Lungs: Decreased BS at bases  Abd: Soft PEG  Neuro: agitated  Skin: no rash   Lines clean, no phlebitis     FH: Non-contributory  Social Hx: Non-contributory    TESTS & MEASUREMENTS:                        7.0    16.10 )-----------( 402      ( 16 Jun 2023 05:35 )             22.9     06-16    137  |  101  |  14  ----------------------------<  96  3.2<L>   |  23  |  <0.5<L>    Ca    8.2<L>      16 Jun 2023 05:35  Mg     1.5     06-16    TPro  6.1  /  Alb  2.3<L>  /  TBili  0.3  /  DBili  x   /  AST  54<H>  /  ALT  69<H>  /  AlkPhos  481<H>  06-16      LIVER FUNCTIONS - ( 16 Jun 2023 05:35 )  Alb: 2.3 g/dL / Pro: 6.1 g/dL / ALK PHOS: 481 U/L / ALT: 69 U/L / AST: 54 U/L / GGT: x               Culture - Sputum (collected 06-11-23 @ 11:47)  Source: Trach Asp Tracheal Aspirate  Gram Stain (06-11-23 @ 23:26):    Numerous polymorphonuclear leukocytes per low power field    Rare Squamous epithelial cells per low power field    No organisms seen per oil power field  Final Report (06-13-23 @ 16:58):    Normal Respiratory Laila present    Culture - Blood (collected 06-10-23 @ 11:41)  Source: .Blood None  Final Report (06-15-23 @ 20:01):    No Growth Final    Culture - Urine (collected 06-05-23 @ 10:35)  Source: Clean Catch Clean Catch (Midstream)  Final Report (06-06-23 @ 18:49):    10,000 - 49,000 CFU/mL Candida albicans "Susceptibilities not performed"    Culture - Body Fluid with Gram Stain (collected 06-02-23 @ 17:52)  Source: Pleural Fl Pleural Fluid  Gram Stain (06-03-23 @ 02:05):    polymorphonuclear leukocytes seen    No organisms seen    by cytocentrifuge  Final Report (06-07-23 @ 21:09):    No growth at 5 days    Culture - Acid Fast - Body Fluid w/Smear (collected 06-02-23 @ 17:52)  Source: .Body Fluid Other, Chest Tube  Preliminary Report (06-10-23 @ 15:07):    No acid-fast bacilli isolated at 1 week. ****Acid-fast cultures are held    for 6 weeks.****    Culture - Fungal, Body Fluid (collected 06-02-23 @ 17:52)  Source: .Body Fluid Other, Chest tube  Preliminary Report (06-13-23 @ 12:19):    Rare Mold Like Fungus    Culture - Blood (collected 06-02-23 @ 05:00)  Source: .Blood Blood  Final Report (06-07-23 @ 16:01):    No Growth Final    Culture - Blood (collected 06-01-23 @ 04:59)  Source: .Blood None  Final Report (06-06-23 @ 19:00):    No Growth Final    Culture - Sputum (collected 06-01-23 @ 02:15)  Source: ET Tube ET Tube  Gram Stain (06-01-23 @ 17:36):    Moderate polymorphonuclear leukocytes per low power field    No Squamous epithelial cells per low power field    Rare Gram positive cocci in pairs per oil power field  Final Report (06-03-23 @ 12:27):    Normal Respiratory Laila present    Culture - Blood (collected 05-31-23 @ 04:48)  Source: .Blood None  Final Report (06-05-23 @ 16:00):    No Growth Final    Culture - Blood (collected 05-30-23 @ 04:30)  Source: .Blood Blood  Final Report (06-04-23 @ 14:01):    No Growth Final    Culture - Blood (collected 05-29-23 @ 05:55)  Source: .Blood Blood  Final Report (06-03-23 @ 14:01):    No Growth Final    Culture - Blood (collected 05-28-23 @ 16:00)  Source: .Blood Blood-Peripheral  Final Report (06-03-23 @ 03:00):    No Growth Final    Culture - Blood (collected 05-28-23 @ 12:00)  Source: .Blood Blood  Final Report (06-02-23 @ 21:00):    No Growth Final    Culture - Blood (collected 05-27-23 @ 06:05)  Source: .Blood Blood  Gram Stain (05-28-23 @ 17:02):    Growth in aerobic bottle: Gram Positive Cocci in Clusters  Final Report (05-30-23 @ 09:16):    Growth in aerobic bottle: Staphylococcus aureus    See previous culture [id number]    Culture - Blood (collected 05-27-23 @ 06:05)  Source: .Blood Blood  Gram Stain (05-29-23 @ 13:29):    Growth in aerobic bottle: Gram Positive Cocci in Clusters    Growth in anaerobic bottle: Gram Positive Cocci in Clusters  Final Report (05-30-23 @ 11:11):    Growth in aerobic and anaerobic bottles: Methicillin Resistant    Staphylococcus aureus  Organism: Methicillin resistant Staphylococcus aureus (05-30-23 @ 11:11)  Organism: Methicillin resistant Staphylococcus aureus (05-30-23 @ 11:11)      Method Type: RUSSELL      -  Ampicillin/Sulbactam: R 16/8      -  Cefazolin: R >16      -  Clindamycin: S 0.5      -  Daptomycin: S 1      -  Erythromycin: R >4      -  Gentamicin: S <=1 Should not be used as monotherapy      -  Linezolid: S 2      -  Oxacillin: R >2      -  Penicillin: R >8      -  Rifampin: S <=1 Should not be used as monotherapy      -  Tetracycline: S <=1      -  Trimethoprim/Sulfamethoxazole: S <=0.5/9.5      -  Vancomycin: S 2    Culture - Blood (collected 05-26-23 @ 06:09)  Source: .Blood Blood  Gram Stain (05-29-23 @ 00:35):    Growth in anaerobic bottle: Gram Positive Cocci in Clusters  Final Report (05-31-23 @ 08:25):    Growth in anaerobic bottle: Methicillin Resistant Staphylococcus aureus  Organism: Methicillin resistant Staphylococcus aureus (05-31-23 @ 08:25)  Organism: Methicillin resistant Staphylococcus aureus (05-31-23 @ 08:25)      Method Type: RUSSELL      -  Ampicillin/Sulbactam: R 16/8      -  Cefazolin: R >16      -  Clindamycin: S 0.5      -  Daptomycin: S 1      -  Erythromycin: I 4      -  Gentamicin: S <=1 Should not be used as monotherapy      -  Linezolid: S 4      -  Oxacillin: R >2      -  Penicillin: R >8      -  Rifampin: S <=1 Should not be used as monotherapy      -  Tetracycline: S <=1      -  Trimethoprim/Sulfamethoxazole: S <=0.5/9.5      -  Vancomycin: S 2    Culture - Sputum (collected 05-24-23 @ 19:17)  Source: Trach Asp Tracheal Aspirate  Gram Stain (05-25-23 @ 07:31):    No polymorphonuclear leukocytes per low power field    No Squamous epithelial cells per low power field    Numerous Gram positive cocci in pairs per oil power field  Final Report (05-27-23 @ 08:45):    Numerous Methicillin Resistant Staphylococcus aureus    Normal Respiratory Laila present  Organism: Methicillin resistant Staphylococcus aureus (05-27-23 @ 08:45)  Organism: Methicillin resistant Staphylococcus aureus (05-27-23 @ 08:45)      Method Type: RUSSELL      -  Ampicillin/Sulbactam: R 16/8      -  Cefazolin: R >16      -  Clindamycin: S <=0.25      -  Erythromycin: R >4      -  Gentamicin: S <=1 Should not be used as monotherapy      -  Linezolid: S 2      -  Oxacillin: R >2      -  Penicillin: R >8      -  Rifampin: S <=1 Should not be used as monotherapy      -  Tetracycline: S <=1      -  Trimethoprim/Sulfamethoxazole: S <=0.5/9.5      -  Vancomycin: S 2    Culture - Acid Fast - Sputum w/Smear (collected 05-24-23 @ 19:17)  Source: Trach Asp Tracheal Aspirate  Preliminary Report (06-10-23 @ 15:06):    No acid-fast bacilli isolated at 2 weeks.    Culture - Fungal, Sputum (collected 05-24-23 @ 19:17)  Source: Trach Asp Tracheal Aspirate  Preliminary Report (05-29-23 @ 07:07):    Few Yeast    Culture - Blood (collected 05-24-23 @ 06:10)  Source: .Blood None  Gram Stain (05-25-23 @ 11:18):    Growth in aerobic bottle: Gram Positive Cocci in Clusters    Growth in anaerobic bottle: Gram Positive Cocci in Clusters  Final Report (05-26-23 @ 08:13):    Growth in aerobic and anaerobic bottles: Methicillin Resistant    Staphylococcus aureus    See previous culture [id number]    Culture - Blood (collected 05-23-23 @ 20:37)  Source: .Blood Blood-Peripheral  Gram Stain (05-24-23 @ 17:51):    Growth in aerobic bottle: Gram Positive Cocci in Clusters    Growth in anaerobic bottle: Gram Positive Cocci in Clusters  Final Report (05-26-23 @ 08:14):    Growth in aerobic and anaerobic bottles: Methicillin Resistant    Staphylococcus aureus    See previous culture [id number]    Culture - Blood (collected 05-23-23 @ 20:37)  Source: .Blood Blood-Peripheral  Gram Stain (05-24-23 @ 14:24):    Growth in aerobic bottle: Gram Positive Cocci in Clusters  Final Report (05-26-23 @ 08:11):    Growth in aerobic bottle: Methicillin Resistant Staphylococcus aureus    ***Blood Panel PCR results on this specimen are available    approximately 3 hours after the Gram stain result.***    Gram stain, PCR, and/or culture results may not always    correspond due to difference in methodologies.    ************************************************************    This PCR assay was performed by multiplex PCR. This    Assay tests for 66 bacterial and resistance gene targets.    Please refer to the [facility] test directory    at [email or web address] for details.  Organism: Blood Culture PCR  Methicillin resistant Staphylococcus aureus (05-26-23 @ 08:11)  Organism: Methicillin resistant Staphylococcus aureus (05-26-23 @ 08:11)      Method Type: RUSSELL      -  Ampicillin/Sulbactam: R 16/8      -  Cefazolin: R 16      -  Clindamycin: S <=0.25      -  Daptomycin: S 0.5      -  Erythromycin: R >4      -  Gentamicin: S <=1 Should not be used as monotherapy      -  Linezolid: S 2      -  Oxacillin: R >2      -  Penicillin: R >8      -  Rifampin: S <=1 Should not be used as monotherapy      -  Tetracycline: S <=1      -  Trimethoprim/Sulfamethoxazole: S <=0.5/9.5      -  Vancomycin: S 2  Organism: Blood Culture PCR (05-26-23 @ 08:11)      Method Type: PCR      -  Methicillin resistant Staphylococcus aureus (MRSA): Detec            INFECTIOUS DISEASES TESTING  Procalcitonin, Serum: 1.76 (06-03-23 @ 05:23)  Procalcitonin, Serum: 5.80 (05-30-23 @ 11:39)  HIV-1/2 Combo Result: Nonreact (05-27-23 @ 05:47)  MRSA PCR Result.: Positive (05-24-23 @ 19:17)  Legionella Antigen, Urine: Negative (05-24-23 @ 19:16)  Procalcitonin, Serum: 48.30 (05-24-23 @ 06:10)  Fungitell: 41 (05-24-23 @ 06:10)      INFLAMMATORY MARKERS      RADIOLOGY & ADDITIONAL TESTS:  I have personally reviewed the last available Chest xray  CXR  Xray Chest 1 View- PORTABLE-Urgent:   ACC: [id number] EXAM:  XR CHEST PORTABLE URGENT 1V   ORDERED BY: TATI BINGHAM     PROCEDURE DATE:  06/14/2023          INTERPRETATION:  Clinical History / Reason for exam: Chest tube removal.    Comparison : Chest radiograph 6/14/2023.    Technique/Positioning: Single frontal chest x-ray obtained.    Findings:    Support devices: Chest tube removal. Unchanged endotracheal tube, enteric   tube, right central venous catheter.    Cardiac/mediastinum/hilum: Unchanged.    Lung parenchyma/Pleura: Unchanged bilateral opacities, left pleural   effusion. No pneumothorax.    Skeleton/soft tissues: Unchanged.    Impression:    Unchanged bilateral opacities, left pleural effusion. No pneumothorax.  Chest tube removal. Unchanged additional support devices.    --- End of Report ---            YARITZA WILDE MD; Attending Radiologist  This document has been electronically signed. Jun 14 2023  9:12AM (06-14-23 @ 07:10)      CT      CARDIOLOGY TESTING  12 Lead ECG:   Ventricular Rate 142 BPM    Atrial Rate 187 BPM    QRS Duration 120 ms    Q-T Interval 306 ms    QTC Calculation(Bazett) 470 ms    R Axis 151 degrees    T Axis 132 degrees    Diagnosis Line Atrial fibrillation with rapid ventricular response  Rightbundle branch block  Abnormal ECG    Confirmed by PANFILO COVINGTON MD ([id number]) on 6/6/2023 6:53:11 AM (06-04-23 @ 04:47)  12 Lead ECG:   Ventricular Rate 205 BPM    Atrial Rate 202 BPM    QRS Duration 114 ms    Q-T Interval 228 ms    QTC Calculation(Bazett) 421 ms    R Axis 154 degrees    T Axis 213 degrees    Diagnosis Line Atrial fibrillation with rapid ventricular response  Rightaxis deviation  Incomplete right bundle branch block  Right ventricular hypertrophy with repolarization abnormality  Nonspecific ST-T changes  Abnormal ECG    Confirmed by Tyrell Olivarez ([id number]) on 6/5/2023 8:33:58 AM (06-04-23 @ 04:41)      MEDICATIONS  aMIOdarone    Tablet 200  chlorhexidine 0.12% Liquid 15  chlorhexidine 2% Cloths 1  chlorhexidine 2% Cloths 1  dexMEDEtomidine Infusion 0.2  heparin   Injectable 5000  insulin lispro (ADMELOG) corrective regimen sliding scale   magnesium sulfate  IVPB 2  methadone    Tablet 10  metroNIDAZOLE    Tablet 500  pantoprazole   Suspension 40  potassium chloride    Tablet ER 40  propofol Infusion 19.957  QUEtiapine 50  vancomycin  IVPB 1500  vitamin A &amp; D Ointment 1      WEIGHT  Weight (kg): 85 (06-14-23 @ 16:22)  Creatinine, Serum: <0.5 mg/dL (06-16-23 @ 05:35)      ANTIBIOTICS:  metroNIDAZOLE    Tablet 500 milliGRAM(s) Oral every 8 hours  vancomycin  IVPB 1500 milliGRAM(s) IV Intermittent every 24 hours      All available historical records have been reviewed

## 2023-06-16 NOTE — CHART NOTE - NSCHARTNOTEFT_GEN_A_CORE
Registered Dietitian Follow-Up     Patient Profile Reviewed                           Yes [x]   No []     Nutrition History Previously Obtained        Yes []  No [x]       Pertinent Subjective Information: Limited to chart review at this time as pt intubated and unable to provide hx.     Pertinent Medical Interventions: Pt continues to be managed for respiratory failure 2/2 cavitary MRSA PNA. Remains intubated.      Diet order: Diet, NPO with Tube Feed:   Tube Feeding Modality: Orogastric  Peptamen Intense VHP  Total Volume for 24 Hours (mL): 480  Continuous  Starting Tube Feed Rate {mL per Hour}: 20  Until Goal Tube Feed Rate (mL per Hour): 20  Tube Feed Duration (in Hours): 24  Tube Feed Start Time: 13:30  Free Water Flush   Total Volume per Flush (mL): 100   Frequency: Every 4 Hours  No Carb Prosource (1pkg = 15gms Protein)     Qty per Day:  4  Banatrol TF     Qty per Day:  2 (23 @ 13:16) [Active]    Anthropometrics:  Height (cm): 167.6 (23 @ 16:22)  Weight (kg): 85 (23 @ 16:22)  BMI (kg/m2): 30.3 (23 @ 16:22)  IBW: 52.1 kg  UBW: N/A    Daily Weight in k.7 (16), Weight in k.2 (15), Weight in k.6 (14), Weight in k.2 (), Weight in k.7 (12), Weight in k.5 (), Weight in k (06-10), Weight in k.6 (), Weight in k.6 (), Weight in k.8 ()  Weight Change: fluctuations noted, no significant weight change from admit weight    MEDICATIONS  (STANDING):  aMIOdarone    Tablet 200 milliGRAM(s) Oral two times a day  chlorhexidine 0.12% Liquid 15 milliLiter(s) Oral Mucosa every 12 hours  chlorhexidine 2% Cloths 1 Application(s) Topical <User Schedule>  chlorhexidine 2% Cloths 1 Application(s) Topical daily  dexMEDEtomidine Infusion 0.2 MICROgram(s)/kG/Hr (4.25 mL/Hr) IV Continuous <Continuous>  fentaNYL    Injectable 50 MICROGram(s) IV Push once  heparin   Injectable 5000 Unit(s) SubCutaneous every 12 hours  insulin lispro (ADMELOG) corrective regimen sliding scale   SubCutaneous three times a day before meals  magnesium sulfate  IVPB 2 Gram(s) IV Intermittent every 4 hours  methadone    Tablet 10 milliGRAM(s) Oral every 8 hours  metroNIDAZOLE    Tablet 500 milliGRAM(s) Oral every 8 hours  pantoprazole   Suspension 40 milliGRAM(s) Oral daily  potassium chloride   Powder 40 milliEquivalent(s) Oral every 4 hours  propofol Infusion 19.957 MICROgram(s)/kG/Min IV Continuous <Continuous>  -- 25.5ml/yk=931dydw/day  QUEtiapine 50 milliGRAM(s) Oral every 12 hours  vancomycin  IVPB 1500 milliGRAM(s) IV Intermittent every 24 hours  vitamin A &amp; D Ointment 1 Application(s) Topical daily    MEDICATIONS  (PRN):  acetaminophen     Tablet .. 650 milliGRAM(s) Oral every 6 hours PRN Temp greater or equal to 38C (100.4F)  LORazepam   Injectable 2 milliGRAM(s) IV Push every 2 hours PRN Agitation    Pertinent Labs:                         7.0    16.10 )-----------( 402      ( 2023 05:35 )             22.9      @ 05:35: Na 137, BUN 14, Cr <0.5<L>, BG 96, K+ 3.2<L>, Phos --, Mg 1.5<L>, Alk Phos 481<H>, ALT/SGPT 69<H>, AST/SGOT 54<H>, HbA1c --    Finger Sticks:  POCT Blood Glucose.: 118 mg/dL ( @ 06:46)  POCT Blood Glucose.: 88 mg/dL (06-15 @ 18:21)    I&O's Detail  15 Sidney 2023 07:01  -  2023 07:00  --------------------------------------------------------  IN:    Dexmedetomidine: 357.4 mL    Enteral Tube Flush: 400 mL    FentaNYL: 376.6 mL    Ketamine: 85.3 mL    Peptamen A.F.: 320 mL    Propofol: 574.1 mL  Total IN: 2113.4 mL    OUT:    PEGJ (Percutaneous Endoscopic Gastrojejunostomy) Tube (mL): 400 mL    Rectal Tube (mL): 800 mL    Voided (mL): 2700 mL  Total OUT: 3900 mL    Total NET: -1786.6 mL    Physical Findings:  - Appearance: sedated  - GI function: abdomen rounded; last bowel movement --liquid/loose bowel movements since   - Tubes: OG tube  - Oral/Mouth cavity: NPO   - Skin: left upper arm skin tear; sacral DTI  - Edema: 1+ generalized edema     Nutrition Requirements:  Weight Used: dosing weight 85 kg     Estimated Energy Needs    Continue []  Adjust [x]  Energy Recommendations: 1765 kcal/day based on PSE Ve 9.1 Tm 37.3      Estimated Protein Needs    Continue []  Adjust [x]  Protein Recommendations: 111-119 gm/day - 1.3-1.4 g/kg      Estimated Fluid Needs        Continue [x]  Adjust []  Fluid Recommendations: 2975 mL/day - adjusted fluid needs for BMI>30    Nutrient Intake: current TF provides 800kcal, 104g protein, 403ml free water. +673kcal from propofol.     [x] Previous Nutrition Diagnosis:  Increased Nutrient Needs (protein)            [x] Ongoing          [] Resolved     Nutrition Education: N/A     Goal/Expected Outcome: Pt to meet >90% & <105% estimated needs via EN in 3-5 days. RD to follow as per high risk protocol.     Indicator/Monitoring: Monitor diet order, kcal intake, body composition, NFPE, labs  (lytes, BG, renal indices)    Assessment: low electrolytes concerning for inadequate PO intake and increased GI output. Will trial increasing TF intake.     Recommendation:   Cont with Peptamen Intense VHP and advance to new goal 45ml/hr. Add No Carb Prosource (1pkg = 15gms Protein) 1pkt once a day. Cont with Banatrol 1pkt 2x/day. -- will provide 1220kcal, 114g protein, 907ml free water. +673kcal/day from propofol.     Patient assessed at high nutrition risk.  RD spectra [id number], also available on Teams. Registered Dietitian Follow-Up     Patient Profile Reviewed                           Yes [x]   No []     Nutrition History Previously Obtained        Yes []  No [x]       Pertinent Subjective Information: Limited to chart review at this time as pt intubated and unable to provide hx.     Pertinent Medical Interventions: Pt continues to be managed for respiratory failure 2/2 cavitary MRSA PNA. Remains intubated.      Diet order: Diet, NPO with Tube Feed:   Tube Feeding Modality: Orogastric  Peptamen Intense VHP  Total Volume for 24 Hours (mL): 480  Continuous  Starting Tube Feed Rate {mL per Hour}: 20  Until Goal Tube Feed Rate (mL per Hour): 20  Tube Feed Duration (in Hours): 24  Tube Feed Start Time: 13:30  Free Water Flush   Total Volume per Flush (mL): 100   Frequency: Every 4 Hours  No Carb Prosource (1pkg = 15gms Protein)     Qty per Day:  4  Banatrol TF     Qty per Day:  2 (23 @ 13:16) [Active]    Anthropometrics:  Height (cm): 167.6 (23 @ 16:22)  Weight (kg): 85 (23 @ 16:22)  BMI (kg/m2): 30.3 (23 @ 16:22)  IBW: 52.1 kg  UBW: N/A    Daily Weight in k.7 (16), Weight in k.2 (15), Weight in k.6 (14), Weight in k.2 (), Weight in k.7 (12), Weight in k.5 (), Weight in k (06-10), Weight in k.6 (), Weight in k.6 (), Weight in k.8 ()  Weight Change: fluctuations noted, no significant weight change from admit weight    MEDICATIONS  (STANDING):  aMIOdarone    Tablet 200 milliGRAM(s) Oral two times a day  chlorhexidine 0.12% Liquid 15 milliLiter(s) Oral Mucosa every 12 hours  chlorhexidine 2% Cloths 1 Application(s) Topical <User Schedule>  chlorhexidine 2% Cloths 1 Application(s) Topical daily  dexMEDEtomidine Infusion 0.2 MICROgram(s)/kG/Hr (4.25 mL/Hr) IV Continuous <Continuous>  fentaNYL    Injectable 50 MICROGram(s) IV Push once  heparin   Injectable 5000 Unit(s) SubCutaneous every 12 hours  insulin lispro (ADMELOG) corrective regimen sliding scale   SubCutaneous three times a day before meals  magnesium sulfate  IVPB 2 Gram(s) IV Intermittent every 4 hours  methadone    Tablet 10 milliGRAM(s) Oral every 8 hours  metroNIDAZOLE    Tablet 500 milliGRAM(s) Oral every 8 hours  pantoprazole   Suspension 40 milliGRAM(s) Oral daily  potassium chloride   Powder 40 milliEquivalent(s) Oral every 4 hours  propofol Infusion 19.957 MICROgram(s)/kG/Min IV Continuous <Continuous>  -- 25.5ml/db=382dkep/day  QUEtiapine 50 milliGRAM(s) Oral every 12 hours  vancomycin  IVPB 1500 milliGRAM(s) IV Intermittent every 24 hours  vitamin A &amp; D Ointment 1 Application(s) Topical daily    MEDICATIONS  (PRN):  acetaminophen     Tablet .. 650 milliGRAM(s) Oral every 6 hours PRN Temp greater or equal to 38C (100.4F)  LORazepam   Injectable 2 milliGRAM(s) IV Push every 2 hours PRN Agitation    Pertinent Labs:                         7.0    16.10 )-----------( 402      ( 2023 05:35 )             22.9      @ 05:35: Na 137, BUN 14, Cr <0.5<L>, BG 96, K+ 3.2<L>, Phos --, Mg 1.5<L>, Alk Phos 481<H>, ALT/SGPT 69<H>, AST/SGOT 54<H>, HbA1c --    Finger Sticks:  POCT Blood Glucose.: 118 mg/dL ( @ 06:46)  POCT Blood Glucose.: 88 mg/dL (06-15 @ 18:21)    I&O's Detail  15 Sidney 2023 07:01  -  2023 07:00  --------------------------------------------------------  IN:    Dexmedetomidine: 357.4 mL    Enteral Tube Flush: 400 mL    FentaNYL: 376.6 mL    Ketamine: 85.3 mL    Peptamen A.F.: 320 mL    Propofol: 574.1 mL  Total IN: 2113.4 mL    OUT:    PEGJ (Percutaneous Endoscopic Gastrojejunostomy) Tube (mL): 400 mL    Rectal Tube (mL): 800 mL    Voided (mL): 2700 mL  Total OUT: 3900 mL    Total NET: -1786.6 mL    Physical Findings:  - Appearance: sedated  - GI function: abdomen rounded; last bowel movement --liquid/loose bowel movements since   - Tubes: OG tube  - Oral/Mouth cavity: NPO   - Skin: left upper arm skin tear; sacral DTI  - Edema: 1+ generalized edema     Nutrition Requirements:  Weight Used: dosing weight 85 kg     Estimated Energy Needs    Continue []  Adjust [x]  Energy Recommendations: 1765 kcal/day based on PSE Ve 9.1 Tm 37.3      Estimated Protein Needs    Continue []  Adjust [x]  Protein Recommendations: 111-119 gm/day - 1.3-1.4 g/kg      Estimated Fluid Needs        Continue [x]  Adjust []  Fluid Recommendations: 2975 mL/day - adjusted fluid needs for BMI>30    Nutrient Intake: current TF provides 800kcal, 104g protein, 403ml free water. +673kcal from propofol.     [x] Previous Nutrition Diagnosis:  Increased Nutrient Needs (protein)            [x] Ongoing          [] Resolved     Nutrition Education: N/A     Goal/Expected Outcome: Pt to meet >90% & <105% estimated needs via EN in 3-5 days. RD to follow as per high risk protocol.     Indicator/Monitoring: Monitor diet order, kcal intake, body composition, NFPE, labs  (lytes, BG, renal indices)    Assessment: low electrolytes concerning for inadequate PO intake and/or increased GI output->will trial increasing TF intake--discussed with resident.     Recommendation:   Cont with Peptamen Intense VHP and advance to new goal 45ml/hr. Add No Carb Prosource (1pkg = 15gms Protein) 1pkt once a day. Cont with Banatrol 1pkt 2x/day. -- will provide 1220kcal, 114g protein, 907ml free water. +673kcal/day from propofol.     Patient assessed at high nutrition risk.  RD spectra [phone], also available on Teams. Registered Dietitian Follow-Up     Patient Profile Reviewed                           Yes [x]   No []     Nutrition History Previously Obtained        Yes []  No [x]       Pertinent Subjective Information: Limited to chart review at this time as pt intubated and unable to provide hx.     Pertinent Medical Interventions: Pt continues to be managed for respiratory failure 2/2 cavitary MRSA PNA. Remains intubated.      Diet order: Diet, NPO with Tube Feed:   Tube Feeding Modality: Orogastric  Peptamen Intense VHP  Total Volume for 24 Hours (mL): 480  Continuous  Starting Tube Feed Rate {mL per Hour}: 20  Until Goal Tube Feed Rate (mL per Hour): 20  Tube Feed Duration (in Hours): 24  Tube Feed Start Time: 13:30  Free Water Flush   Total Volume per Flush (mL): 100   Frequency: Every 4 Hours  No Carb Prosource (1pkg = 15gms Protein)     Qty per Day:  4  Banatrol TF     Qty per Day:  2 (23 @ 13:16) [Active]    Anthropometrics:  Height (cm): 167.6 (23 @ 16:22)  Weight (kg): 85 (23 @ 16:22)  BMI (kg/m2): 30.3 (23 @ 16:22)  IBW: 52.1 kg  UBW: N/A    Daily Weight in k.7 (16), Weight in k.2 (15), Weight in k.6 (14), Weight in k.2 (), Weight in k.7 (12), Weight in k.5 (), Weight in k (06-10), Weight in k.6 (), Weight in k.6 (), Weight in k.8 ()  Weight Change: fluctuations noted, no significant weight change from admit weight    MEDICATIONS  (STANDING):  aMIOdarone    Tablet 200 milliGRAM(s) Oral two times a day  chlorhexidine 0.12% Liquid 15 milliLiter(s) Oral Mucosa every 12 hours  chlorhexidine 2% Cloths 1 Application(s) Topical <User Schedule>  chlorhexidine 2% Cloths 1 Application(s) Topical daily  dexMEDEtomidine Infusion 0.2 MICROgram(s)/kG/Hr (4.25 mL/Hr) IV Continuous <Continuous>  fentaNYL    Injectable 50 MICROGram(s) IV Push once  heparin   Injectable 5000 Unit(s) SubCutaneous every 12 hours  insulin lispro (ADMELOG) corrective regimen sliding scale   SubCutaneous three times a day before meals  magnesium sulfate  IVPB 2 Gram(s) IV Intermittent every 4 hours  methadone    Tablet 10 milliGRAM(s) Oral every 8 hours  metroNIDAZOLE    Tablet 500 milliGRAM(s) Oral every 8 hours  pantoprazole   Suspension 40 milliGRAM(s) Oral daily  potassium chloride   Powder 40 milliEquivalent(s) Oral every 4 hours  propofol Infusion 19.957 MICROgram(s)/kG/Min IV Continuous <Continuous>  -- 25.5ml/rb=044baab/day  QUEtiapine 50 milliGRAM(s) Oral every 12 hours  vancomycin  IVPB 1500 milliGRAM(s) IV Intermittent every 24 hours  vitamin A &amp; D Ointment 1 Application(s) Topical daily    MEDICATIONS  (PRN):  acetaminophen     Tablet .. 650 milliGRAM(s) Oral every 6 hours PRN Temp greater or equal to 38C (100.4F)  LORazepam   Injectable 2 milliGRAM(s) IV Push every 2 hours PRN Agitation    Pertinent Labs:                         7.0    16.10 )-----------( 402      ( 2023 05:35 )             22.9      @ 05:35: Na 137, BUN 14, Cr <0.5<L>, BG 96, K+ 3.2<L>, Phos --, Mg 1.5<L>, Alk Phos 481<H>, ALT/SGPT 69<H>, AST/SGOT 54<H>, HbA1c --    Finger Sticks:  POCT Blood Glucose.: 118 mg/dL ( @ 06:46)  POCT Blood Glucose.: 88 mg/dL (06-15 @ 18:21)    I&O's Detail  15 Sidney 2023 07:01  -  2023 07:00  --------------------------------------------------------  IN:    Dexmedetomidine: 357.4 mL    Enteral Tube Flush: 400 mL    FentaNYL: 376.6 mL    Ketamine: 85.3 mL    Peptamen A.F.: 320 mL    Propofol: 574.1 mL  Total IN: 2113.4 mL    OUT:    PEGJ (Percutaneous Endoscopic Gastrojejunostomy) Tube (mL): 400 mL    Rectal Tube (mL): 800 mL    Voided (mL): 2700 mL  Total OUT: 3900 mL    Total NET: -1786.6 mL    Physical Findings:  - Appearance: sedated  - GI function: abdomen rounded; last bowel movement 6/15--liquid/loose bowel movements since   - Tubes: OG tube  - Oral/Mouth cavity: NPO   - Skin: left upper arm skin tear; sacral DTI  - Edema: 1+ generalized edema     Nutrition Requirements:  Weight Used: dosing weight 85 kg     Estimated Energy Needs    Continue []  Adjust [x]  Energy Recommendations: 1765 kcal/day based on PSE Ve 9.1 Tm 37.3      Estimated Protein Needs    Continue []  Adjust [x]  Protein Recommendations: 111-119 gm/day - 1.3-1.4 g/kg      Estimated Fluid Needs        Continue [x]  Adjust []  Fluid Recommendations: 2975 mL/day - adjusted fluid needs for BMI>30    Nutrient Intake: current TF provides 800kcal, 104g protein, 403ml free water. +673kcal from propofol.     [x] Previous Nutrition Diagnosis:  Increased Nutrient Needs (protein)            [x] Ongoing          [] Resolved     Nutrition Education: N/A     Goal/Expected Outcome: Pt to meet >90% & <105% estimated needs via EN in 3-5 days. RD to follow as per high risk protocol.     Indicator/Monitoring: Monitor diet order, kcal intake, body composition, NFPE, labs  (lytes, BG, renal indices)    Assessment: low electrolytes concerning for inadequate PO intake and/or increased GI output->will trial increasing TF intake--discussed with resident.     Recommendation:   Cont with Peptamen Intense VHP and advance to new goal 45ml/hr. Add No Carb Prosource (1pkg = 15gms Protein) 1pkt once a day. Cont with Banatrol 1pkt 2x/day. -- will provide 1220kcal, 114g protein, 907ml free water. +673kcal/day from propofol.     Patient assessed at high nutrition risk.  RD spectra [id number], also available on Teams.

## 2023-06-16 NOTE — PROGRESS NOTE ADULT - ASSESSMENT
IMPRESSION:    Acute hypoxemic respiratory failure sp trach on 40%  Severe cavitary CAP MRSA  Small parapneumonic effusion SP Chest tube NOW DC  MRSA bacteremia resolved   MASTER negative   Anemia sp transfusion   New onset A FIB rate controlled   LANCE non oliguric improving  Hypernatremia Improving  HO asthma   Influenza B     PLAN:    CNS:  methadone dec sedative as tolerated    HEENT: Oral care.     PULMONARY:  HOB @ 45 degrees.  Aspiration precautions.  No change in vent settings.  Monitor PPL and DP.  Keep SaO2 92 TO 96%.  SBT as tolerated    CARDIOVASCULAR:  ECHO/ MASTER noted.  dc lasix    GI: GI prophylaxis.  OGT Feeding.  Bowel regimen PRN.     RENAL:  Follow up lytes.  Correct as needed.     INFECTIOUS DISEASE: ABX Per ID.    HEMATOLOGICAL:  DVT prophylaxis.  trend CBC. LE doppler - 6/13    ENDOCRINE:  Follow up FS.  Insulin protocol if needed.  TG    R IJ 6/13     Prognosis is poor.      IMPRESSION:    Acute hypoxemic respiratory failure sp trach on 40%  Severe cavitary CAP MRSA  Small parapneumonic effusion SP Chest tube NOW DC  MRSA bacteremia resolved   MASTER negative   Anemia sp transfusion   New onset A FIB rate controlled   LANCE non oliguric improving  Hypernatremia Improving  HO asthma   Influenza B     PLAN:    CNS:  methadone dec sedative as tolerated, dec and dc proprofol, seroquel monito QT    HEENT: Oral care.     PULMONARY:  HOB @ 45 degrees.  Aspiration precautions.  No change in vent settings.  Monitor PPL and DP.  Keep SaO2 92 TO 96%.  PS as tolerated    CARDIOVASCULAR:  ECHO/ MASTER noted.  dc lasix    GI: GI prophylaxis.  OGT Feeding.  Bowel regimen PRN.     RENAL:  Follow up lytes.  Correct as needed.     INFECTIOUS DISEASE: ABX Per ID.    HEMATOLOGICAL:  DVT prophylaxis.  trend CBC. LE doppler - 6/13    ENDOCRINE:  Follow up FS.  Insulin protocol if needed.  TG    R IJ 6/13     Prognosis is poor.

## 2023-06-16 NOTE — PROGRESS NOTE ADULT - SUBJECTIVE AND OBJECTIVE BOX
Over Night Events: events noted, remain critically ill,     PHYSICAL EXAM    ICU Vital Signs Last 24 Hrs  T(C): 37.3 (16 Jun 2023 04:00), Max: 37.3 (16 Jun 2023 04:00)  T(F): 99.1 (16 Jun 2023 04:00), Max: 99.1 (16 Jun 2023 04:00)  HR: 84 (16 Jun 2023 04:59) (71 - 92)  BP: 163/93 (16 Jun 2023 04:00) (96/59 - 163/93)  BP(mean): 122 (16 Jun 2023 04:00) (72 - 122)  RR: 27 (16 Jun 2023 04:00) (18 - 34)  SpO2: 99% (16 Jun 2023 04:59) (98% - 100%)    O2 Parameters below as of 15 Sidney 2023 20:00  Patient On (Oxygen Delivery Method): ventilator    O2 Concentration (%): 40        General: ill looking  HEENT: trach  Lungs: dec bs l base  Cardiovascular: Regular   Abdomen: Soft, Positive BS  Extremities: No clubbing   sedated      06-15-23 @ 07:01  -  06-16-23 @ 07:00  --------------------------------------------------------  IN:    Dexmedetomidine: 300 mL    Enteral Tube Flush: 100 mL    FentaNYL: 334 mL    Ketamine: 85.3 mL    Peptamen A.F.: 80 mL    Propofol: 296 mL  Total IN: 1195.3 mL    OUT:    PEGJ (Percutaneous Endoscopic Gastrojejunostomy) Tube (mL): 400 mL    Rectal Tube (mL): 200 mL    Voided (mL): 1300 mL  Total OUT: 1900 mL    Total NET: -704.7 mL          LABS:                          7.0    16.10 )-----------( 402      ( 16 Jun 2023 05:35 )             22.9                                               06-16    137  |  101  |  14  ----------------------------<  96  3.2<L>   |  23  |  <0.5<L>    Ca    8.2<L>      16 Jun 2023 05:35  Mg     1.5     06-16    TPro  6.1  /  Alb  2.3<L>  /  TBili  0.3  /  DBili  x   /  AST  54<H>  /  ALT  69<H>  /  AlkPhos  481<H>  06-16                                                                                           LIVER FUNCTIONS - ( 16 Jun 2023 05:35 )  Alb: 2.3 g/dL / Pro: 6.1 g/dL / ALK PHOS: 481 U/L / ALT: 69 U/L / AST: 54 U/L / GGT: x                                                                                               Mode: AC/ CMV (Assist Control/ Continuous Mandatory Ventilation)  RR (machine): 16  TV (machine): 400  FiO2: 40  PEEP: 8  ITime: 1  MAP: 14  PIP: 30                                      ABG - ( 16 Jun 2023 04:10 )  pH, Arterial: 7.46  pH, Blood: x     /  pCO2: 34    /  pO2: 140   / HCO3: 24    / Base Excess: 0.8   /  SaO2: 99.3                MEDICATIONS  (STANDING):  aMIOdarone    Tablet 200 milliGRAM(s) Oral two times a day  chlorhexidine 0.12% Liquid 15 milliLiter(s) Oral Mucosa every 12 hours  chlorhexidine 2% Cloths 1 Application(s) Topical <User Schedule>  chlorhexidine 2% Cloths 1 Application(s) Topical daily  dexMEDEtomidine Infusion 0.2 MICROgram(s)/kG/Hr (4.25 mL/Hr) IV Continuous <Continuous>  furosemide   Injectable 40 milliGRAM(s) IV Push daily  heparin   Injectable 5000 Unit(s) SubCutaneous every 12 hours  insulin lispro (ADMELOG) corrective regimen sliding scale   SubCutaneous three times a day before meals  methadone    Tablet 10 milliGRAM(s) Oral every 8 hours  metroNIDAZOLE    Tablet 500 milliGRAM(s) Oral every 8 hours  pantoprazole   Suspension 40 milliGRAM(s) Oral daily  propofol Infusion 19.957 MICROgram(s)/kG/Min (11.1 mL/Hr) IV Continuous <Continuous>  vancomycin  IVPB 1500 milliGRAM(s) IV Intermittent every 24 hours  vitamin A &amp; D Ointment 1 Application(s) Topical daily    MEDICATIONS  (PRN):  acetaminophen     Tablet .. 650 milliGRAM(s) Oral every 6 hours PRN Temp greater or equal to 38C (100.4F)  LORazepam   Injectable 2 milliGRAM(s) IV Push every 2 hours PRN Agitation      cxr noted   Over Night Events: events noted, remain critically ill, on methadone, off ketamine, off fentanyl, on propofol, precedex sp ativan    PHYSICAL EXAM    ICU Vital Signs Last 24 Hrs  T(C): 37.3 (16 Jun 2023 04:00), Max: 37.3 (16 Jun 2023 04:00)  T(F): 99.1 (16 Jun 2023 04:00), Max: 99.1 (16 Jun 2023 04:00)  HR: 84 (16 Jun 2023 04:59) (71 - 92)  BP: 163/93 (16 Jun 2023 04:00) (96/59 - 163/93)  BP(mean): 122 (16 Jun 2023 04:00) (72 - 122)  RR: 27 (16 Jun 2023 04:00) (18 - 34)  SpO2: 99% (16 Jun 2023 04:59) (98% - 100%)    O2 Parameters below as of 15 [place] 2023 20:00  Patient On (Oxygen Delivery Method): ventilator    O2 Concentration (%): 40        General: ill looking  HEENT: trach  Lungs: dec bs l base  Cardiovascular: Regular   Abdomen: Soft, Positive BS  Extremities: No clubbing   sedated      06-15-23 @ 07:01  -  06-16-23 @ 07:00  --------------------------------------------------------  IN:    Dexmedetomidine: 300 mL    Enteral Tube Flush: 100 mL    FentaNYL: 334 mL    Ketamine: 85.3 mL    Peptamen A.F.: 80 mL    Propofol: 296 mL  Total IN: 1195.3 mL    OUT:    PEGJ (Percutaneous Endoscopic Gastrojejunostomy) Tube (mL): 400 mL    Rectal Tube (mL): 200 mL    Voided (mL): 1300 mL  Total OUT: 1900 mL    Total NET: -704.7 mL          LABS:                          7.0    16.10 )-----------( 402      ( 16 Jun 2023 05:35 )             22.9                                               06-16    137  |  101  |  14  ----------------------------<  96  3.2<L>   |  23  |  <0.5<L>    Ca    8.2<L>      16 Jun 2023 05:35  Mg     1.5     06-16    TPro  6.1  /  Alb  2.3<L>  /  TBili  0.3  /  DBili  x   /  AST  54<H>  /  ALT  69<H>  /  AlkPhos  481<H>  06-16                                                                                           LIVER FUNCTIONS - ( 16 Jun 2023 05:35 )  Alb: 2.3 g/dL / Pro: 6.1 g/dL / ALK PHOS: 481 U/L / ALT: 69 U/L / AST: 54 U/L / GGT: x                                                                                               Mode: AC/ CMV (Assist Control/ Continuous Mandatory Ventilation)  RR (machine): 16  TV (machine): 400  FiO2: 40  PEEP: 8  ITime: 1  MAP: 14  PIP: 30                                      ABG - ( 16 Jun 2023 04:10 )  pH, Arterial: 7.46  pH, Blood: x     /  pCO2: 34    /  pO2: 140   / HCO3: 24    / Base Excess: 0.8   /  SaO2: 99.3                MEDICATIONS  (STANDING):  aMIOdarone    Tablet 200 milliGRAM(s) Oral two times a day  chlorhexidine 0.12% Liquid 15 milliLiter(s) Oral Mucosa every 12 hours  chlorhexidine 2% Cloths 1 Application(s) Topical <User Schedule>  chlorhexidine 2% Cloths 1 Application(s) Topical daily  dexMEDEtomidine Infusion 0.2 MICROgram(s)/kG/Hr (4.25 mL/Hr) IV Continuous <Continuous>  furosemide   Injectable 40 milliGRAM(s) IV Push daily  heparin   Injectable 5000 Unit(s) SubCutaneous every 12 hours  insulin lispro (ADMELOG) corrective regimen sliding scale   SubCutaneous three times a day before meals  methadone    Tablet 10 milliGRAM(s) Oral every 8 hours  metroNIDAZOLE    Tablet 500 milliGRAM(s) Oral every 8 hours  pantoprazole   Suspension 40 milliGRAM(s) Oral daily  propofol Infusion 19.957 MICROgram(s)/kG/Min (11.1 mL/Hr) IV Continuous <Continuous>  vancomycin  IVPB 1500 milliGRAM(s) IV Intermittent every 24 hours  vitamin A &amp; D Ointment 1 Application(s) Topical daily    MEDICATIONS  (PRN):  acetaminophen     Tablet .. 650 milliGRAM(s) Oral every 6 hours PRN Temp greater or equal to 38C (100.4F)  LORazepam   Injectable 2 milliGRAM(s) IV Push every 2 hours PRN Agitation      cxr noted

## 2023-06-16 NOTE — PROGRESS NOTE ADULT - SUBJECTIVE AND OBJECTIVE BOX
Patient is a 42y old  Female who presents with a chief complaint of AHRF (16 Jun 2023 07:04)      INTERVAL HPI/OVERNIGHT EVENTS: now off ketamine and fentanyl. needed ativan push x1 for agitation.    ICU Vital Signs Last 24 Hrs  T(C): 37.3 (16 Jun 2023 04:00), Max: 37.3 (16 Jun 2023 04:00)  T(F): 99.1 (16 Jun 2023 04:00), Max: 99.1 (16 Jun 2023 04:00)  HR: 78 (16 Jun 2023 08:01) (71 - 92)  BP: 132/95 (16 Jun 2023 07:00) (96/59 - 172/89)  BP(mean): 110 (16 Jun 2023 07:00) (72 - 138)  ABP: --  ABP(mean): --  RR: 31 (16 Jun 2023 05:00) (18 - 34)  SpO2: 100% (16 Jun 2023 08:01) (98% - 100%)    O2 Parameters below as of 15 [place] 2023 20:00  Patient On (Oxygen Delivery Method): ventilator    O2 Concentration (%): 40      I&O's Summary    15 Sidney 2023 07:01  -  16 Jun 2023 07:00  --------------------------------------------------------  IN: 2113.4 mL / OUT: 3900 mL / NET: -1786.6 mL      Mode: AC/ CMV (Assist Control/ Continuous Mandatory Ventilation)  RR (machine): 16  TV (machine): 400  FiO2: 40  PEEP: 8  ITime: 1  MAP: 14  PIP: 32      LABS:                        7.0    16.10 )-----------( 402      ( 16 Jun 2023 05:35 )             22.9     06-16    137  |  101  |  14  ----------------------------<  96  3.2<L>   |  23  |  <0.5<L>    Ca    8.2<L>      16 Jun 2023 05:35  Mg     1.5     06-16    TPro  6.1  /  Alb  2.3<L>  /  TBili  0.3  /  DBili  x   /  AST  54<H>  /  ALT  69<H>  /  AlkPhos  481<H>  06-16        CAPILLARY BLOOD GLUCOSE      POCT Blood Glucose.: 118 mg/dL (16 Jun 2023 06:46)  POCT Blood Glucose.: 88 mg/dL (15 Sidney 2023 18:21)  POCT Blood Glucose.: 96 mg/dL (15 Sidney 2023 12:12)    ABG - ( 16 Jun 2023 04:10 )  pH, Arterial: 7.46  pH, Blood: x     /  pCO2: 34    /  pO2: 140   / HCO3: 24    / Base Excess: 0.8   /  SaO2: 99.3                MEDS:  acetaminophen     Tablet .. 650 milliGRAM(s) Oral every 6 hours PRN  aMIOdarone    Tablet 200 milliGRAM(s) Oral two times a day  chlorhexidine 0.12% Liquid 15 milliLiter(s) Oral Mucosa every 12 hours  chlorhexidine 2% Cloths 1 Application(s) Topical <User Schedule>  chlorhexidine 2% Cloths 1 Application(s) Topical daily  dexMEDEtomidine Infusion 0.2 MICROgram(s)/kG/Hr IV Continuous <Continuous>  heparin   Injectable 5000 Unit(s) SubCutaneous every 12 hours  insulin lispro (ADMELOG) corrective regimen sliding scale   SubCutaneous three times a day before meals  LORazepam   Injectable 2 milliGRAM(s) IV Push every 2 hours PRN  magnesium sulfate  IVPB 2 Gram(s) IV Intermittent every 4 hours  methadone    Tablet 10 milliGRAM(s) Oral every 8 hours  metroNIDAZOLE    Tablet 500 milliGRAM(s) Oral every 8 hours  pantoprazole   Suspension 40 milliGRAM(s) Oral daily  potassium chloride    Tablet ER 20 milliEquivalent(s) Oral every 2 hours  propofol Infusion 19.957 MICROgram(s)/kG/Min IV Continuous <Continuous>  QUEtiapine 50 milliGRAM(s) Oral every 12 hours  vancomycin  IVPB 1500 milliGRAM(s) IV Intermittent every 24 hours  vitamin A &amp; D Ointment 1 Application(s) Topical daily      RADIOLOGY & ADDITIONAL TESTS:    MICRO:      Consultant(s) Notes Reviewed:  [x ] YES  [ ] NO    MEDICATIONS  (STANDING):  aMIOdarone    Tablet 200 milliGRAM(s) Oral two times a day  chlorhexidine 0.12% Liquid 15 milliLiter(s) Oral Mucosa every 12 hours  chlorhexidine 2% Cloths 1 Application(s) Topical <User Schedule>  chlorhexidine 2% Cloths 1 Application(s) Topical daily  dexMEDEtomidine Infusion 0.2 MICROgram(s)/kG/Hr (4.25 mL/Hr) IV Continuous <Continuous>  heparin   Injectable 5000 Unit(s) SubCutaneous every 12 hours  insulin lispro (ADMELOG) corrective regimen sliding scale   SubCutaneous three times a day before meals  magnesium sulfate  IVPB 2 Gram(s) IV Intermittent every 4 hours  methadone    Tablet 10 milliGRAM(s) Oral every 8 hours  metroNIDAZOLE    Tablet 500 milliGRAM(s) Oral every 8 hours  pantoprazole   Suspension 40 milliGRAM(s) Oral daily  potassium chloride    Tablet ER 20 milliEquivalent(s) Oral every 2 hours  propofol Infusion 19.957 MICROgram(s)/kG/Min (11.1 mL/Hr) IV Continuous <Continuous>  QUEtiapine 50 milliGRAM(s) Oral every 12 hours  vancomycin  IVPB 1500 milliGRAM(s) IV Intermittent every 24 hours  vitamin A &amp; D Ointment 1 Application(s) Topical daily    MEDICATIONS  (PRN):  acetaminophen     Tablet .. 650 milliGRAM(s) Oral every 6 hours PRN Temp greater or equal to 38C (100.4F)  LORazepam   Injectable 2 milliGRAM(s) IV Push every 2 hours PRN Agitation      PHYSICAL EXAM:  GENERAL: trach & peg, sedated, following commands, r' IJ, a-line  HEAD: Atraumatic, Normocephalic  NECK: Supple, No JVD  CHEST/LUNG: dec breath sounds L>R  HEART: S1S2 normal, no S3, Regular rate and rhythm  ABDOMEN: Soft, nt/dn  EXTREMITIES:  2+ Peripheral Pulses  LYMPH: No lymphadenopathy noted  SKIN: stage 2 lower back dti      Care Discussed with Consultants/Other Providers [ x] YES  [ ] NO

## 2023-06-16 NOTE — PROGRESS NOTE ADULT - ASSESSMENT
42 year old female with PMHx Asthma, HTN presents with cough and SOB x3 days and hemoptysis. CT chest showing cavitary pneumonia. Cultures +MRSA bacteremia. Intubated; failed SBT. On Zyvox and Tamiflu. Repeat CT showing worsening PNA.    # Acute hypoxemic respiratory failure 2/2 cavitary MRSA PNA  # MRSA bacteremia, repeat cultures NGTD x3  # +Influenza B  # hx Asthma   - CT Angio Chest PE Protocol: Bilateral patchy groundglass nodularity with dominant confluent left lower lobe opacification with numerous cavitary lesions  - intubated 5/23, self-extubated 5/25, re-intubated 5/25  - repeat CT chest: Diffusely increased number and size of bilateral cavitary lesions with the left lower lobe now demonstrating a consolidative process of the entire lobe  - blood cultures +MRSA, s/p course of jimbo, currently on vanco and flagyl  cultures 5/28-30: NGTD x3  - ECHO: no vegetations. EF 65%, mild TR and pulm HTN  - s/p MASTER 5/31: No evidence of valvular vegetations or intracardiac abscesses to support IE. Pulmonary hepatisation incidental finding.   - D-dimer 1665, LE duplex negative for DVT  - pro-mya 48 > 5.8  - flu +, s/p Tamiflu 30mg q12  - repeat ET cultures negative   - repeat CT chest: Small left pleural effusion with overall essentially unchanged exam.  - c/w Vancomycin (dosing per pharmacy), Flagyl added 500mg q8 for anaerobic coverage  monitor trough levels  - s/p chest tube placement connected to suction --> tPA 6/6 and 6/7, removed 06/14   - d/c lasix  - now s/p trach and peg, GJ ok to use, CTSx following  - now off ketamine and fentanyl--> started methadone  - will add seroquel 50mg q12 today, daily EKG to monitor qtc  - keep ativan pushes for now for prn agitation  - intermittent pressure support      # New onset A-fib w/ RVR  - s/p Amio drip  - EP consulted  - c/w Amio 200mg BID for 2 weeks --> then Amio 200mg daily (switch date 06/21)      # Anemia- stable  - trend CBC, active type and screen, transfuse <7  - DIC panel negative   - Dimer 1665, LE Duplex negative for DVT  - hold heparin drip. SCDs  - s/p 3u pRBCs; Hgb stable around 7/8  - CTAP: r/o retroperitoneal bleed  - will order rpt duplex and start DVT ppx post-op today    #Lines- R' IJ 06/13, chest tube removed  #DVT ppx: heparin ppx  #GI ppx: Pantoprazole 40mg qd  #Diet: tube feeds per dietary  #Activity: bedrest   #Dispo: MICU

## 2023-06-17 LAB
ALBUMIN SERPL ELPH-MCNC: 2.1 G/DL — LOW (ref 3.5–5.2)
ALP SERPL-CCNC: 491 U/L — HIGH (ref 30–115)
ALT FLD-CCNC: 66 U/L — HIGH (ref 0–41)
ANION GAP SERPL CALC-SCNC: 8 MMOL/L — SIGNIFICANT CHANGE UP (ref 7–14)
AST SERPL-CCNC: 60 U/L — HIGH (ref 0–41)
BASE EXCESS BLDA CALC-SCNC: 3.9 MMOL/L — HIGH (ref -2–3)
BASOPHILS # BLD AUTO: 0.05 K/UL — SIGNIFICANT CHANGE UP (ref 0–0.2)
BASOPHILS NFR BLD AUTO: 0.4 % — SIGNIFICANT CHANGE UP (ref 0–1)
BILIRUB SERPL-MCNC: 0.2 MG/DL — SIGNIFICANT CHANGE UP (ref 0.2–1.2)
BLD GP AB SCN SERPL QL: SIGNIFICANT CHANGE UP
BUN SERPL-MCNC: 12 MG/DL — SIGNIFICANT CHANGE UP (ref 10–20)
CALCIUM SERPL-MCNC: 8.2 MG/DL — LOW (ref 8.4–10.5)
CHLORIDE SERPL-SCNC: 102 MMOL/L — SIGNIFICANT CHANGE UP (ref 98–110)
CO2 SERPL-SCNC: 27 MMOL/L — SIGNIFICANT CHANGE UP (ref 17–32)
CREAT SERPL-MCNC: <0.5 MG/DL — LOW (ref 0.7–1.5)
EGFR: 127 ML/MIN/1.73M2 — SIGNIFICANT CHANGE UP
EOSINOPHIL # BLD AUTO: 0.11 K/UL — SIGNIFICANT CHANGE UP (ref 0–0.7)
EOSINOPHIL NFR BLD AUTO: 0.8 % — SIGNIFICANT CHANGE UP (ref 0–8)
GAS PNL BLDA: SIGNIFICANT CHANGE UP
GAS PNL BLDA: SIGNIFICANT CHANGE UP
GLUCOSE BLDC GLUCOMTR-MCNC: 109 MG/DL — HIGH (ref 70–99)
GLUCOSE BLDC GLUCOMTR-MCNC: 115 MG/DL — HIGH (ref 70–99)
GLUCOSE BLDC GLUCOMTR-MCNC: 93 MG/DL — SIGNIFICANT CHANGE UP (ref 70–99)
GLUCOSE SERPL-MCNC: 113 MG/DL — HIGH (ref 70–99)
HCO3 BLDA-SCNC: 28 MMOL/L — SIGNIFICANT CHANGE UP (ref 21–28)
HCT VFR BLD CALC: 22.5 % — LOW (ref 37–47)
HCT VFR BLD CALC: 22.6 % — LOW (ref 37–47)
HGB BLD-MCNC: 6.9 G/DL — CRITICAL LOW (ref 12–16)
HGB BLD-MCNC: 7.1 G/DL — LOW (ref 12–16)
HOROWITZ INDEX BLDA+IHG-RTO: 40 — SIGNIFICANT CHANGE UP
IMM GRANULOCYTES NFR BLD AUTO: 1.4 % — HIGH (ref 0.1–0.3)
LYMPHOCYTES # BLD AUTO: 1.39 K/UL — SIGNIFICANT CHANGE UP (ref 1.2–3.4)
LYMPHOCYTES # BLD AUTO: 10.7 % — LOW (ref 20.5–51.1)
MAGNESIUM SERPL-MCNC: 1.8 MG/DL — SIGNIFICANT CHANGE UP (ref 1.8–2.4)
MCHC RBC-ENTMCNC: 22.3 PG — LOW (ref 27–31)
MCHC RBC-ENTMCNC: 23.1 PG — LOW (ref 27–31)
MCHC RBC-ENTMCNC: 30.5 G/DL — LOW (ref 32–37)
MCHC RBC-ENTMCNC: 31.6 G/DL — LOW (ref 32–37)
MCV RBC AUTO: 72.9 FL — LOW (ref 81–99)
MCV RBC AUTO: 73.1 FL — LOW (ref 81–99)
MONOCYTES # BLD AUTO: 1.07 K/UL — HIGH (ref 0.1–0.6)
MONOCYTES NFR BLD AUTO: 8.2 % — SIGNIFICANT CHANGE UP (ref 1.7–9.3)
NEUTROPHILS # BLD AUTO: 10.18 K/UL — HIGH (ref 1.4–6.5)
NEUTROPHILS NFR BLD AUTO: 78.5 % — HIGH (ref 42.2–75.2)
NRBC # BLD: 0 /100 WBCS — SIGNIFICANT CHANGE UP (ref 0–0)
NRBC # BLD: 0 /100 WBCS — SIGNIFICANT CHANGE UP (ref 0–0)
PCO2 BLDA: 38 MMHG — SIGNIFICANT CHANGE UP (ref 25–48)
PH BLDA: 7.47 — HIGH (ref 7.35–7.45)
PLATELET # BLD AUTO: 364 K/UL — SIGNIFICANT CHANGE UP (ref 130–400)
PLATELET # BLD AUTO: 369 K/UL — SIGNIFICANT CHANGE UP (ref 130–400)
PMV BLD: 9.8 FL — SIGNIFICANT CHANGE UP (ref 7.4–10.4)
PMV BLD: 9.8 FL — SIGNIFICANT CHANGE UP (ref 7.4–10.4)
PO2 BLDA: 100 MMHG — SIGNIFICANT CHANGE UP (ref 83–108)
POTASSIUM SERPL-MCNC: 3.6 MMOL/L — SIGNIFICANT CHANGE UP (ref 3.5–5)
POTASSIUM SERPL-SCNC: 3.6 MMOL/L — SIGNIFICANT CHANGE UP (ref 3.5–5)
PROT SERPL-MCNC: 5.9 G/DL — LOW (ref 6–8)
RBC # BLD: 3.08 M/UL — LOW (ref 4.2–5.4)
RBC # BLD: 3.1 M/UL — LOW (ref 4.2–5.4)
RBC # FLD: 28.4 % — HIGH (ref 11.5–14.5)
RBC # FLD: SIGNIFICANT CHANGE UP % (ref 11.5–14.5)
SAO2 % BLDA: 98.3 % — HIGH (ref 94–98)
SODIUM SERPL-SCNC: 137 MMOL/L — SIGNIFICANT CHANGE UP (ref 135–146)
TRIGL SERPL-MCNC: 300 MG/DL — HIGH
WBC # BLD: 12.98 K/UL — HIGH (ref 4.8–10.8)
WBC # BLD: 14.43 K/UL — HIGH (ref 4.8–10.8)
WBC # FLD AUTO: 12.98 K/UL — HIGH (ref 4.8–10.8)
WBC # FLD AUTO: 14.43 K/UL — HIGH (ref 4.8–10.8)

## 2023-06-17 PROCEDURE — 71045 X-RAY EXAM CHEST 1 VIEW: CPT | Mod: 26

## 2023-06-17 PROCEDURE — 70450 CT HEAD/BRAIN W/O DYE: CPT | Mod: 26

## 2023-06-17 PROCEDURE — 99291 CRITICAL CARE FIRST HOUR: CPT

## 2023-06-17 RX ORDER — QUETIAPINE FUMARATE 200 MG/1
25 TABLET, FILM COATED ORAL
Refills: 0 | Status: DISCONTINUED | OUTPATIENT
Start: 2023-06-17 | End: 2023-06-20

## 2023-06-17 RX ORDER — CLONAZEPAM 1 MG
1 TABLET ORAL
Refills: 0 | Status: DISCONTINUED | OUTPATIENT
Start: 2023-06-17 | End: 2023-06-18

## 2023-06-17 RX ORDER — POTASSIUM CHLORIDE 20 MEQ
40 PACKET (EA) ORAL ONCE
Refills: 0 | Status: COMPLETED | OUTPATIENT
Start: 2023-06-17 | End: 2023-06-17

## 2023-06-17 RX ORDER — MAGNESIUM SULFATE 500 MG/ML
2 VIAL (ML) INJECTION ONCE
Refills: 0 | Status: COMPLETED | OUTPATIENT
Start: 2023-06-17 | End: 2023-06-17

## 2023-06-17 RX ORDER — FUROSEMIDE 40 MG
40 TABLET ORAL ONCE
Refills: 0 | Status: COMPLETED | OUTPATIENT
Start: 2023-06-17 | End: 2023-06-17

## 2023-06-17 RX ORDER — INSULIN LISPRO 100/ML
VIAL (ML) SUBCUTANEOUS EVERY 6 HOURS
Refills: 0 | Status: DISCONTINUED | OUTPATIENT
Start: 2023-06-17 | End: 2023-07-07

## 2023-06-17 RX ADMIN — Medication 500 MILLIGRAM(S): at 13:00

## 2023-06-17 RX ADMIN — METHADONE HYDROCHLORIDE 10 MILLIGRAM(S): 40 TABLET ORAL at 21:27

## 2023-06-17 RX ADMIN — Medication 1 APPLICATION(S): at 12:35

## 2023-06-17 RX ADMIN — Medication 40 MILLIEQUIVALENT(S): at 07:13

## 2023-06-17 RX ADMIN — AMIODARONE HYDROCHLORIDE 200 MILLIGRAM(S): 400 TABLET ORAL at 05:29

## 2023-06-17 RX ADMIN — Medication 25 GRAM(S): at 07:13

## 2023-06-17 RX ADMIN — METHADONE HYDROCHLORIDE 10 MILLIGRAM(S): 40 TABLET ORAL at 13:00

## 2023-06-17 RX ADMIN — CHLORHEXIDINE GLUCONATE 15 MILLILITER(S): 213 SOLUTION TOPICAL at 17:05

## 2023-06-17 RX ADMIN — Medication 500 MILLIGRAM(S): at 21:27

## 2023-06-17 RX ADMIN — METHADONE HYDROCHLORIDE 10 MILLIGRAM(S): 40 TABLET ORAL at 05:29

## 2023-06-17 RX ADMIN — Medication 500 MILLIGRAM(S): at 05:29

## 2023-06-17 RX ADMIN — CHLORHEXIDINE GLUCONATE 1 APPLICATION(S): 213 SOLUTION TOPICAL at 05:31

## 2023-06-17 RX ADMIN — Medication 2 MILLIGRAM(S): at 06:27

## 2023-06-17 RX ADMIN — Medication 300 MILLIGRAM(S): at 12:33

## 2023-06-17 RX ADMIN — Medication 2 MILLIGRAM(S): at 03:29

## 2023-06-17 RX ADMIN — HEPARIN SODIUM 5000 UNIT(S): 5000 INJECTION INTRAVENOUS; SUBCUTANEOUS at 12:29

## 2023-06-17 RX ADMIN — QUETIAPINE FUMARATE 25 MILLIGRAM(S): 200 TABLET, FILM COATED ORAL at 17:07

## 2023-06-17 RX ADMIN — Medication 2 MILLIGRAM(S): at 01:08

## 2023-06-17 RX ADMIN — Medication 1 MILLIGRAM(S): at 17:07

## 2023-06-17 RX ADMIN — QUETIAPINE FUMARATE 50 MILLIGRAM(S): 200 TABLET, FILM COATED ORAL at 05:31

## 2023-06-17 RX ADMIN — Medication 40 MILLIGRAM(S): at 12:35

## 2023-06-17 RX ADMIN — PANTOPRAZOLE SODIUM 40 MILLIGRAM(S): 20 TABLET, DELAYED RELEASE ORAL at 12:58

## 2023-06-17 RX ADMIN — HEPARIN SODIUM 5000 UNIT(S): 5000 INJECTION INTRAVENOUS; SUBCUTANEOUS at 21:28

## 2023-06-17 RX ADMIN — CHLORHEXIDINE GLUCONATE 15 MILLILITER(S): 213 SOLUTION TOPICAL at 05:30

## 2023-06-17 RX ADMIN — AMIODARONE HYDROCHLORIDE 200 MILLIGRAM(S): 400 TABLET ORAL at 17:05

## 2023-06-17 NOTE — PROGRESS NOTE ADULT - ASSESSMENT
IMPRESSION:    Acute hypoxemic respiratory failure sp trach on 40%  Severe cavitary CAP MRSA  Small parapneumonic effusion SP Chest tube  MRSA bacteremia resolved   MASTER negative   Anemia sp transfusion   New onset A FIB rate controlled   LANCE non oliguric improving  Hypernatremia Improving  HO asthma   Influenza B     PLAN:    CNS:  Continue Methadone.  klonopin 1 mg BID.  DC propofol.  Seroquel 25 mg BID.  Monito QTc.  CT NC     HEENT: Oral care. Trach care     PULMONARY:  HOB @ 45 degrees.  Aspiration precautions.  No change in vent settings.  Monitor PPL and DP.  Keep SaO2 92 TO 96%.  PS as tolerated.      CARDIOVASCULAR:  ECHO/ MASTER noted.  Lasix 40 once today.      GI: GI prophylaxis.  OGT Feeding.  Bowel regimen PRN.     RENAL:  Follow up lytes.  Correct as needed.     INFECTIOUS DISEASE: ABX Per ID.  monitor Vanc levels.      HEMATOLOGICAL:  DVT prophylaxis.  trend CBC. LE doppler - 6/13    ENDOCRINE:  Follow up FS.  Insulin protocol if needed.  TG    R IJ 6/13     Prognosis is poor overall .

## 2023-06-17 NOTE — PROGRESS NOTE ADULT - SUBJECTIVE AND OBJECTIVE BOX
Patient is a 42y old  Female who presents with a chief complaint of AHRF (16 Jun 2023 09:35)        Over Night Events:  Remains critically ill on MV.  Sedated.  off pressors.          ROS:     All ROS are negative except HPI         PHYSICAL EXAM    ICU Vital Signs Last 24 Hrs  T(C): 36.9 (17 Jun 2023 08:00), Max: 37.2 (17 Jun 2023 00:00)  T(F): 98.4 (17 Jun 2023 08:00), Max: 98.9 (17 Jun 2023 00:00)  HR: 70 (17 Jun 2023 08:00) (67 - 110)  BP: 100/61 (17 Jun 2023 08:00) (100/61 - 190/96)  BP(mean): 75 (17 Jun 2023 08:00) (72 - 135)  ABP: --  ABP(mean): --  RR: 27 (17 Jun 2023 08:00) (17 - 47)  SpO2: 98% (17 Jun 2023 08:00) (98% - 100%)    O2 Parameters below as of 17 Jun 2023 08:00  Patient On (Oxygen Delivery Method): ventilator    O2 Concentration (%): 40        CONSTITUTIONAL:  IN NAD    ENT:   Airway patent,   Mouth with normal mucosa.   Trach     EYES:   Pupils equal,   Round and reactive to light.    CARDIAC:   Normal rate,   Regular rhythm.    No edema    RESPIRATORY:   No wheezing  Bilateral BS  Normal chest expansion  Not tachypneic,  No use of accessory muscles    GASTROINTESTINAL:  Abdomen soft,   Non-tender,   No guarding,   + BS    MUSCULOSKELETAL:   Range of motion is not limited,  No clubbing, cyanosis    NEUROLOGICAL:   Sedated.    SKIN:   Skin normal color for race,   No evidence of rash.            06-16-23 @ 07:01  -  06-17-23 @ 07:00  --------------------------------------------------------  IN:    Dexmedetomidine: 766.8 mL    Enteral Tube Flush: 470 mL    Free Water: 600 mL    IV PiggyBack: 50 mL    Peptamen A.F.: 855 mL    Propofol: 667.2 mL  Total IN: 3409 mL    OUT:    Rectal Tube (mL): 350 mL    Voided (mL): 1800 mL  Total OUT: 2150 mL    Total NET: 1259 mL      06-17-23 @ 07:01  -  06-17-23 @ 09:02  --------------------------------------------------------  IN:    Dexmedetomidine: 42.5 mL    Peptamen A.F.: 90 mL    Propofol: 51 mL  Total IN: 183.5 mL    OUT:  Total OUT: 0 mL    Total NET: 183.5 mL          LABS:                            6.9    12.98 )-----------( 369      ( 17 Jun 2023 05:23 )             22.6                                               06-17    137  |  102  |  12  ----------------------------<  113<H>  3.6   |  27  |  <0.5<L>    Ca    8.2<L>      17 Jun 2023 05:23  Mg     1.8     06-17    TPro  5.9<L>  /  Alb  2.1<L>  /  TBili  0.2  /  DBili  x   /  AST  60<H>  /  ALT  66<H>  /  AlkPhos  491<H>  06-17                                                                                           LIVER FUNCTIONS - ( 17 Jun 2023 05:23 )  Alb: 2.1 g/dL / Pro: 5.9 g/dL / ALK PHOS: 491 U/L / ALT: 66 U/L / AST: 60 U/L / GGT: x                                                                                               Mode: AC/ CMV (Assist Control/ Continuous Mandatory Ventilation)  RR (machine): 16  TV (machine): 400  FiO2: 40  PEEP: 8  ITime: 1  MAP: 14  PIP: 35                                      ABG - ( 17 Jun 2023 02:59 )  pH, Arterial: 7.47  pH, Blood: x     /  pCO2: 38    /  pO2: 100   / HCO3: 28    / Base Excess: 3.9   /  SaO2: 98.3                MEDICATIONS  (STANDING):  aMIOdarone    Tablet 200 milliGRAM(s) Oral two times a day  chlorhexidine 0.12% Liquid 15 milliLiter(s) Oral Mucosa every 12 hours  chlorhexidine 2% Cloths 1 Application(s) Topical <User Schedule>  chlorhexidine 2% Cloths 1 Application(s) Topical daily  dexMEDEtomidine Infusion 0.2 MICROgram(s)/kG/Hr (4.25 mL/Hr) IV Continuous <Continuous>  heparin   Injectable 5000 Unit(s) SubCutaneous every 12 hours  insulin lispro (ADMELOG) corrective regimen sliding scale   SubCutaneous three times a day before meals  methadone    Tablet 10 milliGRAM(s) Oral every 8 hours  metroNIDAZOLE    Tablet 500 milliGRAM(s) Oral every 8 hours  pantoprazole   Suspension 40 milliGRAM(s) Oral daily  propofol Infusion 19.957 MICROgram(s)/kG/Min (11.1 mL/Hr) IV Continuous <Continuous>  QUEtiapine 50 milliGRAM(s) Oral every 12 hours  vancomycin  IVPB 1500 milliGRAM(s) IV Intermittent every 24 hours  vitamin A &amp; D Ointment 1 Application(s) Topical daily    MEDICATIONS  (PRN):  acetaminophen     Tablet .. 650 milliGRAM(s) Oral every 6 hours PRN Temp greater or equal to 38C (100.4F)  LORazepam   Injectable 2 milliGRAM(s) IV Push every 2 hours PRN Agitation      New X-rays reviewed:                                                                                  ECHO

## 2023-06-18 LAB
ALBUMIN SERPL ELPH-MCNC: 2.3 G/DL — LOW (ref 3.5–5.2)
ALP SERPL-CCNC: 438 U/L — HIGH (ref 30–115)
ALT FLD-CCNC: 51 U/L — HIGH (ref 0–41)
ANION GAP SERPL CALC-SCNC: 11 MMOL/L — SIGNIFICANT CHANGE UP (ref 7–14)
AST SERPL-CCNC: 31 U/L — SIGNIFICANT CHANGE UP (ref 0–41)
BASOPHILS # BLD AUTO: 0.06 K/UL — SIGNIFICANT CHANGE UP (ref 0–0.2)
BASOPHILS NFR BLD AUTO: 0.4 % — SIGNIFICANT CHANGE UP (ref 0–1)
BILIRUB SERPL-MCNC: 0.2 MG/DL — SIGNIFICANT CHANGE UP (ref 0.2–1.2)
BUN SERPL-MCNC: 14 MG/DL — SIGNIFICANT CHANGE UP (ref 10–20)
CALCIUM SERPL-MCNC: 8.2 MG/DL — LOW (ref 8.4–10.5)
CHLORIDE SERPL-SCNC: 101 MMOL/L — SIGNIFICANT CHANGE UP (ref 98–110)
CO2 SERPL-SCNC: 26 MMOL/L — SIGNIFICANT CHANGE UP (ref 17–32)
CREAT SERPL-MCNC: <0.5 MG/DL — LOW (ref 0.7–1.5)
EGFR: 127 ML/MIN/1.73M2 — SIGNIFICANT CHANGE UP
EOSINOPHIL # BLD AUTO: 0.11 K/UL — SIGNIFICANT CHANGE UP (ref 0–0.7)
EOSINOPHIL NFR BLD AUTO: 0.8 % — SIGNIFICANT CHANGE UP (ref 0–8)
GAS PNL BLDA: SIGNIFICANT CHANGE UP
GLUCOSE BLDC GLUCOMTR-MCNC: 107 MG/DL — HIGH (ref 70–99)
GLUCOSE BLDC GLUCOMTR-MCNC: 114 MG/DL — HIGH (ref 70–99)
GLUCOSE BLDC GLUCOMTR-MCNC: 114 MG/DL — HIGH (ref 70–99)
GLUCOSE BLDC GLUCOMTR-MCNC: 99 MG/DL — SIGNIFICANT CHANGE UP (ref 70–99)
GLUCOSE SERPL-MCNC: 98 MG/DL — SIGNIFICANT CHANGE UP (ref 70–99)
HCT VFR BLD CALC: 24.7 % — LOW (ref 37–47)
HGB BLD-MCNC: 7.6 G/DL — LOW (ref 12–16)
IMM GRANULOCYTES NFR BLD AUTO: 1.1 % — HIGH (ref 0.1–0.3)
LYMPHOCYTES # BLD AUTO: 1.56 K/UL — SIGNIFICANT CHANGE UP (ref 1.2–3.4)
LYMPHOCYTES # BLD AUTO: 10.8 % — LOW (ref 20.5–51.1)
MAGNESIUM SERPL-MCNC: 1.6 MG/DL — LOW (ref 1.8–2.4)
MCHC RBC-ENTMCNC: 22.6 PG — LOW (ref 27–31)
MCHC RBC-ENTMCNC: 30.8 G/DL — LOW (ref 32–37)
MCV RBC AUTO: 73.3 FL — LOW (ref 81–99)
MONOCYTES # BLD AUTO: 1.03 K/UL — HIGH (ref 0.1–0.6)
MONOCYTES NFR BLD AUTO: 7.1 % — SIGNIFICANT CHANGE UP (ref 1.7–9.3)
NEUTROPHILS # BLD AUTO: 11.59 K/UL — HIGH (ref 1.4–6.5)
NEUTROPHILS NFR BLD AUTO: 79.8 % — HIGH (ref 42.2–75.2)
NRBC # BLD: 0 /100 WBCS — SIGNIFICANT CHANGE UP (ref 0–0)
PLATELET # BLD AUTO: 370 K/UL — SIGNIFICANT CHANGE UP (ref 130–400)
PMV BLD: 9.8 FL — SIGNIFICANT CHANGE UP (ref 7.4–10.4)
POTASSIUM SERPL-MCNC: 3.7 MMOL/L — SIGNIFICANT CHANGE UP (ref 3.5–5)
POTASSIUM SERPL-SCNC: 3.7 MMOL/L — SIGNIFICANT CHANGE UP (ref 3.5–5)
PROT SERPL-MCNC: 6.1 G/DL — SIGNIFICANT CHANGE UP (ref 6–8)
RBC # BLD: 3.37 M/UL — LOW (ref 4.2–5.4)
RBC # FLD: 28.6 % — HIGH (ref 11.5–14.5)
SODIUM SERPL-SCNC: 138 MMOL/L — SIGNIFICANT CHANGE UP (ref 135–146)
TRIGL SERPL-MCNC: 279 MG/DL — HIGH
VANCOMYCIN FLD-MCNC: 9 UG/ML — SIGNIFICANT CHANGE UP (ref 5–10)
WBC # BLD: 14.51 K/UL — HIGH (ref 4.8–10.8)
WBC # FLD AUTO: 14.51 K/UL — HIGH (ref 4.8–10.8)

## 2023-06-18 PROCEDURE — 71045 X-RAY EXAM CHEST 1 VIEW: CPT | Mod: 26

## 2023-06-18 PROCEDURE — 99291 CRITICAL CARE FIRST HOUR: CPT

## 2023-06-18 RX ORDER — CLONAZEPAM 1 MG
1 TABLET ORAL THREE TIMES A DAY
Refills: 0 | Status: DISCONTINUED | OUTPATIENT
Start: 2023-06-18 | End: 2023-06-23

## 2023-06-18 RX ORDER — MAGNESIUM SULFATE 500 MG/ML
2 VIAL (ML) INJECTION ONCE
Refills: 0 | Status: COMPLETED | OUTPATIENT
Start: 2023-06-18 | End: 2023-06-18

## 2023-06-18 RX ADMIN — METHADONE HYDROCHLORIDE 10 MILLIGRAM(S): 40 TABLET ORAL at 21:24

## 2023-06-18 RX ADMIN — Medication 300 MILLIGRAM(S): at 11:20

## 2023-06-18 RX ADMIN — Medication 500 MILLIGRAM(S): at 14:23

## 2023-06-18 RX ADMIN — Medication 500 MILLIGRAM(S): at 05:10

## 2023-06-18 RX ADMIN — DEXMEDETOMIDINE HYDROCHLORIDE IN 0.9% SODIUM CHLORIDE 4.25 MICROGRAM(S)/KG/HR: 4 INJECTION INTRAVENOUS at 22:44

## 2023-06-18 RX ADMIN — HEPARIN SODIUM 5000 UNIT(S): 5000 INJECTION INTRAVENOUS; SUBCUTANEOUS at 11:19

## 2023-06-18 RX ADMIN — QUETIAPINE FUMARATE 25 MILLIGRAM(S): 200 TABLET, FILM COATED ORAL at 17:34

## 2023-06-18 RX ADMIN — Medication 1 MILLIGRAM(S): at 05:10

## 2023-06-18 RX ADMIN — Medication 1 MILLIGRAM(S): at 14:23

## 2023-06-18 RX ADMIN — CHLORHEXIDINE GLUCONATE 15 MILLILITER(S): 213 SOLUTION TOPICAL at 17:35

## 2023-06-18 RX ADMIN — QUETIAPINE FUMARATE 25 MILLIGRAM(S): 200 TABLET, FILM COATED ORAL at 05:10

## 2023-06-18 RX ADMIN — Medication 25 GRAM(S): at 06:11

## 2023-06-18 RX ADMIN — CHLORHEXIDINE GLUCONATE 1 APPLICATION(S): 213 SOLUTION TOPICAL at 05:26

## 2023-06-18 RX ADMIN — AMIODARONE HYDROCHLORIDE 200 MILLIGRAM(S): 400 TABLET ORAL at 05:10

## 2023-06-18 RX ADMIN — PANTOPRAZOLE SODIUM 40 MILLIGRAM(S): 20 TABLET, DELAYED RELEASE ORAL at 11:20

## 2023-06-18 RX ADMIN — Medication 1 APPLICATION(S): at 14:23

## 2023-06-18 RX ADMIN — PROPOFOL 11.1 MICROGRAM(S)/KG/MIN: 10 INJECTION, EMULSION INTRAVENOUS at 22:44

## 2023-06-18 RX ADMIN — HEPARIN SODIUM 5000 UNIT(S): 5000 INJECTION INTRAVENOUS; SUBCUTANEOUS at 21:23

## 2023-06-18 RX ADMIN — CHLORHEXIDINE GLUCONATE 15 MILLILITER(S): 213 SOLUTION TOPICAL at 05:10

## 2023-06-18 RX ADMIN — METHADONE HYDROCHLORIDE 10 MILLIGRAM(S): 40 TABLET ORAL at 14:23

## 2023-06-18 RX ADMIN — METHADONE HYDROCHLORIDE 10 MILLIGRAM(S): 40 TABLET ORAL at 05:10

## 2023-06-18 RX ADMIN — Medication 500 MILLIGRAM(S): at 21:24

## 2023-06-18 RX ADMIN — Medication 1 MILLIGRAM(S): at 21:23

## 2023-06-18 RX ADMIN — AMIODARONE HYDROCHLORIDE 200 MILLIGRAM(S): 400 TABLET ORAL at 17:34

## 2023-06-18 NOTE — PROGRESS NOTE ADULT - ASSESSMENT
IMPRESSION:    Acute hypoxemic respiratory failure sp trach on 40%  Severe cavitary CAP MRSA  Small parapneumonic effusion SP Chest tube  MRSA bacteremia resolved   MASTER negative   Anemia sp transfusion   New onset A FIB rate controlled   LANCE non oliguric improving  Hypernatremia Improving  HO asthma   Influenza B     PLAN:    CNS:  Continue Methadone.  klonopin 1 mg TID.  DC propofol.  Seroquel 25 mg BID.  Monito QTc.  CTH negative     HEENT: Oral care. Trach care     PULMONARY:  HOB @ 45 degrees.  Aspiration precautions.  No change in vent settings.  Monitor PPL and DP.  Keep SaO2 92 TO 96%.  PS as tolerated.      CARDIOVASCULAR:  ECHO/ MASTER noted.       GI: GI prophylaxis. PEG Feeding.  Bowel regimen PRN.     RENAL:  Follow up lytes.  Correct as needed.     INFECTIOUS DISEASE: ABX Per ID.  Monitor Vanc levels.      HEMATOLOGICAL:  DVT prophylaxis.  trend CBC. LE doppler - 6/13    ENDOCRINE:  Follow up FS.  Insulin protocol if needed.  TG    Prognosis is poor overall .

## 2023-06-18 NOTE — PROGRESS NOTE ADULT - SUBJECTIVE AND OBJECTIVE BOX
Patient is a 42y old  Female who presents with a chief complaint of AHRF (17 Jun 2023 09:02)        Over Night Events:  remains on MV.  Off pressors.  Sedated.  tolerated 40 min PS yesterday         ROS:     All ROS are negative except HPI         PHYSICAL EXAM    ICU Vital Signs Last 24 Hrs  T(C): 37.3 (18 Jun 2023 04:00), Max: 37.3 (18 Jun 2023 00:00)  T(F): 99.1 (18 Jun 2023 04:00), Max: 99.1 (18 Jun 2023 00:00)  HR: 75 (18 Jun 2023 07:30) (68 - 90)  BP: 102/61 (18 Jun 2023 06:00) (96/63 - 133/81)  BP(mean): 75 (18 Jun 2023 06:00) (71 - 102)  ABP: --  ABP(mean): --  RR: 28 (18 Jun 2023 06:00) (23 - 37)  SpO2: 100% (18 Jun 2023 07:30) (97% - 100%)    O2 Parameters below as of 18 Jun 2023 04:00  Patient On (Oxygen Delivery Method): ventilator    O2 Concentration (%): 40        CONSTITUTIONAL:  Ill appearing in NAD    ENT:   Airway patent,   Mouth with normal mucosa.   Trach     EYES:   Pupils equal,   Round and reactive to light.    CARDIAC:   Normal rate,   Regular rhythm.          RESPIRATORY:   No wheezing  Bilateral BS  Normal chest expansion  Not tachypneic,  No use of accessory muscles    GASTROINTESTINAL:  Abdomen soft,   Non-tender,   No guarding,   + BS    MUSCULOSKELETAL:   Range of motion is not limited,  No clubbing, cyanosis    NEUROLOGICAL:   Sedated     SKIN:   Skin normal color for race,   No evidence of rash.      06-17-23 @ 07:01  -  06-18-23 @ 07:00  --------------------------------------------------------  IN:    Dexmedetomidine: 552.5 mL    Free Water: 300 mL    IV PiggyBack: 25 mL    Peptamen A.F.: 1080 mL    Propofol: 556.4 mL  Total IN: 2513.9 mL    OUT:    Rectal Tube (mL): 150 mL    Voided (mL): 2100 mL  Total OUT: 2250 mL    Total NET: 263.9 mL          LABS:                            7.6    14.51 )-----------( 370      ( 18 Jun 2023 04:38 )             24.7                                               06-18    138  |  101  |  14  ----------------------------<  98  3.7   |  26  |  <0.5<L>    Ca    8.2<L>      18 Jun 2023 04:38  Mg     1.6     06-18    TPro  6.1  /  Alb  2.3<L>  /  TBili  0.2  /  DBili  x   /  AST  31  /  ALT  51<H>  /  AlkPhos  438<H>  06-18                                                                                           LIVER FUNCTIONS - ( 18 Jun 2023 04:38 )  Alb: 2.3 g/dL / Pro: 6.1 g/dL / ALK PHOS: 438 U/L / ALT: 51 U/L / AST: 31 U/L / GGT: x                                                                                               Mode: AC/ CMV (Assist Control/ Continuous Mandatory Ventilation)  RR (machine): 16  TV (machine): 360  FiO2: 40  PEEP: 8  ITime: 1  MAP: 14  PIP: 33                                      ABG - ( 18 Jun 2023 02:59 )  pH, Arterial: 7.47  pH, Blood: x     /  pCO2: 38    /  pO2: 148   / HCO3: 28    / Base Excess: 3.7   /  SaO2: 99.5                MEDICATIONS  (STANDING):  aMIOdarone    Tablet 200 milliGRAM(s) Oral two times a day  chlorhexidine 0.12% Liquid 15 milliLiter(s) Oral Mucosa every 12 hours  chlorhexidine 2% Cloths 1 Application(s) Topical <User Schedule>  chlorhexidine 2% Cloths 1 Application(s) Topical daily  clonazePAM  Tablet 1 milliGRAM(s) Oral two times a day  dexMEDEtomidine Infusion 0.2 MICROgram(s)/kG/Hr (4.25 mL/Hr) IV Continuous <Continuous>  heparin   Injectable 5000 Unit(s) SubCutaneous every 12 hours  insulin lispro (ADMELOG) corrective regimen sliding scale   SubCutaneous every 6 hours  methadone    Tablet 10 milliGRAM(s) Oral every 8 hours  metroNIDAZOLE    Tablet 500 milliGRAM(s) Oral every 8 hours  pantoprazole   Suspension 40 milliGRAM(s) Oral daily  propofol Infusion 19.957 MICROgram(s)/kG/Min (11.1 mL/Hr) IV Continuous <Continuous>  QUEtiapine 25 milliGRAM(s) Oral two times a day  vancomycin  IVPB 1500 milliGRAM(s) IV Intermittent every 24 hours  vitamin A &amp; D Ointment 1 Application(s) Topical daily    MEDICATIONS  (PRN):  acetaminophen     Tablet .. 650 milliGRAM(s) Oral every 6 hours PRN Temp greater or equal to 38C (100.4F)      New X-rays reviewed:                                                                                  ECHO

## 2023-06-19 LAB
ALBUMIN SERPL ELPH-MCNC: 2.2 G/DL — LOW (ref 3.5–5.2)
ALP SERPL-CCNC: 364 U/L — HIGH (ref 30–115)
ALT FLD-CCNC: 38 U/L — SIGNIFICANT CHANGE UP (ref 0–41)
ANION GAP SERPL CALC-SCNC: 9 MMOL/L — SIGNIFICANT CHANGE UP (ref 7–14)
AST SERPL-CCNC: 25 U/L — SIGNIFICANT CHANGE UP (ref 0–41)
BASOPHILS # BLD AUTO: 0.05 K/UL — SIGNIFICANT CHANGE UP (ref 0–0.2)
BASOPHILS NFR BLD AUTO: 0.4 % — SIGNIFICANT CHANGE UP (ref 0–1)
BILIRUB SERPL-MCNC: 0.2 MG/DL — SIGNIFICANT CHANGE UP (ref 0.2–1.2)
BUN SERPL-MCNC: 14 MG/DL — SIGNIFICANT CHANGE UP (ref 10–20)
CALCIUM SERPL-MCNC: 8.3 MG/DL — LOW (ref 8.4–10.4)
CHLORIDE SERPL-SCNC: 101 MMOL/L — SIGNIFICANT CHANGE UP (ref 98–110)
CO2 SERPL-SCNC: 25 MMOL/L — SIGNIFICANT CHANGE UP (ref 17–32)
CREAT SERPL-MCNC: <0.5 MG/DL — LOW (ref 0.7–1.5)
EGFR: 136 ML/MIN/1.73M2 — SIGNIFICANT CHANGE UP
EOSINOPHIL # BLD AUTO: 0.11 K/UL — SIGNIFICANT CHANGE UP (ref 0–0.7)
EOSINOPHIL NFR BLD AUTO: 0.9 % — SIGNIFICANT CHANGE UP (ref 0–8)
GLUCOSE BLDC GLUCOMTR-MCNC: 109 MG/DL — HIGH (ref 70–99)
GLUCOSE BLDC GLUCOMTR-MCNC: 112 MG/DL — HIGH (ref 70–99)
GLUCOSE BLDC GLUCOMTR-MCNC: 112 MG/DL — HIGH (ref 70–99)
GLUCOSE SERPL-MCNC: 97 MG/DL — SIGNIFICANT CHANGE UP (ref 70–99)
HCT VFR BLD CALC: 24.1 % — LOW (ref 37–47)
HGB BLD-MCNC: 7.5 G/DL — LOW (ref 12–16)
IMM GRANULOCYTES NFR BLD AUTO: 1.3 % — HIGH (ref 0.1–0.3)
LYMPHOCYTES # BLD AUTO: 1.46 K/UL — SIGNIFICANT CHANGE UP (ref 1.2–3.4)
LYMPHOCYTES # BLD AUTO: 11.8 % — LOW (ref 20.5–51.1)
MAGNESIUM SERPL-MCNC: 1.6 MG/DL — LOW (ref 1.8–2.4)
MCHC RBC-ENTMCNC: 22.8 PG — LOW (ref 27–31)
MCHC RBC-ENTMCNC: 31.1 G/DL — LOW (ref 32–37)
MCV RBC AUTO: 73.3 FL — LOW (ref 81–99)
MONOCYTES # BLD AUTO: 0.96 K/UL — HIGH (ref 0.1–0.6)
MONOCYTES NFR BLD AUTO: 7.8 % — SIGNIFICANT CHANGE UP (ref 1.7–9.3)
NEUTROPHILS # BLD AUTO: 9.6 K/UL — HIGH (ref 1.4–6.5)
NEUTROPHILS NFR BLD AUTO: 77.8 % — HIGH (ref 42.2–75.2)
NRBC # BLD: 0 /100 WBCS — SIGNIFICANT CHANGE UP (ref 0–0)
PLATELET # BLD AUTO: 364 K/UL — SIGNIFICANT CHANGE UP (ref 130–400)
PMV BLD: 9.6 FL — SIGNIFICANT CHANGE UP (ref 7.4–10.4)
POTASSIUM SERPL-MCNC: 3.9 MMOL/L — SIGNIFICANT CHANGE UP (ref 3.5–5)
POTASSIUM SERPL-SCNC: 3.9 MMOL/L — SIGNIFICANT CHANGE UP (ref 3.5–5)
PROT SERPL-MCNC: 6 G/DL — SIGNIFICANT CHANGE UP (ref 6–8)
RBC # BLD: 3.29 M/UL — LOW (ref 4.2–5.4)
RBC # FLD: SIGNIFICANT CHANGE UP % (ref 11.5–14.5)
SODIUM SERPL-SCNC: 135 MMOL/L — SIGNIFICANT CHANGE UP (ref 135–146)
TRIGL SERPL-MCNC: 293 MG/DL — HIGH
WBC # BLD: 12.34 K/UL — HIGH (ref 4.8–10.8)
WBC # FLD AUTO: 12.34 K/UL — HIGH (ref 4.8–10.8)

## 2023-06-19 PROCEDURE — 99291 CRITICAL CARE FIRST HOUR: CPT

## 2023-06-19 RX ORDER — MAGNESIUM SULFATE 500 MG/ML
2 VIAL (ML) INJECTION
Refills: 0 | Status: COMPLETED | OUTPATIENT
Start: 2023-06-19 | End: 2023-06-19

## 2023-06-19 RX ORDER — MAGNESIUM SULFATE 500 MG/ML
2 VIAL (ML) INJECTION ONCE
Refills: 0 | Status: DISCONTINUED | OUTPATIENT
Start: 2023-06-19 | End: 2023-06-19

## 2023-06-19 RX ORDER — VANCOMYCIN HCL 1 G
1500 VIAL (EA) INTRAVENOUS EVERY 12 HOURS
Refills: 0 | Status: DISCONTINUED | OUTPATIENT
Start: 2023-06-20 | End: 2023-06-22

## 2023-06-19 RX ADMIN — Medication 1 MILLIGRAM(S): at 21:34

## 2023-06-19 RX ADMIN — HEPARIN SODIUM 5000 UNIT(S): 5000 INJECTION INTRAVENOUS; SUBCUTANEOUS at 13:06

## 2023-06-19 RX ADMIN — Medication 500 MILLIGRAM(S): at 13:09

## 2023-06-19 RX ADMIN — Medication 1 MILLIGRAM(S): at 13:06

## 2023-06-19 RX ADMIN — METHADONE HYDROCHLORIDE 10 MILLIGRAM(S): 40 TABLET ORAL at 13:09

## 2023-06-19 RX ADMIN — CHLORHEXIDINE GLUCONATE 15 MILLILITER(S): 213 SOLUTION TOPICAL at 17:20

## 2023-06-19 RX ADMIN — PANTOPRAZOLE SODIUM 40 MILLIGRAM(S): 20 TABLET, DELAYED RELEASE ORAL at 12:59

## 2023-06-19 RX ADMIN — Medication 25 GRAM(S): at 13:03

## 2023-06-19 RX ADMIN — Medication 300 MILLIGRAM(S): at 13:04

## 2023-06-19 RX ADMIN — QUETIAPINE FUMARATE 25 MILLIGRAM(S): 200 TABLET, FILM COATED ORAL at 05:19

## 2023-06-19 RX ADMIN — AMIODARONE HYDROCHLORIDE 200 MILLIGRAM(S): 400 TABLET ORAL at 05:19

## 2023-06-19 RX ADMIN — METHADONE HYDROCHLORIDE 10 MILLIGRAM(S): 40 TABLET ORAL at 21:34

## 2023-06-19 RX ADMIN — Medication 1 APPLICATION(S): at 12:59

## 2023-06-19 RX ADMIN — Medication 25 GRAM(S): at 16:48

## 2023-06-19 RX ADMIN — METHADONE HYDROCHLORIDE 10 MILLIGRAM(S): 40 TABLET ORAL at 05:19

## 2023-06-19 RX ADMIN — CHLORHEXIDINE GLUCONATE 15 MILLILITER(S): 213 SOLUTION TOPICAL at 05:19

## 2023-06-19 RX ADMIN — Medication 1 MILLIGRAM(S): at 05:19

## 2023-06-19 RX ADMIN — Medication 500 MILLIGRAM(S): at 05:18

## 2023-06-19 RX ADMIN — CHLORHEXIDINE GLUCONATE 1 APPLICATION(S): 213 SOLUTION TOPICAL at 05:19

## 2023-06-19 RX ADMIN — QUETIAPINE FUMARATE 25 MILLIGRAM(S): 200 TABLET, FILM COATED ORAL at 17:20

## 2023-06-19 RX ADMIN — Medication 500 MILLIGRAM(S): at 21:34

## 2023-06-19 RX ADMIN — AMIODARONE HYDROCHLORIDE 200 MILLIGRAM(S): 400 TABLET ORAL at 17:20

## 2023-06-19 RX ADMIN — HEPARIN SODIUM 5000 UNIT(S): 5000 INJECTION INTRAVENOUS; SUBCUTANEOUS at 21:34

## 2023-06-19 NOTE — PROGRESS NOTE ADULT - SUBJECTIVE AND OBJECTIVE BOX
LENGTH OF HOSPITAL STAY: 27d    Overnight events: none  Complaints: unobtainable    CHIEF COMPLAINT:   Patient is a 42y old  Female who presents with a chief complaint of AHRF (19 Jun 2023 08:35)        HISTORY OF PRESENTING ILLNESS:    HPI:  42 year-old female with PMH of Asthma, HTN? presents with complaint of cough x3 days and SOB. During my encounter pt with dyspnea and increased work of breathing and chest pain, unable to properly provide accurate history. She states that her cough has been progressively worsening over the past 3 days, endorses hemoptysis since yesterday. She states she had a friend who came over and stayed with for more than a week who was sick recently, but she does not know if she recently travelled out of [facility] or not. She also admits that her children has been sick with Sore throat and fever for past 3 days. She admits to fever, chills, sever Chest pain which has gotten worse since she came to the ED. She took 1x Tamiflu yesterday. She otherwise denies diarrhea, constipation, urinary symptoms. She is not vaccinated for Influenza or COVID-19.     In ED  /87, , RR 28, T 100.8 F, 98% on 15 L NRB  Labs significant for wbc 16.7K with Neutrophilic Predominance and L shift, Hgb 11.4, MCV 61.6, Cr 2.0, BUN 23, Alk Phos 125/AST 62/ALT 27, Lac 4.9> 3.9  RVP + Influenza B      CT Angio Chest PE Protocol w/ IV Cont   1. Bilateral patchy groundglass nodularity with dominant confluent left lower lobe opacification with numerous cavitary lesions. Additional contralateral right lower lobe cavitary 1 cm nodule. Findings compatible with cavitary pneumonia in the appropriate clinical setting; differential diagnosis includes tuberculosis.  2. Confluent ill-defined left hilar and mediastinal adenopathy, likely reactive, without dominant lymph node delineated.  3. No evidence of pulmonary embolism.    s/p 1x Rocephin, Meropenem, 3 L LR in ED    Pt admitted to SDU for further managements, during my encounter pt is very agitated, in acute distress. Pt received appropriate pain control with IV Morphine, s/p Xanax 1 mg 1x for agitation and anxiety, despite pain control and adequate fluid resuscitation pt remained tachycardic, tachypneic. STAT EKG reviewed with Cardiology team on call After discussion with Cardiology team, Pt received 1x Adenosine 6 mg IV push with no change. Case discussed with Critical Care team, decision was made for Intubation and Pt upgraded to MICU.   (24 May 2023 00:58)    PAST MEDICAL & SURGICAL HISTORY  PAST MEDICAL & SURGICAL HISTORY:    SOCIAL HISTORY:    ALLERGIES:  aspirin (Short breath; Anaphylaxis)    MEDICATIONS:  STANDING MEDICATIONS  aMIOdarone    Tablet 200 milliGRAM(s) Oral two times a day  chlorhexidine 0.12% Liquid 15 milliLiter(s) Oral Mucosa every 12 hours  chlorhexidine 2% Cloths 1 Application(s) Topical <User Schedule>  chlorhexidine 2% Cloths 1 Application(s) Topical daily  clonazePAM  Tablet 1 milliGRAM(s) Oral three times a day  dexMEDEtomidine Infusion 0.2 MICROgram(s)/kG/Hr IV Continuous <Continuous>  heparin   Injectable 5000 Unit(s) SubCutaneous every 12 hours  insulin lispro (ADMELOG) corrective regimen sliding scale   SubCutaneous every 6 hours  magnesium sulfate  IVPB 2 Gram(s) IV Intermittent every 2 hours  methadone    Tablet 10 milliGRAM(s) Oral every 8 hours  metroNIDAZOLE    Tablet 500 milliGRAM(s) Oral every 8 hours  pantoprazole   Suspension 40 milliGRAM(s) Oral daily  propofol Infusion 19.957 MICROgram(s)/kG/Min IV Continuous <Continuous>  QUEtiapine 25 milliGRAM(s) Oral two times a day  vancomycin  IVPB 1500 milliGRAM(s) IV Intermittent every 24 hours  vitamin A &amp; D Ointment 1 Application(s) Topical daily    PRN MEDICATIONS  acetaminophen     Tablet .. 650 milliGRAM(s) Oral every 6 hours PRN    VITALS:   T(F): 99.1  HR: 72  BP: 108/72  RR: 32  SpO2: 100%    LABS:                        7.5    12.34 )-----------( 364      ( 19 Jun 2023 04:39 )             24.1     06-19    135  |  101  |  14  ----------------------------<  97  3.9   |  25  |  <0.5<L>    Ca    8.3<L>      19 Jun 2023 04:39  Mg     1.6     06-19    TPro  6.0  /  Alb  2.2<L>  /  TBili  0.2  /  DBili  x   /  AST  25  /  ALT  38  /  AlkPhos  364<H>  06-19        ABG - ( 18 Jun 2023 02:59 )  pH, Arterial: 7.47  pH, Blood: x     /  pCO2: 38    /  pO2: 148   / HCO3: 28    / Base Excess: 3.7   /  SaO2: 99.5                        PHYSICAL EXAM:  GEN: No acute distress  LUNGS: Normal respiratory effort. intubated  HEART: S1/S2 present.   ABD: Soft, non-tender, non-distended  EXT: no cyanosis or edema  NEURO: sedated

## 2023-06-19 NOTE — PROGRESS NOTE ADULT - ASSESSMENT
42 year old female with PMHx Asthma, HTN presents with cough and SOB x3 days and hemoptysis. CT chest showing cavitary pneumonia. Cultures +MRSA bacteremia. Intubated; failed SBT. On Zyvox and Tamiflu. Repeat CT showing worsening PNA.    # Acute hypoxemic respiratory failure 2/2 cavitary MRSA PNA  # MRSA bacteremia, repeat cultures NGTD x3  # +Influenza B  # hx Asthma   - CT Angio Chest PE Protocol: Bilateral patchy groundglass nodularity with dominant confluent left lower lobe opacification with numerous cavitary lesions  - intubated 5/23, self-extubated 5/25, re-intubated 5/25  - repeat CT chest: Diffusely increased number and size of bilateral cavitary lesions with the left lower lobe now demonstrating a consolidative process of the entire lobe  - blood cultures +MRSA, s/p course of jimbo, currently on vanco and flagyl  cultures 5/28-30: NGTD x3  - ECHO: no vegetations. EF 65%, mild TR and pulm HTN  - s/p MASTER 5/31: No evidence of valvular vegetations or intracardiac abscesses to support IE. Pulmonary hepatisation incidental finding.   - D-dimer 1665, LE duplex negative for DVT  - pro-mya 48 > 5.8  - flu +, s/p Tamiflu 30mg q12  - repeat ET cultures negative   - repeat CT chest: Small left pleural effusion with overall essentially unchanged exam.  - c/w Vancomycin (dosing per pharmacy), Flagyl added 500mg q8 for anaerobic coverage  monitor trough levels  - s/p chest tube placement connected to suction --> tPA 6/6 and 6/7, removed 06/14   - d/c lasix  - now s/p trach and peg, GJ ok to use, CTSx following  - now off ketamine and fentanyl--> started methadone, wean propofol  - will add seroquel 50mg q12 today, daily EKG to monitor qtc, consider switching to zyprexa  - keep ativan pushes for now for prn agitation  - intermittent pressure support  - SBT today  - adjust vanc dose    # New onset A-fib w/ RVR  - s/p Amio drip  - EP consulted  - c/w Amio 200mg BID for 2 weeks --> then Amio 200mg daily (switch date 06/21)    # Anemia- stable  - trend CBC, active type and screen, transfuse <7  - DIC panel negative   - Dimer 1665, LE Duplex negative for DVT  - hold heparin drip. SCDs  - s/p 3u pRBCs; Hgb stable around 7/8  - CTAP: r/o retroperitoneal bleed  - will order rpt duplex and start DVT ppx post-op today    #Lines- R' IJ 06/13, chest tube removed  #DVT ppx: heparin ppx  #GI ppx: Pantoprazole 40mg qd  #Diet: tube feeds per dietary  #Activity: bedrest   #Dispo: MICU       42 year old female with PMHx Asthma, HTN presents with cough and SOB x3 days and hemoptysis. CT chest showing cavitary pneumonia. Cultures +MRSA bacteremia. Intubated; failed SBT. On Zyvox and Tamiflu. Repeat CT showing worsening PNA.    # Acute hypoxemic respiratory failure 2/2 cavitary MRSA PNA  # MRSA bacteremia, repeat cultures NGTD x3  # +Influenza B  # hx Asthma   - CT Angio Chest PE Protocol: Bilateral patchy groundglass nodularity with dominant confluent left lower lobe opacification with numerous cavitary lesions  - intubated 5/23, self-extubated 5/25, re-intubated 5/25  - repeat CT chest: Diffusely increased number and size of bilateral cavitary lesions with the left lower lobe now demonstrating a consolidative process of the entire lobe  - blood cultures +MRSA, s/p course of jimbo, currently on vanco and flagyl  cultures 5/28-30: NGTD x3  - ECHO: no vegetations. EF 65%, mild TR and pulm HTN  - s/p MASTER 5/31: No evidence of valvular vegetations or intracardiac abscesses to support IE. Pulmonary hepatisation incidental finding.   - D-dimer 1665, LE duplex negative for DVT  - pro-mya 48 > 5.8  - flu +, s/p Tamiflu 30mg q12  - repeat ET cultures negative   - repeat CT chest: Small left pleural effusion with overall essentially unchanged exam.  - c/w Vancomycin (dosing per pharmacy), Flagyl added 500mg q8 for anaerobic coverage  monitor trough levels  - s/p chest tube placement connected to suction --> tPA 6/6 and 6/7, removed 06/14   - d/c lasix  - now s/p trach and peg, GJ ok to use, CTSx following  - now off ketamine and fentanyl--> started methadone, wean propofol  - will add seroquel 50mg q12 today, daily EKG to monitor qtc, consider switching to zyprexa  - keep ativan pushes for now for prn agitation  - intermittent pressure support  - SBT today  - adjust vanc dose    # New onset A-fib w/ RVR  - s/p Amio drip  - EP consulted  - c/w Amio 200mg BID for 2 weeks --> then Amio 200mg daily (switch date 06/21)    # Anemia- stable  - trend CBC, active type and screen, transfuse <7  - DIC panel negative   - Dimer 1665, LE Duplex negative for DVT  - hold heparin drip. SCDs  - s/p 3u pRBCs; Hgb stable around 7/8  - CTAP: r/o retroperitoneal bleed  - will order rpt duplex and start DVT ppx post-op today    DVT ppx: SQH  GI ppx: protonix  Diet: tube feeds per dietary  Dispo: MICU

## 2023-06-19 NOTE — PHARMACOTHERAPY INTERVENTION NOTE - COMMENTS
vanc level 9-precise PK calculation, recommended increasing vancomycin to 1500mg IV q12h (pred AUC ~520)

## 2023-06-19 NOTE — CHART NOTE - NSCHARTNOTEFT_GEN_A_CORE
Registered Dietitian Follow-Up     Patient Profile Reviewed                           Yes [x]   No []     Nutrition History Previously Obtained        Yes []  No [x]       Pertinent Subjective Information: Limited to chart review at this time as pt intubated and unable to provide hx.     Pertinent Medical Interventions: Pt continues to be managed for respiratory failure sp trach, Severe cavitary CAP MRSA and Small parapneumonic effusion.      Diet order: Diet, NPO with Tube Feed:   Tube Feeding Modality: Orogastric  Peptamen Intense VHP  Total Volume for 24 Hours (mL): 1080  Continuous  Starting Tube Feed Rate {mL per Hour}: 45  Until Goal Tube Feed Rate (mL per Hour): 45  Tube Feed Duration (in Hours): 24  Tube Feed Start Time: 13:30  Free Water Flush   Total Volume per Flush (mL): 100   Frequency: Every 4 Hours  No Carb Prosource (1pkg = 15gms Protein)     Qty per Day:  1  Banatrol TF     Qty per Day:  2 (23 @ 13:27) [Active]    Anthropometrics:  Height (cm): 167.6 (23 @ 16:22)  Weight (kg): 85 (23 @ 16:22)  BMI (kg/m2): 30.3 (23 @ 16:22)  IBW: 52.1 kg  UBW: N/A    Daily Weight in k.6 (-19), Weight in k.8 (-18), Weight in k.3 (-17), Weight in k.7 (-16), Weight in k.2 (15), Weight in k.6 (14), Weight in k.2 (13), Weight in k.7 (12), Weight in k.5 (11), Weight in k (06-10), Weight in k.6 (), Weight in k.6 (), Weight in k.8 ()  Weight Change: no significant weight change noted in past few days    MEDICATIONS  (STANDING):  aMIOdarone    Tablet 200 milliGRAM(s) Oral two times a day  chlorhexidine 0.12% Liquid 15 milliLiter(s) Oral Mucosa every 12 hours  chlorhexidine 2% Cloths 1 Application(s) Topical <User Schedule>  chlorhexidine 2% Cloths 1 Application(s) Topical daily  clonazePAM  Tablet 1 milliGRAM(s) Oral three times a day  dexMEDEtomidine Infusion 0.2 MICROgram(s)/kG/Hr (4.25 mL/Hr) IV Continuous <Continuous>  heparin   Injectable 5000 Unit(s) SubCutaneous every 12 hours  insulin lispro (ADMELOG) corrective regimen sliding scale   SubCutaneous every 6 hours  magnesium sulfate  IVPB 2 Gram(s) IV Intermittent every 2 hours  methadone    Tablet 10 milliGRAM(s) Oral every 8 hours  metroNIDAZOLE    Tablet 500 milliGRAM(s) Oral every 8 hours  pantoprazole   Suspension 40 milliGRAM(s) Oral daily  propofol Infusion 19.957 MICROgram(s)/kG/Min IV Continuous <Continuous>  -- 25.5ml/hr = 673kcal/day  QUEtiapine 25 milliGRAM(s) Oral two times a day  vancomycin  IVPB 1500 milliGRAM(s) IV Intermittent every 24 hours  vitamin A &amp; D Ointment 1 Application(s) Topical daily    MEDICATIONS  (PRN):  acetaminophen     Tablet .. 650 milliGRAM(s) Oral every 6 hours PRN Temp greater or equal to 38C (100.4F)    Pertinent Labs:                         7.5    12.34 )-----------( 364      ( 2023 04:39 )             24.1     06 @ 04:39: Na 135, BUN 14, Cr <0.5<L>, BG 97, K+ 3.9, Phos --, Mg 1.6<L>, Alk Phos 364<H>, ALT/SGPT 38, AST/SGOT 25, HbA1c --    Finger Sticks:  POCT Blood Glucose.: 112 mg/dL ( @ 05:18)  POCT Blood Glucose.: 99 mg/dL ( @ 23:17)  POCT Blood Glucose.: 107 mg/dL ( @ 17:40)    I&O's Detail    2023 07:01  -  2023 07:00  --------------------------------------------------------  IN:    Dexmedetomidine: 582.3 mL    Free Water: 600 mL    IV PiggyBack: 500 mL    Peptamen A.F.: 1080 mL    Propofol: 656.2 mL  Total IN: 3418.5 mL    OUT:    Rectal Tube (mL): 50 mL    Voided (mL): 1800 mL  Total OUT: 1850 mL    Total NET: 1568.4 mL    Physical Findings:  - Appearance: sedated  - GI function: abdomen obese; last bowel movement 2023--liquid/loose bowel movements since   - Tubes: PEG  - Oral/Mouth cavity: NPO   - Skin: left upper arm skin tear; sacral DTI  - Edema: 2+ generalized edema      Nutrition Requirements:  Weight Used: dosing weight 85 kg     Estimated Energy Needs    Continue []  Adjust [x]  Energy Recommendations: 1765 kcal/day based on PSE Ve 9.1 Tm 37.3      Estimated Protein Needs    Continue []  Adjust [x]  Protein Recommendations: 111-119 gm/day - 1.3-1.4 g/kg      Estimated Fluid Needs        Continue [x]  Adjust []  Fluid Recommendations: 2975 mL/day - adjusted fluid needs for BMI>30    Nutrient Intake: current TF provides 1220kcal, 114g protein, 907ml free water. +673kcal/day from propofol.     [x] Previous Nutrition Diagnosis:  Increased Nutrient Needs (protein)            [x] Ongoing          [] Resolved     Nutrition Education: N/A     Goal/Expected Outcome: Pt to meet >90% & <105% estimated needs via EN in 3-5 days     Indicator/Monitoring: Monitor diet order, kcal intake, body composition, NFPE, labs  (lytes, BG, renal indices)    Recommendation:  Cont with current TF order    Patient assessed at high nutrition risk.  RD spectra [id number], also available on Teams. Registered Dietitian Follow-Up     Patient Profile Reviewed                           Yes [x]   No []     Nutrition History Previously Obtained        Yes []  No [x]       Pertinent Subjective Information: Limited to chart review at this time as pt intubated and unable to provide hx.     Pertinent Medical Interventions: Pt continues to be managed for respiratory failure sp trach, Severe cavitary CAP MRSA and Small parapneumonic effusion.      Diet order: Diet, NPO with Tube Feed:   Tube Feeding Modality: Orogastric  Peptamen Intense VHP  Total Volume for 24 Hours (mL): 1080  Continuous  Starting Tube Feed Rate {mL per Hour}: 45  Until Goal Tube Feed Rate (mL per Hour): 45  Tube Feed Duration (in Hours): 24  Tube Feed Start Time: 13:30  Free Water Flush   Total Volume per Flush (mL): 100   Frequency: Every 4 Hours  No Carb Prosource (1pkg = 15gms Protein)     Qty per Day:  1  Banatrol TF     Qty per Day:  2 (23 @ 13:27) [Active]    Anthropometrics:  Height (cm): 167.6 (23 @ 16:22)  Weight (kg): 85 (23 @ 16:22)  BMI (kg/m2): 30.3 (23 @ 16:22)  IBW: 52.1 kg  UBW: N/A    Daily Weight in k.6 (-19), Weight in k.8 (-18), Weight in k.3 (-17), Weight in k.7 (-16), Weight in k.2 (15), Weight in k.6 (14), Weight in k.2 (13), Weight in k.7 (12), Weight in k.5 (11), Weight in k (06-10), Weight in k.6 (), Weight in k.6 (), Weight in k.8 ()  Weight Change: no significant weight change noted in past few days    MEDICATIONS  (STANDING):  aMIOdarone    Tablet 200 milliGRAM(s) Oral two times a day  chlorhexidine 0.12% Liquid 15 milliLiter(s) Oral Mucosa every 12 hours  chlorhexidine 2% Cloths 1 Application(s) Topical <User Schedule>  chlorhexidine 2% Cloths 1 Application(s) Topical daily  clonazePAM  Tablet 1 milliGRAM(s) Oral three times a day  dexMEDEtomidine Infusion 0.2 MICROgram(s)/kG/Hr (4.25 mL/Hr) IV Continuous <Continuous>  heparin   Injectable 5000 Unit(s) SubCutaneous every 12 hours  insulin lispro (ADMELOG) corrective regimen sliding scale   SubCutaneous every 6 hours  magnesium sulfate  IVPB 2 Gram(s) IV Intermittent every 2 hours  methadone    Tablet 10 milliGRAM(s) Oral every 8 hours  metroNIDAZOLE    Tablet 500 milliGRAM(s) Oral every 8 hours  pantoprazole   Suspension 40 milliGRAM(s) Oral daily  propofol Infusion 19.957 MICROgram(s)/kG/Min IV Continuous <Continuous>  -- 25.5ml/hr = 673kcal/day  QUEtiapine 25 milliGRAM(s) Oral two times a day  vancomycin  IVPB 1500 milliGRAM(s) IV Intermittent every 24 hours  vitamin A &amp; D Ointment 1 Application(s) Topical daily    MEDICATIONS  (PRN):  acetaminophen     Tablet .. 650 milliGRAM(s) Oral every 6 hours PRN Temp greater or equal to 38C (100.4F)    Pertinent Labs:                         7.5    12.34 )-----------( 364      ( 2023 04:39 )             24.1     06 @ 04:39: Na 135, BUN 14, Cr <0.5<L>, BG 97, K+ 3.9, Phos --, Mg 1.6<L>, Alk Phos 364<H>, ALT/SGPT 38, AST/SGOT 25, HbA1c --    Finger Sticks:  POCT Blood Glucose.: 112 mg/dL ( @ 05:18)  POCT Blood Glucose.: 99 mg/dL ( @ 23:17)  POCT Blood Glucose.: 107 mg/dL ( @ 17:40)    I&O's Detail    2023 07:01  -  2023 07:00  --------------------------------------------------------  IN:    Dexmedetomidine: 582.3 mL    Free Water: 600 mL    IV PiggyBack: 500 mL    Peptamen A.F.: 1080 mL    Propofol: 656.2 mL  Total IN: 3418.5 mL    OUT:    Rectal Tube (mL): 50 mL    Voided (mL): 1800 mL  Total OUT: 1850 mL    Total NET: 1568.4 mL    Physical Findings:  - Appearance: sedated  - GI function: abdomen obese; last bowel movement 2023--liquid/loose bowel movements since   - Tubes: PEG  - Oral/Mouth cavity: NPO   - Skin: left upper arm skin tear; sacral DTI  - Edema: 2+ generalized edema      Nutrition Requirements:  Weight Used: dosing weight 85 kg     Estimated Energy Needs    Continue []  Adjust [x]  Energy Recommendations: 1765 kcal/day based on PSE Ve 9.1 Tm 37.3      Estimated Protein Needs    Continue []  Adjust [x]  Protein Recommendations: 111-119 gm/day - 1.3-1.4 g/kg      Estimated Fluid Needs        Continue [x]  Adjust []  Fluid Recommendations: 2975 mL/day - adjusted fluid needs for BMI>30    Nutrient Intake: current TF provides 1220kcal, 114g protein, 907ml free water. +673kcal/day from propofol.     [x] Previous Nutrition Diagnosis:  Increased Nutrient Needs (protein)            [x] Ongoing          [] Resolved     Nutrition Education: N/A     Goal/Expected Outcome: Pt to meet >90% & <105% estimated needs via EN in 3-5 days     Indicator/Monitoring: Monitor diet order, kcal intake, body composition, NFPE, labs  (lytes, BG, renal indices)    Recommendation:  Decrease Free water flushes  Cont with current TF order    Patient assessed at high nutrition risk.  RD spectra [phone], also available on Teams.

## 2023-06-19 NOTE — PROGRESS NOTE ADULT - ASSESSMENT
IMPRESSION:    Acute hypoxemic respiratory failure sp trach on 40%  Severe cavitary CAP MRSA  Small parapneumonic effusion SP Chest tube  MRSA bacteremia resolved   MASTER negative   Anemia sp transfusion   New onset A FIB rate controlled   LANCE non oliguric improving  Hypernatremia Improving  HO asthma   Influenza B     PLAN:    CNS:  Continue Methadone.  Klonopin 1 mg TID.  Wean propofol.  Seroquel 25 mg BID.  Monito QTc.  CTH negative     HEENT: Oral care. Trach care     PULMONARY:  HOB @ 45 degrees.  Aspiration precautions.  No change in vent settings.  Monitor PPL and DP.  Keep SaO2 92 TO 96%.  PS as tolerated.  LEft chest US     CARDIOVASCULAR:  ECHO/ MASTER noted.   Avoid overload     GI: GI prophylaxis. PEG Feeding.  Bowel regimen PRN.     RENAL:  Follow up lytes.  Correct as needed.     INFECTIOUS DISEASE: ABX Per ID.  Monitor Vanc levels.  Adjust Vancomycin     HEMATOLOGICAL:  DVT prophylaxis.  trend CBC. LE doppler - 6/13    ENDOCRINE:  Follow up FS.  Insulin protocol if needed.  TG    Prognosis is poor overall .

## 2023-06-20 LAB
ALBUMIN SERPL ELPH-MCNC: 2.5 G/DL — LOW (ref 3.5–5.2)
ALP SERPL-CCNC: 343 U/L — HIGH (ref 30–115)
ALT FLD-CCNC: 31 U/L — SIGNIFICANT CHANGE UP (ref 0–41)
ANION GAP SERPL CALC-SCNC: 11 MMOL/L — SIGNIFICANT CHANGE UP (ref 7–14)
AST SERPL-CCNC: 27 U/L — SIGNIFICANT CHANGE UP (ref 0–41)
BASOPHILS # BLD AUTO: 0.07 K/UL — SIGNIFICANT CHANGE UP (ref 0–0.2)
BASOPHILS NFR BLD AUTO: 0.6 % — SIGNIFICANT CHANGE UP (ref 0–1)
BILIRUB SERPL-MCNC: 0.3 MG/DL — SIGNIFICANT CHANGE UP (ref 0.2–1.2)
BUN SERPL-MCNC: 13 MG/DL — SIGNIFICANT CHANGE UP (ref 10–20)
CALCIUM SERPL-MCNC: 8.5 MG/DL — SIGNIFICANT CHANGE UP (ref 8.4–10.4)
CHLORIDE SERPL-SCNC: 101 MMOL/L — SIGNIFICANT CHANGE UP (ref 98–110)
CO2 SERPL-SCNC: 23 MMOL/L — SIGNIFICANT CHANGE UP (ref 17–32)
CREAT SERPL-MCNC: <0.5 MG/DL — LOW (ref 0.7–1.5)
EGFR: 127 ML/MIN/1.73M2 — SIGNIFICANT CHANGE UP
EOSINOPHIL # BLD AUTO: 0.12 K/UL — SIGNIFICANT CHANGE UP (ref 0–0.7)
EOSINOPHIL NFR BLD AUTO: 1 % — SIGNIFICANT CHANGE UP (ref 0–8)
GLUCOSE BLDC GLUCOMTR-MCNC: 114 MG/DL — HIGH (ref 70–99)
GLUCOSE SERPL-MCNC: 100 MG/DL — HIGH (ref 70–99)
HCT VFR BLD CALC: 26.4 % — LOW (ref 37–47)
HGB BLD-MCNC: 8.1 G/DL — LOW (ref 12–16)
IMM GRANULOCYTES NFR BLD AUTO: 0.8 % — HIGH (ref 0.1–0.3)
LYMPHOCYTES # BLD AUTO: 1.29 K/UL — SIGNIFICANT CHANGE UP (ref 1.2–3.4)
LYMPHOCYTES # BLD AUTO: 11.1 % — LOW (ref 20.5–51.1)
MAGNESIUM SERPL-MCNC: 1.6 MG/DL — LOW (ref 1.8–2.4)
MCHC RBC-ENTMCNC: 23.1 PG — LOW (ref 27–31)
MCHC RBC-ENTMCNC: 30.7 G/DL — LOW (ref 32–37)
MCV RBC AUTO: 75.2 FL — LOW (ref 81–99)
MONOCYTES # BLD AUTO: 1.01 K/UL — HIGH (ref 0.1–0.6)
MONOCYTES NFR BLD AUTO: 8.7 % — SIGNIFICANT CHANGE UP (ref 1.7–9.3)
NEUTROPHILS # BLD AUTO: 9.07 K/UL — HIGH (ref 1.4–6.5)
NEUTROPHILS NFR BLD AUTO: 77.8 % — HIGH (ref 42.2–75.2)
NRBC # BLD: 0 /100 WBCS — SIGNIFICANT CHANGE UP (ref 0–0)
PLATELET # BLD AUTO: 359 K/UL — SIGNIFICANT CHANGE UP (ref 130–400)
PMV BLD: 9 FL — SIGNIFICANT CHANGE UP (ref 7.4–10.4)
POTASSIUM SERPL-MCNC: 4.4 MMOL/L — SIGNIFICANT CHANGE UP (ref 3.5–5)
POTASSIUM SERPL-SCNC: 4.4 MMOL/L — SIGNIFICANT CHANGE UP (ref 3.5–5)
PROT SERPL-MCNC: 6.5 G/DL — SIGNIFICANT CHANGE UP (ref 6–8)
RBC # BLD: 3.51 M/UL — LOW (ref 4.2–5.4)
RBC # FLD: 28.8 % — HIGH (ref 11.5–14.5)
SODIUM SERPL-SCNC: 135 MMOL/L — SIGNIFICANT CHANGE UP (ref 135–146)
TRIGL SERPL-MCNC: 215 MG/DL — HIGH
WBC # BLD: 11.65 K/UL — HIGH (ref 4.8–10.8)
WBC # FLD AUTO: 11.65 K/UL — HIGH (ref 4.8–10.8)

## 2023-06-20 PROCEDURE — 71045 X-RAY EXAM CHEST 1 VIEW: CPT | Mod: 26

## 2023-06-20 PROCEDURE — 99291 CRITICAL CARE FIRST HOUR: CPT

## 2023-06-20 RX ORDER — OLANZAPINE 15 MG/1
10 TABLET, FILM COATED ORAL DAILY
Refills: 0 | Status: DISCONTINUED | OUTPATIENT
Start: 2023-06-20 | End: 2023-06-22

## 2023-06-20 RX ORDER — MAGNESIUM SULFATE 500 MG/ML
2 VIAL (ML) INJECTION
Refills: 0 | Status: COMPLETED | OUTPATIENT
Start: 2023-06-20 | End: 2023-06-20

## 2023-06-20 RX ADMIN — Medication 1 MILLIGRAM(S): at 13:59

## 2023-06-20 RX ADMIN — Medication 1 MILLIGRAM(S): at 21:48

## 2023-06-20 RX ADMIN — PANTOPRAZOLE SODIUM 40 MILLIGRAM(S): 20 TABLET, DELAYED RELEASE ORAL at 11:56

## 2023-06-20 RX ADMIN — METHADONE HYDROCHLORIDE 10 MILLIGRAM(S): 40 TABLET ORAL at 05:32

## 2023-06-20 RX ADMIN — METHADONE HYDROCHLORIDE 10 MILLIGRAM(S): 40 TABLET ORAL at 13:59

## 2023-06-20 RX ADMIN — Medication 500 MILLIGRAM(S): at 21:48

## 2023-06-20 RX ADMIN — CHLORHEXIDINE GLUCONATE 15 MILLILITER(S): 213 SOLUTION TOPICAL at 05:33

## 2023-06-20 RX ADMIN — METHADONE HYDROCHLORIDE 10 MILLIGRAM(S): 40 TABLET ORAL at 21:48

## 2023-06-20 RX ADMIN — Medication 25 GRAM(S): at 12:01

## 2023-06-20 RX ADMIN — AMIODARONE HYDROCHLORIDE 200 MILLIGRAM(S): 400 TABLET ORAL at 17:10

## 2023-06-20 RX ADMIN — Medication 500 MILLIGRAM(S): at 13:58

## 2023-06-20 RX ADMIN — CHLORHEXIDINE GLUCONATE 1 APPLICATION(S): 213 SOLUTION TOPICAL at 05:32

## 2023-06-20 RX ADMIN — Medication 500 MILLIGRAM(S): at 05:32

## 2023-06-20 RX ADMIN — Medication 1 MILLIGRAM(S): at 05:32

## 2023-06-20 RX ADMIN — Medication 300 MILLIGRAM(S): at 11:55

## 2023-06-20 RX ADMIN — Medication 1 APPLICATION(S): at 11:54

## 2023-06-20 RX ADMIN — OLANZAPINE 10 MILLIGRAM(S): 15 TABLET, FILM COATED ORAL at 11:55

## 2023-06-20 RX ADMIN — CHLORHEXIDINE GLUCONATE 15 MILLILITER(S): 213 SOLUTION TOPICAL at 17:10

## 2023-06-20 RX ADMIN — HEPARIN SODIUM 5000 UNIT(S): 5000 INJECTION INTRAVENOUS; SUBCUTANEOUS at 21:49

## 2023-06-20 RX ADMIN — QUETIAPINE FUMARATE 25 MILLIGRAM(S): 200 TABLET, FILM COATED ORAL at 05:32

## 2023-06-20 RX ADMIN — Medication 25 GRAM(S): at 08:14

## 2023-06-20 RX ADMIN — AMIODARONE HYDROCHLORIDE 200 MILLIGRAM(S): 400 TABLET ORAL at 05:32

## 2023-06-20 RX ADMIN — Medication 300 MILLIGRAM(S): at 00:33

## 2023-06-20 RX ADMIN — HEPARIN SODIUM 5000 UNIT(S): 5000 INJECTION INTRAVENOUS; SUBCUTANEOUS at 11:54

## 2023-06-20 NOTE — PROGRESS NOTE ADULT - SUBJECTIVE AND OBJECTIVE BOX
ZEYNEP ROSSI 42y Female  MRN#: [id number]   CODE STATUS:________    Hospital Day: 28d    Pt is currently admitted with the primary diagnosis of Acute hypoxemic respiratory failure 2/2 cavitary MRSA PNA    SUBJECTIVE  Hospital Course  42 year old female with PMHx Asthma, HTN presents with cough and SOB x3 days and hemoptysis. CT chest showing cavitary pneumonia. Cultures +MRSA bacteremia. Intubated; failed SBT. On Zyvox and Tamiflu. Repeat CT showed worsening PNA. Acute hypoxemic respiratory failure 2/2 cavitary MRSA PNA    6/20 - plan to wean off sedation, patient on propofol/precedex/clonipen/methadone/zyvox      Overnight events   None significant    Subjective complaints   No new complaints    Present Today:   - Weaver:  No [  ], Yes [   ] : Indication:     - Type of IV Access:       .. CVC/Piccline:  No [  ], Yes [   ] : Indication:       .. Midline: No [  ], Yes [   ] : Indication:                                             ----------------------------------------------------------  OBJECTIVE  PAST MEDICAL & SURGICAL HISTORY                                            -----------------------------------------------------------  ALLERGIES:  aspirin (Short breath; Anaphylaxis)                                            ------------------------------------------------------------    HOME MEDICATIONS  Home Medications:                           MEDICATIONS:  STANDING MEDICATIONS  aMIOdarone    Tablet 200 milliGRAM(s) Oral two times a day  chlorhexidine 0.12% Liquid 15 milliLiter(s) Oral Mucosa every 12 hours  chlorhexidine 2% Cloths 1 Application(s) Topical <User Schedule>  chlorhexidine 2% Cloths 1 Application(s) Topical daily  clonazePAM  Tablet 1 milliGRAM(s) Oral three times a day  dexMEDEtomidine Infusion 0.2 MICROgram(s)/kG/Hr IV Continuous <Continuous>  heparin   Injectable 5000 Unit(s) SubCutaneous every 12 hours  insulin lispro (ADMELOG) corrective regimen sliding scale   SubCutaneous every 6 hours  magnesium sulfate  IVPB 2 Gram(s) IV Intermittent every 2 hours  methadone    Tablet 10 milliGRAM(s) Oral every 8 hours  metroNIDAZOLE    Tablet 500 milliGRAM(s) Oral every 8 hours  OLANZapine 10 milliGRAM(s) Oral daily  pantoprazole   Suspension 40 milliGRAM(s) Oral daily  propofol Infusion 19.957 MICROgram(s)/kG/Min IV Continuous <Continuous>  vancomycin  IVPB 1500 milliGRAM(s) IV Intermittent every 12 hours  vitamin A &amp; D Ointment 1 Application(s) Topical daily    PRN MEDICATIONS  acetaminophen     Tablet .. 650 milliGRAM(s) Oral every 6 hours PRN                                            ------------------------------------------------------------  VITAL SIGNS: Last 24 Hours  T(C): 36.6 (20 Jun 2023 04:00), Max: 36.6 (20 Jun 2023 04:00)  T(F): 97.9 (20 Jun 2023 04:00), Max: 97.9 (20 Jun 2023 04:00)  HR: 72 (20 Jun 2023 09:00) (69 - 105)  BP: 98/60 (20 Jun 2023 09:00) (89/54 - 123/69)  BP(mean): 74 (20 Jun 2023 09:00) (65 - 91)  RR: 27 (20 Jun 2023 09:00) (22 - 34)  SpO2: 100% (20 Jun 2023 09:00) (96% - 100%)      06-19-23 @ 07:01  -  06-20-23 @ 07:00  --------------------------------------------------------  IN: 3297 mL / OUT: 2650 mL / NET: 647 mL    06-20-23 @ 07:01  -  06-20-23 @ 11:06  --------------------------------------------------------  IN: 332 mL / OUT: 0 mL / NET: 332 mL                                             --------------------------------------------------------------  LABS:                        8.1    11.65 )-----------( 359      ( 20 Jun 2023 04:54 )             26.4     06-20    135  |  101  |  13  ----------------------------<  100<H>  4.4   |  23  |  <0.5<L>    Ca    8.5      20 Jun 2023 04:54  Mg     1.6     06-20    TPro  6.5  /  Alb  2.5<L>  /  TBili  0.3  /  DBili  x   /  AST  27  /  ALT  31  /  AlkPhos  343<H>  06-20                                                              -------------------------------------------------------------  RADIOLOGY:                                            --------------------------------------------------------------    PHYSICAL EXAM:  GEN: No acute distress  LUNGS: Normal respiratory effort. trached  HEART: S1/S2 present.   ABD: Soft, non-tender, non-distended  EXT: no cyanosis or edema  NEURO: sedated

## 2023-06-20 NOTE — PROGRESS NOTE ADULT - ASSESSMENT
IMPRESSION:    Acute hypoxemic respiratory failure sp trach   Severe cavitary CAP MRSA  Small parapneumonic effusion SP Chest tube  MRSA bacteremia resolved   MASTER negative   Anemia sp transfusion   New onset A FIB rate controlled   LANCE non oliguric improving  Hypernatremia Improving  HO asthma   Influenza B     PLAN:    CNS:  Continue Methadone.  Klonopin 1 mg TID.  Wean propofol.  DC Seroquel.  Start Zyprexa.  Monito QTc.       HEENT: Oral care. Trach care     PULMONARY:  HOB @ 45 degrees.  Aspiration precautions.  No change in vent settings.  Monitor PPL and DP.  Keep SaO2 92 TO 96%.  PS as tolerated.  Left chest US with no significant effusion      CARDIOVASCULAR:  ECHO/ MASTER noted.   Avoid overload     GI: GI prophylaxis. PEG Feeding.  Bowel regimen PRN.     RENAL:  Follow up lytes.  Correct as needed.     INFECTIOUS DISEASE: ABX Per ID.  Monitor Vanc levels.  Adjust Vancomycin     HEMATOLOGICAL:  DVT prophylaxis.  trend CBC. LE doppler - 6/13    ENDOCRINE:  Follow up FS.  Insulin protocol if needed.   today     Prognosis is poor overall .

## 2023-06-20 NOTE — PROGRESS NOTE ADULT - SUBJECTIVE AND OBJECTIVE BOX
Patient is a 42y old  Female who presents with a chief complaint of AHRF (19 Jun 2023 09:45)        Over Night Events:  remains critically ill on MV.  Off pressors.  Sedated.  Tolerated 2 hours PS yesterday         ROS:     All ROS are negative except HPI         PHYSICAL EXAM    ICU Vital Signs Last 24 Hrs  T(C): 36.6 (20 Jun 2023 04:00), Max: 36.6 (20 Jun 2023 04:00)  T(F): 97.9 (20 Jun 2023 04:00), Max: 97.9 (20 Jun 2023 04:00)  HR: 72 (20 Jun 2023 08:19) (69 - 105)  BP: 115/71 (20 Jun 2023 07:00) (89/54 - 123/69)  BP(mean): 88 (20 Jun 2023 07:00) (65 - 91)  ABP: --  ABP(mean): --  RR: 24 (20 Jun 2023 07:00) (19 - 34)  SpO2: 96% (20 Jun 2023 08:19) (96% - 100%)    O2 Parameters below as of 20 Jun 2023 04:00  Patient On (Oxygen Delivery Method): ventilator    O2 Concentration (%): 40        CONSTITUTIONAL:  In  NAD    ENT:   Airway patent,   Mouth with normal mucosa.   No thrush    EYES:   Pupils equal,   Round and reactive to light.    CARDIAC:   Normal rate,   Regular rhythm.    No edema    RESPIRATORY:   No wheezing  Bilateral BS  Normal chest expansion  Not tachypneic,  No use of accessory muscles    GASTROINTESTINAL:  Abdomen soft,   Non-tender,   No guarding,   + BS    MUSCULOSKELETAL:   Range of motion is not limited,  No clubbing, cyanosis    NEUROLOGICAL:   Sedated     SKIN:   Skin normal color for race,   No evidence of rash.        06-19-23 @ 07:01  -  06-20-23 @ 07:00  --------------------------------------------------------  IN:    Dexmedetomidine: 962.4 mL    Free Water: 300 mL    IV PiggyBack: 500 mL    Peptamen A.F.: 1080 mL    Propofol: 454.6 mL  Total IN: 3297 mL    OUT:    Rectal Tube (mL): 300 mL    Voided (mL): 2350 mL  Total OUT: 2650 mL    Total NET: 647 mL          LABS:                            8.1    11.65 )-----------( 359      ( 20 Jun 2023 04:54 )             26.4                                               06-20    135  |  101  |  13  ----------------------------<  100<H>  4.4   |  23  |  <0.5<L>    Ca    8.5      20 Jun 2023 04:54  Mg     1.6     06-20    TPro  6.5  /  Alb  2.5<L>  /  TBili  0.3  /  DBili  x   /  AST  27  /  ALT  31  /  AlkPhos  343<H>  06-20                                                                                           LIVER FUNCTIONS - ( 20 Jun 2023 04:54 )  Alb: 2.5 g/dL / Pro: 6.5 g/dL / ALK PHOS: 343 U/L / ALT: 31 U/L / AST: 27 U/L / GGT: x                                                                                               Mode: AC/ CMV (Assist Control/ Continuous Mandatory Ventilation)  RR (machine): 16  TV (machine): 360  FiO2: 40  PEEP: 8  PS: 8  ITime: 1  MAP: 12  PIP: 28                                          MEDICATIONS  (STANDING):  aMIOdarone    Tablet 200 milliGRAM(s) Oral two times a day  chlorhexidine 0.12% Liquid 15 milliLiter(s) Oral Mucosa every 12 hours  chlorhexidine 2% Cloths 1 Application(s) Topical daily  chlorhexidine 2% Cloths 1 Application(s) Topical <User Schedule>  clonazePAM  Tablet 1 milliGRAM(s) Oral three times a day  dexMEDEtomidine Infusion 0.2 MICROgram(s)/kG/Hr (4.25 mL/Hr) IV Continuous <Continuous>  heparin   Injectable 5000 Unit(s) SubCutaneous every 12 hours  insulin lispro (ADMELOG) corrective regimen sliding scale   SubCutaneous every 6 hours  magnesium sulfate  IVPB 2 Gram(s) IV Intermittent every 2 hours  methadone    Tablet 10 milliGRAM(s) Oral every 8 hours  metroNIDAZOLE    Tablet 500 milliGRAM(s) Oral every 8 hours  pantoprazole   Suspension 40 milliGRAM(s) Oral daily  propofol Infusion 19.957 MICROgram(s)/kG/Min (11.1 mL/Hr) IV Continuous <Continuous>  QUEtiapine 25 milliGRAM(s) Oral two times a day  vancomycin  IVPB 1500 milliGRAM(s) IV Intermittent every 12 hours  vitamin A &amp; D Ointment 1 Application(s) Topical daily    MEDICATIONS  (PRN):  acetaminophen     Tablet .. 650 milliGRAM(s) Oral every 6 hours PRN Temp greater or equal to 38C (100.4F)      New X-rays reviewed:                                                                                  ECHO

## 2023-06-20 NOTE — PROGRESS NOTE ADULT - ASSESSMENT
42 year old female with PMHx Asthma, HTN presents with cough and SOB x3 days and hemoptysis. CT chest showing cavitary pneumonia. Cultures +MRSA bacteremia. Intubated; failed SBT. On Zyvox and Tamiflu. Repeat CT showing worsening PNA.    # Acute hypoxemic respiratory failure 2/2 cavitary MRSA PNA  # MRSA bacteremia, repeat cultures NGTD x3  # +Influenza B  # hx Asthma   - CT Angio Chest PE Protocol: Bilateral patchy groundglass nodularity with dominant confluent left lower lobe opacification with numerous cavitary lesions  - intubated 5/23, self-extubated 5/25, re-intubated 5/25  - repeat CT chest: Diffusely increased number and size of bilateral cavitary lesions with the left lower lobe now demonstrating a consolidative process of the entire lobe  - blood cultures +MRSA, s/p course of jimbo, currently on vanco and flagyl  cultures 5/28-30: NGTD x3  - ECHO: no vegetations. EF 65%, mild TR and pulm HTN  - s/p MASTER 5/31: No evidence of valvular vegetations or intracardiac abscesses to support IE. Pulmonary hepatisation incidental finding.   - D-dimer 1665, LE duplex negative for DVT  - pro-mya 48 > 5.8  - flu +, s/p Tamiflu 30mg q12  - repeat ET cultures negative   - repeat CT chest: Small left pleural effusion with overall essentially unchanged exam.  - c/w Vancomycin (dosing per pharmacy), Flagyl added 500mg q8 for anaerobic coverage  monitor trough levels  - s/p chest tube placement connected to suction --> tPA 6/6 and 6/7, removed 06/14   - d/c lasix  - now s/p trach and peg, GJ ok to use, CTSx following  - Trach to vent(360/16/8/40%)  - Patient on sedation with propofol + precedex, will wean, started on methadone 10mg bid, clonazepam, and zyprexa 10mg qd  - prn fentanyl push to wean off sedation, if responsive can go up on methadone  - prn ativan if dosent improve with fentanyl push and can go up on clonazepam if ativan helps  - monitor qtc    # New onset A-fib w/ RVR  - s/p Amio drip  - EP consulted  - c/w Amio 200mg BID for 2 weeks --> then Amio 200mg daily (switch date 06/21)    # Microcytic Anemia- Iron deficiency vs anemia of chronic disease  - Iron 7%, low tibc  - s/p 6 units prbc so far last 6/5  - No ext bleeding/melena  - DIC panel negative   - Dimer 1665, LE Duplex negative for DVT  - hold heparin drip. SCDs and heparin sq for now  - trend CBC, active type and screen, transfuse <7        DVT ppx: SQH  GI ppx: protonix  Diet: tube feeds per dietary  Dispo: MICU    Pending - wean sedation, wean off vent when able

## 2023-06-21 LAB
ALBUMIN SERPL ELPH-MCNC: 2.3 G/DL — LOW (ref 3.5–5.2)
ALP SERPL-CCNC: 309 U/L — HIGH (ref 30–115)
ALT FLD-CCNC: 26 U/L — SIGNIFICANT CHANGE UP (ref 0–41)
ANION GAP SERPL CALC-SCNC: 11 MMOL/L — SIGNIFICANT CHANGE UP (ref 7–14)
AST SERPL-CCNC: 23 U/L — SIGNIFICANT CHANGE UP (ref 0–41)
BASOPHILS # BLD AUTO: 0.06 K/UL — SIGNIFICANT CHANGE UP (ref 0–0.2)
BASOPHILS NFR BLD AUTO: 0.5 % — SIGNIFICANT CHANGE UP (ref 0–1)
BILIRUB SERPL-MCNC: 0.3 MG/DL — SIGNIFICANT CHANGE UP (ref 0.2–1.2)
BLD GP AB SCN SERPL QL: SIGNIFICANT CHANGE UP
BUN SERPL-MCNC: 12 MG/DL — SIGNIFICANT CHANGE UP (ref 10–20)
CALCIUM SERPL-MCNC: 8.5 MG/DL — SIGNIFICANT CHANGE UP (ref 8.4–10.5)
CHLORIDE SERPL-SCNC: 99 MMOL/L — SIGNIFICANT CHANGE UP (ref 98–110)
CO2 SERPL-SCNC: 24 MMOL/L — SIGNIFICANT CHANGE UP (ref 17–32)
CREAT SERPL-MCNC: <0.5 MG/DL — LOW (ref 0.7–1.5)
CULTURE RESULTS: SIGNIFICANT CHANGE UP
EGFR: 127 ML/MIN/1.73M2 — SIGNIFICANT CHANGE UP
EOSINOPHIL # BLD AUTO: 0.09 K/UL — SIGNIFICANT CHANGE UP (ref 0–0.7)
EOSINOPHIL NFR BLD AUTO: 0.7 % — SIGNIFICANT CHANGE UP (ref 0–8)
GLUCOSE BLDC GLUCOMTR-MCNC: 109 MG/DL — HIGH (ref 70–99)
GLUCOSE BLDC GLUCOMTR-MCNC: 111 MG/DL — HIGH (ref 70–99)
GLUCOSE BLDC GLUCOMTR-MCNC: 113 MG/DL — HIGH (ref 70–99)
GLUCOSE BLDC GLUCOMTR-MCNC: 114 MG/DL — HIGH (ref 70–99)
GLUCOSE BLDC GLUCOMTR-MCNC: 97 MG/DL — SIGNIFICANT CHANGE UP (ref 70–99)
GLUCOSE SERPL-MCNC: 106 MG/DL — HIGH (ref 70–99)
HCT VFR BLD CALC: 28.9 % — LOW (ref 37–47)
HGB BLD-MCNC: 8.7 G/DL — LOW (ref 12–16)
IMM GRANULOCYTES NFR BLD AUTO: 1 % — HIGH (ref 0.1–0.3)
LYMPHOCYTES # BLD AUTO: 1.42 K/UL — SIGNIFICANT CHANGE UP (ref 1.2–3.4)
LYMPHOCYTES # BLD AUTO: 11.6 % — LOW (ref 20.5–51.1)
MAGNESIUM SERPL-MCNC: 1.8 MG/DL — SIGNIFICANT CHANGE UP (ref 1.8–2.4)
MCHC RBC-ENTMCNC: 22.7 PG — LOW (ref 27–31)
MCHC RBC-ENTMCNC: 30.1 G/DL — LOW (ref 32–37)
MCV RBC AUTO: 75.3 FL — LOW (ref 81–99)
MONOCYTES # BLD AUTO: 1.13 K/UL — HIGH (ref 0.1–0.6)
MONOCYTES NFR BLD AUTO: 9.2 % — SIGNIFICANT CHANGE UP (ref 1.7–9.3)
NEUTROPHILS # BLD AUTO: 9.46 K/UL — HIGH (ref 1.4–6.5)
NEUTROPHILS NFR BLD AUTO: 77 % — HIGH (ref 42.2–75.2)
NRBC # BLD: 0 /100 WBCS — SIGNIFICANT CHANGE UP (ref 0–0)
PLATELET # BLD AUTO: 338 K/UL — SIGNIFICANT CHANGE UP (ref 130–400)
PMV BLD: 9.8 FL — SIGNIFICANT CHANGE UP (ref 7.4–10.4)
POTASSIUM SERPL-MCNC: 4.3 MMOL/L — SIGNIFICANT CHANGE UP (ref 3.5–5)
POTASSIUM SERPL-SCNC: 4.3 MMOL/L — SIGNIFICANT CHANGE UP (ref 3.5–5)
PROT SERPL-MCNC: 6.4 G/DL — SIGNIFICANT CHANGE UP (ref 6–8)
RBC # BLD: 3.84 M/UL — LOW (ref 4.2–5.4)
RBC # FLD: SIGNIFICANT CHANGE UP % (ref 11.5–14.5)
SODIUM SERPL-SCNC: 134 MMOL/L — LOW (ref 135–146)
SPECIMEN SOURCE: SIGNIFICANT CHANGE UP
TRIGL SERPL-MCNC: 235 MG/DL — HIGH
VANCOMYCIN FLD-MCNC: 18.6 UG/ML — HIGH (ref 5–10)
VANCOMYCIN TROUGH SERPL-MCNC: 36.7 UG/ML — HIGH (ref 5–10)
WBC # BLD: 12.28 K/UL — HIGH (ref 4.8–10.8)
WBC # FLD AUTO: 12.28 K/UL — HIGH (ref 4.8–10.8)

## 2023-06-21 PROCEDURE — 99291 CRITICAL CARE FIRST HOUR: CPT

## 2023-06-21 PROCEDURE — 71045 X-RAY EXAM CHEST 1 VIEW: CPT | Mod: 26

## 2023-06-21 RX ORDER — AMIODARONE HYDROCHLORIDE 400 MG/1
200 TABLET ORAL DAILY
Refills: 0 | Status: DISCONTINUED | OUTPATIENT
Start: 2023-06-22 | End: 2023-06-22

## 2023-06-21 RX ADMIN — CHLORHEXIDINE GLUCONATE 15 MILLILITER(S): 213 SOLUTION TOPICAL at 05:10

## 2023-06-21 RX ADMIN — METHADONE HYDROCHLORIDE 10 MILLIGRAM(S): 40 TABLET ORAL at 05:10

## 2023-06-21 RX ADMIN — METHADONE HYDROCHLORIDE 10 MILLIGRAM(S): 40 TABLET ORAL at 13:33

## 2023-06-21 RX ADMIN — Medication 300 MILLIGRAM(S): at 23:31

## 2023-06-21 RX ADMIN — METHADONE HYDROCHLORIDE 10 MILLIGRAM(S): 40 TABLET ORAL at 21:00

## 2023-06-21 RX ADMIN — Medication 500 MILLIGRAM(S): at 13:32

## 2023-06-21 RX ADMIN — CHLORHEXIDINE GLUCONATE 1 APPLICATION(S): 213 SOLUTION TOPICAL at 05:10

## 2023-06-21 RX ADMIN — Medication 500 MILLIGRAM(S): at 21:00

## 2023-06-21 RX ADMIN — Medication 500 MILLIGRAM(S): at 05:09

## 2023-06-21 RX ADMIN — AMIODARONE HYDROCHLORIDE 200 MILLIGRAM(S): 400 TABLET ORAL at 05:10

## 2023-06-21 RX ADMIN — OLANZAPINE 10 MILLIGRAM(S): 15 TABLET, FILM COATED ORAL at 11:30

## 2023-06-21 RX ADMIN — PANTOPRAZOLE SODIUM 40 MILLIGRAM(S): 20 TABLET, DELAYED RELEASE ORAL at 11:30

## 2023-06-21 RX ADMIN — CHLORHEXIDINE GLUCONATE 1 APPLICATION(S): 213 SOLUTION TOPICAL at 05:09

## 2023-06-21 RX ADMIN — DEXMEDETOMIDINE HYDROCHLORIDE IN 0.9% SODIUM CHLORIDE 4.25 MICROGRAM(S)/KG/HR: 4 INJECTION INTRAVENOUS at 21:01

## 2023-06-21 RX ADMIN — CHLORHEXIDINE GLUCONATE 15 MILLILITER(S): 213 SOLUTION TOPICAL at 17:14

## 2023-06-21 RX ADMIN — Medication 1 MILLIGRAM(S): at 05:10

## 2023-06-21 RX ADMIN — Medication 1 MILLIGRAM(S): at 21:00

## 2023-06-21 RX ADMIN — Medication 1 APPLICATION(S): at 11:30

## 2023-06-21 RX ADMIN — HEPARIN SODIUM 5000 UNIT(S): 5000 INJECTION INTRAVENOUS; SUBCUTANEOUS at 21:01

## 2023-06-21 RX ADMIN — Medication 1 MILLIGRAM(S): at 13:32

## 2023-06-21 RX ADMIN — Medication 300 MILLIGRAM(S): at 11:33

## 2023-06-21 RX ADMIN — Medication 0: at 05:07

## 2023-06-21 RX ADMIN — Medication 300 MILLIGRAM(S): at 00:17

## 2023-06-21 RX ADMIN — HEPARIN SODIUM 5000 UNIT(S): 5000 INJECTION INTRAVENOUS; SUBCUTANEOUS at 10:09

## 2023-06-21 NOTE — PROGRESS NOTE ADULT - ASSESSMENT
IMPRESSION:    Acute hypoxemic respiratory failure sp trach   Severe cavitary CAP MRSA  Small parapneumonic effusion SP Chest tube  MRSA bacteremia resolved   MASTER negative   Anemia sp transfusion   New onset A FIB rate controlled   LANCE non oliguric improving  Hypernatremia Improving  HO asthma   Influenza B     PLAN:    CNS:  Continue Methadone.  Klonopin 1 mg TID.  Wean propofol.  Continue Zyprexa.  Monito QTc.       HEENT: Oral care. Trach care     PULMONARY:  HOB @ 45 degrees.  Aspiration precautions.  No change in vent settings.  Monitor PPL and DP.  Keep SaO2 92 TO 96%.  PS as tolerated.     CARDIOVASCULAR:  ECHO/ MASTER noted.   Avoid overload,  rate control     GI: GI prophylaxis. PEG Feeding.  Bowel regimen PRN.     RENAL:  Follow up lytes.  Correct as needed.     INFECTIOUS DISEASE: ABX Per ID.  Monitor Vanc levels.  Adjust Vancomycin     HEMATOLOGICAL:  DVT prophylaxis.  trend CBC. LE doppler - 6/13    ENDOCRINE:  Follow up FS.  Insulin protocol if needed.   today     Prognosis is poor overall .     MICU

## 2023-06-21 NOTE — PROGRESS NOTE ADULT - ASSESSMENT
42 year old female with PMHx Asthma, HTN presents with cough and SOB x3 days and hemoptysis. CT chest showing cavitary pneumonia. Cultures +MRSA bacteremia. Intubated; failed SBT. On Zyvox and Tamiflu. Repeat CT showing worsening PNA.    # Acute hypoxemic respiratory failure 2/2 cavitary MRSA PNA  # MRSA bacteremia, repeat cultures NGTD x3  # +Influenza B  # hx Asthma   - CT Angio Chest PE Protocol: Bilateral patchy groundglass nodularity with dominant confluent left lower lobe opacification with numerous cavitary lesions  - intubated 5/23, self-extubated 5/25, re-intubated 5/25  - repeat CT chest: Diffusely increased number and size of bilateral cavitary lesions with the left lower lobe now demonstrating a consolidative process of the entire lobe  - blood cultures +MRSA, s/p course of jimbo, currently on vanco and flagyl  cultures 5/28-30: NGTD x3  - ECHO: no vegetations. EF 65%, mild TR and pulm HTN  - s/p MASTER 5/31: No evidence of valvular vegetations or intracardiac abscesses to support IE. Pulmonary hepatisation incidental finding.   - D-dimer 1665, LE duplex negative for DVT  - pro-mya 48 > 5.8  - flu +, s/p Tamiflu 30mg q12  - repeat ET cultures negative   - repeat CT chest: Small left pleural effusion with overall essentially unchanged exam.  - s/p chest tube placement connected to suction --> tPA 6/6 and 6/7, removed 06/14   - now s/p trach and peg, GJ ok to use, CTSx following    Plan  - c/w Vancomycin (dosing per pharmacy), Flagyl added 500mg q8 for anaerobic coverage; 4-6 weeks course as per ID  - monitor trough levels today 6/21  - d/c lasix  - Trach to vent(360/16/8/40%), try PS 4-6 hrs  - Patient on sedation with propofol + precedex, will wean, started on methadone 10mg bid, clonazepam, and zyprexa 10mg qd  - prn fentanyl push to wean off sedation, if responsive can go up on methadone  - prn ativan if dosent improve with fentanyl push and can go up on clonazepam if ativan helps  - monitor qtc    # New onset A-fib w/ RVR  - HR controlled now  - s/p Amio drip  - EP consulted  - c/w Amio 200mg BID for 2 weeks --> then Amio 200mg daily (switch date 06/21)  - Holding AC, for now on heparin sq, will restart if Hb stable    # Microcytic Anemia- Iron deficiency vs anemia of chronic disease-->stable  - Iron 7%, low tibc  - s/p 6 units prbc so far last 6/5  - No ext bleeding/melena  - DIC panel negative   - Dimer 1665, LE Duplex negative for DVT  - hold heparin drip. SCDs and heparin sq for now  - trend CBC, active type and screen, transfuse <7        DVT ppx: SQH  GI ppx: protonix  Diet: tube feeds per dietary  Dispo: MICU    Pending - wean sedation, wean off vent when able, restart AC if Hb stable today/nataliia, vanc trough

## 2023-06-21 NOTE — PROGRESS NOTE ADULT - SUBJECTIVE AND OBJECTIVE BOX
Patient is a 42y old  Female who presents with a chief complaint of AHRF (20 Jun 2023 11:06)        Over Night Events:  remains critically ill on MV.  off pressors.  Sedated.  less IV sedation         ROS:     All ROS are negative except HPI         PHYSICAL EXAM    ICU Vital Signs Last 24 Hrs  T(C): 37.1 (21 Jun 2023 08:00), Max: 37.1 (21 Jun 2023 00:00)  T(F): 98.7 (21 Jun 2023 08:00), Max: 98.8 (21 Jun 2023 00:00)  HR: 91 (21 Jun 2023 08:00) (71 - 97)  BP: 147/89 (21 Jun 2023 08:00) (87/55 - 147/89)  BP(mean): 113 (21 Jun 2023 08:00) (66 - 113)  ABP: --  ABP(mean): --  RR: 45 (21 Jun 2023 08:00) (19 - 47)  SpO2: 99% (21 Jun 2023 08:00) (96% - 100%)    O2 Parameters below as of 21 Jun 2023 08:00  Patient On (Oxygen Delivery Method): ventilator    O2 Concentration (%): 40        CONSTITUTIONAL:  In NAD    ENT:   Airway patent,   Mouth with normal mucosa.   Trach     EYES:   Pupils equal,   Round and reactive to light.    CARDIAC:   Normal rate,   Regular rhythm.    No edema    RESPIRATORY:   No wheezing  Bilateral BS  Normal chest expansion  Not tachypneic,  No use of accessory muscles    GASTROINTESTINAL:  Abdomen soft,   Non-tender,   No guarding,   + BS    MUSCULOSKELETAL:   Range of motion is not limited,  No clubbing, cyanosis    NEUROLOGICAL:   Sedated  Opens eyes      SKIN:   Skin normal color for race,   No evidence of rash.        06-20-23 @ 07:01  -  06-21-23 @ 07:00  --------------------------------------------------------  IN:    Dexmedetomidine: 598 mL    Enteral Tube Flush: 300 mL    IV PiggyBack: 500 mL    Peptamen A.F.: 1035 mL    Propofol: 242 mL  Total IN: 2675 mL    OUT:    Rectal Tube (mL): 400 mL    Voided (mL): 2350 mL  Total OUT: 2750 mL    Total NET: -75 mL      06-21-23 @ 07:01  -  06-21-23 @ 09:04  --------------------------------------------------------  IN:    Dexmedetomidine: 26 mL    Peptamen A.F.: 90 mL    Propofol: 100 mL  Total IN: 216 mL    OUT:  Total OUT: 0 mL    Total NET: 216 mL          LABS:                            8.7    12.28 )-----------( 338      ( 21 Jun 2023 04:37 )             28.9                                               06-21    134<L>  |  99  |  12  ----------------------------<  106<H>  4.3   |  24  |  <0.5<L>    Ca    8.5      21 Jun 2023 04:37  Mg     1.8     06-21    TPro  6.4  /  Alb  2.3<L>  /  TBili  0.3  /  DBili  x   /  AST  23  /  ALT  26  /  AlkPhos  309<H>  06-21                                             Urinalysis Basic - ( 21 Jun 2023 04:37 )    Color: x / Appearance: x / SG: x / pH: x  Gluc: 106 mg/dL / Ketone: x  / Bili: x / Urobili: x   Blood: x / Protein: x / Nitrite: x   Leuk Esterase: x / RBC: x / WBC x   Sq Epi: x / Non Sq Epi: x / Bacteria: x                                                  LIVER FUNCTIONS - ( 21 Jun 2023 04:37 )  Alb: 2.3 g/dL / Pro: 6.4 g/dL / ALK PHOS: 309 U/L / ALT: 26 U/L / AST: 23 U/L / GGT: x                                                                                               Mode: AC/ CMV (Assist Control/ Continuous Mandatory Ventilation)  RR (machine): 16  TV (machine): 360  FiO2: 40  PEEP: 8  ITime: 1  MAP: 11  PIP: 21                                          MEDICATIONS  (STANDING):  aMIOdarone    Tablet 200 milliGRAM(s) Oral two times a day  chlorhexidine 0.12% Liquid 15 milliLiter(s) Oral Mucosa every 12 hours  chlorhexidine 2% Cloths 1 Application(s) Topical daily  chlorhexidine 2% Cloths 1 Application(s) Topical <User Schedule>  clonazePAM  Tablet 1 milliGRAM(s) Oral three times a day  dexMEDEtomidine Infusion 0.2 MICROgram(s)/kG/Hr (4.25 mL/Hr) IV Continuous <Continuous>  heparin   Injectable 5000 Unit(s) SubCutaneous every 12 hours  insulin lispro (ADMELOG) corrective regimen sliding scale   SubCutaneous every 6 hours  methadone    Tablet 10 milliGRAM(s) Oral every 8 hours  metroNIDAZOLE    Tablet 500 milliGRAM(s) Oral every 8 hours  OLANZapine 10 milliGRAM(s) Oral daily  pantoprazole   Suspension 40 milliGRAM(s) Oral daily  propofol Infusion 19.957 MICROgram(s)/kG/Min (11.1 mL/Hr) IV Continuous <Continuous>  vancomycin  IVPB 1500 milliGRAM(s) IV Intermittent every 12 hours  vitamin A &amp; D Ointment 1 Application(s) Topical daily    MEDICATIONS  (PRN):  acetaminophen     Tablet .. 650 milliGRAM(s) Oral every 6 hours PRN Temp greater or equal to 38C (100.4F)      New X-rays reviewed:                                                                                  ECHO

## 2023-06-21 NOTE — PROGRESS NOTE ADULT - SUBJECTIVE AND OBJECTIVE BOX
ZEYNEP ROSSI 42y Female  MRN#: [id number]   CODE STATUS:________    Hospital Day: 29d    Pt is currently admitted with the primary diagnosis of Acute hypoxemic respiratory failure 2/2 cavitary MRSA PNA    SUBJECTIVE  Hospital Course  42 year old female with PMHx Asthma, HTN presents with cough and SOB x3 days and hemoptysis. CT chest showing cavitary pneumonia. Cultures +MRSA bacteremia. Intubated; failed SBT. On Zyvox and Tamiflu. Repeat CT showed worsening PNA. Acute hypoxemic respiratory failure 2/2 cavitary MRSA PNA    6/20 - plan to wean off sedation, patient on propofol/precedex/clonipen/methadone/zyvox    6/21 - propofol at 10mcg/min and Precedex 1mcg/min will wean, will restart AC in am if Hb stable    Overnight events   None significant    Subjective complaints   sedated but opens eyes today    Present Today:   - Meg:  No [  ], Yes [   ] : Indication:     - Type of IV Access:       .. CVC/Piccline:  No [  ], Yes [   ] : Indication:       .. Midline: No [  ], Yes [   ] : Indication:                                             ----------------------------------------------------------  OBJECTIVE  PAST MEDICAL & SURGICAL HISTORY                                            -----------------------------------------------------------  ALLERGIES:  aspirin (Short breath; Anaphylaxis)                                            ------------------------------------------------------------    HOME MEDICATIONS  Home Medications:                           MEDICATIONS:  STANDING MEDICATIONS  chlorhexidine 0.12% Liquid 15 milliLiter(s) Oral Mucosa every 12 hours  chlorhexidine 2% Cloths 1 Application(s) Topical <User Schedule>  chlorhexidine 2% Cloths 1 Application(s) Topical daily  clonazePAM  Tablet 1 milliGRAM(s) Oral three times a day  dexMEDEtomidine Infusion 0.2 MICROgram(s)/kG/Hr IV Continuous <Continuous>  heparin   Injectable 5000 Unit(s) SubCutaneous every 12 hours  insulin lispro (ADMELOG) corrective regimen sliding scale   SubCutaneous every 6 hours  methadone    Tablet 10 milliGRAM(s) Oral every 8 hours  metroNIDAZOLE    Tablet 500 milliGRAM(s) Oral every 8 hours  OLANZapine 10 milliGRAM(s) Oral daily  pantoprazole   Suspension 40 milliGRAM(s) Oral daily  propofol Infusion 19.957 MICROgram(s)/kG/Min IV Continuous <Continuous>  vancomycin  IVPB 1500 milliGRAM(s) IV Intermittent every 12 hours  vitamin A &amp; D Ointment 1 Application(s) Topical daily    PRN MEDICATIONS  acetaminophen     Tablet .. 650 milliGRAM(s) Oral every 6 hours PRN                                            ------------------------------------------------------------  VITAL SIGNS: Last 24 Hours  T(C): 37.1 (21 Jun 2023 08:00), Max: 37.1 (21 Jun 2023 00:00)  T(F): 98.7 (21 Jun 2023 08:00), Max: 98.8 (21 Jun 2023 00:00)  HR: 88 (21 Jun 2023 10:00) (71 - 97)  BP: 111/59 (21 Jun 2023 10:00) (89/51 - 147/89)  BP(mean): 78 (21 Jun 2023 10:00) (66 - 113)  RR: 21 (21 Jun 2023 10:00) (19 - 47)  SpO2: 99% (21 Jun 2023 10:00) (96% - 100%)      06-20-23 @ 07:01  -  06-21-23 @ 07:00  --------------------------------------------------------  IN: 2675 mL / OUT: 2750 mL / NET: -75 mL    06-21-23 @ 07:01  -  06-21-23 @ 10:32  --------------------------------------------------------  IN: 323 mL / OUT: 850 mL / NET: -527 mL                                             --------------------------------------------------------------  LABS:                        8.7    12.28 )-----------( 338      ( 21 Jun 2023 04:37 )             28.9     06-21    134<L>  |  99  |  12  ----------------------------<  106<H>  4.3   |  24  |  <0.5<L>    Ca    8.5      21 Jun 2023 04:37  Mg     1.8     06-21    TPro  6.4  /  Alb  2.3<L>  /  TBili  0.3  /  DBili  x   /  AST  23  /  ALT  26  /  AlkPhos  309<H>  06-21      Urinalysis Basic - ( 21 Jun 2023 04:37 )    Color: x / Appearance: x / SG: x / pH: x  Gluc: 106 mg/dL / Ketone: x  / Bili: x / Urobili: x   Blood: x / Protein: x / Nitrite: x   Leuk Esterase: x / RBC: x / WBC x   Sq Epi: x / Non Sq Epi: x / Bacteria: x                                                            -------------------------------------------------------------  RADIOLOGY:                                            --------------------------------------------------------------    PHYSICAL EXAM:  GEN: No acute distress  LUNGS: Normal respiratory effort. trached  HEART: S1/S2 present.   ABD: Soft, non-tender, non-distended  EXT: no cyanosis or edema  NEURO: sedated but opens eyes today

## 2023-06-22 LAB
ALBUMIN SERPL ELPH-MCNC: 2.5 G/DL — LOW (ref 3.5–5.2)
ALP SERPL-CCNC: 300 U/L — HIGH (ref 30–115)
ALT FLD-CCNC: 25 U/L — SIGNIFICANT CHANGE UP (ref 0–41)
ANION GAP SERPL CALC-SCNC: 14 MMOL/L — SIGNIFICANT CHANGE UP (ref 7–14)
AST SERPL-CCNC: 28 U/L — SIGNIFICANT CHANGE UP (ref 0–41)
BILIRUB SERPL-MCNC: 0.4 MG/DL — SIGNIFICANT CHANGE UP (ref 0.2–1.2)
BUN SERPL-MCNC: 11 MG/DL — SIGNIFICANT CHANGE UP (ref 10–20)
CALCIUM SERPL-MCNC: 8.8 MG/DL — SIGNIFICANT CHANGE UP (ref 8.4–10.4)
CHLORIDE SERPL-SCNC: 100 MMOL/L — SIGNIFICANT CHANGE UP (ref 98–110)
CO2 SERPL-SCNC: 23 MMOL/L — SIGNIFICANT CHANGE UP (ref 17–32)
CREAT SERPL-MCNC: <0.5 MG/DL — LOW (ref 0.7–1.5)
EGFR: 127 ML/MIN/1.73M2 — SIGNIFICANT CHANGE UP
GLUCOSE BLDC GLUCOMTR-MCNC: 118 MG/DL — HIGH (ref 70–99)
GLUCOSE BLDC GLUCOMTR-MCNC: 122 MG/DL — HIGH (ref 70–99)
GLUCOSE SERPL-MCNC: 105 MG/DL — HIGH (ref 70–99)
HCT VFR BLD CALC: 24.5 % — LOW (ref 37–47)
HGB BLD-MCNC: 7.5 G/DL — LOW (ref 12–16)
MAGNESIUM SERPL-MCNC: 1.5 MG/DL — LOW (ref 1.8–2.4)
MCHC RBC-ENTMCNC: 22.9 PG — LOW (ref 27–31)
MCHC RBC-ENTMCNC: 30.6 G/DL — LOW (ref 32–37)
MCV RBC AUTO: 74.7 FL — LOW (ref 81–99)
NRBC # BLD: 0 /100 WBCS — SIGNIFICANT CHANGE UP (ref 0–0)
PLATELET # BLD AUTO: 372 K/UL — SIGNIFICANT CHANGE UP (ref 130–400)
PMV BLD: 9.3 FL — SIGNIFICANT CHANGE UP (ref 7.4–10.4)
POTASSIUM SERPL-MCNC: 4.4 MMOL/L — SIGNIFICANT CHANGE UP (ref 3.5–5)
POTASSIUM SERPL-SCNC: 4.4 MMOL/L — SIGNIFICANT CHANGE UP (ref 3.5–5)
PROT SERPL-MCNC: 6.9 G/DL — SIGNIFICANT CHANGE UP (ref 6–8)
RBC # BLD: 3.28 M/UL — LOW (ref 4.2–5.4)
RBC # FLD: SIGNIFICANT CHANGE UP % (ref 11.5–14.5)
SODIUM SERPL-SCNC: 137 MMOL/L — SIGNIFICANT CHANGE UP (ref 135–146)
VANCOMYCIN TROUGH SERPL-MCNC: 19.4 UG/ML — HIGH (ref 5–10)
WBC # BLD: 14.72 K/UL — HIGH (ref 4.8–10.8)
WBC # FLD AUTO: 14.72 K/UL — HIGH (ref 4.8–10.8)

## 2023-06-22 PROCEDURE — 93010 ELECTROCARDIOGRAM REPORT: CPT

## 2023-06-22 PROCEDURE — 71045 X-RAY EXAM CHEST 1 VIEW: CPT | Mod: 26

## 2023-06-22 PROCEDURE — 99291 CRITICAL CARE FIRST HOUR: CPT

## 2023-06-22 RX ORDER — DILTIAZEM HCL 120 MG
30 CAPSULE, EXT RELEASE 24 HR ORAL EVERY 6 HOURS
Refills: 0 | Status: DISCONTINUED | OUTPATIENT
Start: 2023-06-22 | End: 2023-06-23

## 2023-06-22 RX ORDER — ENOXAPARIN SODIUM 100 MG/ML
80 INJECTION SUBCUTANEOUS EVERY 12 HOURS
Refills: 0 | Status: DISCONTINUED | OUTPATIENT
Start: 2023-06-22 | End: 2023-07-04

## 2023-06-22 RX ORDER — OLANZAPINE 15 MG/1
5 TABLET, FILM COATED ORAL DAILY
Refills: 0 | Status: DISCONTINUED | OUTPATIENT
Start: 2023-06-22 | End: 2023-07-05

## 2023-06-22 RX ORDER — DILTIAZEM HCL 120 MG
5 CAPSULE, EXT RELEASE 24 HR ORAL ONCE
Refills: 0 | Status: COMPLETED | OUTPATIENT
Start: 2023-06-22 | End: 2023-06-22

## 2023-06-22 RX ORDER — LABETALOL HCL 100 MG
100 TABLET ORAL THREE TIMES A DAY
Refills: 0 | Status: DISCONTINUED | OUTPATIENT
Start: 2023-06-22 | End: 2023-06-22

## 2023-06-22 RX ORDER — AMIODARONE HYDROCHLORIDE 400 MG/1
100 TABLET ORAL DAILY
Refills: 0 | Status: DISCONTINUED | OUTPATIENT
Start: 2023-06-23 | End: 2023-07-07

## 2023-06-22 RX ORDER — METHADONE HYDROCHLORIDE 40 MG/1
10 TABLET ORAL EVERY 8 HOURS
Refills: 0 | Status: DISCONTINUED | OUTPATIENT
Start: 2023-06-22 | End: 2023-06-28

## 2023-06-22 RX ORDER — MAGNESIUM SULFATE 500 MG/ML
2 VIAL (ML) INJECTION
Refills: 0 | Status: COMPLETED | OUTPATIENT
Start: 2023-06-22 | End: 2023-06-22

## 2023-06-22 RX ORDER — FENTANYL CITRATE 50 UG/ML
50 INJECTION INTRAVENOUS ONCE
Refills: 0 | Status: DISCONTINUED | OUTPATIENT
Start: 2023-06-22 | End: 2023-06-22

## 2023-06-22 RX ORDER — LABETALOL HCL 100 MG
10 TABLET ORAL ONCE
Refills: 0 | Status: DISCONTINUED | OUTPATIENT
Start: 2023-06-22 | End: 2023-06-22

## 2023-06-22 RX ORDER — VANCOMYCIN HCL 1 G
1000 VIAL (EA) INTRAVENOUS EVERY 12 HOURS
Refills: 0 | Status: DISCONTINUED | OUTPATIENT
Start: 2023-06-22 | End: 2023-06-26

## 2023-06-22 RX ADMIN — Medication 1 MILLIGRAM(S): at 05:08

## 2023-06-22 RX ADMIN — Medication 1 APPLICATION(S): at 12:12

## 2023-06-22 RX ADMIN — Medication 1 MILLIGRAM(S): at 21:03

## 2023-06-22 RX ADMIN — CHLORHEXIDINE GLUCONATE 1 APPLICATION(S): 213 SOLUTION TOPICAL at 05:13

## 2023-06-22 RX ADMIN — Medication 500 MILLIGRAM(S): at 21:03

## 2023-06-22 RX ADMIN — DEXMEDETOMIDINE HYDROCHLORIDE IN 0.9% SODIUM CHLORIDE 4.25 MICROGRAM(S)/KG/HR: 4 INJECTION INTRAVENOUS at 21:04

## 2023-06-22 RX ADMIN — CHLORHEXIDINE GLUCONATE 15 MILLILITER(S): 213 SOLUTION TOPICAL at 17:30

## 2023-06-22 RX ADMIN — Medication 25 GRAM(S): at 07:43

## 2023-06-22 RX ADMIN — METHADONE HYDROCHLORIDE 10 MILLIGRAM(S): 40 TABLET ORAL at 21:03

## 2023-06-22 RX ADMIN — ENOXAPARIN SODIUM 80 MILLIGRAM(S): 100 INJECTION SUBCUTANEOUS at 12:12

## 2023-06-22 RX ADMIN — Medication 5 MILLIGRAM(S): at 09:26

## 2023-06-22 RX ADMIN — Medication 2 MILLIGRAM(S): at 08:56

## 2023-06-22 RX ADMIN — Medication 500 MILLIGRAM(S): at 05:02

## 2023-06-22 RX ADMIN — FENTANYL CITRATE 50 MICROGRAM(S): 50 INJECTION INTRAVENOUS at 06:12

## 2023-06-22 RX ADMIN — OLANZAPINE 5 MILLIGRAM(S): 15 TABLET, FILM COATED ORAL at 12:12

## 2023-06-22 RX ADMIN — CHLORHEXIDINE GLUCONATE 1 APPLICATION(S): 213 SOLUTION TOPICAL at 05:02

## 2023-06-22 RX ADMIN — FENTANYL CITRATE 50 MICROGRAM(S): 50 INJECTION INTRAVENOUS at 05:57

## 2023-06-22 RX ADMIN — PANTOPRAZOLE SODIUM 40 MILLIGRAM(S): 20 TABLET, DELAYED RELEASE ORAL at 12:13

## 2023-06-22 RX ADMIN — Medication 2 MILLIGRAM(S): at 10:18

## 2023-06-22 RX ADMIN — METHADONE HYDROCHLORIDE 10 MILLIGRAM(S): 40 TABLET ORAL at 05:02

## 2023-06-22 RX ADMIN — Medication 250 MILLIGRAM(S): at 18:38

## 2023-06-22 RX ADMIN — Medication 25 GRAM(S): at 09:28

## 2023-06-22 RX ADMIN — CHLORHEXIDINE GLUCONATE 15 MILLILITER(S): 213 SOLUTION TOPICAL at 05:03

## 2023-06-22 RX ADMIN — Medication 30 MILLIGRAM(S): at 17:30

## 2023-06-22 RX ADMIN — ENOXAPARIN SODIUM 80 MILLIGRAM(S): 100 INJECTION SUBCUTANEOUS at 21:03

## 2023-06-22 RX ADMIN — AMIODARONE HYDROCHLORIDE 200 MILLIGRAM(S): 400 TABLET ORAL at 05:02

## 2023-06-22 RX ADMIN — Medication 25 GRAM(S): at 12:13

## 2023-06-22 RX ADMIN — Medication 500 MILLIGRAM(S): at 14:35

## 2023-06-22 RX ADMIN — Medication 30 MILLIGRAM(S): at 12:51

## 2023-06-22 RX ADMIN — METHADONE HYDROCHLORIDE 10 MILLIGRAM(S): 40 TABLET ORAL at 14:36

## 2023-06-22 RX ADMIN — Medication 2 MILLIGRAM(S): at 06:55

## 2023-06-22 RX ADMIN — Medication 1 MILLIGRAM(S): at 14:35

## 2023-06-22 NOTE — PROGRESS NOTE ADULT - ASSESSMENT
IMPRESSION:    Acute hypoxemic respiratory failure sp trach   Severe cavitary CAP MRSA  Small parapneumonic effusion SP Chest tube  MRSA bacteremia resolved   MASTER negative   Anemia sp transfusion   New onset A FIB rate controlled   LANCE non oliguric improving  Hypernatremia Improving  HO asthma   Influenza B     PLAN:    CNS:  Continue Methadone.  Klonopin 1 mg TID.  Wean propofol.  Zyprexa 5 .  Monito QTc.       HEENT: Oral care. Trach care     PULMONARY:  HOB @ 45 degrees.  Aspiration precautions.  No change in vent settings.  Monitor PPL and DP.  Keep SaO2 92 TO 96%.  PS as tolerated. 10/8    CARDIOVASCULAR:  ECHO/ MASTER noted.   Avoid overload,  rate control.  Amiodarone 100 m daily.  Cardizem 30 mg Q6     GI: GI prophylaxis. PEG Feeding.  Bowel regimen PRN.     RENAL:  Follow up lytes.  Correct as needed.     INFECTIOUS DISEASE: ABX Per ID.  Monitor Vanc levels.  Adjust Vancomycin     HEMATOLOGICAL:  DVT prophylaxis.  trend CBC. LE doppler - 6/13.  LMWH therapeutic for now     ENDOCRINE:  Follow up FS.  Insulin protocol if needed.   today     Prognosis is poor overall .     MICU

## 2023-06-22 NOTE — PROGRESS NOTE ADULT - ASSESSMENT
42 year old female with PMHx Asthma, HTN presents with cough and SOB x3 days and hemoptysis. CT chest showing cavitary pneumonia. Cultures +MRSA bacteremia. Intubated; failed SBT. On Zyvox and Tamiflu. Repeat CT showing worsening PNA.    # Acute hypoxemic respiratory failure 2/2 cavitary MRSA PNA  # MRSA bacteremia, repeat cultures NGTD x3  # +Influenza B  # hx Asthma   - CT Angio Chest PE Protocol: Bilateral patchy groundglass nodularity with dominant confluent left lower lobe opacification with numerous cavitary lesions  - intubated , self-extubated , re-intubated   - repeat CT chest: Diffusely increased number and size of bilateral cavitary lesions with the left lower lobe now demonstrating a consolidative process of the entire lobe  - blood cultures +MRSA, s/p course of jimbo, currently on vanco and flagyl  cultures -: NGTD x3  - ECHO: no vegetations. EF 65%, mild TR and pulm HTN  - s/p MASTER : No evidence of valvular vegetations or intracardiac abscesses to support IE. Pulmonary hepatisation incidental finding.   - D-dimer 1665, LE duplex negative for DVT  - pro-mya 48 > 5.8  - flu +, s/p Tamiflu 30mg q12  - repeat ET cultures negative   - repeat CT chest: Small left pleural effusion with overall essentially unchanged exam.  - s/p chest tube placement connected to suction --> tPA  and , removed    - now s/p trach and peg, GJ ok to use, CTSx following    Plan  - c/w Vancomycin (dosing per pharmacy), Flagyl added 500mg q8 for anaerobic coverage; 4-6 weeks course as per ID  - monitor trough levels today   - d/c lasix  - Trach to vent(360/16/8/40%), try PS 4-6 hrs  - Wean sedation as tolerated, now on Precedex only, will go up on clonazepam and d/c Precedex     #Elevated QTc  - ~550s now  - on methadone 10mg qd hr, zyprexa decreased to 5mg qd, amiodarone decreased to 100mg qd  - replete lytes  - monitor qtc    # New onset A-fib w/ RVR  - HR controlled now  - s/p Amio drip  - EP consulted  - c/w Amio 200mg BID for 2 weeks --> then Amio 200mg daily (switch date )-->amio  to 100mg qd  given elevated qtc  - Restarted AC with lovenox  given Hb stable last few days    # Microcytic Anemia- Iron deficiency vs anemia of chronic disease-->stable  - Iron 7%, low tibc  - s/p 6 units prbc so far last   - No ext bleeding/melena  - DIC panel negative   - Dimer 1665, LE Duplex negative for DVT  - Restarted AC with lovenox  given Hb stable last few days  - trend CBC, active type and screen, transfuse <7    DVT ppx: SQH  GI ppx: protonix  Diet: tube feeds per dietary  Dispo: MICU    Pending - wean sedation, wean off vent when able, restart AC monitor Hb     42 year old female with PMHx Asthma, HTN presents with cough and SOB x3 days and hemoptysis. CT chest showing cavitary pneumonia. Cultures +MRSA bacteremia. Intubated; failed SBT. On Zyvox and Tamiflu. Repeat CT showing worsening PNA.    # Acute hypoxemic respiratory failure 2/2 cavitary MRSA PNA  # MRSA bacteremia, repeat cultures NGTD x3  # +Influenza B  # hx Asthma   - CT Angio Chest PE Protocol: Bilateral patchy groundglass nodularity with dominant confluent left lower lobe opacification with numerous cavitary lesions  - intubated , self-extubated , re-intubated   - repeat CT chest: Diffusely increased number and size of bilateral cavitary lesions with the left lower lobe now demonstrating a consolidative process of the entire lobe  - blood cultures +MRSA, s/p course of jimbo, currently on vanco and flagyl  cultures -: NGTD x3  - ECHO: no vegetations. EF 65%, mild TR and pulm HTN  - s/p MASTER : No evidence of valvular vegetations or intracardiac abscesses to support IE. Pulmonary hepatisation incidental finding.   - D-dimer 1665, LE duplex negative for DVT  - pro-mya 48 > 5.8  - flu +, s/p Tamiflu 30mg q12  - repeat ET cultures negative   - repeat CT chest: Small left pleural effusion with overall essentially unchanged exam.  - s/p chest tube placement connected to suction --> tPA  and , removed    - now s/p trach and peg, GJ ok to use, CTSx following    Plan  - c/w Vancomycin (dosing per pharmacy), Flagyl added 500mg q8 for anaerobic coverage; 4-6 weeks course as per ID  - monitor trough levels today   - d/c lasix  - Trach to vent(360/16/8/40%), try PS 4-6 hrs  - Wean sedation as tolerated, now on Precedex only, will go up on clonazepam and d/c Precedex     #Elevated QTc  - ~550s now  - on methadone 10mg qd hr, zyprexa decreased to 5mg qd, amiodarone decreased to 100mg qd  - replete lytes  - monitor qtc    # New onset A-fib w/ RVR  - HR controlled now  - s/p Amio drip  - EP consulted  - c/w Amio 200mg BID for 2 weeks --> then Amio 200mg daily (switch date )-->amio  to 100mg qd  given elevated qtc  - started cardizem po 30mg q6hr   - Restarted AC with lovenox  given Hb stable last few days    # Microcytic Anemia- Iron deficiency vs anemia of chronic disease-->stable  - Iron 7%, low tibc  - s/p 6 units prbc so far last   - No ext bleeding/melena  - DIC panel negative   - Dimer 1665, LE Duplex negative for DVT  - Restarted AC with lovenox  given Hb stable last few days  - trend CBC, active type and screen, transfuse <7    DVT ppx: SQH  GI ppx: protonix  Diet: tube feeds per dietary  Dispo: MICU    Pending - wean sedation, wean off vent when able, restart AC monitor Hb

## 2023-06-22 NOTE — PHARMACOTHERAPY INTERVENTION NOTE - COMMENTS
-vanco level 19.4- precise PK calculation-recommended adjusting vancomycin 1g IV q12h start @ 1800  -check vanco level 6/24 prior to am dose

## 2023-06-22 NOTE — CHART NOTE - NSCHARTNOTEFT_GEN_A_CORE
Registered Dietitian Follow-Up     Patient Profile Reviewed                           Yes [x]   No []     Nutrition History Previously Obtained        Yes []  No [x]       Pertinent Medical Interventions: Pt continue to be managed for respiratory failure sp trach, Severe cavitary CAP MRSA, Small parapneumonic effusion SP Chest tube.  Off Continuous sedation/propofol.      Diet order: Diet, NPO with Tube Feed:   Tube Feeding Modality: Orogastric  Peptamen Intense VHP  Total Volume for 24 Hours (mL): 1080  Continuous  Starting Tube Feed Rate {mL per Hour}: 45  Until Goal Tube Feed Rate (mL per Hour): 45  Tube Feed Duration (in Hours): 24  Tube Feed Start Time: 13:30  Free Water Flush   Total Volume per Flush (mL): 100   Frequency: Every 4 Hours  No Carb Prosource (1pkg = 15gms Protein)     Qty per Day:  1  Banatrol TF     Qty per Day:  2 (23 @ 13:27) [Active]    Anthropometrics:  Height (cm): 167.6 (23 @ 16:22)  Weight (kg): 85 (23 @ 16:22)  BMI (kg/m2): 30.3 (23 @ 16:22)  IBW: 52.1 kg  UBW: N/A    Daily Weight in k.5 (-22), Weight in k.4 (-21), Weight in k.3 (-20), Weight in k.6 (-19), Weight in k.8 (-18), Weight in k.3 (-17), Weight in k.7 (-16), Weight in k.2 (-15), Weight in k.6 (-14), Weight in k.2 (-13), Weight in k.7 (-12), Weight in k.5 (11), Weight in k (-10), Weight in k.6 (-), Weight in k.6 (-08), Weight in k.8 ()  Weight Change: no significant weight change noted in the past few days    MEDICATIONS  (STANDING):  chlorhexidine 0.12% Liquid 15 milliLiter(s) Oral Mucosa every 12 hours  chlorhexidine 2% Cloths 1 Application(s) Topical daily  chlorhexidine 2% Cloths 1 Application(s) Topical <User Schedule>  clonazePAM  Tablet 1 milliGRAM(s) Oral three times a day  dexMEDEtomidine Infusion 0.2 MICROgram(s)/kG/Hr (4.25 mL/Hr) IV Continuous <Continuous>  diltiazem    Tablet 30 milliGRAM(s) Oral every 6 hours  enoxaparin Injectable 80 milliGRAM(s) SubCutaneous every 12 hours  insulin lispro (ADMELOG) corrective regimen sliding scale   SubCutaneous every 6 hours  magnesium sulfate  IVPB 2 Gram(s) IV Intermittent every 2 hours  methadone    Tablet 10 milliGRAM(s) Oral every 8 hours  metroNIDAZOLE    Tablet 500 milliGRAM(s) Oral every 8 hours  OLANZapine 5 milliGRAM(s) Oral daily  pantoprazole   Suspension 40 milliGRAM(s) Oral daily  propofol Infusion 19.957 MICROgram(s)/kG/Min IV Continuous <Continuous>  -- not infusing since 10PM last night per flowsheets  vancomycin  IVPB 1500 milliGRAM(s) IV Intermittent every 12 hours  vitamin A &amp; D Ointment 1 Application(s) Topical daily    MEDICATIONS  (PRN):  acetaminophen     Tablet .. 650 milliGRAM(s) Oral every 6 hours PRN Temp greater or equal to 38C (100.4F)    Pertinent Labs:  @ 04:50: Na 137, BUN 11, Cr <0.5<L>, <H>, K+ 4.4, Phos --, Mg 1.5<L>, Alk Phos 300<H>, ALT/SGPT 25, AST/SGOT 28, HbA1c --    Finger Sticks:  POCT Blood Glucose.: 118 mg/dL ( @ 05:41)  POCT Blood Glucose.: 109 mg/dL ( @ 23:23)  POCT Blood Glucose.: 114 mg/dL ( @ 17:23)  POCT Blood Glucose.: 111 mg/dL ( @ 12:16)    I&O's Detail  2023 07:01  -  2023 07:00  --------------------------------------------------------  IN:    Dexmedetomidine: 407.4 mL    Enteral Tube Flush: 50 mL    Free Water: 600 mL    IV PiggyBack: 400 mL    Peptamen A.F.: 1080 mL    Propofol: 110 mL  Total IN: 2647.4 mL    OUT:    Rectal Tube (mL): 600 mL    Voided (mL): 2700 mL  Total OUT: 3300 mL    Total NET: -652.6 mL    Physical Findings:  - Appearance: lethargic  - GI function: abdomen rounded; last bowel movement 2023--liquid/loose bowel movements since   - Tubes: PEG  - Oral/Mouth cavity: NPO   - Skin: left upper arm skin tear; sacral Suspected deep tissue injury  - Edema: 1+ generalized edema      Nutrition Requirements:  Weight Used: dosing weight 85 kg     Estimated Energy Needs    Continue []  Adjust [x]  Energy Recommendations: 1710 kcal/day based on PSE Ve 8.4 Tm 37.1     Estimated Protein Needs    Continue []  Adjust [x]  Protein Recommendations: 111-119 gm/day - 1.3-1.4 g/kg      Estimated Fluid Needs        Continue [x]  Adjust []  Fluid Recommendations: 2975 mL/day - adjusted fluid needs for BMI>30    Nutrient Intake: current TF provides 1220kcal, 114g protein, 907ml free water.    [x] Previous Nutrition Diagnosis:  Increased Nutrient Needs (protein)            [x] Ongoing          [] Resolved     Nutrition Education: N/A     Goal/Expected Outcome: Pt to meet >90% & <105% estimated needs via EN in 3-5 days     Indicator/Monitoring: Monitor diet order, kcal intake, body composition, NFPE, labs  (lytes, BG, renal indices)    Recommendation:  - Given pt off propofol, low-fat & high protein formula no longer warranted. Switch to Replete formula, start at 60ml/hr and advance to goal 75ml/hr in 4 hours. Free water flushes 50ml Q6H. -- will provide 1800kcal, 115g protein, 1512+200ml free water.   - Will monitor tolerance, can add Banatrol if dignishield output worsens    Patient assessed at high nutrition risk.  RD spectra [id number], also available on Teams. Registered Dietitian Follow-Up     Patient Profile Reviewed                           Yes [x]   No []     Nutrition History Previously Obtained        Yes []  No [x]       Pertinent Medical Interventions: Pt continue to be managed for respiratory failure sp trach, Severe cavitary CAP MRSA, Small parapneumonic effusion SP Chest tube.  Off Continuous sedation/propofol.      Diet order: Diet, NPO with Tube Feed:   Tube Feeding Modality: Orogastric  Peptamen Intense VHP  Total Volume for 24 Hours (mL): 1080  Continuous  Starting Tube Feed Rate {mL per Hour}: 45  Until Goal Tube Feed Rate (mL per Hour): 45  Tube Feed Duration (in Hours): 24  Tube Feed Start Time: 13:30  Free Water Flush   Total Volume per Flush (mL): 100   Frequency: Every 4 Hours  No Carb Prosource (1pkg = 15gms Protein)     Qty per Day:  1  Banatrol TF     Qty per Day:  2 (23 @ 13:27) [Active]    Anthropometrics:  Height (cm): 167.6 (23 @ 16:22)  Weight (kg): 85 (23 @ 16:22)  BMI (kg/m2): 30.3 (23 @ 16:22)  IBW: 52.1 kg  UBW: N/A    Daily Weight in k.5 (-22), Weight in k.4 (-21), Weight in k.3 (-20), Weight in k.6 (-19), Weight in k.8 (-18), Weight in k.3 (-17), Weight in k.7 (-16), Weight in k.2 (-15), Weight in k.6 (-14), Weight in k.2 (-13), Weight in k.7 (-12), Weight in k.5 (11), Weight in k (-10), Weight in k.6 (-), Weight in k.6 (-08), Weight in k.8 ()  Weight Change: no significant weight change noted in the past few days    MEDICATIONS  (STANDING):  chlorhexidine 0.12% Liquid 15 milliLiter(s) Oral Mucosa every 12 hours  chlorhexidine 2% Cloths 1 Application(s) Topical daily  chlorhexidine 2% Cloths 1 Application(s) Topical <User Schedule>  clonazePAM  Tablet 1 milliGRAM(s) Oral three times a day  dexMEDEtomidine Infusion 0.2 MICROgram(s)/kG/Hr (4.25 mL/Hr) IV Continuous <Continuous>  diltiazem    Tablet 30 milliGRAM(s) Oral every 6 hours  enoxaparin Injectable 80 milliGRAM(s) SubCutaneous every 12 hours  insulin lispro (ADMELOG) corrective regimen sliding scale   SubCutaneous every 6 hours  magnesium sulfate  IVPB 2 Gram(s) IV Intermittent every 2 hours  methadone    Tablet 10 milliGRAM(s) Oral every 8 hours  metroNIDAZOLE    Tablet 500 milliGRAM(s) Oral every 8 hours  OLANZapine 5 milliGRAM(s) Oral daily  pantoprazole   Suspension 40 milliGRAM(s) Oral daily  propofol Infusion 19.957 MICROgram(s)/kG/Min IV Continuous <Continuous>  -- not infusing since 10PM last night per flowsheets  vancomycin  IVPB 1500 milliGRAM(s) IV Intermittent every 12 hours  vitamin A &amp; D Ointment 1 Application(s) Topical daily    MEDICATIONS  (PRN):  acetaminophen     Tablet .. 650 milliGRAM(s) Oral every 6 hours PRN Temp greater or equal to 38C (100.4F)    Pertinent Labs:  @ 04:50: Na 137, BUN 11, Cr <0.5<L>, <H>, K+ 4.4, Phos --, Mg 1.5<L>, Alk Phos 300<H>, ALT/SGPT 25, AST/SGOT 28, HbA1c --    Finger Sticks:  POCT Blood Glucose.: 118 mg/dL ( @ 05:41)  POCT Blood Glucose.: 109 mg/dL ( @ 23:23)  POCT Blood Glucose.: 114 mg/dL ( @ 17:23)  POCT Blood Glucose.: 111 mg/dL ( @ 12:16)    I&O's Detail  2023 07:01  -  2023 07:00  --------------------------------------------------------  IN:    Dexmedetomidine: 407.4 mL    Enteral Tube Flush: 50 mL    Free Water: 600 mL    IV PiggyBack: 400 mL    Peptamen A.F.: 1080 mL    Propofol: 110 mL  Total IN: 2647.4 mL    OUT:    Rectal Tube (mL): 600 mL    Voided (mL): 2700 mL  Total OUT: 3300 mL    Total NET: -652.6 mL    Physical Findings:  - Appearance: lethargic  - GI function: abdomen rounded; last bowel movement 2023--liquid/loose bowel movements since   - Tubes: PEG  - Oral/Mouth cavity: NPO   - Skin: left upper arm skin tear; sacral Suspected deep tissue injury  - Edema: 1+ generalized edema      Nutrition Requirements:  Weight Used: dosing weight 85 kg     Estimated Energy Needs    Continue []  Adjust [x]  Energy Recommendations: 1710 kcal/day based on PSE Ve 8.4 Tm 37.1     Estimated Protein Needs    Continue []  Adjust [x]  Protein Recommendations: 111-119 gm/day - 1.3-1.4 g/kg      Estimated Fluid Needs        Continue [x]  Adjust []  Fluid Recommendations: 2975 mL/day - adjusted fluid needs for BMI>30    Nutrient Intake: current TF provides 1220kcal, 114g protein, 907ml free water.    [x] Previous Nutrition Diagnosis:  Increased Nutrient Needs (protein)            [x] Ongoing          [] Resolved     Nutrition Education: N/A     Goal/Expected Outcome: Pt to meet >90% & <105% estimated needs via EN in 3-5 days     Indicator/Monitoring: Monitor diet order, kcal intake, body composition, NFPE, labs  (lytes, BG, renal indices)    Recommendation:  - Given pt off propofol, low-fat & high protein formula no longer warranted. Switch to Replete formula, start at 60ml/hr and advance to goal 75ml/hr in 4 hours. Free water flushes 50ml Q6H. -- will provide 1800kcal, 115g protein, 1512+200ml free water.   - Cont Banatrol for loose bowel movements (80kcal, 0g protein)     Patient assessed at high nutrition risk.  RD spectra [phone], also available on Teams.

## 2023-06-22 NOTE — PROGRESS NOTE ADULT - SUBJECTIVE AND OBJECTIVE BOX
ZEYNEP ROSSI 42y Female  MRN#: [id number]   CODE STATUS:________    Hospital Day: 30d    Pt is currently admitted with the primary diagnosis of Acute hypoxemic respiratory failure 2/2 cavitary MRSA PNA    SUBJECTIVE  Hospital Course  42 year old female with PMHx Asthma, HTN presents with cough and SOB x3 days and hemoptysis. CT chest showing cavitary pneumonia. Cultures +MRSA bacteremia. Intubated; failed SBT. On Zyvox and Tamiflu. Repeat CT showed worsening PNA. Acute hypoxemic respiratory failure 2/2 cavitary MRSA PNA    6/20 - plan to wean off sedation, patient on propofol/precedex/clonipen/methadone/zyvox    6/21 - propofol at 10mcg/min and Precedex 1mcg/min will wean, will restart AC in am if Hb stable  6/22 - agitated when weaned off sedation, back on precedex, will increase clonazepam and wean off precedex. Restarted AC with lovenox for now as Hb stable    Overnight events   None significant    Subjective complaints   awake but not following commands this am    Present Today:   - Meg:  No [  ], Yes [   ] : Indication:     - Type of IV Access:       .. CVC/Piccline:  No [  ], Yes [   ] : Indication:       .. Midline: No [  ], Yes [   ] : Indication:                                             ----------------------------------------------------------  OBJECTIVE  PAST MEDICAL & SURGICAL HISTORY                                            -----------------------------------------------------------  ALLERGIES:  aspirin (Short breath; Anaphylaxis)                                            ------------------------------------------------------------    HOME MEDICATIONS  Home Medications:                           MEDICATIONS:  STANDING MEDICATIONS  chlorhexidine 0.12% Liquid 15 milliLiter(s) Oral Mucosa every 12 hours  chlorhexidine 2% Cloths 1 Application(s) Topical daily  chlorhexidine 2% Cloths 1 Application(s) Topical <User Schedule>  clonazePAM  Tablet 1 milliGRAM(s) Oral three times a day  dexMEDEtomidine Infusion 0.2 MICROgram(s)/kG/Hr IV Continuous <Continuous>  diltiazem    Tablet 30 milliGRAM(s) Oral every 6 hours  enoxaparin Injectable 80 milliGRAM(s) SubCutaneous every 12 hours  insulin lispro (ADMELOG) corrective regimen sliding scale   SubCutaneous every 6 hours  methadone    Tablet 10 milliGRAM(s) Oral every 8 hours  metroNIDAZOLE    Tablet 500 milliGRAM(s) Oral every 8 hours  OLANZapine 5 milliGRAM(s) Oral daily  pantoprazole   Suspension 40 milliGRAM(s) Oral daily  propofol Infusion 19.957 MICROgram(s)/kG/Min IV Continuous <Continuous>  vancomycin  IVPB 1000 milliGRAM(s) IV Intermittent every 12 hours  vitamin A &amp; D Ointment 1 Application(s) Topical daily    PRN MEDICATIONS  acetaminophen     Tablet .. 650 milliGRAM(s) Oral every 6 hours PRN                                            ------------------------------------------------------------  VITAL SIGNS: Last 24 Hours  T(C): 36.5 (22 Jun 2023 08:00), Max: 36.7 (21 Jun 2023 20:00)  T(F): 97.7 (22 Jun 2023 08:00), Max: 98.1 (21 Jun 2023 20:00)  HR: 115 (22 Jun 2023 11:00) (76 - 149)  BP: 122/73 (22 Jun 2023 11:00) (82/51 - 192/111)  BP(mean): 93 (22 Jun 2023 11:00) (62 - 151)  RR: 17 (22 Jun 2023 11:00) (15 - 65)  SpO2: 97% (22 Jun 2023 11:00) (97% - 100%)      06-21-23 @ 07:01  -  06-22-23 @ 07:00  --------------------------------------------------------  IN: 2647.4 mL / OUT: 3300 mL / NET: -652.6 mL    06-22-23 @ 07:01  -  06-22-23 @ 12:27  --------------------------------------------------------  IN: 280 mL / OUT: 300 mL / NET: -20 mL                                             --------------------------------------------------------------  LABS:                        8.7    12.28 )-----------( 338      ( 21 Jun 2023 04:37 )             28.9     06-22    137  |  100  |  11  ----------------------------<  105<H>  4.4   |  23  |  <0.5<L>    Ca    8.8      22 Jun 2023 04:50  Mg     1.5     06-22    TPro  6.9  /  Alb  2.5<L>  /  TBili  0.4  /  DBili  x   /  AST  28  /  ALT  25  /  AlkPhos  300<H>  06-22      Urinalysis Basic - ( 22 Jun 2023 04:50 )    Color: x / Appearance: x / SG: x / pH: x  Gluc: 105 mg/dL / Ketone: x  / Bili: x / Urobili: x   Blood: x / Protein: x / Nitrite: x   Leuk Esterase: x / RBC: x / WBC x   Sq Epi: x / Non Sq Epi: x / Bacteria: x                                                            -------------------------------------------------------------  RADIOLOGY:                                            --------------------------------------------------------------    PHYSICAL EXAM:  G=GEN: No acute distress  LUNGS: Normal respiratory effort. trached  HEART: S1/S2 present.   ABD: Soft, non-tender, non-distended  EXT: no cyanosis or edema  NEURO: awake but not following commands ZEYNEP ROSSI 42y Female  MRN#: [id number]   CODE STATUS:________    Hospital Day: 30d    Pt is currently admitted with the primary diagnosis of Acute hypoxemic respiratory failure 2/2 cavitary MRSA PNA    SUBJECTIVE  Hospital Course  42 year old female with PMHx Asthma, HTN presents with cough and SOB x3 days and hemoptysis. CT chest showing cavitary pneumonia. Cultures +MRSA bacteremia. Intubated; failed SBT. On Zyvox and Tamiflu. Repeat CT showed worsening PNA. Acute hypoxemic respiratory failure 2/2 cavitary MRSA PNA    6/20 - plan to wean off sedation, patient on propofol/precedex/clonipen/methadone/zyvox    6/21 - propofol at 10mcg/min and Precedex 1mcg/min will wean, will restart AC in am if Hb stable  6/22 - agitated when weaned off sedation, back on precedex, will increase clonazepam and wean off precedex. Restarted AC with lovenox for now as Hb stable, started cardizem 30mg q6hr po    Overnight events   None significant    Subjective complaints   awake but not following commands this am    Present Today:   - Meg:  No [  ], Yes [   ] : Indication:     - Type of IV Access:       .. CVC/Piccline:  No [  ], Yes [   ] : Indication:       .. Midline: No [  ], Yes [   ] : Indication:                                             ----------------------------------------------------------  OBJECTIVE  PAST MEDICAL & SURGICAL HISTORY                                            -----------------------------------------------------------  ALLERGIES:  aspirin (Short breath; Anaphylaxis)                                            ------------------------------------------------------------    HOME MEDICATIONS  Home Medications:                           MEDICATIONS:  STANDING MEDICATIONS  chlorhexidine 0.12% Liquid 15 milliLiter(s) Oral Mucosa every 12 hours  chlorhexidine 2% Cloths 1 Application(s) Topical daily  chlorhexidine 2% Cloths 1 Application(s) Topical <User Schedule>  clonazePAM  Tablet 1 milliGRAM(s) Oral three times a day  dexMEDEtomidine Infusion 0.2 MICROgram(s)/kG/Hr IV Continuous <Continuous>  diltiazem    Tablet 30 milliGRAM(s) Oral every 6 hours  enoxaparin Injectable 80 milliGRAM(s) SubCutaneous every 12 hours  insulin lispro (ADMELOG) corrective regimen sliding scale   SubCutaneous every 6 hours  methadone    Tablet 10 milliGRAM(s) Oral every 8 hours  metroNIDAZOLE    Tablet 500 milliGRAM(s) Oral every 8 hours  OLANZapine 5 milliGRAM(s) Oral daily  pantoprazole   Suspension 40 milliGRAM(s) Oral daily  propofol Infusion 19.957 MICROgram(s)/kG/Min IV Continuous <Continuous>  vancomycin  IVPB 1000 milliGRAM(s) IV Intermittent every 12 hours  vitamin A &amp; D Ointment 1 Application(s) Topical daily    PRN MEDICATIONS  acetaminophen     Tablet .. 650 milliGRAM(s) Oral every 6 hours PRN                                            ------------------------------------------------------------  VITAL SIGNS: Last 24 Hours  T(C): 36.5 (22 Jun 2023 08:00), Max: 36.7 (21 Jun 2023 20:00)  T(F): 97.7 (22 Jun 2023 08:00), Max: 98.1 (21 Jun 2023 20:00)  HR: 115 (22 Jun 2023 11:00) (76 - 149)  BP: 122/73 (22 Jun 2023 11:00) (82/51 - 192/111)  BP(mean): 93 (22 Jun 2023 11:00) (62 - 151)  RR: 17 (22 Jun 2023 11:00) (15 - 65)  SpO2: 97% (22 Jun 2023 11:00) (97% - 100%)      06-21-23 @ 07:01  -  06-22-23 @ 07:00  --------------------------------------------------------  IN: 2647.4 mL / OUT: 3300 mL / NET: -652.6 mL    06-22-23 @ 07:01  -  06-22-23 @ 12:27  --------------------------------------------------------  IN: 280 mL / OUT: 300 mL / NET: -20 mL                                             --------------------------------------------------------------  LABS:                        8.7    12.28 )-----------( 338      ( 21 Jun 2023 04:37 )             28.9     06-22    137  |  100  |  11  ----------------------------<  105<H>  4.4   |  23  |  <0.5<L>    Ca    8.8      22 Jun 2023 04:50  Mg     1.5     06-22    TPro  6.9  /  Alb  2.5<L>  /  TBili  0.4  /  DBili  x   /  AST  28  /  ALT  25  /  AlkPhos  300<H>  06-22      Urinalysis Basic - ( 22 Jun 2023 04:50 )    Color: x / Appearance: x / SG: x / pH: x  Gluc: 105 mg/dL / Ketone: x  / Bili: x / Urobili: x   Blood: x / Protein: x / Nitrite: x   Leuk Esterase: x / RBC: x / WBC x   Sq Epi: x / Non Sq Epi: x / Bacteria: x                                                            -------------------------------------------------------------  RADIOLOGY:                                            --------------------------------------------------------------    PHYSICAL EXAM:  G=GEN: No acute distress  LUNGS: Normal respiratory effort. trached  HEART: S1/S2 present.   ABD: Soft, non-tender, non-distended  EXT: no cyanosis or edema  NEURO: awake but not following commands

## 2023-06-22 NOTE — PROGRESS NOTE ADULT - SUBJECTIVE AND OBJECTIVE BOX
Patient is a 42y old  Female who presents with a chief complaint of AHRF (21 Jun 2023 10:31)        Over Night Events:  remains on MV.  Off pressors.  tachycardic.  More awake.  Off Continuous sedation         ROS:     All ROS are negative except HPI         PHYSICAL EXAM    ICU Vital Signs Last 24 Hrs  T(C): 36.5 (22 Jun 2023 08:00), Max: 36.7 (21 Jun 2023 20:00)  T(F): 97.7 (22 Jun 2023 08:00), Max: 98.1 (21 Jun 2023 20:00)  HR: 129 (22 Jun 2023 08:00) (76 - 137)  BP: 153/85 (22 Jun 2023 07:30) (82/51 - 169/91)  BP(mean): 112 (22 Jun 2023 07:30) (62 - 122)  ABP: --  ABP(mean): --  RR: 27 (22 Jun 2023 08:00) (15 - 38)  SpO2: 100% (22 Jun 2023 08:00) (99% - 100%)    O2 Parameters below as of 22 Jun 2023 08:00  Patient On (Oxygen Delivery Method): ventilator    O2 Concentration (%): 40        CONSTITUTIONAL:  In   NAD    ENT:   Airway patent,   Mouth with normal mucosa.   Trach     EYES:   Pupils equal,   Round and reactive to light.    CARDIAC:   Tachycardic   Regular rhythm.    No edema      RESPIRATORY:   No wheezing  Bilateral BS  Normal chest expansion  Not tachypneic,  No use of accessory muscles    GASTROINTESTINAL:  Abdomen soft,   Non-tender,   No guarding,   + BS    MUSCULOSKELETAL:   Range of motion is not limited,  No clubbing, cyanosis    NEUROLOGICAL:   Alert   Not following commands     SKIN:   Skin normal color for race,   No evidence of rash.    06-21-23 @ 07:01  -  06-22-23 @ 07:00  --------------------------------------------------------  IN:    Dexmedetomidine: 407.4 mL    Enteral Tube Flush: 50 mL    Free Water: 600 mL    IV PiggyBack: 400 mL    Peptamen A.F.: 1080 mL    Propofol: 110 mL  Total IN: 2647.4 mL    OUT:    Rectal Tube (mL): 600 mL    Voided (mL): 2700 mL  Total OUT: 3300 mL    Total NET: -652.6 mL      06-22-23 @ 07:01  -  06-22-23 @ 09:02  --------------------------------------------------------  IN:    Peptamen A.F.: 45 mL  Total IN: 45 mL    OUT:    Voided (mL): 300 mL  Total OUT: 300 mL    Total NET: -255 mL          LABS:                            8.7    12.28 )-----------( 338      ( 21 Jun 2023 04:37 )             28.9                                               06-22    137  |  100  |  11  ----------------------------<  105<H>  4.4   |  23  |  <0.5<L>    Ca    8.8      22 Jun 2023 04:50  Mg     1.5     06-22    TPro  6.9  /  Alb  2.5<L>  /  TBili  0.4  /  DBili  x   /  AST  28  /  ALT  25  /  AlkPhos  300<H>  06-22                                             Urinalysis Basic - ( 22 Jun 2023 04:50 )    Color: x / Appearance: x / SG: x / pH: x  Gluc: 105 mg/dL / Ketone: x  / Bili: x / Urobili: x   Blood: x / Protein: x / Nitrite: x   Leuk Esterase: x / RBC: x / WBC x   Sq Epi: x / Non Sq Epi: x / Bacteria: x                                                  LIVER FUNCTIONS - ( 22 Jun 2023 04:50 )  Alb: 2.5 g/dL / Pro: 6.9 g/dL / ALK PHOS: 300 U/L / ALT: 25 U/L / AST: 28 U/L / GGT: x                                                                                               Mode: CPAP with PS  FiO2: 40  PEEP: 8  PS: 8                                          MEDICATIONS  (STANDING):  aMIOdarone    Tablet 200 milliGRAM(s) Oral daily  chlorhexidine 0.12% Liquid 15 milliLiter(s) Oral Mucosa every 12 hours  chlorhexidine 2% Cloths 1 Application(s) Topical daily  chlorhexidine 2% Cloths 1 Application(s) Topical <User Schedule>  clonazePAM  Tablet 1 milliGRAM(s) Oral three times a day  dexMEDEtomidine Infusion 0.2 MICROgram(s)/kG/Hr (4.25 mL/Hr) IV Continuous <Continuous>  heparin   Injectable 5000 Unit(s) SubCutaneous every 12 hours  insulin lispro (ADMELOG) corrective regimen sliding scale   SubCutaneous every 6 hours  magnesium sulfate  IVPB 2 Gram(s) IV Intermittent every 2 hours  methadone    Tablet 10 milliGRAM(s) Oral every 8 hours  metroNIDAZOLE    Tablet 500 milliGRAM(s) Oral every 8 hours  pantoprazole   Suspension 40 milliGRAM(s) Oral daily  propofol Infusion 19.957 MICROgram(s)/kG/Min (11.1 mL/Hr) IV Continuous <Continuous>  vancomycin  IVPB 1500 milliGRAM(s) IV Intermittent every 12 hours  vitamin A &amp; D Ointment 1 Application(s) Topical daily    MEDICATIONS  (PRN):  acetaminophen     Tablet .. 650 milliGRAM(s) Oral every 6 hours PRN Temp greater or equal to 38C (100.4F)      New X-rays reviewed:                                                                                  ECHO

## 2023-06-23 LAB
ALBUMIN SERPL ELPH-MCNC: 2.3 G/DL — LOW (ref 3.5–5.2)
ALP SERPL-CCNC: 274 U/L — HIGH (ref 30–115)
ALT FLD-CCNC: 22 U/L — SIGNIFICANT CHANGE UP (ref 0–41)
ANION GAP SERPL CALC-SCNC: 10 MMOL/L — SIGNIFICANT CHANGE UP (ref 7–14)
AST SERPL-CCNC: 21 U/L — SIGNIFICANT CHANGE UP (ref 0–41)
BASOPHILS # BLD AUTO: 0.05 K/UL — SIGNIFICANT CHANGE UP (ref 0–0.2)
BASOPHILS NFR BLD AUTO: 0.4 % — SIGNIFICANT CHANGE UP (ref 0–1)
BILIRUB SERPL-MCNC: 0.2 MG/DL — SIGNIFICANT CHANGE UP (ref 0.2–1.2)
BUN SERPL-MCNC: 13 MG/DL — SIGNIFICANT CHANGE UP (ref 10–20)
CALCIUM SERPL-MCNC: 8.5 MG/DL — SIGNIFICANT CHANGE UP (ref 8.4–10.5)
CHLORIDE SERPL-SCNC: 96 MMOL/L — LOW (ref 98–110)
CO2 SERPL-SCNC: 29 MMOL/L — SIGNIFICANT CHANGE UP (ref 17–32)
CREAT SERPL-MCNC: <0.5 MG/DL — LOW (ref 0.7–1.5)
EGFR: 127 ML/MIN/1.73M2 — SIGNIFICANT CHANGE UP
EOSINOPHIL # BLD AUTO: 0.12 K/UL — SIGNIFICANT CHANGE UP (ref 0–0.7)
EOSINOPHIL NFR BLD AUTO: 1 % — SIGNIFICANT CHANGE UP (ref 0–8)
GLUCOSE BLDC GLUCOMTR-MCNC: 117 MG/DL — HIGH (ref 70–99)
GLUCOSE BLDC GLUCOMTR-MCNC: 119 MG/DL — HIGH (ref 70–99)
GLUCOSE BLDC GLUCOMTR-MCNC: 122 MG/DL — HIGH (ref 70–99)
GLUCOSE BLDC GLUCOMTR-MCNC: 123 MG/DL — HIGH (ref 70–99)
GLUCOSE BLDC GLUCOMTR-MCNC: 125 MG/DL — HIGH (ref 70–99)
GLUCOSE BLDC GLUCOMTR-MCNC: 98 MG/DL — SIGNIFICANT CHANGE UP (ref 70–99)
GLUCOSE SERPL-MCNC: 105 MG/DL — HIGH (ref 70–99)
HCT VFR BLD CALC: 24.3 % — LOW (ref 37–47)
HGB BLD-MCNC: 7.4 G/DL — LOW (ref 12–16)
IMM GRANULOCYTES NFR BLD AUTO: 1.1 % — HIGH (ref 0.1–0.3)
IRON SATN MFR SERPL: 23 % — SIGNIFICANT CHANGE UP (ref 15–50)
IRON SATN MFR SERPL: 33 UG/DL — LOW (ref 35–150)
LYMPHOCYTES # BLD AUTO: 1.32 K/UL — SIGNIFICANT CHANGE UP (ref 1.2–3.4)
LYMPHOCYTES # BLD AUTO: 10.5 % — LOW (ref 20.5–51.1)
MAGNESIUM SERPL-MCNC: 2 MG/DL — SIGNIFICANT CHANGE UP (ref 1.8–2.4)
MCHC RBC-ENTMCNC: 23 PG — LOW (ref 27–31)
MCHC RBC-ENTMCNC: 30.5 G/DL — LOW (ref 32–37)
MCV RBC AUTO: 75.5 FL — LOW (ref 81–99)
MONOCYTES # BLD AUTO: 1.42 K/UL — HIGH (ref 0.1–0.6)
MONOCYTES NFR BLD AUTO: 11.3 % — HIGH (ref 1.7–9.3)
NEUTROPHILS # BLD AUTO: 9.57 K/UL — HIGH (ref 1.4–6.5)
NEUTROPHILS NFR BLD AUTO: 75.7 % — HIGH (ref 42.2–75.2)
NRBC # BLD: 0 /100 WBCS — SIGNIFICANT CHANGE UP (ref 0–0)
PLATELET # BLD AUTO: 358 K/UL — SIGNIFICANT CHANGE UP (ref 130–400)
PMV BLD: 9.2 FL — SIGNIFICANT CHANGE UP (ref 7.4–10.4)
POTASSIUM SERPL-MCNC: 3.8 MMOL/L — SIGNIFICANT CHANGE UP (ref 3.5–5)
POTASSIUM SERPL-SCNC: 3.8 MMOL/L — SIGNIFICANT CHANGE UP (ref 3.5–5)
PROT SERPL-MCNC: 6.4 G/DL — SIGNIFICANT CHANGE UP (ref 6–8)
RBC # BLD: 3.22 M/UL — LOW (ref 4.2–5.4)
RBC # FLD: SIGNIFICANT CHANGE UP % (ref 11.5–14.5)
SODIUM SERPL-SCNC: 135 MMOL/L — SIGNIFICANT CHANGE UP (ref 135–146)
TIBC SERPL-MCNC: 145 UG/DL — LOW (ref 220–430)
UIBC SERPL-MCNC: 112 UG/DL — SIGNIFICANT CHANGE UP (ref 110–370)
WBC # BLD: 12.62 K/UL — HIGH (ref 4.8–10.8)
WBC # FLD AUTO: 12.62 K/UL — HIGH (ref 4.8–10.8)

## 2023-06-23 PROCEDURE — 99291 CRITICAL CARE FIRST HOUR: CPT

## 2023-06-23 PROCEDURE — 71045 X-RAY EXAM CHEST 1 VIEW: CPT | Mod: 26

## 2023-06-23 PROCEDURE — 99252 IP/OBS CONSLTJ NEW/EST SF 35: CPT

## 2023-06-23 PROCEDURE — 99222 1ST HOSP IP/OBS MODERATE 55: CPT

## 2023-06-23 RX ORDER — NYSTATIN CREAM 100000 [USP'U]/G
1 CREAM TOPICAL
Refills: 0 | Status: DISCONTINUED | OUTPATIENT
Start: 2023-06-23 | End: 2023-07-17

## 2023-06-23 RX ORDER — CLONAZEPAM 1 MG
2 TABLET ORAL THREE TIMES A DAY
Refills: 0 | Status: DISCONTINUED | OUTPATIENT
Start: 2023-06-23 | End: 2023-06-26

## 2023-06-23 RX ORDER — CLONAZEPAM 1 MG
1.5 TABLET ORAL THREE TIMES A DAY
Refills: 0 | Status: DISCONTINUED | OUTPATIENT
Start: 2023-06-23 | End: 2023-06-23

## 2023-06-23 RX ORDER — DILTIAZEM HCL 120 MG
30 CAPSULE, EXT RELEASE 24 HR ORAL ONCE
Refills: 0 | Status: COMPLETED | OUTPATIENT
Start: 2023-06-23 | End: 2023-06-23

## 2023-06-23 RX ORDER — DILTIAZEM HCL 120 MG
30 CAPSULE, EXT RELEASE 24 HR ORAL ONCE
Refills: 0 | Status: DISCONTINUED | OUTPATIENT
Start: 2023-06-23 | End: 2023-06-23

## 2023-06-23 RX ORDER — CLONAZEPAM 1 MG
0.5 TABLET ORAL ONCE
Refills: 0 | Status: DISCONTINUED | OUTPATIENT
Start: 2023-06-23 | End: 2023-06-23

## 2023-06-23 RX ORDER — FENTANYL CITRATE 50 UG/ML
25 INJECTION INTRAVENOUS ONCE
Refills: 0 | Status: DISCONTINUED | OUTPATIENT
Start: 2023-06-23 | End: 2023-06-23

## 2023-06-23 RX ORDER — DILTIAZEM HCL 120 MG
60 CAPSULE, EXT RELEASE 24 HR ORAL EVERY 6 HOURS
Refills: 0 | Status: DISCONTINUED | OUTPATIENT
Start: 2023-06-23 | End: 2023-07-06

## 2023-06-23 RX ADMIN — ENOXAPARIN SODIUM 80 MILLIGRAM(S): 100 INJECTION SUBCUTANEOUS at 21:26

## 2023-06-23 RX ADMIN — FENTANYL CITRATE 25 MICROGRAM(S): 50 INJECTION INTRAVENOUS at 13:38

## 2023-06-23 RX ADMIN — Medication 0: at 23:29

## 2023-06-23 RX ADMIN — METHADONE HYDROCHLORIDE 10 MILLIGRAM(S): 40 TABLET ORAL at 13:31

## 2023-06-23 RX ADMIN — Medication 30 MILLIGRAM(S): at 00:18

## 2023-06-23 RX ADMIN — CHLORHEXIDINE GLUCONATE 15 MILLILITER(S): 213 SOLUTION TOPICAL at 05:05

## 2023-06-23 RX ADMIN — Medication 1 MILLIGRAM(S): at 05:04

## 2023-06-23 RX ADMIN — PANTOPRAZOLE SODIUM 40 MILLIGRAM(S): 20 TABLET, DELAYED RELEASE ORAL at 11:24

## 2023-06-23 RX ADMIN — OLANZAPINE 5 MILLIGRAM(S): 15 TABLET, FILM COATED ORAL at 11:24

## 2023-06-23 RX ADMIN — AMIODARONE HYDROCHLORIDE 100 MILLIGRAM(S): 400 TABLET ORAL at 05:04

## 2023-06-23 RX ADMIN — Medication 500 MILLIGRAM(S): at 05:04

## 2023-06-23 RX ADMIN — ENOXAPARIN SODIUM 80 MILLIGRAM(S): 100 INJECTION SUBCUTANEOUS at 09:33

## 2023-06-23 RX ADMIN — Medication 250 MILLIGRAM(S): at 05:08

## 2023-06-23 RX ADMIN — Medication 60 MILLIGRAM(S): at 11:52

## 2023-06-23 RX ADMIN — FENTANYL CITRATE 25 MICROGRAM(S): 50 INJECTION INTRAVENOUS at 13:31

## 2023-06-23 RX ADMIN — METHADONE HYDROCHLORIDE 10 MILLIGRAM(S): 40 TABLET ORAL at 21:08

## 2023-06-23 RX ADMIN — Medication 0.5 MILLIGRAM(S): at 09:33

## 2023-06-23 RX ADMIN — CHLORHEXIDINE GLUCONATE 1 APPLICATION(S): 213 SOLUTION TOPICAL at 05:05

## 2023-06-23 RX ADMIN — Medication 1 APPLICATION(S): at 11:24

## 2023-06-23 RX ADMIN — Medication 2 MILLIGRAM(S): at 17:33

## 2023-06-23 RX ADMIN — Medication 1.5 MILLIGRAM(S): at 13:31

## 2023-06-23 RX ADMIN — Medication 2 MILLIGRAM(S): at 21:07

## 2023-06-23 RX ADMIN — Medication 250 MILLIGRAM(S): at 17:33

## 2023-06-23 RX ADMIN — METHADONE HYDROCHLORIDE 10 MILLIGRAM(S): 40 TABLET ORAL at 05:05

## 2023-06-23 RX ADMIN — Medication 30 MILLIGRAM(S): at 05:04

## 2023-06-23 RX ADMIN — NYSTATIN CREAM 1 APPLICATION(S): 100000 CREAM TOPICAL at 17:34

## 2023-06-23 RX ADMIN — Medication 30 MILLIGRAM(S): at 09:33

## 2023-06-23 RX ADMIN — Medication 500 MILLIGRAM(S): at 21:26

## 2023-06-23 RX ADMIN — Medication 60 MILLIGRAM(S): at 18:04

## 2023-06-23 RX ADMIN — Medication 500 MILLIGRAM(S): at 13:40

## 2023-06-23 NOTE — ADVANCED PRACTICE NURSE CONSULT - ASSESSMENT
History of Present Illness:   42 year-old female with PMH of Asthma, HTN? presents with complaint of cough x3 days and SOB. During my encounter pt with dyspnea and increased work of breathing and chest pain, unable to properly provide accurate history. She states that her cough has been progressively worsening over the past 3 days, endorses hemoptysis since yesterday. She states she had a friend who came over and stayed with for more than a week who was sick recently, but she does not know if she recently travelled out of [facility] or not. She also admits that her children has been sick with Sore throat and fever for past 3 days. She admits to fever, chills, sever Chest pain which has gotten worse since she came to the ED.     Allergies and Intolerances:        Allergies:  	No Known Allergies:     Patient received lying in bed. Trached. Limited mobility. Incontinent of stool. High risk for pressure injury.    Type of Wound: Deep Tissue Injury with Progression  Location: Sacrococcygeal region  Tunneling /Undermining: No  Wound bed: Pink partial thickness skin loss  Wound edges: Intact  Periwound: Macerated with dry maculopapules to bilateral buttocks  Wound exudate: None  Wound odor: No  Induration, erythema, warmth: No  Wound pain: No    Skin to bilateral heels intact at time of assessment.  History of Present Illness:    Patient is a 42 year-old female with PMH of Asthma, HTN? presents with complaint of cough x3 days and SOB. During my encounter pt with dyspnea and increased work of breathing and chest pain, unable to properly provide accurate history. She states that her cough has been progressively worsening over the past 3 days, endorses hemoptysis since yesterday. She states she had a friend who came over and stayed with for more than a week who was sick recently, but she does not know if she recently travelled out of [facility] or not. She also admits that her children has been sick with Sore throat and fever for past 3 days. She admits to fever, chills, sever Chest pain which has gotten worse since she came to the ED.     Allergies and Intolerances:        Allergies:  	No Known Allergies:     Patient received lying in bed. Trached. Limited mobility. Incontinent of stool. High risk for pressure injury.    Type of Wound: Deep Tissue Injury with Progression  Location: Sacrococcygeal region  Tunneling /Undermining: No  Wound bed: Pink partial thickness skin loss  Wound edges: Intact  Periwound: Macerated with dry maculopapules to bilateral buttocks  Wound exudate: None  Wound odor: No  Induration, erythema, warmth: No  Wound pain: No    Skin to bilateral heels intact at time of assessment.

## 2023-06-23 NOTE — PROGRESS NOTE ADULT - ASSESSMENT
42 year old female with PMHx Asthma, HTN presents with cough and SOB x3 days and hemoptysis. CT chest showing cavitary pneumonia. Cultures +MRSA bacteremia. Intubated; failed SBT. On Zyvox and Tamiflu. Repeat CT showing worsening PNA.    # Acute hypoxemic respiratory failure 2/2 cavitary MRSA PNA  # MRSA bacteremia, repeat cultures NGTD x3  # +Influenza B  # hx Asthma   - CT Angio Chest PE Protocol: Bilateral patchy groundglass nodularity with dominant confluent left lower lobe opacification with numerous cavitary lesions  - intubated , self-extubated , re-intubated   - repeat CT chest: Diffusely increased number and size of bilateral cavitary lesions with the left lower lobe now demonstrating a consolidative process of the entire lobe  - blood cultures +MRSA, s/p course of jimbo, currently on vanco and flagyl  cultures -: NGTD x3  - ECHO: no vegetations. EF 65%, mild TR and pulm HTN  - s/p MASTER : No evidence of valvular vegetations or intracardiac abscesses to support IE. Pulmonary hepatisation incidental finding.   - D-dimer 1665, LE duplex negative for DVT  - pro-mya 48 > 5.8  - flu +, s/p Tamiflu 30mg q12  - repeat ET cultures negative   - repeat CT chest: Small left pleural effusion with overall essentially unchanged exam.  - s/p chest tube placement connected to suction --> tPA  and , removed    - now s/p trach and peg, GJ ok to use, CTSx following    Plan  - c/w Vancomycin (dosing per pharmacy), Flagyl added 500mg q8 for anaerobic coverage; 4-6 weeks course as per ID; adjust as per vanc trough last   - d/eusebia lasix  - Trach to vent(360/16/8/40%), try PS 4-6 hrs; will hold PS  and try to wean sedation  - Wean sedation as tolerated, now on Precedex only, will go up on clonazepam and d/c Precedex     #Elevated QTc  - ~550s now  - on methadone 10mg qd hr, zyprexa decreased to 5mg qd, amiodarone decreased to 100mg qd  - replete lytes  - monitor qtc    # New onset A-fib w/ RVR  - HR controlled now  - s/p Amio drip  - EP consulted  - c/w Amio 200mg BID for 2 weeks --> then Amio 200mg daily (switch date )-->amio  to 100mg qd  given elevated qtc  - Cardizem po 60mg q6hr   - Restarted AC with lovenox  given Hb stable last few days    # Microcytic Anemia- Iron deficiency vs anemia of chronic disease-->stable  - Iron 7%, low tibc, resent iron panel and ferritin   - s/p 6 units prbc so far last   - No ext bleeding/melena  - DIC panel negative   - Dimer 1665, LE Duplex negative for DVT  - Restarted AC with lovenox  given Hb stable last few days  - trend CBC, active type and screen, transfuse <7    DVT ppx: SQH  GI ppx: protonix  Diet: tube feeds per dietary  Dispo: MICU    Pending - wean sedation, wean off vent when able, restarted AC monitor Hb, Follow up iron panel + ferritin

## 2023-06-23 NOTE — PROGRESS NOTE ADULT - SUBJECTIVE AND OBJECTIVE BOX
Patient is a 42y old  Female who presents with a chief complaint of AHRF (2023 12:27)        Over Night Events:  Remains on MV.  More awake.  on Precedex 0.4  off pressors.          ROS:     All ROS are negative except HPI         PHYSICAL EXAM    ICU Vital Signs Last 24 Hrs  T(C): 36.6 (2023 04:00), Max: 37.3 (2023 15:00)  T(F): 97.9 (2023 04:00), Max: 99.2 (2023 15:00)  HR: 85 (2023 07:40) (79 - 149)  BP: 113/69 (2023 07:00) (102/57 - 192/111)  BP(mean): 86 (2023 07:00) (74 - 151)  ABP: --  ABP(mean): --  RR: 24 (2023 07:00) (16 - 65)  SpO2: 98% (2023 07:40) (97% - 100%)    O2 Parameters below as of 2023 07:00  Patient On (Oxygen Delivery Method): ventilator    O2 Concentration (%): 40        CONSTITUTIONAL:  In  NAD    ENT:   Airway patent,   Mouth with normal mucosa.   Trach     EYES:   Pupils equal,   Round and reactive to light.    CARDIAC:   Normal rate,   Regular rhythm.        RESPIRATORY:   No wheezing  Bilateral BS  Normal chest expansion  Not tachypneic,  No use of accessory muscles    GASTROINTESTINAL:  Abdomen soft,   Non-tender,   No guarding,   + BS    MUSCULOSKELETAL:   Range of motion is not limited,  No clubbing, cyanosis    NEUROLOGICAL:   Alert   No motor  deficits.    SKIN:   Skin normal color for race,   No evidence of rash.        23 @ 07:01  -  23 @ 07:00  --------------------------------------------------------  IN:    Dexmedetomidine: 310.4 mL    Enteral Tube Flush: 100 mL    Free Water: 200 mL    IV PiggyBack: 250 mL    Miscellaneous Tube Feedin mL    Peptamen A.F.: 360 mL  Total IN: 2390.4 mL    OUT:    Propofol: 0 mL    Rectal Tube (mL): 130 mL    Voided (mL): 1600 mL  Total OUT: 1730 mL    Total NET: 660.4 mL          LABS:                            7.4    12.62 )-----------( 358      ( 2023 04:43 )             24.3                                               0623             7.4    12.62 )-----------( 358      (  @ 04:43 )             24.3                7.5    14.72 )-----------( 372      (  @ 11:59 )             24.5                8.7    12.28 )-----------( 338      (  @ 04:37 )             28.9           135  |  96<L>  |  13  ----------------------------<  105<H>  3.8   |  29  |  <0.5<L>    Ca    8.5      2023 04:43  Mg     2.0         TPro  6.4  /  Alb  2.3<L>  /  TBili  0.2  /  DBili  x   /  AST  21  /  ALT  22  /  AlkPhos  274<H>                                               Urinalysis Basic - ( 2023 04:43 )    Color: x / Appearance: x / SG: x / pH: x  Gluc: 105 mg/dL / Ketone: x  / Bili: x / Urobili: x   Blood: x / Protein: x / Nitrite: x   Leuk Esterase: x / RBC: x / WBC x   Sq Epi: x / Non Sq Epi: x / Bacteria: x                                                  LIVER FUNCTIONS - ( 2023 04:43 )  Alb: 2.3 g/dL / Pro: 6.4 g/dL / ALK PHOS: 274 U/L / ALT: 22 U/L / AST: 21 U/L / GGT: x                                                                                               Mode: AC/ CMV (Assist Control/ Continuous Mandatory Ventilation)  RR (machine): 16  TV (machine): 360  FiO2: 40  PEEP: 8  ITime: 1  MAP: 11  PIP: 27                                          MEDICATIONS  (STANDING):  aMIOdarone    Tablet 100 milliGRAM(s) Oral daily  chlorhexidine 0.12% Liquid 15 milliLiter(s) Oral Mucosa every 12 hours  chlorhexidine 2% Cloths 1 Application(s) Topical daily  chlorhexidine 2% Cloths 1 Application(s) Topical <User Schedule>  clonazePAM  Tablet 1 milliGRAM(s) Oral three times a day  dexMEDEtomidine Infusion 0.2 MICROgram(s)/kG/Hr (4.25 mL/Hr) IV Continuous <Continuous>  diltiazem    Tablet 30 milliGRAM(s) Oral every 6 hours  enoxaparin Injectable 80 milliGRAM(s) SubCutaneous every 12 hours  insulin lispro (ADMELOG) corrective regimen sliding scale   SubCutaneous every 6 hours  methadone    Tablet 10 milliGRAM(s) Oral every 8 hours  metroNIDAZOLE    Tablet 500 milliGRAM(s) Oral every 8 hours  OLANZapine 5 milliGRAM(s) Oral daily  pantoprazole   Suspension 40 milliGRAM(s) Oral daily  vancomycin  IVPB 1000 milliGRAM(s) IV Intermittent every 12 hours  vitamin A &amp; D Ointment 1 Application(s) Topical daily    MEDICATIONS  (PRN):  acetaminophen     Tablet .. 650 milliGRAM(s) Oral every 6 hours PRN Temp greater or equal to 38C (100.4F)      New X-rays reviewed:                                                                                  ECHO

## 2023-06-23 NOTE — PROGRESS NOTE ADULT - ASSESSMENT
IMPRESSION:    Acute hypoxemic respiratory failure sp trach   Severe cavitary CAP MRSA  Small parapneumonic effusion SP Chest tube  MRSA bacteremia resolved   MASTER negative   Anemia sp transfusion   New onset A FIB rate controlled   LANCE non oliguric improving  Hypernatremia Improving  HO asthma   Influenza B     PLAN:    CNS:  Continue Methadone.  Klonopin 1.5 mg TID.  Wean Preccedex.  Zyprexa 5 .  Monitor QTc.       HEENT: Oral care. Trach care     PULMONARY:  HOB @ 45 degrees.  Aspiration precautions.  No change in vent settings.  Monitor PPL and DP.  Keep SaO2 92 TO 96%.  No PS today.      CARDIOVASCULAR:    Avoid overload,  rate control.  Amiodarone 100 mg daily.  Cardizem 60 mg Q6     GI: GI prophylaxis. PEG Feeding.  Bowel regimen PRN.     RENAL:  Follow up lytes.  Correct as needed.     INFECTIOUS DISEASE: ABX Per ID.  Monitor Vanc levels.  Adjust Vancomycin     HEMATOLOGICAL:  DVT prophylaxis.  trend CBC. LE doppler - 6/13.  LMWH therapeutic for now     ENDOCRINE:  Follow up FS.  Insulin protocol if needed.   today     Prognosis is poor overall .     MICU

## 2023-06-23 NOTE — ADVANCED PRACTICE NURSE CONSULT - RECOMMEDATIONS
Cleanse wound with soap and water.   Pat dry apply triad, gauze and Allevyn twice a day and prn for soiling.   Recommend start nystatin cream to dry maculopapules  Recommend follow up with Dermatology    Maintain pressure injury prevention.   Keep skin clean.   Maintain incontinence care.   Monitor wound for changes and notify provider   Case discussed with primary RN Cleanse wound with soap and water.   Pat dry apply triad, gauze and Allevyn twice a day and prn for soiling.   Recommend start nystatin cream to dry maculopapules  Recommend follow up with Dermatology    Maintain pressure injury prevention.   Keep skin clean.   Maintain incontinence care.   Monitor wound for changes and notify provider   Case discussed with primary RN  Patient seen during rounds with wound care RN- agreeable with above recs.

## 2023-06-24 LAB
ALBUMIN SERPL ELPH-MCNC: 2.5 G/DL — LOW (ref 3.5–5.2)
ALP SERPL-CCNC: 253 U/L — HIGH (ref 30–115)
ALT FLD-CCNC: 23 U/L — SIGNIFICANT CHANGE UP (ref 0–41)
ANION GAP SERPL CALC-SCNC: 13 MMOL/L — SIGNIFICANT CHANGE UP (ref 7–14)
AST SERPL-CCNC: 32 U/L — SIGNIFICANT CHANGE UP (ref 0–41)
BASOPHILS # BLD AUTO: 0.06 K/UL — SIGNIFICANT CHANGE UP (ref 0–0.2)
BASOPHILS NFR BLD AUTO: 0.5 % — SIGNIFICANT CHANGE UP (ref 0–1)
BILIRUB SERPL-MCNC: 0.2 MG/DL — SIGNIFICANT CHANGE UP (ref 0.2–1.2)
BLD GP AB SCN SERPL QL: SIGNIFICANT CHANGE UP
BUN SERPL-MCNC: 11 MG/DL — SIGNIFICANT CHANGE UP (ref 10–20)
CALCIUM SERPL-MCNC: 8.6 MG/DL — SIGNIFICANT CHANGE UP (ref 8.4–10.5)
CHLORIDE SERPL-SCNC: 96 MMOL/L — LOW (ref 98–110)
CO2 SERPL-SCNC: 27 MMOL/L — SIGNIFICANT CHANGE UP (ref 17–32)
CREAT SERPL-MCNC: <0.5 MG/DL — LOW (ref 0.7–1.5)
EGFR: 127 ML/MIN/1.73M2 — SIGNIFICANT CHANGE UP
EOSINOPHIL # BLD AUTO: 0.12 K/UL — SIGNIFICANT CHANGE UP (ref 0–0.7)
EOSINOPHIL NFR BLD AUTO: 1 % — SIGNIFICANT CHANGE UP (ref 0–8)
FERRITIN SERPL-MCNC: 469 NG/ML — HIGH (ref 15–150)
GLUCOSE BLDC GLUCOMTR-MCNC: 115 MG/DL — HIGH (ref 70–99)
GLUCOSE BLDC GLUCOMTR-MCNC: 123 MG/DL — HIGH (ref 70–99)
GLUCOSE SERPL-MCNC: 124 MG/DL — HIGH (ref 70–99)
HCT VFR BLD CALC: 26.9 % — LOW (ref 37–47)
HGB BLD-MCNC: 8.3 G/DL — LOW (ref 12–16)
IMM GRANULOCYTES NFR BLD AUTO: 0.8 % — HIGH (ref 0.1–0.3)
LYMPHOCYTES # BLD AUTO: 1.18 K/UL — LOW (ref 1.2–3.4)
LYMPHOCYTES # BLD AUTO: 10.1 % — LOW (ref 20.5–51.1)
MAGNESIUM SERPL-MCNC: 1.6 MG/DL — LOW (ref 1.8–2.4)
MCHC RBC-ENTMCNC: 23.6 PG — LOW (ref 27–31)
MCHC RBC-ENTMCNC: 30.9 G/DL — LOW (ref 32–37)
MCV RBC AUTO: 76.4 FL — LOW (ref 81–99)
MONOCYTES # BLD AUTO: 1.28 K/UL — HIGH (ref 0.1–0.6)
MONOCYTES NFR BLD AUTO: 11 % — HIGH (ref 1.7–9.3)
NEUTROPHILS # BLD AUTO: 8.9 K/UL — HIGH (ref 1.4–6.5)
NEUTROPHILS NFR BLD AUTO: 76.6 % — HIGH (ref 42.2–75.2)
NRBC # BLD: 0 /100 WBCS — SIGNIFICANT CHANGE UP (ref 0–0)
PLATELET # BLD AUTO: 346 K/UL — SIGNIFICANT CHANGE UP (ref 130–400)
PMV BLD: 8.9 FL — SIGNIFICANT CHANGE UP (ref 7.4–10.4)
POTASSIUM SERPL-MCNC: 3.8 MMOL/L — SIGNIFICANT CHANGE UP (ref 3.5–5)
POTASSIUM SERPL-SCNC: 3.8 MMOL/L — SIGNIFICANT CHANGE UP (ref 3.5–5)
PROT SERPL-MCNC: 6.7 G/DL — SIGNIFICANT CHANGE UP (ref 6–8)
RBC # BLD: 3.52 M/UL — LOW (ref 4.2–5.4)
RBC # FLD: SIGNIFICANT CHANGE UP % (ref 11.5–14.5)
SODIUM SERPL-SCNC: 136 MMOL/L — SIGNIFICANT CHANGE UP (ref 135–146)
VANCOMYCIN TROUGH SERPL-MCNC: 16.6 UG/ML — HIGH (ref 5–10)
VANCOMYCIN TROUGH SERPL-MCNC: 38.5 UG/ML — HIGH (ref 5–10)
WBC # BLD: 11.63 K/UL — HIGH (ref 4.8–10.8)
WBC # FLD AUTO: 11.63 K/UL — HIGH (ref 4.8–10.8)

## 2023-06-24 PROCEDURE — 71045 X-RAY EXAM CHEST 1 VIEW: CPT | Mod: 26

## 2023-06-24 PROCEDURE — 99291 CRITICAL CARE FIRST HOUR: CPT

## 2023-06-24 RX ORDER — MAGNESIUM SULFATE 500 MG/ML
2 VIAL (ML) INJECTION ONCE
Refills: 0 | Status: COMPLETED | OUTPATIENT
Start: 2023-06-24 | End: 2023-06-24

## 2023-06-24 RX ORDER — CHLORHEXIDINE GLUCONATE 213 G/1000ML
15 SOLUTION TOPICAL EVERY 12 HOURS
Refills: 0 | Status: DISCONTINUED | OUTPATIENT
Start: 2023-06-24 | End: 2023-06-28

## 2023-06-24 RX ORDER — LISINOPRIL 2.5 MG/1
5 TABLET ORAL DAILY
Refills: 0 | Status: DISCONTINUED | OUTPATIENT
Start: 2023-06-24 | End: 2023-06-27

## 2023-06-24 RX ADMIN — AMIODARONE HYDROCHLORIDE 100 MILLIGRAM(S): 400 TABLET ORAL at 05:17

## 2023-06-24 RX ADMIN — METHADONE HYDROCHLORIDE 10 MILLIGRAM(S): 40 TABLET ORAL at 21:22

## 2023-06-24 RX ADMIN — ENOXAPARIN SODIUM 80 MILLIGRAM(S): 100 INJECTION SUBCUTANEOUS at 21:22

## 2023-06-24 RX ADMIN — CHLORHEXIDINE GLUCONATE 15 MILLILITER(S): 213 SOLUTION TOPICAL at 17:41

## 2023-06-24 RX ADMIN — Medication 60 MILLIGRAM(S): at 00:15

## 2023-06-24 RX ADMIN — Medication 60 MILLIGRAM(S): at 05:17

## 2023-06-24 RX ADMIN — ENOXAPARIN SODIUM 80 MILLIGRAM(S): 100 INJECTION SUBCUTANEOUS at 11:12

## 2023-06-24 RX ADMIN — Medication 25 GRAM(S): at 11:14

## 2023-06-24 RX ADMIN — Medication 500 MILLIGRAM(S): at 21:21

## 2023-06-24 RX ADMIN — Medication 2 MILLIGRAM(S): at 21:21

## 2023-06-24 RX ADMIN — Medication 2 MILLIGRAM(S): at 05:18

## 2023-06-24 RX ADMIN — Medication 60 MILLIGRAM(S): at 23:52

## 2023-06-24 RX ADMIN — METHADONE HYDROCHLORIDE 10 MILLIGRAM(S): 40 TABLET ORAL at 05:18

## 2023-06-24 RX ADMIN — Medication 60 MILLIGRAM(S): at 11:17

## 2023-06-24 RX ADMIN — NYSTATIN CREAM 1 APPLICATION(S): 100000 CREAM TOPICAL at 05:17

## 2023-06-24 RX ADMIN — Medication 650 MILLIGRAM(S): at 01:37

## 2023-06-24 RX ADMIN — CHLORHEXIDINE GLUCONATE 1 APPLICATION(S): 213 SOLUTION TOPICAL at 05:44

## 2023-06-24 RX ADMIN — Medication 250 MILLIGRAM(S): at 05:16

## 2023-06-24 RX ADMIN — Medication 2 MILLIGRAM(S): at 13:33

## 2023-06-24 RX ADMIN — Medication 650 MILLIGRAM(S): at 01:27

## 2023-06-24 RX ADMIN — PANTOPRAZOLE SODIUM 40 MILLIGRAM(S): 20 TABLET, DELAYED RELEASE ORAL at 11:17

## 2023-06-24 RX ADMIN — NYSTATIN CREAM 1 APPLICATION(S): 100000 CREAM TOPICAL at 17:36

## 2023-06-24 RX ADMIN — Medication 250 MILLIGRAM(S): at 17:33

## 2023-06-24 RX ADMIN — Medication 1 APPLICATION(S): at 11:17

## 2023-06-24 RX ADMIN — Medication 60 MILLIGRAM(S): at 17:35

## 2023-06-24 RX ADMIN — METHADONE HYDROCHLORIDE 10 MILLIGRAM(S): 40 TABLET ORAL at 13:33

## 2023-06-24 RX ADMIN — Medication 500 MILLIGRAM(S): at 05:17

## 2023-06-24 RX ADMIN — Medication 500 MILLIGRAM(S): at 13:33

## 2023-06-24 RX ADMIN — LISINOPRIL 5 MILLIGRAM(S): 2.5 TABLET ORAL at 11:15

## 2023-06-24 RX ADMIN — OLANZAPINE 5 MILLIGRAM(S): 15 TABLET, FILM COATED ORAL at 11:17

## 2023-06-24 NOTE — PHARMACOTHERAPY INTERVENTION NOTE - COMMENTS
Vancomycin level of 38.5 was taken after morning dose was given. Therefore, it is not a true trough. Recommended to order repeat level for 11am. Will follow and make further recommendations.

## 2023-06-24 NOTE — PROGRESS NOTE ADULT - SUBJECTIVE AND OBJECTIVE BOX
Patient is a 42y old  Female who presents with a chief complaint of AHRF (2023 09:43)        Over Night Events : no pressors         ROS:     All ROS are negative except HPI         PHYSICAL EXAM    ICU Vital Signs Last 24 Hrs  T(C): 36.8 (2023 04:00), Max: 37.4 (2023 20:00)  T(F): 98.2 (2023 04:00), Max: 99.3 (2023 20:00)  HR: 100 (2023 07:40) (89 - 125)  BP: 171/98 (2023 07:00) (136/68 - 179/80)  BP(mean): 129 (2023 07:00) (93 - 129)  RR: 23 (2023 07:00) (15 - 43)  SpO2: 100% (2023 07:40) (97% - 100%)    O2 Parameters below as of 2023 04:00  Patient On (Oxygen Delivery Method): ventilator            CONSTITUTIONAL:  ill appearing    ENT:   Airway patent,   Mouth with normal mucosa.   trach    EYES:   Pupils equal,   Round and reactive to light.    CARDIAC:   Normal rate,   Regular rhythm.    No edema     RESPIRATORY:   No wheezing  Bilateral BS  Normal chest expansion  Not tachypneic,  No use of accessory muscles    GASTROINTESTINAL:  Abdomen soft,   Non-tender,   No guarding,   + BS     NEUROLOGICAL:   Does not follow command     SKIN:   stage 2   dermatitis          23 @ 07:01  -  23 @ 07:00  --------------------------------------------------------  IN:    Dexmedetomidine: 44.6 mL    Enteral Tube Flush: 160 mL    Free Water: 200 mL    IV PiggyBack: 250 mL    Miscellaneous Tube Feedin mL  Total IN: 2454.6 mL    OUT:    Rectal Tube (mL): 400 mL    Voided (mL): 1550 mL  Total OUT: 1950 mL    Total NET: 504.6 mL          LABS:                            8.3    11.63 )-----------( 346      ( 2023 06:19 )             26.9                                                   136  |  96<L>  |  11  ----------------------------<  124<H>  3.8   |  27  |  <0.5<L>    Ca    8.6      2023 06:19  Mg     1.6     -    TPro  6.7  /  Alb  2.5<L>  /  TBili  0.2  /  DBili  x   /  AST  32  /  ALT  23  /  AlkPhos  253<H>  -24                                             Urinalysis Basic - ( 2023 06:19 )    Color: x / Appearance: x / SG: x / pH: x  Gluc: 124 mg/dL / Ketone: x  / Bili: x / Urobili: x   Blood: x / Protein: x / Nitrite: x   Leuk Esterase: x / RBC: x / WBC x   Sq Epi: x / Non Sq Epi: x / Bacteria: x                                                  LIVER FUNCTIONS - ( 2023 06:19 )  Alb: 2.5 g/dL / Pro: 6.7 g/dL / ALK PHOS: 253 U/L / ALT: 23 U/L / AST: 32 U/L / GGT: x                                                                                               Mode: AC/ CMV (Assist Control/ Continuous Mandatory Ventilation)  RR (machine): 16  TV (machine): 360  FiO2: 40  PEEP: 8  ITime: 1  MAP: 13  PIP: 32                                          MEDICATIONS  (STANDING):  aMIOdarone    Tablet 100 milliGRAM(s) Oral daily  chlorhexidine 2% Cloths 1 Application(s) Topical <User Schedule>  clonazePAM  Tablet 2 milliGRAM(s) Oral three times a day  dexMEDEtomidine Infusion 0.2 MICROgram(s)/kG/Hr (4.25 mL/Hr) IV Continuous <Continuous>  diltiazem    Tablet 60 milliGRAM(s) Oral every 6 hours  enoxaparin Injectable 80 milliGRAM(s) SubCutaneous every 12 hours  insulin lispro (ADMELOG) corrective regimen sliding scale   SubCutaneous every 6 hours  methadone    Tablet 10 milliGRAM(s) Oral every 8 hours  metroNIDAZOLE    Tablet 500 milliGRAM(s) Oral every 8 hours  nystatin Cream 1 Application(s) Topical two times a day  OLANZapine 5 milliGRAM(s) Oral daily  pantoprazole   Suspension 40 milliGRAM(s) Oral daily  vancomycin  IVPB 1000 milliGRAM(s) IV Intermittent every 12 hours  vitamin A &amp; D Ointment 1 Application(s) Topical daily    MEDICATIONS  (PRN):  acetaminophen     Tablet .. 650 milliGRAM(s) Oral every 6 hours PRN Temp greater or equal to 38C (100.4F)         CXR interpreted by me:

## 2023-06-24 NOTE — PHARMACOTHERAPY INTERVENTION NOTE - COMMENTS
Pt is on vancomycin 1000mg IV q12h. Using today's level of 16.6 and using [employer] vancomycin AUC:RUSSELL software, recommended to continue current dosing of vancomycin 1000mg IV q12h as it results in a therapeutic AUC:RUSSELL of 595 (within goal of 400 to 600). Recommended to obtain level on 6/26 with AM labs to assist with further dosing.

## 2023-06-24 NOTE — PROGRESS NOTE ADULT - ASSESSMENT
IMPRESSION:    Acute hypoxemic respiratory failure sp trach   Severe cavitary CAP MRSA  Small parapneumonic effusion SP Chest tube  MRSA bacteremia resolved   MASTER negative   Anemia sp transfusion   New onset A FIB rate controlled   LANCE non oliguric improving  Hypernatremia Improving  HO asthma   Influenza B     PLAN:    CNS:  Continue Methadone.  Klonopin 1.5 mg TID.     Zyprexa 5 .  Monitor QTc.       HEENT: Oral care. Trach care     PULMONARY:  HOB @ 45 degrees.  Aspiration precautions.  No change in vent settings.  Monitor PPL and DP.  Keep SaO2 92 TO 96%.  PS trial     CARDIOVASCULAR:    Avoid overload,  rate control.  Amiodarone 100 mg daily.  Cardizem 60 mg Q6     GI: GI prophylaxis. PEG Feeding.  Bowel regimen PRN.     RENAL:  Follow up lytes.  Correct as needed.     INFECTIOUS DISEASE: ABX Per ID.  Monitor Vanc levels.  Adjust Vancomycin     HEMATOLOGICAL:  DVT prophylaxis.  trend CBC. LE doppler - 6/13.  LMWH therapeutic for now     ENDOCRINE:  Follow up FS.  Insulin protocol if needed.   today     Prognosis is poor overall .     MICU    IMPRESSION:    Acute hypoxemic respiratory failure sp trach   Severe cavitary CAP MRSA  Small parapneumonic effusion SP Chest tube  MRSA bacteremia resolved   MASTER negative   Anemia sp transfusion   New onset A FIB rate controlled   LANCE non oliguric improving  Hypernatremia Improving  HO asthma   Influenza B     PLAN:    CNS:  Continue Methadone.  Klonopin 1.5 mg TID.     Zyprexa 5 .  Monitor QTc.       HEENT: Oral care. Trach care     PULMONARY:  HOB @ 45 degrees.  Aspiration precautions.    No change in vent settings.    Monitor PPL and DP.    Keep SaO2 92 TO 96%.  PS trial     CARDIOVASCULAR:    Avoid overload,  rate control.  Amiodarone 100 mg daily.  Cardizem 60 mg Q6     GI: GI prophylaxis. PEG Feeding.  Bowel regimen PRN.     RENAL:  Follow up lytes.  Correct as needed.     INFECTIOUS DISEASE: ABX Per ID.  Monitor Vanc levels.    Adjust Vancomycin     HEMATOLOGICAL:  DVT prophylaxis.    trend CBC. LE doppler - 6/13.    LMWH therapeutic     ENDOCRINE:  Follow up FS.  Insulin protocol if needed.   today     Prognosis is poor overall .     MICU

## 2023-06-25 LAB
ALBUMIN SERPL ELPH-MCNC: 2.7 G/DL — LOW (ref 3.5–5.2)
ALP SERPL-CCNC: 203 U/L — HIGH (ref 30–115)
ALT FLD-CCNC: 20 U/L — SIGNIFICANT CHANGE UP (ref 0–41)
ANION GAP SERPL CALC-SCNC: 7 MMOL/L — SIGNIFICANT CHANGE UP (ref 7–14)
AST SERPL-CCNC: 21 U/L — SIGNIFICANT CHANGE UP (ref 0–41)
BASOPHILS # BLD AUTO: 0.05 K/UL — SIGNIFICANT CHANGE UP (ref 0–0.2)
BASOPHILS NFR BLD AUTO: 0.4 % — SIGNIFICANT CHANGE UP (ref 0–1)
BILIRUB SERPL-MCNC: 0.2 MG/DL — SIGNIFICANT CHANGE UP (ref 0.2–1.2)
BUN SERPL-MCNC: 12 MG/DL — SIGNIFICANT CHANGE UP (ref 10–20)
CALCIUM SERPL-MCNC: 8.9 MG/DL — SIGNIFICANT CHANGE UP (ref 8.4–10.4)
CHLORIDE SERPL-SCNC: 97 MMOL/L — LOW (ref 98–110)
CO2 SERPL-SCNC: 33 MMOL/L — HIGH (ref 17–32)
CREAT SERPL-MCNC: <0.5 MG/DL — LOW (ref 0.7–1.5)
EGFR: 127 ML/MIN/1.73M2 — SIGNIFICANT CHANGE UP
EOSINOPHIL # BLD AUTO: 0.15 K/UL — SIGNIFICANT CHANGE UP (ref 0–0.7)
EOSINOPHIL NFR BLD AUTO: 1.2 % — SIGNIFICANT CHANGE UP (ref 0–8)
GLUCOSE BLDC GLUCOMTR-MCNC: 104 MG/DL — HIGH (ref 70–99)
GLUCOSE BLDC GLUCOMTR-MCNC: 119 MG/DL — HIGH (ref 70–99)
GLUCOSE SERPL-MCNC: 94 MG/DL — SIGNIFICANT CHANGE UP (ref 70–99)
HCT VFR BLD CALC: 25.7 % — LOW (ref 37–47)
HGB BLD-MCNC: 8 G/DL — LOW (ref 12–16)
IMM GRANULOCYTES NFR BLD AUTO: 1.1 % — HIGH (ref 0.1–0.3)
LYMPHOCYTES # BLD AUTO: 1.5 K/UL — SIGNIFICANT CHANGE UP (ref 1.2–3.4)
LYMPHOCYTES # BLD AUTO: 11.5 % — LOW (ref 20.5–51.1)
MAGNESIUM SERPL-MCNC: 1.5 MG/DL — LOW (ref 1.8–2.4)
MCHC RBC-ENTMCNC: 23.7 PG — LOW (ref 27–31)
MCHC RBC-ENTMCNC: 31.1 G/DL — LOW (ref 32–37)
MCV RBC AUTO: 76 FL — LOW (ref 81–99)
MONOCYTES # BLD AUTO: 1.61 K/UL — HIGH (ref 0.1–0.6)
MONOCYTES NFR BLD AUTO: 12.4 % — HIGH (ref 1.7–9.3)
NEUTROPHILS # BLD AUTO: 9.54 K/UL — HIGH (ref 1.4–6.5)
NEUTROPHILS NFR BLD AUTO: 73.4 % — SIGNIFICANT CHANGE UP (ref 42.2–75.2)
NRBC # BLD: 0 /100 WBCS — SIGNIFICANT CHANGE UP (ref 0–0)
PLATELET # BLD AUTO: 349 K/UL — SIGNIFICANT CHANGE UP (ref 130–400)
PMV BLD: 9 FL — SIGNIFICANT CHANGE UP (ref 7.4–10.4)
POTASSIUM SERPL-MCNC: 4.2 MMOL/L — SIGNIFICANT CHANGE UP (ref 3.5–5)
POTASSIUM SERPL-SCNC: 4.2 MMOL/L — SIGNIFICANT CHANGE UP (ref 3.5–5)
PROT SERPL-MCNC: 6.6 G/DL — SIGNIFICANT CHANGE UP (ref 6–8)
RBC # BLD: 3.38 M/UL — LOW (ref 4.2–5.4)
RBC # FLD: SIGNIFICANT CHANGE UP % (ref 11.5–14.5)
SODIUM SERPL-SCNC: 137 MMOL/L — SIGNIFICANT CHANGE UP (ref 135–146)
WBC # BLD: 12.99 K/UL — HIGH (ref 4.8–10.8)
WBC # FLD AUTO: 12.99 K/UL — HIGH (ref 4.8–10.8)

## 2023-06-25 PROCEDURE — 71045 X-RAY EXAM CHEST 1 VIEW: CPT | Mod: 26

## 2023-06-25 PROCEDURE — 99291 CRITICAL CARE FIRST HOUR: CPT

## 2023-06-25 RX ORDER — MAGNESIUM SULFATE 500 MG/ML
2 VIAL (ML) INJECTION
Refills: 0 | Status: COMPLETED | OUTPATIENT
Start: 2023-06-25 | End: 2023-06-25

## 2023-06-25 RX ADMIN — Medication 2 MILLIGRAM(S): at 21:29

## 2023-06-25 RX ADMIN — CHLORHEXIDINE GLUCONATE 1 APPLICATION(S): 213 SOLUTION TOPICAL at 05:43

## 2023-06-25 RX ADMIN — NYSTATIN CREAM 1 APPLICATION(S): 100000 CREAM TOPICAL at 18:28

## 2023-06-25 RX ADMIN — Medication 60 MILLIGRAM(S): at 23:27

## 2023-06-25 RX ADMIN — Medication 250 MILLIGRAM(S): at 18:30

## 2023-06-25 RX ADMIN — ENOXAPARIN SODIUM 80 MILLIGRAM(S): 100 INJECTION SUBCUTANEOUS at 21:29

## 2023-06-25 RX ADMIN — CHLORHEXIDINE GLUCONATE 15 MILLILITER(S): 213 SOLUTION TOPICAL at 18:27

## 2023-06-25 RX ADMIN — Medication 60 MILLIGRAM(S): at 18:27

## 2023-06-25 RX ADMIN — LISINOPRIL 5 MILLIGRAM(S): 2.5 TABLET ORAL at 05:25

## 2023-06-25 RX ADMIN — Medication 2 MILLIGRAM(S): at 13:52

## 2023-06-25 RX ADMIN — METHADONE HYDROCHLORIDE 10 MILLIGRAM(S): 40 TABLET ORAL at 13:52

## 2023-06-25 RX ADMIN — Medication 1 APPLICATION(S): at 11:42

## 2023-06-25 RX ADMIN — Medication 500 MILLIGRAM(S): at 05:25

## 2023-06-25 RX ADMIN — Medication 500 MILLIGRAM(S): at 21:29

## 2023-06-25 RX ADMIN — Medication 25 GRAM(S): at 18:27

## 2023-06-25 RX ADMIN — PANTOPRAZOLE SODIUM 40 MILLIGRAM(S): 20 TABLET, DELAYED RELEASE ORAL at 11:41

## 2023-06-25 RX ADMIN — Medication 25 GRAM(S): at 13:51

## 2023-06-25 RX ADMIN — Medication 60 MILLIGRAM(S): at 05:25

## 2023-06-25 RX ADMIN — NYSTATIN CREAM 1 APPLICATION(S): 100000 CREAM TOPICAL at 05:44

## 2023-06-25 RX ADMIN — AMIODARONE HYDROCHLORIDE 100 MILLIGRAM(S): 400 TABLET ORAL at 05:30

## 2023-06-25 RX ADMIN — OLANZAPINE 5 MILLIGRAM(S): 15 TABLET, FILM COATED ORAL at 11:41

## 2023-06-25 RX ADMIN — METHADONE HYDROCHLORIDE 10 MILLIGRAM(S): 40 TABLET ORAL at 05:25

## 2023-06-25 RX ADMIN — Medication 60 MILLIGRAM(S): at 11:42

## 2023-06-25 RX ADMIN — ENOXAPARIN SODIUM 80 MILLIGRAM(S): 100 INJECTION SUBCUTANEOUS at 09:44

## 2023-06-25 RX ADMIN — Medication 2 MILLIGRAM(S): at 05:40

## 2023-06-25 RX ADMIN — Medication 25 GRAM(S): at 09:44

## 2023-06-25 RX ADMIN — METHADONE HYDROCHLORIDE 10 MILLIGRAM(S): 40 TABLET ORAL at 21:29

## 2023-06-25 RX ADMIN — CHLORHEXIDINE GLUCONATE 15 MILLILITER(S): 213 SOLUTION TOPICAL at 05:43

## 2023-06-25 RX ADMIN — Medication 250 MILLIGRAM(S): at 05:43

## 2023-06-25 RX ADMIN — Medication 500 MILLIGRAM(S): at 13:51

## 2023-06-25 NOTE — PROGRESS NOTE ADULT - ASSESSMENT
IMPRESSION:    Acute hypoxemic respiratory failure sp trach   Severe cavitary CAP MRSA  Small parapneumonic effusion SP Chest tube  MRSA bacteremia resolved   MASTER negative   Anemia sp transfusion   New onset A FIB rate controlled   LANCE non oliguric improving  Hypernatremia Improving  HO asthma   Influenza B     PLAN:    CNS:  Continue Methadone.  Klonopin 1.5 mg TID.  Zyprexa 5 .  Monitor QTc.       HEENT: Oral care. Trach care    PULMONARY:  HOB @ 45 degrees.  Aspiration precautions.    No change in vent settings.    Monitor PPL and DP.    Keep SaO2 92 TO 96%.  PS trial     CARDIOVASCULAR:    Avoid overload,  rate control.  Amiodarone 100 mg daily.  Cardizem 60 mg Q6     GI: GI prophylaxis. PEG Feeding.  Bowel regimen PRN.     RENAL:  Follow up lytes.  Correct as needed.     INFECTIOUS DISEASE: ABX Per ID.  Monitor Vanc levels.    Adjust Vancomycin     HEMATOLOGICAL:  DVT prophylaxis.    trend CBC. LE doppler - 6/13.   LMWH therapeutic     ENDOCRINE:  Follow up FS.  Insulin protocol if needed.   today     Prognosis is poor overall .     MICU

## 2023-06-25 NOTE — PROGRESS NOTE ADULT - ASSESSMENT
42 year old female with PMHx Asthma, HTN presents with cough and SOB x3 days and hemoptysis. CT chest showing cavitary pneumonia. Cultures +MRSA bacteremia. Intubated; failed SBT. On Zyvox and Tamiflu. Repeat CT showing worsening PNA.    # Acute hypoxemic respiratory failure 2/2 cavitary MRSA PNA  # MRSA bacteremia, repeat cultures NGTD x3  # +Influenza B  # hx Asthma   - CT Angio Chest PE Protocol: Bilateral patchy groundglass nodularity with dominant confluent left lower lobe opacification with numerous cavitary lesions  - intubated , self-extubated , re-intubated   - repeat CT chest: Diffusely increased number and size of bilateral cavitary lesions with the left lower lobe now demonstrating a consolidative process of the entire lobe  - blood cultures +MRSA, s/p course of jimbo, currently on vanco and flagyl  cultures -: NGTD x3  - ECHO: no vegetations. EF 65%, mild TR and pulm HTN  - s/p MASTER : No evidence of valvular vegetations or intracardiac abscesses to support IE. Pulmonary hepatisation incidental finding.   - D-dimer 1665, LE duplex negative for DVT  - pro-mya 48 > 5.8  - flu +, s/p Tamiflu 30mg q12  - repeat ET cultures negative   - repeat CT chest: Small left pleural effusion with overall essentially unchanged exam.  - s/p chest tube placement connected to suction --> tPA  and , removed    - now s/p trach and peg, GJ ok to use, CTSx following  - weaned off sedation since     Plan  - c/w Vancomycin (dosing per pharmacy), Flagyl added 500mg q8 for anaerobic coverage; 4-6 weeks course as per ID  - monitor trough levels   - Trach to vent(360/16/8/40%), try PS 4-6 hrs, wean Vent as tolerated    #Elevated QTc  - ~550s now  - on methadone 10mg qd hr, zyprexa decreased to 5mg qd, amiodarone decreased to 100mg qd  - replete lytes  - monitor qtc    # New onset A-fib w/ RVR  - HR controlled now  - s/p Amio drip  - EP consulted  - c/w Amio 200mg BID for 2 weeks --> then Amio 200mg daily (switch date )-->amio  to 100mg qd  given elevated qtc  - Cardizem po 60mg q6hr   - Restarted AC with lovenox  given Hb stable last few days    # Microcytic Anemia- Iron deficiency vs anemia of chronic disease-->stable  - Iron panel and ferritin  suggest ACD  - s/p 6 units prbc so far last   - No ext bleeding/melena  - DIC panel negative   - Dimer 1665, LE Duplex negative for DVT  - Restarted AC with lovenox  given Hb stable last few days  - trend CBC, active type and screen, transfuse <7    DVT ppx: SQH  GI ppx: protonix  Diet: tube feeds per dietary  Dispo: MICU    Pending - Wean off vent, improvement in mental status

## 2023-06-25 NOTE — PROGRESS NOTE ADULT - SUBJECTIVE AND OBJECTIVE BOX
Patient is a 42y old  Female who presents with a chief complaint of AHRF (2023 07:05)        Over Night Events:  Off pressors. On MV. S/p PS     ROS:     All ROS are negative except HPI       PHYSICAL EXAM    ICU Vital Signs Last 24 Hrs  T(C): 36.6 (2023 04:00), Max: 37.4 (2023 16:00)  T(F): 97.9 (2023 04:00), Max: 99.3 (2023 16:00)  HR: 99 (2023 07:40) (99 - 124)  BP: 141/85 (2023 07:00) (121/75 - 170/92)  BP(mean): 108 (2023 07:00) (93 - 122)  RR: 19 (2023 07:00) (13 - 25)  SpO2: 99% (2023 07:40) (96% - 100%)    O2 Parameters below as of 2023 04:00  Patient On (Oxygen Delivery Method): ventilator    O2 Concentration (%): 40      ENT: Airway patent, trached  EYES: Pupils equal, Round and reactive to light.  CARDIAC: Normal rate, Regular rhythm.    RESPIRATORY: No wheezing, Bilateral crackles, Not tachypneic, No use of accessory muscles  GASTROINTESTINAL: Abdomen soft, Non-tender, No guarding,   NEUROLOGICAL: Alert, follows simple commands  SKIN: Skin normal color for race, Warm and dry and intact.       23 @ 07:01  -  23 @ 07:00  --------------------------------------------------------  IN:    Enteral Tube Flush: 150 mL    Free Water: 100 mL    IV PiggyBack: 250 mL    Miscellaneous Tube Feedin mL  Total IN: 2300 mL    OUT:    Rectal Tube (mL): 100 mL    Voided (mL): 1550 mL  Total OUT: 1650 mL    Total NET: 650 mL        LABS:                            8.0    12.99 )-----------( 349      ( 2023 04:57 )             25.7                                               06-25    137  |  97<L>  |  12  ----------------------------<  94  4.2   |  33<H>  |  <0.5<L>    Ca    8.9      2023 04:57  Mg     1.5         TPro  6.6  /  Alb  2.7<L>  /  TBili  0.2  /  DBili  x   /  AST  21  /  ALT  20  /  AlkPhos  203<H>                                               Urinalysis Basic - ( 2023 04:57 )    Color: x / Appearance: x / SG: x / pH: x  Gluc: 94 mg/dL / Ketone: x  / Bili: x / Urobili: x   Blood: x / Protein: x / Nitrite: x   Leuk Esterase: x / RBC: x / WBC x   Sq Epi: x / Non Sq Epi: x / Bacteria: x                                                  LIVER FUNCTIONS - ( 2023 04:57 )  Alb: 2.7 g/dL / Pro: 6.6 g/dL / ALK PHOS: 203 U/L / ALT: 20 U/L / AST: 21 U/L / GGT: x                                                                                             Mode: AC/ CMV (Assist Control/ Continuous Mandatory Ventilation)  RR (machine): 16  TV (machine): 360  FiO2: 40  PEEP: 8  ITime: 1  MAP: 14  PIP: 28                                          MEDICATIONS  (STANDING):  aMIOdarone    Tablet 100 milliGRAM(s) Oral daily  chlorhexidine 0.12% Liquid 15 milliLiter(s) Oral Mucosa every 12 hours  chlorhexidine 2% Cloths 1 Application(s) Topical <User Schedule>  clonazePAM  Tablet 2 milliGRAM(s) Oral three times a day  dexMEDEtomidine Infusion 0.2 MICROgram(s)/kG/Hr (4.25 mL/Hr) IV Continuous <Continuous>  diltiazem    Tablet 60 milliGRAM(s) Oral every 6 hours  enoxaparin Injectable 80 milliGRAM(s) SubCutaneous every 12 hours  insulin lispro (ADMELOG) corrective regimen sliding scale   SubCutaneous every 6 hours  lisinopril 5 milliGRAM(s) Oral daily  magnesium sulfate  IVPB 2 Gram(s) IV Intermittent every 2 hours  methadone    Tablet 10 milliGRAM(s) Oral every 8 hours  metroNIDAZOLE    Tablet 500 milliGRAM(s) Oral every 8 hours  nystatin Cream 1 Application(s) Topical two times a day  OLANZapine 5 milliGRAM(s) Oral daily  pantoprazole   Suspension 40 milliGRAM(s) Oral daily  vancomycin  IVPB 1000 milliGRAM(s) IV Intermittent every 12 hours  vitamin A &amp; D Ointment 1 Application(s) Topical daily    MEDICATIONS  (PRN):  acetaminophen     Tablet .. 650 milliGRAM(s) Oral every 6 hours PRN Temp greater or equal to 38C (100.4F)

## 2023-06-25 NOTE — PROGRESS NOTE ADULT - SUBJECTIVE AND OBJECTIVE BOX
ZEYNEP ROSSI 42y Female  MRN#: [id number]   CODE STATUS:________    Hospital Day: 33d    Pt is currently admitted with the primary diagnosis of Acute hypoxemic respiratory failure 2/2 cavitary MRSA PNA    SUBJECTIVE  Hospital Course  42 year old female with PMHx Asthma, HTN presents with cough and SOB x3 days and hemoptysis. CT chest showing cavitary pneumonia. Cultures +MRSA bacteremia. Intubated; failed SBT. On Zyvox and Tamiflu. Repeat CT showed worsening PNA. Acute hypoxemic respiratory failure 2/2 cavitary MRSA PNA    6/20 - plan to wean off sedation, patient on propofol/precedex/clonipen/methadone/zyvox    6/21 - propofol at 10mcg/min and Precedex 1mcg/min will wean, will restart AC in am if Hb stable  6/22 - agitated when weaned off sedation, back on precedex, will increase clonazepam and wean off precedex. Restarted AC with lovenox for now as Hb stable, started cardizem 30mg q6hr po  6/25 - Awake, follows simple commands, wean vent as tolerated    Overnight events   None significant    Subjective complaints   awake, follows simple commands, does not appear in pain    Present Today:   - Weaver:  No [  ], Yes [   ] : Indication:     - Type of IV Access:       .. CVC/Piccline:  No [  ], Yes [   ] : Indication:       .. Midline: No [  ], Yes [   ] : Indication:                                             ----------------------------------------------------------  OBJECTIVE  PAST MEDICAL & SURGICAL HISTORY                                            -----------------------------------------------------------  ALLERGIES:  aspirin (Short breath; Anaphylaxis)                                            ------------------------------------------------------------    HOME MEDICATIONS  Home Medications:                           MEDICATIONS:  STANDING MEDICATIONS  aMIOdarone    Tablet 100 milliGRAM(s) Oral daily  chlorhexidine 0.12% Liquid 15 milliLiter(s) Oral Mucosa every 12 hours  chlorhexidine 2% Cloths 1 Application(s) Topical <User Schedule>  clonazePAM  Tablet 2 milliGRAM(s) Oral three times a day  dexMEDEtomidine Infusion 0.2 MICROgram(s)/kG/Hr IV Continuous <Continuous>  diltiazem    Tablet 60 milliGRAM(s) Oral every 6 hours  enoxaparin Injectable 80 milliGRAM(s) SubCutaneous every 12 hours  insulin lispro (ADMELOG) corrective regimen sliding scale   SubCutaneous every 6 hours  lisinopril 5 milliGRAM(s) Oral daily  magnesium sulfate  IVPB 2 Gram(s) IV Intermittent every 2 hours  methadone    Tablet 10 milliGRAM(s) Oral every 8 hours  metroNIDAZOLE    Tablet 500 milliGRAM(s) Oral every 8 hours  nystatin Cream 1 Application(s) Topical two times a day  OLANZapine 5 milliGRAM(s) Oral daily  pantoprazole   Suspension 40 milliGRAM(s) Oral daily  vancomycin  IVPB 1000 milliGRAM(s) IV Intermittent every 12 hours  vitamin A &amp; D Ointment 1 Application(s) Topical daily    PRN MEDICATIONS  acetaminophen     Tablet .. 650 milliGRAM(s) Oral every 6 hours PRN                                            ------------------------------------------------------------  VITAL SIGNS: Last 24 Hours  T(C): 36.6 (25 Jun 2023 04:00), Max: 37.4 (24 Jun 2023 16:00)  T(F): 97.9 (25 Jun 2023 04:00), Max: 99.3 (24 Jun 2023 16:00)  HR: 99 (25 Jun 2023 07:00) (99 - 124)  BP: 141/85 (25 Jun 2023 07:00) (121/75 - 170/92)  BP(mean): 108 (25 Jun 2023 07:00) (93 - 122)  RR: 19 (25 Jun 2023 07:00) (13 - 25)  SpO2: 100% (25 Jun 2023 07:00) (96% - 100%)      06-24-23 @ 07:01  -  06-25-23 @ 07:00  --------------------------------------------------------  IN: 2300 mL / OUT: 1650 mL / NET: 650 mL                                             --------------------------------------------------------------  LABS:                        8.0    12.99 )-----------( 349      ( 25 Jun 2023 04:57 )             25.7     06-25    137  |  97<L>  |  12  ----------------------------<  94  4.2   |  33<H>  |  <0.5<L>    Ca    8.9      25 Jun 2023 04:57  Mg     1.5     06-25    TPro  6.6  /  Alb  2.7<L>  /  TBili  0.2  /  DBili  x   /  AST  21  /  ALT  20  /  AlkPhos  203<H>  06-25      Urinalysis Basic - ( 25 Jun 2023 04:57 )    Color: x / Appearance: x / SG: x / pH: x  Gluc: 94 mg/dL / Ketone: x  / Bili: x / Urobili: x   Blood: x / Protein: x / Nitrite: x   Leuk Esterase: x / RBC: x / WBC x   Sq Epi: x / Non Sq Epi: x / Bacteria: x                                                            -------------------------------------------------------------  RADIOLOGY:                                            --------------------------------------------------------------    PHYSICAL EXAM:  GEN: No acute distress  LUNGS: Normal respiratory effort. trached  HEART: S1/S2 present.   ABD: Soft, non-tender, non-distended  EXT: no cyanosis or edema  NEURO: awake, follows simple commands

## 2023-06-26 LAB
ALBUMIN SERPL ELPH-MCNC: 2.6 G/DL — LOW (ref 3.5–5.2)
ALP SERPL-CCNC: 181 U/L — HIGH (ref 30–115)
ALT FLD-CCNC: 28 U/L — SIGNIFICANT CHANGE UP (ref 0–41)
ANION GAP SERPL CALC-SCNC: 9 MMOL/L — SIGNIFICANT CHANGE UP (ref 7–14)
AST SERPL-CCNC: 50 U/L — HIGH (ref 0–41)
BASOPHILS # BLD AUTO: 0.06 K/UL — SIGNIFICANT CHANGE UP (ref 0–0.2)
BASOPHILS NFR BLD AUTO: 0.5 % — SIGNIFICANT CHANGE UP (ref 0–1)
BILIRUB SERPL-MCNC: 0.2 MG/DL — SIGNIFICANT CHANGE UP (ref 0.2–1.2)
BUN SERPL-MCNC: 14 MG/DL — SIGNIFICANT CHANGE UP (ref 10–20)
CALCIUM SERPL-MCNC: 8.9 MG/DL — SIGNIFICANT CHANGE UP (ref 8.4–10.4)
CHLORIDE SERPL-SCNC: 94 MMOL/L — LOW (ref 98–110)
CO2 SERPL-SCNC: 31 MMOL/L — SIGNIFICANT CHANGE UP (ref 17–32)
CREAT SERPL-MCNC: 0.5 MG/DL — LOW (ref 0.7–1.5)
EGFR: 120 ML/MIN/1.73M2 — SIGNIFICANT CHANGE UP
EOSINOPHIL # BLD AUTO: 0.17 K/UL — SIGNIFICANT CHANGE UP (ref 0–0.7)
EOSINOPHIL NFR BLD AUTO: 1.5 % — SIGNIFICANT CHANGE UP (ref 0–8)
GLUCOSE BLDC GLUCOMTR-MCNC: 119 MG/DL — HIGH (ref 70–99)
GLUCOSE BLDC GLUCOMTR-MCNC: 134 MG/DL — HIGH (ref 70–99)
GLUCOSE BLDC GLUCOMTR-MCNC: 97 MG/DL — SIGNIFICANT CHANGE UP (ref 70–99)
GLUCOSE SERPL-MCNC: 110 MG/DL — HIGH (ref 70–99)
HCT VFR BLD CALC: 25.3 % — LOW (ref 37–47)
HGB BLD-MCNC: 7.8 G/DL — LOW (ref 12–16)
IMM GRANULOCYTES NFR BLD AUTO: 1.2 % — HIGH (ref 0.1–0.3)
LYMPHOCYTES # BLD AUTO: 1.24 K/UL — SIGNIFICANT CHANGE UP (ref 1.2–3.4)
LYMPHOCYTES # BLD AUTO: 10.9 % — LOW (ref 20.5–51.1)
MAGNESIUM SERPL-MCNC: 1.9 MG/DL — SIGNIFICANT CHANGE UP (ref 1.8–2.4)
MCHC RBC-ENTMCNC: 23.7 PG — LOW (ref 27–31)
MCHC RBC-ENTMCNC: 30.8 G/DL — LOW (ref 32–37)
MCV RBC AUTO: 76.9 FL — LOW (ref 81–99)
MONOCYTES # BLD AUTO: 1.3 K/UL — HIGH (ref 0.1–0.6)
MONOCYTES NFR BLD AUTO: 11.4 % — HIGH (ref 1.7–9.3)
NEUTROPHILS # BLD AUTO: 8.51 K/UL — HIGH (ref 1.4–6.5)
NEUTROPHILS NFR BLD AUTO: 74.5 % — SIGNIFICANT CHANGE UP (ref 42.2–75.2)
NRBC # BLD: 0 /100 WBCS — SIGNIFICANT CHANGE UP (ref 0–0)
PLATELET # BLD AUTO: 320 K/UL — SIGNIFICANT CHANGE UP (ref 130–400)
PMV BLD: 8.7 FL — SIGNIFICANT CHANGE UP (ref 7.4–10.4)
POTASSIUM SERPL-MCNC: 4.2 MMOL/L — SIGNIFICANT CHANGE UP (ref 3.5–5)
POTASSIUM SERPL-SCNC: 4.2 MMOL/L — SIGNIFICANT CHANGE UP (ref 3.5–5)
PROT SERPL-MCNC: 6.6 G/DL — SIGNIFICANT CHANGE UP (ref 6–8)
RBC # BLD: 3.29 M/UL — LOW (ref 4.2–5.4)
RBC # FLD: SIGNIFICANT CHANGE UP % (ref 11.5–14.5)
SODIUM SERPL-SCNC: 134 MMOL/L — LOW (ref 135–146)
TRIGL SERPL-MCNC: 147 MG/DL — SIGNIFICANT CHANGE UP
VANCOMYCIN TROUGH SERPL-MCNC: 12.2 UG/ML — HIGH (ref 5–10)
WBC # BLD: 11.42 K/UL — HIGH (ref 4.8–10.8)
WBC # FLD AUTO: 11.42 K/UL — HIGH (ref 4.8–10.8)

## 2023-06-26 PROCEDURE — 99233 SBSQ HOSP IP/OBS HIGH 50: CPT

## 2023-06-26 RX ORDER — VANCOMYCIN HCL 1 G
1000 VIAL (EA) INTRAVENOUS
Refills: 0 | Status: DISCONTINUED | OUTPATIENT
Start: 2023-06-26 | End: 2023-07-01

## 2023-06-26 RX ORDER — MAGNESIUM SULFATE 500 MG/ML
2 VIAL (ML) INJECTION ONCE
Refills: 0 | Status: COMPLETED | OUTPATIENT
Start: 2023-06-26 | End: 2023-06-26

## 2023-06-26 RX ORDER — CLONAZEPAM 1 MG
1 TABLET ORAL THREE TIMES A DAY
Refills: 0 | Status: COMPLETED | OUTPATIENT
Start: 2023-06-26 | End: 2023-07-03

## 2023-06-26 RX ADMIN — Medication 500 MILLIGRAM(S): at 05:16

## 2023-06-26 RX ADMIN — CHLORHEXIDINE GLUCONATE 1 APPLICATION(S): 213 SOLUTION TOPICAL at 05:18

## 2023-06-26 RX ADMIN — Medication 500 MILLIGRAM(S): at 13:27

## 2023-06-26 RX ADMIN — PANTOPRAZOLE SODIUM 40 MILLIGRAM(S): 20 TABLET, DELAYED RELEASE ORAL at 13:27

## 2023-06-26 RX ADMIN — Medication 60 MILLIGRAM(S): at 05:16

## 2023-06-26 RX ADMIN — Medication 250 MILLIGRAM(S): at 13:26

## 2023-06-26 RX ADMIN — NYSTATIN CREAM 1 APPLICATION(S): 100000 CREAM TOPICAL at 05:17

## 2023-06-26 RX ADMIN — ENOXAPARIN SODIUM 80 MILLIGRAM(S): 100 INJECTION SUBCUTANEOUS at 11:03

## 2023-06-26 RX ADMIN — Medication 2 MILLIGRAM(S): at 05:17

## 2023-06-26 RX ADMIN — METHADONE HYDROCHLORIDE 10 MILLIGRAM(S): 40 TABLET ORAL at 05:17

## 2023-06-26 RX ADMIN — METHADONE HYDROCHLORIDE 10 MILLIGRAM(S): 40 TABLET ORAL at 13:27

## 2023-06-26 RX ADMIN — Medication 500 MILLIGRAM(S): at 21:43

## 2023-06-26 RX ADMIN — AMIODARONE HYDROCHLORIDE 100 MILLIGRAM(S): 400 TABLET ORAL at 05:15

## 2023-06-26 RX ADMIN — Medication 1 MILLIGRAM(S): at 03:49

## 2023-06-26 RX ADMIN — Medication 1 MILLIGRAM(S): at 13:27

## 2023-06-26 RX ADMIN — METHADONE HYDROCHLORIDE 10 MILLIGRAM(S): 40 TABLET ORAL at 21:43

## 2023-06-26 RX ADMIN — OLANZAPINE 5 MILLIGRAM(S): 15 TABLET, FILM COATED ORAL at 13:28

## 2023-06-26 RX ADMIN — NYSTATIN CREAM 1 APPLICATION(S): 100000 CREAM TOPICAL at 18:18

## 2023-06-26 RX ADMIN — Medication 60 MILLIGRAM(S): at 18:15

## 2023-06-26 RX ADMIN — LISINOPRIL 5 MILLIGRAM(S): 2.5 TABLET ORAL at 05:16

## 2023-06-26 RX ADMIN — CHLORHEXIDINE GLUCONATE 15 MILLILITER(S): 213 SOLUTION TOPICAL at 18:15

## 2023-06-26 RX ADMIN — Medication 1 APPLICATION(S): at 13:27

## 2023-06-26 RX ADMIN — Medication 60 MILLIGRAM(S): at 13:28

## 2023-06-26 RX ADMIN — ENOXAPARIN SODIUM 80 MILLIGRAM(S): 100 INJECTION SUBCUTANEOUS at 21:42

## 2023-06-26 RX ADMIN — CHLORHEXIDINE GLUCONATE 15 MILLILITER(S): 213 SOLUTION TOPICAL at 05:16

## 2023-06-26 RX ADMIN — Medication 25 GRAM(S): at 11:03

## 2023-06-26 RX ADMIN — Medication 1 MILLIGRAM(S): at 21:43

## 2023-06-26 NOTE — CHART NOTE - NSCHARTNOTEFT_GEN_A_CORE
ICU Transfer Note    Transfer from: ICU    Transfer to: (  ) Medicine    (  ) Telemetry    (  ) RCU                               (  ) Palliative    (  ) Stroke Unit    (  ) MICU    (  ) Step Down    HPI / ICU COURSE:          Vital Signs Last 24 Hrs  T(C): 36.2 (2023 08:00), Max: 37.4 (2023 16:00)  T(F): 97.2 (2023 08:00), Max: 99.4 (2023 16:00)  HR: 113 (2023 11:00) (94 - 117)  BP: 112/67 (:00) (92/51 - 138/88)  BP(mean): 82 (:) (65 - 106)  RR: 32 (:) (13 - 35)  SpO2: 97% (:00) (94% - 100%)    Parameters below as of 2023 08:00  Patient On (Oxygen Delivery Method): ventilator    O2 Concentration (%): 40    I&O's Summary    2023 07:01  -  2023 07:00  --------------------------------------------------------  IN: 2000 mL / OUT: 1075 mL / NET: 925 mL    2023 07:01  -  2023 11:31  --------------------------------------------------------  IN: 450 mL / OUT: 450 mL / NET: 0 mL        Physical Exam:       LABS:                               7.8    11.42 )-----------( 320      ( 2023 04:44 )             25.3       06-26    134<L>  |  94<L>  |  14  ----------------------------<  110<H>  4.2   |  31  |  0.5<L>    Ca    8.9      2023 04:44  Mg     1.9         TPro  6.6  /  Alb  2.6<L>  /  TBili  0.2  /  DBili  x   /  AST  50<H>  /  ALT  28  /  AlkPhos  181<H>                MEDICATIONS  (STANDING):  aMIOdarone    Tablet 100 milliGRAM(s) Oral daily  chlorhexidine 0.12% Liquid 15 milliLiter(s) Oral Mucosa every 12 hours  chlorhexidine 2% Cloths 1 Application(s) Topical <User Schedule>  clonazePAM  Tablet 1 milliGRAM(s) Oral three times a day  dexMEDEtomidine Infusion 0.2 MICROgram(s)/kG/Hr (4.25 mL/Hr) IV Continuous <Continuous>  diltiazem    Tablet 60 milliGRAM(s) Oral every 6 hours  enoxaparin Injectable 80 milliGRAM(s) SubCutaneous every 12 hours  insulin lispro (ADMELOG) corrective regimen sliding scale   SubCutaneous every 6 hours  lisinopril 5 milliGRAM(s) Oral daily  methadone    Tablet 10 milliGRAM(s) Oral every 8 hours  metroNIDAZOLE    Tablet 500 milliGRAM(s) Oral every 8 hours  nystatin Cream 1 Application(s) Topical two times a day  OLANZapine 5 milliGRAM(s) Oral daily  pantoprazole   Suspension 40 milliGRAM(s) Oral daily  vancomycin  IVPB 1000 milliGRAM(s) IV Intermittent every 12 hours  vitamin A &amp; D Ointment 1 Application(s) Topical daily    MEDICATIONS  (PRN):  acetaminophen     Tablet .. 650 milliGRAM(s) Oral every 6 hours PRN Temp greater or equal to 38C (100.4F)        EC Lead ECG:   Systolic  mmHg    Diastolic BP 85 mmHg    Ventricular Rate 128 BPM    Atrial Rate 128 BPM    P-R Interval 142 ms    QRS Duration 140 ms    Q-T Interval 408 ms    QTC Calculation(Bazett) 595 ms    P Axis 62 degrees    R Axis 55 degrees    T Axis 61 degrees    Diagnosis Line Sinus tachycardia  Right bundle branch block  Abnormal ECG    Confirmed by Tyrell Olivarez ([id number]) on 2023 9:13:53 AM (23 @ 07:33)      Telemetry: No events    Imaging:      ASSESSMENT & PLAN:       42 year old female with PMHx Asthma, HTN presents with cough and SOB x3 days and hemoptysis admitted for cavitary CAP.    # Acute hypoxemic respiratory failure 2/2 cavitary MRSA PNA  -finished a course zyvox and Tamiflu ( + influenza B)    -intubated , self-extubated , re-intubated ; SBT failed, SP trach and PEG  -Currenntly on prececedex, PS  -tuberculosis,Strep/Legionella negative, HIV negative   - repeat CT chest: Diffusely increased number and size of bilateral cavitary lesions with the left lower lobe now demonstrating a consolidative process of the entirelobe, Resolved  - repeat ET cultures negative   - d/c Solumedrol 40mg qd  - trend triglycerides daily (off propofol)  -aspiration precautions    #Paranneumonic effusion  -s/p chest tube placement connected to suction --> tPA  and   - c/w 40mg Lasix daily, d/eusebia lasix, avoid overload  -repeat CT chest: Small left pleural effusion with overall essentially unchanged exam.    # MRSA bacteremia  - s/p Mupirocin BID x5 days  -  blood cultures -: NGTD x3  - s/p MASTER : No evidence of valvular vegetations or intracardiac abscesses to support IE. Pulmonary hepatisation incidental finding. EF 65%, mild TR and pulm HTN  - s/p course of jimbo, currently on vanco and flagyl    # Maculopapular buttocks rash  -PCR skin scraping negative for HSV --> d/c Acyclovir   -Wound care    # LANCE, resolved   - post-infectious GN vs. ATN vs. pulmonary renal syndrome  - Cr 3 > 0.7  - renal U/S: negative   - CK trending down 1483>39>41  - UA: +proteins and blood, Pr/Cr 1.3 (H)  - CHARLES+ 1:640, hep/HIV negative  - no acute indication for RRT    #  Microcytic Anemia  - Iron deficiency vs anemia of chronic disease   - DIC panel negative   - Dimer 1665, LE Duplex negative for DVT  - hold heparin drip. SCDs  - s/p 3u pRBCs; Hgb stable around   - CTAP: r/o retroperitoneal bleed  - Iron 7%, low tibc, resent iron panel and ferritin   - s/p 6 units prbc so far last   - Restarted AC with lovenox  given Hb stable last few days    # New onset A-fib w/ RVR  - HR controlled now  - s/p Amio drip  - EP consulted  - c/w Amio 200mg BID for 2 weeks --> then Amio 200mg daily (switch date )-->amio  to 100mg qd  given elevated qtc  - Cardizem po 60mg q6hr   - Restarted AC with lovenox  given Hb stable last few days    #Elevated QTc  - ~550s now  - on methadone 10mg qd , zyprexa decreased to 5mg qd, amiodarone decreased to 100mg qd  - replete lytes  - monitor qtc    #DVT ppx: LMWH bid   #GI ppx: Protonix Pantoprazole 40mg qd  #Diet: NPO w/ PEG feed   #Activity: bedrest   #Dispo: MICU  #Code: Full    Plan  - c/w Vancomycin (dosing per pharmacy), Flagyl added 500mg q8 for anaerobic coverage; 4-6 weeks course as per ID; adjust as per vanc trough last   - d/eusebia lasix  - Trach to vent (360/16/8/40%), try PS 4-6 hrs; will hold PS  and try to wean sedation  - Wean sedation as tolerated, now on Precedex only, will go up on clonazepam and d/c Precedex     Pending   -Wean sedation, wean off vent when able  - restarted AC monitor Hb  -  -Follow up iron panel + ferritin    FOR FOLLOW UP:  [ ]   [ ]   [ ] ICU Transfer Note    Transfer from: ICU    Transfer to: Step Down    HPI COURSE:    42 year-old female with PMH of Asthma, HTN? presents with complaint of cough x3 days and SOB.   Patient had dyspnea and increased work of breathing and chest pain,and was unable to properly provide accurate history.  She states that her cough has been progressively worsening over the past 3 days, endorses hemoptysis since yesterday. She states she had a friend who came over and stayed with for more than a week who was sick recently, but she does not know if she recently travelled out of [facility] or not. She also admits that her children has been sick with sore throat and fever for past 3 days. She admits to fever, chills, severe chest pain which has gotten worse since she came to the ED.   She took 1x Tamiflu yesterday. She otherwise denies diarrhea, constipation, urinary symptoms. She is not vaccinated for Influenza or COVID-19.     CT Angio Chest PE Protocol: Bilateral patchy groundglass nodularity with dominant confluent left lower lobe opacification with numerous cavitary lesions. Additional contralateral right lower lobe cavitary 1 cm nodule.      Vital Signs Last 24 Hrs  T(C): 36.2 (2023 08:00), Max: 37.4 (2023 16:00)  T(F): 97.2 (2023 08:00), Max: 99.4 (2023 16:00)  HR: 113 (2023 11:00) (94 - 117)  BP: 112/67 (2023 11:00) (92/51 - 138/88)  BP(mean): 82 (2023 11:00) (65 - 106)  RR: 32 (2023 11:00) (13 - 35)  SpO2: 97% (2023 11:00) (94% - 100%)    Parameters below as of 2023 08:00  Patient On (Oxygen Delivery Method): ventilator    O2 Concentration (%): 40    I&O's Summary    2023 07:01  -  2023 07:00  --------------------------------------------------------  IN: 2000 mL / OUT: 1075 mL / NET: 925 mL    2023 07:01  -  2023 11:31  --------------------------------------------------------  IN: 450 mL / OUT: 450 mL / NET: 0 mL        Physical Exam:       LABS:                               7.8    11.42 )-----------( 320      ( 2023 04:44 )             25.3           134<L>  |  94<L>  |  14  ----------------------------<  110<H>  4.2   |  31  |  0.5<L>    Ca    8.9      2023 04:44  Mg     1.9         TPro  6.6  /  Alb  2.6<L>  /  TBili  0.2  /  DBili  x   /  AST  50<H>  /  ALT  28  /  AlkPhos  181<H>                MEDICATIONS  (STANDING):  aMIOdarone    Tablet 100 milliGRAM(s) Oral daily  chlorhexidine 0.12% Liquid 15 milliLiter(s) Oral Mucosa every 12 hours  chlorhexidine 2% Cloths 1 Application(s) Topical <User Schedule>  clonazePAM  Tablet 1 milliGRAM(s) Oral three times a day  dexMEDEtomidine Infusion 0.2 MICROgram(s)/kG/Hr (4.25 mL/Hr) IV Continuous <Continuous>  diltiazem    Tablet 60 milliGRAM(s) Oral every 6 hours  enoxaparin Injectable 80 milliGRAM(s) SubCutaneous every 12 hours  insulin lispro (ADMELOG) corrective regimen sliding scale   SubCutaneous every 6 hours  lisinopril 5 milliGRAM(s) Oral daily  methadone    Tablet 10 milliGRAM(s) Oral every 8 hours  metroNIDAZOLE    Tablet 500 milliGRAM(s) Oral every 8 hours  nystatin Cream 1 Application(s) Topical two times a day  OLANZapine 5 milliGRAM(s) Oral daily  pantoprazole   Suspension 40 milliGRAM(s) Oral daily  vancomycin  IVPB 1000 milliGRAM(s) IV Intermittent every 12 hours  vitamin A &amp; D Ointment 1 Application(s) Topical daily    MEDICATIONS  (PRN):  acetaminophen     Tablet .. 650 milliGRAM(s) Oral every 6 hours PRN Temp greater or equal to 38C (100.4F)        EC Lead ECG:   Systolic  mmHg    Diastolic BP 85 mmHg    Ventricular Rate 128 BPM    Atrial Rate 128 BPM    P-R Interval 142 ms    QRS Duration 140 ms    Q-T Interval 408 ms    QTC Calculation(Bazett) 595 ms    P Axis 62 degrees    R Axis 55 degrees    T Axis 61 degrees    Diagnosis Line Sinus tachycardia  Right bundle branch block  Abnormal ECG    Confirmed by Tyrell Olivarez ([id number]) on 2023 9:13:53 AM (23 @ 07:33)      Telemetry: No events    Imaging:      ASSESSMENT & PLAN:       42 year old female with PMHx Asthma, HTN presents with cough and SOB x3 days and hemoptysis admitted for cavitary CAP.    # Acute hypoxemic respiratory failure 2/2 cavitary MRSA PNA  -finished a course zyvox and Tamiflu ( + influenza B)    -intubated , self-extubated , re-intubated ; SBT failed, SP trach and PEG  -Currenntly on prececedex, PS  -tuberculosis,Strep/Legionella negative, HIV negative   - repeat CT chest: Diffusely increased number and size of bilateral cavitary lesions with the left lower lobe now demonstrating a consolidative process of the entirelobe, Resolved  - repeat ET cultures negative   - d/c Solumedrol 40mg qd  - trend triglycerides daily (off propofol)  -aspiration precautions    #Paranneumonic effusion  -s/p chest tube placement connected to suction --> tPA  and   - c/w 40mg Lasix daily, d/eusebia lasix, avoid overload  -repeat CT chest: Small left pleural effusion with overall essentially unchanged exam.    # MRSA bacteremia  - s/p Mupirocin BID x5 days  -  blood cultures -: NGTD x3  - s/p MASTER : No evidence of valvular vegetations or intracardiac abscesses to support IE. Pulmonary hepatisation incidental finding. EF 65%, mild TR and pulm HTN  - s/p course of jimbo, currently on vanco and flagyl    # Maculopapular buttocks rash  -PCR skin scraping negative for HSV --> d/c Acyclovir   -Wound care    # LANCE, resolved   - post-infectious GN vs. ATN vs. pulmonary renal syndrome  - Cr 3 > 0.7  - renal U/S: negative   - CK trending down 1483>39>41  - UA: +proteins and blood, Pr/Cr 1.3 (H)  - CHARLES+ 1:640, hep/HIV negative  - no acute indication for RRT    #  Microcytic Anemia  - Iron deficiency vs anemia of chronic disease   - DIC panel negative   - Dimer 1665, LE Duplex negative for DVT  - hold heparin drip. SCDs  - s/p 3u pRBCs; Hgb stable around   - CTAP: r/o retroperitoneal bleed  - Iron 7%, low tibc, resent iron panel and ferritin   - s/p 6 units prbc so far last   - Restarted AC with lovenox  given Hb stable last few days    # New onset A-fib w/ RVR  - HR controlled now  - s/p Amio drip  - EP consulted  - c/w Amio 200mg BID for 2 weeks --> then Amio 200mg daily (switch date )-->amio  to 100mg qd  given elevated qtc  - Cardizem po 60mg q6hr   - Restarted AC with lovenox  given Hb stable last few days    #Elevated QTc  - ~550s now  - on methadone 10mg qd , zyprexa decreased to 5mg qd, amiodarone decreased to 100mg qd  - replete lytes  - monitor qtc    #DVT ppx: LMWH bid   #GI ppx: Protonix Pantoprazole 40mg qd  #Diet: NPO w/ PEG feed   #Activity: bedrest   #Dispo: MICU  #Code: Full    Plan  - c/w Vancomycin (dosing per pharmacy), Flagyl added 500mg q8 for anaerobic coverage; 4-6 weeks course as per ID; adjust as per vanc trough last   - d/eusebia lasix  - Trach to vent (360/16/8/40%), try PS 4-6 hrs; will hold PS  and try to wean sedation  - Wean sedation as tolerated, now on Precedex only, will go up on clonazepam and d/c Precedex     Pending   -Wean sedation, wean off vent when able  - restarted AC monitor Hb  -  -Follow up iron panel + ferritin    FOR FOLLOW UP:  [ ]   [ ]   [ ] ICU Transfer Note    Transfer from: ICU    Transfer to: Step Down    HPI COURSE:    42 year-old female with PMH of Asthma, HTN? presents with complaint of cough x3 days and SOB.   Patient had dyspnea and increased work of breathing and chest pain,and was unable to properly provide accurate history.  She states that her cough has been progressively worsening over the past 3 days, endorses hemoptysis since yesterday. She states she had a friend who came over and stayed with for more than a week who was sick recently, but she does not know if she recently travelled out of [facility] or not. She also admits that her children has been sick with sore throat and fever for past 3 days. She admits to fever, chills, severe chest pain which has gotten worse since she came to the ED.   She took 1x Tamiflu yesterday. She otherwise denies diarrhea, constipation, urinary symptoms. She is not vaccinated for Influenza or COVID-19.     CT Angio Chest PE Protocol: Bilateral patchy groundglass nodularity with dominant confluent left lower lobe opacification with numerous cavitary lesions. Additional contralateral right lower lobe cavitary 1 cm nodule.      Vital Signs Last 24 Hrs  T(C): 36.2 (2023 08:00), Max: 37.4 (2023 16:00)  T(F): 97.2 (2023 08:00), Max: 99.4 (2023 16:00)  HR: 113 (2023 11:00) (94 - 117)  BP: 112/67 (2023 11:00) (92/51 - 138/88)  BP(mean): 82 (2023 11:00) (65 - 106)  RR: 32 (2023 11:00) (13 - 35)  SpO2: 97% (2023 11:00) (94% - 100%)    Parameters below as of 2023 08:00  Patient On (Oxygen Delivery Method): ventilator    O2 Concentration (%): 40    I&O's Summary    2023 07:01  -  2023 07:00  --------------------------------------------------------  IN: 2000 mL / OUT: 1075 mL / NET: 925 mL    2023 07:01  -  2023 11:31  --------------------------------------------------------  IN: 450 mL / OUT: 450 mL / NET: 0 mL        Physical Exam:   -Patient awake, does not obeys command  - Rhonchi  bilateral heard  - S1S2 heard  - Right sided redness noted on her antecubital fossa       LABS:                               7.8    11.42 )-----------( 320      ( 2023 04:44 )             25.3           134<L>  |  94<L>  |  14  ----------------------------<  110<H>  4.2   |  31  |  0.5<L>    Ca    8.9      2023 04:44  Mg     1.9         TPro  6.6  /  Alb  2.6<L>  /  TBili  0.2  /  DBili  x   /  AST  50<H>  /  ALT  28  /  AlkPhos  181<H>                MEDICATIONS  (STANDING):  aMIOdarone    Tablet 100 milliGRAM(s) Oral daily  chlorhexidine 0.12% Liquid 15 milliLiter(s) Oral Mucosa every 12 hours  chlorhexidine 2% Cloths 1 Application(s) Topical <User Schedule>  clonazePAM  Tablet 1 milliGRAM(s) Oral three times a day  dexMEDEtomidine Infusion 0.2 MICROgram(s)/kG/Hr (4.25 mL/Hr) IV Continuous <Continuous>  diltiazem    Tablet 60 milliGRAM(s) Oral every 6 hours  enoxaparin Injectable 80 milliGRAM(s) SubCutaneous every 12 hours  insulin lispro (ADMELOG) corrective regimen sliding scale   SubCutaneous every 6 hours  lisinopril 5 milliGRAM(s) Oral daily  methadone    Tablet 10 milliGRAM(s) Oral every 8 hours  metroNIDAZOLE    Tablet 500 milliGRAM(s) Oral every 8 hours  nystatin Cream 1 Application(s) Topical two times a day  OLANZapine 5 milliGRAM(s) Oral daily  pantoprazole   Suspension 40 milliGRAM(s) Oral daily  vancomycin  IVPB 1000 milliGRAM(s) IV Intermittent every 12 hours  vitamin A &amp; D Ointment 1 Application(s) Topical daily    MEDICATIONS  (PRN):  acetaminophen     Tablet .. 650 milliGRAM(s) Oral every 6 hours PRN Temp greater or equal to 38C (100.4F)        EC Lead ECG:   Systolic  mmHg    Diastolic BP 85 mmHg    Ventricular Rate 128 BPM    Atrial Rate 128 BPM    P-R Interval 142 ms    QRS Duration 140 ms    Q-T Interval 408 ms    QTC Calculation(Bazett) 595 ms    P Axis 62 degrees    R Axis 55 degrees    T Axis 61 degrees    Diagnosis Line Sinus tachycardia  Right bundle branch block  Abnormal ECG    Confirmed by Tyrell Olivarez ([id number]) on 2023 9:13:53 AM (23 @ 07:33)      Telemetry: No events    Imaging:      ASSESSMENT & PLAN:       42 year old female with PMHx Asthma, HTN presents with cough and SOB x3 days and hemoptysis admitted for cavitary CAP.    # Acute hypoxemic respiratory failure 2/2 cavitary MRSA PNA  -finished a course zyvox and Tamiflu ( + influenza B)    -intubated , self-extubated , re-intubated ; SBT failed, SP trach and PEG  -Currenntly on prececedex, PS  -tuberculosis,Strep/Legionella negative, HIV negative   - repeat CT chest: Diffusely increased number and size of bilateral cavitary lesions with the left lower lobe now demonstrating a consolidative process of the entirelobe, Resolved  - repeat ET cultures negative   - d/c Solumedrol 40mg qd  - trend triglycerides daily (off propofol)  -aspiration precautions    #Paranneumonic effusion  -s/p chest tube placement connected to suction --> tPA  and   - c/w 40mg Lasix daily, d/eusebia lasix, avoid overload  -repeat CT chest: Small left pleural effusion with overall essentially unchanged exam.    # MRSA bacteremia  - s/p Mupirocin BID x5 days  -  blood cultures -: NGTD x3  - s/p MASTER : No evidence of valvular vegetations or intracardiac abscesses to support IE. Pulmonary hepatisation incidental finding. EF 65%, mild TR and pulm HTN  - s/p course of jimbo, currently on vanco and flagyl    # Maculopapular buttocks rash  -PCR skin scraping negative for HSV --> d/c Acyclovir   -Wound care    # LANCE, resolved   - post-infectious GN vs. ATN vs. pulmonary renal syndrome  - Cr 3 > 0.7  - renal U/S: negative   - CK trending down 1483>39>41  - UA: +proteins and blood, Pr/Cr 1.3 (H)  - CHARLES+ 1:640, hep/HIV negative  - no acute indication for RRT    #  Microcytic Anemia  - Iron deficiency vs anemia of chronic disease   - DIC panel negative   - Dimer 1665, LE Duplex negative for DVT  - hold heparin drip. SCDs  - s/p 3u pRBCs; Hgb stable around   - CTAP: r/o retroperitoneal bleed  - Iron 7%, low tibc, resent iron panel and ferritin   - s/p 6 units prbc so far last   - Restarted AC with lovenox  given Hb stable last few days    # New onset A-fib w/ RVR  - HR controlled now  - s/p Amio drip  - EP consulted  - c/w Amio 200mg BID for 2 weeks --> then Amio 200mg daily (switch date )-->amio  to 100mg qd  given elevated qtc  - Cardizem po 60mg q6hr   - Restarted AC with lovenox  given Hb stable last few days    #Elevated QTc  - ~550s now  - on methadone 10mg qd , zyprexa decreased to 5mg qd, amiodarone decreased to 100mg qd  - replete lytes  - monitor qtc    #DVT ppx: LMWH bid   #GI ppx: Protonix Pantoprazole 40mg qd  #Diet: NPO w/ PEG feed   #Activity: bedrest   #Dispo: MICU  #Code: Full    Plan  - c/w Vancomycin (dosing per pharmacy), Flagyl added 500mg q8 for anaerobic coverage; 4-6 weeks course as per ID; adjust as per vanc trough last   - d/eusebia lasix  - Trach to vent (360/16/8/40%), try PS 4-6 hrs; will hold PS  and try to wean sedation  - Wean sedation as tolerated, now on Precedex only, will go up on clonazepam and d/c Precedex     Pending   -Wean sedation, wean off vent when able  - restarted AC monitor Hb  -  -Follow up iron panel + ferritin    FOR FOLLOW UP:  [ ]   [ ]   [ ] ICU Transfer Note    Transfer from: ICU    Transfer to: Step Down Unit     HPI COURSE:    42 year-old female with PMH of Asthma, HTN? presents with complaint of cough x3 days and SOB.   Patient had dyspnea and increased work of breathing and chest pain,and was unable to properly provide accurate history.  She states that her cough has been progressively worsening over the past 3 days, endorses hemoptysis since yesterday. She states she had a friend who came over and stayed with for more than a week who was sick recently, but she does not know if she recently travelled out of [place] or not. She also admits that her children has been sick with sore throat and fever for past 3 days. She admits to fever, chills, severe chest pain which has gotten worse since she came to the ED.   She took 1x Tamiflu yesterday. She otherwise denies diarrhea, constipation, urinary symptoms. She is not vaccinated for Influenza or COVID-19.       CT Angio Chest PE Protocol: Bilateral patchy groundglass nodularity with dominant confluent left lower lobe opacification with numerous cavitary lesions. Additional contralateral right lower lobe cavitary 1 cm nodule.    ICU COURSE:     Patient admitted to the ICU for acute hypoxemic respiratory failure secondary to cavitary MRSA Pneumonia with a positive influenza B, finished a course of zyvox and tamiflu. This was complicated by loculated parapneumonic effusion for which a chest tube was inserted and tpa was given twice. Her stay was complicated by MRSA bacteremia and for which she took a course of meropenem, currently on vancomycin and metronidazole, cultures negative to date. MASTER was negative for vegetations. Trach and PEG done for inability to wean off vent, currently off sedation.   Her stay was also complicated by new onset Afib with RVR for which she is on amiodarone, diltiazem, and therapeutic anticoagulation. Long QT is also noted. Patient also had LANCE that improved with no need for RRT.  Otherwise patient received a total of 6 p RBC for acute blood loss anemia ,currently hemoglobin stable.     Patient is clinically and hemodynamically stable for step down unit.       Vital Signs Last 24 Hrs  T(C): 36.2 (2023 08:00), Max: 37.4 (2023 16:00)  T(F): 97.2 (2023 08:00), Max: 99.4 (2023 16:00)  HR: 113 (2023 11:00) (94 - 117)  BP: 112/67 (2023 11:00) (92/51 - 138/88)  BP(mean): 82 (:00) (65 - 106)  RR: 32 (:00) (13 - 35)  SpO2: 97% (2023 11:00) (94% - 100%)    Parameters below as of 2023 08:00  Patient On (Oxygen Delivery Method): ventilator    O2 Concentration (%): 40    I&O's Summary    2023 07:01  -  2023 07:00  --------------------------------------------------------  IN: 2000 mL / OUT: 1075 mL / NET: 925 mL    2023 07:01  -  2023 11:31  --------------------------------------------------------  IN: 450 mL / OUT: 450 mL / NET: 0 mL        Physical Exam:   -Patient awake, does not obeys command  - Rhonchi  bilateral heard  - S1S2 heard  - Right sided redness noted on her antecubital fossa       LABS:                               7.8    11.42 )-----------( 320      ( 2023 04:44 )             25.3           134<L>  |  94<L>  |  14  ----------------------------<  110<H>  4.2   |  31  |  0.5<L>    Ca    8.9      2023 04:44  Mg     1.9         TPro  6.6  /  Alb  2.6<L>  /  TBili  0.2  /  DBili  x   /  AST  50<H>  /  ALT  28  /  AlkPhos  181<H>                MEDICATIONS  (STANDING):  aMIOdarone    Tablet 100 milliGRAM(s) Oral daily  chlorhexidine 0.12% Liquid 15 milliLiter(s) Oral Mucosa every 12 hours  chlorhexidine 2% Cloths 1 Application(s) Topical <User Schedule>  clonazePAM  Tablet 1 milliGRAM(s) Oral three times a day  dexMEDEtomidine Infusion 0.2 MICROgram(s)/kG/Hr (4.25 mL/Hr) IV Continuous <Continuous>  diltiazem    Tablet 60 milliGRAM(s) Oral every 6 hours  enoxaparin Injectable 80 milliGRAM(s) SubCutaneous every 12 hours  insulin lispro (ADMELOG) corrective regimen sliding scale   SubCutaneous every 6 hours  lisinopril 5 milliGRAM(s) Oral daily  methadone    Tablet 10 milliGRAM(s) Oral every 8 hours  metroNIDAZOLE    Tablet 500 milliGRAM(s) Oral every 8 hours  nystatin Cream 1 Application(s) Topical two times a day  OLANZapine 5 milliGRAM(s) Oral daily  pantoprazole   Suspension 40 milliGRAM(s) Oral daily  vancomycin  IVPB 1000 milliGRAM(s) IV Intermittent every 12 hours  vitamin A &amp; D Ointment 1 Application(s) Topical daily    MEDICATIONS  (PRN):  acetaminophen     Tablet .. 650 milliGRAM(s) Oral every 6 hours PRN Temp greater or equal to 38C (100.4F)        EC Lead ECG:   Systolic  mmHg    Diastolic BP 85 mmHg    Ventricular Rate 128 BPM    Atrial Rate 128 BPM    P-R Interval 142 ms    QRS Duration 140 ms    Q-T Interval 408 ms    QTC Calculation(Bazett) 595 ms    P Axis 62 degrees    R Axis 55 degrees    T Axis 61 degrees    Diagnosis Line Sinus tachycardia  Right bundle branch block  Abnormal ECG    Confirmed by Tyrell Olivarez ([id number]) on 2023 9:13:53 AM (23 @ 07:33)      Telemetry: No events      ASSESSMENT & PLAN:       # Acute hypoxemic respiratory failure 2/2 cavitary MRSA PNA  -finished a course zyvox and Tamiflu ( + influenza B)    -intubated , self-extubated , re-intubated ; SBT failed, SP trach to vent , and PEG  -Currently off precedex   - not tolerated PS  -tuberculosis,Strep/Legionella negative, HIV negative   - repeat ET cultures negative   - off Solumedrol 40mg qd  -aspiration precautions, suction PRN    #Paranneumonic effusion  -s/p chest tube placement connected to suction --> tPA  and   - was 40mg Lasix daily, d/eusebia lasix, avoid overload.   -repeat CT chest: Small left pleural effusion with overall essentially unchanged exam.    # MRSA bacteremia  - s/p Mupirocin BID x5 days  -  blood cultures -30: NGTD x3  - s/p MASTER : No evidence of valvular vegetations or intracardiac abscesses to support IE. Pulmonary hepatisation incidental finding. EF 65%, mild TR and pulm HTN  - s/p course of jimbo, currently on vanco and flagyl, pending vanco trough level     # Maculopapular buttocks rash  -PCR skin scraping negative for HSV --> d/c Acyclovir   -Wound care    # LANCE, resolved   - post-infectious GN vs. ATN vs. pulmonary renal syndrome  - Cr 3 > 0.7  - renal U/S: negative   - CK trending down 1483>39>41  - UA: +proteins and blood, Pr/Cr 1.3 (H)  - CHARLES+ 1:640, hep/HIV negative  - no acute indication for RRT    #  Microcytic Anemia  - Iron deficiency vs anemia of chronic disease   - DIC panel negative   - Dimer 1665, LE Duplex negative for DVT  - s/p 3u pRBCs; Hgb stable around   - CTAP: r/o retroperitoneal bleed  - Iron 7%, low tibc, resent iron panel and ferritin   - s/p 6 units prbc so far last   - Restarted AC with lovenox  given Hb stable last few days  -monitor hemoglobin    # New onset A-fib w/ RVR  - HR controlled now  - s/p Amio drip  - EP consulted  - c/w Amio 200mg BID for 2 weeks --> then Amio 200mg daily (switch date )-->amio  to 100mg qd  given elevated qtc  - Cardizem po 60mg q6hr   - Restarted AC with lovenox  given Hb stable last few days    #Elevated QTc  - ~550s now  - on methadone, zyprexa , amiodarone lowest dose possible   - monitor qtc    #DVT ppx: LMWH bid   #GI ppx: Protonix Pantoprazole 40mg qd  #Diet: NPO w/ PEG feed   #Activity: confined to bed      FOR FOLLOW UP:  [ ] continue antibiotics   [ ] Trach to vent (360/16/8/40%), try PS trial   [ ]monitor hemoglobin    ZeinaMorcos PGY1 ICU Transfer Note    Transfer from: ICU    Transfer to: Step Down Unit     HPI COURSE:    42 year-old female with PMH of Asthma, HTN? presents with complaint of cough x3 days and SOB.   Patient had dyspnea and increased work of breathing and chest pain,and was unable to properly provide accurate history.  She states that her cough has been progressively worsening over the past 3 days, endorses hemoptysis since yesterday. She states she had a friend who came over and stayed with for more than a week who was sick recently, but she does not know if she recently travelled out of State or not. She also admits that her children has been sick with sore throat and fever for past 3 days. She admits to fever, chills, severe chest pain which has gotten worse since she came to the ED.   She took 1x Tamiflu yesterday. She otherwise denies diarrhea, constipation, urinary symptoms. She is not vaccinated for Influenza or COVID-19.       CT Angio Chest PE Protocol: Bilateral patchy groundglass nodularity with dominant confluent left lower lobe opacification with numerous cavitary lesions. Additional contralateral right lower lobe cavitary 1 cm nodule.    ICU COURSE:     Patient admitted to the ICU for acute hypoxemic respiratory failure secondary to cavitary MRSA Pneumonia with a positive influenza B, finished a course of zyvox and tamiflu. This was complicated by loculated parapneumonic effusion for which a chest tube was inserted and tpa was given twice. Her stay was complicated by MRSA bacteremia and for which she took a course of meropenem, currently on vancomycin and metronidazole, cultures negative to date. MASTER was negative for vegetations. Trach and PEG done for inability to wean off vent, currently off sedation.   Her stay was also complicated by new onset Afib with RVR for which she is on amiodarone, diltiazem, and therapeutic anticoagulation. Long QT is also noted. Patient also had LANCE that improved with no need for RRT.  Otherwise patient received a total of 6 p RBC for acute blood loss anemia ,currently hemoglobin stable.     Patient is clinically and hemodynamically stable for step down unit.       Vital Signs Last 24 Hrs  T(C): 36.2 (2023 08:00), Max: 37.4 (2023 16:00)  T(F): 97.2 (2023 08:00), Max: 99.4 (2023 16:00)  HR: 113 (2023 11:00) (94 - 117)  BP: 112/67 (2023 11:00) (92/51 - 138/88)  BP(mean): 82 (:00) (65 - 106)  RR: 32 (:00) (13 - 35)  SpO2: 97% (2023 11:00) (94% - 100%)    Parameters below as of 2023 08:00  Patient On (Oxygen Delivery Method): ventilator    O2 Concentration (%): 40    I&O's Summary    2023 07:01  -  2023 07:00  --------------------------------------------------------  IN: 2000 mL / OUT: 1075 mL / NET: 925 mL    2023 07:01  -  2023 11:31  --------------------------------------------------------  IN: 450 mL / OUT: 450 mL / NET: 0 mL        Physical Exam:   -Patient awake, does not obeys command  - Rhonchi  bilateral heard  - S1S2 heard  - Right sided redness noted on her antecubital fossa       LABS:                               7.8    11.42 )-----------( 320      ( 2023 04:44 )             25.3           134<L>  |  94<L>  |  14  ----------------------------<  110<H>  4.2   |  31  |  0.5<L>    Ca    8.9      2023 04:44  Mg     1.9         TPro  6.6  /  Alb  2.6<L>  /  TBili  0.2  /  DBili  x   /  AST  50<H>  /  ALT  28  /  AlkPhos  181<H>                MEDICATIONS  (STANDING):  aMIOdarone    Tablet 100 milliGRAM(s) Oral daily  chlorhexidine 0.12% Liquid 15 milliLiter(s) Oral Mucosa every 12 hours  chlorhexidine 2% Cloths 1 Application(s) Topical <User Schedule>  clonazePAM  Tablet 1 milliGRAM(s) Oral three times a day  dexMEDEtomidine Infusion 0.2 MICROgram(s)/kG/Hr (4.25 mL/Hr) IV Continuous <Continuous>  diltiazem    Tablet 60 milliGRAM(s) Oral every 6 hours  enoxaparin Injectable 80 milliGRAM(s) SubCutaneous every 12 hours  insulin lispro (ADMELOG) corrective regimen sliding scale   SubCutaneous every 6 hours  lisinopril 5 milliGRAM(s) Oral daily  methadone    Tablet 10 milliGRAM(s) Oral every 8 hours  metroNIDAZOLE    Tablet 500 milliGRAM(s) Oral every 8 hours  nystatin Cream 1 Application(s) Topical two times a day  OLANZapine 5 milliGRAM(s) Oral daily  pantoprazole   Suspension 40 milliGRAM(s) Oral daily  vancomycin  IVPB 1000 milliGRAM(s) IV Intermittent every 12 hours  vitamin A &amp; D Ointment 1 Application(s) Topical daily    MEDICATIONS  (PRN):  acetaminophen     Tablet .. 650 milliGRAM(s) Oral every 6 hours PRN Temp greater or equal to 38C (100.4F)        EC Lead ECG:   Systolic  mmHg    Diastolic BP 85 mmHg    Ventricular Rate 128 BPM    Atrial Rate 128 BPM    P-R Interval 142 ms    QRS Duration 140 ms    Q-T Interval 408 ms    QTC Calculation(Bazett) 595 ms    P Axis 62 degrees    R Axis 55 degrees    T Axis 61 degrees    Diagnosis Line Sinus tachycardia  Right bundle branch block  Abnormal ECG    Confirmed by Tyrell Olivarez ([id number]) on 2023 9:13:53 AM (23 @ 07:33)      Telemetry: No events      ASSESSMENT & PLAN:       # Acute hypoxemic respiratory failure 2/2 cavitary MRSA PNA  -finished a course zyvox and Tamiflu ( + influenza B)    -intubated , self-extubated , re-intubated ; SBT failed, SP trach to vent , and PEG  -Currently off precedex   - not tolerated PS  -tuberculosis,Strep/Legionella negative, HIV negative   - repeat ET cultures negative   - off Solumedrol 40mg qd  -aspiration precautions, suction PRN    #Paranneumonic effusion  -s/p chest tube placement connected to suction --> tPA  and   - was 40mg Lasix daily, d/eusebia lasix, avoid overload.   -repeat CT chest: Small left pleural effusion with overall essentially unchanged exam.    # MRSA bacteremia  - s/p Mupirocin BID x5 days  -  blood cultures -30: NGTD x3  - s/p MASTER : No evidence of valvular vegetations or intracardiac abscesses to support IE. Pulmonary hepatisation incidental finding. EF 65%, mild TR and pulm HTN  - s/p course of jimbo, currently on vanco and flagyl, pending vanco trough level     # Maculopapular buttocks rash  -PCR skin scraping negative for HSV --> d/c Acyclovir   -Wound care    # LANCE, resolved   - post-infectious GN vs. ATN vs. pulmonary renal syndrome  - Cr 3 > 0.7  - renal U/S: negative   - CK trending down 1483>39>41  - UA: +proteins and blood, Pr/Cr 1.3 (H)  - CHARLES+ 1:640, hep/HIV negative  - no acute indication for RRT    #  Microcytic Anemia  - Iron deficiency vs anemia of chronic disease   - DIC panel negative   - Dimer 1665, LE Duplex negative for DVT  - s/p 3u pRBCs; Hgb stable around   - CTAP: r/o retroperitoneal bleed  - Iron 7%, low tibc, resent iron panel and ferritin   - s/p 6 units prbc so far last   - Restarted AC with lovenox  given Hb stable last few days  -monitor hemoglobin    # New onset A-fib w/ RVR  - HR controlled now  - s/p Amio drip  - EP consulted  - c/w Amio 200mg BID for 2 weeks --> then Amio 200mg daily (switch date )-->amio  to 100mg qd  given elevated qtc  - Cardizem po 60mg q6hr   - Restarted AC with lovenox  given Hb stable last few days    #Elevated QTc  - ~550s now  - on tapering methadone and  zyprexa , on amiodarone 100mg daily   - monitor qtc    #DVT ppx: LMWH bid   #GI ppx: Protonix Pantoprazole 40mg qd  #Diet: NPO w/ PEG feed   #Activity: confined to bed      FOR FOLLOW UP:  [ ] continue antibiotics   [ ] Trach to vent (360/16/8/40%), try PS trial   [ ]monitor hemoglobin    ZeinaMorcos PGY1

## 2023-06-26 NOTE — PHARMACOTHERAPY INTERVENTION NOTE - COMMENTS
vanco level 12.2 on vancomycin 1g IV q12h-6 am dose held  -d/w med team, reorder vancomycin 1g IV q12h 1100 & 2300 , now dose ~12:30  -f/u vanco level 6/28 vanco level 12.2 on vancomycin 1g IV q12h-6 am dose held  -precise PK calculation, continue current regimen  -d/w med team, reorder vancomycin 1g IV q12h 1100 & 2300 , now dose ~12:30  -f/u vanco level 6/28

## 2023-06-26 NOTE — CHART NOTE - NSCHARTNOTEFT_GEN_A_CORE
Thoracic Surgery    42yF s/p Trach and PEG, POD#12, removed trach sutures at bedside.     Please recall surgery as needed.     Spectra [id number]

## 2023-06-26 NOTE — PROGRESS NOTE ADULT - ASSESSMENT
IMPRESSION:    Acute hypoxemic respiratory failure sp trach   Severe cavitary CAP MRSA  Small parapneumonic effusion SP Chest tube  MRSA bacteremia resolved   MASTER negative   Anemia sp transfusion   New onset A FIB rate controlled   LANCE non oliguric improving  Hypernatremia Improving  HO asthma   Influenza B     PLAN:    CNS:  Continue Methadone 10/10/5.  Klonopin 1 mg TID.  Zyprexa 5 .  Monitor QTc.       HEENT: Oral care. Trach care    PULMONARY:  HOB @ 45 degrees.  Aspiration precautions.    No change in vent settings.    Keep SaO2 92 TO 96%.  PS trial     CARDIOVASCULAR:    Avoid overload,  rate control.  Amiodarone 100 mg daily.  Cardizem 60 mg Q6     GI: GI prophylaxis. PEG Feeding.  Bowel regimen PRN.     RENAL:  Follow up lytes.  Correct as needed.     INFECTIOUS DISEASE: ABX Per ID.  Monitor Vanc levels.    Adjust Vancomycin     HEMATOLOGICAL:  DVT prophylaxis.    trend CBC. LE doppler - 6/13.   LMWH therapeutic     ENDOCRINE:  Follow up FS.  Insulin protocol if needed.   today     Prognosis is poor overall .       SDU

## 2023-06-26 NOTE — PROGRESS NOTE ADULT - SUBJECTIVE AND OBJECTIVE BOX
Over Night Events: events noted, off precedex, vent dpendant    PHYSICAL EXAM    ICU Vital Signs Last 24 Hrs  T(C): 36.6 (2023 04:00), Max: 37.4 (2023 16:00)  T(F): 97.9 (2023 04:00), Max: 99.4 (2023 16:00)  HR: 94 (2023 07:36) (94 - 127)  BP: 102/63 (2023 07:00) (92/51 - 138/88)  BP(mean): 78 (2023 07:00) (65 - 106)  RR: 21 (2023 07:00) (13 - 37)  SpO2: 98% (2023 07:36) (94% - 100%)    O2 Parameters below as of 2023 04:00  Patient On (Oxygen Delivery Method): ventilator    O2 Concentration (%): 40        General: ill looking  HEENT: trach  Lungs: Bilateral BS  Cardiovascular: Regular   Abdomen: Soft, Positive BS  Extremities: No clubbing   non focal      23 @ 07:01  -  23 @ 07:00  --------------------------------------------------------  IN:    Enteral Tube Flush: 100 mL    Free Water: 100 mL    Miscellaneous Tube Feedin mL  Total IN: 2000 mL    OUT:    Rectal Tube (mL): 75 mL    Voided (mL): 1000 mL  Total OUT: 1075 mL    Total NET: 925 mL          LABS:                          7.8    11.42 )-----------( 320      ( 2023 04:44 )             25.3                                               06-26    134<L>  |  94<L>  |  14  ----------------------------<  110<H>  4.2   |  31  |  0.5<L>    Ca    8.9      2023 04:44  Mg     1.9     06-26    TPro  6.6  /  Alb  2.6<L>  /  TBili  0.2  /  DBili  x   /  AST  50<H>  /  ALT  28  /  AlkPhos  181<H>  06-26                                             Urinalysis Basic - ( 2023 04:44 )    Color: x / Appearance: x / SG: x / pH: x  Gluc: 110 mg/dL / Ketone: x  / Bili: x / Urobili: x   Blood: x / Protein: x / Nitrite: x   Leuk Esterase: x / RBC: x / WBC x   Sq Epi: x / Non Sq Epi: x / Bacteria: x                                                  LIVER FUNCTIONS - ( 2023 04:44 )  Alb: 2.6 g/dL / Pro: 6.6 g/dL / ALK PHOS: 181 U/L / ALT: 28 U/L / AST: 50 U/L / GGT: x                                                                                               Mode: AC/ CMV (Assist Control/ Continuous Mandatory Ventilation)  RR (machine): 16  TV (machine): 360  FiO2: 40  PEEP: 8  ITime: 1  MAP: 13  PIP: 29                                          MEDICATIONS  (STANDING):  aMIOdarone    Tablet 100 milliGRAM(s) Oral daily  chlorhexidine 0.12% Liquid 15 milliLiter(s) Oral Mucosa every 12 hours  chlorhexidine 2% Cloths 1 Application(s) Topical <User Schedule>  clonazePAM  Tablet 2 milliGRAM(s) Oral three times a day  dexMEDEtomidine Infusion 0.2 MICROgram(s)/kG/Hr (4.25 mL/Hr) IV Continuous <Continuous>  diltiazem    Tablet 60 milliGRAM(s) Oral every 6 hours  enoxaparin Injectable 80 milliGRAM(s) SubCutaneous every 12 hours  insulin lispro (ADMELOG) corrective regimen sliding scale   SubCutaneous every 6 hours  lisinopril 5 milliGRAM(s) Oral daily  magnesium sulfate  IVPB 2 Gram(s) IV Intermittent once  methadone    Tablet 10 milliGRAM(s) Oral every 8 hours  metroNIDAZOLE    Tablet 500 milliGRAM(s) Oral every 8 hours  nystatin Cream 1 Application(s) Topical two times a day  OLANZapine 5 milliGRAM(s) Oral daily  pantoprazole   Suspension 40 milliGRAM(s) Oral daily  vancomycin  IVPB 1000 milliGRAM(s) IV Intermittent every 12 hours  vitamin A &amp; D Ointment 1 Application(s) Topical daily    MEDICATIONS  (PRN):  acetaminophen     Tablet .. 650 milliGRAM(s) Oral every 6 hours PRN Temp greater or equal to 38C (100.4F)      CXR noted

## 2023-06-27 LAB
ALBUMIN SERPL ELPH-MCNC: 2.4 G/DL — LOW (ref 3.5–5.2)
ALP SERPL-CCNC: 177 U/L — HIGH (ref 30–115)
ALT FLD-CCNC: 40 U/L — SIGNIFICANT CHANGE UP (ref 0–41)
ANION GAP SERPL CALC-SCNC: 10 MMOL/L — SIGNIFICANT CHANGE UP (ref 7–14)
AST SERPL-CCNC: 68 U/L — HIGH (ref 0–41)
BASOPHILS # BLD AUTO: 0.05 K/UL — SIGNIFICANT CHANGE UP (ref 0–0.2)
BASOPHILS NFR BLD AUTO: 0.5 % — SIGNIFICANT CHANGE UP (ref 0–1)
BILIRUB SERPL-MCNC: 0.2 MG/DL — SIGNIFICANT CHANGE UP (ref 0.2–1.2)
BUN SERPL-MCNC: 14 MG/DL — SIGNIFICANT CHANGE UP (ref 10–20)
CALCIUM SERPL-MCNC: 8.7 MG/DL — SIGNIFICANT CHANGE UP (ref 8.4–10.5)
CHLORIDE SERPL-SCNC: 95 MMOL/L — LOW (ref 98–110)
CO2 SERPL-SCNC: 30 MMOL/L — SIGNIFICANT CHANGE UP (ref 17–32)
CREAT SERPL-MCNC: 0.5 MG/DL — LOW (ref 0.7–1.5)
EGFR: 120 ML/MIN/1.73M2 — SIGNIFICANT CHANGE UP
EOSINOPHIL # BLD AUTO: 0.19 K/UL — SIGNIFICANT CHANGE UP (ref 0–0.7)
EOSINOPHIL NFR BLD AUTO: 1.8 % — SIGNIFICANT CHANGE UP (ref 0–8)
GLUCOSE BLDC GLUCOMTR-MCNC: 100 MG/DL — HIGH (ref 70–99)
GLUCOSE BLDC GLUCOMTR-MCNC: 109 MG/DL — HIGH (ref 70–99)
GLUCOSE BLDC GLUCOMTR-MCNC: 114 MG/DL — HIGH (ref 70–99)
GLUCOSE BLDC GLUCOMTR-MCNC: 115 MG/DL — HIGH (ref 70–99)
GLUCOSE BLDC GLUCOMTR-MCNC: 122 MG/DL — HIGH (ref 70–99)
GLUCOSE BLDC GLUCOMTR-MCNC: 95 MG/DL — SIGNIFICANT CHANGE UP (ref 70–99)
GLUCOSE SERPL-MCNC: 91 MG/DL — SIGNIFICANT CHANGE UP (ref 70–99)
HCT VFR BLD CALC: 26 % — LOW (ref 37–47)
HGB BLD-MCNC: 7.8 G/DL — LOW (ref 12–16)
IMM GRANULOCYTES NFR BLD AUTO: 1.5 % — HIGH (ref 0.1–0.3)
LYMPHOCYTES # BLD AUTO: 1.43 K/UL — SIGNIFICANT CHANGE UP (ref 1.2–3.4)
LYMPHOCYTES # BLD AUTO: 13.7 % — LOW (ref 20.5–51.1)
MAGNESIUM SERPL-MCNC: 1.9 MG/DL — SIGNIFICANT CHANGE UP (ref 1.8–2.4)
MCHC RBC-ENTMCNC: 23.1 PG — LOW (ref 27–31)
MCHC RBC-ENTMCNC: 30 G/DL — LOW (ref 32–37)
MCV RBC AUTO: 76.9 FL — LOW (ref 81–99)
MONOCYTES # BLD AUTO: 1.08 K/UL — HIGH (ref 0.1–0.6)
MONOCYTES NFR BLD AUTO: 10.3 % — HIGH (ref 1.7–9.3)
NEUTROPHILS # BLD AUTO: 7.55 K/UL — HIGH (ref 1.4–6.5)
NEUTROPHILS NFR BLD AUTO: 72.2 % — SIGNIFICANT CHANGE UP (ref 42.2–75.2)
NRBC # BLD: 0 /100 WBCS — SIGNIFICANT CHANGE UP (ref 0–0)
PHOSPHATE SERPL-MCNC: 4.8 MG/DL — SIGNIFICANT CHANGE UP (ref 2.1–4.9)
PLATELET # BLD AUTO: 231 K/UL — SIGNIFICANT CHANGE UP (ref 130–400)
PMV BLD: 10.3 FL — SIGNIFICANT CHANGE UP (ref 7.4–10.4)
POTASSIUM SERPL-MCNC: 4.9 MMOL/L — SIGNIFICANT CHANGE UP (ref 3.5–5)
POTASSIUM SERPL-SCNC: 4.9 MMOL/L — SIGNIFICANT CHANGE UP (ref 3.5–5)
PROT SERPL-MCNC: 6.4 G/DL — SIGNIFICANT CHANGE UP (ref 6–8)
RBC # BLD: 3.38 M/UL — LOW (ref 4.2–5.4)
RBC # FLD: SIGNIFICANT CHANGE UP % (ref 11.5–14.5)
SODIUM SERPL-SCNC: 135 MMOL/L — SIGNIFICANT CHANGE UP (ref 135–146)
WBC # BLD: 10.46 K/UL — SIGNIFICANT CHANGE UP (ref 4.8–10.8)
WBC # FLD AUTO: 10.46 K/UL — SIGNIFICANT CHANGE UP (ref 4.8–10.8)

## 2023-06-27 PROCEDURE — 99291 CRITICAL CARE FIRST HOUR: CPT

## 2023-06-27 PROCEDURE — 99232 SBSQ HOSP IP/OBS MODERATE 35: CPT

## 2023-06-27 PROCEDURE — 93010 ELECTROCARDIOGRAM REPORT: CPT

## 2023-06-27 RX ORDER — LISINOPRIL 2.5 MG/1
5 TABLET ORAL DAILY
Refills: 0 | Status: DISCONTINUED | OUTPATIENT
Start: 2023-06-27 | End: 2023-07-04

## 2023-06-27 RX ORDER — METRONIDAZOLE 500 MG
500 TABLET ORAL EVERY 8 HOURS
Refills: 0 | Status: DISCONTINUED | OUTPATIENT
Start: 2023-06-27 | End: 2023-07-07

## 2023-06-27 RX ORDER — SCOPALAMINE 1 MG/3D
1 PATCH, EXTENDED RELEASE TRANSDERMAL ONCE
Refills: 0 | Status: COMPLETED | OUTPATIENT
Start: 2023-06-27 | End: 2023-06-28

## 2023-06-27 RX ORDER — ACETAMINOPHEN 500 MG
650 TABLET ORAL EVERY 6 HOURS
Refills: 0 | Status: DISCONTINUED | OUTPATIENT
Start: 2023-06-27 | End: 2023-07-12

## 2023-06-27 RX ORDER — PANTOPRAZOLE SODIUM 20 MG/1
40 TABLET, DELAYED RELEASE ORAL DAILY
Refills: 0 | Status: DISCONTINUED | OUTPATIENT
Start: 2023-06-27 | End: 2023-06-30

## 2023-06-27 RX ADMIN — Medication 60 MILLIGRAM(S): at 23:45

## 2023-06-27 RX ADMIN — Medication 500 MILLIGRAM(S): at 13:43

## 2023-06-27 RX ADMIN — Medication 1 MILLIGRAM(S): at 22:16

## 2023-06-27 RX ADMIN — ENOXAPARIN SODIUM 80 MILLIGRAM(S): 100 INJECTION SUBCUTANEOUS at 22:15

## 2023-06-27 RX ADMIN — Medication 60 MILLIGRAM(S): at 12:11

## 2023-06-27 RX ADMIN — METHADONE HYDROCHLORIDE 10 MILLIGRAM(S): 40 TABLET ORAL at 13:43

## 2023-06-27 RX ADMIN — Medication 500 MILLIGRAM(S): at 22:15

## 2023-06-27 RX ADMIN — Medication 1 MILLIGRAM(S): at 13:43

## 2023-06-27 RX ADMIN — Medication 500 MILLIGRAM(S): at 06:23

## 2023-06-27 RX ADMIN — AMIODARONE HYDROCHLORIDE 100 MILLIGRAM(S): 400 TABLET ORAL at 07:19

## 2023-06-27 RX ADMIN — CHLORHEXIDINE GLUCONATE 1 APPLICATION(S): 213 SOLUTION TOPICAL at 06:26

## 2023-06-27 RX ADMIN — METHADONE HYDROCHLORIDE 10 MILLIGRAM(S): 40 TABLET ORAL at 06:22

## 2023-06-27 RX ADMIN — Medication 1 MILLIGRAM(S): at 06:22

## 2023-06-27 RX ADMIN — OLANZAPINE 5 MILLIGRAM(S): 15 TABLET, FILM COATED ORAL at 12:10

## 2023-06-27 RX ADMIN — NYSTATIN CREAM 1 APPLICATION(S): 100000 CREAM TOPICAL at 18:11

## 2023-06-27 RX ADMIN — Medication 250 MILLIGRAM(S): at 23:44

## 2023-06-27 RX ADMIN — ENOXAPARIN SODIUM 80 MILLIGRAM(S): 100 INJECTION SUBCUTANEOUS at 11:00

## 2023-06-27 RX ADMIN — CHLORHEXIDINE GLUCONATE 15 MILLILITER(S): 213 SOLUTION TOPICAL at 06:23

## 2023-06-27 RX ADMIN — Medication 60 MILLIGRAM(S): at 18:05

## 2023-06-27 RX ADMIN — METHADONE HYDROCHLORIDE 10 MILLIGRAM(S): 40 TABLET ORAL at 22:17

## 2023-06-27 RX ADMIN — CHLORHEXIDINE GLUCONATE 15 MILLILITER(S): 213 SOLUTION TOPICAL at 18:11

## 2023-06-27 RX ADMIN — PANTOPRAZOLE SODIUM 40 MILLIGRAM(S): 20 TABLET, DELAYED RELEASE ORAL at 12:12

## 2023-06-27 RX ADMIN — NYSTATIN CREAM 1 APPLICATION(S): 100000 CREAM TOPICAL at 06:24

## 2023-06-27 RX ADMIN — Medication 60 MILLIGRAM(S): at 00:38

## 2023-06-27 RX ADMIN — Medication 1 APPLICATION(S): at 13:00

## 2023-06-27 RX ADMIN — LISINOPRIL 5 MILLIGRAM(S): 2.5 TABLET ORAL at 06:22

## 2023-06-27 RX ADMIN — Medication 60 MILLIGRAM(S): at 06:23

## 2023-06-27 RX ADMIN — Medication 250 MILLIGRAM(S): at 12:09

## 2023-06-27 NOTE — PROGRESS NOTE ADULT - ASSESSMENT
IMPRESSION:    Acute hypoxemic respiratory failure sp trach   Severe cavitary CAP MRSA  Small parapneumonic effusion SP Chest tube  MRSA bacteremia resolved   MASTER negative   Anemia sp transfusion   New onset A FIB rate controlled   LANCE non oliguric improving  Hypernatremia Improving  HO asthma   Influenza B     PLAN:    CNS:  Continue Methadone 10/10/5.  Klonopin 1 mg TID.  Zyprexa 5 .  Monitor QTc.       HEENT: Oral care. Trach care    PULMONARY:  HOB @ 45 degrees.  Aspiration precautions.    No change in vent settings.    Keep SaO2 92 TO 96%.  PS trial as tolerated     CARDIOVASCULAR:    Avoid overload,  rate control.  Amiodarone 100 mg daily.  Cardizem 60 mg Q6     GI: GI prophylaxis. PEG Feeding.  Bowel regimen.     RENAL:  Follow up lytes.  Correct as needed.     INFECTIOUS DISEASE: ABX Per ID.  Monitor Vanc levels.  Adjust Vancomycin     HEMATOLOGICAL:  DVT prophylaxis.    trend CBC. LE doppler - 6/13.   LMWH therapeutic     ENDOCRINE:  Follow up FS.  Insulin protocol if needed.      Prognosis is poor overall .     SDU

## 2023-06-27 NOTE — PROGRESS NOTE ADULT - SUBJECTIVE AND OBJECTIVE BOX
Patient is a 42y old  Female who presents with a chief complaint of AHRF (2023 08:17)        Over Night Events:  Remains on MV.  More awake.          ROS:     All ROS are negative except HPI         PHYSICAL EXAM    ICU Vital Signs Last 24 Hrs  T(C): 36.1 (2023 07:00), Max: 37.6 (2023 12:00)  T(F): 97 (2023 07:00), Max: 99.7 (2023 12:00)  HR: 101 (2023 07:00) (94 - 122)  BP: 118/76 (2023 07:00) (101/60 - 132/81)  BP(mean): 92 (2023 07:00) (71 - 110)  ABP: --  ABP(mean): --  RR: 20 (2023 07:00) (11 - 32)  SpO2: 99% (:00) (95% - 100%)    O2 Parameters below as of 2023 07:00  Patient On (Oxygen Delivery Method): ventilator            CONSTITUTIONAL:  In NAD    ENT:   Airway patent,   Mouth with normal mucosa.       EYES:   Pupils equal,   Round and reactive to light.    CARDIAC:   Normal rate,   Regular rhythm.      RESPIRATORY:   No wheezing  Bilateral BS  Normal chest expansion  Not tachypneic,  No use of accessory muscles    GASTROINTESTINAL:  Abdomen soft,   Non-tender,   No guarding,   + BS    MUSCULOSKELETAL:   Range of motion is not limited,  No clubbing, cyanosis    NEUROLOGICAL:   Alert     SKIN:   Skin normal color for race,   No evidence of rash.        23 @ 07:01  -  23 @ 07:00  --------------------------------------------------------  IN:    Enteral Tube Flush: 310 mL    Free Water: 100 mL    IV PiggyBack: 300 mL    Miscellaneous Tube Feedin mL  Total IN: 2060 mL    OUT:    Rectal Tube (mL): 250 mL    Voided (mL): 1000 mL  Total OUT: 1250 mL    Total NET: 810 mL          LABS:                            7.8    10.46 )-----------( 231      ( 2023 05:34 )             26.0                                               06-    135  |  95<L>  |  14  ----------------------------<  91  4.9   |  30  |  0.5<L>    Ca    8.7      2023 05:34  Phos  4.8       Mg     1.9         TPro  6.4  /  Alb  2.4<L>  /  TBili  0.2  /  DBili  x   /  AST  68<H>  /  ALT  40  /  AlkPhos  177<H>                                               Urinalysis Basic - ( 2023 05:34 )    Color: x / Appearance: x / SG: x / pH: x  Gluc: 91 mg/dL / Ketone: x  / Bili: x / Urobili: x   Blood: x / Protein: x / Nitrite: x   Leuk Esterase: x / RBC: x / WBC x   Sq Epi: x / Non Sq Epi: x / Bacteria: x                                                  LIVER FUNCTIONS - ( 2023 05:34 )  Alb: 2.4 g/dL / Pro: 6.4 g/dL / ALK PHOS: 177 U/L / ALT: 40 U/L / AST: 68 U/L / GGT: x                                                                                               Mode: AC/ CMV (Assist Control/ Continuous Mandatory Ventilation)  RR (machine): 16  TV (machine): 360  FiO2: 40  PEEP: 8  ITime: 1  MAP: 12  PIP: 23                                          MEDICATIONS  (STANDING):  aMIOdarone    Tablet 100 milliGRAM(s) Oral daily  chlorhexidine 0.12% Liquid 15 milliLiter(s) Oral Mucosa every 12 hours  chlorhexidine 2% Cloths 1 Application(s) Topical <User Schedule>  clonazePAM  Tablet 1 milliGRAM(s) Oral three times a day  diltiazem    Tablet 60 milliGRAM(s) Oral every 6 hours  enoxaparin Injectable 80 milliGRAM(s) SubCutaneous every 12 hours  insulin lispro (ADMELOG) corrective regimen sliding scale   SubCutaneous every 6 hours  lisinopril 5 milliGRAM(s) Oral daily  methadone    Tablet 10 milliGRAM(s) Oral every 8 hours  metroNIDAZOLE    Tablet 500 milliGRAM(s) Oral every 8 hours  nystatin Cream 1 Application(s) Topical two times a day  OLANZapine 5 milliGRAM(s) Oral daily  pantoprazole   Suspension 40 milliGRAM(s) Oral daily  vancomycin  IVPB 1000 milliGRAM(s) IV Intermittent <User Schedule>  vitamin A &amp; D Ointment 1 Application(s) Topical daily    MEDICATIONS  (PRN):  acetaminophen     Tablet .. 650 milliGRAM(s) Oral every 6 hours PRN Temp greater or equal to 38C (100.4F)

## 2023-06-27 NOTE — PROGRESS NOTE ADULT - ASSESSMENT
42 year-old female with PMH of Asthma, HTN? presents with complaint of cough x3 days and SOB.   Patient had dyspnea and increased work of breathing and chest pain,and was unable to properly provide accurate history.  She states that her cough has been progressively worsening over the past 3 days, endorses hemoptysis since yesterday. She states she had a friend who came over and stayed with for more than a week who was sick recently, but she does not know if she recently travelled out of State or not. She also admits that her children has been sick with sore throat and fever for past 3 days. She admits to fever, chills, severe chest pain which has gotten worse since she came to the ED.   She took 1x Tamiflu yesterday. She otherwise denies diarrhea, constipation, urinary symptoms. She is not vaccinated for Influenza or COVID-19.   CT Angio Chest PE Protocol: Bilateral patchy groundglass nodularity with dominant confluent left lower lobe opacification with numerous cavitary lesions. Additional contralateral right lower lobe cavitary 1 cm nodule.    ICU COURSE:     Patient admitted to the ICU for acute hypoxemic respiratory failure secondary to cavitary MRSA Pneumonia with a positive influenza B, finished a course of zyvox and tamiflu. This was complicated by loculated parapneumonic effusion for which a chest tube was inserted and tpa was given twice. Her stay was complicated by MRSA bacteremia  currently on vancomycin and metronidazole, cultures negative to date. MASTER was negative for vegetations. Trach and PEG done for inability to wean off vent, currently off sedation.   Her stay was also complicated by new onset Afib with RVR for which she is on amiodarone, diltiazem, and therapeutic anticoagulation. Long QT is also noted. Patient also had LANCE that improved with no need for RRT. Otherwise patient received a total of 6 p RBC for acute blood loss anemia ,currently hemoglobin stable.   Patient transferred to step down unit.       [] Acute hypoxemic respiratory failure / s/P Trach and PEG    []Severe cavitary CAP MRSA  []Small parapneumonic effusion SP Chest tube  []MRSA bacteremia  []Influenza B   []hx of Asthma     -finished a course zyvox and Tamiflu ( + influenza B)    -intubated , self-extubated , re-intubated ; SBT failed, SP trach to vent , and PEG  - PS as per pulm cc   -tuberculosis,Strep/Legionella negative, HIV negative   CHARLES positive 1:640 centromere pattern , repeat and check RF   Anti ds DNA negaitve, cANCA, pANCA negative   - repeat ET cultures negative   - off Solumedrol 40mg qd  -aspiration precautions, suction PRN  -s/p chest tube placement connected to suction --> tPA  and   - was 40mg Lasix daily, d/eusebia lasix, avoid overload.   -repeat CT chest: Small left pleural effusion with overall essentially unchanged exam.  -MRSA + s/p Mupirocin BID x5 days  -  blood cultures  NGTD SINCE    - s/p MASTER : No evidence of valvular vegetations or intracardiac abscesses to support IE. Pulmonary hepatisation incidental finding. EF 65%, mild TR and pulm HTN  - s/p course of jimbo, currently on vanco and flagyl, Monitor vanco trough level and adjust as per pharmacy   ID input appreciated      # Maculopapular buttocks rash  -PCR skin scraping negative for HSV --> d/c Acyclovir   -Wound care  as per wound care nurse note  [Deep Tissue Injury with Progression Sacrococcygeal region  Pink partial thickness skin loss  Macerated with dry maculopapules to bilateral buttocks]  will consult burn team   frequent turning, off loading positioning and skin care as per protocol, Maintain pressure injury prevention, Keep skin clean, Offload heels, Monitor wound for changes and notify provider if any   nutrition support     # LANCE, resolved   - post-infectious GN vs. ATN vs. pulmonary renal syndrome  - Cr 3 > 0.7  - renal U/S: negative   Creatinine Trend: 0.5<--, 0.5<--, <0.5<--, <0.5<--, <0.5<--, <0.5<--  - CK trending down 1483>39>41  - UA: +proteins and blood, Pr/Cr 1.3 (H)  - CHARLES+ 1:640, hep/HIV negative  - no acute indication for RRT    #  Microcytic Anemia likely  anemia of chronic disease   , resent iron panel and ferritin  improved   - s/p 6 units prbc so far last   - Restarted AC with lovenox  given Hb stable last few days  -monitor hemoglobin     # New onset A-fib w/ RVR  - HR controlled now  - s/p Amio drip - s/p Amio 200mg BID for 2 weeks --> then Amio 200mg daily (switch date )-->amio  to 100mg qd  given elevated qtc  - Cardizem po 60mg q6hr   - Restarted AC with lovenox  given Hb stable last few days  - EP consult appreciated     prolonged QTc  - ~550s now  - on tapering methadone and  zyprexa would taper slowly  , on amiodarone 100mg daily   - monitor qtc, wean meds slowly   avoid adding other QTc prolonging meds    EKG Today noted  QTc 534 improved from 595 before     Pending; ID/ Burn team , monitor Qtc , repeat CHARLES     would consider palliative team

## 2023-06-27 NOTE — PROGRESS NOTE ADULT - SUBJECTIVE AND OBJECTIVE BOX
SUBJECTIVE / OVERNIGHT EVENTS  Patient slept well overnight. No acute complaints this AM. Patient does not report fevers, chills, CP, SOB, or n/v/d.    MEDICATIONS  aMIOdarone    Tablet 100 milliGRAM(s) Oral daily  chlorhexidine 0.12% Liquid 15 milliLiter(s) Oral Mucosa every 12 hours  chlorhexidine 2% Cloths 1 Application(s) Topical <User Schedule>  clonazePAM  Tablet 1 milliGRAM(s) Oral three times a day  diltiazem    Tablet 60 milliGRAM(s) Oral every 6 hours  enoxaparin Injectable 80 milliGRAM(s) SubCutaneous every 12 hours  insulin lispro (ADMELOG) corrective regimen sliding scale   SubCutaneous every 6 hours  lisinopril 5 milliGRAM(s) Oral daily  methadone    Tablet 10 milliGRAM(s) Oral every 8 hours  metroNIDAZOLE    Tablet 500 milliGRAM(s) Oral every 8 hours  nystatin Cream 1 Application(s) Topical two times a day  OLANZapine 5 milliGRAM(s) Oral daily  pantoprazole   Suspension 40 milliGRAM(s) Oral daily  vancomycin  IVPB 1000 milliGRAM(s) IV Intermittent <User Schedule>  vitamin A &amp; D Ointment 1 Application(s) Topical daily    acetaminophen     Tablet .. 650 milliGRAM(s) Oral every 6 hours PRN Temp greater or equal to 38C (100.4F)    VITALS /  EXAM    T(C): 36.3 (23 @ 11:00), Max: 37.1 (23 @ 04:00)  HR: 107 (23 @ 14:07) (96 - 124)  BP: 125/77 (23 @ 11:00) (101/60 - 132/81)  RR: 18 (23 @ 11:00) (11 - 26)  SpO2: 97% (23 @ 14:07) (95% - 100%)  POCT Blood Glucose.: 109 mg/dL (23 @ 11:12)  POCT Blood Glucose.: 100 mg/dL (23 @ 06:58)  POCT Blood Glucose.: 95 mg/dL (23 @ 00:42)  POCT Blood Glucose.: 97 mg/dL (23 @ 17:53)    ENT: Airway patent, trached  EYES: Pupils equal, Round and reactive to light.  CARDIAC: Normal rate, Regular rhythm.    RESPIRATORY: No wheezing, Bilateral crackles, Not tachypneic, No use of accessory muscles  GASTROINTESTINAL: Abdomen soft, Non-tender, No guarding,   NEUROLOGICAL: Alert, follows simple commands  SKIN: Skin normal color for race, Warm and dry and intact.     I's & O's     23 @ 07:01  -  23 @ 07:00  --------------------------------------------------------  IN:    Enteral Tube Flush: 310 mL    Free Water: 100 mL    IV PiggyBack: 300 mL    Miscellaneous Tube Feedin mL  Total IN: 2060 mL    OUT:    Rectal Tube (mL): 250 mL    Voided (mL): 1000 mL  Total OUT: 1250 mL    Total NET: 810 mL        LABS             7.8    10.46 )-----------( 231      ( 23 @ 05:34 )             26.0     135  |  95  |  14  -------------------------<  91   23 @ 05:34  4.9  |  30  |  0.5    Ca      8.7     23 @ 05:34  Phos   4.8     23 @ 05:34  Mg     1.9     23 @ 05:34    TPro  6.4  /  Alb  2.4  /  TBili  0.2  /  DBili  x   /  AST  68  /  ALT  40  /  AlkPhos  177  /  GGT  x     23 @ 05:34                    Urinalysis Basic - ( 2023 05:34 )    Color: x / Appearance: x / SG: x / pH: x  Gluc: 91 mg/dL / Ketone: x  / Bili: x / Urobili: x   Blood: x / Protein: x / Nitrite: x   Leuk Esterase: x / RBC: x / WBC x   Sq Epi: x / Non Sq Epi: x / Bacteria: x      MICRO / IMAGING / CARDIOLOGY  Telemetry: Reviewed   EKG: Reviewed    CULTURES    IMAGING    CARDIOLOGY   SUBJECTIVE / OVERNIGHT EVENTS  Patient was seen and examined. Patient has a tracheostomy and a PEG. Patient slept comfortably and didn't have fever or chills.     MEDICATIONS  aMIOdarone    Tablet 100 milliGRAM(s) Oral daily  chlorhexidine 0.12% Liquid 15 milliLiter(s) Oral Mucosa every 12 hours  chlorhexidine 2% Cloths 1 Application(s) Topical <User Schedule>  clonazePAM  Tablet 1 milliGRAM(s) Oral three times a day  diltiazem    Tablet 60 milliGRAM(s) Oral every 6 hours  enoxaparin Injectable 80 milliGRAM(s) SubCutaneous every 12 hours  insulin lispro (ADMELOG) corrective regimen sliding scale   SubCutaneous every 6 hours  lisinopril 5 milliGRAM(s) Oral daily  methadone    Tablet 10 milliGRAM(s) Oral every 8 hours  metroNIDAZOLE    Tablet 500 milliGRAM(s) Oral every 8 hours  nystatin Cream 1 Application(s) Topical two times a day  OLANZapine 5 milliGRAM(s) Oral daily  pantoprazole   Suspension 40 milliGRAM(s) Oral daily  vancomycin  IVPB 1000 milliGRAM(s) IV Intermittent <User Schedule>  vitamin A &amp; D Ointment 1 Application(s) Topical daily    acetaminophen     Tablet .. 650 milliGRAM(s) Oral every 6 hours PRN Temp greater or equal to 38C (100.4F)    VITALS /  EXAM    T(C): 36.3 (23 @ 11:00), Max: 37.1 (23 @ 04:00)  HR: 107 (23 @ 14:07) (96 - 124)  BP: 125/77 (23 @ 11:00) (101/60 - 132/81)  RR: 18 (23 @ 11:00) ( - 26)  SpO2: 97% (23 @ 14:07) (95% - 100%)  POCT Blood Glucose.: 109 mg/dL (23 @ 11:12)  POCT Blood Glucose.: 100 mg/dL (23 @ 06:58)  POCT Blood Glucose.: 95 mg/dL (23 @ 00:42)  POCT Blood Glucose.: 97 mg/dL (23 @ 17:53)    ENT: Airway patent, trached  EYES: Pupils equal, Round and reactive to light.  CARDIAC: Normal rate, Regular rhythm.    RESPIRATORY: No wheezing, Bilateral crackles, Not tachypneic, No use of accessory muscles  GASTROINTESTINAL: Abdomen soft, Non-tender, No guarding,   NEUROLOGICAL: Alert, follows simple commands  SKIN: Skin normal color for race, Warm and dry and intact.     I's & O's     23 @ 07:01  -  23 @ 07:00  --------------------------------------------------------  IN:    Enteral Tube Flush: 310 mL    Free Water: 100 mL    IV PiggyBack: 300 mL    Miscellaneous Tube Feedin mL  Total IN: 2060 mL    OUT:    Rectal Tube (mL): 250 mL    Voided (mL): 1000 mL  Total OUT: 1250 mL    Total NET: 810 mL        LABS             7.8    10.46 )-----------( 231      ( 23 @ 05:34 )             26.0     135  |  95  |  14  -------------------------<  91   23 @ 05:34  4.9  |  30  |  0.5    Ca      8.7     23 @ 05:34  Phos   4.8     23 @ 05:34  Mg     1.9     23 @ 05:34    TPro  6.4  /  Alb  2.4  /  TBili  0.2  /  DBili  x   /  AST  68  /  ALT  40  /  AlkPhos  177  /  GGT  x     23 @ 05:34                    Urinalysis Basic - ( 2023 05:34 )    Color: x / Appearance: x / SG: x / pH: x  Gluc: 91 mg/dL / Ketone: x  / Bili: x / Urobili: x   Blood: x / Protein: x / Nitrite: x   Leuk Esterase: x / RBC: x / WBC x   Sq Epi: x / Non Sq Epi: x / Bacteria: x      MICRO / IMAGING / CARDIOLOGY  Telemetry: Reviewed   EKG: Reviewed    CULTURES    IMAGING    CARDIOLOGY   SUBJECTIVE / OVERNIGHT EVENTS  Patient was seen and examined. Patient has a tracheostomy and a PEG. Patient slept comfortably and didn't have fever or chills.     MEDICATIONS  aMIOdarone    Tablet 100 milliGRAM(s) Oral daily  chlorhexidine 0.12% Liquid 15 milliLiter(s) Oral Mucosa every 12 hours  chlorhexidine 2% Cloths 1 Application(s) Topical <User Schedule>  clonazePAM  Tablet 1 milliGRAM(s) Oral three times a day  diltiazem    Tablet 60 milliGRAM(s) Oral every 6 hours  enoxaparin Injectable 80 milliGRAM(s) SubCutaneous every 12 hours  insulin lispro (ADMELOG) corrective regimen sliding scale   SubCutaneous every 6 hours  lisinopril 5 milliGRAM(s) Oral daily  methadone    Tablet 10 milliGRAM(s) Oral every 8 hours  metroNIDAZOLE    Tablet 500 milliGRAM(s) Oral every 8 hours  nystatin Cream 1 Application(s) Topical two times a day  OLANZapine 5 milliGRAM(s) Oral daily  pantoprazole   Suspension 40 milliGRAM(s) Oral daily  vancomycin  IVPB 1000 milliGRAM(s) IV Intermittent <User Schedule>  vitamin A &amp; D Ointment 1 Application(s) Topical daily    acetaminophen     Tablet .. 650 milliGRAM(s) Oral every 6 hours PRN Temp greater or equal to 38C (100.4F)    VITALS /  EXAM    T(C): 36.3 (23 @ 11:00), Max: 37.1 (23 @ 04:00)  HR: 107 (23 @ 14:07) (96 - 124)  BP: 125/77 (23 @ 11:00) (101/60 - 132/81)  RR: 18 (23 @ 11:00) ( - 26)  SpO2: 97% (23 @ 14:07) (95% - 100%)  POCT Blood Glucose.: 109 mg/dL (23 @ 11:12)  POCT Blood Glucose.: 100 mg/dL (23 @ 06:58)  POCT Blood Glucose.: 95 mg/dL (23 @ 00:42)  POCT Blood Glucose.: 97 mg/dL (23 @ 17:53)    ENT: Airway patent, trached  EYES: Pupils equal, Round and reactive to light.  CARDIAC: Normal rate, Regular rhythm.    RESPIRATORY: No wheezing, Bilateral crackles, Not tachypneic, No use of accessory muscles  GASTROINTESTINAL: Abdomen soft, Non-tender, No guarding,   NEUROLOGICAL: Alert, follows simple commands  SKIN: Sacrum: ~ 2cm x 1cm partial thickness wound , pink and moist dermis. No purulent drainage, malodor or active bleeding evident,     I's & O's     23 @ 07:01  -  23 @ 07:00  --------------------------------------------------------  IN:    Enteral Tube Flush: 310 mL    Free Water: 100 mL    IV PiggyBack: 300 mL    Miscellaneous Tube Feedin mL  Total IN: 2060 mL    OUT:    Rectal Tube (mL): 250 mL    Voided (mL): 1000 mL  Total OUT: 1250 mL    Total NET: 810 mL        LABS             7.8    10.46 )-----------( 231      ( 23 @ 05:34 )             26.0     135  |  95  |  14  -------------------------<  91   23 @ 05:34  4.9  |  30  |  0.5    Ca      8.7     23 @ 05:34  Phos   4.8     23 @ 05:34  Mg     1.9     23 @ 05:34    TPro  6.4  /  Alb  2.4  /  TBili  0.2  /  DBili  x   /  AST  68  /  ALT  40  /  AlkPhos  177  /  GGT  x     23 @ 05:34                    Urinalysis Basic - ( 2023 05:34 )    Color: x / Appearance: x / SG: x / pH: x  Gluc: 91 mg/dL / Ketone: x  / Bili: x / Urobili: x   Blood: x / Protein: x / Nitrite: x   Leuk Esterase: x / RBC: x / WBC x   Sq Epi: x / Non Sq Epi: x / Bacteria: x      MICRO / IMAGING / CARDIOLOGY  Telemetry: Reviewed   EKG: Reviewed    CULTURES    IMAGING    CARDIOLOGY

## 2023-06-27 NOTE — PROGRESS NOTE ADULT - SUBJECTIVE AND OBJECTIVE BOX
FLO ZEYNEP  42y, Female  Allergy: aspirin (Short breath; Anaphylaxis)      LOS  35d    CHIEF COMPLAINT: AHRF (27 Jun 2023 09:22)      INTERVAL EVENTS/HPI  - No acute events overnight  - T(F): , Max: 99.7 (06-26-23 @ 12:00)  - Tolerating medication  - WBC Count: 10.46 (06-27-23 @ 05:34)  WBC Count: 11.42 (06-26-23 @ 04:44)     - Creatinine: 0.5 (06-27-23 @ 05:34)  Creatinine: 0.5 (06-26-23 @ 04:44)       ROS  unable to obtain history secondary to patient's mental status and/or sedation     VITALS:  T(F): 97, Max: 99.7 (06-26-23 @ 12:00)  HR: 110  BP: 118/76  RR: 20Vital Signs Last 24 Hrs  T(C): 36.1 (27 Jun 2023 07:00), Max: 37.6 (26 Jun 2023 12:00)  T(F): 97 (27 Jun 2023 07:00), Max: 99.7 (26 Jun 2023 12:00)  HR: 110 (27 Jun 2023 09:46) (94 - 122)  BP: 118/76 (27 Jun 2023 07:00) (101/60 - 132/81)  BP(mean): 92 (27 Jun 2023 07:00) (71 - 110)  RR: 20 (27 Jun 2023 07:00) (11 - 30)  SpO2: 98% (27 Jun 2023 09:46) (95% - 100%)    Parameters below as of 27 Jun 2023 07:00  Patient On (Oxygen Delivery Method): ventilator        PHYSICAL EXAM:  Gen: trach/ vent  CV: RRR  Lungs: Decreased BS at bases  Abd: Soft  Neuro: awake  Skin: no rash   Lines clean, no phlebitis     FH: Non-contributory  Social Hx: Non-contributory    TESTS & MEASUREMENTS:                        7.8    10.46 )-----------( 231      ( 27 Jun 2023 05:34 )             26.0     06-27    135  |  95<L>  |  14  ----------------------------<  91  4.9   |  30  |  0.5<L>    Ca    8.7      27 Jun 2023 05:34  Phos  4.8     06-27  Mg     1.9     06-27    TPro  6.4  /  Alb  2.4<L>  /  TBili  0.2  /  DBili  x   /  AST  68<H>  /  ALT  40  /  AlkPhos  177<H>  06-27      LIVER FUNCTIONS - ( 27 Jun 2023 05:34 )  Alb: 2.4 g/dL / Pro: 6.4 g/dL / ALK PHOS: 177 U/L / ALT: 40 U/L / AST: 68 U/L / GGT: x           Urinalysis Basic - ( 27 Jun 2023 05:34 )    Color: x / Appearance: x / SG: x / pH: x  Gluc: 91 mg/dL / Ketone: x  / Bili: x / Urobili: x   Blood: x / Protein: x / Nitrite: x   Leuk Esterase: x / RBC: x / WBC x   Sq Epi: x / Non Sq Epi: x / Bacteria: x        Culture - Sputum (collected 06-11-23 @ 11:47)  Source: Trach Asp Tracheal Aspirate  Gram Stain (06-11-23 @ 23:26):    Numerous polymorphonuclear leukocytes per low power field    Rare Squamous epithelial cells per low power field    No organisms seen per oil power field  Final Report (06-13-23 @ 16:58):    Normal Respiratory Laila present    Culture - Blood (collected 06-10-23 @ 11:41)  Source: .Blood None  Final Report (06-15-23 @ 20:01):    No Growth Final    Culture - Urine (collected 06-05-23 @ 10:35)  Source: Clean Catch Clean Catch (Midstream)  Final Report (06-06-23 @ 18:49):    10,000 - 49,000 CFU/mL Candida albicans "Susceptibilities not performed"    Culture - Body Fluid with Gram Stain (collected 06-02-23 @ 17:52)  Source: Pleural Fl Pleural Fluid  Gram Stain (06-03-23 @ 02:05):    polymorphonuclear leukocytes seen    No organisms seen    by cytocentrifuge  Final Report (06-07-23 @ 21:09):    No growth at 5 days    Culture - Acid Fast - Body Fluid w/Smear (collected 06-02-23 @ 17:52)  Source: .Body Fluid Other, Chest Tube  Preliminary Report (06-24-23 @ 15:06):    No acid-fast bacilli isolated at 3 weeks.    Culture - Fungal, Body Fluid (collected 06-02-23 @ 17:52)  Source: .Body Fluid Other, Chest tube  Preliminary Report (06-13-23 @ 12:19):    Rare Mold Like Fungus    Culture - Blood (collected 06-02-23 @ 05:00)  Source: .Blood Blood  Final Report (06-07-23 @ 16:01):    No Growth Final    Culture - Blood (collected 06-01-23 @ 04:59)  Source: .Blood None  Final Report (06-06-23 @ 19:00):    No Growth Final    Culture - Sputum (collected 06-01-23 @ 02:15)  Source: ET Tube ET Tube  Gram Stain (06-01-23 @ 17:36):    Moderate polymorphonuclear leukocytes per low power field    No Squamous epithelial cells per low power field    Rare Gram positive cocci in pairs per oil power field  Final Report (06-03-23 @ 12:27):    Normal Respiratory Laila present    Culture - Blood (collected 05-31-23 @ 04:48)  Source: .Blood None  Final Report (06-05-23 @ 16:00):    No Growth Final    Culture - Blood (collected 05-30-23 @ 04:30)  Source: .Blood Blood  Final Report (06-04-23 @ 14:01):    No Growth Final    Culture - Blood (collected 05-29-23 @ 05:55)  Source: .Blood Blood  Final Report (06-03-23 @ 14:01):    No Growth Final    Culture - Blood (collected 05-28-23 @ 16:00)  Source: .Blood Blood-Peripheral  Final Report (06-03-23 @ 03:00):    No Growth Final    Culture - Blood (collected 05-28-23 @ 12:00)  Source: .Blood Blood  Final Report (06-02-23 @ 21:00):    No Growth Final            INFECTIOUS DISEASES TESTING  Vancomycin Level, Trough: 12.2 (06-26-23 @ 04:44)  Vancomycin Level, Trough: 16.6 (06-24-23 @ 12:33)  Vancomycin Level, Trough: 38.5 (06-24-23 @ 06:19)  Vancomycin Level, Trough: 19.4 (06-22-23 @ 09:25)  Vancomycin Level, Random: 18.6 (06-21-23 @ 13:18)  Vancomycin Level, Trough: 36.7 (06-21-23 @ 10:50)  Vancomycin Level, Random: 9.0 (06-18-23 @ 04:38)  Vancomycin Level, Trough: 5.5 (06-16-23 @ 12:44)  Vancomycin Level, Trough: 8.9 (06-16-23 @ 10:53)  Vancomycin Level, Random: 8.5 (06-12-23 @ 04:55)  Vancomycin Level, Trough: 8.9 (06-08-23 @ 12:10)  Vancomycin Level, Trough: 12.0 (06-08-23 @ 04:40)  Vancomycin Level, Trough: 11.2 (06-04-23 @ 10:45)  Vancomycin Level, Trough: 11.9 (06-04-23 @ 04:40)  Procalcitonin, Serum: 1.76 (06-03-23 @ 05:23)  Vancomycin Level, Trough: 9.1 (06-02-23 @ 05:00)  Vancomycin Level, Trough: 11.9 (06-01-23 @ 21:55)  Procalcitonin, Serum: 5.80 (05-30-23 @ 11:39)  HIV-1/2 Combo Result: Nonreact (05-27-23 @ 05:47)  MRSA PCR Result.: Positive (05-24-23 @ 19:17)  Legionella Antigen, Urine: Negative (05-24-23 @ 19:16)  Streptococcus pneumoniae Ag, Ur Result: Negative (05-24-23 @ 19:16)  Procalcitonin, Serum: 48.30 (05-24-23 @ 06:10)  Fungitell: 41 (05-24-23 @ 06:10)  strept    INFLAMMATORY MARKERS      RADIOLOGY & ADDITIONAL TESTS:  I have personally reviewed the last available Chest xray  CXR      CT      CARDIOLOGY TESTING  12 Lead ECG:   Systolic  mmHg    Diastolic BP 85 mmHg    Ventricular Rate 128 BPM    Atrial Rate 128 BPM    P-R Interval 142 ms    QRS Duration 140 ms    Q-T Interval 408 ms    QTC Calculation(Bazett) 595 ms    P Axis 62 degrees    R Axis 55 degrees    T Axis 61 degrees    Diagnosis Line Sinus tachycardia  Right bundle branch block  Abnormal ECG    Confirmed by Tyrell Olivarez ([id number]) on 6/22/2023 9:13:53 AM (06-22-23 @ 07:33)  12 Lead ECG:   Ventricular Rate 78 BPM    Atrial Rate 78 BPM    P-R Interval 176 ms    QRS Duration 152 ms    Q-T Interval 456 ms    QTC Calculation(Bazett) 519 ms    P Axis 64 degrees    R Axis 38 degrees    T Axis 32 degrees    Diagnosis Line Normal sinus rhythm  Right bundle branch block  Abnormal ECG    Confirmed by Jorge Wyatt ([id number]),  Tyrell Olivarez ([id number]) on 6/17/2023  6:08:39 PM (06-16-23 @ 11:18)      MEDICATIONS  aMIOdarone    Tablet 100 Oral daily  chlorhexidine 0.12% Liquid 15 Oral Mucosa every 12 hours  chlorhexidine 2% Cloths 1 Topical <User Schedule>  clonazePAM  Tablet 1 Oral three times a day  diltiazem    Tablet 60 Oral every 6 hours  enoxaparin Injectable 80 SubCutaneous every 12 hours  insulin lispro (ADMELOG) corrective regimen sliding scale  SubCutaneous every 6 hours  lisinopril 5 Oral daily  methadone    Tablet 10 Oral every 8 hours  metroNIDAZOLE    Tablet 500 Oral every 8 hours  nystatin Cream 1 Topical two times a day  OLANZapine 5 Oral daily  pantoprazole   Suspension 40 Oral daily  vancomycin  IVPB 1000 IV Intermittent <User Schedule>  vitamin A &amp; D Ointment 1 Topical daily      WEIGHT  Weight (kg): 85 (06-14-23 @ 16:22)  Creatinine: 0.5 mg/dL (06-27-23 @ 05:34)      ANTIBIOTICS:  metroNIDAZOLE    Tablet 500 milliGRAM(s) Oral every 8 hours  vancomycin  IVPB 1000 milliGRAM(s) IV Intermittent <User Schedule>      All available historical records have been reviewed

## 2023-06-27 NOTE — PROGRESS NOTE ADULT - ASSESSMENT
ASSESSMENT  42 year-old female with PMH of Asthma, HTN? presents with complaint of cough x3 days and SOB. During my encounter pt with dyspnea and increased work of breathing and chest pain, unable to properly provide accurate history. She states that her cough has been progressively worsening over the past 3 days, endorses hemoptysis since yesterday.     IMPRESSION  #Rash only LUE , back  doubt drug rash without it being diffuse  initially appeared with small vesicles, not typical of shingles but HSV/VZV sent and is NEG  #Fever    6/11 sputum NG     6/5   10,000 - 49,000 CFU/mL Candida albicans "Susceptibilities not performed"- colonizer    6/2 BCX NGTD    6/2 pleural fluid     6/1 DTA   Normal Respiratory Laila present    5/31 BCX NGTD     5/30 BCX NGTD   #Flu B with cavitary PNA, with MRSA bacteremia and cavitary PNA, parapneumonic effusion s/p CT     5/29 BCX NGTD     5/28 BCX NGTD  x2    5/27 BCX +     5/26 BCX +    5/24 DTA MRSA    5/24 fungal ETT   Few Yeast, Fungitell: 41 (05-24-23 @ 06:10)-- unremarkable     5/23 BCX MRSA 3/4 bottles    MRSA PCR +     MASTER no vegetations   < from: CT Chest No Cont (05.29.23 @ 14:14) >  Diffusely increased number and size of bilateral cavitary lesions with   the left lower lobe now demonstrating a consolidative process of the   entirelobe.  Additional scattered bilateral ground glass opacities are noted. Findings   are compatible with progressing pneumonia.  WBC 16--> 3 12% Bands, Severe sepsis on admission  Doubt TB (lower lobe)  Acute hypoxemic resp failure requiring intubation  < from: CT Angio Chest PE Protocol w/ IV Cont (05.23.23 @ 22:06) >  1. Bilateral patchy groundglass nodularity with dominant confluent left   lower lobe opacification with numerous cavitary lesions. Additional   contralateral right lower lobe cavitary 1 cm nodule. Findings compatible   with cavitary pneumonia in the appropriate clinical setting; differential   diagnosis includes tuberculosis.  2. Confluent ill-defined left hilar and mediastinal adenopathy, likely   reactive, without dominant lymph node delineated.  3. No evidence of pulmonary embolism.  #Lactic acidosis  #LANCE, resolved  #Hyponatremia      RECOMMENDATIONS  - CTS following  - Continue Vanc dosing per ID pharmacy   - Continue flagyl 500mg TID PO   - Anticipate prolonged antibiotics course x 6 weeks end 7/9   - Guarded prognosis    If any questions, please call or send a message on [email or web address] Teams  Please continue to update ID with any pertinent new laboratory or radiographic findings  Spectra [phone]

## 2023-06-27 NOTE — PROGRESS NOTE ADULT - SUBJECTIVE AND OBJECTIVE BOX
T H I S   I S    N O  T   A    F I N A L I Z E D   N O T ZEYNEP SHARPE  42y, Female  Allergy: aspirin (Short breath; Anaphylaxis)    Hospital Day: 35d    Patient seen and examined earlier today.     PMH/PSH:  PAST MEDICAL & SURGICAL HISTORY:      LAST 24-Hr EVENTS:    VITALS:  T(F): 97.4 (06-27-23 @ 11:00), Max: 98.7 (06-27-23 @ 04:00)  HR: 124 (06-27-23 @ 11:00)  BP: 125/77 (06-27-23 @ 11:00) (101/60 - 132/81)  RR: 18 (06-27-23 @ 11:00)  SpO2: 98% (06-27-23 @ 11:00)  FiO2: 40        TESTS & MEASUREMENTS:  Weight/BMI      06-25-23 @ 07:01  -  06-26-23 @ 07:00  --------------------------------------------------------  IN: 2000 mL / OUT: 1075 mL / NET: 925 mL    06-26-23 @ 07:01  -  06-27-23 @ 07:00  --------------------------------------------------------  IN: 2060 mL / OUT: 1250 mL / NET: 810 mL                            7.8    10.46 )-----------( 231      ( 27 Jun 2023 05:34 )             26.0         06-27    135  |  95<L>  |  14  ----------------------------<  91  4.9   |  30  |  0.5<L>    Ca    8.7      27 Jun 2023 05:34  Phos  4.8     06-27  Mg     1.9     06-27    TPro  6.4  /  Alb  2.4<L>  /  TBili  0.2  /  DBili  x   /  AST  68<H>  /  ALT  40  /  AlkPhos  177<H>  06-27    LIVER FUNCTIONS - ( 27 Jun 2023 05:34 )  Alb: 2.4 g/dL / Pro: 6.4 g/dL / ALK PHOS: 177 U/L / ALT: 40 U/L / AST: 68 U/L / GGT: x                 Urinalysis Basic - ( 27 Jun 2023 05:34 )    Color: x / Appearance: x / SG: x / pH: x  Gluc: 91 mg/dL / Ketone: x  / Bili: x / Urobili: x   Blood: x / Protein: x / Nitrite: x   Leuk Esterase: x / RBC: x / WBC x   Sq Epi: x / Non Sq Epi: x / Bacteria: x          Ferritin: 469 ng/mL (06-23-23 @ 09:50)          Vancomycin Level, Trough: 12.2 ug/mL (06-26-23 @ 04:44)  Vancomycin Level, Trough: 16.6 ug/mL (06-24-23 @ 12:33)  Vancomycin Level, Trough: 38.5 ug/mL (06-24-23 @ 06:19)    A1C with Estimated Average Glucose Result: 5.6 % (05-25-23 @ 05:47)          RADIOLOGY, ECG, & ADDITIONAL TESTS:  12 Lead ECG:   Systolic  mmHg    Diastolic BP 85 mmHg    Ventricular Rate 128 BPM    Atrial Rate 128 BPM    P-R Interval 142 ms    QRS Duration 140 ms    Q-T Interval 408 ms    QTC Calculation(Bazett) 595 ms    P Axis 62 degrees    R Axis 55 degrees    T Axis 61 degrees    Diagnosis Line Sinus tachycardia  Right bundle branch block  Abnormal ECG    Confirmed by Tyrell Olivarez ([id number]) on 6/22/2023 9:13:53 AM (06-22-23 @ 07:33)      RECENT DIAGNOSTIC ORDERS:  Vancomycin Level, Random: 16:00 (06-27-23 @ 10:43)  12 Lead ECG (06-27-23 @ 09:31)  12 Lead ECG (06-27-23 @ 00:00)  Magnesium: AM Sched. Collection: 03-Jul-2023 04:30 (06-26-23 @ 14:42)  Magnesium: AM Sched. Collection: 02-Jul-2023 04:30 (06-26-23 @ 14:42)  Magnesium: AM Sched. Collection: 01-Jul-2023 04:30 (06-26-23 @ 14:42)  Magnesium: AM Sched. Collection: 30-Jun-2023 04:30 (06-26-23 @ 14:42)  Magnesium: AM Sched. Collection: 29-Jun-2023 04:30 (06-26-23 @ 14:42)  Magnesium: AM Sched. Collection: 28-Jun-2023 04:30 (06-26-23 @ 14:42)  Phosphorus: AM Sched. Collection: 03-Jul-2023 04:30 (06-26-23 @ 14:42)  Phosphorus: AM Sched. Collection: 02-Jul-2023 04:30 (06-26-23 @ 14:42)  Phosphorus: AM Sched. Collection: 01-Jul-2023 04:30 (06-26-23 @ 14:42)  Phosphorus: AM Sched. Collection: 30-Jun-2023 04:30 (06-26-23 @ 14:42)  Phosphorus: AM Sched. Collection: 29-Jun-2023 04:30 (06-26-23 @ 14:42)  Phosphorus: AM Sched. Collection: 28-Jun-2023 04:30 (06-26-23 @ 14:42)  Comprehensive Metabolic Panel: AM Sched. Collection: 03-Jul-2023 04:30 (06-26-23 @ 14:42)  Comprehensive Metabolic Panel: AM Sched. Collection: 02-Jul-2023 04:30 (06-26-23 @ 14:42)  Comprehensive Metabolic Panel: AM Sched. Collection: 01-Jul-2023 04:30 (06-26-23 @ 14:42)  Comprehensive Metabolic Panel: AM Sched. Collection: 30-Jun-2023 04:30 (06-26-23 @ 14:42)  Comprehensive Metabolic Panel: AM Sched. Collection: 29-Jun-2023 04:30 (06-26-23 @ 14:42)  Comprehensive Metabolic Panel: AM Sched. Collection: 28-Jun-2023 04:30 (06-26-23 @ 14:42)  Complete Blood Count + Automated Diff: AM Sched. Collection: 03-Jul-2023 04:30 (06-26-23 @ 14:42)  Complete Blood Count + Automated Diff: AM Sched. Collection: 02-Jul-2023 04:30 (06-26-23 @ 14:42)  Complete Blood Count + Automated Diff: AM Sched. Collection: 01-Jul-2023 04:30 (06-26-23 @ 14:42)  Complete Blood Count + Automated Diff: AM Sched. Collection: 30-Jun-2023 04:30 (06-26-23 @ 14:42)  Complete Blood Count + Automated Diff: AM Sched. Collection: 29-Jun-2023 04:30 (06-26-23 @ 14:42)  Complete Blood Count + Automated Diff: AM Sched. Collection: 28-Jun-2023 04:30 (06-26-23 @ 14:42)      MEDICATIONS:  MEDICATIONS  (STANDING):  aMIOdarone    Tablet 100 milliGRAM(s) Oral daily  chlorhexidine 0.12% Liquid 15 milliLiter(s) Oral Mucosa every 12 hours  chlorhexidine 2% Cloths 1 Application(s) Topical <User Schedule>  clonazePAM  Tablet 1 milliGRAM(s) Oral three times a day  diltiazem    Tablet 60 milliGRAM(s) Oral every 6 hours  enoxaparin Injectable 80 milliGRAM(s) SubCutaneous every 12 hours  insulin lispro (ADMELOG) corrective regimen sliding scale   SubCutaneous every 6 hours  lisinopril 5 milliGRAM(s) Oral daily  methadone    Tablet 10 milliGRAM(s) Oral every 8 hours  metroNIDAZOLE    Tablet 500 milliGRAM(s) Oral every 8 hours  nystatin Cream 1 Application(s) Topical two times a day  OLANZapine 5 milliGRAM(s) Oral daily  pantoprazole   Suspension 40 milliGRAM(s) Oral daily  vancomycin  IVPB 1000 milliGRAM(s) IV Intermittent <User Schedule>  vitamin A &amp; D Ointment 1 Application(s) Topical daily    MEDICATIONS  (PRN):  acetaminophen     Tablet .. 650 milliGRAM(s) Oral every 6 hours PRN Temp greater or equal to 38C (100.4F)      HOME MEDICATIONS:      PHYSICAL EXAM:  GENERAL:   CHEST/LUNG:   HEART:   ABDOMEN:   EXTREMITIES:             ROSSI, TINA  42y, Female  Allergy: aspirin (Short breath; Anaphylaxis)    Hospital Day: 35d    Patient seen and examined earlier today. she is non verbal     PMH/PSH:  PAST MEDICAL & SURGICAL HISTORY:      LAST 24-Hr EVENTS:    VITALS:  T(F): 97.4 (06-27-23 @ 11:00), Max: 98.7 (06-27-23 @ 04:00)  HR: 124 (06-27-23 @ 11:00)  BP: 125/77 (06-27-23 @ 11:00) (101/60 - 132/81)  RR: 18 (06-27-23 @ 11:00)  SpO2: 98% (06-27-23 @ 11:00)  FiO2: 40        TESTS & MEASUREMENTS:  Weight/BMI      06-25-23 @ 07:01  -  06-26-23 @ 07:00  --------------------------------------------------------  IN: 2000 mL / OUT: 1075 mL / NET: 925 mL    06-26-23 @ 07:01  -  06-27-23 @ 07:00  --------------------------------------------------------  IN: 2060 mL / OUT: 1250 mL / NET: 810 mL                            7.8    10.46 )-----------( 231      ( 27 Jun 2023 05:34 )             26.0         06-27    135  |  95<L>  |  14  ----------------------------<  91  4.9   |  30  |  0.5<L>    Ca    8.7      27 Jun 2023 05:34  Phos  4.8     06-27  Mg     1.9     06-27    TPro  6.4  /  Alb  2.4<L>  /  TBili  0.2  /  DBili  x   /  AST  68<H>  /  ALT  40  /  AlkPhos  177<H>  06-27    LIVER FUNCTIONS - ( 27 Jun 2023 05:34 )  Alb: 2.4 g/dL / Pro: 6.4 g/dL / ALK PHOS: 177 U/L / ALT: 40 U/L / AST: 68 U/L / GGT: x                 Urinalysis Basic - ( 27 Jun 2023 05:34 )    Color: x / Appearance: x / SG: x / pH: x  Gluc: 91 mg/dL / Ketone: x  / Bili: x / Urobili: x   Blood: x / Protein: x / Nitrite: x   Leuk Esterase: x / RBC: x / WBC x   Sq Epi: x / Non Sq Epi: x / Bacteria: x          Ferritin: 469 ng/mL (06-23-23 @ 09:50)          Vancomycin Level, Trough: 12.2 ug/mL (06-26-23 @ 04:44)  Vancomycin Level, Trough: 16.6 ug/mL (06-24-23 @ 12:33)  Vancomycin Level, Trough: 38.5 ug/mL (06-24-23 @ 06:19)    A1C with Estimated Average Glucose Result: 5.6 % (05-25-23 @ 05:47)          RADIOLOGY, ECG, & ADDITIONAL TESTS:  12 Lead ECG:   Systolic  mmHg    Diastolic BP 85 mmHg    Ventricular Rate 128 BPM    Atrial Rate 128 BPM    P-R Interval 142 ms    QRS Duration 140 ms    Q-T Interval 408 ms    QTC Calculation(Bazett) 595 ms    P Axis 62 degrees    R Axis 55 degrees    T Axis 61 degrees    Diagnosis Line Sinus tachycardia  Right bundle branch block  Abnormal ECG    Confirmed by Tyrell Olivarez ([id number]) on 6/22/2023 9:13:53 AM (06-22-23 @ 07:33)        MEDICATIONS:  MEDICATIONS  (STANDING):  aMIOdarone    Tablet 100 milliGRAM(s) Oral daily  chlorhexidine 0.12% Liquid 15 milliLiter(s) Oral Mucosa every 12 hours  chlorhexidine 2% Cloths 1 Application(s) Topical <User Schedule>  clonazePAM  Tablet 1 milliGRAM(s) Oral three times a day  diltiazem    Tablet 60 milliGRAM(s) Oral every 6 hours  enoxaparin Injectable 80 milliGRAM(s) SubCutaneous every 12 hours  insulin lispro (ADMELOG) corrective regimen sliding scale   SubCutaneous every 6 hours  lisinopril 5 milliGRAM(s) Oral daily  methadone    Tablet 10 milliGRAM(s) Oral every 8 hours  metroNIDAZOLE    Tablet 500 milliGRAM(s) Oral every 8 hours  nystatin Cream 1 Application(s) Topical two times a day  OLANZapine 5 milliGRAM(s) Oral daily  pantoprazole   Suspension 40 milliGRAM(s) Oral daily  vancomycin  IVPB 1000 milliGRAM(s) IV Intermittent <User Schedule>  vitamin A &amp; D Ointment 1 Application(s) Topical daily    MEDICATIONS  (PRN):  acetaminophen     Tablet .. 650 milliGRAM(s) Oral every 6 hours PRN Temp greater or equal to 38C (100.4F)      HOME MEDICATIONS:      PHYSICAL EXAM:  On exam  General: awake, non verbal , not following commands , + Trach , NAD  chronic ill appearance  Lungs:  b/l rhonchi , normal resp effort  Heart: regular ryhthm   Abdomen: soft, non tender, + PEG , rectal tube +  ext no edema

## 2023-06-27 NOTE — PHARMACOTHERAPY INTERVENTION NOTE - COMMENTS
Recommended obtaining repeat vancomycin level on 6/28 at 1600, prior to the fourth consecutive dose of vancomycin 1000mg IV q12h (does not need to be a trough because [employer] Bayesian vancomycin dosing software will adjust for levels collected early).

## 2023-06-27 NOTE — CHART NOTE - NSCHARTNOTEFT_GEN_A_CORE
Registered Dietitian Follow-Up     Patient Profile Reviewed                           Yes [x]   No []     Nutrition History Previously Obtained        Yes []  No [x]       Pertinent Subjective Information: Per RN, pt is tolerating current TF regimen well. No holds.     Pertinent Medical Information:   #Acute hypoxemic respiratory failure sp trach   #Small parapneumonic effusion SP Chest tube  #MRSA bacteremia resolved   #Anemia sp transfusion   #New onset A FIB rate controlled   #LANCE non oliguric improving  #Hypernatremia Improving  #HO asthma   #Influenza B      Diet order: Diet, NPO with Tube Feed:   Tube Feeding Modality: Orogastric  Replete  Total Volume for 24 Hours (mL): 1800  Continuous  Starting Tube Feed Rate {mL per Hour}: 60     Every 4 hours  Until Goal Tube Feed Rate (mL per Hour): 75  Tube Feed Duration (in Hours): 24  Tube Feed Start Time: 12:00  Free Water Flush   Total Volume per Flush (mL): 50   Frequency: Every 6 Hours  No Carb Prosource (1pkg = 15gms Protein)     Qty per Day:  1  Banatrol TF     Qty per Day:  2 (23 @ 11:56) [Active]    Anthropometrics:  Height (cm): 167.6 (23 @ 16:22)  Weight (kg): 85 (23 @ 16:22)  BMI (kg/m2): 30.3 (23 @ 16:22)  IBW: 52.1 KG    Daily Weight in k.5 (-27), Weight in k.6 (-26), Weight in k (-25), Weight in k.2 (-24), Weight in k.4 (-23), Weight in k.5 (-22), Weight in k.4 (-21), Weight in k.3 (-20), Weight in k.6 (-19), Weight in k.8 (-18), Weight in k.3 (-17), Weight in k.7 (-16), Weight in k.2 (-15), Weight in k.6 (-14), Weight in k.2 (13), Weight in k.7 (), Weight in k.5 (), Weight in k (06-10), Weight in k.6 (), Weight in k.6 (), Weight in k.8 ()  Weight Change: Wt fluctuations likely d/t fluid shifts; edema noted    MEDICATIONS  (STANDING):  chlorhexidine 0.12% Liquid 15 milliLiter(s) Oral Mucosa every 12 hours  chlorhexidine 2% Cloths 1 Application(s) Topical daily  chlorhexidine 2% Cloths 1 Application(s) Topical <User Schedule>  clonazePAM  Tablet 1 milliGRAM(s) Oral three times a day  dexMEDEtomidine Infusion 0.2 MICROgram(s)/kG/Hr (4.25 mL/Hr) IV Continuous <Continuous>  diltiazem    Tablet 30 milliGRAM(s) Oral every 6 hours  enoxaparin Injectable 80 milliGRAM(s) SubCutaneous every 12 hours  insulin lispro (ADMELOG) corrective regimen sliding scale   SubCutaneous every 6 hours  magnesium sulfate  IVPB 2 Gram(s) IV Intermittent every 2 hours  methadone    Tablet 10 milliGRAM(s) Oral every 8 hours  metroNIDAZOLE    Tablet 500 milliGRAM(s) Oral every 8 hours  OLANZapine 5 milliGRAM(s) Oral daily  pantoprazole   Suspension 40 milliGRAM(s) Oral daily  propofol Infusion 19.957 MICROgram(s)/kG/Min IV Continuous <Continuous>  -- not infusing since 10PM last night per flowsheets  vancomycin  IVPB 1500 milliGRAM(s) IV Intermittent every 12 hours  vitamin A &amp; D Ointment 1 Application(s) Topical daily    MEDICATIONS  (PRN):  acetaminophen     Tablet .. 650 milliGRAM(s) Oral every 6 hours PRN Temp greater or equal to 38C (100.4F)    Pertinent Labs:  @ 05:34 - RBC 3.38; H/H 7.8/26; Chloride 95; Cr 0.5; Alk Phos 177; AST/SGOT 68    Finger Sticks:  CAPILLARY BLOOD GLUCOSE  POCT Blood Glucose.: 100 mg/dL (2023 06:58)  POCT Blood Glucose.: 95 mg/dL (2023 00:42)  POCT Blood Glucose.: 97 mg/dL (2023 17:53)  POCT Blood Glucose.: 134 mg/dL (2023 11:07)    I&O's Detail    2023 07:01  -  2023 07:00  --------------------------------------------------------  IN:    Enteral Tube Flush: 310 mL    Free Water: 100 mL    IV PiggyBack: 300 mL    Miscellaneous Tube Feedin mL  Total IN: 2060 mL    OUT:    Rectal Tube (mL): 250 mL    Voided (mL): 1000 mL  Total OUT: 1250 mL    Total NET: 810 mL    Physical Findings:  - Appearance: trached  - GI function: dignishield; last BM   - Tubes: PEG  - Oral/Mouth cavity: NPO   - Skin: sacral suspected deep tissue injury  - Edema: 2+ generalized edema      Nutrition Requirements:  Weight Used: dosing weight 85 KG -- with consideration for intubation, age, BMI, SDTI, critical care     Estimated Energy Needs    Continue []  Adjust [x]  Energy Recommendations: 1511.54 kcal/day based on PSE Ve 7.4 TMAX 36.1     Estimated Protein Needs    Continue []  Adjust [x]  Protein Recommendations: 111-119 gm/day - 1.3-1.4 g/kg      Estimated Fluid Needs        Continue [x]  Adjust []  Fluid Recommendations: 2975 mL/day - adjusted fluid needs for BMI>30    Nutrient Intake: current TF provides 1220kcal, 114g protein, 907ml free water.    [x] Previous Nutrition Diagnosis:  Increased Nutrient Needs (protein)            [x] Ongoing          [] Resolved     Nutrition Education: N/A     Goal/Expected Outcome: Pt to meet >85% of estimated needs within 3-5 days     Indicator/Monitoring: Diet order, weights, labs, NFPF, body composition, BM and tolerance to TF regimen, GOC    Recommendation:  1. Continue with current TF regimen - pt tolerating well; will adjust prn  2. Maintain all aspiration precautions    Pt is at high nutrition risk; will f/u in 3-5 days or prn.    RD to remain available: Teresa Allen [phone] or [phone]

## 2023-06-28 LAB
ALBUMIN SERPL ELPH-MCNC: 2.6 G/DL — LOW (ref 3.5–5.2)
ALP SERPL-CCNC: 151 U/L — HIGH (ref 30–115)
ALT FLD-CCNC: 34 U/L — SIGNIFICANT CHANGE UP (ref 0–41)
ANION GAP SERPL CALC-SCNC: 9 MMOL/L — SIGNIFICANT CHANGE UP (ref 7–14)
AST SERPL-CCNC: 40 U/L — SIGNIFICANT CHANGE UP (ref 0–41)
BASOPHILS # BLD AUTO: 0.05 K/UL — SIGNIFICANT CHANGE UP (ref 0–0.2)
BASOPHILS NFR BLD AUTO: 0.4 % — SIGNIFICANT CHANGE UP (ref 0–1)
BILIRUB SERPL-MCNC: 0.2 MG/DL — SIGNIFICANT CHANGE UP (ref 0.2–1.2)
BUN SERPL-MCNC: 13 MG/DL — SIGNIFICANT CHANGE UP (ref 10–20)
CALCIUM SERPL-MCNC: 8.9 MG/DL — SIGNIFICANT CHANGE UP (ref 8.4–10.5)
CHLORIDE SERPL-SCNC: 94 MMOL/L — LOW (ref 98–110)
CO2 SERPL-SCNC: 32 MMOL/L — SIGNIFICANT CHANGE UP (ref 17–32)
CREAT SERPL-MCNC: 0.5 MG/DL — LOW (ref 0.7–1.5)
EGFR: 120 ML/MIN/1.73M2 — SIGNIFICANT CHANGE UP
EOSINOPHIL # BLD AUTO: 0.19 K/UL — SIGNIFICANT CHANGE UP (ref 0–0.7)
EOSINOPHIL NFR BLD AUTO: 1.6 % — SIGNIFICANT CHANGE UP (ref 0–8)
FERRITIN SERPL-MCNC: 387 NG/ML — HIGH (ref 15–150)
FERRITIN SERPL-MCNC: 493 NG/ML — HIGH (ref 15–150)
GLUCOSE BLDC GLUCOMTR-MCNC: 112 MG/DL — HIGH (ref 70–99)
GLUCOSE BLDC GLUCOMTR-MCNC: 113 MG/DL — HIGH (ref 70–99)
GLUCOSE BLDC GLUCOMTR-MCNC: 117 MG/DL — HIGH (ref 70–99)
GLUCOSE SERPL-MCNC: 107 MG/DL — HIGH (ref 70–99)
HCT VFR BLD CALC: 23.7 % — LOW (ref 37–47)
HGB BLD-MCNC: 7.3 G/DL — LOW (ref 12–16)
IMM GRANULOCYTES NFR BLD AUTO: 1.5 % — HIGH (ref 0.1–0.3)
IRON SATN MFR SERPL: 18 % — SIGNIFICANT CHANGE UP (ref 15–50)
IRON SATN MFR SERPL: 26 UG/DL — LOW (ref 35–150)
LYMPHOCYTES # BLD AUTO: 1.26 K/UL — SIGNIFICANT CHANGE UP (ref 1.2–3.4)
LYMPHOCYTES # BLD AUTO: 10.8 % — LOW (ref 20.5–51.1)
MAGNESIUM SERPL-MCNC: 1.7 MG/DL — LOW (ref 1.8–2.4)
MCHC RBC-ENTMCNC: 23.9 PG — LOW (ref 27–31)
MCHC RBC-ENTMCNC: 30.8 G/DL — LOW (ref 32–37)
MCV RBC AUTO: 77.5 FL — LOW (ref 81–99)
MONOCYTES # BLD AUTO: 0.92 K/UL — HIGH (ref 0.1–0.6)
MONOCYTES NFR BLD AUTO: 7.9 % — SIGNIFICANT CHANGE UP (ref 1.7–9.3)
NEUTROPHILS # BLD AUTO: 9.08 K/UL — HIGH (ref 1.4–6.5)
NEUTROPHILS NFR BLD AUTO: 77.8 % — HIGH (ref 42.2–75.2)
NRBC # BLD: 0 /100 WBCS — SIGNIFICANT CHANGE UP (ref 0–0)
PHOSPHATE SERPL-MCNC: 4.4 MG/DL — SIGNIFICANT CHANGE UP (ref 2.1–4.9)
PLATELET # BLD AUTO: 316 K/UL — SIGNIFICANT CHANGE UP (ref 130–400)
PMV BLD: 9.1 FL — SIGNIFICANT CHANGE UP (ref 7.4–10.4)
POTASSIUM SERPL-MCNC: 4.5 MMOL/L — SIGNIFICANT CHANGE UP (ref 3.5–5)
POTASSIUM SERPL-SCNC: 4.5 MMOL/L — SIGNIFICANT CHANGE UP (ref 3.5–5)
PROT SERPL-MCNC: 6.5 G/DL — SIGNIFICANT CHANGE UP (ref 6–8)
RBC # BLD: 3.06 M/UL — LOW (ref 4.2–5.4)
RBC # FLD: 28 % — HIGH (ref 11.5–14.5)
RHEUMATOID FACT SERPL-ACNC: <10 IU/ML — SIGNIFICANT CHANGE UP (ref 0–13)
SODIUM SERPL-SCNC: 135 MMOL/L — SIGNIFICANT CHANGE UP (ref 135–146)
TIBC SERPL-MCNC: 142 UG/DL — LOW (ref 220–430)
TROPONIN T SERPL-MCNC: <0.01 NG/ML — SIGNIFICANT CHANGE UP
UIBC SERPL-MCNC: 116 UG/DL — SIGNIFICANT CHANGE UP (ref 110–370)
VANCOMYCIN FLD-MCNC: 17.6 UG/ML — HIGH (ref 5–10)
WBC # BLD: 11.67 K/UL — HIGH (ref 4.8–10.8)
WBC # FLD AUTO: 11.67 K/UL — HIGH (ref 4.8–10.8)

## 2023-06-28 PROCEDURE — 74018 RADEX ABDOMEN 1 VIEW: CPT | Mod: 26

## 2023-06-28 PROCEDURE — 99233 SBSQ HOSP IP/OBS HIGH 50: CPT

## 2023-06-28 PROCEDURE — 93010 ELECTROCARDIOGRAM REPORT: CPT | Mod: 77

## 2023-06-28 PROCEDURE — 71045 X-RAY EXAM CHEST 1 VIEW: CPT | Mod: 26

## 2023-06-28 PROCEDURE — 93010 ELECTROCARDIOGRAM REPORT: CPT

## 2023-06-28 PROCEDURE — 99221 1ST HOSP IP/OBS SF/LOW 40: CPT

## 2023-06-28 RX ORDER — METHADONE HYDROCHLORIDE 40 MG/1
5 TABLET ORAL THREE TIMES A DAY
Refills: 0 | Status: DISCONTINUED | OUTPATIENT
Start: 2023-06-28 | End: 2023-07-01

## 2023-06-28 RX ORDER — CHLORHEXIDINE GLUCONATE 213 G/1000ML
15 SOLUTION TOPICAL EVERY 12 HOURS
Refills: 0 | Status: DISCONTINUED | OUTPATIENT
Start: 2023-06-28 | End: 2023-06-28

## 2023-06-28 RX ORDER — MORPHINE SULFATE 50 MG/1
1 CAPSULE, EXTENDED RELEASE ORAL ONCE
Refills: 0 | Status: DISCONTINUED | OUTPATIENT
Start: 2023-06-28 | End: 2023-06-28

## 2023-06-28 RX ORDER — POLYETHYLENE GLYCOL 3350 17 G/17G
17 POWDER, FOR SOLUTION ORAL ONCE
Refills: 0 | Status: COMPLETED | OUTPATIENT
Start: 2023-06-28 | End: 2023-06-28

## 2023-06-28 RX ORDER — MAGNESIUM SULFATE 500 MG/ML
2 VIAL (ML) INJECTION DAILY
Refills: 0 | Status: DISCONTINUED | OUTPATIENT
Start: 2023-06-28 | End: 2023-06-30

## 2023-06-28 RX ADMIN — Medication 60 MILLIGRAM(S): at 05:14

## 2023-06-28 RX ADMIN — Medication 1 MILLIGRAM(S): at 13:04

## 2023-06-28 RX ADMIN — Medication 30 MILLILITER(S): at 04:31

## 2023-06-28 RX ADMIN — MORPHINE SULFATE 1 MILLIGRAM(S): 50 CAPSULE, EXTENDED RELEASE ORAL at 05:15

## 2023-06-28 RX ADMIN — Medication 150 GRAM(S): at 16:33

## 2023-06-28 RX ADMIN — NYSTATIN CREAM 1 APPLICATION(S): 100000 CREAM TOPICAL at 17:19

## 2023-06-28 RX ADMIN — OLANZAPINE 5 MILLIGRAM(S): 15 TABLET, FILM COATED ORAL at 12:23

## 2023-06-28 RX ADMIN — CHLORHEXIDINE GLUCONATE 1 APPLICATION(S): 213 SOLUTION TOPICAL at 05:14

## 2023-06-28 RX ADMIN — LISINOPRIL 5 MILLIGRAM(S): 2.5 TABLET ORAL at 05:14

## 2023-06-28 RX ADMIN — SCOPALAMINE 1 PATCH: 1 PATCH, EXTENDED RELEASE TRANSDERMAL at 19:54

## 2023-06-28 RX ADMIN — AMIODARONE HYDROCHLORIDE 100 MILLIGRAM(S): 400 TABLET ORAL at 05:16

## 2023-06-28 RX ADMIN — Medication 60 MILLIGRAM(S): at 17:19

## 2023-06-28 RX ADMIN — MORPHINE SULFATE 1 MILLIGRAM(S): 50 CAPSULE, EXTENDED RELEASE ORAL at 04:25

## 2023-06-28 RX ADMIN — SCOPALAMINE 1 PATCH: 1 PATCH, EXTENDED RELEASE TRANSDERMAL at 04:31

## 2023-06-28 RX ADMIN — CHLORHEXIDINE GLUCONATE 15 MILLILITER(S): 213 SOLUTION TOPICAL at 05:15

## 2023-06-28 RX ADMIN — METHADONE HYDROCHLORIDE 5 MILLIGRAM(S): 40 TABLET ORAL at 22:22

## 2023-06-28 RX ADMIN — Medication 250 MILLIGRAM(S): at 12:25

## 2023-06-28 RX ADMIN — Medication 1 APPLICATION(S): at 12:25

## 2023-06-28 RX ADMIN — Medication 500 MILLIGRAM(S): at 05:14

## 2023-06-28 RX ADMIN — POLYETHYLENE GLYCOL 3350 17 GRAM(S): 17 POWDER, FOR SOLUTION ORAL at 04:31

## 2023-06-28 RX ADMIN — NYSTATIN CREAM 1 APPLICATION(S): 100000 CREAM TOPICAL at 05:16

## 2023-06-28 RX ADMIN — METHADONE HYDROCHLORIDE 10 MILLIGRAM(S): 40 TABLET ORAL at 05:18

## 2023-06-28 RX ADMIN — Medication 60 MILLIGRAM(S): at 12:23

## 2023-06-28 RX ADMIN — Medication 500 MILLIGRAM(S): at 13:04

## 2023-06-28 RX ADMIN — Medication 250 MILLIGRAM(S): at 22:22

## 2023-06-28 RX ADMIN — Medication 500 MILLIGRAM(S): at 22:23

## 2023-06-28 RX ADMIN — Medication 1 MILLIGRAM(S): at 05:18

## 2023-06-28 RX ADMIN — ENOXAPARIN SODIUM 80 MILLIGRAM(S): 100 INJECTION SUBCUTANEOUS at 12:22

## 2023-06-28 RX ADMIN — PANTOPRAZOLE SODIUM 40 MILLIGRAM(S): 20 TABLET, DELAYED RELEASE ORAL at 12:23

## 2023-06-28 RX ADMIN — ENOXAPARIN SODIUM 80 MILLIGRAM(S): 100 INJECTION SUBCUTANEOUS at 22:23

## 2023-06-28 RX ADMIN — SCOPALAMINE 1 PATCH: 1 PATCH, EXTENDED RELEASE TRANSDERMAL at 08:14

## 2023-06-28 RX ADMIN — METHADONE HYDROCHLORIDE 5 MILLIGRAM(S): 40 TABLET ORAL at 13:04

## 2023-06-28 RX ADMIN — Medication 1 MILLIGRAM(S): at 22:22

## 2023-06-28 NOTE — PROGRESS NOTE ADULT - SUBJECTIVE AND OBJECTIVE BOX
ROSSI, TINA  42y Female    CHIEF COMPLAINT:    Patient is a 42y old  Female who presents with a chief complaint of AHRF (2023 16:00)    INTERVAL HPI/OVERNIGHT EVENTS:    Patient seen and examined. No acute events overnight. remains vented    ROS: All other systems are negative.    Vital Signs:    T(F): 98.2 (23 @ 11:44), Max: 99.1 (23 @ 21:00)  HR: 108 (23 @ 14:50) (93 - 117)  BP: 130/80 (23 @ 11:44) (96/51 - 140/90)  RR: 20 (23 @ 08:30) (20 - 20)  SpO2: 100% (23 @ 14:50) (97% - 100%)    2023 07:01  -  2023 07:00  --------------------------------------------------------  IN: 1250 mL / OUT: 900 mL / NET: 350 mL    2023 07:01  -  2023 16:45  --------------------------------------------------------  IN: 0 mL / OUT: 1600 mL / NET: -1600 mL    Daily Weight in k.4 (2023 05:00)    POCT Blood Glucose.: 112 mg/dL (2023 11:16)  POCT Blood Glucose.: 117 mg/dL (2023 05:19)  POCT Blood Glucose.: 114 mg/dL (2023 23:58)  POCT Blood Glucose.: 122 mg/dL (2023 21:12)  POCT Blood Glucose.: 115 mg/dL (2023 16:48)    PHYSICAL EXAM:    GENERAL:  NAD chronically ill appearing   SKIN: No rashes or lesions  HEENT: Atraumatic. Normocephalic.   NECK: Supple, No JVD.    PULMONARY: decreased breath sounds B/L. No wheezing   CVS: Normal S1, S2. Rate and Rhythm are regular   ABDOMEN/GI: Soft, Nontender, Nondistended   MSK:  No clubing or cyanosis   NEUROLOGIC: moves all extremities   PSYCH: Awake     Consultant(s) Notes Reviewed:  [x ] YES  [ ] NO  Care Discussed with Consultants/Other Providers [ x] YES  [ ] NO    LABS:                        7.3    11.67 )-----------( 316      ( 2023 07:52 )             23.7     135  |  94<L>  |  13  ----------------------------<  107<H>  4.5   |  32  |  0.5<L>    Ca    8.9      2023 07:52  Phos  4.4       Mg     1.7         TPro  6.5  /  Alb  2.6<L>  /  TBili  0.2  /  DBili  x   /  AST  40  /  ALT  34  /  AlkPhos  151<H>      Trop <0.01, CKMB --, CK --, 23 @ 06:23    RADIOLOGY & ADDITIONAL TESTS:  Imaging or report Personally Reviewed:  [x] YES  [ ] NO  EKG reviewed: [x] YES  [ ] NO    Medications:  Standing  aMIOdarone    Tablet 100 milliGRAM(s) Oral daily  chlorhexidine 0.12% Liquid 15 milliLiter(s) Oral Mucosa every 12 hours  chlorhexidine 2% Cloths 1 Application(s) Topical <User Schedule>  clonazePAM  Tablet 1 milliGRAM(s) Oral three times a day  diltiazem    Tablet 60 milliGRAM(s) Oral every 6 hours  enoxaparin Injectable 80 milliGRAM(s) SubCutaneous every 12 hours  insulin lispro (ADMELOG) corrective regimen sliding scale   SubCutaneous every 6 hours  lisinopril 5 milliGRAM(s) Oral daily  magnesium sulfate  IVPB 2 Gram(s) IV Intermittent daily  methadone    Tablet 5 milliGRAM(s) Oral three times a day  metroNIDAZOLE    Tablet 500 milliGRAM(s) Oral every 8 hours  nystatin Cream 1 Application(s) Topical two times a day  OLANZapine 5 milliGRAM(s) Oral daily  pantoprazole   Suspension 40 milliGRAM(s) Oral daily  vancomycin  IVPB 1000 milliGRAM(s) IV Intermittent <User Schedule>  vitamin A &amp; D Ointment 1 Application(s) Topical daily    PRN Meds  acetaminophen     Tablet .. 650 milliGRAM(s) Oral every 6 hours PRN  aluminum hydroxide/magnesium hydroxide/simethicone Suspension 30 milliLiter(s) Oral every 4 hours PRN

## 2023-06-28 NOTE — CONSULT NOTE ADULT - NS ATTEND AMEND GEN_ALL_CORE FT
Pt seen 6/4    43 yo F with h/o asthma and HTN admitted 5/24 with MRSA PNA still intubated noted to have b/l cavitary lesions w/ additional left consolidation and effusion now with new onset AF with RVR. Patient was started on Amio and converted to NSR.     Rec  - Cont Amio drip, when completed can switch to Amio PO 200mg BID x 2 wks f/b 200mg daily. Baseline LFTs noted. Check TSH and free T4  - Start OAC for stroke prevention after anemia work up is complete. CHADS VASc is at least 2 (HTN, F).   - Daily EKG to assess QTc  - Treat underlying infection as this will continue to exacerbate AF RVR  - Recall EP as needed [id number]
NECK PAIN
As above patient seen in consultation as requested for sacral/buttock ulcer  Chart reviewed  Examination-small wound 2.5 x 1 cm  of the buttocks sacral area-pale yellow slough at the base    No surgical intervention  Further management is appropriate for certified wound care nurse specialist/nursing protocol  Offloading, positional changes and attention to skin hygiene

## 2023-06-28 NOTE — CONSULT NOTE ADULT - SUBJECTIVE AND OBJECTIVE BOX
HPI:  42 year-old female with PMH of Asthma, HTN? presents with complaint of cough x3 days and SOB. During my encounter pt with dyspnea and increased work of breathing and chest pain, unable to properly provide accurate history. She states that her cough has been progressively worsening over the past 3 days, endorses hemoptysis since yesterday. She states she had a friend who came over and stayed with for more than a week who was sick recently, but she does not know if she recently travelled out of [facility] or not. She also admits that her children has been sick with Sore throat and fever for past 3 days. She admits to fever, chills, sever Chest pain which has gotten worse since she came to the ED. She took 1x Tamiflu yesterday. She otherwise denies diarrhea, constipation, urinary symptoms. She is not vaccinated for Influenza or COVID-19.     In ED  /87, , RR 28, T 100.8 F, 98% on 15 L NRB  Labs significant for wbc 16.7K with Neutrophilic Predominance and L shift, Hgb 11.4, MCV 61.6, Cr 2.0, BUN 23, Alk Phos 125/AST 62/ALT 27, Lac 4.9> 3.9  RVP + Influenza B      CT Angio Chest PE Protocol w/ IV Cont   1. Bilateral patchy groundglass nodularity with dominant confluent left lower lobe opacification with numerous cavitary lesions. Additional contralateral right lower lobe cavitary 1 cm nodule. Findings compatible with cavitary pneumonia in the appropriate clinical setting; differential diagnosis includes tuberculosis.  2. Confluent ill-defined left hilar and mediastinal adenopathy, likely reactive, without dominant lymph node delineated.  3. No evidence of pulmonary embolism.    s/p 1x Rocephin, Meropenem, 3 L LR in ED    Pt admitted to SDU for further managements, during my encounter pt is very agitated, in acute distress. Pt received appropriate pain control with IV Morphine, s/p Xanax 1 mg 1x for agitation and anxiety, despite pain control and adequate fluid resuscitation pt remained tachycardic, tachypneic. STAT EKG reviewed with Cardiology team on call After discussion with Cardiology team, Pt received 1x Adenosine 6 mg IV push with no change. Case discussed with Critical Care team, decision was made for Intubation and Pt upgraded to MICU.   (24 May 2023 00:58)        BURN consulted for sacral ulcer     Allergies    aspirin (Short breath; Anaphylaxis)    Intolerances      PAST MEDICAL & SURGICAL HISTORY:                            7.3    11.67 )-----------( 316      ( 28 Jun 2023 07:52 )             23.7     ICU Vital Signs Last 24 Hrs  T(C): 36.8 (28 Jun 2023 11:44), Max: 37.3 (27 Jun 2023 21:00)  T(F): 98.2 (28 Jun 2023 11:44), Max: 99.1 (27 Jun 2023 21:00)  HR: 103 (28 Jun 2023 11:44) (93 - 117)  BP: 130/80 (28 Jun 2023 11:44) (96/51 - 140/90)  BP(mean): 99 (28 Jun 2023 11:44) (71 - 99)  RR: 20 (28 Jun 2023 08:30) (18 - 20)  SpO2: 100% (28 Jun 2023 11:44) (97% - 100%)    O2 Parameters below as of 27 Jun 2023 21:00  Patient On (Oxygen Delivery Method): ventilator      PHYSICAL EXAM:  GENERAL: NAD,   HEAD:  Atraumatic, Normocephalic  CHEST/LUNG: trach in place, on vent   HEART: in no acute cardiopulmonary distress   SKIN: Sacrum: ~ 2cm x 1cm partial thickness wound , pink and moist dermis. No purulent drainage, malodor or active bleeding evident,        HPI:  42 year-old female with PMH of Asthma, HTN? presents with complaint of cough x3 days and SOB. During my encounter pt with dyspnea and increased work of breathing and chest pain, unable to properly provide accurate history. She states that her cough has been progressively worsening over the past 3 days, endorses hemoptysis since yesterday. She states she had a friend who came over and stayed with for more than a week who was sick recently, but she does not know if she recently travelled out of [facility] or not. She also admits that her children has been sick with Sore throat and fever for past 3 days. She admits to fever, chills, sever Chest pain which has gotten worse since she came to the ED. She took 1x Tamiflu yesterday. She otherwise denies diarrhea, constipation, urinary symptoms. She is not vaccinated for Influenza or COVID-19.     In ED  /87, , RR 28, T 100.8 F, 98% on 15 L NRB  Labs significant for wbc 16.7K with Neutrophilic Predominance and L shift, Hgb 11.4, MCV 61.6, Cr 2.0, BUN 23, Alk Phos 125/AST 62/ALT 27, Lac 4.9> 3.9  RVP + Influenza B      CT Angio Chest PE Protocol w/ IV Cont   1. Bilateral patchy groundglass nodularity with dominant confluent left lower lobe opacification with numerous cavitary lesions. Additional contralateral right lower lobe cavitary 1 cm nodule. Findings compatible with cavitary pneumonia in the appropriate clinical setting; differential diagnosis includes tuberculosis.  2. Confluent ill-defined left hilar and mediastinal adenopathy, likely reactive, without dominant lymph node delineated.  3. No evidence of pulmonary embolism.    s/p 1x Rocephin, Meropenem, 3 L LR in ED    Pt admitted to SDU for further managements, during my encounter pt is very agitated, in acute distress. Pt received appropriate pain control with IV Morphine, s/p Xanax 1 mg 1x for agitation and anxiety, despite pain control and adequate fluid resuscitation pt remained tachycardic, tachypneic. STAT EKG reviewed with Cardiology team on call After discussion with Cardiology team, Pt received 1x Adenosine 6 mg IV push with no change. Case discussed with Critical Care team, decision was made for Intubation and Pt upgraded to MICU.   (24 May 2023 00:58)        BURN consulted for sacral ulcer     Allergies    aspirin (Short breath; Anaphylaxis)    Intolerances      PAST MEDICAL & SURGICAL HISTORY:                            7.3    11.67 )-----------( 316      ( 28 Jun 2023 07:52 )             23.7     ICU Vital Signs Last 24 Hrs  T(C): 36.8 (28 Jun 2023 11:44), Max: 37.3 (27 Jun 2023 21:00)  T(F): 98.2 (28 Jun 2023 11:44), Max: 99.1 (27 Jun 2023 21:00)  HR: 103 (28 Jun 2023 11:44) (93 - 117)  BP: 130/80 (28 Jun 2023 11:44) (96/51 - 140/90)  BP(mean): 99 (28 Jun 2023 11:44) (71 - 99)  RR: 20 (28 Jun 2023 08:30) (18 - 20)  SpO2: 100% (28 Jun 2023 11:44) (97% - 100%)    O2 Parameters below as of 27 Jun 2023 21:00  Patient On (Oxygen Delivery Method): ventilator      PHYSICAL EXAM:  GENERAL: NAD,   HEAD:  Atraumatic, Normocephalic  CHEST/LUNG: trach in place, on vent   HEART: in no acute cardiopulmonary distress   SKIN: Sacrum/ buttock : ~ 2.5cm x 1cm  wound with pale slough at base , pink and moist dermis. No purulent drainage, malodor or active bleeding evident,

## 2023-06-28 NOTE — PHARMACOTHERAPY INTERVENTION NOTE - COMMENTS
Recommended continuing vancomycin 1g IV q12h in the setting of a vancomycin trough level of 17.6mcg/mL. Utilizing PrecisePK, a Bayesian pharmacokinetic modeling program, the patient's current vancomycin AUC is ~450, of which is within the goal range of 400-600.    Elliot Mak PharmD  Clinical Pharmacy Specialist, Infectious Diseases  Tele-Antimicrobial Stewardship Program (Tele-ASP)  Tele-ASP Phone: [phone]

## 2023-06-28 NOTE — PROGRESS NOTE ADULT - ASSESSMENT
· Assessment	  42 year old female with PMHx Asthma, HTN presents with cough and SOB x3 days and hemoptysis admitted for cavitary CAP.    # Acute hypoxemic respiratory failure 2/2 cavitary MRSA PNA  -finished a course zyvox and Tamiflu ( + influenza B)    -intubated , self-extubated , re-intubated ; SBT failed, SP trach and PEG  -Currenntly on prececedex, PS  -tuberculosis,Strep/Legionella negative, HIV negative   - repeat CT chest: Diffusely increased number and size of bilateral cavitary lesions with the left lower lobe now demonstrating a consolidative process of the entirelobe, Resolved  - repeat ET cultures negative   - d/c Solumedrol 40mg qd  -aspiration precautions  - Pressure support on Vent only    #Paranneumonic effusion  -s/p chest tube placement connected to suction --> tPA  and   - c/w 40mg Lasix daily, d/eusebia lasix, avoid overload  -repeat CT chest: Small left pleural effusion with overall essentially unchanged exam.    # MRSA bacteremia  - s/p Mupirocin BID x5 days  -  blood cultures -: NGTD x3  - s/p MASTER : No evidence of valvular vegetations or intracardiac abscesses to support IE. Pulmonary hepatisation incidental finding. EF 65%, mild TR and pulm HTN  - s/p course of jimbo, currently on vanco and flagyl till August    # Maculopapular buttocks rash  -PCR skin scraping negative for HSV --> d/c Acyclovir   -Wound care    # LANCE, resolved   - post-infectious GN vs. ATN vs. pulmonary renal syndrome  - Cr 3 > 0.7  - renal U/S: negative   - CK trending down 1483>39>41  - UA: +proteins and blood, Pr/Cr 1.3 (H)  - CHARLES+ 1:640, hep/HIV negative  - no acute indication for RRT    #  Microcytic Anemia  - Iron deficiency vs anemia of chronic disease   - DIC panel negative   - Dimer 1665, LE Duplex negative for DVT  - hold heparin drip. SCDs  - s/p 3u pRBCs; Hgb stable around 7/8  - CTAP: r/o retroperitoneal bleed  - Iron 7%, low tibc, resent iron panel and ferritin   - s/p 6 units prbc so far last   - Restarted AC with lovenox  given Hb stable last few days    # New onset A-fib w/ RVR  - HR controlled now  - s/p Amio drip  - EP consulted  - c/w Amio 200mg BID for 2 weeks --> then Amio 200mg daily (switch date )-->amio  to 100mg qd  given elevated qtc  - Cardizem po 60mg q6hr   - Restarted AC with lovenox  given Hb stable last few days    #Elevated QTc  - daily EKGs  - decreasemethadone to 5mg TID, zyprexa decreased to 5mg qd, amiodarone on 100mg qd  - replete lytes  - monitor qtc    #Wound care  - wash wound with soap and water, cover with allevyn twice a day  -Surgical debridement     #DVT ppx: LMWH bid   #GI ppx: Protonix Pantoprazole 40mg qd  #Diet: NPO w/ PEG feed   #Activity: bedrest  vomiting

## 2023-06-28 NOTE — PROGRESS NOTE ADULT - ASSESSMENT
42 year-old female with PMH of Asthma, HTN? presents with complaint of cough x3 days and SOB. In the ED was found to have Bilateral patchy groundglass nodularity with dominant confluent left lower lobe opacification with numerous cavitary lesions. Additional contralateral right lower lobe cavitary 1 cm nodule on CT. Patient was admitted to the ICU for acute hypoxemic respiratory failure secondary to cavitary MRSA Pneumonia with a positive influenza B, finished a course of zyvox and tamiflu. This was complicated by loculated parapneumonic effusion for which a chest tube was inserted and tpa was given twice. Her stay was complicated by MRSA bacteremia  currently on vancomycin and metronidazole, cultures negative to date. MASTER was negative for vegetations. Trach and PEG done for inability to wean off vent, currently off sedation. Her stay was also complicated by new onset Afib with RVR for which she is on amiodarone, diltiazem, and therapeutic anticoagulation. Long QT is also noted. Patient also had LANCE that improved with no need for RRT. Otherwise patient received a total of 6 p RBC for acute blood loss anemia ,currently hemoglobin stable. Patient transferred to step down unit.     Acute hypoxemic respiratory failure / s/P Trach and PEG    evere cavitary CAP MRSA  Small parapneumonic effusion SP Chest tube  MRSA bacteremia  Influenza B   hx of Asthma   Intubated 5/23 self-extubated 5/25, re-intubated 5/25; SBT failed, SP trach/G tube  - s/p Zyvox, meropenem and Tamifu, now on Vancomycin and flagyl, anticipated end date 7/9  - monitor levels  - s/p course of steroids now off  -  blood cultures  NGTD SINCE 5/28   - s/p MASTER 5/31: No evidence of valvular vegetations or intracardiac abscesses to support IE. Pulmonary hepatisation incidental finding. EF 65%, mild TR and pulm HTN  - ventillator management per pulm/cc  - on Methadone 5/5/5 and olanzapine from MICU due to prolonged time on Fentanyl. Monitor daily EGS for QTC. Keep Mg ~2 and K ~4     Maculopapular buttocks rash  -PCR skin scraping negative for HSV --> off  Acyclovir   -Wound care per burn, wound care RN following     LANCE, resolved   - post-infectious GN vs. ATN vs. pulmonary renal syndrome  - Cr peaked  3 > now 0.5  - renal U/S: negative   - daily BMP     Microcytic Anemia likely  anemia of chronic disease   - s/p 6 units prbc so far last 6/5  - Restarted AC with lovenox 6/22 given Hb stable last few days  -monitor hemoglobin     New onset A-fib w/ RVR  - HR controlled now  - s/p Amio drip, now on PO  - Cardizem po 60mg q6hr   - therapeutic Lovenox   - EP consult appreciated     Overall prognosis is guarded, continue monitoring in SDU

## 2023-06-28 NOTE — PROGRESS NOTE ADULT - SUBJECTIVE AND OBJECTIVE BOX
SUBJECTIVE / OVERNIGHT EVENTS  Patient was seen and examined. Patient has a tracheostomy and a PEG. Patient slept comfortably and didn't have fever or chills.     MEDICATIONS  aMIOdarone    Tablet 100 milliGRAM(s) Oral daily  chlorhexidine 0.12% Liquid 15 milliLiter(s) Oral Mucosa every 12 hours  chlorhexidine 2% Cloths 1 Application(s) Topical <User Schedule>  clonazePAM  Tablet 1 milliGRAM(s) Oral three times a day  diltiazem    Tablet 60 milliGRAM(s) Oral every 6 hours  enoxaparin Injectable 80 milliGRAM(s) SubCutaneous every 12 hours  insulin lispro (ADMELOG) corrective regimen sliding scale   SubCutaneous every 6 hours  lisinopril 5 milliGRAM(s) Oral daily  magnesium sulfate  IVPB 2 Gram(s) IV Intermittent daily  methadone    Tablet 5 milliGRAM(s) Oral three times a day  metroNIDAZOLE    Tablet 500 milliGRAM(s) Oral every 8 hours  nystatin Cream 1 Application(s) Topical two times a day  OLANZapine 5 milliGRAM(s) Oral daily  pantoprazole   Suspension 40 milliGRAM(s) Oral daily  vancomycin  IVPB 1000 milliGRAM(s) IV Intermittent <User Schedule>  vitamin A &amp; D Ointment 1 Application(s) Topical daily    acetaminophen     Tablet .. 650 milliGRAM(s) Oral every 6 hours PRN Temp greater or equal to 38C (100.4F)  aluminum hydroxide/magnesium hydroxide/simethicone Suspension 30 milliLiter(s) Oral every 4 hours PRN Dyspepsia    VITALS /  EXAM    T(C): 36.8 (23 @ 11:44), Max: 37.3 (23 @ 21:00)  HR: 103 (23 @ 11:44) (93 - 117)  BP: 130/80 (23 @ 11:44) (96/51 - 140/90)  RR: 20 (23 @ 08:30) (20 - 20)  SpO2: 100% (23 @ 11:44) (97% - 100%)  POCT Blood Glucose.: 112 mg/dL (23 @ 11:16)  POCT Blood Glucose.: 117 mg/dL (23 @ 05:19)  POCT Blood Glucose.: 114 mg/dL (23 @ 23:58)  POCT Blood Glucose.: 122 mg/dL (23 @ 21:12)  POCT Blood Glucose.: 115 mg/dL (23 @ 16:48)    In NAD   ENT: Airway patent, trached  EYES: Pupils equal, Round and reactive to light.  CARDIAC: Normal rate, IRRegular rhythm.    RESPIRATORY: No wheezing, Bilateral BS, Not tachypneic, No use of accessory muscles  GASTROINTESTINAL: Abdomen soft, Non-tender, No guarding,   NEUROLOGICAL: Alert.  followign simple commands   SKIN: Skin normal color for race, Warm and dry and intact.     I's & O's     23 @ 07:01  -  23 @ 07:00  --------------------------------------------------------  IN:    Free Water: 100 mL    IV PiggyBack: 250 mL    Miscellaneous Tube Feedin mL  Total IN: 1250 mL    OUT:    Voided (mL): 900 mL  Total OUT: 900 mL    Total NET: 350 mL      23 @ 07:01  -  23 @ 16:00  --------------------------------------------------------  IN:  Total IN: 0 mL    OUT:    Rectal Tube (mL): 500 mL    Voided (mL): 1100 mL  Total OUT: 1600 mL    Total NET: -1600 mL        LABS             7.3    11.67 )-----------( 316      ( 23 @ 07:52 )             23.7     135  |  94  |  13  -------------------------<  107   23 @ 07:52  4.5  |  32  |  0.5    Ca      8.9     23 @ 07:52  Phos   4.4     23 @ 07:52  Mg     1.7     23 @ 07:52    TPro  6.5  /  Alb  2.6  /  TBili  0.2  /  DBili  x   /  AST  40  /  ALT  34  /  AlkPhos  151  /  GGT  x     23 @ 07:52      Troponin T, Serum: <0.01 ng/mL (23 @ 06:23)                Urinalysis Basic - ( 2023 07:52 )    Color: x / Appearance: x / SG: x / pH: x  Gluc: 107 mg/dL / Ketone: x  / Bili: x / Urobili: x   Blood: x / Protein: x / Nitrite: x   Leuk Esterase: x / RBC: x / WBC x   Sq Epi: x / Non Sq Epi: x / Bacteria: x      MICRO / IMAGING / CARDIOLOGY  Telemetry: Reviewed   EKG: Reviewed    CULTURES    IMAGING  PACS Image:  (23 @ 05:41)  PACS Image:  (23 @ 05:37)    CARDIOLOGY   SUBJECTIVE / OVERNIGHT EVENTS  Patient was seen and examined. Patient has a tracheostomy and a PEG. Patient slept comfortably and didn't have fever or chills.     MEDICATIONS  aMIOdarone    Tablet 100 milliGRAM(s) Oral daily  chlorhexidine 0.12% Liquid 15 milliLiter(s) Oral Mucosa every 12 hours  chlorhexidine 2% Cloths 1 Application(s) Topical <User Schedule>  clonazePAM  Tablet 1 milliGRAM(s) Oral three times a day  diltiazem    Tablet 60 milliGRAM(s) Oral every 6 hours  enoxaparin Injectable 80 milliGRAM(s) SubCutaneous every 12 hours  insulin lispro (ADMELOG) corrective regimen sliding scale   SubCutaneous every 6 hours  lisinopril 5 milliGRAM(s) Oral daily  magnesium sulfate  IVPB 2 Gram(s) IV Intermittent daily  methadone    Tablet 5 milliGRAM(s) Oral three times a day  metroNIDAZOLE    Tablet 500 milliGRAM(s) Oral every 8 hours  nystatin Cream 1 Application(s) Topical two times a day  OLANZapine 5 milliGRAM(s) Oral daily  pantoprazole   Suspension 40 milliGRAM(s) Oral daily  vancomycin  IVPB 1000 milliGRAM(s) IV Intermittent <User Schedule>  vitamin A &amp; D Ointment 1 Application(s) Topical daily    acetaminophen     Tablet .. 650 milliGRAM(s) Oral every 6 hours PRN Temp greater or equal to 38C (100.4F)  aluminum hydroxide/magnesium hydroxide/simethicone Suspension 30 milliLiter(s) Oral every 4 hours PRN Dyspepsia    VITALS /  EXAM    T(C): 36.8 (23 @ 11:44), Max: 37.3 (23 @ 21:00)  HR: 103 (23 @ 11:44) (93 - 117)  BP: 130/80 (23 @ 11:44) (96/51 - 140/90)  RR: 20 (23 @ 08:30) (20 - 20)  SpO2: 100% (23 @ 11:44) (97% - 100%)  POCT Blood Glucose.: 112 mg/dL (23 @ 11:16)  POCT Blood Glucose.: 117 mg/dL (23 @ 05:19)  POCT Blood Glucose.: 114 mg/dL (23 @ 23:58)  POCT Blood Glucose.: 122 mg/dL (23 @ 21:12)  POCT Blood Glucose.: 115 mg/dL (23 @ 16:48)    In NAD   ENT: Airway patent, trached  EYES: Pupils equal, Round and reactive to light.  CARDIAC: Normal rate, IRRegular rhythm.    RESPIRATORY: No wheezing, Bilateral BS, Not tachypneic, No use of accessory muscles  GASTROINTESTINAL: Abdomen soft, Non-tender, No guarding,   NEUROLOGICAL: Alert.  followign simple commands   SKIN: Sacrum: ~ 2cm x 1cm partial thickness wound , pink and moist dermis. No purulent drainage, malodor or active bleeding evident,     I's & O's     23 @ 07:01  -  23 @ 07:00  --------------------------------------------------------  IN:    Free Water: 100 mL    IV PiggyBack: 250 mL    Miscellaneous Tube Feedin mL  Total IN: 1250 mL    OUT:    Voided (mL): 900 mL  Total OUT: 900 mL    Total NET: 350 mL      23 @ 07:01  -  23 @ 16:00  --------------------------------------------------------  IN:  Total IN: 0 mL    OUT:    Rectal Tube (mL): 500 mL    Voided (mL): 1100 mL  Total OUT: 1600 mL    Total NET: -1600 mL        LABS             7.3    11.67 )-----------( 316      ( 23 @ 07:52 )             23.7     135  |  94  |  13  -------------------------<  107   23 @ 07:52  4.5  |  32  |  0.5    Ca      8.9     23 @ 07:52  Phos   4.4     23 @ 07:52  Mg     1.7     23 @ 07:52    TPro  6.5  /  Alb  2.6  /  TBili  0.2  /  DBili  x   /  AST  40  /  ALT  34  /  AlkPhos  151  /  GGT  x     23 @ 07:52      Troponin T, Serum: <0.01 ng/mL (23 @ 06:23)                Urinalysis Basic - ( 2023 07:52 )    Color: x / Appearance: x / SG: x / pH: x  Gluc: 107 mg/dL / Ketone: x  / Bili: x / Urobili: x   Blood: x / Protein: x / Nitrite: x   Leuk Esterase: x / RBC: x / WBC x   Sq Epi: x / Non Sq Epi: x / Bacteria: x      MICRO / IMAGING / CARDIOLOGY  Telemetry: Reviewed   EKG: Reviewed    CULTURES    IMAGING  PACS Image:  (23 @ 05:41)  PACS Image:  (23 @ 05:37)    CARDIOLOGY

## 2023-06-28 NOTE — PHARMACOTHERAPY INTERVENTION NOTE - OUTCOME
REASON FOR VISIT:   Chief Complaint   Patient presents with   • Gastro-intestinal Problem     FU HCV \"I've been good.\"       LAST VISIT WITH ME:  2017.    HISTORY OF PRESENT ILLNESS:    The patient is a 60 year old female who is being seen by GI for follow-up regarding a history of chronic HCV, status post treatment with resolution and demonstrated SVR. Her liver function tests have completely normalized and we reviewed her recent labs from 17 as well as the liver Doppler ultrasound which has completely normalized with no further findings of liver steatosis and the spleen which was previouslt enlarged on the Doppler ultrasound from 17 has now normalized. She did have a complete colonoscopy to the cecum performed by Dr. Michel on 2016 with findings of a solitary tubular adenoma that was removed and follow colonoscopy would be recommended in 5 years from her previous exam. She also has a history of hepatic hemosiderosis which has normalized and the iron saturation is normal at 23%. Her current liver function tests are normal. Her appetite is good and her weight remains stable and there is no recent history of jaundice, pruritus, fever, chills, sweats, abdominal pain, dysphagia, odynophagia, melena, hematemesis, hematochezia. Her past medical, surgical, family and social history and allergies and medications have been reviewed with her as well as in the electronic record.    PAST MEDICAL HISTORY:    Hepatitis C                                                   Hemosiderosis                                                 Urticaria                                                     PAST SURGICAL HISTORY:    DEXA BONE DENSITY AXIAL SKELETON                2011    REMOVAL OF TONSILS,<13 Y/O                      89          HYSTERECTOMY                                               SECTION, LOW TRANSVERSE                                Comment: , ,      ESOPHAGOGASTRODUODENOSCOPY                      12/28/15      ADENOIDECTOMY                                                 COLONOSCOPY                                     8/10/11         Comment: repeat in 5 yrs. family hx    COLONOSCOPY                                     12/28/2016      Comment: repeat colon in 5 yrs, adenoma polyps, family                hx colon ca, sister    Current Outpatient Prescriptions   Medication Sig   • losartan (COZAAR) 25 MG tablet TAKE 1 TABLET BY MOUTH DAILY   • Cholecalciferol (VITAMIN D) 2000 units capsule Take 1 capsule by mouth daily.   • levofloxacin (LEVAQUIN) 500 MG tablet Take 1 tablet by mouth daily.     No current facility-administered medications for this visit.        ALLERGIES:   Allergen Reactions   • Codeine VOMITING   • Demerol VOMITING        Family History   Problem Relation Age of Onset   • Heart failure Mother    • Ovarian cancer Mother    • Asthma Father    • Cancer Father      Lung   • Breast cancer Sister    • Colon cancer Sister    • Asthma Brother    • Diabetes Maternal Grandmother        Social History     Social History   • Marital status:      Spouse name: N/A   • Number of children: N/A   • Years of education: N/A     Occupational History   • Not on file.     Social History Main Topics   • Smoking status: Never Smoker   • Smokeless tobacco: Never Used   • Alcohol use No   • Drug use: Unknown   • Sexual activity: Not on file     Other Topics Concern   • Not on file     Social History Narrative   • No narrative on file          REVIEW OF SYSTEMS:  A 13-point review of systems was performed and positive and pertinent negative findings are included in my History of Present Illness.    PHYSICAL EXAM:  Vitals:   Visit Vitals  /70 (BP Location: [facility], Patient Position: Sitting, Cuff Size: Regular)   Pulse 76   Resp 16   Ht 5' 1\" (1.549 m)   Wt 61.2 kg   BMI 25.51 kg/m²        Constitutional: Alert and oriented x3, no acute distress. Well-developed,  accepted well-nourished, affect normal;  Skin: No jaundice on inspection. Skin is warm and dry on palpation.No rash;  Eyes: Normal conjunctivae and lids, sclerae anicteric.   Pulmonary: Clear to percussion and auscultation.   Cardiovascular: Regular rate and rhythm on auscultation. No lower extremity edema.  Abdomen: Soft, nontender, no distention, positive bowel sounds, no hepatosplenomegaly. No mass;  Rectal: Deferred.  Musculoskeletal: Patient moving all extremities appropriately.   Neurologic: Grossly intact.   Psychiatric: Mood and affect are normal.    ASSESSMENT:   (B18.2) Chronic hepatitis C without hepatic coma (CMS/HCC)  (primary encounter diagnosis)  Comment: History of chronic HCV, now in SVR; complete normalization of her liver function tests and liver ultrasound and Doppler study is now normal; previous findings of liver steatosis and splenomegaly have now resolved on ultrasound.  Plan: Recheck hepatic function profile, and iron panel, and quantitative HCV titer in one year along with liver Doppler ultrasound in one year. Return at that time.    (Z86.010) History of colon polyps  Comment: History of a tubular adenoma removed at the time of colonoscopy 12/28/16.  Plan: Follow colonoscopy in 4 years.      PLAN:  No orders of the defined types were placed in this encounter.      FOLLOW-UP:  Return in about 1 year (around 12/27/2018) for HCV.

## 2023-06-28 NOTE — PROGRESS NOTE ADULT - SUBJECTIVE AND OBJECTIVE BOX
Patient is a 42y old  Female who presents with a chief complaint of AHRF (2023 15:57)        Over Night Events:  Off pressors.       ROS:     All ROS are negative except HPI         PHYSICAL EXAM    ICU Vital Signs Last 24 Hrs  T(C): 37.1 (2023 08:30), Max: 37.3 (2023 21:00)  T(F): 98.7 (2023 08:30), Max: 99.1 (2023 21:00)  HR: 116 (2023 08:30) (93 - 124)  BP: 96/51 (2023 08:30) (96/51 - 140/90)  BP(mean): 71 (2023 08:30) (71 - 96)  ABP: --  ABP(mean): --  RR: 20 (2023 08:30) (18 - 20)  SpO2: 98% (2023 08:30) (97% - 100%)    O2 Parameters below as of 2023 21:00  Patient On (Oxygen Delivery Method): ventilator              ENT: Airway patent, trached  EYES: Pupils equal, Round and reactive to light.  CARDIAC: Normal rate, IRRegular rhythm.    RESPIRATORY: No wheezing, Bilateral BS, Not tachypneic, No use of accessory muscles  GASTROINTESTINAL: Abdomen soft, Non-tender, No guarding,   NEUROLOGICAL: Alert and oriented   SKIN: Skin normal color for race, Warm and dry and intact.         23 @ 07:01  -  23 @ 07:00  --------------------------------------------------------  IN:    Free Water: 100 mL    IV PiggyBack: 250 mL    Miscellaneous Tube Feedin mL  Total IN: 1250 mL    OUT:    Voided (mL): 900 mL  Total OUT: 900 mL    Total NET: 350 mL      LABS:                            7.8    10.46 )-----------( 231      ( 2023 05:34 )             26.0                                               06-    135  |  95<L>  |  14  ----------------------------<  91  4.9   |  30  |  0.5<L>    Ca    8.7      2023 05:34  Phos  4.8       Mg     1.9         TPro  6.4  /  Alb  2.4<L>  /  TBili  0.2  /  DBili  x   /  AST  68<H>  /  ALT  40  /  AlkPhos  177<H>                                               Urinalysis Basic - ( 2023 05:34 )    Color: x / Appearance: x / SG: x / pH: x  Gluc: 91 mg/dL / Ketone: x  / Bili: x / Urobili: x   Blood: x / Protein: x / Nitrite: x   Leuk Esterase: x / RBC: x / WBC x   Sq Epi: x / Non Sq Epi: x / Bacteria: x        CARDIAC MARKERS ( 2023 06:23 )  x     / <0.01 ng/mL / x     / x     / x                                                LIVER FUNCTIONS - ( 2023 05:34 )  Alb: 2.4 g/dL / Pro: 6.4 g/dL / ALK PHOS: 177 U/L / ALT: 40 U/L / AST: 68 U/L / GGT: x                                                                     Mode: AC/ CMV (Assist Control/ Continuous Mandatory Ventilation)  RR (machine): 16  TV (machine): 360  FiO2: 40  PEEP: 8  ITime: 1  MAP: 13  PIP: 24                                          MEDICATIONS  (STANDING):  aMIOdarone    Tablet 100 milliGRAM(s) Oral daily  chlorhexidine 0.12% Liquid 15 milliLiter(s) Oral Mucosa every 12 hours  chlorhexidine 2% Cloths 1 Application(s) Topical <User Schedule>  clonazePAM  Tablet 1 milliGRAM(s) Oral three times a day  diltiazem    Tablet 60 milliGRAM(s) Oral every 6 hours  enoxaparin Injectable 80 milliGRAM(s) SubCutaneous every 12 hours  insulin lispro (ADMELOG) corrective regimen sliding scale   SubCutaneous every 6 hours  lisinopril 5 milliGRAM(s) Oral daily  methadone    Tablet 10 milliGRAM(s) Oral every 8 hours  metroNIDAZOLE    Tablet 500 milliGRAM(s) Oral every 8 hours  nystatin Cream 1 Application(s) Topical two times a day  OLANZapine 5 milliGRAM(s) Oral daily  pantoprazole   Suspension 40 milliGRAM(s) Oral daily  vancomycin  IVPB 1000 milliGRAM(s) IV Intermittent <User Schedule>  vitamin A &amp; D Ointment 1 Application(s) Topical daily    MEDICATIONS  (PRN):  acetaminophen     Tablet .. 650 milliGRAM(s) Oral every 6 hours PRN Temp greater or equal to 38C (100.4F)  aluminum hydroxide/magnesium hydroxide/simethicone Suspension 30 milliLiter(s) Oral every 4 hours PRN Dyspepsia         Patient is a 42y old  Female who presents with a chief complaint of AHRF (2023 15:57)        Over Night Events:  On MV.  Off pressors.       ROS:     All ROS are negative except HPI         PHYSICAL EXAM    ICU Vital Signs Last 24 Hrs  T(C): 37.1 (2023 08:30), Max: 37.3 (2023 21:00)  T(F): 98.7 (2023 08:30), Max: 99.1 (2023 21:00)  HR: 116 (2023 08:30) (93 - 124)  BP: 96/51 (2023 08:30) (96/51 - 140/90)  BP(mean): 71 (2023 08:30) (71 - 96)  ABP: --  ABP(mean): --  RR: 20 (2023 08:30) (18 - 20)  SpO2: 98% (2023 08:30) (97% - 100%)    O2 Parameters below as of 2023 21:00  Patient On (Oxygen Delivery Method): ventilator            In NAD   ENT: Airway patent, trached  EYES: Pupils equal, Round and reactive to light.  CARDIAC: Normal rate, IRRegular rhythm.    RESPIRATORY: No wheezing, Bilateral BS, Not tachypneic, No use of accessory muscles  GASTROINTESTINAL: Abdomen soft, Non-tender, No guarding,   NEUROLOGICAL: Alert.  followign simple commands   SKIN: Skin normal color for race, Warm and dry and intact.         23 @ 07:01  -  23 @ 07:00  --------------------------------------------------------  IN:    Free Water: 100 mL    IV PiggyBack: 250 mL    Miscellaneous Tube Feedin mL  Total IN: 1250 mL    OUT:    Voided (mL): 900 mL  Total OUT: 900 mL    Total NET: 350 mL      LABS:                            7.8    10.46 )-----------( 231      ( 2023 05:34 )             26.0                                               06    135  |  95<L>  |  14  ----------------------------<  91  4.9   |  30  |  0.5<L>    Ca    8.7      2023 05:34  Phos  4.8       Mg     1.9         TPro  6.4  /  Alb  2.4<L>  /  TBili  0.2  /  DBili  x   /  AST  68<H>  /  ALT  40  /  AlkPhos  177<H>                                               Urinalysis Basic - ( 2023 05:34 )    Color: x / Appearance: x / SG: x / pH: x  Gluc: 91 mg/dL / Ketone: x  / Bili: x / Urobili: x   Blood: x / Protein: x / Nitrite: x   Leuk Esterase: x / RBC: x / WBC x   Sq Epi: x / Non Sq Epi: x / Bacteria: x        CARDIAC MARKERS ( 2023 06:23 )  x     / <0.01 ng/mL / x     / x     / x                                                LIVER FUNCTIONS - ( 2023 05:34 )  Alb: 2.4 g/dL / Pro: 6.4 g/dL / ALK PHOS: 177 U/L / ALT: 40 U/L / AST: 68 U/L / GGT: x                                                                     Mode: AC/ CMV (Assist Control/ Continuous Mandatory Ventilation)  RR (machine): 16  TV (machine): 360  FiO2: 40  PEEP: 8  ITime: 1  MAP: 13  PIP: 24                                          MEDICATIONS  (STANDING):  aMIOdarone    Tablet 100 milliGRAM(s) Oral daily  chlorhexidine 0.12% Liquid 15 milliLiter(s) Oral Mucosa every 12 hours  chlorhexidine 2% Cloths 1 Application(s) Topical <User Schedule>  clonazePAM  Tablet 1 milliGRAM(s) Oral three times a day  diltiazem    Tablet 60 milliGRAM(s) Oral every 6 hours  enoxaparin Injectable 80 milliGRAM(s) SubCutaneous every 12 hours  insulin lispro (ADMELOG) corrective regimen sliding scale   SubCutaneous every 6 hours  lisinopril 5 milliGRAM(s) Oral daily  methadone    Tablet 10 milliGRAM(s) Oral every 8 hours  metroNIDAZOLE    Tablet 500 milliGRAM(s) Oral every 8 hours  nystatin Cream 1 Application(s) Topical two times a day  OLANZapine 5 milliGRAM(s) Oral daily  pantoprazole   Suspension 40 milliGRAM(s) Oral daily  vancomycin  IVPB 1000 milliGRAM(s) IV Intermittent <User Schedule>  vitamin A &amp; D Ointment 1 Application(s) Topical daily    MEDICATIONS  (PRN):  acetaminophen     Tablet .. 650 milliGRAM(s) Oral every 6 hours PRN Temp greater or equal to 38C (100.4F)  aluminum hydroxide/magnesium hydroxide/simethicone Suspension 30 milliLiter(s) Oral every 4 hours PRN Dyspepsia

## 2023-06-28 NOTE — CONSULT NOTE ADULT - ASSESSMENT
ASSESSMENT:  Partial thickness sacral ulcer       RECOMMENDATION:  Wound care -Please wash wound with soap and water, cover with allevyn twice a day  Surgical debridement   IV abx as indicated/ per ID  Offloading/ positional changes  Will follow. ASSESSMENT:  Partial thickness sacral ulcer       RECOMMENDATION:  Wound care -Please wash wound with soap and water, cover with Allevyn twice a day  No Surgical debridement needed at this time  Offloading/ positional changes  Consult wound care nurse specialist for further management    Remainder of care per primary team

## 2023-06-28 NOTE — PHARMACOTHERAPY INTERVENTION NOTE - COMMENTS
Recommended obtaining a repeat vancomycin trough level ~3-5 days or earlier in the setting of unstable renal function or clinical status.    Elliot Mak PharmD  Clinical Pharmacy Specialist, Infectious Diseases  Tele-Antimicrobial Stewardship Program (Tele-[facility])  Tele-ASP Phone: [phone]

## 2023-06-28 NOTE — PROGRESS NOTE ADULT - ASSESSMENT
IMPRESSION:    Acute hypoxemic respiratory failure sp trach   Severe cavitary CAP MRSA  Small parapneumonic effusion SP Chest tube  MRSA bacteremia resolved   MASTER negative   Anemia sp transfusion   New onset A FIB rate controlled   LANCE non oliguric improving  Hypernatremia Improving  HO asthma   Influenza B     PLAN:    CNS:  Low Methadone.   Klonopin 1 mg TID.  Zyprexa 5 .  Monitor QTC.       HEENT: Oral care. Trach care    PULMONARY:  HOB @ 45 degrees.  Aspiration precautions.    No change in vent settings.    Keep SaO2 92 TO 96%.  PS trial as tolerated     CARDIOVASCULAR:    Avoid overload,  rate control.  Amiodarone 100 mg daily.  Cardizem 60 mg Q6     GI: GI prophylaxis. PEG Feeding.  Bowel regimen.     RENAL:  Follow up lytes.  Correct as needed.     INFECTIOUS DISEASE: ABX Per ID.  Monitor Vanc levels.  Adjust Vancomycin     HEMATOLOGICAL:  DVT prophylaxis.    trend CBC. LE doppler - 6/13.   LMWH therapeutic     ENDOCRINE:  Follow up FS.  Insulin protocol if needed.      Prognosis is poor overall .     SDU   IMPRESSION:    Acute hypoxemic respiratory failure sp trach   Severe cavitary CAP MRSA  Small parapneumonic effusion SP Chest tube  MRSA bacteremia resolved   MASTER negative   Anemia sp transfusion   New onset A FIB rate controlled   LANCE non oliguric improving  Hypernatremia Improving  HO asthma   Influenza B     PLAN:    CNS:  Continue Methadone Lower to 10 / 5 / 5.   Klonopin 1 mg TID.  Zyprexa 5 .  Monitor QTC.       HEENT: Oral care. Trach care    PULMONARY:  HOB @ 45 degrees.  Aspiration precautions.    No change in vent settings.    Keep SaO2 92 TO 96%.  PS trial as tolerated     CARDIOVASCULAR:    Avoid overload,  rate control.  Amiodarone 100 mg daily.  Cardizem 60 mg Q6     GI: GI prophylaxis. PEG Feeding.  Bowel regimen.     RENAL:  Follow up lytes.  Correct as needed.     INFECTIOUS DISEASE: ABX Per ID.  Monitor Vanc levels.  Adjust Vancomycin     HEMATOLOGICAL:  DVT prophylaxis.    trend CBC. LE doppler - 6/13.   LMWH therapeutic     ENDOCRINE:  Follow up FS.  Insulin protocol if needed.      Prognosis is poor overall .     SDU

## 2023-06-29 LAB
ALBUMIN SERPL ELPH-MCNC: 2.8 G/DL — LOW (ref 3.5–5.2)
ALP SERPL-CCNC: 156 U/L — HIGH (ref 30–115)
ALT FLD-CCNC: 33 U/L — SIGNIFICANT CHANGE UP (ref 0–41)
ANA PAT FLD IF-IMP: ABNORMAL
ANA TITR SER: ABNORMAL
ANION GAP SERPL CALC-SCNC: 13 MMOL/L — SIGNIFICANT CHANGE UP (ref 7–14)
AST SERPL-CCNC: 38 U/L — SIGNIFICANT CHANGE UP (ref 0–41)
BASOPHILS # BLD AUTO: 0.06 K/UL — SIGNIFICANT CHANGE UP (ref 0–0.2)
BASOPHILS NFR BLD AUTO: 0.5 % — SIGNIFICANT CHANGE UP (ref 0–1)
BILIRUB SERPL-MCNC: 0.2 MG/DL — SIGNIFICANT CHANGE UP (ref 0.2–1.2)
BUN SERPL-MCNC: 12 MG/DL — SIGNIFICANT CHANGE UP (ref 10–20)
CALCIUM SERPL-MCNC: 9.2 MG/DL — SIGNIFICANT CHANGE UP (ref 8.4–10.5)
CHLORIDE SERPL-SCNC: 94 MMOL/L — LOW (ref 98–110)
CO2 SERPL-SCNC: 29 MMOL/L — SIGNIFICANT CHANGE UP (ref 17–32)
CREAT SERPL-MCNC: 0.6 MG/DL — LOW (ref 0.7–1.5)
EGFR: 115 ML/MIN/1.73M2 — SIGNIFICANT CHANGE UP
EOSINOPHIL # BLD AUTO: 0.26 K/UL — SIGNIFICANT CHANGE UP (ref 0–0.7)
EOSINOPHIL NFR BLD AUTO: 2.3 % — SIGNIFICANT CHANGE UP (ref 0–8)
GLUCOSE BLDC GLUCOMTR-MCNC: 110 MG/DL — HIGH (ref 70–99)
GLUCOSE BLDC GLUCOMTR-MCNC: 126 MG/DL — HIGH (ref 70–99)
GLUCOSE BLDC GLUCOMTR-MCNC: 127 MG/DL — HIGH (ref 70–99)
GLUCOSE BLDC GLUCOMTR-MCNC: 128 MG/DL — HIGH (ref 70–99)
GLUCOSE BLDC GLUCOMTR-MCNC: 132 MG/DL — HIGH (ref 70–99)
GLUCOSE SERPL-MCNC: 110 MG/DL — HIGH (ref 70–99)
HCT VFR BLD CALC: 26.7 % — LOW (ref 37–47)
HGB BLD-MCNC: 8.2 G/DL — LOW (ref 12–16)
IMM GRANULOCYTES NFR BLD AUTO: 1 % — HIGH (ref 0.1–0.3)
LYMPHOCYTES # BLD AUTO: 1.24 K/UL — SIGNIFICANT CHANGE UP (ref 1.2–3.4)
LYMPHOCYTES # BLD AUTO: 10.8 % — LOW (ref 20.5–51.1)
MAGNESIUM SERPL-MCNC: 2 MG/DL — SIGNIFICANT CHANGE UP (ref 1.8–2.4)
MCHC RBC-ENTMCNC: 23.9 PG — LOW (ref 27–31)
MCHC RBC-ENTMCNC: 30.7 G/DL — LOW (ref 32–37)
MCV RBC AUTO: 77.8 FL — LOW (ref 81–99)
MONOCYTES # BLD AUTO: 0.94 K/UL — HIGH (ref 0.1–0.6)
MONOCYTES NFR BLD AUTO: 8.2 % — SIGNIFICANT CHANGE UP (ref 1.7–9.3)
NEUTROPHILS # BLD AUTO: 8.86 K/UL — HIGH (ref 1.4–6.5)
NEUTROPHILS NFR BLD AUTO: 77.2 % — HIGH (ref 42.2–75.2)
NRBC # BLD: 0 /100 WBCS — SIGNIFICANT CHANGE UP (ref 0–0)
PHOSPHATE SERPL-MCNC: 4.2 MG/DL — SIGNIFICANT CHANGE UP (ref 2.1–4.9)
PLATELET # BLD AUTO: 323 K/UL — SIGNIFICANT CHANGE UP (ref 130–400)
PMV BLD: 8.9 FL — SIGNIFICANT CHANGE UP (ref 7.4–10.4)
POTASSIUM SERPL-MCNC: 4.5 MMOL/L — SIGNIFICANT CHANGE UP (ref 3.5–5)
POTASSIUM SERPL-SCNC: 4.5 MMOL/L — SIGNIFICANT CHANGE UP (ref 3.5–5)
PROT SERPL-MCNC: 7 G/DL — SIGNIFICANT CHANGE UP (ref 6–8)
RBC # BLD: 3.43 M/UL — LOW (ref 4.2–5.4)
RBC # FLD: 27.8 % — HIGH (ref 11.5–14.5)
SODIUM SERPL-SCNC: 136 MMOL/L — SIGNIFICANT CHANGE UP (ref 135–146)
WBC # BLD: 11.48 K/UL — HIGH (ref 4.8–10.8)
WBC # FLD AUTO: 11.48 K/UL — HIGH (ref 4.8–10.8)

## 2023-06-29 PROCEDURE — 71045 X-RAY EXAM CHEST 1 VIEW: CPT | Mod: 26,77

## 2023-06-29 PROCEDURE — 71045 X-RAY EXAM CHEST 1 VIEW: CPT | Mod: 26

## 2023-06-29 PROCEDURE — 99233 SBSQ HOSP IP/OBS HIGH 50: CPT

## 2023-06-29 PROCEDURE — 93010 ELECTROCARDIOGRAM REPORT: CPT

## 2023-06-29 PROCEDURE — 99291 CRITICAL CARE FIRST HOUR: CPT

## 2023-06-29 RX ORDER — MAGNESIUM SULFATE 500 MG/ML
2 VIAL (ML) INJECTION ONCE
Refills: 0 | Status: COMPLETED | OUTPATIENT
Start: 2023-06-29 | End: 2023-06-29

## 2023-06-29 RX ADMIN — METHADONE HYDROCHLORIDE 5 MILLIGRAM(S): 40 TABLET ORAL at 21:00

## 2023-06-29 RX ADMIN — NYSTATIN CREAM 1 APPLICATION(S): 100000 CREAM TOPICAL at 06:02

## 2023-06-29 RX ADMIN — Medication 1 MILLIGRAM(S): at 14:21

## 2023-06-29 RX ADMIN — Medication 1 APPLICATION(S): at 11:37

## 2023-06-29 RX ADMIN — ENOXAPARIN SODIUM 80 MILLIGRAM(S): 100 INJECTION SUBCUTANEOUS at 10:04

## 2023-06-29 RX ADMIN — ENOXAPARIN SODIUM 80 MILLIGRAM(S): 100 INJECTION SUBCUTANEOUS at 21:00

## 2023-06-29 RX ADMIN — METHADONE HYDROCHLORIDE 5 MILLIGRAM(S): 40 TABLET ORAL at 14:21

## 2023-06-29 RX ADMIN — METHADONE HYDROCHLORIDE 5 MILLIGRAM(S): 40 TABLET ORAL at 06:00

## 2023-06-29 RX ADMIN — Medication 60 MILLIGRAM(S): at 23:06

## 2023-06-29 RX ADMIN — Medication 1 MILLIGRAM(S): at 21:00

## 2023-06-29 RX ADMIN — Medication 60 MILLIGRAM(S): at 02:56

## 2023-06-29 RX ADMIN — Medication 500 MILLIGRAM(S): at 21:00

## 2023-06-29 RX ADMIN — Medication 500 MILLIGRAM(S): at 14:21

## 2023-06-29 RX ADMIN — Medication 60 MILLIGRAM(S): at 11:26

## 2023-06-29 RX ADMIN — Medication 500 MILLIGRAM(S): at 05:58

## 2023-06-29 RX ADMIN — NYSTATIN CREAM 1 APPLICATION(S): 100000 CREAM TOPICAL at 17:27

## 2023-06-29 RX ADMIN — LISINOPRIL 5 MILLIGRAM(S): 2.5 TABLET ORAL at 05:58

## 2023-06-29 RX ADMIN — OLANZAPINE 5 MILLIGRAM(S): 15 TABLET, FILM COATED ORAL at 11:26

## 2023-06-29 RX ADMIN — Medication 1 MILLIGRAM(S): at 06:00

## 2023-06-29 RX ADMIN — Medication 250 MILLIGRAM(S): at 10:08

## 2023-06-29 RX ADMIN — CHLORHEXIDINE GLUCONATE 1 APPLICATION(S): 213 SOLUTION TOPICAL at 05:59

## 2023-06-29 RX ADMIN — Medication 250 MILLIGRAM(S): at 23:05

## 2023-06-29 RX ADMIN — Medication 150 GRAM(S): at 11:26

## 2023-06-29 RX ADMIN — SCOPALAMINE 1 PATCH: 1 PATCH, EXTENDED RELEASE TRANSDERMAL at 07:30

## 2023-06-29 RX ADMIN — SCOPALAMINE 1 PATCH: 1 PATCH, EXTENDED RELEASE TRANSDERMAL at 19:31

## 2023-06-29 RX ADMIN — AMIODARONE HYDROCHLORIDE 100 MILLIGRAM(S): 400 TABLET ORAL at 05:58

## 2023-06-29 RX ADMIN — PANTOPRAZOLE SODIUM 40 MILLIGRAM(S): 20 TABLET, DELAYED RELEASE ORAL at 11:26

## 2023-06-29 RX ADMIN — Medication 60 MILLIGRAM(S): at 17:27

## 2023-06-29 RX ADMIN — Medication 60 MILLIGRAM(S): at 05:58

## 2023-06-29 RX ADMIN — Medication 25 GRAM(S): at 10:03

## 2023-06-29 NOTE — PROGRESS NOTE ADULT - SUBJECTIVE AND OBJECTIVE BOX
Patient is a 42y old  Female who presents with a chief complaint of AHRF (2023 16:43)        Over Night Events:        ROS:     All ROS are negative except HPI         PHYSICAL EXAM    ICU Vital Signs Last 24 Hrs  T(C): 37 (2023 07:43), Max: 37 (2023 07:43)  T(F): 98.6 (2023 07:43), Max: 98.6 (2023 07:43)  HR: 85 (:43) (85 - 117)  BP: 141/86 (:43) (110/65 - 150/77)  BP(mean): 107 (:43) (90 - 107)  ABP: --  ABP(mean): --  RR: 20 (:43) (20 - 20)  SpO2: 100% (:43) (98% - 100%)    O2 Parameters below as of 2023 07:43  Patient On (Oxygen Delivery Method): ventilator              ENT: Airway patent,  EYES: Pupils equal, Round and reactive to light.  CARDIAC: Normal rate, Regular rhythm.    RESPIRATORY: No wheezing, Bilateral BS, Not tachypneic, No use of accessory muscles  GASTROINTESTINAL: Abdomen soft, Non-tender, No guarding,   NEUROLOGICAL: Alert and oriented   SKIN: Skin normal color for race, Warm and dry and intact.         23 @ 07:01  -  23 @ 07:00  --------------------------------------------------------  IN:    Free Water: 200 mL    IV PiggyBack: 250 mL    Miscellaneous Tube Feedin mL  Total IN: 2250 mL    OUT:    Rectal Tube (mL): 500 mL    Voided (mL): 2000 mL  Total OUT: 2500 mL    Total NET: -250 mL          LABS:                            8.2    11.48 )-----------( 323      ( 2023 06:05 )             26.7                                               06    136  |  94<L>  |  12  ----------------------------<  110<H>  4.5   |  29  |  0.6<L>    Ca    9.2      2023 06:05  Phos  4.2       Mg     2.0         TPro  7.0  /  Alb  2.8<L>  /  TBili  0.2  /  DBili  x   /  AST  38  /  ALT  33  /  AlkPhos  156<H>                                               Urinalysis Basic - ( 2023 06:05 )    Color: x / Appearance: x / SG: x / pH: x  Gluc: 110 mg/dL / Ketone: x  / Bili: x / Urobili: x   Blood: x / Protein: x / Nitrite: x   Leuk Esterase: x / RBC: x / WBC x   Sq Epi: x / Non Sq Epi: x / Bacteria: x        CARDIAC MARKERS ( 2023 06:23 )  x     / <0.01 ng/mL / x     / x     / x                                                LIVER FUNCTIONS - ( 2023 06:05 )  Alb: 2.8 g/dL / Pro: 7.0 g/dL / ALK PHOS: 156 U/L / ALT: 33 U/L / AST: 38 U/L / GGT: x                                                                                               Mode: AC/ CMV (Assist Control/ Continuous Mandatory Ventilation)  RR (machine): 16  TV (machine): 360  FiO2: 40  PEEP: 8  ITime: 1  MAP: 15  PIP: 29                                          MEDICATIONS  (STANDING):  aMIOdarone    Tablet 100 milliGRAM(s) Oral daily  chlorhexidine 2% Cloths 1 Application(s) Topical <User Schedule>  clonazePAM  Tablet 1 milliGRAM(s) Oral three times a day  diltiazem    Tablet 60 milliGRAM(s) Oral every 6 hours  enoxaparin Injectable 80 milliGRAM(s) SubCutaneous every 12 hours  insulin lispro (ADMELOG) corrective regimen sliding scale   SubCutaneous every 6 hours  lisinopril 5 milliGRAM(s) Oral daily  magnesium sulfate  IVPB 2 Gram(s) IV Intermittent daily  methadone    Tablet 5 milliGRAM(s) Oral three times a day  metroNIDAZOLE    Tablet 500 milliGRAM(s) Oral every 8 hours  nystatin Cream 1 Application(s) Topical two times a day  OLANZapine 5 milliGRAM(s) Oral daily  pantoprazole   Suspension 40 milliGRAM(s) Oral daily  vancomycin  IVPB 1000 milliGRAM(s) IV Intermittent <User Schedule>  vitamin A &amp; D Ointment 1 Application(s) Topical daily    MEDICATIONS  (PRN):  acetaminophen     Tablet .. 650 milliGRAM(s) Oral every 6 hours PRN Temp greater or equal to 38C (100.4F)  aluminum hydroxide/magnesium hydroxide/simethicone Suspension 30 milliLiter(s) Oral every 4 hours PRN Dyspepsia         Patient is a 42y old  Female who presents with a chief complaint of AHRF (2023 16:43)        Over Night Events:  Remains critically ill on MV.  Tolerated 6 hours PS         ROS:     All ROS are negative except HPI         PHYSICAL EXAM    ICU Vital Signs Last 24 Hrs  T(C): 37 (2023 07:43), Max: 37 (2023 07:43)  T(F): 98.6 (:43), Max: 98.6 (2023 07:43)  HR: 85 (:43) (85 - 117)  BP: 141/86 (:43) (110/65 - 150/77)  BP(mean): 107 (:43) (90 - 107)  ABP: --  ABP(mean): --  RR: 20 (:43) (20 - 20)  SpO2: 100% (:43) (98% - 100%)    O2 Parameters below as of 2023 07:43  Patient On (Oxygen Delivery Method): ventilator              ENT: Airway patent,  EYES: Pupils equal, Round and reactive to light.  CARDIAC: Normal rate, Regular rhythm.    RESPIRATORY: No wheezing, Bilateral BS, Not tachypneic, No use of accessory muscles  GASTROINTESTINAL: Abdomen soft, Non-tender, No guarding,   NEUROLOGICAL: Alert follows commands   SKIN: Skin normal color for race, Warm and dry        23 @ 07:01  -  23 @ 07:00  --------------------------------------------------------  IN:    Free Water: 200 mL    IV PiggyBack: 250 mL    Miscellaneous Tube Feedin mL  Total IN: 2250 mL    OUT:    Rectal Tube (mL): 500 mL    Voided (mL): 2000 mL  Total OUT: 2500 mL    Total NET: -250 mL          LABS:                            8.2    11.48 )-----------( 323      ( 2023 06:05 )             26.7                                                   136  |  94<L>  |  12  ----------------------------<  110<H>  4.5   |  29  |  0.6<L>    Ca    9.2      2023 06:05  Phos  4.2       Mg     2.0         TPro  7.0  /  Alb  2.8<L>  /  TBili  0.2  /  DBili  x   /  AST  38  /  ALT  33  /  AlkPhos  156<H>                                               Urinalysis Basic - ( 2023 06:05 )    Color: x / Appearance: x / SG: x / pH: x  Gluc: 110 mg/dL / Ketone: x  / Bili: x / Urobili: x   Blood: x / Protein: x / Nitrite: x   Leuk Esterase: x / RBC: x / WBC x   Sq Epi: x / Non Sq Epi: x / Bacteria: x        CARDIAC MARKERS ( 2023 06:23 )  x     / <0.01 ng/mL / x     / x     / x                                                LIVER FUNCTIONS - ( 2023 06:05 )  Alb: 2.8 g/dL / Pro: 7.0 g/dL / ALK PHOS: 156 U/L / ALT: 33 U/L / AST: 38 U/L / GGT: x                                                                                               Mode: AC/ CMV (Assist Control/ Continuous Mandatory Ventilation)  RR (machine): 16  TV (machine): 360  FiO2: 40  PEEP: 8  ITime: 1  MAP: 15  PIP: 29                                          MEDICATIONS  (STANDING):  aMIOdarone    Tablet 100 milliGRAM(s) Oral daily  chlorhexidine 2% Cloths 1 Application(s) Topical <User Schedule>  clonazePAM  Tablet 1 milliGRAM(s) Oral three times a day  diltiazem    Tablet 60 milliGRAM(s) Oral every 6 hours  enoxaparin Injectable 80 milliGRAM(s) SubCutaneous every 12 hours  insulin lispro (ADMELOG) corrective regimen sliding scale   SubCutaneous every 6 hours  lisinopril 5 milliGRAM(s) Oral daily  magnesium sulfate  IVPB 2 Gram(s) IV Intermittent daily  methadone    Tablet 5 milliGRAM(s) Oral three times a day  metroNIDAZOLE    Tablet 500 milliGRAM(s) Oral every 8 hours  nystatin Cream 1 Application(s) Topical two times a day  OLANZapine 5 milliGRAM(s) Oral daily  pantoprazole   Suspension 40 milliGRAM(s) Oral daily  vancomycin  IVPB 1000 milliGRAM(s) IV Intermittent <User Schedule>  vitamin A &amp; D Ointment 1 Application(s) Topical daily    MEDICATIONS  (PRN):  acetaminophen     Tablet .. 650 milliGRAM(s) Oral every 6 hours PRN Temp greater or equal to 38C (100.4F)  aluminum hydroxide/magnesium hydroxide/simethicone Suspension 30 milliLiter(s) Oral every 4 hours PRN Dyspepsia

## 2023-06-29 NOTE — PROGRESS NOTE ADULT - SUBJECTIVE AND OBJECTIVE BOX
ROSSIZEYNEP  42y Female    CHIEF COMPLAINT:    Patient is a 42y old  Female who presents with a chief complaint of AHRF (2023 09:27)    INTERVAL HPI/OVERNIGHT EVENTS:    Patient seen and examined. No acute events overnight. Tolerated PS yesterday for 5 hours     ROS: All other systems are negative.    Vital Signs:    T(F): 98.4 (23 @ 11:31), Max: 98.6 (23 @ 07:43)  HR: 104 (23 @ 11:31) (85 - 117)  BP: 156/85 (23 @ 11:31) (110/65 - 156/85)  RR: 20 (23 @ 11:31) (20 - 20)  SpO2: 100% (23 @ 11:31) (98% - 100%)    2023 07:01  -  2023 07:00  --------------------------------------------------------  IN: 2250 mL / OUT: 2500 mL / NET: -250 mL    2023 07:01  -  2023 15:26  --------------------------------------------------------  IN: 0 mL / OUT: 1500 mL / NET: -1500 mL    Daily Weight in k.4 (2023 00:00)    POCT Blood Glucose.: 127 mg/dL (2023 12:59)  POCT Blood Glucose.: 126 mg/dL (2023 06:06)  POCT Blood Glucose.: 110 mg/dL (2023 03:00)  POCT Blood Glucose.: 113 mg/dL (2023 19:15)    PHYSICAL EXAM:    GENERAL:  NAD chronically ill appearing   SKIN: No rashes or lesions  HEENT: Atraumatic. Normocephalic.    NECK: Supple, No JVD.    PULMONARY: decreased breath sounds B/L. No wheezing.    CVS: Normal S1, S2. Rate and Rhythm are regular.  tachycardic  ABDOMEN/GI: Soft, Nontender, Nondistended; BS present  MSK:  No clubbing or cyanosis   NEUROLOGIC: weak, moves all extremities   PSYCH: Awake and alert    Consultant(s) Notes Reviewed:  [x ] YES  [ ] NO  Care Discussed with Consultants/Other Providers [ x] YES  [ ] NO    LABS:                        8.2    11.48 )-----------( 323      ( 2023 06:05 )             26.7     136  |  94<L>  |  12  ----------------------------<  110<H>  4.5   |  29  |  0.6<L>    Ca    9.2      2023 06:05  Phos  4.2       Mg     2.0         TPro  7.0  /  Alb  2.8<L>  /  TBili  0.2  /  DBili  x   /  AST  38  /  ALT  33  /  AlkPhos  156<H>      Trop <0.01, CKMB --, CK --, 23 @ 06:23    RADIOLOGY & ADDITIONAL TESTS:  Imaging or report Personally Reviewed:  [x] YES  [ ] NO  EKG reviewed: [x] YES  [ ] NO    Medications:  Standing  aMIOdarone    Tablet 100 milliGRAM(s) Oral daily  chlorhexidine 2% Cloths 1 Application(s) Topical <User Schedule>  clonazePAM  Tablet 1 milliGRAM(s) Oral three times a day  diltiazem    Tablet 60 milliGRAM(s) Oral every 6 hours  enoxaparin Injectable 80 milliGRAM(s) SubCutaneous every 12 hours  insulin lispro (ADMELOG) corrective regimen sliding scale   SubCutaneous every 6 hours  lisinopril 5 milliGRAM(s) Oral daily  magnesium sulfate  IVPB 2 Gram(s) IV Intermittent daily  methadone    Tablet 5 milliGRAM(s) Oral three times a day  metroNIDAZOLE    Tablet 500 milliGRAM(s) Oral every 8 hours  nystatin Cream 1 Application(s) Topical two times a day  OLANZapine 5 milliGRAM(s) Oral daily  pantoprazole   Suspension 40 milliGRAM(s) Oral daily  vancomycin  IVPB 1000 milliGRAM(s) IV Intermittent <User Schedule>  vitamin A &amp; D Ointment 1 Application(s) Topical daily    PRN Meds  acetaminophen     Tablet .. 650 milliGRAM(s) Oral every 6 hours PRN  aluminum hydroxide/magnesium hydroxide/simethicone Suspension 30 milliLiter(s) Oral every 4 hours PRN

## 2023-06-29 NOTE — PROGRESS NOTE ADULT - ASSESSMENT
42 year-old female with PMH of Asthma, HTN? presents with complaint of cough x3 days and SOB. In the ED was found to have Bilateral patchy groundglass nodularity with dominant confluent left lower lobe opacification with numerous cavitary lesions. Additional contralateral right lower lobe cavitary 1 cm nodule on CT. Patient was admitted to the ICU for acute hypoxemic respiratory failure secondary to cavitary MRSA Pneumonia with a positive influenza B, finished a course of zyvox and tamiflu. This was complicated by loculated parapneumonic effusion for which a chest tube was inserted and tpa was given twice. Her stay was complicated by MRSA bacteremia  currently on vancomycin and metronidazole, cultures negative to date. MASTER was negative for vegetations. Trach and PEG done for inability to wean off vent, currently off sedation. Her stay was also complicated by new onset Afib with RVR for which she is on amiodarone, diltiazem, and therapeutic anticoagulation. Long QT is also noted. Patient also had LANCE that improved with no need for RRT. Otherwise patient received a total of 6 p RBC for acute blood loss anemia ,currently hemoglobin stable. Patient transferred to step down unit.     Acute hypoxemic respiratory failure / s/P Trach and PEG    evere cavitary CAP MRSA  Small parapneumonic effusion SP Chest tube  MRSA bacteremia  Influenza B   hx of Asthma   Intubated 5/23 self-extubated 5/25, re-intubated 5/25; SBT failed, SP trach/G tube  - s/p Zyvox, meropenem and Tamifu, now on Vancomycin and flagyl, anticipated end date 7/9  - monitor levels per ID pharmacy   - s/p course of steroids now off  -  blood cultures  NGTD SINCE 5/28   - s/p MASTER 5/31: No evidence of valvular vegetations or intracardiac abscesses to support IE. Pulmonary hepatisation incidental finding. EF 65%, mild TR and pulm HTN  - ventilator management per pulm/cc, tolerated PS for 5 hours yesterday   - on Methadone 5/5/5 and olanzapine from MICU due to prolonged time on Fentanyl. Monitor daily EKGS for QTC. Keep Mg ~2 and K ~4     Maculopapular buttocks rash  -PCR skin scraping negative for HSV --> off  Acyclovir   -Wound care per burn, wound care RN following     LANCE, resolved   - post-infectious GN vs. ATN  - Cr peaked  3 > now 0.6  - renal U/S: negative   - daily BMP     Microcytic Anemia likely  anemia of chronic disease   - s/p 6 units prbc so far last 6/5  - Restarted AC with lovenox 6/22 given Hb stable last few days  -monitor hemoglobin     New onset A-fib w/ RVR  - HR controlled now  - s/p Amio drip, now on PO  - Cardizem po 60mg q6hr   - therapeutic Lovenox   - EP consult appreciated     Overall prognosis is guarded, continue monitoring in SDU

## 2023-06-29 NOTE — PROGRESS NOTE ADULT - ASSESSMENT
IMPRESSION:    Acute hypoxemic respiratory failure sp trach   Severe cavitary CAP MRSA  Small parapneumonic effusion SP Chest tube  MRSA bacteremia resolved   MASTER negative   Anemia sp transfusion   New onset A FIB rate controlled   LANCE non oliguric improving  Hypernatremia Improving  HO asthma   Influenza B     PLAN:    CNS:  Continue Methadone Lower to 10 / 5 / 5.   Klonopin 1 mg TID.  Zyprexa 5 .  Monitor QTC.       HEENT: Oral care. Trach care    PULMONARY:  HOB @ 45 degrees.  Aspiration precautions.    No change in vent settings.    Keep SaO2 92 TO 96%.  PS trial as tolerated     CARDIOVASCULAR:    Avoid overload,  rate control.  Amiodarone 100 mg daily.  Cardizem 60 mg Q6     GI: GI prophylaxis. PEG Feeding.  Bowel regimen.     RENAL:  Follow up lytes.  Correct as needed.     INFECTIOUS DISEASE: ABX Per ID.  Monitor Vanc levels.  Adjust Vancomycin     HEMATOLOGICAL:  DVT prophylaxis.    trend CBC. LE doppler - 6/13.   LMWH therapeutic     ENDOCRINE:  Follow up FS.  Insulin protocol if needed.      Prognosis is poor overall .     SDU   IMPRESSION:    Acute hypoxemic respiratory failure sp trach   Severe cavitary CAP MRSA  Small parapneumonic effusion SP Chest tube  MRSA bacteremia resolved   MASTER negative   Anemia sp transfusion   New onset A FIB rate controlled   LANCE non oliguric improving  Hypernatremia Improving  HO asthma   Influenza B     PLAN:    CNS:  Continue Methadone Lower to 5 / 5 / 5.   Klonopin 1 mg TID.  Zyprexa 5 .  Monitor QTC.       HEENT: Oral care. Trach care    PULMONARY:  HOB @ 45 degrees.  Aspiration precautions.    No change in vent settings.    Keep SaO2 92 TO 96%.  PS for 4-6 hours     CARDIOVASCULAR:    Avoid overload,  rate control.  Amiodarone 100 mg daily.  Cardizem 60 mg Q6     GI: GI prophylaxis. PEG Feeding.  Bowel regimen.     RENAL:  Follow up lytes.  Correct as needed.     INFECTIOUS DISEASE: ABX Per ID.  Monitor Vanc levels.  Adjust Vancomycin     HEMATOLOGICAL:  DVT prophylaxis.    trend CBC. LE doppler - 6/13.   LMWH therapeutic     ENDOCRINE:  Follow up FS.  Insulin protocol if needed.      Prognosis is poor overall .     SDU

## 2023-06-30 LAB
ALBUMIN SERPL ELPH-MCNC: 2.5 G/DL — LOW (ref 3.5–5.2)
ALP SERPL-CCNC: 141 U/L — HIGH (ref 30–115)
ALT FLD-CCNC: 29 U/L — SIGNIFICANT CHANGE UP (ref 0–41)
ANION GAP SERPL CALC-SCNC: 10 MMOL/L — SIGNIFICANT CHANGE UP (ref 7–14)
AST SERPL-CCNC: 42 U/L — HIGH (ref 0–41)
BASOPHILS # BLD AUTO: 0.05 K/UL — SIGNIFICANT CHANGE UP (ref 0–0.2)
BASOPHILS NFR BLD AUTO: 0.4 % — SIGNIFICANT CHANGE UP (ref 0–1)
BILIRUB SERPL-MCNC: 0.2 MG/DL — SIGNIFICANT CHANGE UP (ref 0.2–1.2)
BUN SERPL-MCNC: 14 MG/DL — SIGNIFICANT CHANGE UP (ref 10–20)
CALCIUM SERPL-MCNC: 9.1 MG/DL — SIGNIFICANT CHANGE UP (ref 8.4–10.4)
CHLORIDE SERPL-SCNC: 93 MMOL/L — LOW (ref 98–110)
CO2 SERPL-SCNC: 28 MMOL/L — SIGNIFICANT CHANGE UP (ref 17–32)
CREAT SERPL-MCNC: 0.6 MG/DL — LOW (ref 0.7–1.5)
EGFR: 115 ML/MIN/1.73M2 — SIGNIFICANT CHANGE UP
EOSINOPHIL # BLD AUTO: 0.33 K/UL — SIGNIFICANT CHANGE UP (ref 0–0.7)
EOSINOPHIL NFR BLD AUTO: 2.8 % — SIGNIFICANT CHANGE UP (ref 0–8)
GLUCOSE BLDC GLUCOMTR-MCNC: 101 MG/DL — HIGH (ref 70–99)
GLUCOSE BLDC GLUCOMTR-MCNC: 107 MG/DL — HIGH (ref 70–99)
GLUCOSE BLDC GLUCOMTR-MCNC: 114 MG/DL — HIGH (ref 70–99)
GLUCOSE BLDC GLUCOMTR-MCNC: 122 MG/DL — HIGH (ref 70–99)
GLUCOSE BLDC GLUCOMTR-MCNC: 128 MG/DL — HIGH (ref 70–99)
GLUCOSE SERPL-MCNC: 109 MG/DL — HIGH (ref 70–99)
HCT VFR BLD CALC: 25.5 % — LOW (ref 37–47)
HGB BLD-MCNC: 7.7 G/DL — LOW (ref 12–16)
IMM GRANULOCYTES NFR BLD AUTO: 1.4 % — HIGH (ref 0.1–0.3)
LYMPHOCYTES # BLD AUTO: 1.48 K/UL — SIGNIFICANT CHANGE UP (ref 1.2–3.4)
LYMPHOCYTES # BLD AUTO: 12.3 % — LOW (ref 20.5–51.1)
MAGNESIUM SERPL-MCNC: 2 MG/DL — SIGNIFICANT CHANGE UP (ref 1.8–2.4)
MAGNESIUM SERPL-MCNC: 2.2 MG/DL — SIGNIFICANT CHANGE UP (ref 1.8–2.4)
MCHC RBC-ENTMCNC: 23.4 PG — LOW (ref 27–31)
MCHC RBC-ENTMCNC: 30.2 G/DL — LOW (ref 32–37)
MCV RBC AUTO: 77.5 FL — LOW (ref 81–99)
MONOCYTES # BLD AUTO: 0.86 K/UL — HIGH (ref 0.1–0.6)
MONOCYTES NFR BLD AUTO: 7.2 % — SIGNIFICANT CHANGE UP (ref 1.7–9.3)
NEUTROPHILS # BLD AUTO: 9.11 K/UL — HIGH (ref 1.4–6.5)
NEUTROPHILS NFR BLD AUTO: 75.9 % — HIGH (ref 42.2–75.2)
NRBC # BLD: 0 /100 WBCS — SIGNIFICANT CHANGE UP (ref 0–0)
PHOSPHATE SERPL-MCNC: 4.4 MG/DL — SIGNIFICANT CHANGE UP (ref 2.1–4.9)
PLATELET # BLD AUTO: 312 K/UL — SIGNIFICANT CHANGE UP (ref 130–400)
PMV BLD: 10.3 FL — SIGNIFICANT CHANGE UP (ref 7.4–10.4)
POTASSIUM SERPL-MCNC: 5.2 MMOL/L — HIGH (ref 3.5–5)
POTASSIUM SERPL-SCNC: 5.2 MMOL/L — HIGH (ref 3.5–5)
PROT SERPL-MCNC: 6.9 G/DL — SIGNIFICANT CHANGE UP (ref 6–8)
RBC # BLD: 3.29 M/UL — LOW (ref 4.2–5.4)
RBC # FLD: 27.9 % — HIGH (ref 11.5–14.5)
SODIUM SERPL-SCNC: 131 MMOL/L — LOW (ref 135–146)
WBC # BLD: 12 K/UL — HIGH (ref 4.8–10.8)
WBC # FLD AUTO: 12 K/UL — HIGH (ref 4.8–10.8)

## 2023-06-30 PROCEDURE — 93010 ELECTROCARDIOGRAM REPORT: CPT

## 2023-06-30 PROCEDURE — 71045 X-RAY EXAM CHEST 1 VIEW: CPT | Mod: 26

## 2023-06-30 PROCEDURE — 99233 SBSQ HOSP IP/OBS HIGH 50: CPT

## 2023-06-30 PROCEDURE — 99291 CRITICAL CARE FIRST HOUR: CPT

## 2023-06-30 RX ORDER — FAMOTIDINE 10 MG/ML
20 INJECTION INTRAVENOUS
Refills: 0 | Status: DISCONTINUED | OUTPATIENT
Start: 2023-06-30 | End: 2023-07-01

## 2023-06-30 RX ORDER — METHADONE HYDROCHLORIDE 40 MG/1
10 TABLET ORAL ONCE
Refills: 0 | Status: DISCONTINUED | OUTPATIENT
Start: 2023-06-30 | End: 2023-06-30

## 2023-06-30 RX ORDER — SODIUM ZIRCONIUM CYCLOSILICATE 10 G/10G
10 POWDER, FOR SUSPENSION ORAL ONCE
Refills: 0 | Status: COMPLETED | OUTPATIENT
Start: 2023-06-30 | End: 2023-06-30

## 2023-06-30 RX ADMIN — OLANZAPINE 5 MILLIGRAM(S): 15 TABLET, FILM COATED ORAL at 11:38

## 2023-06-30 RX ADMIN — Medication 60 MILLIGRAM(S): at 17:22

## 2023-06-30 RX ADMIN — Medication 1 MILLIGRAM(S): at 21:04

## 2023-06-30 RX ADMIN — Medication 500 MILLIGRAM(S): at 05:03

## 2023-06-30 RX ADMIN — SCOPALAMINE 1 PATCH: 1 PATCH, EXTENDED RELEASE TRANSDERMAL at 07:52

## 2023-06-30 RX ADMIN — METHADONE HYDROCHLORIDE 5 MILLIGRAM(S): 40 TABLET ORAL at 21:04

## 2023-06-30 RX ADMIN — NYSTATIN CREAM 1 APPLICATION(S): 100000 CREAM TOPICAL at 05:05

## 2023-06-30 RX ADMIN — CHLORHEXIDINE GLUCONATE 1 APPLICATION(S): 213 SOLUTION TOPICAL at 05:05

## 2023-06-30 RX ADMIN — ENOXAPARIN SODIUM 80 MILLIGRAM(S): 100 INJECTION SUBCUTANEOUS at 10:25

## 2023-06-30 RX ADMIN — SODIUM ZIRCONIUM CYCLOSILICATE 10 GRAM(S): 10 POWDER, FOR SUSPENSION ORAL at 10:25

## 2023-06-30 RX ADMIN — METHADONE HYDROCHLORIDE 5 MILLIGRAM(S): 40 TABLET ORAL at 05:02

## 2023-06-30 RX ADMIN — Medication 1 MILLIGRAM(S): at 13:33

## 2023-06-30 RX ADMIN — NYSTATIN CREAM 1 APPLICATION(S): 100000 CREAM TOPICAL at 18:17

## 2023-06-30 RX ADMIN — LISINOPRIL 5 MILLIGRAM(S): 2.5 TABLET ORAL at 05:03

## 2023-06-30 RX ADMIN — SCOPALAMINE 1 PATCH: 1 PATCH, EXTENDED RELEASE TRANSDERMAL at 19:30

## 2023-06-30 RX ADMIN — METHADONE HYDROCHLORIDE 10 MILLIGRAM(S): 40 TABLET ORAL at 13:34

## 2023-06-30 RX ADMIN — Medication 1 MILLIGRAM(S): at 05:02

## 2023-06-30 RX ADMIN — Medication 60 MILLIGRAM(S): at 23:23

## 2023-06-30 RX ADMIN — ENOXAPARIN SODIUM 80 MILLIGRAM(S): 100 INJECTION SUBCUTANEOUS at 21:05

## 2023-06-30 RX ADMIN — Medication 60 MILLIGRAM(S): at 11:39

## 2023-06-30 RX ADMIN — Medication 500 MILLIGRAM(S): at 13:33

## 2023-06-30 RX ADMIN — FAMOTIDINE 20 MILLIGRAM(S): 10 INJECTION INTRAVENOUS at 17:22

## 2023-06-30 RX ADMIN — Medication 60 MILLIGRAM(S): at 05:03

## 2023-06-30 RX ADMIN — Medication 500 MILLIGRAM(S): at 21:05

## 2023-06-30 RX ADMIN — AMIODARONE HYDROCHLORIDE 100 MILLIGRAM(S): 400 TABLET ORAL at 05:03

## 2023-06-30 RX ADMIN — Medication 1 APPLICATION(S): at 11:39

## 2023-06-30 RX ADMIN — Medication 250 MILLIGRAM(S): at 23:20

## 2023-06-30 RX ADMIN — Medication 250 MILLIGRAM(S): at 10:24

## 2023-06-30 NOTE — PROGRESS NOTE ADULT - CRITICAL CARE SERVICES PROVIDED
5/4/2022      Mr. Felix ISAAK Blanca  [address] [address]        Dear Paulo Isidro Rios,     Our records indicate it is time for your follow-up colonoscopy. Your last procedure was on 6/28/2017.    Please call the office at [phone].     If you have chosen to complete your colonoscopy outside of [facility], please give us a call so we can update your records. If you have any questions or concerns, we would be happy to discuss them with you.    Sincerely,       Dr. Mario Alberto Rascon  Gastroenterology Department  [facility]  
Patient is critically ill, requiring critical care services.

## 2023-06-30 NOTE — PROGRESS NOTE ADULT - ASSESSMENT
IMPRESSION:    Acute hypoxemic respiratory failure sp trach   Severe cavitary CAP MRSA  Small parapneumonic effusion SP Chest tube  MRSA bacteremia resolved   MASTER negative   Anemia sp transfusion   New onset A FIB rate controlled   LANCE non oliguric improving  Hypernatremia Improving  HO asthma   Influenza B     PLAN:    CNS:  Continue Methadone Lower to 5 / 5 / 5.   Klonopin 1 mg TID.  Zyprexa 5 .  Monitor QTC.       HEENT: Oral care. Trach care    PULMONARY:  HOB @ 45 degrees.  Aspiration precautions.    No change in vent settings.    Keep SaO2 92 TO 96%.  PS for 4-6 hours     CARDIOVASCULAR:    Avoid overload,  rate control.  Amiodarone 100 mg daily.  Cardizem 60 mg Q6     GI: GI prophylaxis. PEG Feeding.  Bowel regimen. Nutrition follow up. Take rectal tube out.    RENAL:  Follow up lytes.  Correct as needed.     INFECTIOUS DISEASE: ABX Per ID.  Monitor Vanc levels.  Adjust Vancomycin     HEMATOLOGICAL:  DVT prophylaxis.    trend CBC. LE doppler - 6/13.   LMWH therapeutic     ENDOCRINE:  Follow up FS.  Insulin protocol if needed.      Prognosis is poor overall .     SDU IMPRESSION:    Acute hypoxemic respiratory failure sp trach   Severe cavitary CAP MRSA  Small parapneumonic effusion SP Chest tube  MRSA bacteremia resolved   MASTER negative   Anemia sp transfusion   New onset A FIB rate controlled   LANCE non oliguric improving  Hypernatremia Improving  HO asthma   Influenza B     PLAN:    CNS:  Continue Methadone Lower to 5 / 5 / 5.   Klonopin 1 mg BID.  Zyprexa 5 .  Monitor QTC.       HEENT: Oral care. Trach care    PULMONARY:  HOB @ 45 degrees.  Aspiration precautions.    No change in vent settings.    Keep SaO2 92 TO 96%.  PS for 4-6 hours     CARDIOVASCULAR:    Avoid overload,  rate control.  Amiodarone 100 mg daily.  Cardizem 60 mg Q6     GI: GI prophylaxis. PEG Feeding.  DC Bowel regimen. Take rectal tube out.    RENAL:  Follow up lytes.  Correct as needed.     INFECTIOUS DISEASE: ABX Per ID.  Monitor Vanc levels.  Adjust Vancomycin     HEMATOLOGICAL:  DVT prophylaxis.    trend CBC. LE doppler - 6/13.   LMWH therapeutic     ENDOCRINE:  Follow up FS.  Insulin protocol if needed.      Prognosis is poor overall .     SDU

## 2023-06-30 NOTE — PROGRESS NOTE ADULT - ASSESSMENT
Assessment	  42 year old female with PMHx Asthma, HTN presents with cough and SOB x3 days and hemoptysis admitted for cavitary CAP.    # Acute hypoxemic respiratory failure 2/2 cavitary MRSA PNA   -intubated , self-extubated , re-intubated ; SBT failed, SP trach and PEG. Patient currently on pressure support  -Currenntly on prececedex, PS  -tuberculosis,Strep/Legionella negative, HIV negative   - repeat CT chest: Diffusely increased number and size of bilateral cavitary lesions with the left lower lobe now demonstrating a consolidative process of the entirelobe, Resolved  - repeat ET cultures negative   - Solumedrol 40mg discontinued  - aspiration precautions      #Paranneumonic effusion  -s/p chest tube placement connected to suction --> tPA  and   - c/w 40mg Lasix daily, d/eusebia lasix, avoid overload  -repeat CT chest: Small left pleural effusion with overall essentially unchanged exam.    # MRSA bacteremia  - s/p Mupirocin BID x5 days  -  blood cultures -: NGTD x3  - s/p MASTER : No evidence of valvular vegetations or intracardiac abscesses to support IE. Pulmonary hepatisation incidental finding. EF 65%, mild TR and pulm HTN  - s/p course of jimbo, currently on vanco and flagyl till August      # LANCE, resolved (baseline 0.7-0.8)  - post-infectious GN vs. ATN vs. pulmonary renal syndrome  - Cr 3 > 0.7  - renal U/S: negative   - CK trending down 1483>39>41  - UA: +proteins and blood, Pr/Cr 1.3 (H)  - CHARLES+ 1:640, hep/HIV negative  - no acute indication for RRT    #  Microcytic Anemia ( Iron deficiency vs anemia of chronic disease )  - DIC panel negative   - Dimer 1665, LE Duplex negative for DVT  - hold heparin drip. SCDs  - s/p 3u pRBCs; Hgb stable around   - CTAP: r/o retroperitoneal bleed  - Iron 7%, low tibc, resent iron panel and ferritin   - s/p 6 units prbc so far last   - Restarted AC with lovenox  given Hb stable last few days    # New onset A-fib w/ RVR  - HR controlled now  - s/p Amio drip  - EP consulted  - c/w Amio 200mg BID for 2 weeks --> then Amio 200mg daily (switch date )-->amio  to 100mg qd  given elevated qtc  - Cardizem po 60mg q6hr   - Restarted AC with lovenox  given Hb stable last few days    #Elevated QTc (Multiple QT prolonging drugs)  - daily EKGs  - decreasemethadone to 5mg TID, zyprexa decreased to 5mg qd, amiodarone on 100mg qd    #Wound care  - wash wound with soap and water, cover with allevyn twice a day  -Surgical debridement     #Mild watery Diarrhea (Most probably osmotic)  - Remove rectal tube    #DVT ppx:   LMWH bid   #GI ppx:   Protonix Pantoprazole 40mg qd  #Diet:  -Change PEG feed to high fiber diet  #Activity: bedrest

## 2023-06-30 NOTE — PROGRESS NOTE ADULT - SUBJECTIVE AND OBJECTIVE BOX
FLO ZEYNEP  42y Female    CHIEF COMPLAINT:    Patient is a 42y old  Female who presents with a chief complaint of AHRF (2023 09:20)    INTERVAL HPI/OVERNIGHT EVENTS:    Patient seen and examined. No acute events overnight. Remains vented, tolerated PS 5 hours yesterday     ROS: All other systems are negative.    Vital Signs:    T(F): 97.4 (23 @ 08:04), Max: 98.4 (23 @ 11:31)  HR: 100 (23 @ 08:04) (93 - 104)  BP: 126/74 (23 @ 08:04) (101/58 - 156/85)  RR: 20 (23 @ 08:04) (20 - 20)  SpO2: 100% (23 @ 08:04) (98% - 100%)    2023 07:01  -  2023 07:00  --------------------------------------------------------  IN: 1250 mL / OUT: 2400 mL / NET: -1150 mL    Daily Weight in k.7 (2023 00:00)    POCT Blood Glucose.: 101 mg/dL (2023 07:47)  POCT Blood Glucose.: 107 mg/dL (2023 05:01)  POCT Blood Glucose.: 132 mg/dL (2023 23:02)  POCT Blood Glucose.: 128 mg/dL (2023 18:03)  POCT Blood Glucose.: 127 mg/dL (2023 12:59)    PHYSICAL EXAM:    GENERAL:  NAD chronically ill appearing   SKIN: No rashes or lesions  HEENT: Atraumatic. Normocephalic.   NECK: Supple, No JVD.    PULMONARY: CTA B/L. No wheezing. No rales  CVS: Normal S1, S2. Rate and Rhythm are regular. tachycardic  ABDOMEN/GI: Soft, Nontender, Nondistended   MSK:  No clubbing or cyanosis   NEUROLOGIC:  moves all extremities   PSYCH: Awake and alert     Consultant(s) Notes Reviewed:  [x ] YES  [ ] NO  Care Discussed with Consultants/Other Providers [ x] YES  [ ] NO    LABS:                        7.7    12.00 )-----------( 312      ( 2023 06:00 )             25.5     131<L>  |  93<L>  |  14  ----------------------------<  109<H>  5.2<H>   |  28  |  0.6<L>    Ca    9.1      2023 06:00  Phos  4.4       Mg     2.2         TPro  6.9  /  Alb  2.5<L>  /  TBili  0.2  /  DBili  x   /  AST  42<H>  /  ALT  29  /  AlkPhos  141<H>      Trop <0.01, CKMB --, CK --, 23 @ 06:23    RADIOLOGY & ADDITIONAL TESTS:  Imaging or report Personally Reviewed:  [x] YES  [ ] NO  EKG reviewed: [x] YES  [ ] NO    Medications:  Standing  aMIOdarone    Tablet 100 milliGRAM(s) Oral daily  chlorhexidine 2% Cloths 1 Application(s) Topical <User Schedule>  clonazePAM  Tablet 1 milliGRAM(s) Oral three times a day  diltiazem    Tablet 60 milliGRAM(s) Oral every 6 hours  enoxaparin Injectable 80 milliGRAM(s) SubCutaneous every 12 hours  famotidine    Tablet 20 milliGRAM(s) Oral two times a day  insulin lispro (ADMELOG) corrective regimen sliding scale   SubCutaneous every 6 hours  lisinopril 5 milliGRAM(s) Oral daily  methadone    Tablet 5 milliGRAM(s) Oral three times a day  metroNIDAZOLE    Tablet 500 milliGRAM(s) Oral every 8 hours  nystatin Cream 1 Application(s) Topical two times a day  OLANZapine 5 milliGRAM(s) Oral daily  vancomycin  IVPB 1000 milliGRAM(s) IV Intermittent <User Schedule>  vitamin A &amp; D Ointment 1 Application(s) Topical daily    PRN Meds  acetaminophen     Tablet .. 650 milliGRAM(s) Oral every 6 hours PRN  aluminum hydroxide/magnesium hydroxide/simethicone Suspension 30 milliLiter(s) Oral every 4 hours PRN

## 2023-06-30 NOTE — CONSULT NOTE ADULT - ASSESSMENT
- acute now chronic hypoxemic resp failure  - MRSA pneumonia and bacteremia  - + flu B  - s/p pleural effusion and chest tube  - s/p multiple transfusions  - s/p trach and G-J tube placement  - new with diarrhea    suggest:  - r/o c diff  - banatrol ordered but unclear if given  - no further need for high protein crit Rx - can switch to fiber-containing Rx  - d/w resident: pt has been on multiple antimicrobials since admission, and had been on oral Mg products, PPI, and on Miralax until yesterday       Diarrhea most likely due to antibiotic induced, non infectious, and cathartic induced.  - document Banatrol  - change feeds to Jevity 1.2 at 60 ml/h  - change PPI to IV temporarily or consider changing to Pepcid

## 2023-06-30 NOTE — PHARMACOTHERAPY INTERVENTION NOTE - COMMENTS
Recommended vancomycin level on 7/1 at 1600 to ensure vancomycin dose of 1000mg IV q12h is still appropriate (does not need to be a trough because [employer] Bayesian vancomycin dosing software will adjust for levels collected early).

## 2023-06-30 NOTE — PROGRESS NOTE ADULT - ASSESSMENT
42 year-old female with PMH of Asthma, HTN? presents with complaint of cough x3 days and SOB. In the ED was found to have Bilateral patchy groundglass nodularity with dominant confluent left lower lobe opacification with numerous cavitary lesions. Additional contralateral right lower lobe cavitary 1 cm nodule on CT. Patient was admitted to the ICU for acute hypoxemic respiratory failure secondary to cavitary MRSA Pneumonia with a positive influenza B, finished a course of zyvox and tamiflu. This was complicated by loculated parapneumonic effusion for which a chest tube was inserted and tpa was given twice. Her stay was complicated by MRSA bacteremia  currently on vancomycin and metronidazole, cultures negative to date. MASTER was negative for vegetations. Trach and PEG done for inability to wean off vent, currently off sedation. Her stay was also complicated by new onset Afib with RVR for which she is on amiodarone, diltiazem, and therapeutic anticoagulation. Long QT is also noted. Patient also had LANCE that improved with no need for RRT. Otherwise patient received a total of 6 p RBC for acute blood loss anemia ,currently hemoglobin stable. Patient transferred to step down unit.     Acute hypoxemic respiratory failure / s/P Trach and PEG    evere cavitary CAP MRSA  Small parapneumonic effusion SP Chest tube  MRSA bacteremia  Influenza B   hx of Asthma   Intubated 5/23 self-extubated 5/25, re-intubated 5/25; SBT failed, SP trach/G tube  - s/p Zyvox, meropenem and Tamifu, now on Vancomycin and flagyl, anticipated end date 7/9  - monitor levels per ID pharmacy   - s/p course of steroids now off  -  blood cultures  NGTD SINCE 5/28   - s/p MASTER 5/31: No evidence of valvular vegetations or intracardiac abscesses to support IE. Pulmonary hepatisation incidental finding. EF 65%, mild TR and pulm HTN  - ventilator management per pulm/cc, tolerated PS for 5 hours yesterday   - on Methadone 5/5/5 and olanzapine from MICU due to prolonged time on Fentanyl. Monitor daily EKGS for QTC. Keep Mg ~2 and K ~4     Maculopapular buttocks rash  -PCR skin scraping negative for HSV --> off  Acyclovir   -Wound care per burn, wound care RN following     LANCE, resolved   - post-infectious GN vs. ATN  - Cr peaked  3 > now 0.6  - renal U/S: negative   - daily BMP     Microcytic Anemia likely  anemia of chronic disease   - s/p 6 units prbc so far last 6/5  - Restarted AC with lovenox 6/22 given Hb stable last few days  -monitor hemoglobin     New onset A-fib w/ RVR  - HR better controlled now  - s/p Amio drip, now on PO  - Cardizem po 60mg q6hr   - therapeutic Lovenox   - EP consult appreciated     Diarrhea - likely related to tube feeds  nutrition fu requested  dc Mg, change PPI to pepcid, off laxatives     Overall prognosis is guarded, continue monitoring in SDU

## 2023-06-30 NOTE — PROGRESS NOTE ADULT - SUBJECTIVE AND OBJECTIVE BOX
SUBJECTIVE / OVERNIGHT EVENTS  Patient was seen and examined. Patient has a tracheostomy and a PEG. Patient slept comfortably and didn't have fever or chills.     MEDICATIONS  aMIOdarone    Tablet 100 milliGRAM(s) Oral daily  chlorhexidine 2% Cloths 1 Application(s) Topical <User Schedule>  clonazePAM  Tablet 1 milliGRAM(s) Oral three times a day  diltiazem    Tablet 60 milliGRAM(s) Oral every 6 hours  enoxaparin Injectable 80 milliGRAM(s) SubCutaneous every 12 hours  famotidine    Tablet 20 milliGRAM(s) Oral two times a day  insulin lispro (ADMELOG) corrective regimen sliding scale   SubCutaneous every 6 hours  lisinopril 5 milliGRAM(s) Oral daily  methadone    Tablet 5 milliGRAM(s) Oral three times a day  metroNIDAZOLE    Tablet 500 milliGRAM(s) Oral every 8 hours  nystatin Cream 1 Application(s) Topical two times a day  OLANZapine 5 milliGRAM(s) Oral daily  vancomycin  IVPB 1000 milliGRAM(s) IV Intermittent <User Schedule>  vitamin A &amp; D Ointment 1 Application(s) Topical daily    acetaminophen     Tablet .. 650 milliGRAM(s) Oral every 6 hours PRN Temp greater or equal to 38C (100.4F)  aluminum hydroxide/magnesium hydroxide/simethicone Suspension 30 milliLiter(s) Oral every 4 hours PRN Dyspepsia    VITALS /  EXAM    T(C): 36.3 (23 @ 11:48), Max: 36.4 (23 @ 04:00)  HR: 101 (23 @ 11:48) (93 - 103)  BP: 124/69 (23 @ 11:48) (101/58 - 143/84)  RR: 20 (23 @ 11:48) (20 - 20)  SpO2: 100% (23 @ 11:48) (98% - 100%)  POCT Blood Glucose.: 128 mg/dL (23 @ 11:08)  POCT Blood Glucose.: 101 mg/dL (23 @ 07:47)  POCT Blood Glucose.: 107 mg/dL (23 @ 05:01)  POCT Blood Glucose.: 132 mg/dL (23 @ 23:02)  POCT Blood Glucose.: 128 mg/dL (23 @ 18:03)    ENT: Airway patent, trached  EYES: Pupils equal, Round and reactive to light.  CARDIAC: Normal rate, IRRegular rhythm.    RESPIRATORY: No wheezing, Bilateral BS, Not tachypneic, No use of accessory muscles  GASTROINTESTINAL: Abdomen soft, Non-tender, No guarding,   NEUROLOGICAL: Alert.  followign simple commands   SKIN: Sacrum: ~ 2cm x 1cm partial thickness wound , pink and moist dermis. No purulent drainage, malodor or active bleeding evident,     I's & O's     23 @ 07:01  -  23 @ 07:00  --------------------------------------------------------  IN:    Free Water: 100 mL    IV PiggyBack: 250 mL    Miscellaneous Tube Feedin mL  Total IN: 1250 mL    OUT:    Rectal Tube (mL): 400 mL    Voided (mL): 2000 mL  Total OUT: 2400 mL    Total NET: -1150 mL      23 @ 07:01  -  23 @ 16:04  --------------------------------------------------------  IN:    Enteral Tube Flush: 50 mL    IV PiggyBack: 250 mL    Jevity 1.2: 60 mL    Miscellaneous Tube Feedin mL  Total IN: 660 mL    OUT:    Rectal Tube (mL): 100 mL  Total OUT: 100 mL    Total NET: 560 mL        LABS             7.7    12.00 )-----------( 312      ( 23 @ 06:00 )             25.5     131  |  93  |  14  -------------------------<  109   23 @ 06:00  5.2  |  28  |  0.6    Ca      9.1     23 @ 06:00  Phos   4.4     23 @ 06:00  Mg     2.2     23 @ 06:00    TPro  6.9  /  Alb  2.5  /  TBili  0.2  /  DBili  x   /  AST  42  /  ALT  29  /  AlkPhos  141  /  GGT  x     23 @ 06:00      Troponin T, Serum: <0.01 ng/mL (23 @ 06:23)                Urinalysis Basic - ( 2023 06:00 )    Color: x / Appearance: x / SG: x / pH: x  Gluc: 109 mg/dL / Ketone: x  / Bili: x / Urobili: x   Blood: x / Protein: x / Nitrite: x   Leuk Esterase: x / RBC: x / WBC x   Sq Epi: x / Non Sq Epi: x / Bacteria: x      MICRO / IMAGING / CARDIOLOGY  Telemetry: Reviewed   EKG: Reviewed    CULTURES    IMAGING  PACS Image:  (23 @ 07:31)  PACS Image:  (23 @ 17:58)  PACS Image:  (23 @ 06:29)    CARDIOLOGY

## 2023-06-30 NOTE — CONSULT NOTE ADULT - SUBJECTIVE AND OBJECTIVE BOX
NUTRITION SUPPORT TEAM  -  CONSULT NOTE     42 year-old female with PMH of Asthma, HTN? presents with complaint of cough x3 days and SOB. During my encounter pt with dyspnea and increased work of breathing and chest pain, unable to properly provide accurate history. She states that her cough has been progressively worsening over the past 3 days, endorses hemoptysis since yesterday. She states she had a friend who came over and stayed with for more than a week who was sick recently, but she does not know if she recently travelled out of State or not. She also admits that her children has been sick with Sore throat and fever for past 3 days. She admits to fever, chills, sever Chest pain which has gotten worse since she came to the ED. She took 1x Tamiflu yesterday. She otherwise denies diarrhea, constipation, urinary symptoms. She is not vaccinated for Influenza or COVID-19.   RVP + Influenza B      CT Angio Chest PE Protocol w/ IV Cont   1. Bilateral patchy groundglass nodularity with dominant confluent left lower lobe opacification with numerous cavitary lesions. Additional contralateral right lower lobe cavitary 1 cm nodule. Findings compatible with cavitary pneumonia in the appropriate clinical setting; differential diagnosis includes tuberculosis.  2. Confluent ill-defined left hilar and mediastinal adenopathy, likely reactive, without dominant lymph node delineated.  3. No evidence of pulmonary embolism.    s/p 1x Rocephin, Meropenem, 3 L LR in ED  Pt admitted to SDU for further managements, during my encounter pt is very agitated, in acute distress. Pt received appropriate pain control with IV Morphine, s/p Xanax 1 mg 1x for agitation and anxiety, despite pain control and adequate fluid resuscitation pt remained tachycardic, tachypneic. STAT EKG reviewed with Cardiology team on call After discussion with Cardiology team, Pt received 1x Adenosine 6 mg IV push with no change. Case discussed with Critical Care team, decision was made for Intubation and Pt upgraded to MICU.   (24 May 2023 00:58)      Patient admitted to the ICU for acute hypoxemic respiratory failure secondary to cavitary MRSA Pneumonia with a positive influenza B, finished a course of zyvox and tamiflu. This was complicated by loculated parapneumonic effusion for which a chest tube was inserted and tpa was given twice. Her stay was complicated by MRSA bacteremia and for which she took a course of meropenem, currently on vancomycin and metronidazole, cultures negative to date. MASTER was negative for vegetations. Trach and PEG done for inability to wean off vent, currently off sedation.   Her stay was also complicated by new onset Afib with RVR for which she is on amiodarone, diltiazem, and therapeutic anticoagulation. Long QT is also noted. Patient also had LANCE that improved with no need for RRT.  Otherwise patient received a total of 6 p RBC for acute blood loss anemia ,currently hemoglobin stable.     NUTRITION SUPPORT NOTE:  NST called by residents this morning  due to questions about enteral formula and newly developed diarrhea    REVIEW OF SYSTEMS:  Negative except as noted above.     ALLERGIES:  aspirin (Short breath; Anaphylaxis)    VITALS:  T(F): 97.4 (-30 @ 11:48), Max: 97.6 ( @ 04:00)  HR: 101 (-30 @ 11:48) (100 - 101)  BP: 124/69 (-30 @ 11:48) (124/69 - 143/84)  RR: 20 ( @ 11:48) (20 - 20)  SpO2: 100% (-30 @ 11:48) (100% - 100%)    HEIGHT/WEIGHT/BMI:   Height (cm): 167.6 (-14)  Weight (kg): 85 (-14)  BMI (kg/m2): 30.3 (-14)    I/Os:   23 @ 07:01  -  23 @ 07:00  --------------------------------------------------------  IN:    Free Water: 100 mL    IV PiggyBack: 250 mL    Miscellaneous Tube Feedin mL  Total IN: 1250 mL  OUT:    Rectal Tube (mL): 400 mL    Voided (mL): 2000 mL  Total OUT: 2400 mL  Total NET: -1150 mL    PHYSICAL EXAM:   GENERAL: awake, ill appearing  HEENT: Moist mucous membranes, trach  ABDOMEN: Soft, Nontender, Nondistended, + G-J tube in place, feeds infusing  EXTREMITIES:  No clubbing, cyanosis  SKIN: warm and well perfused; No obvious rashes or lesions  d/w RN - stool is loose, brown    STANDING MEDICATIONS:   aMIOdarone    Tablet 100 milliGRAM(s) Oral daily  chlorhexidine 2% Cloths 1 Application(s) Topical <User Schedule>  clonazePAM  Tablet 1 milliGRAM(s) Oral three times a day  diltiazem    Tablet 60 milliGRAM(s) Oral every 6 hours  enoxaparin Injectable 80 milliGRAM(s) SubCutaneous every 12 hours  famotidine    Tablet 20 milliGRAM(s) Oral two times a day  insulin lispro (ADMELOG) corrective regimen sliding scale   SubCutaneous every 6 hours  lisinopril 5 milliGRAM(s) Oral daily  methadone    Tablet 5 milliGRAM(s) Oral three times a day  methadone    Tablet 10 milliGRAM(s) Oral once  metroNIDAZOLE    Tablet 500 milliGRAM(s) Oral every 8 hours  nystatin Cream 1 Application(s) Topical two times a day  OLANZapine 5 milliGRAM(s) Oral daily  vancomycin  IVPB 1000 milliGRAM(s) IV Intermittent <User Schedule>  vitamin A &amp; D Ointment 1 Application(s) Topical daily    LABS:                        7.7    12.00 )-----------( 312      ( 2023 06:00 )             25.5     131<L>  |  93<L>  |  14  ----------------------------<  109<H>          (23 @ 06:00)  5.2<H>   |  28  |  0.6<L>    Ca    9.1          (23 @ 06:00)  Phos  4.4         (23 @ 06:00)  Mg     2.2         (23 @ 06:00)    TPro  6.9  /  Alb  2.5<L>  /  TBili  0.2  /  DBili  x   /  AST  42<H>  /  ALT  29  /  AlkPhos  141<H>       23 @ 06:00  Triglycerides, Serum: 147 mg/dL ( @ 04:44)    A1c: 5.6 % (23 @ 05:47)    Blood Glucose (Past 24 hours):  128 mg/dL ( @ 11:08)  101 mg/dL ( @ 07:47)  107 mg/dL ( @ 05:01)  132 mg/dL ( @ 23:02)  128 mg/dL ( @ 18:03)    DIET:   Diet, NPO with Tube Feed:   Tube Feeding Modality: Orogastric  Jevity 1.2 Olvin  Total Volume for 24 Hours (mL): 1440  Continuous  Until Goal Tube Feed Rate (mL per Hour): 60  Tube Feed Duration (in Hours): 24  Tube Feed Start Time: 12:00  Free Water Flush   Total Volume per Flush (mL): 50   Frequency: Every 6 Hours  No Carb Prosource (1pkg = 15gms Protein)     Qty per Day:  1  Banatrol TF     Qty per Day:  2 (23 @ 10:58) [Active]

## 2023-06-30 NOTE — PROGRESS NOTE ADULT - CRITICAL CARE SERVICES
31
35
35
31
35
31
31
35
35
31
35
31
33
35
31
31
32
31

## 2023-06-30 NOTE — PROGRESS NOTE ADULT - SUBJECTIVE AND OBJECTIVE BOX
Patient is a 42y old  Female who presents with a chief complaint of AHRF (2023 15:25)        Over Night Events:  Still critically ill. On MV. tolerated PS    ROS:     All ROS are negative except HPI       PHYSICAL EXAM    ICU Vital Signs Last 24 Hrs  T(C): 36.3 (2023 08:04), Max: 36.9 (2023 11:31)  T(F): 97.4 (2023 08:04), Max: 98.4 (2023 11:31)  HR: 100 (2023 08:04) (93 - 104)  BP: 126/74 (2023 08:04) (101/58 - 156/85)  BP(mean): 94 (2023 08:04) (94 - 115)  RR: 20 (2023 08:04) (20 - 20)  SpO2: 100% (2023 08:04) (98% - 100%)    O2 Parameters below as of 2023 08:04  Patient On (Oxygen Delivery Method): ventilator      ENT: Airway patent, trached  EYES: Pupils equal, Round and reactive to light.  CARDIAC: Normal rate, Regular rhythm.    RESPIRATORY: No wheezing, Bilateral BS, Not tachypneic, No use of accessory muscles  GASTROINTESTINAL: Abdomen soft, Non-tender, No guarding,   NEUROLOGICAL: Alert and oriented   SKIN: Skin normal color for race, Warm and dry and intact.       23 @ 07:01  -  23 @ 07:00  --------------------------------------------------------  IN:    Free Water: 100 mL    IV PiggyBack: 250 mL    Miscellaneous Tube Feedin mL  Total IN: 1250 mL    OUT:    Rectal Tube (mL): 400 mL    Voided (mL): 2000 mL  Total OUT: 2400 mL    Total NET: -1150 mL      LABS:                          7.7    12.00 )-----------( 312      ( 2023 06:00 )             25.5                                               06-30    131<L>  |  93<L>  |  14  ----------------------------<  109<H>  5.2<H>   |  28  |  0.6<L>    Ca    9.1      2023 06:00  Phos  4.4     06-30  Mg     2.2     06-30    TPro  6.9  /  Alb  2.5<L>  /  TBili  0.2  /  DBili  x   /  AST  42<H>  /  ALT  29  /  AlkPhos  141<H>  06-30                                             Urinalysis Basic - ( 2023 06:00 )    Color: x / Appearance: x / SG: x / pH: x  Gluc: 109 mg/dL / Ketone: x  / Bili: x / Urobili: x   Blood: x / Protein: x / Nitrite: x   Leuk Esterase: x / RBC: x / WBC x   Sq Epi: x / Non Sq Epi: x / Bacteria: x                                                  LIVER FUNCTIONS - ( 2023 06:00 )  Alb: 2.5 g/dL / Pro: 6.9 g/dL / ALK PHOS: 141 U/L / ALT: 29 U/L / AST: 42 U/L / GGT: x                                                                                               Mode: AC/ CMV (Assist Control/ Continuous Mandatory Ventilation)  RR (machine): 16  TV (machine): 360  FiO2: 40  PEEP: 8  ITime: 1  MAP: 14  PIP: 29                                          MEDICATIONS  (STANDING):  aMIOdarone    Tablet 100 milliGRAM(s) Oral daily  chlorhexidine 2% Cloths 1 Application(s) Topical <User Schedule>  clonazePAM  Tablet 1 milliGRAM(s) Oral three times a day  diltiazem    Tablet 60 milliGRAM(s) Oral every 6 hours  enoxaparin Injectable 80 milliGRAM(s) SubCutaneous every 12 hours  insulin lispro (ADMELOG) corrective regimen sliding scale   SubCutaneous every 6 hours  lisinopril 5 milliGRAM(s) Oral daily  magnesium sulfate  IVPB 2 Gram(s) IV Intermittent daily  methadone    Tablet 5 milliGRAM(s) Oral three times a day  metroNIDAZOLE    Tablet 500 milliGRAM(s) Oral every 8 hours  nystatin Cream 1 Application(s) Topical two times a day  OLANZapine 5 milliGRAM(s) Oral daily  pantoprazole   Suspension 40 milliGRAM(s) Oral daily  vancomycin  IVPB 1000 milliGRAM(s) IV Intermittent <User Schedule>  vitamin A &amp; D Ointment 1 Application(s) Topical daily    MEDICATIONS  (PRN):  acetaminophen     Tablet .. 650 milliGRAM(s) Oral every 6 hours PRN Temp greater or equal to 38C (100.4F)  aluminum hydroxide/magnesium hydroxide/simethicone Suspension 30 milliLiter(s) Oral every 4 hours PRN Dyspepsia         Patient is a 42y old  Female who presents with a chief complaint of AHRF (2023 15:25)        Over Night Events:  Still critically ill. On MV. Tolerated PS 5 hours     ROS:     All ROS are negative except HPI       PHYSICAL EXAM    ICU Vital Signs Last 24 Hrs  T(C): 36.3 (2023 08:04), Max: 36.9 (2023 11:31)  T(F): 97.4 (2023 08:04), Max: 98.4 (2023 11:31)  HR: 100 (2023 08:04) (93 - 104)  BP: 126/74 (2023 08:04) (101/58 - 156/85)  BP(mean): 94 (2023 08:04) (94 - 115)  RR: 20 (2023 08:04) (20 - 20)  SpO2: 100% (2023 08:04) (98% - 100%)    O2 Parameters below as of 2023 08:04  Patient On (Oxygen Delivery Method): ventilator      ENT: Airway patent, trached  EYES: Pupils equal, Round and reactive to light.  CARDIAC: Normal rate, Regular rhythm.    RESPIRATORY: No wheezing, Bilateral BS, Not tachypneic, No use of accessory muscles  GASTROINTESTINAL: Abdomen soft, Non-tender, No guarding,   NEUROLOGICAL: Alert and oriented   SKIN: Skin normal color for race, Warm and dry and intact.       23 @ 07:01  -  23 @ 07:00  --------------------------------------------------------  IN:    Free Water: 100 mL    IV PiggyBack: 250 mL    Miscellaneous Tube Feedin mL  Total IN: 1250 mL    OUT:    Rectal Tube (mL): 400 mL    Voided (mL): 2000 mL  Total OUT: 2400 mL    Total NET: -1150 mL      LABS:                          7.7    12.00 )-----------( 312      ( 2023 06:00 )             25.5                                               06-30    131<L>  |  93<L>  |  14  ----------------------------<  109<H>  5.2<H>   |  28  |  0.6<L>    Ca    9.1      2023 06:00  Phos  4.4     06  Mg     2.2     0630    TPro  6.9  /  Alb  2.5<L>  /  TBili  0.2  /  DBili  x   /  AST  42<H>  /  ALT  29  /  AlkPhos  141<H>                                               Urinalysis Basic - ( 2023 06:00 )    Color: x / Appearance: x / SG: x / pH: x  Gluc: 109 mg/dL / Ketone: x  / Bili: x / Urobili: x   Blood: x / Protein: x / Nitrite: x   Leuk Esterase: x / RBC: x / WBC x   Sq Epi: x / Non Sq Epi: x / Bacteria: x                                                  LIVER FUNCTIONS - ( 2023 06:00 )  Alb: 2.5 g/dL / Pro: 6.9 g/dL / ALK PHOS: 141 U/L / ALT: 29 U/L / AST: 42 U/L / GGT: x                                                                                               Mode: AC/ CMV (Assist Control/ Continuous Mandatory Ventilation)  RR (machine): 16  TV (machine): 360  FiO2: 40  PEEP: 8  ITime: 1  MAP: 14  PIP: 29                                          MEDICATIONS  (STANDING):  aMIOdarone    Tablet 100 milliGRAM(s) Oral daily  chlorhexidine 2% Cloths 1 Application(s) Topical <User Schedule>  clonazePAM  Tablet 1 milliGRAM(s) Oral three times a day  diltiazem    Tablet 60 milliGRAM(s) Oral every 6 hours  enoxaparin Injectable 80 milliGRAM(s) SubCutaneous every 12 hours  insulin lispro (ADMELOG) corrective regimen sliding scale   SubCutaneous every 6 hours  lisinopril 5 milliGRAM(s) Oral daily  magnesium sulfate  IVPB 2 Gram(s) IV Intermittent daily  methadone    Tablet 5 milliGRAM(s) Oral three times a day  metroNIDAZOLE    Tablet 500 milliGRAM(s) Oral every 8 hours  nystatin Cream 1 Application(s) Topical two times a day  OLANZapine 5 milliGRAM(s) Oral daily  pantoprazole   Suspension 40 milliGRAM(s) Oral daily  vancomycin  IVPB 1000 milliGRAM(s) IV Intermittent <User Schedule>  vitamin A &amp; D Ointment 1 Application(s) Topical daily    MEDICATIONS  (PRN):  acetaminophen     Tablet .. 650 milliGRAM(s) Oral every 6 hours PRN Temp greater or equal to 38C (100.4F)  aluminum hydroxide/magnesium hydroxide/simethicone Suspension 30 milliLiter(s) Oral every 4 hours PRN Dyspepsia

## 2023-07-01 LAB
ALBUMIN SERPL ELPH-MCNC: 2.9 G/DL — LOW (ref 3.5–5.2)
ALP SERPL-CCNC: 130 U/L — HIGH (ref 30–115)
ALT FLD-CCNC: 28 U/L — SIGNIFICANT CHANGE UP (ref 0–41)
ANION GAP SERPL CALC-SCNC: 10 MMOL/L — SIGNIFICANT CHANGE UP (ref 7–14)
AST SERPL-CCNC: 33 U/L — SIGNIFICANT CHANGE UP (ref 0–41)
BASOPHILS # BLD AUTO: 0.08 K/UL — SIGNIFICANT CHANGE UP (ref 0–0.2)
BASOPHILS NFR BLD AUTO: 0.7 % — SIGNIFICANT CHANGE UP (ref 0–1)
BILIRUB SERPL-MCNC: <0.2 MG/DL — SIGNIFICANT CHANGE UP (ref 0.2–1.2)
BUN SERPL-MCNC: 13 MG/DL — SIGNIFICANT CHANGE UP (ref 10–20)
CALCIUM SERPL-MCNC: 9.4 MG/DL — SIGNIFICANT CHANGE UP (ref 8.4–10.4)
CHLORIDE SERPL-SCNC: 95 MMOL/L — LOW (ref 98–110)
CO2 SERPL-SCNC: 31 MMOL/L — SIGNIFICANT CHANGE UP (ref 17–32)
CREAT SERPL-MCNC: 0.6 MG/DL — LOW (ref 0.7–1.5)
EGFR: 115 ML/MIN/1.73M2 — SIGNIFICANT CHANGE UP
EOSINOPHIL # BLD AUTO: 0.35 K/UL — SIGNIFICANT CHANGE UP (ref 0–0.7)
EOSINOPHIL NFR BLD AUTO: 3 % — SIGNIFICANT CHANGE UP (ref 0–8)
GLUCOSE BLDC GLUCOMTR-MCNC: 101 MG/DL — HIGH (ref 70–99)
GLUCOSE BLDC GLUCOMTR-MCNC: 113 MG/DL — HIGH (ref 70–99)
GLUCOSE BLDC GLUCOMTR-MCNC: 114 MG/DL — HIGH (ref 70–99)
GLUCOSE BLDC GLUCOMTR-MCNC: 114 MG/DL — HIGH (ref 70–99)
GLUCOSE BLDC GLUCOMTR-MCNC: 126 MG/DL — HIGH (ref 70–99)
GLUCOSE SERPL-MCNC: 93 MG/DL — SIGNIFICANT CHANGE UP (ref 70–99)
HCT VFR BLD CALC: 27 % — LOW (ref 37–47)
HGB BLD-MCNC: 8.2 G/DL — LOW (ref 12–16)
IMM GRANULOCYTES NFR BLD AUTO: 1.5 % — HIGH (ref 0.1–0.3)
LYMPHOCYTES # BLD AUTO: 1.63 K/UL — SIGNIFICANT CHANGE UP (ref 1.2–3.4)
LYMPHOCYTES # BLD AUTO: 14 % — LOW (ref 20.5–51.1)
MAGNESIUM SERPL-MCNC: 2 MG/DL — SIGNIFICANT CHANGE UP (ref 1.8–2.4)
MCHC RBC-ENTMCNC: 23.9 PG — LOW (ref 27–31)
MCHC RBC-ENTMCNC: 30.4 G/DL — LOW (ref 32–37)
MCV RBC AUTO: 78.7 FL — LOW (ref 81–99)
MONOCYTES # BLD AUTO: 1.11 K/UL — HIGH (ref 0.1–0.6)
MONOCYTES NFR BLD AUTO: 9.5 % — HIGH (ref 1.7–9.3)
NEUTROPHILS # BLD AUTO: 8.33 K/UL — HIGH (ref 1.4–6.5)
NEUTROPHILS NFR BLD AUTO: 71.3 % — SIGNIFICANT CHANGE UP (ref 42.2–75.2)
NRBC # BLD: 0 /100 WBCS — SIGNIFICANT CHANGE UP (ref 0–0)
PHOSPHATE SERPL-MCNC: 5.6 MG/DL — HIGH (ref 2.1–4.9)
PLATELET # BLD AUTO: 380 K/UL — SIGNIFICANT CHANGE UP (ref 130–400)
PMV BLD: 9 FL — SIGNIFICANT CHANGE UP (ref 7.4–10.4)
POTASSIUM SERPL-MCNC: 5.2 MMOL/L — HIGH (ref 3.5–5)
POTASSIUM SERPL-SCNC: 5.2 MMOL/L — HIGH (ref 3.5–5)
PROT SERPL-MCNC: 7.1 G/DL — SIGNIFICANT CHANGE UP (ref 6–8)
RBC # BLD: 3.43 M/UL — LOW (ref 4.2–5.4)
RBC # FLD: 27.9 % — HIGH (ref 11.5–14.5)
SODIUM SERPL-SCNC: 136 MMOL/L — SIGNIFICANT CHANGE UP (ref 135–146)
VANCOMYCIN TROUGH SERPL-MCNC: 25.9 UG/ML — HIGH (ref 5–10)
WBC # BLD: 11.67 K/UL — HIGH (ref 4.8–10.8)
WBC # FLD AUTO: 11.67 K/UL — HIGH (ref 4.8–10.8)

## 2023-07-01 PROCEDURE — 93010 ELECTROCARDIOGRAM REPORT: CPT

## 2023-07-01 PROCEDURE — 99232 SBSQ HOSP IP/OBS MODERATE 35: CPT | Mod: GC

## 2023-07-01 PROCEDURE — 71045 X-RAY EXAM CHEST 1 VIEW: CPT | Mod: 26

## 2023-07-01 PROCEDURE — 99233 SBSQ HOSP IP/OBS HIGH 50: CPT

## 2023-07-01 RX ORDER — METHADONE HYDROCHLORIDE 40 MG/1
5 TABLET ORAL THREE TIMES A DAY
Refills: 0 | Status: DISCONTINUED | OUTPATIENT
Start: 2023-07-01 | End: 2023-07-04

## 2023-07-01 RX ORDER — VANCOMYCIN HCL 1 G
750 VIAL (EA) INTRAVENOUS EVERY 12 HOURS
Refills: 0 | Status: DISCONTINUED | OUTPATIENT
Start: 2023-07-02 | End: 2023-07-06

## 2023-07-01 RX ORDER — METHADONE HYDROCHLORIDE 40 MG/1
5 TABLET ORAL THREE TIMES A DAY
Refills: 0 | Status: DISCONTINUED | OUTPATIENT
Start: 2023-07-01 | End: 2023-07-01

## 2023-07-01 RX ORDER — METHADONE HYDROCHLORIDE 40 MG/1
5 TABLET ORAL ONCE
Refills: 0 | Status: DISCONTINUED | OUTPATIENT
Start: 2023-07-01 | End: 2023-07-01

## 2023-07-01 RX ORDER — FAMOTIDINE 10 MG/ML
20 INJECTION INTRAVENOUS
Refills: 0 | Status: DISCONTINUED | OUTPATIENT
Start: 2023-07-01 | End: 2023-07-05

## 2023-07-01 RX ADMIN — Medication 60 MILLIGRAM(S): at 18:37

## 2023-07-01 RX ADMIN — Medication 500 MILLIGRAM(S): at 13:43

## 2023-07-01 RX ADMIN — Medication 500 MILLIGRAM(S): at 22:02

## 2023-07-01 RX ADMIN — FAMOTIDINE 20 MILLIGRAM(S): 10 INJECTION INTRAVENOUS at 05:16

## 2023-07-01 RX ADMIN — METHADONE HYDROCHLORIDE 5 MILLIGRAM(S): 40 TABLET ORAL at 14:57

## 2023-07-01 RX ADMIN — Medication 250 MILLIGRAM(S): at 11:26

## 2023-07-01 RX ADMIN — ENOXAPARIN SODIUM 80 MILLIGRAM(S): 100 INJECTION SUBCUTANEOUS at 10:17

## 2023-07-01 RX ADMIN — Medication 500 MILLIGRAM(S): at 05:16

## 2023-07-01 RX ADMIN — AMIODARONE HYDROCHLORIDE 100 MILLIGRAM(S): 400 TABLET ORAL at 05:16

## 2023-07-01 RX ADMIN — NYSTATIN CREAM 1 APPLICATION(S): 100000 CREAM TOPICAL at 18:39

## 2023-07-01 RX ADMIN — Medication 60 MILLIGRAM(S): at 11:27

## 2023-07-01 RX ADMIN — Medication 60 MILLIGRAM(S): at 23:16

## 2023-07-01 RX ADMIN — METHADONE HYDROCHLORIDE 5 MILLIGRAM(S): 40 TABLET ORAL at 05:15

## 2023-07-01 RX ADMIN — Medication 60 MILLIGRAM(S): at 05:15

## 2023-07-01 RX ADMIN — ENOXAPARIN SODIUM 80 MILLIGRAM(S): 100 INJECTION SUBCUTANEOUS at 22:02

## 2023-07-01 RX ADMIN — CHLORHEXIDINE GLUCONATE 1 APPLICATION(S): 213 SOLUTION TOPICAL at 05:22

## 2023-07-01 RX ADMIN — LISINOPRIL 5 MILLIGRAM(S): 2.5 TABLET ORAL at 05:15

## 2023-07-01 RX ADMIN — Medication 1 MILLIGRAM(S): at 22:03

## 2023-07-01 RX ADMIN — Medication 1 MILLIGRAM(S): at 13:43

## 2023-07-01 RX ADMIN — OLANZAPINE 5 MILLIGRAM(S): 15 TABLET, FILM COATED ORAL at 11:26

## 2023-07-01 RX ADMIN — FAMOTIDINE 20 MILLIGRAM(S): 10 INJECTION INTRAVENOUS at 18:37

## 2023-07-01 RX ADMIN — SCOPALAMINE 1 PATCH: 1 PATCH, EXTENDED RELEASE TRANSDERMAL at 04:30

## 2023-07-01 RX ADMIN — Medication 1 APPLICATION(S): at 11:27

## 2023-07-01 RX ADMIN — Medication 1 MILLIGRAM(S): at 05:15

## 2023-07-01 RX ADMIN — NYSTATIN CREAM 1 APPLICATION(S): 100000 CREAM TOPICAL at 05:16

## 2023-07-01 RX ADMIN — METHADONE HYDROCHLORIDE 5 MILLIGRAM(S): 40 TABLET ORAL at 23:59

## 2023-07-01 NOTE — PROGRESS NOTE ADULT - ASSESSMENT
42 year-old female with PMH of Asthma, HTN? presents with complaint of cough x3 days and SOB. In the ED was found to have Bilateral patchy groundglass nodularity with dominant confluent left lower lobe opacification with numerous cavitary lesions. Additional contralateral right lower lobe cavitary 1 cm nodule on CT. Patient was admitted to the ICU for acute hypoxemic respiratory failure secondary to cavitary MRSA Pneumonia with a positive influenza B, finished a course of zyvox and tamiflu. This was complicated by loculated parapneumonic effusion for which a chest tube was inserted and tpa was given twice. Her stay was complicated by MRSA bacteremia  currently on vancomycin and metronidazole, cultures negative to date. MASTER was negative for vegetations. Trach and PEG done for inability to wean off vent, currently off sedation. Her stay was also complicated by new onset Afib with RVR for which she is on amiodarone, diltiazem, and therapeutic anticoagulation. Long QT is also noted. Patient also had LANCE that improved with no need for RRT. Otherwise patient received a total of 6 p RBC for acute blood loss anemia ,currently hemoglobin stable. Patient transferred to step down unit.     Acute hypoxemic respiratory failure / s/P Trach and PEG    Severe cavitary CAP MRSA  Small parapneumonic effusion SP Chest tube  MRSA bacteremia  Influenza B   hx of Asthma   Intubated 5/23 self-extubated 5/25, re-intubated 5/25; SBT failed, SP trach/G tube  - s/p Zyvox, meropenem and Tamifu, now on Vancomycin and flagyl, anticipated end date 7/9  - monitor levels per ID pharmacy   - s/p course of steroids now off  -  blood cultures  NGTD SINCE 5/28   - s/p MASTER 5/31: No evidence of valvular vegetations or intracardiac abscesses to support IE. Pulmonary hepatisation incidental finding. EF 65%, mild TR and pulm HTN  - ventilator management per pulm/cc, tolerated PS for 5 hours yesterday   - on Methadone 5/5/5 and olanzapine from MICU due to prolonged time on Fentanyl. Monitor daily EKGS for QTC. Keep Mg ~2 and K ~4     Maculopapular buttocks rash  -PCR skin scraping negative for HSV --> off  Acyclovir   -Wound care per burn, wound care RN following     LANCE, resolved   - post-infectious GN vs. ATN  - Cr peaked  3 > now 0.6  - renal U/S: negative   - daily BMP     Microcytic Anemia likely  anemia of chronic disease   - s/p 6 units prbc so far last 6/5  - Restarted AC with lovenox 6/22 given Hb stable last few days  -monitor hemoglobin     New onset A-fib w/ RVR  - HR better controlled now  - s/p Amio drip, now on PO  - Cardizem po 60mg q6hr   - therapeutic Lovenox   - EP consult appreciated     Diarrhea - likely related to tube feeds  nutrition fu requested  dc Mg, change PPI to pepcid, off laxatives   dc dignishield once diarrhea improves. IF worsening leukocytosis/fevers, check Cdiff     Pending: c/w IV abx, vent management per pulm, monitor diarrhea

## 2023-07-01 NOTE — PROGRESS NOTE ADULT - SUBJECTIVE AND OBJECTIVE BOX
ZEYNEP ROSSI  42y Female    CHIEF COMPLAINT:    Patient is a 42y old  Female who presents with a chief complaint of AHRF (2023 09:55)    INTERVAL HPI/OVERNIGHT EVENTS:    Patient seen and examined. No acute events overnight. Overall unchanged, tolerated PS for 5 hours     ROS: All other systems are negative.    Vital Signs:    T(F): 97.6 (23 @ 07:50), Max: 98.9 (23 @ 16:00)  HR: 110 (23 @ 11:00) (92 - 110)  BP: 119/80 (23 @ 11:00) (115/73 - 137/97)  RR: 20 (23 @ 11:00) (18 - 20)  SpO2: 100% (23 @ 11:00) (100% - 100%)    2023 07:01  -  2023 07:00  --------------------------------------------------------  IN: 1970 mL / OUT: 1400 mL / NET: 570 mL    Daily Weight in k.8 (2023 00:00)    POCT Blood Glucose.: 101 mg/dL (2023 11:28)  POCT Blood Glucose.: 114 mg/dL (2023 08:22)  POCT Blood Glucose.: 126 mg/dL (2023 05:14)  POCT Blood Glucose.: 114 mg/dL (2023 23:19)  POCT Blood Glucose.: 122 mg/dL (2023 17:15)    PHYSICAL EXAM:    GENERAL:  NAD chronically ill appearing   SKIN: No rashes or lesions  HEENT: Atraumatic. Normocephalic.   NECK: Supple, No JVD. No lymphadenopathy.  PULMONARY: decreased breath sounds B/L. No wheezing.   CVS: Normal S1, S2. Rate and Rhythm are regular  ABDOMEN/GI: Soft, Nontender, Nondistended  MSK:  No clubbing or cyanosis   NEUROLOGIC: moves all extremities   PSYCH: awake and alert     Consultant(s) Notes Reviewed:  [x ] YES  [ ] NO  Care Discussed with Consultants/Other Providers [ x] YES  [ ] NO    LABS:                        8.2    11.67 )-----------( 380      ( 2023 07:41 )             27.0     136  |  95<L>  |  13  ----------------------------<  93  5.2<H>   |  31  |  0.6<L>    Ca    9.4      2023 07:41  Phos  5.6       Mg     2.0         TPro  7.1  /  Alb  2.9<L>  /  TBili  <0.2  /  DBili  x   /  AST  33  /  ALT  28  /  AlkPhos  130<H>      RADIOLOGY & ADDITIONAL TESTS:  Imaging or report Personally Reviewed:  [x] YES  [ ] NO  EKG reviewed: [x] YES  [ ] NO    Medications:  Standing  aMIOdarone    Tablet 100 milliGRAM(s) Oral daily  chlorhexidine 2% Cloths 1 Application(s) Topical <User Schedule>  clonazePAM  Tablet 1 milliGRAM(s) Oral three times a day  diltiazem    Tablet 60 milliGRAM(s) Oral every 6 hours  enoxaparin Injectable 80 milliGRAM(s) SubCutaneous every 12 hours  famotidine    Tablet 20 milliGRAM(s) Oral two times a day  insulin lispro (ADMELOG) corrective regimen sliding scale   SubCutaneous every 6 hours  lisinopril 5 milliGRAM(s) Oral daily  methadone    Tablet 5 milliGRAM(s) Oral three times a day  metroNIDAZOLE    Tablet 500 milliGRAM(s) Oral every 8 hours  nystatin Cream 1 Application(s) Topical two times a day  OLANZapine 5 milliGRAM(s) Oral daily  vancomycin  IVPB 1000 milliGRAM(s) IV Intermittent <User Schedule>  vitamin A &amp; D Ointment 1 Application(s) Topical daily    PRN Meds  acetaminophen     Tablet .. 650 milliGRAM(s) Oral every 6 hours PRN  aluminum hydroxide/magnesium hydroxide/simethicone Suspension 30 milliLiter(s) Oral every 4 hours PRN

## 2023-07-01 NOTE — PROGRESS NOTE ADULT - ASSESSMENT
IMPRESSION:    Acute hypoxemic respiratory failure sp trach   Severe cavitary CAP MRSA  Small parapneumonic effusion SP Chest tube  MRSA bacteremia resolved   MASTER negative   Anemia sp transfusion   New onset A FIB rate controlled   LANCE non oliguric improving  Hypernatremia Improving  HO asthma   Influenza B         PLAN:    CNS:  Continue Methadone Lower to 5 / 5 / 5.   Klonopin 1 mg BID.  Zyprexa 5 .  Monitor QTC.       HEENT: Oral care. Trach care    PULMONARY:  HOB @ 45 degrees.  Aspiration precautions.    No change in vent settings.    Keep SaO2 92 TO 96%.  PS for 4-6 hours     CARDIOVASCULAR:    Avoid overload,  rate control.  Amiodarone 100 mg daily.  Cardizem 60 mg Q6     GI: GI prophylaxis. PEG Feeding.   monitor output, may need to send c diff, remoe rectal tube once diarrhea improved    RENAL:  Follow up lytes.  Correct as needed.     INFECTIOUS DISEASE: ABX Per ID.  vanc trough    HEMATOLOGICAL:  DVT prophylaxis.    LMWH therapeutic     ENDOCRINE:  Follow up FS.  Insulin protocol if needed.      Prognosis is poor overall .     Vent unit

## 2023-07-01 NOTE — PROGRESS NOTE ADULT - SUBJECTIVE AND OBJECTIVE BOX
Patient is a 42y old  Female who presents with a chief complaint of AHRF (2023 16:04)      overnight events: none    Drips: none            ROS: as in HPI; All other systems reviewed are negative        PHYSICAL EXAM  Vital Signs Last 24 Hrs  T(C): 36.4 (2023 07:50), Max: 37.2 (2023 16:00)  T(F): 97.6 (2023 07:50), Max: 98.9 (2023 16:00)  HR: 107 (2023 07:50) (92 - 109)  BP: 122/70 (2023 07:50) (115/73 - 137/97)  BP(mean): 89 (2023 07:50) (87 - 113)  RR: 20 (2023 07:50) (18 - 20)  SpO2: 100% (2023 07:50) (100% - 100%)    Parameters below as of 2023 07:50  Patient On (Oxygen Delivery Method): ventilator          CONSTITUTIONAL:  Well nourished.  NAD    ENT:   Airway patent,     EYES:   Clear bilaterally,   pupils equal,   round and reactive to light.    CARDIAC:   Normal rate,   regular rhythm.    no edema    RESPIRATORY:   No wheezing   Normal chest expansion  Not tachypneic,    GASTROINTESTINAL:  Abdomen soft, non-tender,   No guarding,   Positive BS    MUSCULOSKELETAL:   Range of motion is not limited,    NEUROLOGICAL:   Alert and oriented   No motor deficits.    SKIN:   Skin normal color for race,   No evidence of rash.        Mode: AC/ CMV (Assist Control/ Continuous Mandatory Ventilation)  RR (machine): 16  TV (machine): 360  FiO2: 40  PEEP: 8  ITime: 1  MAP: 12  PIP: 20          I&O's Detail    2023 07:01  -  2023 07:00  --------------------------------------------------------  IN:    Enteral Tube Flush: 150 mL    IV PiggyBack: 500 mL    Jevity 1.2: 1020 mL    Miscellaneous Tube Feedin mL  Total IN: 1970 mL    OUT:    Rectal Tube (mL): 100 mL    Voided (mL): 1300 mL  Total OUT: 1400 mL    Total NET: 570 mL            LABS:                        8.2    11.67 )-----------( 380      ( 2023 07:41 )             27.0     2023 07:41    136    |  95     |  13     ----------------------------<  93     5.2     |  31     |  0.6      Ca    9.4        2023 07:41  Phos  5.6       2023 07:41  Mg     2.0       2023 07:41    TPro  7.1    /  Alb  2.9    /  TBili  <0.2   /  DBili  x      /  AST  33     /  ALT  28     /  AlkPhos  130    2023 07:41  Amylase x     lipase x              CAPILLARY BLOOD GLUCOSE      POCT Blood Glucose.: 114 mg/dL (2023 08:22)      Urinalysis Basic - ( 2023 07:41 )    Color: x / Appearance: x / SG: x / pH: x  Gluc: 93 mg/dL / Ketone: x  / Bili: x / Urobili: x   Blood: x / Protein: x / Nitrite: x   Leuk Esterase: x / RBC: x / WBC x   Sq Epi: x / Non Sq Epi: x / Bacteria: x      Culture        MEDICATIONS  (STANDING):  aMIOdarone    Tablet 100 milliGRAM(s) Oral daily  chlorhexidine 2% Cloths 1 Application(s) Topical <User Schedule>  clonazePAM  Tablet 1 milliGRAM(s) Oral three times a day  diltiazem    Tablet 60 milliGRAM(s) Oral every 6 hours  enoxaparin Injectable 80 milliGRAM(s) SubCutaneous every 12 hours  famotidine    Tablet 20 milliGRAM(s) Oral two times a day  insulin lispro (ADMELOG) corrective regimen sliding scale   SubCutaneous every 6 hours  lisinopril 5 milliGRAM(s) Oral daily  methadone    Tablet 5 milliGRAM(s) Oral three times a day  metroNIDAZOLE    Tablet 500 milliGRAM(s) Oral every 8 hours  nystatin Cream 1 Application(s) Topical two times a day  OLANZapine 5 milliGRAM(s) Oral daily  vancomycin  IVPB 1000 milliGRAM(s) IV Intermittent <User Schedule>  vitamin A &amp; D Ointment 1 Application(s) Topical daily    MEDICATIONS  (PRN):  acetaminophen     Tablet .. 650 milliGRAM(s) Oral every 6 hours PRN Temp greater or equal to 38C (100.4F)  aluminum hydroxide/magnesium hydroxide/simethicone Suspension 30 milliLiter(s) Oral every 4 hours PRN Dyspepsia

## 2023-07-01 NOTE — PHARMACOTHERAPY INTERVENTION NOTE - COMMENTS
Recommended to adjust vancomycin dose from 1000 mg IV q12h to 750 mg IV q12h (starting at 6am on 7/2) since the vancomycin level today, 7/1 was 25.9 mg/L. As per [employer] calculations, the steady state vancomycin AUC/RUSSELL on the former dose is 652.64 mg/L*h, which is greater than the therapeutic range of 400 - 600 mg/L*h. Decreasing the dose is predicted to yield an AUC/RUSSELL of 489.48 mg/L*h.    Kinga Foster, PharmD, BCIDP  Clinical Pharmacy Specialist, Infectious Diseases  Tele-Antimicrobial Stewardship Program (Tele-ASP)  Tele-ASP Phone: [phone]

## 2023-07-01 NOTE — PHARMACOTHERAPY INTERVENTION NOTE - COMMENTS
As per policy, ordered a vancomycin trough for 7/3 at 4pm in order to assist with vancomycin pharmacokinetic monitoring.    Kinga Foster, PharmD, [facility]  Clinical Pharmacy Specialist, Infectious Diseases  Tele-Antimicrobial Stewardship Program (Tele-ASP)  Tele-ASP Phone: [phone]

## 2023-07-02 LAB
ALBUMIN SERPL ELPH-MCNC: 3.2 G/DL — LOW (ref 3.5–5.2)
ALP SERPL-CCNC: 133 U/L — HIGH (ref 30–115)
ALT FLD-CCNC: 28 U/L — SIGNIFICANT CHANGE UP (ref 0–41)
ANION GAP SERPL CALC-SCNC: 9 MMOL/L — SIGNIFICANT CHANGE UP (ref 7–14)
AST SERPL-CCNC: 30 U/L — SIGNIFICANT CHANGE UP (ref 0–41)
BASOPHILS # BLD AUTO: 0.06 K/UL — SIGNIFICANT CHANGE UP (ref 0–0.2)
BASOPHILS NFR BLD AUTO: 0.5 % — SIGNIFICANT CHANGE UP (ref 0–1)
BILIRUB SERPL-MCNC: 0.2 MG/DL — SIGNIFICANT CHANGE UP (ref 0.2–1.2)
BUN SERPL-MCNC: 13 MG/DL — SIGNIFICANT CHANGE UP (ref 10–20)
CALCIUM SERPL-MCNC: 9.7 MG/DL — SIGNIFICANT CHANGE UP (ref 8.4–10.5)
CHLORIDE SERPL-SCNC: 94 MMOL/L — LOW (ref 98–110)
CO2 SERPL-SCNC: 31 MMOL/L — SIGNIFICANT CHANGE UP (ref 17–32)
CREAT SERPL-MCNC: 0.7 MG/DL — SIGNIFICANT CHANGE UP (ref 0.7–1.5)
EGFR: 111 ML/MIN/1.73M2 — SIGNIFICANT CHANGE UP
EOSINOPHIL # BLD AUTO: 0.32 K/UL — SIGNIFICANT CHANGE UP (ref 0–0.7)
EOSINOPHIL NFR BLD AUTO: 2.5 % — SIGNIFICANT CHANGE UP (ref 0–8)
GLUCOSE BLDC GLUCOMTR-MCNC: 114 MG/DL — HIGH (ref 70–99)
GLUCOSE BLDC GLUCOMTR-MCNC: 117 MG/DL — HIGH (ref 70–99)
GLUCOSE BLDC GLUCOMTR-MCNC: 118 MG/DL — HIGH (ref 70–99)
GLUCOSE BLDC GLUCOMTR-MCNC: 122 MG/DL — HIGH (ref 70–99)
GLUCOSE SERPL-MCNC: 108 MG/DL — HIGH (ref 70–99)
HCT VFR BLD CALC: 27.3 % — LOW (ref 37–47)
HGB BLD-MCNC: 8.3 G/DL — LOW (ref 12–16)
IMM GRANULOCYTES NFR BLD AUTO: 1.2 % — HIGH (ref 0.1–0.3)
LYMPHOCYTES # BLD AUTO: 1.44 K/UL — SIGNIFICANT CHANGE UP (ref 1.2–3.4)
LYMPHOCYTES # BLD AUTO: 11.2 % — LOW (ref 20.5–51.1)
MAGNESIUM SERPL-MCNC: 2.1 MG/DL — SIGNIFICANT CHANGE UP (ref 1.8–2.4)
MCHC RBC-ENTMCNC: 23.7 PG — LOW (ref 27–31)
MCHC RBC-ENTMCNC: 30.4 G/DL — LOW (ref 32–37)
MCV RBC AUTO: 78 FL — LOW (ref 81–99)
MONOCYTES # BLD AUTO: 1.18 K/UL — HIGH (ref 0.1–0.6)
MONOCYTES NFR BLD AUTO: 9.2 % — SIGNIFICANT CHANGE UP (ref 1.7–9.3)
NEUTROPHILS # BLD AUTO: 9.7 K/UL — HIGH (ref 1.4–6.5)
NEUTROPHILS NFR BLD AUTO: 75.4 % — HIGH (ref 42.2–75.2)
NRBC # BLD: 0 /100 WBCS — SIGNIFICANT CHANGE UP (ref 0–0)
PHOSPHATE SERPL-MCNC: 6.2 MG/DL — HIGH (ref 2.1–4.9)
PLATELET # BLD AUTO: 406 K/UL — HIGH (ref 130–400)
PMV BLD: 8.6 FL — SIGNIFICANT CHANGE UP (ref 7.4–10.4)
POTASSIUM SERPL-MCNC: 5.2 MMOL/L — HIGH (ref 3.5–5)
POTASSIUM SERPL-SCNC: 5.2 MMOL/L — HIGH (ref 3.5–5)
PROT SERPL-MCNC: 7.4 G/DL — SIGNIFICANT CHANGE UP (ref 6–8)
RBC # BLD: 3.5 M/UL — LOW (ref 4.2–5.4)
RBC # FLD: 27.3 % — HIGH (ref 11.5–14.5)
SODIUM SERPL-SCNC: 134 MMOL/L — LOW (ref 135–146)
WBC # BLD: 12.85 K/UL — HIGH (ref 4.8–10.8)
WBC # FLD AUTO: 12.85 K/UL — HIGH (ref 4.8–10.8)

## 2023-07-02 PROCEDURE — 99232 SBSQ HOSP IP/OBS MODERATE 35: CPT | Mod: GC

## 2023-07-02 PROCEDURE — 93010 ELECTROCARDIOGRAM REPORT: CPT

## 2023-07-02 PROCEDURE — 99233 SBSQ HOSP IP/OBS HIGH 50: CPT

## 2023-07-02 RX ADMIN — NYSTATIN CREAM 1 APPLICATION(S): 100000 CREAM TOPICAL at 06:08

## 2023-07-02 RX ADMIN — METHADONE HYDROCHLORIDE 5 MILLIGRAM(S): 40 TABLET ORAL at 06:07

## 2023-07-02 RX ADMIN — Medication 1 MILLIGRAM(S): at 13:53

## 2023-07-02 RX ADMIN — Medication 60 MILLIGRAM(S): at 18:10

## 2023-07-02 RX ADMIN — Medication 60 MILLIGRAM(S): at 06:05

## 2023-07-02 RX ADMIN — METHADONE HYDROCHLORIDE 5 MILLIGRAM(S): 40 TABLET ORAL at 13:54

## 2023-07-02 RX ADMIN — LISINOPRIL 5 MILLIGRAM(S): 2.5 TABLET ORAL at 06:05

## 2023-07-02 RX ADMIN — OLANZAPINE 5 MILLIGRAM(S): 15 TABLET, FILM COATED ORAL at 11:16

## 2023-07-02 RX ADMIN — Medication 500 MILLIGRAM(S): at 13:53

## 2023-07-02 RX ADMIN — AMIODARONE HYDROCHLORIDE 100 MILLIGRAM(S): 400 TABLET ORAL at 06:05

## 2023-07-02 RX ADMIN — FAMOTIDINE 20 MILLIGRAM(S): 10 INJECTION INTRAVENOUS at 06:05

## 2023-07-02 RX ADMIN — NYSTATIN CREAM 1 APPLICATION(S): 100000 CREAM TOPICAL at 18:10

## 2023-07-02 RX ADMIN — Medication 250 MILLIGRAM(S): at 18:11

## 2023-07-02 RX ADMIN — Medication 250 MILLIGRAM(S): at 06:08

## 2023-07-02 RX ADMIN — FAMOTIDINE 20 MILLIGRAM(S): 10 INJECTION INTRAVENOUS at 18:10

## 2023-07-02 RX ADMIN — Medication 1 MILLIGRAM(S): at 06:07

## 2023-07-02 RX ADMIN — CHLORHEXIDINE GLUCONATE 1 APPLICATION(S): 213 SOLUTION TOPICAL at 06:09

## 2023-07-02 RX ADMIN — Medication 1 APPLICATION(S): at 11:17

## 2023-07-02 RX ADMIN — ENOXAPARIN SODIUM 80 MILLIGRAM(S): 100 INJECTION SUBCUTANEOUS at 11:15

## 2023-07-02 RX ADMIN — Medication 500 MILLIGRAM(S): at 06:05

## 2023-07-02 RX ADMIN — Medication 60 MILLIGRAM(S): at 11:16

## 2023-07-02 NOTE — PROGRESS NOTE ADULT - SUBJECTIVE AND OBJECTIVE BOX
Patient is a 42y old  Female who presents with a chief complaint of AHRF (01 Jul 2023 12:59)        Over Night Events: On MV. Off presors        ROS:     All pertinent ROS are negative except HPI         PHYSICAL EXAM    ICU Vital Signs Last 24 Hrs  T(C): 36.4 (02 Jul 2023 09:00), Max: 36.4 (02 Jul 2023 00:00)  T(F): 97.6 (02 Jul 2023 09:00), Max: 97.6 (02 Jul 2023 09:00)  HR: 94 (02 Jul 2023 09:00) (90 - 117)  BP: 137/80 (02 Jul 2023 09:00) (119/80 - 139/71)  BP(mean): 104 (02 Jul 2023 09:00) (96 - 104)  RR: 20 (02 Jul 2023 09:00) (20 - 20)  SpO2: 100% (02 Jul 2023 09:00) (98% - 100%)    O2 Parameters below as of 02 Jul 2023 09:00  Patient On (Oxygen Delivery Method): ventilator            CONSTITUTIONAL:  NAD    ENT:   Trach    EYES:   Pupils equal,   Round and reactive to light.    CARDIAC:   Normal rate,   Regular rhythm.      RESPIRATORY:   No wheezing  Bilateral BS  Normal chest expansion  Not tachypneic,  No use of accessory muscles    GASTROINTESTINAL:  Abdomen soft,   Non-tender,   No guarding,   + BS    MUSCULOSKELETAL:   Range of motion is not limited,  No clubbing, cyanosis    NEUROLOGICAL:   Alert    SKIN:   Skin normal color for race,   Warm and dry  No evidence of rash.    PSYCHIATRIC:   Normal mood and affect.   No apparent risk to self or others.          07-01-23 @ 07:01  -  07-02-23 @ 07:00  --------------------------------------------------------  IN:    Jevity 1.2: 660 mL  Total IN: 660 mL    OUT:    Rectal Tube (mL): 800 mL  Total OUT: 800 mL    Total NET: -140 mL          LABS:                            8.3    12.85 )-----------( 406      ( 02 Jul 2023 09:13 )             27.3                                               07-02    134<L>  |  94<L>  |  13  ----------------------------<  108<H>  5.2<H>   |  31  |  0.7    Ca    9.7      02 Jul 2023 09:13  Phos  6.2     07-02  Mg     2.1     07-02    TPro  7.4  /  Alb  3.2<L>  /  TBili  0.2  /  DBili  x   /  AST  30  /  ALT  28  /  AlkPhos  133<H>  07-02                                             Urinalysis Basic - ( 02 Jul 2023 09:13 )    Color: x / Appearance: x / SG: x / pH: x  Gluc: 108 mg/dL / Ketone: x  / Bili: x / Urobili: x   Blood: x / Protein: x / Nitrite: x   Leuk Esterase: x / RBC: x / WBC x   Sq Epi: x / Non Sq Epi: x / Bacteria: x                                                  LIVER FUNCTIONS - ( 02 Jul 2023 09:13 )  Alb: 3.2 g/dL / Pro: 7.4 g/dL / ALK PHOS: 133 U/L / ALT: 28 U/L / AST: 30 U/L / GGT: x                                                                                               Mode: AC/ CMV (Assist Control/ Continuous Mandatory Ventilation)  RR (machine): 16  TV (machine): 360  FiO2: 40  PEEP: 8  ITime: 1  MAP: 11  PIP: 22                                          MEDICATIONS  (STANDING):  aMIOdarone    Tablet 100 milliGRAM(s) Oral daily  chlorhexidine 2% Cloths 1 Application(s) Topical <User Schedule>  clonazePAM  Tablet 1 milliGRAM(s) Oral three times a day  diltiazem    Tablet 60 milliGRAM(s) Oral every 6 hours  enoxaparin Injectable 80 milliGRAM(s) SubCutaneous every 12 hours  famotidine    Tablet 20 milliGRAM(s) Oral two times a day  insulin lispro (ADMELOG) corrective regimen sliding scale   SubCutaneous every 6 hours  lisinopril 5 milliGRAM(s) Oral daily  methadone    Tablet 5 milliGRAM(s) Oral three times a day  metroNIDAZOLE    Tablet 500 milliGRAM(s) Oral every 8 hours  nystatin Cream 1 Application(s) Topical two times a day  OLANZapine 5 milliGRAM(s) Oral daily  vancomycin  IVPB 750 milliGRAM(s) IV Intermittent every 12 hours  vitamin A &amp; D Ointment 1 Application(s) Topical daily    MEDICATIONS  (PRN):  acetaminophen     Tablet .. 650 milliGRAM(s) Oral every 6 hours PRN Temp greater or equal to 38C (100.4F)  aluminum hydroxide/magnesium hydroxide/simethicone Suspension 30 milliLiter(s) Oral every 4 hours PRN Dyspepsia      New X-rays reviewed:                                                                                  ECHO    CXR interpreted by me:

## 2023-07-02 NOTE — PROGRESS NOTE ADULT - SUBJECTIVE AND OBJECTIVE BOX
ROSSIKENTONA  42y Female    CHIEF COMPLAINT:    Patient is a 42y old  Female who presents with a chief complaint of AHRF (01 Jul 2023 12:59)    INTERVAL HPI/OVERNIGHT EVENTS:    Patient seen and examined. No acute events overnight. remains vented    ROS: All other systems are negative.    Vital Signs:    T(F): 97.6 (07-02-23 @ 09:00), Max: 97.6 (07-02-23 @ 09:00)  HR: 94 (07-02-23 @ 09:00) (90 - 117)  BP: 137/80 (07-02-23 @ 09:00) (119/80 - 139/71)  RR: 20 (07-02-23 @ 09:00) (20 - 20)  SpO2: 100% (07-02-23 @ 09:00) (98% - 100%)    01 Jul 2023 07:01  -  02 Jul 2023 07:00  --------------------------------------------------------  IN: 660 mL / OUT: 800 mL / NET: -140 mL    POCT Blood Glucose.: 122 mg/dL (02 Jul 2023 07:41)  POCT Blood Glucose.: 114 mg/dL (02 Jul 2023 06:24)  POCT Blood Glucose.: 114 mg/dL (01 Jul 2023 23:22)  POCT Blood Glucose.: 113 mg/dL (01 Jul 2023 18:05)  POCT Blood Glucose.: 101 mg/dL (01 Jul 2023 11:28)    PHYSICAL EXAM:    GENERAL:  NAD chronically ill appearing   SKIN: No rashes or lesions  HEENT: Atraumatic. Normocephalic.   NECK: Supple, No JVD.  PULMONARY: decreased breath sounds B/L. No wheezing.  CVS: Normal S1, S2. Rate and Rhythm are regular.   ABDOMEN/GI: Soft, Nontender, Nondistended  MSK:  No clubbing or cyanosis   NEUROLOGIC:  moves all extremities   PSYCH: Awake and alert     Consultant(s) Notes Reviewed:  [x ] YES  [ ] NO  Care Discussed with Consultants/Other Providers [ x] YES  [ ] NO    LABS:                        8.3    12.85 )-----------( 406      ( 02 Jul 2023 09:13 )             27.3     134<L>  |  94<L>  |  13  ----------------------------<  108<H>  5.2<H>   |  31  |  0.7    Ca    9.7      02 Jul 2023 09:13  Phos  6.2     07-02  Mg     2.1     07-02    TPro  7.4  /  Alb  3.2<L>  /  TBili  0.2  /  DBili  x   /  AST  30  /  ALT  28  /  AlkPhos  133<H>  07-0    RADIOLOGY & ADDITIONAL TESTS:  Imaging or report Personally Reviewed:  [x] YES  [ ] NO  EKG reviewed: [x] YES  [ ] NO    Medications:  Standing  aMIOdarone    Tablet 100 milliGRAM(s) Oral daily  chlorhexidine 2% Cloths 1 Application(s) Topical <User Schedule>  clonazePAM  Tablet 1 milliGRAM(s) Oral three times a day  diltiazem    Tablet 60 milliGRAM(s) Oral every 6 hours  enoxaparin Injectable 80 milliGRAM(s) SubCutaneous every 12 hours  famotidine    Tablet 20 milliGRAM(s) Oral two times a day  insulin lispro (ADMELOG) corrective regimen sliding scale   SubCutaneous every 6 hours  lisinopril 5 milliGRAM(s) Oral daily  methadone    Tablet 5 milliGRAM(s) Oral three times a day  metroNIDAZOLE    Tablet 500 milliGRAM(s) Oral every 8 hours  nystatin Cream 1 Application(s) Topical two times a day  OLANZapine 5 milliGRAM(s) Oral daily  vancomycin  IVPB 750 milliGRAM(s) IV Intermittent every 12 hours  vitamin A &amp; D Ointment 1 Application(s) Topical daily    PRN Meds  acetaminophen     Tablet .. 650 milliGRAM(s) Oral every 6 hours PRN  aluminum hydroxide/magnesium hydroxide/simethicone Suspension 30 milliLiter(s) Oral every 4 hours PRN

## 2023-07-02 NOTE — CHART NOTE - NSCHARTNOTEFT_GEN_A_CORE
Transfer from: [facility] 2A    Transfer to: Medicine    Sign out given to:     HPI / HOSPITAL COURSE:  42 year-old female with PMH of Asthma, HTN? presents with complaint of cough x3 days and SOB. In the ED was found to have Bilateral patchy groundglass nodularity with dominant confluent left lower lobe opacification with numerous cavitary lesions. Additional contralateral right lower lobe cavitary 1 cm nodule on CT. Patient was admitted to the ICU for acute hypoxemic respiratory failure secondary to cavitary MRSA Pneumonia with a positive influenza B, finished a course of zyvox and tamiflu. This was complicated by loculated parapneumonic effusion for which a chest tube was inserted and tpa was given twice. Her stay was complicated by MRSA bacteremia  currently on vancomycin and metronidazole, cultures negative to date. MASTER was negative for vegetations. Trach and PEG done for inability to wean off vent, currently off sedation. Her stay was also complicated by new onset Afib with RVR for which she is on amiodarone, diltiazem, and therapeutic anticoagulation. Long was  also noted. Patient also had LANCE that improved with no need for RRT. Otherwise patient received a total of 6 p RBC for acute blood loss anemia ,currently hemoglobin stable. Patient transferred to step down unit.       CEU course:   CEU course was uneventful. Patient continued vanco and flagyl. Amiodarone and methadone doses were reduced and EKG was monitored daily for any increase in QTc.     ASSESSMENT & PLAN:   42 year-old female with PMH of Asthma, HTN presented with acute hypoxemic respiratory failure. Diagnosed with MRSA bacteremia.       #Acute hypoxemic respiratory failure / s/P Trach and PEG    #Severe cavitary CAP MRSA  #Small parapneumonic effusion SP Chest tube  #MRSA bacteremia  #Influenza B   #hx of Asthma   Intubated 5/23 self-extubated 5/25, re-intubated 5/25; SBT failed, SP trach/G tube  - s/p Zyvox, meropenem and Tamifu, now on Vancomycin and flagyl, anticipated end date 7/9  - monitor levels per ID pharmacy   - s/p course of steroids now off  -  blood cultures  NGTD SINCE 5/28   - s/p MASTER 5/31: No evidence of valvular vegetations or intracardiac abscesses to support IE. Pulmonary hepatisation incidental finding. EF 65%, mild TR and pulm HTN  - ventilator management per pulm/cc, tolerated PS for 5 hours yesterday   - on Methadone 5/5/5 and olanzapine from MICU due to prolonged time on Fentanyl. Monitor daily EKGS for QTC. Keep Mg ~2 and K ~4  -Contact respiratory for a possible pressure support on the ventilator  - Daily pulmonary toilet for the tracheostomy      #Maculopapular buttocks rash  -PCR skin scraping negative for HSV --> off  Acyclovir   -Wound care per burn, wound care RN following     #LANCE, resolved   - post-infectious GN vs. ATN  - Cr peaked  3 > now 0.7  - renal U/S: negative   - daily BMP     #Microcytic Anemia likely  anemia of chronic disease   - s/p 6 units prbc so far last 6/5  - Restarted AC with lovenox 6/22 given Hb stable last few days  -monitor hemoglobin     #New onset A-fib w/ RVR  - HR better controlled now  - s/p Amio drip, now on PO  - Cardizem po 60mg q6hr   - therapeutic Lovenox   - EP consult appreciated     #Diarrhea - likely related to tube feeds  - Nutrition: Diet changed to Jevity.   -Magnesium discontinued , PPI changed to pepcid, off laxatives   -dc dignishield once diarrhea improves. IF worsening leukocytosis/fevers, check Cdiff

## 2023-07-02 NOTE — PROGRESS NOTE ADULT - ASSESSMENT
IMPRESSION:    Acute hypoxemic respiratory failure sp trach   Severe cavitary CAP MRSA  Small parapneumonic effusion SP Chest tube/removal  MRSA bacteremia resolved   MASTER negative   Anemia sp transfusion   New onset A FIB rate controlled   LANCE non oliguric improving  Hypernatremia Improving  HO asthma   Influenza B         PLAN:    CNS:  Continue Methadone 5 / 5 / 5.   Klonopin 1 mg BID.  Zyprexa 5 .  Monitor QTC.       HEENT: Oral care. Trach care    PULMONARY:  HOB @ 45 degrees.  Aspiration precautions.    No change in vent settings.    Keep SaO2 92 TO 96%.  PS for 4-6 hours     CARDIOVASCULAR:    Avoid overload,  rate control.  Amiodarone 100 mg daily.  Cardizem 60 mg Q6     GI: GI prophylaxis. PEG Feeding.   monitor output, may need to send c diff, remove rectal tube once diarrhea improved    RENAL:  Follow up lytes.  Correct as needed.     INFECTIOUS DISEASE: ABX Per ID.  vanc trough    HEMATOLOGICAL:  DVT prophylaxis.    LMWH therapeutic     ENDOCRINE:  Follow up FS.  Insulin protocol if needed.      Prognosis is poor overall .     Vent unit

## 2023-07-02 NOTE — PROGRESS NOTE ADULT - ASSESSMENT
42 year-old female with PMH of Asthma, HTN? presents with complaint of cough x3 days and SOB. In the ED was found to have Bilateral patchy groundglass nodularity with dominant confluent left lower lobe opacification with numerous cavitary lesions. Additional contralateral right lower lobe cavitary 1 cm nodule on CT. Patient was admitted to the ICU for acute hypoxemic respiratory failure secondary to cavitary MRSA Pneumonia with a positive influenza B, finished a course of zyvox and tamiflu. This was complicated by loculated parapneumonic effusion for which a chest tube was inserted and tpa was given twice. Her stay was complicated by MRSA bacteremia  currently on vancomycin and metronidazole, cultures negative to date. MASTER was negative for vegetations. Trach and PEG done for inability to wean off vent, currently off sedation. Her stay was also complicated by new onset Afib with RVR for which she is on amiodarone, diltiazem, and therapeutic anticoagulation. Long QT is also noted. Patient also had LANCE that improved with no need for RRT. Otherwise patient received a total of 6 p RBC for acute blood loss anemia ,currently hemoglobin stable. Patient transferred to step down unit.     Acute hypoxemic respiratory failure / s/P Trach and PEG    Severe cavitary CAP MRSA  Small parapneumonic effusion SP Chest tube  MRSA bacteremia  Influenza B   hx of Asthma   Intubated 5/23 self-extubated 5/25, re-intubated 5/25; SBT failed, SP trach/G tube  - s/p Zyvox, meropenem and Tamifu, now on Vancomycin and flagyl, anticipated end date 7/9  - monitor levels per ID pharmacy   - s/p course of steroids now off  -  blood cultures  NGTD SINCE 5/28   - s/p MASTER 5/31: No evidence of valvular vegetations or intracardiac abscesses to support IE. Pulmonary hepatisation incidental finding. EF 65%, mild TR and pulm HTN  - ventilator management per pulm/cc, tolerated PS for 5 hours yesterday   - on Methadone 5/5/5 and olanzapine from MICU due to prolonged time on Fentanyl. Monitor daily EKGS for QTC. Keep Mg ~2 and K ~4     Maculopapular buttocks rash  -PCR skin scraping negative for HSV --> off  Acyclovir   -Wound care per burn, wound care RN following     LANCE, resolved   - post-infectious GN vs. ATN  - Cr peaked  3 > now 0.7  - renal U/S: negative   - daily BMP     Microcytic Anemia likely  anemia of chronic disease   - s/p 6 units prbc so far last 6/5  - Restarted AC with lovenox 6/22 given Hb stable last few days  -monitor hemoglobin     New onset A-fib w/ RVR  - HR better controlled now  - s/p Amio drip, now on PO  - Cardizem po 60mg q6hr   - therapeutic Lovenox   - EP consult appreciated     Diarrhea - likely related to tube feeds  nutrition fu requested  dc Mg, change PPI to pepcid, off laxatives   dc dignishield once diarrhea improves. IF worsening leukocytosis/fevers, check Cdiff     Pending: c/w IV abx, vent management per pulm, monitor diarrhea   All discussed with daughter Serenity [phone]

## 2023-07-03 LAB
ALBUMIN SERPL ELPH-MCNC: 2.9 G/DL — LOW (ref 3.5–5.2)
ALP SERPL-CCNC: 125 U/L — HIGH (ref 30–115)
ALT FLD-CCNC: 26 U/L — SIGNIFICANT CHANGE UP (ref 0–41)
ANION GAP SERPL CALC-SCNC: 10 MMOL/L — SIGNIFICANT CHANGE UP (ref 7–14)
AST SERPL-CCNC: 32 U/L — SIGNIFICANT CHANGE UP (ref 0–41)
BASOPHILS # BLD AUTO: 0.08 K/UL — SIGNIFICANT CHANGE UP (ref 0–0.2)
BASOPHILS NFR BLD AUTO: 0.6 % — SIGNIFICANT CHANGE UP (ref 0–1)
BILIRUB SERPL-MCNC: <0.2 MG/DL — SIGNIFICANT CHANGE UP (ref 0.2–1.2)
BUN SERPL-MCNC: 13 MG/DL — SIGNIFICANT CHANGE UP (ref 10–20)
CALCIUM SERPL-MCNC: 9.5 MG/DL — SIGNIFICANT CHANGE UP (ref 8.4–10.5)
CHLORIDE SERPL-SCNC: 92 MMOL/L — LOW (ref 98–110)
CO2 SERPL-SCNC: 30 MMOL/L — SIGNIFICANT CHANGE UP (ref 17–32)
CREAT SERPL-MCNC: 0.7 MG/DL — SIGNIFICANT CHANGE UP (ref 0.7–1.5)
EGFR: 111 ML/MIN/1.73M2 — SIGNIFICANT CHANGE UP
EOSINOPHIL # BLD AUTO: 0.34 K/UL — SIGNIFICANT CHANGE UP (ref 0–0.7)
EOSINOPHIL NFR BLD AUTO: 2.7 % — SIGNIFICANT CHANGE UP (ref 0–8)
GLUCOSE BLDC GLUCOMTR-MCNC: 107 MG/DL — HIGH (ref 70–99)
GLUCOSE BLDC GLUCOMTR-MCNC: 117 MG/DL — HIGH (ref 70–99)
GLUCOSE BLDC GLUCOMTR-MCNC: 123 MG/DL — HIGH (ref 70–99)
GLUCOSE BLDC GLUCOMTR-MCNC: 126 MG/DL — HIGH (ref 70–99)
GLUCOSE BLDC GLUCOMTR-MCNC: 130 MG/DL — HIGH (ref 70–99)
GLUCOSE BLDC GLUCOMTR-MCNC: 131 MG/DL — HIGH (ref 70–99)
GLUCOSE SERPL-MCNC: 112 MG/DL — HIGH (ref 70–99)
HCT VFR BLD CALC: 28 % — LOW (ref 37–47)
HGB BLD-MCNC: 8.4 G/DL — LOW (ref 12–16)
IMM GRANULOCYTES NFR BLD AUTO: 1.2 % — HIGH (ref 0.1–0.3)
LYMPHOCYTES # BLD AUTO: 1.71 K/UL — SIGNIFICANT CHANGE UP (ref 1.2–3.4)
LYMPHOCYTES # BLD AUTO: 13.8 % — LOW (ref 20.5–51.1)
MAGNESIUM SERPL-MCNC: 2 MG/DL — SIGNIFICANT CHANGE UP (ref 1.8–2.4)
MCHC RBC-ENTMCNC: 23.6 PG — LOW (ref 27–31)
MCHC RBC-ENTMCNC: 30 G/DL — LOW (ref 32–37)
MCV RBC AUTO: 78.7 FL — LOW (ref 81–99)
MONOCYTES # BLD AUTO: 1.08 K/UL — HIGH (ref 0.1–0.6)
MONOCYTES NFR BLD AUTO: 8.7 % — SIGNIFICANT CHANGE UP (ref 1.7–9.3)
NEUTROPHILS # BLD AUTO: 9.07 K/UL — HIGH (ref 1.4–6.5)
NEUTROPHILS NFR BLD AUTO: 73 % — SIGNIFICANT CHANGE UP (ref 42.2–75.2)
NRBC # BLD: 0 /100 WBCS — SIGNIFICANT CHANGE UP (ref 0–0)
PHOSPHATE SERPL-MCNC: 5.9 MG/DL — HIGH (ref 2.1–4.9)
PLATELET # BLD AUTO: 318 K/UL — SIGNIFICANT CHANGE UP (ref 130–400)
PMV BLD: 9.5 FL — SIGNIFICANT CHANGE UP (ref 7.4–10.4)
POTASSIUM SERPL-MCNC: 5.4 MMOL/L — HIGH (ref 3.5–5)
POTASSIUM SERPL-SCNC: 5.4 MMOL/L — HIGH (ref 3.5–5)
PROT SERPL-MCNC: 7.2 G/DL — SIGNIFICANT CHANGE UP (ref 6–8)
RBC # BLD: 3.56 M/UL — LOW (ref 4.2–5.4)
RBC # FLD: 27.9 % — HIGH (ref 11.5–14.5)
SODIUM SERPL-SCNC: 132 MMOL/L — LOW (ref 135–146)
VANCOMYCIN TROUGH SERPL-MCNC: 13.1 UG/ML — HIGH (ref 5–10)
VANCOMYCIN TROUGH SERPL-MCNC: 13.6 UG/ML — HIGH (ref 5–10)
WBC # BLD: 12.43 K/UL — HIGH (ref 4.8–10.8)
WBC # FLD AUTO: 12.43 K/UL — HIGH (ref 4.8–10.8)

## 2023-07-03 PROCEDURE — 93010 ELECTROCARDIOGRAM REPORT: CPT

## 2023-07-03 PROCEDURE — 99233 SBSQ HOSP IP/OBS HIGH 50: CPT

## 2023-07-03 RX ADMIN — Medication 1 MILLIGRAM(S): at 13:01

## 2023-07-03 RX ADMIN — ENOXAPARIN SODIUM 80 MILLIGRAM(S): 100 INJECTION SUBCUTANEOUS at 21:39

## 2023-07-03 RX ADMIN — METHADONE HYDROCHLORIDE 5 MILLIGRAM(S): 40 TABLET ORAL at 21:39

## 2023-07-03 RX ADMIN — FAMOTIDINE 20 MILLIGRAM(S): 10 INJECTION INTRAVENOUS at 17:19

## 2023-07-03 RX ADMIN — FAMOTIDINE 20 MILLIGRAM(S): 10 INJECTION INTRAVENOUS at 05:06

## 2023-07-03 RX ADMIN — METHADONE HYDROCHLORIDE 5 MILLIGRAM(S): 40 TABLET ORAL at 05:27

## 2023-07-03 RX ADMIN — Medication 60 MILLIGRAM(S): at 17:20

## 2023-07-03 RX ADMIN — NYSTATIN CREAM 1 APPLICATION(S): 100000 CREAM TOPICAL at 17:21

## 2023-07-03 RX ADMIN — Medication 250 MILLIGRAM(S): at 05:27

## 2023-07-03 RX ADMIN — NYSTATIN CREAM 1 APPLICATION(S): 100000 CREAM TOPICAL at 05:04

## 2023-07-03 RX ADMIN — LISINOPRIL 5 MILLIGRAM(S): 2.5 TABLET ORAL at 05:05

## 2023-07-03 RX ADMIN — Medication 250 MILLIGRAM(S): at 17:22

## 2023-07-03 RX ADMIN — OLANZAPINE 5 MILLIGRAM(S): 15 TABLET, FILM COATED ORAL at 13:01

## 2023-07-03 RX ADMIN — ENOXAPARIN SODIUM 80 MILLIGRAM(S): 100 INJECTION SUBCUTANEOUS at 13:00

## 2023-07-03 RX ADMIN — Medication 1 MILLIGRAM(S): at 02:00

## 2023-07-03 RX ADMIN — Medication 1 MILLIGRAM(S): at 21:39

## 2023-07-03 RX ADMIN — METHADONE HYDROCHLORIDE 5 MILLIGRAM(S): 40 TABLET ORAL at 13:01

## 2023-07-03 RX ADMIN — Medication 60 MILLIGRAM(S): at 02:02

## 2023-07-03 RX ADMIN — Medication 500 MILLIGRAM(S): at 21:55

## 2023-07-03 RX ADMIN — Medication 1 APPLICATION(S): at 13:01

## 2023-07-03 RX ADMIN — Medication 60 MILLIGRAM(S): at 05:03

## 2023-07-03 RX ADMIN — Medication 1 MILLIGRAM(S): at 05:27

## 2023-07-03 RX ADMIN — Medication 500 MILLIGRAM(S): at 13:00

## 2023-07-03 RX ADMIN — Medication 60 MILLIGRAM(S): at 13:00

## 2023-07-03 RX ADMIN — CHLORHEXIDINE GLUCONATE 1 APPLICATION(S): 213 SOLUTION TOPICAL at 05:01

## 2023-07-03 RX ADMIN — AMIODARONE HYDROCHLORIDE 100 MILLIGRAM(S): 400 TABLET ORAL at 05:02

## 2023-07-03 RX ADMIN — Medication 500 MILLIGRAM(S): at 05:05

## 2023-07-03 RX ADMIN — Medication 60 MILLIGRAM(S): at 23:14

## 2023-07-03 NOTE — PROGRESS NOTE ADULT - ASSESSMENT
42 year old female with PMHx Asthma, HTN presents with cough and SOB x3 days and hemoptysis admitted for cavitary CAP.      #Acute hypoxemic respiratory failure / s/P Trach and PEG    #Severe cavitary CAP MRSA  #Small parapneumonic effusion SP Chest tube  #MRSA bacteremia  #Influenza B   #hx of Asthma   - Intubated 5/23 self-extubated 5/25, re-intubated 5/25; SBT failed, SP trach/G tube  - s/p Zyvox, meropenem and Tamifu, now on Vancomycin and flagyl, anticipated end date 7/9  - s/p course of steroids now off  -  blood cultures  NGTD SINCE 5/28   - s/p MASTER 5/31: No evidence of valvular vegetations or intracardiac abscesses to support IE. Pulmonary hepatisation incidental finding. EF 65%, mild TR and pulm HTN  - ventilator management per pulm/cc, tolerated PS for 5 hours yesterday   - on Methadone 5/5/5 and olanzapine from MICU due to prolonged time on Fentanyl. Monitor daily EKGS for QTC. Keep Mg ~2 and K ~4  - vanc trough 4pm  - cxr daily  - c/w ID recommendations  - c/w ventilator settings    #Maculopapular buttocks rash  -PCR skin scraping negative for HSV --> off  Acyclovir   -Wound care per burn, wound care RN following     #LANCE, resolved   - post-infectious GN vs. ATN  - Cr peaked  3 > now 0.7  - renal U/S: negative   - creatnine 0.7 7/3  - daily BMP     #Microcytic Anemia likely  anemia of chronic disease   - s/p 6 units prbc so far last 6/5  - Restarted AC with lovenox 6/22 given Hb stable last few days  - monitor hemoglobin   - hb stable at 8.4 7/3    #New onset A-fib w/ RVR  - HR better controlled now  - s/p Amio drip, now on PO  - Cardizem po 60mg q6hr   - therapeutic Lovenox   - EP consult appreciated --> treat infection    #Diarrhea - likely related to tube feeds  - nutrition fu requested  - dc Mg, change PPI to pepcid, off laxatives   - dc rectal tube

## 2023-07-03 NOTE — PROGRESS NOTE ADULT - SUBJECTIVE AND OBJECTIVE BOX
Patient is a 42y old  Female who presents with a chief complaint of AHRF (02 Jul 2023 10:06)        Over Night Events:remains on Vent support         ROS:     All ROS are negative except HPI         PHYSICAL EXAM    ICU Vital Signs Last 24 Hrs  T(C): 36.8 (03 Jul 2023 07:25), Max: 37.6 (03 Jul 2023 00:00)  T(F): 98.2 (03 Jul 2023 07:25), Max: 99.6 (03 Jul 2023 00:00)  HR: 109 (03 Jul 2023 08:30) (97 - 112)  BP: 128/72 (03 Jul 2023 07:25) (116/74 - 146/83)  BP(mean): 94 (03 Jul 2023 07:25) (89 - 106)  ABP: --  ABP(mean): --  RR: 20 (03 Jul 2023 07:25) (20 - 20)  SpO2: 100% (03 Jul 2023 08:30) (100% - 100%)    O2 Parameters below as of 03 Jul 2023 07:25  Patient On (Oxygen Delivery Method): ventilator            CONSTITUTIONAL:  ill appearing    ENT:   Airway patent,   Mouth with normal mucosa.   Trach present        CARDIAC:   Normal rate,   Regular rhythm.            RESPIRATORY:   No wheezing  Bilateral BS  Normal chest expansion  Not tachypneic,  No use of accessory muscles    GASTROINTESTINAL:  Abdomen soft,   Non-tender,   No guarding,   + BS       NEUROLOGICAL:   Follows commands     SKIN:   stage 2          07-02-23 @ 07:01  -  07-03-23 @ 07:00  --------------------------------------------------------  IN:    Enteral Tube Flush: 50 mL    Jevity 1.2: 660 mL  Total IN: 710 mL    OUT:    Rectal Tube (mL): 100 mL    Voided (mL): 1000 mL  Total OUT: 1100 mL    Total NET: -390 mL          LABS:                            8.4    12.43 )-----------( 318      ( 03 Jul 2023 06:02 )             28.0                                               07-03    132<L>  |  92<L>  |  13  ----------------------------<  112<H>  5.4<H>   |  30  |  0.7    Ca    9.5      03 Jul 2023 06:02  Phos  5.9     07-03  Mg     2.0     07-03    TPro  7.2  /  Alb  2.9<L>  /  TBili  <0.2  /  DBili  x   /  AST  32  /  ALT  26  /  AlkPhos  125<H>  07-03                                             Urinalysis Basic - ( 03 Jul 2023 06:02 )    Color: x / Appearance: x / SG: x / pH: x  Gluc: 112 mg/dL / Ketone: x  / Bili: x / Urobili: x   Blood: x / Protein: x / Nitrite: x   Leuk Esterase: x / RBC: x / WBC x   Sq Epi: x / Non Sq Epi: x / Bacteria: x                                                  LIVER FUNCTIONS - ( 03 Jul 2023 06:02 )  Alb: 2.9 g/dL / Pro: 7.2 g/dL / ALK PHOS: 125 U/L / ALT: 26 U/L / AST: 32 U/L / GGT: x                                                                                               Mode: CPAP with PS  FiO2: 40  PEEP: 8  PS: 10  MAP: 11  PIP: 22                                          MEDICATIONS  (STANDING):  aMIOdarone    Tablet 100 milliGRAM(s) Oral daily  chlorhexidine 2% Cloths 1 Application(s) Topical <User Schedule>  clonazePAM  Tablet 1 milliGRAM(s) Oral three times a day  diltiazem    Tablet 60 milliGRAM(s) Oral every 6 hours  enoxaparin Injectable 80 milliGRAM(s) SubCutaneous every 12 hours  famotidine    Tablet 20 milliGRAM(s) Oral two times a day  insulin lispro (ADMELOG) corrective regimen sliding scale   SubCutaneous every 6 hours  lisinopril 5 milliGRAM(s) Oral daily  methadone    Tablet 5 milliGRAM(s) Oral three times a day  metroNIDAZOLE    Tablet 500 milliGRAM(s) Oral every 8 hours  nystatin Cream 1 Application(s) Topical two times a day  OLANZapine 5 milliGRAM(s) Oral daily  vancomycin  IVPB 750 milliGRAM(s) IV Intermittent every 12 hours  vitamin A &amp; D Ointment 1 Application(s) Topical daily    MEDICATIONS  (PRN):  acetaminophen     Tablet .. 650 milliGRAM(s) Oral every 6 hours PRN Temp greater or equal to 38C (100.4F)  aluminum hydroxide/magnesium hydroxide/simethicone Suspension 30 milliLiter(s) Oral every 4 hours PRN Dyspepsia           CXR interpreted by me:       Patient is a 42y old  Female who presents with a chief complaint of AHRF (02 Jul 2023 10:06)        Over Night Events: remains on Vent support , follows commands      PHYSICAL EXAM    ICU Vital Signs Last 24 Hrs  T(C): 36.8 (03 Jul 2023 07:25), Max: 37.6 (03 Jul 2023 00:00)  T(F): 98.2 (03 Jul 2023 07:25), Max: 99.6 (03 Jul 2023 00:00)  HR: 109 (03 Jul 2023 08:30) (97 - 112)  BP: 128/72 (03 Jul 2023 07:25) (116/74 - 146/83)  BP(mean): 94 (03 Jul 2023 07:25) (89 - 106)  RR: 20 (03 Jul 2023 07:25) (20 - 20)  SpO2: 100% (03 Jul 2023 08:30) (100% - 100%)    O2 Parameters below as of 03 Jul 2023 07:25  Patient On (Oxygen Delivery Method): ventilator            CONSTITUTIONAL:  ill appearing    ENT:     Trach present        CARDIAC:   Normal rate,   Regular rhythm.            RESPIRATORY:   No wheezing  Bilateral BS  Normal chest expansion  Not tachypneic,  No use of accessory muscles    GASTROINTESTINAL:  Abdomen soft,   Non-tender,   No guarding,   + BS       NEUROLOGICAL:   Follows commands     SKIN:   stage 2          07-02-23 @ 07:01  -  07-03-23 @ 07:00  --------------------------------------------------------  IN:    Enteral Tube Flush: 50 mL    Jevity 1.2: 660 mL  Total IN: 710 mL    OUT:    Rectal Tube (mL): 100 mL    Voided (mL): 1000 mL  Total OUT: 1100 mL    Total NET: -390 mL          LABS:                            8.4    12.43 )-----------( 318      ( 03 Jul 2023 06:02 )             28.0                                               07-03    132<L>  |  92<L>  |  13  ----------------------------<  112<H>  5.4<H>   |  30  |  0.7    Ca    9.5      03 Jul 2023 06:02  Phos  5.9     07-03  Mg     2.0     07-03    TPro  7.2  /  Alb  2.9<L>  /  TBili  <0.2  /  DBili  x   /  AST  32  /  ALT  26  /  AlkPhos  125<H>  07-03                                             Urinalysis Basic - ( 03 Jul 2023 06:02 )    Color: x / Appearance: x / SG: x / pH: x  Gluc: 112 mg/dL / Ketone: x  / Bili: x / Urobili: x   Blood: x / Protein: x / Nitrite: x   Leuk Esterase: x / RBC: x / WBC x   Sq Epi: x / Non Sq Epi: x / Bacteria: x                                                  LIVER FUNCTIONS - ( 03 Jul 2023 06:02 )  Alb: 2.9 g/dL / Pro: 7.2 g/dL / ALK PHOS: 125 U/L / ALT: 26 U/L / AST: 32 U/L / GGT: x                                                                                               Mode: CPAP with PS  FiO2: 40  PEEP: 8  PS: 10  MAP: 11  PIP: 22                                          MEDICATIONS  (STANDING):  aMIOdarone    Tablet 100 milliGRAM(s) Oral daily  chlorhexidine 2% Cloths 1 Application(s) Topical <User Schedule>  clonazePAM  Tablet 1 milliGRAM(s) Oral three times a day  diltiazem    Tablet 60 milliGRAM(s) Oral every 6 hours  enoxaparin Injectable 80 milliGRAM(s) SubCutaneous every 12 hours  famotidine    Tablet 20 milliGRAM(s) Oral two times a day  insulin lispro (ADMELOG) corrective regimen sliding scale   SubCutaneous every 6 hours  lisinopril 5 milliGRAM(s) Oral daily  methadone    Tablet 5 milliGRAM(s) Oral three times a day  metroNIDAZOLE    Tablet 500 milliGRAM(s) Oral every 8 hours  nystatin Cream 1 Application(s) Topical two times a day  OLANZapine 5 milliGRAM(s) Oral daily  vancomycin  IVPB 750 milliGRAM(s) IV Intermittent every 12 hours  vitamin A &amp; D Ointment 1 Application(s) Topical daily    MEDICATIONS  (PRN):  acetaminophen     Tablet .. 650 milliGRAM(s) Oral every 6 hours PRN Temp greater or equal to 38C (100.4F)  aluminum hydroxide/magnesium hydroxide/simethicone Suspension 30 milliLiter(s) Oral every 4 hours PRN Dyspepsia           CXR interpreted by me:

## 2023-07-03 NOTE — PROGRESS NOTE ADULT - SUBJECTIVE AND OBJECTIVE BOX
NUTRITION SUPPORT TEAM  -  PROGRESS NOTE   remain vented via trach  on J- tube feed    rectal tube in place  medical f/u noted  REVIEW OF SYSTEMS:  Negative except as noted above.     VITALS:  T(F): 97.9 (07-03 @ 12:00), Max: 98.2 (07-03 @ 07:25)  HR: 106 (07-03 @ 12:15) (101 - 131)  BP: 131/88 (07-03 @ 12:00) (128/72 - 131/88)  RR: 20 (07-03 @ 12:00) (20 - 20)  SpO2: 100% (07-03 @ 12:00) (100% - 100%)  Mode: CPAP with PS  FiO2: 40  PEEP: 8  PS: 10  MAP: 11  PIP: 22    HEIGHT/WEIGHT/BMI:   Height (cm): 167.6 (06-14)  Weight (kg): 85 (06-14)  BMI (kg/m2): 30.3 (06-14)  07-02-23 @ 07:01  -  07-03-23 @ 07:00  --------------------------------------------------------  IN:    Enteral Tube Flush: 50 mL    Jevity 1.2: 660 mL  Total IN: 710 mL    OUT:    Rectal Tube (mL): 100 mL    Voided (mL): 1000 mL  Total OUT: 1100 mL  Total NET: -390 mL  MEDICATIONS:   acetaminophen     Tablet .. 650 milliGRAM(s) Oral every 6 hours PRN  aluminum hydroxide/magnesium hydroxide/simethicone Suspension 30 milliLiter(s) Oral every 4 hours PRN  aMIOdarone    Tablet 100 milliGRAM(s) Oral daily  chlorhexidine 2% Cloths 1 Application(s) Topical <User Schedule>  clonazePAM  Tablet 1 milliGRAM(s) Oral three times a day  diltiazem    Tablet 60 milliGRAM(s) Oral every 6 hours  enoxaparin Injectable 80 milliGRAM(s) SubCutaneous every 12 hours  famotidine    Tablet 20 milliGRAM(s) Oral two times a day  insulin lispro (ADMELOG) corrective regimen sliding scale   SubCutaneous every 6 hours  lisinopril 5 milliGRAM(s) Oral daily  methadone    Tablet 5 milliGRAM(s) Oral three times a day  metroNIDAZOLE    Tablet 500 milliGRAM(s) Oral every 8 hours  nystatin Cream 1 Application(s) Topical two times a day  OLANZapine 5 milliGRAM(s) Oral daily  vancomycin  IVPB 750 milliGRAM(s) IV Intermittent every 12 hours  vitamin A &amp; D Ointment 1 Application(s) Topical daily    LABS:                         8.4    12.43 )-----------( 318 ( 03 Jul 2023 06:02 )             28.0     132<L>  |  92<L>  |  13  ----------------------------<  112<H>          (07-03-23 @ 06:02)  5.4<H>   |  30  |  0.7    Ca    9.5          (07-03-23 @ 06:02)  Phos  5.9         (07-03-23 @ 06:02)  Mg     2.0         (07-03-23 @ 06:02)  TPro  7.2  /  Alb  2.9<L>  /  TBili  <0.2  /  DBili  x   /  AST  32  /  ALT  26  /  AlkPhos  125<H>       07-03-23 @ 06:02  Triglycerides, Serum: 147 mg/dL (06-26 @ 04:44)  Blood Glucose (Past 24 hours):  107 mg/dL (07-03 @ 11:29)  126 mg/dL (07-03 @ 07:19)  117 mg/dL (07-03 @ 05:15)  123 mg/dL (07-03 @ 02:07)  117 mg/dL (07-02 @ 17:56)    DIET:   Diet, NPO with Tube Feed:   Tube Feeding Modality: Jejunostomy  Jevity 1.2 Olvin  Total Volume for 24 Hours (mL): 1440  Continuous  Until Goal Tube Feed Rate (mL per Hour): 60  Tube Feed Duration (in Hours): 24  Tube Feed Start Time: 12:00  Free Water Flush   Total Volume per Flush (mL): 50   Frequency: Every 6 Hours  Banatrol TF     Qty per Day:  2 (06-30-23 @ 13:49) [Active]  RADIOLOGY:   < from: Xray Chest 1 View- PORTABLE-Routine (Xray Chest 1 View- PORTABLE-Routine in AM.) (07.01.23 @ 08:26) >  IMPRESSION:  Unchanged tracheostomy  Decreased left basal pleural-parenchymal opacities. No pneumothorax.  Stable cardiomediastinal silhouette.  Unchanged osseous structures.   NUTRITION SUPPORT TEAM  -  PROGRESS NOTE   remains vented via trach, awake and aware  on J- tube feeds  rectal tube in place  medical f/u noted  rash on buttocks - care per burn team  + loose stools several days ago - rectal tube removed, + fecal incontinence but no further loose stool    REVIEW OF SYSTEMS:  Negative except as noted above.     VITALS:  T(F): 97.9 (07-03 @ 12:00), Max: 98.2 (07-03 @ 07:25)  HR: 106 (07-03 @ 12:15) (101 - 131)  BP: 131/88 (07-03 @ 12:00) (128/72 - 131/88)  RR: 20 (07-03 @ 12:00) (20 - 20)  SpO2: 100% (07-03 @ 12:00) (100% - 100%)  Mode: CPAP with PS  FiO2: 40  PEEP: 8  PS: 10  MAP: 11  PIP: 22    HEIGHT/WEIGHT/BMI:   Height (cm): 167.6 (06-14)  Weight (kg): 85 (06-14)  BMI (kg/m2): 30.3 (06-14)  07-02-23 @ 07:01  -  07-03-23 @ 07:00  --------------------------------------------------------  IN:    Enteral Tube Flush: 50 mL    Jevity 1.2: 660 mL  Total IN: 710 mL    OUT:    Rectal Tube (mL): 100 mL    Voided (mL): 1000 mL  Total OUT: 1100 mL  Total NET: -390 mL  MEDICATIONS:   acetaminophen     Tablet .. 650 milliGRAM(s) Oral every 6 hours PRN  aluminum hydroxide/magnesium hydroxide/simethicone Suspension 30 milliLiter(s) Oral every 4 hours PRN  aMIOdarone    Tablet 100 milliGRAM(s) Oral daily  chlorhexidine 2% Cloths 1 Application(s) Topical <User Schedule>  clonazePAM  Tablet 1 milliGRAM(s) Oral three times a day  diltiazem    Tablet 60 milliGRAM(s) Oral every 6 hours  enoxaparin Injectable 80 milliGRAM(s) SubCutaneous every 12 hours  famotidine    Tablet 20 milliGRAM(s) Oral two times a day  insulin lispro (ADMELOG) corrective regimen sliding scale   SubCutaneous every 6 hours  lisinopril 5 milliGRAM(s) Oral daily  methadone    Tablet 5 milliGRAM(s) Oral three times a day  metroNIDAZOLE    Tablet 500 milliGRAM(s) Oral every 8 hours  nystatin Cream 1 Application(s) Topical two times a day  OLANZapine 5 milliGRAM(s) Oral daily  vancomycin  IVPB 750 milliGRAM(s) IV Intermittent every 12 hours  vitamin A &amp; D Ointment 1 Application(s) Topical daily    LABS:                         8.4    12.43 )-----------( 318      ( 03 Jul 2023 06:02 )             28.0     132<L>  |  92<L>  |  13  ----------------------------<  112<H>          (07-03-23 @ 06:02)  5.4<H>   |  30  |  0.7    Ca    9.5          (07-03-23 @ 06:02)  Phos  5.9         (07-03-23 @ 06:02)  Mg     2.0         (07-03-23 @ 06:02)  TPro  7.2  /  Alb  2.9<L>  /  TBili  <0.2  /  DBili  x   /  AST  32  /  ALT  26  /  AlkPhos  125<H>       07-03-23 @ 06:02  Triglycerides, Serum: 147 mg/dL (06-26 @ 04:44)  Blood Glucose (Past 24 hours):  107 mg/dL (07-03 @ 11:29)  126 mg/dL (07-03 @ 07:19)  117 mg/dL (07-03 @ 05:15)  123 mg/dL (07-03 @ 02:07)  117 mg/dL (07-02 @ 17:56)    DIET:   Diet, NPO with Tube Feed:   Tube Feeding Modality: Jejunostomy  Jevity 1.2 Olvin  Total Volume for 24 Hours (mL): 1440  Continuous  Until Goal Tube Feed Rate (mL per Hour): 60  Tube Feed Duration (in Hours): 24  Tube Feed Start Time: 12:00  Free Water Flush   Total Volume per Flush (mL): 50   Frequency: Every 6 Hours  Banatrol TF     Qty per Day:  2 (06-30-23 @ 13:49) [Active]  RADIOLOGY:   < from: Xray Chest 1 View- PORTABLE-Routine (Xray Chest 1 View- PORTABLE-Routine in AM.) (07.01.23 @ 08:26) >  IMPRESSION:  Unchanged tracheostomy  Decreased left basal pleural-parenchymal opacities. No pneumothorax.  Stable cardiomediastinal silhouette.  Unchanged osseous structures.

## 2023-07-03 NOTE — PROGRESS NOTE ADULT - SUBJECTIVE AND OBJECTIVE BOX
24H events:    Patient is a 42y old Female who presents with a chief complaint of AHRF (03 Jul 2023 09:17)    Primary diagnosis of Pneumonia    Today is hospital day 41d. This morning patient was seen and examined at bedside, resting comfortably in bed.    No acute or major events overnight.    Code Status: full code    PAST MEDICAL & SURGICAL HISTORY    SOCIAL HISTORY:  Social History: na      ALLERGIES:  aspirin (Short breath; Anaphylaxis)  Bananas (Unknown)    MEDICATIONS:  STANDING MEDICATIONS  aMIOdarone    Tablet 100 milliGRAM(s) Oral daily  chlorhexidine 2% Cloths 1 Application(s) Topical <User Schedule>  clonazePAM  Tablet 1 milliGRAM(s) Oral three times a day  diltiazem    Tablet 60 milliGRAM(s) Oral every 6 hours  enoxaparin Injectable 80 milliGRAM(s) SubCutaneous every 12 hours  famotidine    Tablet 20 milliGRAM(s) Oral two times a day  insulin lispro (ADMELOG) corrective regimen sliding scale   SubCutaneous every 6 hours  lisinopril 5 milliGRAM(s) Oral daily  methadone    Tablet 5 milliGRAM(s) Oral three times a day  metroNIDAZOLE    Tablet 500 milliGRAM(s) Oral every 8 hours  nystatin Cream 1 Application(s) Topical two times a day  OLANZapine 5 milliGRAM(s) Oral daily  vancomycin  IVPB 750 milliGRAM(s) IV Intermittent every 12 hours  vitamin A &amp; D Ointment 1 Application(s) Topical daily    PRN MEDICATIONS  acetaminophen     Tablet .. 650 milliGRAM(s) Oral every 6 hours PRN  aluminum hydroxide/magnesium hydroxide/simethicone Suspension 30 milliLiter(s) Oral every 4 hours PRN      ROS:   no headaches, no fevers, no dizziness, no CP, +abdominal pain, no n/v/d, no burning with urination, no weakness or tingling    VITALS:   T(F): 98.2  HR: 109  BP: 128/72  RR: 20  SpO2: 100%    PHYSICAL EXAM:  GENERAL: lying in bed on trach/peg  HEAD: normal  HEART: RRR  LUNGS: CTA b/l  ABDOMEN: soft nontender nondistended  EXTREMITIES: 2+ peripheral pulses bilaterally  NERVOUS SYSTEM:  A&OX3    Indwelling Weaver Catheter:  Inserted on May 24 for urinary retention    LABS:                        8.4    12.43 )-----------( 318      ( 03 Jul 2023 06:02 )             28.0     07-03    132<L>  |  92<L>  |  13  ----------------------------<  112<H>  5.4<H>   |  30  |  0.7    Ca    9.5      03 Jul 2023 06:02  Phos  5.9     07-03  Mg     2.0     07-03    TPro  7.2  /  Alb  2.9<L>  /  TBili  <0.2  /  DBili  x   /  AST  32  /  ALT  26  /  AlkPhos  125<H>  07-03      Urinalysis Basic - ( 03 Jul 2023 06:02 )    Color: x / Appearance: x / SG: x / pH: x  Gluc: 112 mg/dL / Ketone: x  / Bili: x / Urobili: x   Blood: x / Protein: x / Nitrite: x   Leuk Esterase: x / RBC: x / WBC x   Sq Epi: x / Non Sq Epi: x / Bacteria: x        RADIOLOGY:    < from: Xray Chest 1 View- PORTABLE-Routine (Xray Chest 1 View- PORTABLE-Routine in AM.) (07.01.23 @ 08:26) >  FINDINGS/  IMPRESSION:  Unchanged tracheostomy    Decreased left basal pleural-parenchymal opacities. No pneumothorax.    Stable cardiomediastinal silhouette.    Unchanged osseous structures.    < end of copied text >

## 2023-07-03 NOTE — PROGRESS NOTE ADULT - SUBJECTIVE AND OBJECTIVE BOX
FLO ZEYNEP  42y Female    CHIEF COMPLAINT:    Patient is a 42y old  Female who presents with a chief complaint of AHRF (2023 09:13)    INTERVAL HPI/OVERNIGHT EVENTS:    Patient seen and examined. No acute events overnight. remains vented, tolerated PS trials    ROS: All other systems are negative.    Vital Signs:    T(F): 98.2 (23 @ 07:25), Max: 99.6 (23 @ 00:00)  HR: 109 (23 @ 08:30) (97 - 112)  BP: 128/72 (23 @ 07:25) (116/74 - 146/83)  RR: 20 (23 @ 07:25) (20 - 20)  SpO2: 100% (23 @ 08:30) (100% - 100%)    2023 07:01  -  2023 07:00  --------------------------------------------------------  IN: 710 mL / OUT: 1100 mL / NET: -390 mL    Daily Weight in k.5 (2023 00:00)    POCT Blood Glucose.: 126 mg/dL (2023 07:19)  POCT Blood Glucose.: 117 mg/dL (2023 05:15)  POCT Blood Glucose.: 123 mg/dL (2023 02:07)  POCT Blood Glucose.: 117 mg/dL (2023 17:56)  POCT Blood Glucose.: 118 mg/dL (2023 11:26)    PHYSICAL EXAM:    GENERAL:  NAD  SKIN: No rashes or lesions  HEENT: Atraumatic. Normocephalic.    NECK: Supple, No JVD.    PULMONARY: decreased breath sounds B/L. No wheezing   CVS: Normal S1, S2. Rate and Rhythm are regular. tachycardic   ABDOMEN/GI: Soft, Nontender, mildly distended; BS present  MSK:  No clubbing or cyanosis   NEUROLOGIC: moves all extremities   PSYCH: Alert & oriented x 3    Consultant(s) Notes Reviewed:  [x ] YES  [ ] NO  Care Discussed with Consultants/Other Providers [ x] YES  [ ] NO    LABS:                        8.4    12.43 )-----------( 318      ( 2023 06:02 )             28.0     132<L>  |  92<L>  |  13  ----------------------------<  112<H>  5.4<H>   |  30  |  0.7    Ca    9.5      2023 06:02  Phos  5.9     07-03  Mg     2.0     07-03    TPro  7.2  /  Alb  2.9<L>  /  TBili  <0.2  /  DBili  x   /  AST  32  /  ALT  26  /  AlkPhos  125<H>  07-03    RADIOLOGY & ADDITIONAL TESTS:  Imaging or report Personally Reviewed:  [x] YES  [ ] NO  EKG reviewed: [x] YES  [ ] NO    Medications:  Standing  aMIOdarone    Tablet 100 milliGRAM(s) Oral daily  chlorhexidine 2% Cloths 1 Application(s) Topical <User Schedule>  clonazePAM  Tablet 1 milliGRAM(s) Oral three times a day  diltiazem    Tablet 60 milliGRAM(s) Oral every 6 hours  enoxaparin Injectable 80 milliGRAM(s) SubCutaneous every 12 hours  famotidine    Tablet 20 milliGRAM(s) Oral two times a day  insulin lispro (ADMELOG) corrective regimen sliding scale   SubCutaneous every 6 hours  lisinopril 5 milliGRAM(s) Oral daily  methadone    Tablet 5 milliGRAM(s) Oral three times a day  metroNIDAZOLE    Tablet 500 milliGRAM(s) Oral every 8 hours  nystatin Cream 1 Application(s) Topical two times a day  OLANZapine 5 milliGRAM(s) Oral daily  vancomycin  IVPB 750 milliGRAM(s) IV Intermittent every 12 hours  vitamin A &amp; D Ointment 1 Application(s) Topical daily    PRN Meds  acetaminophen     Tablet .. 650 milliGRAM(s) Oral every 6 hours PRN  aluminum hydroxide/magnesium hydroxide/simethicone Suspension 30 milliLiter(s) Oral every 4 hours PRN

## 2023-07-03 NOTE — PROGRESS NOTE ADULT - ASSESSMENT
42 year-old female with PMH of Asthma, HTN? presents with complaint of cough x3 days and SOB. In the ED was found to have Bilateral patchy groundglass nodularity with dominant confluent left lower lobe opacification with numerous cavitary lesions. Additional contralateral right lower lobe cavitary 1 cm nodule on CT. Patient was admitted to the ICU for acute hypoxemic respiratory failure secondary to cavitary MRSA Pneumonia with a positive influenza B, finished a course of zyvox and tamiflu. This was complicated by loculated parapneumonic effusion for which a chest tube was inserted and tpa was given twice. Her stay was complicated by MRSA bacteremia  currently on vancomycin and metronidazole, cultures negative to date. MASTER was negative for vegetations. Trach and PEG done for inability to wean off vent, currently off sedation. Her stay was also complicated by new onset Afib with RVR for which she is on amiodarone, diltiazem, and therapeutic anticoagulation. Long QT is also noted. Patient also had LANCE that improved with no need for RRT. Otherwise patient received a total of 6 p RBC for acute blood loss anemia ,currently hemoglobin stable. Patient transferred to step down unit.     Acute hypoxemic respiratory failure / s/P Trach and PEG    Severe cavitary CAP MRSA  Small parapneumonic effusion SP Chest tube  MRSA bacteremia  Influenza B   hx of Asthma   Intubated 5/23 self-extubated 5/25, re-intubated 5/25; SBT failed, SP trach/G tube  - s/p Zyvox, meropenem and Tamifu, now on Vancomycin and flagyl, anticipated end date 7/9  - monitor levels per ID pharmacy   - s/p course of steroids now off  -  blood cultures  NGTD SINCE 5/28   - s/p MASTER 5/31: No evidence of valvular vegetations or intracardiac abscesses to support IE. Pulmonary hepatisation incidental finding. EF 65%, mild TR and pulm HTN  - ventilator management per pulm/cc, tolerated PS for 5 hours yesterday   - on Methadone 5/5/5 and olanzapine from MICU due to prolonged time on Fentanyl. Monitor daily EKGS for QTC. Keep Mg ~2 and K ~4     Maculopapular buttocks rash  -PCR skin scraping negative for HSV --> off  Acyclovir   -Wound care per burn, wound care RN following     LANCE, resolved   - post-infectious GN vs. ATN  - Cr peaked  3 > now 0.7  - renal U/S: negative   - daily BMP     Microcytic Anemia likely  anemia of chronic disease   - s/p 6 units prbc so far last 6/5  - Restarted AC with lovenox 6/22 given Hb stable last few days  -monitor hemoglobin     New onset A-fib w/ RVR  - HR better controlled now  - s/p Amio drip, now on PO  - Cardizem po 60mg q6hr   - therapeutic Lovenox   - EP consult appreciated     Diarrhea - likely related to tube feeds, improved   nutrition fu requested  meds adjusted   janet mitchell    Pending: c/w IV abx, vent management per pulm, monitor diarrhea   All discussed with daughter Serenity [phone]

## 2023-07-03 NOTE — PROGRESS NOTE ADULT - ASSESSMENT
IMPRESSION:    Acute hypoxemic respiratory failure sp trach   Severe cavitary CAP MRSA  Small parapneumonic effusion SP Chest tube/removal  MRSA bacteremia resolved   MASTER negative   Anemia sp transfusion   New onset A FIB rate controlled   LANCE non oliguric improving  Hypernatremia Improving  HO asthma   Influenza B         PLAN:    CNS:  Continue Methadone 5 / 5 / 5.   Klonopin 1 mg BID.  Zyprexa 5 .  Monitor QTC.       HEENT: Oral care. Trach care    PULMONARY:  HOB @ 45 degrees.  Aspiration precautions.    No change in vent settings.    Keep SaO2 92 TO 96%.  PS for 4-6 hours     CARDIOVASCULAR:    Avoid overload,  rate control.  Amiodarone 100 mg daily.  Cardizem 60 mg Q6     GI: GI prophylaxis. PEG Feeding.   monitor output, may need to send c diff, remove rectal tube once diarrhea improved    RENAL:  Follow up lytes.  Correct as needed.     INFECTIOUS DISEASE: ABX Per ID.  vanc trough    HEMATOLOGICAL:  DVT prophylaxis.    LMWH therapeutic     ENDOCRINE:  Follow up FS.  Insulin protocol if needed.      Prognosis is poor overall .     Vent unit   IMPRESSION:    Acute hypoxemic respiratory failure sp trach   Severe cavitary CAP MRSA  Small parapneumonic effusion SP Chest tube/removal  MRSA bacteremia resolved   MASTER negative   Anemia sp transfusion   New onset A FIB rate controlled   LANCE non oliguric improving  Hypernatremia Improving  HO asthma   Influenza B         PLAN:    CNS:  Continue Methadone 5q 12.   Klonopin 1 mg BID.  Zyprexa 5 .  Monitor QTC.       HEENT: Oral care. Trach care    PULMONARY:  HOB @ 45 degrees.  Aspiration precautions.    No change in vent settings.    Keep SaO2 92 TO 96%.  PS as toletated    CARDIOVASCULAR:    Avoid overload,  rate control.  Amiodarone 100 mg daily.       GI: GI prophylaxis. PEG Feeding.      RENAL:  Follow up lytes.  Correct as needed.     INFECTIOUS DISEASE: ABX Per ID.  vanc trough    HEMATOLOGICAL:  DVT prophylaxis.    LMWH therapeutic     ENDOCRINE:  Follow up FS.  Insulin protocol if needed.      Prognosis is poor overall .     Vent unit

## 2023-07-03 NOTE — PROGRESS NOTE ADULT - ASSESSMENT
ASSESSMENT  - acute now chronic hypoxemic resp failure  - MRSA pneumonia and bacteremia  - + flu B  - s/p pleural effusion and chest tube  - s/p multiple transfusions  - s/p trach and G-J tube placement  - new with diarrhea    suggest:  - continue with  jevity 1.2 at 60ml/hr  - no further need for high protein crit Rx - can switch to fiber-containing Rx  - document Banatrol  - will f/u - acute now chronic hypoxemic resp failure  - MRSA pneumonia and bacteremia  - + flu B  - s/p pleural effusion and chest tube  - s/p multiple transfusions  - s/p trach and G-J tube placement  - new with diarrhea    suggest:  - continue with  Jevity 1.2 at 60ml/hr via Jejunal tube       NEVER bolus via J tube, and give any meds via Gastric port  - no further need for high protein crit Rx - switched to fiber-containing Rx several days ago  - document Banatrol - can give up to 3 times a day  - loose stool most likely not at all related to feeding formula, but to the multiple antimicrobials and other meds pt has received since admission  - will f/u

## 2023-07-03 NOTE — PHARMACOTHERAPY INTERVENTION NOTE - COMMENTS
The vancomycin level today, 7/3 was 13.6 mg/L. As per [employer] calculations, the steady state vancomycin AUC/RUSSELL is 504.26 mg/L*h, which is within the therapeutic range of 400 - 600 mg/L*h. Serum creatinine is 0.7 mg/dL. Can continue vancomycin 750 mg IV q12h.    Kinga Foster, PharmD, BCIDP  Clinical Pharmacy Specialist, Infectious Diseases  Tele-Antimicrobial Stewardship Program (Tele-ASP)  Tele-ASP Phone: [phone]   The vancomycin level today, 7/3 was 13.6 mg/L. As per [employer] calculations, the steady state vancomycin AUC/RUSSELL is 504.26 mg/L*h, which is within the therapeutic range of 400 - 600 mg/L*h. Serum creatinine is 0.7 mg/dL. Recommended to continue vancomycin 750 mg IV q12h.    Kinga Fotser, PharmD, [facility]  Clinical Pharmacy Specialist, Infectious Diseases  Tele-Antimicrobial Stewardship Program (Tele-ASP)  Tele-ASP Phone: [phone]

## 2023-07-04 LAB
ALBUMIN SERPL ELPH-MCNC: 3 G/DL — LOW (ref 3.5–5.2)
ALP SERPL-CCNC: 125 U/L — HIGH (ref 30–115)
ALT FLD-CCNC: 23 U/L — SIGNIFICANT CHANGE UP (ref 0–41)
ANION GAP SERPL CALC-SCNC: 10 MMOL/L — SIGNIFICANT CHANGE UP (ref 7–14)
AST SERPL-CCNC: 24 U/L — SIGNIFICANT CHANGE UP (ref 0–41)
BASOPHILS # BLD AUTO: 0.08 K/UL — SIGNIFICANT CHANGE UP (ref 0–0.2)
BASOPHILS NFR BLD AUTO: 0.6 % — SIGNIFICANT CHANGE UP (ref 0–1)
BILIRUB SERPL-MCNC: 0.2 MG/DL — SIGNIFICANT CHANGE UP (ref 0.2–1.2)
BUN SERPL-MCNC: 16 MG/DL — SIGNIFICANT CHANGE UP (ref 10–20)
CALCIUM SERPL-MCNC: 9.7 MG/DL — SIGNIFICANT CHANGE UP (ref 8.4–10.5)
CHLORIDE SERPL-SCNC: 94 MMOL/L — LOW (ref 98–110)
CO2 SERPL-SCNC: 30 MMOL/L — SIGNIFICANT CHANGE UP (ref 17–32)
CREAT SERPL-MCNC: 0.8 MG/DL — SIGNIFICANT CHANGE UP (ref 0.7–1.5)
EGFR: 94 ML/MIN/1.73M2 — SIGNIFICANT CHANGE UP
EOSINOPHIL # BLD AUTO: 0.32 K/UL — SIGNIFICANT CHANGE UP (ref 0–0.7)
EOSINOPHIL NFR BLD AUTO: 2.5 % — SIGNIFICANT CHANGE UP (ref 0–8)
GLUCOSE BLDC GLUCOMTR-MCNC: 116 MG/DL — HIGH (ref 70–99)
GLUCOSE BLDC GLUCOMTR-MCNC: 117 MG/DL — HIGH (ref 70–99)
GLUCOSE BLDC GLUCOMTR-MCNC: 122 MG/DL — HIGH (ref 70–99)
GLUCOSE BLDC GLUCOMTR-MCNC: 122 MG/DL — HIGH (ref 70–99)
GLUCOSE SERPL-MCNC: 101 MG/DL — HIGH (ref 70–99)
HCT VFR BLD CALC: 27 % — LOW (ref 37–47)
HGB BLD-MCNC: 8.2 G/DL — LOW (ref 12–16)
IMM GRANULOCYTES NFR BLD AUTO: 1.3 % — HIGH (ref 0.1–0.3)
LYMPHOCYTES # BLD AUTO: 1.85 K/UL — SIGNIFICANT CHANGE UP (ref 1.2–3.4)
LYMPHOCYTES # BLD AUTO: 14.7 % — LOW (ref 20.5–51.1)
MAGNESIUM SERPL-MCNC: 2.1 MG/DL — SIGNIFICANT CHANGE UP (ref 1.8–2.4)
MCHC RBC-ENTMCNC: 24 PG — LOW (ref 27–31)
MCHC RBC-ENTMCNC: 30.4 G/DL — LOW (ref 32–37)
MCV RBC AUTO: 79.2 FL — LOW (ref 81–99)
MONOCYTES # BLD AUTO: 1.22 K/UL — HIGH (ref 0.1–0.6)
MONOCYTES NFR BLD AUTO: 9.7 % — HIGH (ref 1.7–9.3)
NEUTROPHILS # BLD AUTO: 8.98 K/UL — HIGH (ref 1.4–6.5)
NEUTROPHILS NFR BLD AUTO: 71.2 % — SIGNIFICANT CHANGE UP (ref 42.2–75.2)
NRBC # BLD: 0 /100 WBCS — SIGNIFICANT CHANGE UP (ref 0–0)
PLATELET # BLD AUTO: 464 K/UL — HIGH (ref 130–400)
PMV BLD: 8.8 FL — SIGNIFICANT CHANGE UP (ref 7.4–10.4)
POTASSIUM SERPL-MCNC: 5.5 MMOL/L — HIGH (ref 3.5–5)
POTASSIUM SERPL-SCNC: 5.5 MMOL/L — HIGH (ref 3.5–5)
PROT SERPL-MCNC: 7.3 G/DL — SIGNIFICANT CHANGE UP (ref 6–8)
RBC # BLD: 3.41 M/UL — LOW (ref 4.2–5.4)
RBC # BLD: 3.41 M/UL — LOW (ref 4.2–5.4)
RBC # FLD: 27.2 % — HIGH (ref 11.5–14.5)
RETICS #: 82.5 K/UL — SIGNIFICANT CHANGE UP (ref 25–125)
RETICS/RBC NFR: 2.4 % — HIGH (ref 0.5–1.5)
SODIUM SERPL-SCNC: 134 MMOL/L — LOW (ref 135–146)
WBC # BLD: 12.61 K/UL — HIGH (ref 4.8–10.8)
WBC # FLD AUTO: 12.61 K/UL — HIGH (ref 4.8–10.8)

## 2023-07-04 PROCEDURE — 99232 SBSQ HOSP IP/OBS MODERATE 35: CPT

## 2023-07-04 PROCEDURE — 99232 SBSQ HOSP IP/OBS MODERATE 35: CPT | Mod: GC

## 2023-07-04 PROCEDURE — 93010 ELECTROCARDIOGRAM REPORT: CPT

## 2023-07-04 PROCEDURE — 71045 X-RAY EXAM CHEST 1 VIEW: CPT | Mod: 26

## 2023-07-04 RX ORDER — METHADONE HYDROCHLORIDE 40 MG/1
5 TABLET ORAL
Refills: 0 | Status: DISCONTINUED | OUTPATIENT
Start: 2023-07-04 | End: 2023-07-05

## 2023-07-04 RX ORDER — SENNA PLUS 8.6 MG/1
2 TABLET ORAL AT BEDTIME
Refills: 0 | Status: DISCONTINUED | OUTPATIENT
Start: 2023-07-04 | End: 2023-07-17

## 2023-07-04 RX ORDER — SCOPALAMINE 1 MG/3D
1 PATCH, EXTENDED RELEASE TRANSDERMAL
Refills: 0 | Status: DISCONTINUED | OUTPATIENT
Start: 2023-07-04 | End: 2023-07-05

## 2023-07-04 RX ORDER — APIXABAN 2.5 MG/1
5 TABLET, FILM COATED ORAL EVERY 12 HOURS
Refills: 0 | Status: DISCONTINUED | OUTPATIENT
Start: 2023-07-04 | End: 2023-07-17

## 2023-07-04 RX ORDER — SODIUM ZIRCONIUM CYCLOSILICATE 10 G/10G
5 POWDER, FOR SUSPENSION ORAL
Refills: 0 | Status: COMPLETED | OUTPATIENT
Start: 2023-07-04 | End: 2023-07-04

## 2023-07-04 RX ADMIN — LISINOPRIL 5 MILLIGRAM(S): 2.5 TABLET ORAL at 05:37

## 2023-07-04 RX ADMIN — SCOPALAMINE 1 PATCH: 1 PATCH, EXTENDED RELEASE TRANSDERMAL at 20:42

## 2023-07-04 RX ADMIN — Medication 60 MILLIGRAM(S): at 17:20

## 2023-07-04 RX ADMIN — METHADONE HYDROCHLORIDE 5 MILLIGRAM(S): 40 TABLET ORAL at 05:36

## 2023-07-04 RX ADMIN — Medication 1 APPLICATION(S): at 11:18

## 2023-07-04 RX ADMIN — FAMOTIDINE 20 MILLIGRAM(S): 10 INJECTION INTRAVENOUS at 17:21

## 2023-07-04 RX ADMIN — OLANZAPINE 5 MILLIGRAM(S): 15 TABLET, FILM COATED ORAL at 11:02

## 2023-07-04 RX ADMIN — NYSTATIN CREAM 1 APPLICATION(S): 100000 CREAM TOPICAL at 17:21

## 2023-07-04 RX ADMIN — SCOPALAMINE 1 PATCH: 1 PATCH, EXTENDED RELEASE TRANSDERMAL at 15:03

## 2023-07-04 RX ADMIN — NYSTATIN CREAM 1 APPLICATION(S): 100000 CREAM TOPICAL at 05:46

## 2023-07-04 RX ADMIN — AMIODARONE HYDROCHLORIDE 100 MILLIGRAM(S): 400 TABLET ORAL at 05:37

## 2023-07-04 RX ADMIN — Medication 2 MILLIGRAM(S): at 21:26

## 2023-07-04 RX ADMIN — APIXABAN 5 MILLIGRAM(S): 2.5 TABLET, FILM COATED ORAL at 17:27

## 2023-07-04 RX ADMIN — SENNA PLUS 2 TABLET(S): 8.6 TABLET ORAL at 21:09

## 2023-07-04 RX ADMIN — METHADONE HYDROCHLORIDE 5 MILLIGRAM(S): 40 TABLET ORAL at 17:20

## 2023-07-04 RX ADMIN — Medication 60 MILLIGRAM(S): at 23:09

## 2023-07-04 RX ADMIN — Medication 500 MILLIGRAM(S): at 21:08

## 2023-07-04 RX ADMIN — Medication 60 MILLIGRAM(S): at 05:37

## 2023-07-04 RX ADMIN — Medication 500 MILLIGRAM(S): at 05:37

## 2023-07-04 RX ADMIN — FAMOTIDINE 20 MILLIGRAM(S): 10 INJECTION INTRAVENOUS at 05:37

## 2023-07-04 RX ADMIN — SENNA PLUS 2 TABLET(S): 8.6 TABLET ORAL at 15:03

## 2023-07-04 RX ADMIN — Medication 250 MILLIGRAM(S): at 05:36

## 2023-07-04 RX ADMIN — Medication 60 MILLIGRAM(S): at 11:02

## 2023-07-04 RX ADMIN — SODIUM ZIRCONIUM CYCLOSILICATE 5 GRAM(S): 10 POWDER, FOR SUSPENSION ORAL at 17:15

## 2023-07-04 RX ADMIN — Medication 250 MILLIGRAM(S): at 17:45

## 2023-07-04 RX ADMIN — SODIUM ZIRCONIUM CYCLOSILICATE 5 GRAM(S): 10 POWDER, FOR SUSPENSION ORAL at 11:00

## 2023-07-04 RX ADMIN — CHLORHEXIDINE GLUCONATE 1 APPLICATION(S): 213 SOLUTION TOPICAL at 05:47

## 2023-07-04 RX ADMIN — Medication 500 MILLIGRAM(S): at 15:04

## 2023-07-04 NOTE — PROGRESS NOTE ADULT - ASSESSMENT
IMPRESSION:    Acute hypoxemic respiratory failure sp trach   Severe cavitary CAP MRSA  Small parapneumonic effusion SP Chest tube/removal  MRSA bacteremia resolved   MASTER negative   Anemia sp transfusion   New onset A FIB rate controlled   LANCE non oliguric improving  Hypernatremia Improving  HO asthma   Influenza B     PLAN:    CNS:  Continue Methadone 5q 12, wean it slowly.   Klonopin 1 mg BID.  Zyprexa 5 .  Monitor QTC.       HEENT: Oral care. Trach care. scopolamine patch for secretions.    PULMONARY:  HOB @ 45 degrees.  Aspiration precautions.    No change in vent settings.    Keep SaO2 92 TO 96%.  PS as toletated    CARDIOVASCULAR:    Avoid overload,  rate control.  Amiodarone 100 mg daily.       GI: GI prophylaxis. PEG Feeding. Bowel regimen     RENAL:  Follow up lytes.  Correct as needed.     INFECTIOUS DISEASE: ABX Per ID.  vanc trough    HEMATOLOGICAL:  DVT prophylaxis.    LMWH therapeutic     ENDOCRINE:  Follow up FS.  Insulin protocol if needed.      Prognosis is poor overall .     Vent unit

## 2023-07-04 NOTE — PROGRESS NOTE ADULT - ASSESSMENT
42 year-old female with PMH of Asthma, HTN? presents with complaint of cough x3 days and SOB. In the ED was found to have Bilateral patchy groundglass nodularity with dominant confluent left lower lobe opacification with numerous cavitary lesions. Additional contralateral right lower lobe cavitary 1 cm nodule on CT. Patient was admitted to the ICU for acute hypoxemic respiratory failure secondary to cavitary MRSA Pneumonia with a positive influenza B, finished a course of zyvox and tamiflu. This was complicated by loculated parapneumonic effusion for which a chest tube was inserted and tpa was given twice. Her stay was complicated by MRSA bacteremia  currently on vancomycin and metronidazole, cultures negative to date. MASTER was negative for vegetations. Trach and PEG done for inability to wean off vent, currently off sedation. Her stay was also complicated by new onset Afib with RVR for which she is on amiodarone, diltiazem, and therapeutic anticoagulation. Long QT is also noted. Patient also had LANCE that improved with no need for RRT. Otherwise patient received a total of 6 p RBC for acute blood loss anemia ,currently hemoglobin stable. Patient transferred to step down unit.     Acute hypoxemic respiratory failure / s/P Trach and PEG    Severe cavitary CAP MRSA  Small parapneumonic effusion SP Chest tube  MRSA bacteremia  Influenza B   hx of Asthma   Intubated 5/23 self-extubated 5/25, re-intubated 5/25; SBT failed, SP trach/G tube  - s/p Zyvox, meropenem and Tamifu, now on Vancomycin and flagyl, anticipated end date 7/9  - monitor levels per ID pharmacy   - s/p course of steroids now off  -  blood cultures  NGTD SINCE 5/28   - s/p MASTER 5/31: No evidence of valvular vegetations or intracardiac abscesses to support IE. Pulmonary hepatisation incidental finding. EF 65%, mild TR and pulm HTN  - ventilator management per pulm/cc, tolerated PS for 5 hours yesterday   - decrease Methadone to 5 q12H and c/w olanzapine --> on taper from MICU due to prolonged time on Fentanyl. Monitor daily EKGS for QTC. Keep Mg ~2 and K ~4     Maculopapular buttocks rash  -PCR skin scraping negative for HSV --> off  Acyclovir   -Wound care per burn, wound care RN following     LANCE, resolved   Hypernakemia  - post-infectious GN vs. ATN  - Cr peaked  3 > now 0.7  - renal U/S: negative   - daily BMP   - Lokelma 5mg Q12H today, hold ACE     Microcytic Anemia likely  anemia of chronic disease   - s/p 6 units prbc so far last 6/5  - Restarted AC with lovenox 6/22 given Hb stable last few days, switch to Eliquis 5mg Q12H   -monitor hemoglobin     New onset A-fib w/ RVR  - HR better controlled now  - s/p Amio drip, now on PO  - Cardizem po 60mg q6hr   - therapeutic Lovenox, can transition to ELiquis 5mg Q12H    - EP consult appreciated     Diarrhea - likely related to tube feeds, improved   nutrition fu requested  meds adjusted   janet mitchell    Pending: c/w IV abx, vent management per pulm, monitor diarrhea, labs   All discussed with daughter Serenity [phone]

## 2023-07-04 NOTE — PROGRESS NOTE ADULT - ASSESSMENT
42 year old female with PMHx Asthma, HTN presents with cough and SOB x3 days and hemoptysis admitted for cavitary CAP.      #Acute hypoxemic respiratory failure / s/P Trach and PEG    #Severe cavitary CAP MRSA  #Small parapneumonic effusion SP Chest tube  #MRSA bacteremia  #Influenza B   #hx of Asthma   - Intubated 5/23 self-extubated 5/25, re-intubated 5/25; SBT failed, SP trach/G tube  - s/p Zyvox, meropenem and Tamifu, now on Vancomycin and flagyl, anticipated end date 7/9  - s/p course of steroids now off  -  blood cultures  NGTD SINCE 5/28   - s/p MASTER 5/31: No evidence of valvular vegetations or intracardiac abscesses to support IE. Pulmonary hepatisation incidental finding. EF 65%, mild TR and pulm HTN  - ventilator management per pulm/cc, tolerated PS for 5 hours yesterday   - on Methadone 5/5/5 and olanzapine from MICU due to prolonged time on Fentanyl. Monitor daily EKGS for QTC. Keep Mg ~2 and K ~4  - cxr daily  - c/w ID recommendations  - c/w ventilator settings  - vanc trough 13.6  - nutrition consult       - c/w jevity 1.2 60ml/hr to J tube       - meds and boluses to the G tube, never J       - banatrol up to TID  - pending CXR    #Maculopapular buttocks rash  -PCR skin scraping negative for HSV --> off  Acyclovir   -Wound care per burn, wound care RN following     #LANCE, resolved   - post-infectious GN vs. ATN  - Cr peaked  3 > now 0.7  - renal U/S: negative   - creatnine 0.7 7/3  - creatnine 0.8 7/4  - daily BMP     #Microcytic Anemia likely  anemia of chronic disease   - s/p 6 units prbc so far last 6/5  - Restarted AC with lovenox 6/22 given Hb stable last few days  - monitor hemoglobin   - hb stable at 8.4 7/3  - hb stable at 8.2 7/4    #New onset A-fib w/ RVR  - HR better controlled now  - s/p Amio drip, now on PO  - Cardizem po 60mg q6hr   - therapeutic Lovenox   - EP consult appreciated --> treat infection    #Diarrhea - likely related to tube feeds  - nutrition fu requested  - dc Mg, change PPI to pepcid, off laxatives   - dc rectal tube      GI: pepcid 20mg BID via G  DVT: lovenox 80 Q12  Diet: enteral feeds via J         42 year old female with PMHx Asthma, HTN presents with cough and SOB x3 days and hemoptysis admitted for cavitary CAP.      #Acute hypoxemic respiratory failure / s/P Trach and PEG    #Severe cavitary CAP MRSA  #Small parapneumonic effusion SP Chest tube  #MRSA bacteremia  #Influenza B   #hx of Asthma   - Intubated 5/23 self-extubated 5/25, re-intubated 5/25; SBT failed, SP trach/G tube  - s/p Zyvox, meropenem and Tamifu, now on Vancomycin and flagyl, anticipated end date 7/9  - s/p course of steroids now off  -  blood cultures  NGTD SINCE 5/28   - s/p MASTER 5/31: No evidence of valvular vegetations or intracardiac abscesses to support IE. Pulmonary hepatisation incidental finding. EF 65%, mild TR and pulm HTN  - ventilator management per pulm/cc, tolerated PS for 5 hours yesterday   - on Methadone 5/5/5 and olanzapine from MICU due to prolonged time on Fentanyl. Monitor daily EKGS for QTC. Keep Mg ~2 and K ~4  - cxr daily  - c/w ID recommendations  - c/w ventilator settings  - vanc trough 13.6  - nutrition consult       - c/w jevity 1.2 60ml/hr to J tube       - meds and boluses to the G tube, never J       - banatrol up to TID  - pending CXR  - increasing thin secretions --> scopolamine patch  - taper methadone to 5mg BID  - PT/OT consults    #Maculopapular buttocks rash  -PCR skin scraping negative for HSV --> off  Acyclovir   -Wound care per burn, wound care RN following     #LANCE, resolved   - post-infectious GN vs. ATN  - Cr peaked  3 > now 0.7  - renal U/S: negative   - creatnine 0.7 7/3  - creatnine 0.8 7/4  - daily BMP   - K 5.5 7/4  - lokelma 5 BID    #Microcytic Anemia likely  anemia of chronic disease   - s/p 6 units prbc so far last 6/5  - Restarted AC with lovenox 6/22 given Hb stable last few days  - monitor hemoglobin   - hb stable at 8.4 7/3  - hb stable at 8.2 7/4    #New onset A-fib w/ RVR  - HR better controlled now  - s/p Amio drip, now on PO  - Cardizem po 60mg q6hr   - therapeutic Lovenox   - EP consult appreciated --> treat infection  - modified Qtc 433 on 7/4    #Diarrhea - likely related to tube feeds  #Abdominal Pain  - nutrition fu requested  - dc Mg, change PPI to pepcid, off laxatives   - dc rectal tube  - KUB today 7/4      GI: pepcid 20mg BID via G  DVT: lovenox 80 Q12  Diet: enteral feeds via J

## 2023-07-04 NOTE — PROGRESS NOTE ADULT - SUBJECTIVE AND OBJECTIVE BOX
FLO ZEYNEP  42y Female    CHIEF COMPLAINT:    Patient is a 42y old  Female who presents with a chief complaint of AHRF (2023 08:35)    INTERVAL HPI/OVERNIGHT EVENTS:    Patient seen and examined. No acute events overnight. Complains of mild abdominal discomfort   ROS: All other systems are negative.    Vital Signs:    T(F): 97.9 (23 @ 08:20), Max: 98.8 (23 @ 20:13)  HR: 106 (23 @ 08:20) (96 - 131)  BP: 125/68 (23 @ 08:20) (91/55 - 131/88)  RR: 20 (23 @ 08:20) (18 - 20)  SpO2: 100% (23 @ 08:20) (98% - 100%)    2023 07:01  -  2023 07:00  --------------------------------------------------------  IN: 1530 mL / OUT: 500 mL / NET: 1030 mL    Daily Weight in k.2 (2023 00:00    POCT Blood Glucose.: 117 mg/dL (2023 07:53)  POCT Blood Glucose.: 116 mg/dL (2023 05:02)  POCT Blood Glucose.: 131 mg/dL (2023 22:22)  POCT Blood Glucose.: 130 mg/dL (2023 17:04)  POCT Blood Glucose.: 107 mg/dL (2023 11:29)    PHYSICAL EXAM:    GENERAL:  NAD chronically ill appearing   SKIN: No rashes or lesions  HEENT: Atraumatic. Normocephalic.   NECK: Supple, No JVD.    PULMONARY: Coarse breath sounds B/L. No wheezing.   CVS: Normal S1, S2. Rate and Rhythm are regular   ABDOMEN/GI: Soft, Nontender, Nondistended   MSK:  No clubbing or cyanosis   NEUROLOGIC:  moves all extremities   PSYCH: Alert & oriented x 3, able to communicate     Consultant(s) Notes Reviewed:  [x ] YES  [ ] NO  Care Discussed with Consultants/Other Providers [ x] YES  [ ] NO    LABS:                        8.2    12.61 )-----------( 464      ( 2023 05:25 )             27.0     134<L>  |  94<L>  |  16  ----------------------------<  101<H>  5.5<H>   |  30  |  0.8    Ca    9.7      2023 05:25  Phos  5.9     07-03  Mg     2.1     07-    TPro  7.3  /  Alb  3.0<L>  /  TBili  0.2  /  DBili  x   /  AST  24  /  ALT  23  /  AlkPhos  125<H>  -    RADIOLOGY & ADDITIONAL TESTS:  Imaging or report Personally Reviewed:  [x] YES  [ ] NO  EKG reviewed: [x] YES  [ ] NO    Medications:  Standing  aMIOdarone    Tablet 100 milliGRAM(s) Oral daily  chlorhexidine 2% Cloths 1 Application(s) Topical <User Schedule>  diltiazem    Tablet 60 milliGRAM(s) Oral every 6 hours  enoxaparin Injectable 80 milliGRAM(s) SubCutaneous every 12 hours  famotidine    Tablet 20 milliGRAM(s) Oral two times a day  insulin lispro (ADMELOG) corrective regimen sliding scale   SubCutaneous every 6 hours  methadone    Tablet 5 milliGRAM(s) Oral three times a day  metroNIDAZOLE    Tablet 500 milliGRAM(s) Oral every 8 hours  nystatin Cream 1 Application(s) Topical two times a day  OLANZapine 5 milliGRAM(s) Oral daily  vancomycin  IVPB 750 milliGRAM(s) IV Intermittent every 12 hours  vitamin A &amp; D Ointment 1 Application(s) Topical daily    PRN Meds  acetaminophen     Tablet .. 650 milliGRAM(s) Oral every 6 hours PRN  aluminum hydroxide/magnesium hydroxide/simethicone Suspension 30 milliLiter(s) Oral every 4 hours PRN

## 2023-07-04 NOTE — PROGRESS NOTE ADULT - SUBJECTIVE AND OBJECTIVE BOX
24H events:    Patient is a 42y old Female who presents with a chief complaint of AHRF (03 Jul 2023 12:33)    Primary diagnosis of Pneumonia    Today is hospital day 42d. This morning patient was seen and examined at bedside, resting comfortably in bed.    No acute or major events overnight.    Code Status: DNR/DNI    PAST MEDICAL & SURGICAL HISTORY    SOCIAL HISTORY:  Social History: na    ALLERGIES:  aspirin (Short breath; Anaphylaxis)  Bananas (Unknown)    MEDICATIONS:  STANDING MEDICATIONS  aMIOdarone    Tablet 100 milliGRAM(s) Oral daily  chlorhexidine 2% Cloths 1 Application(s) Topical <User Schedule>  diltiazem    Tablet 60 milliGRAM(s) Oral every 6 hours  enoxaparin Injectable 80 milliGRAM(s) SubCutaneous every 12 hours  famotidine    Tablet 20 milliGRAM(s) Oral two times a day  insulin lispro (ADMELOG) corrective regimen sliding scale   SubCutaneous every 6 hours  lisinopril 5 milliGRAM(s) Oral daily  methadone    Tablet 5 milliGRAM(s) Oral three times a day  metroNIDAZOLE    Tablet 500 milliGRAM(s) Oral every 8 hours  nystatin Cream 1 Application(s) Topical two times a day  OLANZapine 5 milliGRAM(s) Oral daily  vancomycin  IVPB 750 milliGRAM(s) IV Intermittent every 12 hours  vitamin A &amp; D Ointment 1 Application(s) Topical daily    PRN MEDICATIONS  acetaminophen     Tablet .. 650 milliGRAM(s) Oral every 6 hours PRN  aluminum hydroxide/magnesium hydroxide/simethicone Suspension 30 milliLiter(s) Oral every 4 hours PRN      ROS:   no headaches, no fevers, no dizziness, no CP/SOB, +abdominal pain, no n/v/d, no burning with urination, no weakness or tingling    VITALS:   T(F): 97.9  HR: 106  BP: 125/68  RR: 20  SpO2: 100%    PHYSICAL EXAM:  GENERAL: lying in bed on trach/peg, in apparent physical distress  HEAD: normal  HEART: RRR  LUNGS: mild crackles noted b/l  ABDOMEN: soft nondistended, tender in the L and R LQ  EXTREMITIES: 2+ peripheral pulses bilaterally  NERVOUS SYSTEM:  A&OX3    Indwelling Weaver Catheter:  Inserted on May 24 for urinary retention    LABS:                        8.2    12.61 )-----------( 464      ( 04 Jul 2023 05:25 )             27.0     07-04    134<L>  |  94<L>  |  16  ----------------------------<  101<H>  5.5<H>   |  30  |  0.8    Ca    9.7      04 Jul 2023 05:25  Phos  5.9     07-03  Mg     2.1     07-04    TPro  7.3  /  Alb  3.0<L>  /  TBili  0.2  /  DBili  x   /  AST  24  /  ALT  23  /  AlkPhos  125<H>  07-04      Urinalysis Basic - ( 04 Jul 2023 05:25 )    Color: x / Appearance: x / SG: x / pH: x  Gluc: 101 mg/dL / Ketone: x  / Bili: x / Urobili: x   Blood: x / Protein: x / Nitrite: x   Leuk Esterase: x / RBC: x / WBC x   Sq Epi: x / Non Sq Epi: x / Bacteria: x      RADIOLOGY:    < from: Xray Chest 1 View- PORTABLE-Routine (Xray Chest 1 View- PORTABLE-Routine in AM.) (07.01.23 @ 08:26) >  IMPRESSION:  Unchanged tracheostomy    Decreased left basal pleural-parenchymal opacities. No pneumothorax.    Stable cardiomediastinal silhouette.    Unchanged osseous structures.    < end of copied text >         24H events:    Patient is a 42y old Female who presents with a chief complaint of AHRF (03 Jul 2023 12:33)    Primary diagnosis of Pneumonia    Today is hospital day 42d. This morning patient was seen and examined at bedside, resting comfortably in bed.    No acute or major events overnight.    Code Status: full code    PAST MEDICAL & SURGICAL HISTORY    SOCIAL HISTORY:  Social History: na    ALLERGIES:  aspirin (Short breath; Anaphylaxis)  Bananas (Unknown)    MEDICATIONS:  STANDING MEDICATIONS  aMIOdarone    Tablet 100 milliGRAM(s) Oral daily  chlorhexidine 2% Cloths 1 Application(s) Topical <User Schedule>  diltiazem    Tablet 60 milliGRAM(s) Oral every 6 hours  enoxaparin Injectable 80 milliGRAM(s) SubCutaneous every 12 hours  famotidine    Tablet 20 milliGRAM(s) Oral two times a day  insulin lispro (ADMELOG) corrective regimen sliding scale   SubCutaneous every 6 hours  lisinopril 5 milliGRAM(s) Oral daily  methadone    Tablet 5 milliGRAM(s) Oral three times a day  metroNIDAZOLE    Tablet 500 milliGRAM(s) Oral every 8 hours  nystatin Cream 1 Application(s) Topical two times a day  OLANZapine 5 milliGRAM(s) Oral daily  vancomycin  IVPB 750 milliGRAM(s) IV Intermittent every 12 hours  vitamin A &amp; D Ointment 1 Application(s) Topical daily    PRN MEDICATIONS  acetaminophen     Tablet .. 650 milliGRAM(s) Oral every 6 hours PRN  aluminum hydroxide/magnesium hydroxide/simethicone Suspension 30 milliLiter(s) Oral every 4 hours PRN      ROS:   no headaches, no fevers, no dizziness, no CP/SOB, +abdominal pain, no n/v/d, no burning with urination, no weakness or tingling    VITALS:   T(F): 97.9  HR: 106  BP: 125/68  RR: 20  SpO2: 100%    PHYSICAL EXAM:  GENERAL: lying in bed on trach/peg, in apparent physical distress  HEAD: normal  HEART: RRR  LUNGS: mild crackles noted b/l  ABDOMEN: soft nondistended, tender in the L and R LQ  EXTREMITIES: 2+ peripheral pulses bilaterally  NERVOUS SYSTEM:  A&OX3    Indwelling Weaver Catheter:  Inserted on May 24 for urinary retention    LABS:                        8.2    12.61 )-----------( 464      ( 04 Jul 2023 05:25 )             27.0     07-04    134<L>  |  94<L>  |  16  ----------------------------<  101<H>  5.5<H>   |  30  |  0.8    Ca    9.7      04 Jul 2023 05:25  Phos  5.9     07-03  Mg     2.1     07-04    TPro  7.3  /  Alb  3.0<L>  /  TBili  0.2  /  DBili  x   /  AST  24  /  ALT  23  /  AlkPhos  125<H>  07-04      Urinalysis Basic - ( 04 Jul 2023 05:25 )    Color: x / Appearance: x / SG: x / pH: x  Gluc: 101 mg/dL / Ketone: x  / Bili: x / Urobili: x   Blood: x / Protein: x / Nitrite: x   Leuk Esterase: x / RBC: x / WBC x   Sq Epi: x / Non Sq Epi: x / Bacteria: x      RADIOLOGY:    < from: Xray Chest 1 View- PORTABLE-Routine (Xray Chest 1 View- PORTABLE-Routine in AM.) (07.01.23 @ 08:26) >  IMPRESSION:  Unchanged tracheostomy    Decreased left basal pleural-parenchymal opacities. No pneumothorax.    Stable cardiomediastinal silhouette.    Unchanged osseous structures.    < end of copied text >

## 2023-07-04 NOTE — PROGRESS NOTE ADULT - SUBJECTIVE AND OBJECTIVE BOX
Patient is a 42y old  Female who presents with a chief complaint of AHRF (04 Jul 2023 08:35)        Over Night Events:  Off pressors. Still on MV      ROS:     All ROS are negative except HPI       PHYSICAL EXAM    ICU Vital Signs Last 24 Hrs  T(C): 36.6 (04 Jul 2023 08:20), Max: 37.1 (03 Jul 2023 20:13)  T(F): 97.9 (04 Jul 2023 08:20), Max: 98.8 (03 Jul 2023 20:13)  HR: 106 (04 Jul 2023 08:20) (96 - 131)  BP: 125/68 (04 Jul 2023 08:20) (91/55 - 131/88)  BP(mean): 88 (04 Jul 2023 08:20) (68 - 104)  ABP: --  ABP(mean): --  RR: 20 (04 Jul 2023 08:20) (18 - 20)  SpO2: 100% (04 Jul 2023 08:20) (98% - 100%)    O2 Parameters below as of 04 Jul 2023 08:20  Patient On (Oxygen Delivery Method): ventilator      ENT: Airway patent, trached  EYES: Pupils equal, Round and reactive to light.  CARDIAC: Normal rate, Regular rhythm.    RESPIRATORY: No wheezing, Bilateral BS, Not tachypneic, No use of accessory muscles  GASTROINTESTINAL: Abdomen soft, Non-tender, No guarding,   NEUROLOGICAL: Alert and oriented   SKIN: Skin normal color for race, Warm and dry and intact.       07-03-23 @ 07:01  -  07-04-23 @ 07:00  --------------------------------------------------------  IN:    Enteral Tube Flush: 200 mL    IV PiggyBack: 250 mL    Jevity 1.2: 1080 mL  Total IN: 1530 mL    OUT:    Rectal Tube (mL): 100 mL    Voided (mL): 400 mL  Total OUT: 500 mL    Total NET: 1030 mL        LABS:                            8.2    12.61 )-----------( 464      ( 04 Jul 2023 05:25 )             27.0                                               07-04    134<L>  |  94<L>  |  16  ----------------------------<  101<H>  5.5<H>   |  30  |  0.8    Ca    9.7      04 Jul 2023 05:25  Phos  5.9     07-03  Mg     2.1     07-04    TPro  7.3  /  Alb  3.0<L>  /  TBili  0.2  /  DBili  x   /  AST  24  /  ALT  23  /  AlkPhos  125<H>  07-04                              Urinalysis Basic - ( 04 Jul 2023 05:25 )    Color: x / Appearance: x / SG: x / pH: x  Gluc: 101 mg/dL / Ketone: x  / Bili: x / Urobili: x   Blood: x / Protein: x / Nitrite: x   Leuk Esterase: x / RBC: x / WBC x   Sq Epi: x / Non Sq Epi: x / Bacteria: x                                                LIVER FUNCTIONS - ( 04 Jul 2023 05:25 )  Alb: 3.0 g/dL / Pro: 7.3 g/dL / ALK PHOS: 125 U/L / ALT: 23 U/L / AST: 24 U/L / GGT: x                                                                                            Mode: CPAP with PS                                          MEDICATIONS  (STANDING):  aMIOdarone    Tablet 100 milliGRAM(s) Oral daily  chlorhexidine 2% Cloths 1 Application(s) Topical <User Schedule>  diltiazem    Tablet 60 milliGRAM(s) Oral every 6 hours  enoxaparin Injectable 80 milliGRAM(s) SubCutaneous every 12 hours  famotidine    Tablet 20 milliGRAM(s) Oral two times a day  insulin lispro (ADMELOG) corrective regimen sliding scale   SubCutaneous every 6 hours  lisinopril 5 milliGRAM(s) Oral daily  methadone    Tablet 5 milliGRAM(s) Oral three times a day  metroNIDAZOLE    Tablet 500 milliGRAM(s) Oral every 8 hours  nystatin Cream 1 Application(s) Topical two times a day  OLANZapine 5 milliGRAM(s) Oral daily  vancomycin  IVPB 750 milliGRAM(s) IV Intermittent every 12 hours  vitamin A &amp; D Ointment 1 Application(s) Topical daily    MEDICATIONS  (PRN):  acetaminophen     Tablet .. 650 milliGRAM(s) Oral every 6 hours PRN Temp greater or equal to 38C (100.4F)  aluminum hydroxide/magnesium hydroxide/simethicone Suspension 30 milliLiter(s) Oral every 4 hours PRN Dyspepsia

## 2023-07-05 LAB
ANION GAP SERPL CALC-SCNC: 11 MMOL/L — SIGNIFICANT CHANGE UP (ref 7–14)
ANION GAP SERPL CALC-SCNC: 12 MMOL/L — SIGNIFICANT CHANGE UP (ref 7–14)
ANION GAP SERPL CALC-SCNC: 13 MMOL/L — SIGNIFICANT CHANGE UP (ref 7–14)
BLD GP AB SCN SERPL QL: SIGNIFICANT CHANGE UP
BUN SERPL-MCNC: 16 MG/DL — SIGNIFICANT CHANGE UP (ref 10–20)
CALCIUM SERPL-MCNC: 9.5 MG/DL — SIGNIFICANT CHANGE UP (ref 8.4–10.4)
CALCIUM SERPL-MCNC: 9.5 MG/DL — SIGNIFICANT CHANGE UP (ref 8.4–10.5)
CALCIUM SERPL-MCNC: 9.6 MG/DL — SIGNIFICANT CHANGE UP (ref 8.4–10.5)
CHLORIDE SERPL-SCNC: 93 MMOL/L — LOW (ref 98–110)
CHLORIDE SERPL-SCNC: 94 MMOL/L — LOW (ref 98–110)
CHLORIDE SERPL-SCNC: 94 MMOL/L — LOW (ref 98–110)
CO2 SERPL-SCNC: 25 MMOL/L — SIGNIFICANT CHANGE UP (ref 17–32)
CO2 SERPL-SCNC: 27 MMOL/L — SIGNIFICANT CHANGE UP (ref 17–32)
CO2 SERPL-SCNC: 27 MMOL/L — SIGNIFICANT CHANGE UP (ref 17–32)
CREAT SERPL-MCNC: 0.8 MG/DL — SIGNIFICANT CHANGE UP (ref 0.7–1.5)
CREAT SERPL-MCNC: 0.9 MG/DL — SIGNIFICANT CHANGE UP (ref 0.7–1.5)
CREAT SERPL-MCNC: 0.9 MG/DL — SIGNIFICANT CHANGE UP (ref 0.7–1.5)
CULTURE RESULTS: SIGNIFICANT CHANGE UP
EGFR: 82 ML/MIN/1.73M2 — SIGNIFICANT CHANGE UP
EGFR: 82 ML/MIN/1.73M2 — SIGNIFICANT CHANGE UP
EGFR: 94 ML/MIN/1.73M2 — SIGNIFICANT CHANGE UP
GLUCOSE BLDC GLUCOMTR-MCNC: 104 MG/DL — HIGH (ref 70–99)
GLUCOSE BLDC GLUCOMTR-MCNC: 109 MG/DL — HIGH (ref 70–99)
GLUCOSE BLDC GLUCOMTR-MCNC: 112 MG/DL — HIGH (ref 70–99)
GLUCOSE BLDC GLUCOMTR-MCNC: 117 MG/DL — HIGH (ref 70–99)
GLUCOSE BLDC GLUCOMTR-MCNC: 118 MG/DL — HIGH (ref 70–99)
GLUCOSE SERPL-MCNC: 108 MG/DL — HIGH (ref 70–99)
GLUCOSE SERPL-MCNC: 113 MG/DL — HIGH (ref 70–99)
GLUCOSE SERPL-MCNC: 144 MG/DL — HIGH (ref 70–99)
HCT VFR BLD CALC: 26.7 % — LOW (ref 37–47)
HGB BLD-MCNC: 8.2 G/DL — LOW (ref 12–16)
MAGNESIUM SERPL-MCNC: 2.1 MG/DL — SIGNIFICANT CHANGE UP (ref 1.8–2.4)
MCHC RBC-ENTMCNC: 23.8 PG — LOW (ref 27–31)
MCHC RBC-ENTMCNC: 30.7 G/DL — LOW (ref 32–37)
MCV RBC AUTO: 77.6 FL — LOW (ref 81–99)
NRBC # BLD: 0 /100 WBCS — SIGNIFICANT CHANGE UP (ref 0–0)
PLATELET # BLD AUTO: 548 K/UL — HIGH (ref 130–400)
PMV BLD: 9.1 FL — SIGNIFICANT CHANGE UP (ref 7.4–10.4)
POTASSIUM SERPL-MCNC: 5.3 MMOL/L — HIGH (ref 3.5–5)
POTASSIUM SERPL-MCNC: 5.4 MMOL/L — HIGH (ref 3.5–5)
POTASSIUM SERPL-MCNC: 6.1 MMOL/L — CRITICAL HIGH (ref 3.5–5)
POTASSIUM SERPL-SCNC: 5.3 MMOL/L — HIGH (ref 3.5–5)
POTASSIUM SERPL-SCNC: 5.4 MMOL/L — HIGH (ref 3.5–5)
POTASSIUM SERPL-SCNC: 6.1 MMOL/L — CRITICAL HIGH (ref 3.5–5)
RBC # BLD: 3.44 M/UL — LOW (ref 4.2–5.4)
RBC # FLD: 27.4 % — HIGH (ref 11.5–14.5)
SODIUM SERPL-SCNC: 131 MMOL/L — LOW (ref 135–146)
SODIUM SERPL-SCNC: 132 MMOL/L — LOW (ref 135–146)
SODIUM SERPL-SCNC: 133 MMOL/L — LOW (ref 135–146)
SPECIMEN SOURCE: SIGNIFICANT CHANGE UP
T3 SERPL-MCNC: 123 NG/DL — SIGNIFICANT CHANGE UP (ref 80–200)
T4 FREE SERPL-MCNC: 1.5 NG/DL — SIGNIFICANT CHANGE UP (ref 0.9–1.8)
WBC # BLD: 11.72 K/UL — HIGH (ref 4.8–10.8)
WBC # FLD AUTO: 11.72 K/UL — HIGH (ref 4.8–10.8)

## 2023-07-05 PROCEDURE — 74018 RADEX ABDOMEN 1 VIEW: CPT | Mod: 26

## 2023-07-05 PROCEDURE — 99233 SBSQ HOSP IP/OBS HIGH 50: CPT

## 2023-07-05 PROCEDURE — 93010 ELECTROCARDIOGRAM REPORT: CPT

## 2023-07-05 PROCEDURE — 71045 X-RAY EXAM CHEST 1 VIEW: CPT | Mod: 26

## 2023-07-05 RX ORDER — OXYCODONE HYDROCHLORIDE 5 MG/1
5 TABLET ORAL EVERY 6 HOURS
Refills: 0 | Status: DISCONTINUED | OUTPATIENT
Start: 2023-07-05 | End: 2023-07-05

## 2023-07-05 RX ORDER — FAMOTIDINE 10 MG/ML
20 INJECTION INTRAVENOUS DAILY
Refills: 0 | Status: DISCONTINUED | OUTPATIENT
Start: 2023-07-05 | End: 2023-07-14

## 2023-07-05 RX ORDER — SODIUM ZIRCONIUM CYCLOSILICATE 10 G/10G
5 POWDER, FOR SUSPENSION ORAL ONCE
Refills: 0 | Status: COMPLETED | OUTPATIENT
Start: 2023-07-05 | End: 2023-07-05

## 2023-07-05 RX ORDER — SODIUM ZIRCONIUM CYCLOSILICATE 10 G/10G
5 POWDER, FOR SUSPENSION ORAL ONCE
Refills: 0 | Status: COMPLETED | OUTPATIENT
Start: 2023-07-05 | End: 2023-07-06

## 2023-07-05 RX ORDER — CLONAZEPAM 1 MG
0.5 TABLET ORAL THREE TIMES A DAY
Refills: 0 | Status: DISCONTINUED | OUTPATIENT
Start: 2023-07-05 | End: 2023-07-12

## 2023-07-05 RX ORDER — METHADONE HYDROCHLORIDE 40 MG/1
5 TABLET ORAL DAILY
Refills: 0 | Status: DISCONTINUED | OUTPATIENT
Start: 2023-07-06 | End: 2023-07-07

## 2023-07-05 RX ORDER — FERROUS SULFATE 325(65) MG
300 TABLET ORAL DAILY
Refills: 0 | Status: DISCONTINUED | OUTPATIENT
Start: 2023-07-05 | End: 2023-07-14

## 2023-07-05 RX ORDER — PETROLATUM,WHITE
1 JELLY (GRAM) TOPICAL
Refills: 0 | Status: DISCONTINUED | OUTPATIENT
Start: 2023-07-05 | End: 2023-07-17

## 2023-07-05 RX ADMIN — Medication 500 MILLIGRAM(S): at 05:52

## 2023-07-05 RX ADMIN — Medication 500 MILLIGRAM(S): at 21:05

## 2023-07-05 RX ADMIN — SENNA PLUS 2 TABLET(S): 8.6 TABLET ORAL at 21:05

## 2023-07-05 RX ADMIN — AMIODARONE HYDROCHLORIDE 100 MILLIGRAM(S): 400 TABLET ORAL at 05:52

## 2023-07-05 RX ADMIN — Medication 250 MILLIGRAM(S): at 17:04

## 2023-07-05 RX ADMIN — Medication 500 MILLIGRAM(S): at 13:08

## 2023-07-05 RX ADMIN — Medication 1 MILLIGRAM(S): at 23:17

## 2023-07-05 RX ADMIN — METHADONE HYDROCHLORIDE 5 MILLIGRAM(S): 40 TABLET ORAL at 05:53

## 2023-07-05 RX ADMIN — SCOPALAMINE 1 PATCH: 1 PATCH, EXTENDED RELEASE TRANSDERMAL at 07:29

## 2023-07-05 RX ADMIN — Medication 60 MILLIGRAM(S): at 05:53

## 2023-07-05 RX ADMIN — FAMOTIDINE 20 MILLIGRAM(S): 10 INJECTION INTRAVENOUS at 13:08

## 2023-07-05 RX ADMIN — Medication 60 MILLIGRAM(S): at 13:09

## 2023-07-05 RX ADMIN — NYSTATIN CREAM 1 APPLICATION(S): 100000 CREAM TOPICAL at 05:53

## 2023-07-05 RX ADMIN — SODIUM ZIRCONIUM CYCLOSILICATE 5 GRAM(S): 10 POWDER, FOR SUSPENSION ORAL at 16:34

## 2023-07-05 RX ADMIN — Medication 60 MILLIGRAM(S): at 17:06

## 2023-07-05 RX ADMIN — APIXABAN 5 MILLIGRAM(S): 2.5 TABLET, FILM COATED ORAL at 17:03

## 2023-07-05 RX ADMIN — FAMOTIDINE 20 MILLIGRAM(S): 10 INJECTION INTRAVENOUS at 05:53

## 2023-07-05 RX ADMIN — CHLORHEXIDINE GLUCONATE 1 APPLICATION(S): 213 SOLUTION TOPICAL at 05:53

## 2023-07-05 RX ADMIN — Medication 1 APPLICATION(S): at 12:32

## 2023-07-05 RX ADMIN — Medication 250 MILLIGRAM(S): at 05:54

## 2023-07-05 RX ADMIN — APIXABAN 5 MILLIGRAM(S): 2.5 TABLET, FILM COATED ORAL at 05:53

## 2023-07-05 RX ADMIN — Medication 300 MILLIGRAM(S): at 13:08

## 2023-07-05 RX ADMIN — SCOPALAMINE 1 PATCH: 1 PATCH, EXTENDED RELEASE TRANSDERMAL at 10:54

## 2023-07-05 RX ADMIN — Medication 1 APPLICATION(S): at 19:41

## 2023-07-05 RX ADMIN — Medication 0.5 MILLIGRAM(S): at 20:38

## 2023-07-05 RX ADMIN — Medication 0.5 MILLIGRAM(S): at 13:09

## 2023-07-05 RX ADMIN — SODIUM ZIRCONIUM CYCLOSILICATE 5 GRAM(S): 10 POWDER, FOR SUSPENSION ORAL at 14:29

## 2023-07-05 RX ADMIN — NYSTATIN CREAM 1 APPLICATION(S): 100000 CREAM TOPICAL at 17:01

## 2023-07-05 NOTE — PHYSICAL THERAPY INITIAL EVALUATION ADULT - ADDITIONAL COMMENTS
Pt. lives with her children in an elevator apartment with 1 DANTE. Pt. was completely independent PTA

## 2023-07-05 NOTE — PROGRESS NOTE ADULT - SUBJECTIVE AND OBJECTIVE BOX
ZEYNEP ROSSI  [facility] (Back) 020 A ([facility] 3A (Back))      Patient was evaluated and examined  by bedside, reports feeling slightly anxious , no pain, moderate gray colored tracheal secretions, tolerating CPAP 10/8, tolerating peg feedings      REVIEW OF SYSTEMS:  please see pertinent positives mentioned above, all other 12 ROS negative      T(C): , Max: 37 (07-04-23 @ 16:00)  HR: 95 (07-05-23 @ 09:55)  BP: 106/74 (07-05-23 @ 05:00)  RR: 16 (07-05-23 @ 08:09)  SpO2: 99% (07-05-23 @ 09:55)  CAPILLARY BLOOD GLUCOSE      POCT Blood Glucose.: 112 mg/dL (05 Jul 2023 12:39)  POCT Blood Glucose.: 117 mg/dL (05 Jul 2023 07:54)  POCT Blood Glucose.: 104 mg/dL (05 Jul 2023 05:41)  POCT Blood Glucose.: 117 mg/dL (04 Jul 2023 23:12)  POCT Blood Glucose.: 122 mg/dL (04 Jul 2023 21:41)  POCT Blood Glucose.: 117 mg/dL (04 Jul 2023 16:05)      PHYSICAL EXAM:  General: NAD, AAOX3, patient is laying comfortably in bed  HEENT: AT, NC, trach present  Lungs: CTA B/L, no wheezing, mild b/l  rhonchi  CVS: normal S1, S2, RRR, NO M/G/R  Abdomen: soft, bowel sounds present, non-tender, non-distended, peg present  Extremities: no edema, no clubbing, no cyanosis, positive peripheral pulses b/l  Neuro: no acute focal neurological deficits  Skin: dry scaly skin of hands and feet, DTI sacrum       LAB  CBC  Date: 07-05-23 @ 07:54  Mean cell Fctrzpvgyg24.8  Mean cell Hemoglobin Conc30.7  Mean cell Volum 77.6  Platelet count-Automate 548  RBC Count 3.44  Red Cell Distrib Width27.4  WBC Count11.72  % Albumin, Urine--  Hematocrit 26.7  Hemoglobin 8.2  CBC  Date: 07-04-23 @ 05:25  Mean cell Wjfvvamhsb75.0  Mean cell Hemoglobin Conc30.4  Mean cell Volum 79.2  Platelet count-Automate 464  RBC Count 3.41  Red Cell Distrib Width27.2  WBC Count12.61  % Albumin, Urine--  Hematocrit 27.0  Hemoglobin 8.2  CBC  Date: 07-03-23 @ 06:02  Mean cell Icfoonxqtb54.6  Mean cell Hemoglobin Conc30.0  Mean cell Volum 78.7  Platelet count-Automate 318  RBC Count 3.56  Red Cell Distrib Width27.9  WBC Count12.43  % Albumin, Urine--  Hematocrit 28.0  Hemoglobin 8.4          [facility]  07-05-23 @ 11:58  Blood Gas Arterial-Calcium,Ionized--  Blood Urea Nitrogen, Serum 16 mg/dL [10 - 20]  Carbon Dioxide, Serum25 mmol/L [17 - 32]  Chloride, Serum94 mmol/L<L> [98 - 110]  Creatinie, Serum0.9 mg/dL [0.7 - 1.5]  Glucose, Jlhmy134 mg/dL<H> [70 - 99]  Potassium, Serum6.1 mmol/L<HH> [3.5 - 5.0] [Hemolyzed. Interpret with caution  Critical value:  TYPE:(C=Critical, N=Notification, A=Abnormal) C  TESTS: K  DATE/TIME CALLED: 07/05/2023 14:10:15 EDT  CALLED TO: DR. SWENSON  READ BACK (2 Patient Identifiers)(Y/N): Y  READ BACK VALUES (Y/N): Y  CALLED BY: GABRIELA]  Sodium, Serum 132 mmol/L<L> [135 - 146]  [facility]  07-05-23 @ 07:54  Blood Gas Arterial-Calcium,Ionized--  Blood Urea Nitrogen, Serum 16 mg/dL [10 - 20]  Carbon Dioxide, Serum27 mmol/L [17 - 32]  Chloride, Serum93 mmol/L<L> [98 - 110]  Creatinie, Serum0.8 mg/dL [0.7 - 1.5]  Glucose, Opsvo466 mg/dL<H> [70 - 99]  Potassium, Serum5.4 mmol/L<H> [3.5 - 5.0]  Sodium, Serum 131 mmol/L<L> [135 - 146]  [facility]  07-04-23 @ 05:25  Blood Gas Arterial-Calcium,Ionized--  Blood Urea Nitrogen, Serum 16 mg/dL [10 - 20]  Carbon Dioxide, Serum30 mmol/L [17 - 32]  Chloride, Serum94 mmol/L<L> [98 - 110]  Creatinie, Serum0.8 mg/dL [0.7 - 1.5]  Glucose, Tkcgy673 mg/dL<H> [70 - 99]  Potassium, Serum5.5 mmol/L<H> [3.5 - 5.0]  Sodium, Serum 134 mmol/L<L> [135 - 146]  BMP  07-03-23 @ 06:02  Blood Gas Arterial-Calcium,Ionized--  Blood Urea Nitrogen, Serum 13 mg/dL [10 - 20]  Carbon Dioxide, Serum30 mmol/L [17 - 32]  Chloride, Serum92 mmol/L<L> [98 - 110]  Creatinie, Serum0.7 mg/dL [0.7 - 1.5]  Glucose, [address] mg/dL<H> [70 - 99]  Potassium, Serum5.4 mmol/L<H> [3.5 - 5.0]  Sodium, Serum 132 mmol/L<L> [135 - 146]            Microbiology:    Culture - Sputum (collected 06-11-23 @ 11:47)  Source: Trach Asp Tracheal Aspirate  Gram Stain (06-11-23 @ 23:26):    Numerous polymorphonuclear leukocytes per low power field    Rare Squamous epithelial cells per low power field    No organisms seen per oil power field  Final Report (06-13-23 @ 16:58):    Normal Respiratory Laila present    Culture - Blood (collected 06-10-23 @ 11:41)  Source: .Blood None  Final Report (06-15-23 @ 20:01):    No Growth Final        Medications:  acetaminophen     Tablet .. 650 milliGRAM(s) Oral every 6 hours PRN  aluminum hydroxide/magnesium hydroxide/simethicone Suspension 30 milliLiter(s) Oral every 4 hours PRN  aMIOdarone    Tablet 100 milliGRAM(s) Oral daily  apixaban 5 milliGRAM(s) Oral every 12 hours  chlorhexidine 2% Cloths 1 Application(s) Topical <User Schedule>  clonazePAM  Tablet 0.5 milliGRAM(s) Oral three times a day PRN  diltiazem    Tablet 60 milliGRAM(s) Oral every 6 hours  famotidine    Tablet 20 milliGRAM(s) Oral daily  ferrous    sulfate Liquid 300 milliGRAM(s) Enteral Tube daily  insulin lispro (ADMELOG) corrective regimen sliding scale   SubCutaneous every 6 hours  metroNIDAZOLE    Tablet 500 milliGRAM(s) Oral every 8 hours  nystatin Cream 1 Application(s) Topical two times a day  oxyCODONE    IR 5 milliGRAM(s) Oral every 6 hours PRN  senna 2 Tablet(s) Oral at bedtime  vancomycin  IVPB 750 milliGRAM(s) IV Intermittent every 12 hours  vitamin A &amp; D Ointment 1 Application(s) Topical daily        Assessment and Plan:  Patient is a 41 y/o female with PMH of Asthma, HTN c/w acute Influenza B viral pneumonia superimposed on cavitary MRSA pneumonia, acute hypoxic respiratory failure requiring intubation. Patient was initially admitted to ICU level of care, completed the course of zyvox and tamiflu.   Pneumonia was  complicated by loculated parapneumonic effusion s/p  chest tube placement/removal. s/p Tracheostomy and peg  placement   Patient's stay was complicated by MRSA bacteremia  currently on IV vancomycin and metronidazole, cultures negative to date. MASTER was negative for vegetations.   Her stay was also complicated by new onset Afib with RVR for which she is on amiodarone, diltiazem, and therapeutic anticoagulation.  Patient also had LANCE that improved with no need for RRT. Otherwise patient received a total of 6 p RBC for acute blood loss anemia ,currently hemoglobin stable. Patient transferred to step down unit and to Vent Unit with the following course of therapy:     Acute hypoxemic respiratory failure / s/P Trach and PEG    Severe cavitary CAP MRSA  Small parapneumonic effusion SP Chest tube- removed   MRSA bacteremia  Influenza B   hx of Asthma   Intubated 5/23 self-extubated 5/25, re-intubated 5/25; SBT failed, SP trach/G tube  - s/p Zyvox, meropenem and Tamifu, now on Vancomycin and flagyl, anticipated end date 7/9  - s/p course of steroids now off  -  blood cultures  NGTD SINCE 5/28   - s/p MASTER 5/31: No evidence of valvular vegetations or intracardiac abscesses to support IE. Pulmonary hepatisation incidental finding. EF 65%, mild TR and pulm HTN  - ventilator management per pulm/cc, tolerated PS for 5 hours yesterday   - 7/5/23 taper down  Methadone to 5 mg via peg once daily x 2 more days than d/c, d/c  olanzapine   - 7/5/23: cont. wean off trial on CPAP 10/8 sat 100%, Pulm. team f/up , cough suppressant tx.       # Hyperkalemia- s/p Lokelma 10 mg x 1 dose today, f/up repeated BMP     # Microcytic Anemia likely  anemia of chronic disease   - s/p 6 units prbc so far last 6/5  - Restarted AC with lovenox 6/22 given Hb stable last few days, switched to Eliquis 5mg Q12H   -monitor hemoglobin     New onset A-fib w/ RVR- currently remains in NSR with RBBB  - HR better controlled now  - s/p Amio drip, now on PO  - Cardizem po 60mg q6hr   - therapeutic Lovenox,  transitioned to ELiquis 5mg Q12H    - EP specialist on board, f/up rec.    Anxiety- continue low dose Klonopin 0.25 mg via peg every 8 hours prn. tx.    GI/DVT proph.    #Progress Note Handoff: repeat BMP today to f/up K level, continue CPAP trial as tolerated by pt. , continue IV Vanco till 7/9/23, taper down Methadone and d/c in 48 hours. d/c Olanzapine  Family discussion: medical plan of tx. d/w pt. by bedside eDisposition: LTAC candidate.     Total time spent to complete patient's bedside assessment, review medical chart, discuss medical plan of care with covering medical team was more than 50 minutes with >50% of time spent face to face with patient, discussion with patient/family and/or coordination of care

## 2023-07-05 NOTE — PROGRESS NOTE ADULT - ASSESSMENT
IMPRESSION:    Acute hypoxemic respiratory failure sp trach   Severe CAP secondary to MRSA  Small parapneumonic effusion SP Chest tube and removal  MRSA bacteremia resolved   MASTER negative   Anemia sp transfusion   New onset A FIB rate controlled   LANCE improved   HO asthma   Influenza B     PLAN:    CNS:  Continue Methadone 5q 12 and slwoly wean.  Klonopin 1 mg BID.  Zyprexa 5 .  Monitor QTC.       HEENT: Oral care. Trach care.     PULMONARY:  HOB @ 45 degrees.  Aspiration precautions.  No change in vent settings.  Keep SaO2 92 TO 96%.  PS as tolerated    CARDIOVASCULAR:    Avoid overload,  rate control.     GI: GI prophylaxis. PEG Feeding. Bowel regimen     RENAL:  Follow up lytes.  Correct as needed.     INFECTIOUS DISEASE: ABX Per ID.  FU Vancomycin levels.      MATOLOGICAL:  DVT prophylaxis.  On Eliquis     ENDOCRINE:  Follow up FS.  Insulin protocol if needed.      Prognosis is poor overall .     Vent unit / DC planing

## 2023-07-05 NOTE — PHYSICAL THERAPY INITIAL EVALUATION ADULT - GENERAL OBSERVATIONS, REHAB EVAL
Pt. encountered alert and NAD, supine in bed, (+)trach (+)J tube, agreeable to PT IE. Pt. left semifowlers in bed, (+)Trach (+)J tube, NAD

## 2023-07-05 NOTE — PHYSICAL THERAPY INITIAL EVALUATION ADULT - NSACTIVITYREC_GEN_A_PT
Cont w PT including strengthening exercises, balance activities, gait training, and OOB mobility as tolerated.

## 2023-07-05 NOTE — OCCUPATIONAL THERAPY INITIAL EVALUATION ADULT - FINE MOTOR COORDINATION, RIGHT HAND THUMB/FINGER OPPOSITION SKILLS, OT EVAL
hand was shaking when performing fine motor/coordination activity, increased time required/moderate impairment

## 2023-07-05 NOTE — OCCUPATIONAL THERAPY INITIAL EVALUATION ADULT - PRECAUTIONS/LIMITATIONS, REHAB EVAL
+trach +j-tube(NPO)/aspiration precautions/fall precautions/isolation precautions/oxygen therapy device and L/min

## 2023-07-05 NOTE — OCCUPATIONAL THERAPY INITIAL EVALUATION ADULT - ADL RETRAINING, OT EVAL
Pt will perform UB dressing with supervision by discharge. Pt will perform LB dressing with Nahomi by discharge.

## 2023-07-05 NOTE — OCCUPATIONAL THERAPY INITIAL EVALUATION ADULT - GENERAL OBSERVATIONS, REHAB EVAL
Pt encountered semi almaguer in bed in NAD +J-tube +trach +IV locked. Pt agreed to OT eval today at bedside. Pt left semi almaguer in bed, no c/p pain or discomfort, RN Urmila aware.

## 2023-07-05 NOTE — PROGRESS NOTE ADULT - SUBJECTIVE AND OBJECTIVE BOX
ZEYNEP ROSSI 42y Female  MRN#: [id number]     Hospital Day: 43d    Pt is currently admitted with the primary diagnosis of  Pneumonia due to infectious organism        SUBJECTIVE     Overnight events  None    Subjective complaints  Pt was evaluated this am. Patient denied any active complaints and per patient his symptoms are improving                                            ----------------------------------------------------------  OBJECTIVE  PAST MEDICAL & SURGICAL HISTORY                                            -----------------------------------------------------------  ALLERGIES:  aspirin (Short breath; Anaphylaxis)  Bananas (Unknown)                                            ------------------------------------------------------------    HOME MEDICATIONS  Home Medications:                           MEDICATIONS:  STANDING MEDICATIONS  aMIOdarone    Tablet 100 milliGRAM(s) Oral daily  apixaban 5 milliGRAM(s) Oral every 12 hours  chlorhexidine 2% Cloths 1 Application(s) Topical <User Schedule>  diltiazem    Tablet 60 milliGRAM(s) Oral every 6 hours  famotidine    Tablet 20 milliGRAM(s) Oral daily  ferrous    sulfate Liquid 300 milliGRAM(s) Enteral Tube daily  insulin lispro (ADMELOG) corrective regimen sliding scale   SubCutaneous every 6 hours  metroNIDAZOLE    Tablet 500 milliGRAM(s) Oral every 8 hours  nystatin Cream 1 Application(s) Topical two times a day  petrolatum white Ointment 1 Application(s) Topical two times a day  senna 2 Tablet(s) Oral at bedtime  vancomycin  IVPB 750 milliGRAM(s) IV Intermittent every 12 hours  vitamin A &amp; D Ointment 1 Application(s) Topical daily    PRN MEDICATIONS  acetaminophen     Tablet .. 650 milliGRAM(s) Oral every 6 hours PRN  aluminum hydroxide/magnesium hydroxide/simethicone Suspension 30 milliLiter(s) Oral every 4 hours PRN  clonazePAM  Tablet 0.5 milliGRAM(s) Oral three times a day PRN  oxycodone    5 mG/acetaminophen 325 mG 1 Tablet(s) Oral/Enteral Tube every 6 hours PRN                                            ------------------------------------------------------------  VITAL SIGNS: Last 24 Hours  T(C): 36.8 (05 Jul 2023 14:00), Max: 37 (04 Jul 2023 16:00)  T(F): 98.2 (05 Jul 2023 14:00), Max: 98.6 (04 Jul 2023 16:00)  HR: 89 (05 Jul 2023 14:00) (89 - 121)  BP: 131/72 (05 Jul 2023 14:00) (99/70 - 131/72)  BP(mean): --  RR: 16 (05 Jul 2023 14:00) (16 - 20)  SpO2: 100% (05 Jul 2023 14:00) (98% - 100%)      07-04-23 @ 07:01  -  07-05-23 @ 07:00  --------------------------------------------------------  IN: 1070 mL / OUT: 0 mL / NET: 1070 mL                                             --------------------------------------------------------------  LABS:                        8.2    11.72 )-----------( 548      ( 05 Jul 2023 07:54 )             26.7     07-05    132<L>  |  94<L>  |  16  ----------------------------<  113<H>  6.1<HH>   |  25  |  0.9    Ca    9.5      05 Jul 2023 11:58  Mg     2.1     07-05    TPro  7.3  /  Alb  3.0<L>  /  TBili  0.2  /  DBili  x   /  AST  24  /  ALT  23  /  AlkPhos  125<H>  07-04      Urinalysis Basic - ( 05 Jul 2023 11:58 )    Color: x / Appearance: x / SG: x / pH: x  Gluc: 113 mg/dL / Ketone: x  / Bili: x / Urobili: x   Blood: x / Protein: x / Nitrite: x   Leuk Esterase: x / RBC: x / WBC x   Sq Epi: x / Non Sq Epi: x / Bacteria: x                                                            -------------------------------------------------------------  RADIOLOGY:                                            --------------------------------------------------------------    PHYSICAL EXAM:  GENERAL: NAD, lying in bed comfortably  HEAD:  Atraumatic, Normocephalic  CHEST/LUNG: pt with trach on cpap  HEART: regular rate and rhythm; No murmurs, rubs, or gallops  ABDOMEN: Bowel sounds present; Soft, Nontender, Nondistended.    NERVOUS SYSTEM:  Alert & Oriented.                                            --------------------------------------------------------------                 ZEYNEP ROSSI 42y Female  MRN#: [id number]     Hospital Day: 43d    Pt is currently admitted with the primary diagnosis of  Pneumonia due to infectious organism        SUBJECTIVE     Overnight events  None    Subjective complaints  Pt was evaluated this am. Pt with trach, c/o abdominal pain.                                             ----------------------------------------------------------  OBJECTIVE  PAST MEDICAL & SURGICAL HISTORY                                            -----------------------------------------------------------  ALLERGIES:  aspirin (Short breath; Anaphylaxis)  Bananas (Unknown)                                            ------------------------------------------------------------    HOME MEDICATIONS  Home Medications:                           MEDICATIONS:  STANDING MEDICATIONS  aMIOdarone    Tablet 100 milliGRAM(s) Oral daily  apixaban 5 milliGRAM(s) Oral every 12 hours  chlorhexidine 2% Cloths 1 Application(s) Topical <User Schedule>  diltiazem    Tablet 60 milliGRAM(s) Oral every 6 hours  famotidine    Tablet 20 milliGRAM(s) Oral daily  ferrous    sulfate Liquid 300 milliGRAM(s) Enteral Tube daily  insulin lispro (ADMELOG) corrective regimen sliding scale   SubCutaneous every 6 hours  metroNIDAZOLE    Tablet 500 milliGRAM(s) Oral every 8 hours  nystatin Cream 1 Application(s) Topical two times a day  petrolatum white Ointment 1 Application(s) Topical two times a day  senna 2 Tablet(s) Oral at bedtime  vancomycin  IVPB 750 milliGRAM(s) IV Intermittent every 12 hours  vitamin A &amp; D Ointment 1 Application(s) Topical daily    PRN MEDICATIONS  acetaminophen     Tablet .. 650 milliGRAM(s) Oral every 6 hours PRN  aluminum hydroxide/magnesium hydroxide/simethicone Suspension 30 milliLiter(s) Oral every 4 hours PRN  clonazePAM  Tablet 0.5 milliGRAM(s) Oral three times a day PRN  oxycodone    5 mG/acetaminophen 325 mG 1 Tablet(s) Oral/Enteral Tube every 6 hours PRN                                            ------------------------------------------------------------  VITAL SIGNS: Last 24 Hours  T(C): 36.8 (05 Jul 2023 14:00), Max: 37 (04 Jul 2023 16:00)  T(F): 98.2 (05 Jul 2023 14:00), Max: 98.6 (04 Jul 2023 16:00)  HR: 89 (05 Jul 2023 14:00) (89 - 121)  BP: 131/72 (05 Jul 2023 14:00) (99/70 - 131/72)  BP(mean): --  RR: 16 (05 Jul 2023 14:00) (16 - 20)  SpO2: 100% (05 Jul 2023 14:00) (98% - 100%)      07-04-23 @ 07:01  -  07-05-23 @ 07:00  --------------------------------------------------------  IN: 1070 mL / OUT: 0 mL / NET: 1070 mL                                             --------------------------------------------------------------  LABS:                        8.2    11.72 )-----------( 548      ( 05 Jul 2023 07:54 )             26.7     07-05    132<L>  |  94<L>  |  16  ----------------------------<  113<H>  6.1<HH>   |  25  |  0.9    Ca    9.5      05 Jul 2023 11:58  Mg     2.1     07-05    TPro  7.3  /  Alb  3.0<L>  /  TBili  0.2  /  DBili  x   /  AST  24  /  ALT  23  /  AlkPhos  125<H>  07-04      Urinalysis Basic - ( 05 Jul 2023 11:58 )    Color: x / Appearance: x / SG: x / pH: x  Gluc: 113 mg/dL / Ketone: x  / Bili: x / Urobili: x   Blood: x / Protein: x / Nitrite: x   Leuk Esterase: x / RBC: x / WBC x   Sq Epi: x / Non Sq Epi: x / Bacteria: x                                                            -------------------------------------------------------------  RADIOLOGY:                                            --------------------------------------------------------------    PHYSICAL EXAM:  GENERAL: NAD, lying in bed comfortably  HEAD:  Atraumatic, Normocephalic  CHEST/LUNG: pt with trach on cpap  HEART: regular rate and rhythm; No murmurs, rubs, or gallops  ABDOMEN: Bowel sounds present; Soft, Nontender, Nondistended.    NERVOUS SYSTEM:  Alert & Oriented.                                            --------------------------------------------------------------

## 2023-07-05 NOTE — OCCUPATIONAL THERAPY INITIAL EVALUATION ADULT - FINE MOTOR COORDINATION, LEFT HAND THUMB/FINGER OPPOSITION SKILLS, OT EVAL
hand was shaking when performing fine motor/coordination activity, increased time required/mild impairment

## 2023-07-05 NOTE — PROGRESS NOTE ADULT - SUBJECTIVE AND OBJECTIVE BOX
ZEYNEP ROSSI 42y Female  MRN#: [id number]     Hospital Day: 43d    Pt is currently admitted with the primary diagnosis of  Pneumonia due to infectious organism        SUBJECTIVE     Overnight events  None    Subjective complaints  Pt was evaluated this am, pt with trach - pt complaining of abdominal disco                                            ----------------------------------------------------------  OBJECTIVE  PAST MEDICAL & SURGICAL HISTORY                                            -----------------------------------------------------------  ALLERGIES:  aspirin (Short breath; Anaphylaxis)  Bananas (Unknown)                                            ------------------------------------------------------------    HOME MEDICATIONS  Home Medications:                           MEDICATIONS:  STANDING MEDICATIONS  aMIOdarone    Tablet 100 milliGRAM(s) Oral daily  apixaban 5 milliGRAM(s) Oral every 12 hours  chlorhexidine 2% Cloths 1 Application(s) Topical <User Schedule>  diltiazem    Tablet 60 milliGRAM(s) Oral every 6 hours  famotidine    Tablet 20 milliGRAM(s) Oral daily  ferrous    sulfate Liquid 300 milliGRAM(s) Enteral Tube daily  insulin lispro (ADMELOG) corrective regimen sliding scale   SubCutaneous every 6 hours  metroNIDAZOLE    Tablet 500 milliGRAM(s) Oral every 8 hours  nystatin Cream 1 Application(s) Topical two times a day  senna 2 Tablet(s) Oral at bedtime  vancomycin  IVPB 750 milliGRAM(s) IV Intermittent every 12 hours  vitamin A &amp; D Ointment 1 Application(s) Topical daily    PRN MEDICATIONS  acetaminophen     Tablet .. 650 milliGRAM(s) Oral every 6 hours PRN  aluminum hydroxide/magnesium hydroxide/simethicone Suspension 30 milliLiter(s) Oral every 4 hours PRN  clonazePAM  Tablet 0.5 milliGRAM(s) Oral three times a day PRN  oxyCODONE    IR 5 milliGRAM(s) Oral every 6 hours PRN                                            ------------------------------------------------------------  VITAL SIGNS: Last 24 Hours  T(C): 36.6 (05 Jul 2023 05:00), Max: 37 (04 Jul 2023 16:00)  T(F): 97.8 (05 Jul 2023 05:00), Max: 98.6 (04 Jul 2023 16:00)  HR: 95 (05 Jul 2023 09:55) (91 - 121)  BP: 106/74 (05 Jul 2023 05:00) (99/70 - 124/68)  BP(mean): --  RR: 16 (05 Jul 2023 08:09) (16 - 20)  SpO2: 99% (05 Jul 2023 09:55) (98% - 100%)      07-04-23 @ 07:01  -  07-05-23 @ 07:00  --------------------------------------------------------  IN: 1070 mL / OUT: 0 mL / NET: 1070 mL                                             --------------------------------------------------------------  LABS:                        8.2    11.72 )-----------( 548      ( 05 Jul 2023 07:54 )             26.7     07-05    132<L>  |  94<L>  |  16  ----------------------------<  113<H>  6.1<HH>   |  25  |  0.9    Ca    9.5      05 Jul 2023 11:58  Mg     2.1     07-05    TPro  7.3  /  Alb  3.0<L>  /  TBili  0.2  /  DBili  x   /  AST  24  /  ALT  23  /  AlkPhos  125<H>  07-04      Urinalysis Basic - ( 05 Jul 2023 11:58 )    Color: x / Appearance: x / SG: x / pH: x  Gluc: 113 mg/dL / Ketone: x  / Bili: x / Urobili: x   Blood: x / Protein: x / Nitrite: x   Leuk Esterase: x / RBC: x / WBC x   Sq Epi: x / Non Sq Epi: x / Bacteria: x                                                            -------------------------------------------------------------  RADIOLOGY:                                            --------------------------------------------------------------    PHYSICAL EXAM:  GENERAL: NAD, lying in bed comfortably  HEAD:  Atraumatic, Normocephalic  EYES: EOMI, conjunctiva and sclera clear  ENT: Moist mucous membranes  NECK: Supple, No JVD  CHEST/LUNG: Clear to auscultation bilaterally; No rales, rhonchi, wheezing, or rubs. Unlabored respirations  HEART: regular rate and rhythm; No murmurs, rubs, or gallops  ABDOMEN: Bowel sounds present; Soft, Nontender, Nondistended.    EXTREMITIES: Warm. No clubbing, cyanosis, or edema  NERVOUS SYSTEM:  Alert & Oriented X3. No focal deficits   SKIN: No rashes or lesions                                           --------------------------------------------------------------

## 2023-07-05 NOTE — ADVANCED PRACTICE NURSE CONSULT - RECOMMEDATIONS
Recommend discontinue triad  Cleanse wound with soap and water.   Pat dry apply medihoney and Allevyn twice a day and prn for soiling.   Maintain pressure injury prevention.   Keep skin clean.   Maintain incontinence care.   Monitor wound for changes and notify provider   Case discussed with primary RN Recommend discontinue triad  Cleanse wound with soap and water.   Pat dry apply medihoney and Allevyn twice a day and prn for soiling.   Maintain pressure injury prevention.   Keep skin clean.   Maintain incontinence care.   Monitor wound for changes and notify provider   Case discussed with primary RN   Patient seen with wound care nurse during rounds, all necessary amendment done to  chart . Agreeable to above assessment and recs.

## 2023-07-05 NOTE — OCCUPATIONAL THERAPY INITIAL EVALUATION ADULT - PERTINENT HX OF CURRENT PROBLEM, REHAB EVAL
42 year-old female with PMH of Asthma, HTN? presents with complaint of cough x3 days and SOB. During my encounter pt with dyspnea and increased work of breathing and chest pain, unable to properly provide accurate history. She states that her cough has been progressively worsening over the past 3 days, endorses hemoptysis since yesterday. She states she had a friend who came over and stayed with for more than a week who was sick recently, but she does not know if she recently travelled out of [facility] or not. She also admits that her children has been sick with Sore throat and fever for past 3 days. She admits to fever, chills, sever Chest pain which has gotten worse since she came to the ED. She took 1x Tamiflu yesterday. She otherwise denies diarrhea, constipation, urinary symptoms. She is not vaccinated for Influenza or COVID-19.

## 2023-07-05 NOTE — ADVANCED PRACTICE NURSE CONSULT - ASSESSMENT
History of Present Illness:   42 year-old female with PMH of Asthma, HTN? presents with complaint of cough x3 days and SOB.  She states that her cough has been progressively worsening over the past 3 days, endorses hemoptysis since yesterday. She states she had a friend who came over and stayed with for more than a week who was sick recently, but she does not know if she recently travelled out of [facility] [address] or not. She also admits that her children has been sick with Sore throat and fever for past 3 days. She admits to fever, chills, sever Chest pain which has gotten worse since she came to the ED. She took 1x Tamiflu. She is not vaccinated for Influenza or COVID-19. Pt admitted to SDU for further managements. Pt upgraded to MICU.    Allergies and Intolerances:        Allergies:  	No Known Allergies:     Home Medications:   * Outpatient Medication Status not yet specified    Patient received lying in bed. Trached. Limited mobility. Incontinent of stool. High risk for pressure injury.    Type of Wound: Deep Tissue Injury with progression  Location: Sacrococcygeal region  Measurements: ~5bbt4mc  Tunneling /Undermining: No  Wound bed: Yellow devitalized tissue  Wound edges: Intact  Periwound: Intact  Wound exudate: None  Wound odor: No  Induration, erythema, warmth: No  Wound pain: No           History of Present Illness:   Patient is a 42 year-old female with PMH of Asthma, HTN? presents with complaint of cough x3 days and SOB.  She states that her cough has been progressively worsening over the past 3 days, endorses hemoptysis since yesterday. She states she had a friend who came over and stayed with for more than a week who was sick recently, but she does not know if she recently travelled out of [facility] or not. She also admits that her children has been sick with Sore throat and fever for past 3 days. She admits to fever, chills, sever Chest pain which has gotten worse since she came to the ED. She took 1x Tamiflu. She is not vaccinated for Influenza or COVID-19. Pt admitted to SDU for further managements. Pt upgraded to MICU.    Allergies and Intolerances:        Allergies:  	No Known Allergies:     Home Medications:   * Outpatient Medication Status not yet specified    Patient received lying in bed. Trached. Limited mobility. Incontinent of stool. High risk for pressure injury.    Type of Wound: Deep Tissue Injury with progression  Location: Sacrococcygeal region  Measurements: ~8tta1jm  Tunneling /Undermining: No  Wound bed: Yellow devitalized tissue  Wound edges: Intact  Periwound: Intact  Wound exudate: None  Wound odor: No  Induration, erythema, warmth: No  Wound pain: No

## 2023-07-05 NOTE — PHYSICAL THERAPY INITIAL EVALUATION ADULT - PERTINENT HX OF CURRENT PROBLEM, REHAB EVAL
42 year-old female admitted with [facility] with PMH of Asthma, HTN? presents with complaint of cough x3 days and SOB. During my encounter pt with dyspnea and increased work of breathing and chest pain, unable to properly provide accurate history. She states that her cough has been progressively worsening over the past 3 days, endorses hemoptysis since yesterday. She states she had a friend who came over and stayed with for more than a week who was sick recently, but she does not know if she recently travelled out of [facility] or not. She also admits that her children has been sick with Sore throat and fever for past 3 days. She admits to fever, chills, sever Chest pain which has gotten worse since she came to the ED. She took 1x Tamiflu yesterday. She otherwise denies diarrhea, constipation, urinary symptoms. She is not vaccinated for Influenza or COVID-19.

## 2023-07-05 NOTE — OCCUPATIONAL THERAPY INITIAL EVALUATION ADULT - IMPAIRED TRANSFERS: SIT/STAND, REHAB EVAL
decreased activity tolerance/endurance/impaired balance/impaired coordination/decreased flexibility/impaired postural control/decreased strength

## 2023-07-05 NOTE — PROGRESS NOTE ADULT - SUBJECTIVE AND OBJECTIVE BOX
Patient is a 42y old  Female who presents with a chief complaint of AHRF (04 Jul 2023 09:48)        Over Night Events:  On MV.  Off pressors         ROS:     All ROS are negative except HPI         PHYSICAL EXAM    ICU Vital Signs Last 24 Hrs  T(C): 36.6 (05 Jul 2023 05:00), Max: 37 (04 Jul 2023 16:00)  T(F): 97.8 (05 Jul 2023 05:00), Max: 98.6 (04 Jul 2023 16:00)  HR: 100 (05 Jul 2023 05:00) (91 - 121)  BP: 106/74 (05 Jul 2023 05:00) (99/70 - 125/68)  BP(mean): 90 (04 Jul 2023 11:48) (88 - 90)  ABP: --  ABP(mean): --  RR: 18 (05 Jul 2023 05:00) (18 - 20)  SpO2: 100% (05 Jul 2023 05:00) (98% - 100%)    O2 Parameters below as of 05 Jul 2023 05:00  Patient On (Oxygen Delivery Method): ventilator            CONSTITUTIONAL:  In NAD    ENT:   Airway patent,   Mouth with normal mucosa.   Trach     EYES:   Pupils equal,   Round and reactive to light.    CARDIAC:   Normal rate,   Regular rhythm.    No edema      RESPIRATORY:   No wheezing  Bilateral BS  Normal chest expansion  Not tachypneic,  No use of accessory muscles    GASTROINTESTINAL:  Abdomen soft,   Non-tender,   No guarding,   + BS    MUSCULOSKELETAL:   Range of motion is not limited,  No clubbing, cyanosis    NEUROLOGICAL:   Alert   No motor  deficits.    SKIN:   Skin normal color for race,   No evidence of rash.      07-04-23 @ 07:01  -  07-05-23 @ 07:00  --------------------------------------------------------  IN:    Enteral Tube Flush: 50 mL    Jevity 1.2: 1020 mL  Total IN: 1070 mL    OUT:  Total OUT: 0 mL    Total NET: 1070 mL          LABS:                            8.2    12.61 )-----------( 464      ( 04 Jul 2023 05:25 )             27.0                                               07-04    134<L>  |  94<L>  |  16  ----------------------------<  101<H>  5.5<H>   |  30  |  0.8    Ca    9.7      04 Jul 2023 05:25  Mg     2.1     07-04    TPro  7.3  /  Alb  3.0<L>  /  TBili  0.2  /  DBili  x   /  AST  24  /  ALT  23  /  AlkPhos  125<H>  07-04                                             Urinalysis Basic - ( 04 Jul 2023 05:25 )    Color: x / Appearance: x / SG: x / pH: x  Gluc: 101 mg/dL / Ketone: x  / Bili: x / Urobili: x   Blood: x / Protein: x / Nitrite: x   Leuk Esterase: x / RBC: x / WBC x   Sq Epi: x / Non Sq Epi: x / Bacteria: x                                                  LIVER FUNCTIONS - ( 04 Jul 2023 05:25 )  Alb: 3.0 g/dL / Pro: 7.3 g/dL / ALK PHOS: 125 U/L / ALT: 23 U/L / AST: 24 U/L / GGT: x                                                                                               Mode: AC/ CMV (Assist Control/ Continuous Mandatory Ventilation)  RR (machine): 16  TV (machine): 360  FiO2: 40  PEEP: 8  ITime: 1.67  MAP: 12  PIP: 17                                          MEDICATIONS  (STANDING):  aMIOdarone    Tablet 100 milliGRAM(s) Oral daily  apixaban 5 milliGRAM(s) Oral every 12 hours  chlorhexidine 2% Cloths 1 Application(s) Topical <User Schedule>  diltiazem    Tablet 60 milliGRAM(s) Oral every 6 hours  famotidine    Tablet 20 milliGRAM(s) Oral two times a day  insulin lispro (ADMELOG) corrective regimen sliding scale   SubCutaneous every 6 hours  methadone    Tablet 5 milliGRAM(s) Oral two times a day  metroNIDAZOLE    Tablet 500 milliGRAM(s) Oral every 8 hours  nystatin Cream 1 Application(s) Topical two times a day  OLANZapine 5 milliGRAM(s) Oral daily  scopolamine 1 mG/72 Hr(s) Patch 1 Patch Transdermal every 72 hours  senna 2 Tablet(s) Oral at bedtime  vancomycin  IVPB 750 milliGRAM(s) IV Intermittent every 12 hours  vitamin A &amp; D Ointment 1 Application(s) Topical daily    MEDICATIONS  (PRN):  acetaminophen     Tablet .. 650 milliGRAM(s) Oral every 6 hours PRN Temp greater or equal to 38C (100.4F)  aluminum hydroxide/magnesium hydroxide/simethicone Suspension 30 milliLiter(s) Oral every 4 hours PRN Dyspepsia      New X-rays reviewed:                                                                                  ECHO

## 2023-07-05 NOTE — OCCUPATIONAL THERAPY INITIAL EVALUATION ADULT - LIVES WITH, PROFILE
Pt lives with her two children in elevator apt building with one step to the entrance. +bath tub/children

## 2023-07-05 NOTE — PROGRESS NOTE ADULT - ASSESSMENT
42 year-old female with PMH of Asthma, HTN? presents with complaint of cough x3 days and SOB. In the ED was found to have Bilateral patchy groundglass nodularity with dominant confluent left lower lobe opacification with numerous cavitary lesions.Trach and PEG done for inability to wean off vent, currently off sedation. Her stay was also complicated by new onset Afib with RVR for which she is on amiodarone, diltiazem, and therapeutic anticoagulation.    #Acute hypoxemic respiratory failure / s/P Trach and PEG    #Severe cavitary CAP MRSA  #Small parapneumonic effusion SP Chest tube  #MRSA bacteremia  #Influenza B   #hx of Asthma   #Intubated 5/23 self-extubated 5/25, re-intubated 5/25; SBT failed, SP trach/G tube  - s/p Zyvox, meropenem and Tamifu, now on Vancomycin and flagyl, anticipated end date 7/9  - monitor levels per ID pharmacy   - s/p course of steroids now off  -  blood cultures  NGTD SINCE 5/28   - s/p MASTER 5/31: No evidence of valvular vegetations or intracardiac abscesses to support IE. Pulmonary hepatisation incidental finding. EF 65%, mild TR and pulm HTN  - ventilator management per pulm/cc,  - decrease Methadone to 5 q12H and c/w olanzapine --> on taper from MICU due to prolonged time on Fentanyl. Monitor daily EKGS for QTC. Keep Mg ~2 and K ~4     Maculopapular buttocks rash  -PCR skin scraping negative for HSV --> off  Acyclovir   -Wound care per burn, wound care RN following     LANCE, resolved   Hypernakemia  - post-infectious GN vs. ATN  - Cr peaked  3 > now 0.7  - renal U/S: negative   - daily BMP   - Lokelma 5mg Q12H today, hold ACE     Microcytic Anemia likely  anemia of chronic disease   - s/p 6 units prbc so far last 6/5  - Restarted AC with lovenox 6/22 given Hb stable last few days, switch to Eliquis 5mg Q12H   -monitor hemoglobin     New onset A-fib w/ RVR  - HR better controlled now  - s/p Amio drip, now on PO  - Cardizem po 60mg q6hr   - therapeutic Lovenox, can transition to ELiquis 5mg Q12H    - EP consult appreciated     Diarrhea - likely related to tube feeds, improved   nutrition fu requested  meds adjusted   janet mitchell   42 year-old female with PMH of Asthma, HTN? presents with complaint of cough x3 days and SOB. In the ED was found to have Bilateral patchy groundglass nodularity with dominant confluent left lower lobe opacification with numerous cavitary lesions.Trach and PEG done for inability to wean off vent, currently off sedation. Her stay was also complicated by new onset Afib with RVR for which she is on amiodarone, diltiazem, and therapeutic anticoagulation.    #Acute hypoxemic respiratory failure / s/P Trach and PEG    #Severe cavitary CAP MRSA  #Small parapneumonic effusion SP Chest tube  #MRSA bacteremia  #Influenza B   #hx of Asthma   #Intubated 5/23 self-extubated 5/25, re-intubated 5/25; SBT failed, SP trach/G tube  - s/p Zyvox, meropenem and Tamifu, now on Vancomycin and flagyl, anticipated end date 7/9  - monitor levels per ID pharmacy   - s/p course of steroids now off  -  blood cultures  NGTD SINCE 5/28   - s/p MASTER 5/31: No evidence of valvular vegetations or intracardiac abscesses to support IE. Pulmonary hepatisation incidental finding. EF 65%, mild TR and pulm HTN  - ventilator management per pulm/cc,  - Methadone to 5 mg via peg once daily x 2 more days than d/c, --> transition to percocet   - d/c  olanzapine   - pain management consult    # Hyperkalemia  - s/p Lokelma 10 mg x 1 dose today, f/up repeated BMP at 4     #Maculopapular buttocks rash  -PCR skin scraping negative for HSV --> off  Acyclovir   -Wound care per burn, wound care RN following     #Microcytic Anemia likely  anemia of chronic disease   - s/p 6 units prbc so far last 6/5  - Restarted AC with lovenox 6/22 given Hb stable last few days, switch to Eliquis 5mg Q12H   -monitor hemoglobin     New onset A-fib w/ RVR  - HR better controlled now  - s/p Amio drip, now on PO  - Cardizem po 60mg q6hr   - therapeutic Lovenox, can transition to ELiquis 5mg Q12H    - EP specialist f/u  - f/u TSH and free t3 and t4    Anxiety  - continue low dose Klonopin 0.25 mg via peg every 8 hours prn. tx.    Handoff: repeat BMP today to f/up K level, continue CPAP trial as tolerated by pt. , continue IV Vanco till 7/9/23, taper down Methadone and d/c in 48 hours. d/c Olanzapine, f/u free TSH, t3, t4

## 2023-07-06 LAB
ALBUMIN SERPL ELPH-MCNC: 3.1 G/DL — LOW (ref 3.5–5.2)
ALP SERPL-CCNC: 117 U/L — HIGH (ref 30–115)
ALT FLD-CCNC: 18 U/L — SIGNIFICANT CHANGE UP (ref 0–41)
ANION GAP SERPL CALC-SCNC: 11 MMOL/L — SIGNIFICANT CHANGE UP (ref 7–14)
AST SERPL-CCNC: 23 U/L — SIGNIFICANT CHANGE UP (ref 0–41)
BILIRUB SERPL-MCNC: <0.2 MG/DL — SIGNIFICANT CHANGE UP (ref 0.2–1.2)
BUN SERPL-MCNC: 17 MG/DL — SIGNIFICANT CHANGE UP (ref 10–20)
CALCIUM SERPL-MCNC: 9.6 MG/DL — SIGNIFICANT CHANGE UP (ref 8.4–10.5)
CHLORIDE SERPL-SCNC: 91 MMOL/L — LOW (ref 98–110)
CO2 SERPL-SCNC: 29 MMOL/L — SIGNIFICANT CHANGE UP (ref 17–32)
CREAT SERPL-MCNC: 0.9 MG/DL — SIGNIFICANT CHANGE UP (ref 0.7–1.5)
EGFR: 82 ML/MIN/1.73M2 — SIGNIFICANT CHANGE UP
GLUCOSE BLDC GLUCOMTR-MCNC: 106 MG/DL — HIGH (ref 70–99)
GLUCOSE BLDC GLUCOMTR-MCNC: 107 MG/DL — HIGH (ref 70–99)
GLUCOSE BLDC GLUCOMTR-MCNC: 108 MG/DL — HIGH (ref 70–99)
GLUCOSE BLDC GLUCOMTR-MCNC: 115 MG/DL — HIGH (ref 70–99)
GLUCOSE SERPL-MCNC: 102 MG/DL — HIGH (ref 70–99)
HCT VFR BLD CALC: 28.2 % — LOW (ref 37–47)
HGB BLD-MCNC: 8.7 G/DL — LOW (ref 12–16)
MAGNESIUM SERPL-MCNC: 2.2 MG/DL — SIGNIFICANT CHANGE UP (ref 1.8–2.4)
MCHC RBC-ENTMCNC: 24.2 PG — LOW (ref 27–31)
MCHC RBC-ENTMCNC: 30.9 G/DL — LOW (ref 32–37)
MCV RBC AUTO: 78.6 FL — LOW (ref 81–99)
NRBC # BLD: 0 /100 WBCS — SIGNIFICANT CHANGE UP (ref 0–0)
PLATELET # BLD AUTO: 554 K/UL — HIGH (ref 130–400)
PMV BLD: 9.1 FL — SIGNIFICANT CHANGE UP (ref 7.4–10.4)
POTASSIUM SERPL-MCNC: 4.9 MMOL/L — SIGNIFICANT CHANGE UP (ref 3.5–5)
POTASSIUM SERPL-SCNC: 4.9 MMOL/L — SIGNIFICANT CHANGE UP (ref 3.5–5)
PROT SERPL-MCNC: 7.5 G/DL — SIGNIFICANT CHANGE UP (ref 6–8)
RBC # BLD: 3.59 M/UL — LOW (ref 4.2–5.4)
RBC # FLD: 26.8 % — HIGH (ref 11.5–14.5)
SODIUM SERPL-SCNC: 131 MMOL/L — LOW (ref 135–146)
VANCOMYCIN FLD-MCNC: 24.2 UG/ML — HIGH (ref 5–10)
WBC # BLD: 12.42 K/UL — HIGH (ref 4.8–10.8)
WBC # FLD AUTO: 12.42 K/UL — HIGH (ref 4.8–10.8)

## 2023-07-06 PROCEDURE — 99232 SBSQ HOSP IP/OBS MODERATE 35: CPT

## 2023-07-06 PROCEDURE — 99223 1ST HOSP IP/OBS HIGH 75: CPT

## 2023-07-06 PROCEDURE — 99233 SBSQ HOSP IP/OBS HIGH 50: CPT

## 2023-07-06 RX ORDER — VANCOMYCIN HCL 1 G
500 VIAL (EA) INTRAVENOUS EVERY 12 HOURS
Refills: 0 | Status: DISCONTINUED | OUTPATIENT
Start: 2023-07-07 | End: 2023-07-07

## 2023-07-06 RX ORDER — DILTIAZEM HCL 120 MG
30 CAPSULE, EXT RELEASE 24 HR ORAL EVERY 6 HOURS
Refills: 0 | Status: DISCONTINUED | OUTPATIENT
Start: 2023-07-06 | End: 2023-07-14

## 2023-07-06 RX ADMIN — APIXABAN 5 MILLIGRAM(S): 2.5 TABLET, FILM COATED ORAL at 05:59

## 2023-07-06 RX ADMIN — Medication 1 MILLIGRAM(S): at 00:24

## 2023-07-06 RX ADMIN — NYSTATIN CREAM 1 APPLICATION(S): 100000 CREAM TOPICAL at 17:30

## 2023-07-06 RX ADMIN — METHADONE HYDROCHLORIDE 5 MILLIGRAM(S): 40 TABLET ORAL at 11:30

## 2023-07-06 RX ADMIN — Medication 300 MILLIGRAM(S): at 11:30

## 2023-07-06 RX ADMIN — Medication 60 MILLIGRAM(S): at 00:22

## 2023-07-06 RX ADMIN — AMIODARONE HYDROCHLORIDE 100 MILLIGRAM(S): 400 TABLET ORAL at 05:59

## 2023-07-06 RX ADMIN — CHLORHEXIDINE GLUCONATE 1 APPLICATION(S): 213 SOLUTION TOPICAL at 06:00

## 2023-07-06 RX ADMIN — SODIUM ZIRCONIUM CYCLOSILICATE 5 GRAM(S): 10 POWDER, FOR SUSPENSION ORAL at 04:10

## 2023-07-06 RX ADMIN — Medication 30 MILLIGRAM(S): at 11:31

## 2023-07-06 RX ADMIN — Medication 30 MILLIGRAM(S): at 23:37

## 2023-07-06 RX ADMIN — Medication 1 APPLICATION(S): at 17:29

## 2023-07-06 RX ADMIN — Medication 500 MILLIGRAM(S): at 05:59

## 2023-07-06 RX ADMIN — Medication 30 MILLIGRAM(S): at 17:29

## 2023-07-06 RX ADMIN — NYSTATIN CREAM 1 APPLICATION(S): 100000 CREAM TOPICAL at 05:58

## 2023-07-06 RX ADMIN — Medication 1 APPLICATION(S): at 11:30

## 2023-07-06 RX ADMIN — APIXABAN 5 MILLIGRAM(S): 2.5 TABLET, FILM COATED ORAL at 17:29

## 2023-07-06 RX ADMIN — FAMOTIDINE 20 MILLIGRAM(S): 10 INJECTION INTRAVENOUS at 11:30

## 2023-07-06 RX ADMIN — Medication 500 MILLIGRAM(S): at 13:18

## 2023-07-06 RX ADMIN — Medication 1 APPLICATION(S): at 06:33

## 2023-07-06 RX ADMIN — SENNA PLUS 2 TABLET(S): 8.6 TABLET ORAL at 21:02

## 2023-07-06 RX ADMIN — Medication 0.5 MILLIGRAM(S): at 11:30

## 2023-07-06 RX ADMIN — Medication 500 MILLIGRAM(S): at 21:03

## 2023-07-06 RX ADMIN — Medication 250 MILLIGRAM(S): at 06:00

## 2023-07-06 NOTE — PROGRESS NOTE ADULT - SUBJECTIVE AND OBJECTIVE BOX
INTERVAL HPI/OVERNIGHT EVENTS:  Pt doing well.  Has a trach/PEG.  Not currently on tele.  Daily EKGs show NSR.  Team requesting EP input on whether or not to continue Amiodarone    MEDICATIONS  (STANDING):  aMIOdarone    Tablet 100 milliGRAM(s) Oral daily  apixaban 5 milliGRAM(s) Oral every 12 hours  chlorhexidine 2% Cloths 1 Application(s) Topical <User Schedule>  diltiazem    Tablet 30 milliGRAM(s) Oral every 6 hours  famotidine    Tablet 20 milliGRAM(s) Oral daily  ferrous    sulfate Liquid 300 milliGRAM(s) Enteral Tube daily  insulin lispro (ADMELOG) corrective regimen sliding scale   SubCutaneous every 6 hours  methadone    Tablet 5 milliGRAM(s) Oral daily  metroNIDAZOLE    Tablet 500 milliGRAM(s) Oral every 8 hours  nystatin Cream 1 Application(s) Topical two times a day  petrolatum white Ointment 1 Application(s) Topical two times a day  senna 2 Tablet(s) Oral at bedtime  vitamin A &amp; D Ointment 1 Application(s) Topical daily    MEDICATIONS  (PRN):  acetaminophen     Tablet .. 650 milliGRAM(s) Oral every 6 hours PRN Temp greater or equal to 38C (100.4F)  aluminum hydroxide/magnesium hydroxide/simethicone Suspension 30 milliLiter(s) Oral every 4 hours PRN Dyspepsia  clonazePAM  Tablet 0.5 milliGRAM(s) Oral three times a day PRN For Agitation only  oxycodone    5 mG/acetaminophen 325 mG 1 Tablet(s) Oral/Enteral Tube every 6 hours PRN Severe Pain (7 - 10)      Allergies    aspirin (Short breath; Anaphylaxis)  Bananas (Unknown)    Intolerances    EKG  < from: 12 Lead ECG (07.05.23 @ 07:34) >  Ventricular Rate 97 BPM    Atrial Rate 97 BPM    P-R Interval 168 ms    QRS Duration 146 ms    Q-T Interval 410 ms    QTC Calculation(Bazett) 520 ms    P Axis 62 degrees    R Axis 52 degrees    T Axis 57 degrees    Diagnosis Line Normal sinus rhythm  Right bundle branch block  Abnormal ECG    < end of copied text >      REVIEW OF SYSTEMS  negative    Vital Signs Last 24 Hrs  T(C): 36.9 (06 Jul 2023 13:31), Max: 36.9 (06 Jul 2023 13:31)  T(F): 98.4 (06 Jul 2023 13:31), Max: 98.4 (06 Jul 2023 13:31)  HR: 105 (06 Jul 2023 13:31) (63 - 119)  BP: 128/62 (06 Jul 2023 13:31) (94/53 - 128/62)  BP(mean): 70 (06 Jul 2023 06:47) (70 - 94)  RR: 19 (06 Jul 2023 13:31) (18 - 19)  SpO2: 99% (06 Jul 2023 13:31) (99% - 100%)    Parameters below as of 06 Jul 2023 13:31  Patient On (Oxygen Delivery Method): T-piece    Physical Exam    GENERAL: In no apparent distress, well nourished, and hydrated.  HEART: Regular rate and rhythm; No murmurs, rubs, or gallops.  PULMONARY: Clear to auscultation and perfusion.  No rales, wheezing, or rhonchi bilaterally.  ABDOMEN: Soft, Nontender, Nondistended; Bowel sounds present  EXTREMITIES:  2+ Peripheral Pulses, No clubbing, cyanosis, or edema  NEUROLOGICAL: Grossly nonfocal    LABS:                        8.7    12.42 )-----------( 554      ( 06 Jul 2023 12:34 )             28.2     07-06    131<L>  |  91<L>  |  17  ----------------------------<  102<H>  4.9   |  29  |  0.9    Ca    9.6      06 Jul 2023 12:34  Mg     2.2     07-06    TPro  7.5  /  Alb  3.1<L>  /  TBili  <0.2  /  DBili  x   /  AST  23  /  ALT  18  /  AlkPhos  117<H>  07-06      Urinalysis Basic - ( 06 Jul 2023 12:34 )    Color: x / Appearance: x / SG: x / pH: x  Gluc: 102 mg/dL / Ketone: x  / Bili: x / Urobili: x   Blood: x / Protein: x / Nitrite: x   Leuk Esterase: x / RBC: x / WBC x   Sq Epi: x / Non Sq Epi: x / Bacteria: x        RADIOLOGY & ADDITIONAL TESTS:   INTERVAL HPI/OVERNIGHT EVENTS:  Pt doing well.  Has a trach/PEG.  Not currently on tele.  Daily EKGs show NSR.  Team requesting EP input on whether or not to continue Amiodarone    MEDICATIONS  (STANDING):  aMIOdarone    Tablet 100 milliGRAM(s) Oral daily  apixaban 5 milliGRAM(s) Oral every 12 hours  chlorhexidine 2% Cloths 1 Application(s) Topical <User Schedule>  diltiazem    Tablet 30 milliGRAM(s) Oral every 6 hours  famotidine    Tablet 20 milliGRAM(s) Oral daily  ferrous    sulfate Liquid 300 milliGRAM(s) Enteral Tube daily  insulin lispro (ADMELOG) corrective regimen sliding scale   SubCutaneous every 6 hours  methadone    Tablet 5 milliGRAM(s) Oral daily  metroNIDAZOLE    Tablet 500 milliGRAM(s) Oral every 8 hours  nystatin Cream 1 Application(s) Topical two times a day  petrolatum white Ointment 1 Application(s) Topical two times a day  senna 2 Tablet(s) Oral at bedtime  vitamin A &amp; D Ointment 1 Application(s) Topical daily    MEDICATIONS  (PRN):  acetaminophen     Tablet .. 650 milliGRAM(s) Oral every 6 hours PRN Temp greater or equal to 38C (100.4F)  aluminum hydroxide/magnesium hydroxide/simethicone Suspension 30 milliLiter(s) Oral every 4 hours PRN Dyspepsia  clonazePAM  Tablet 0.5 milliGRAM(s) Oral three times a day PRN For Agitation only  oxycodone    5 mG/acetaminophen 325 mG 1 Tablet(s) Oral/Enteral Tube every 6 hours PRN Severe Pain (7 - 10)      Allergies    aspirin (Short breath; Anaphylaxis)  Bananas (Unknown)    Intolerances    EKG  < from: 12 Lead ECG (07.05.23 @ 07:34) >  Ventricular Rate 97 BPM    Atrial Rate 97 BPM    P-R Interval 168 ms    QRS Duration 146 ms    Q-T Interval 410 ms    QTC Calculation(Bazett) 520 ms    P Axis 62 degrees    R Axis 52 degrees    T Axis 57 degrees    Diagnosis Line Normal sinus rhythm  Right bundle branch block  Abnormal ECG    < end of copied text >      REVIEW OF SYSTEMS  negative    Vital Signs Last 24 Hrs  T(C): 36.9 (06 Jul 2023 13:31), Max: 36.9 (06 Jul 2023 13:31)  T(F): 98.4 (06 Jul 2023 13:31), Max: 98.4 (06 Jul 2023 13:31)  HR: 105 (06 Jul 2023 13:31) (63 - 119)  BP: 128/62 (06 Jul 2023 13:31) (94/53 - 128/62)  BP(mean): 70 (06 Jul 2023 06:47) (70 - 94)  RR: 19 (06 Jul 2023 13:31) (18 - 19)  SpO2: 99% (06 Jul 2023 13:31) (99% - 100%)    Parameters below as of 06 Jul 2023 13:31  Patient On (Oxygen Delivery Method): T-piece    Physical Exam    GENERAL: In no apparent distress, well nourished, and hydrated.  HEART: Regular rate and rhythm; No murmurs, rubs, or gallops.  PULMONARY: + Trach  ABDOMEN: Soft, + PEG  EXTREMITIES:  2+ Peripheral Pulses, No clubbing, cyanosis, or edema  NEUROLOGICAL: Grossly nonfocal    LABS:                        8.7    12.42 )-----------( 554      ( 06 Jul 2023 12:34 )             28.2     07-06    131<L>  |  91<L>  |  17  ----------------------------<  102<H>  4.9   |  29  |  0.9    Ca    9.6      06 Jul 2023 12:34  Mg     2.2     07-06    TPro  7.5  /  Alb  3.1<L>  /  TBili  <0.2  /  DBili  x   /  AST  23  /  ALT  18  /  AlkPhos  117<H>  07-06      Urinalysis Basic - ( 06 Jul 2023 12:34 )    Color: x / Appearance: x / SG: x / pH: x  Gluc: 102 mg/dL / Ketone: x  / Bili: x / Urobili: x   Blood: x / Protein: x / Nitrite: x   Leuk Esterase: x / RBC: x / WBC x   Sq Epi: x / Non Sq Epi: x / Bacteria: x        RADIOLOGY & ADDITIONAL TESTS:

## 2023-07-06 NOTE — PROGRESS NOTE ADULT - SUBJECTIVE AND OBJECTIVE BOX
ZEYNEP ROSSI 42y Female  MRN#: [id number]     Hospital Day: 44d    Pt is currently admitted with the primary diagnosis of  Pneumonia due to infectious organism        SUBJECTIVE     Overnight events  None    Subjective complaints  Pt was evaluated this am. Patient denied any active complaints and per patient his symptoms are improving                                            ----------------------------------------------------------  OBJECTIVE  PAST MEDICAL & SURGICAL HISTORY                                            -----------------------------------------------------------  ALLERGIES:  aspirin (Short breath; Anaphylaxis)  Bananas (Unknown)                                            ------------------------------------------------------------    HOME MEDICATIONS  Home Medications:                           MEDICATIONS:  STANDING MEDICATIONS  aMIOdarone    Tablet 100 milliGRAM(s) Oral daily  apixaban 5 milliGRAM(s) Oral every 12 hours  chlorhexidine 2% Cloths 1 Application(s) Topical <User Schedule>  diltiazem    Tablet 30 milliGRAM(s) Oral every 6 hours  famotidine    Tablet 20 milliGRAM(s) Oral daily  ferrous    sulfate Liquid 300 milliGRAM(s) Enteral Tube daily  insulin lispro (ADMELOG) corrective regimen sliding scale   SubCutaneous every 6 hours  methadone    Tablet 5 milliGRAM(s) Oral daily  metroNIDAZOLE    Tablet 500 milliGRAM(s) Oral every 8 hours  nystatin Cream 1 Application(s) Topical two times a day  petrolatum white Ointment 1 Application(s) Topical two times a day  senna 2 Tablet(s) Oral at bedtime  vancomycin  IVPB 750 milliGRAM(s) IV Intermittent every 12 hours  vitamin A &amp; D Ointment 1 Application(s) Topical daily    PRN MEDICATIONS  acetaminophen     Tablet .. 650 milliGRAM(s) Oral every 6 hours PRN  aluminum hydroxide/magnesium hydroxide/simethicone Suspension 30 milliLiter(s) Oral every 4 hours PRN  clonazePAM  Tablet 0.5 milliGRAM(s) Oral three times a day PRN  oxycodone    5 mG/acetaminophen 325 mG 1 Tablet(s) Oral/Enteral Tube every 6 hours PRN                                            ------------------------------------------------------------  VITAL SIGNS: Last 24 Hours  T(C): 36.6 (05 Jul 2023 22:00), Max: 36.8 (05 Jul 2023 14:00)  T(F): 97.8 (05 Jul 2023 22:00), Max: 98.2 (05 Jul 2023 14:00)  HR: 91 (06 Jul 2023 06:47) (63 - 96)  BP: 94/53 (06 Jul 2023 06:47) (94/53 - 131/72)  BP(mean): 70 (06 Jul 2023 06:47) (70 - 94)  RR: 18 (05 Jul 2023 22:00) (16 - 18)  SpO2: 100% (06 Jul 2023 02:14) (99% - 100%)                                             --------------------------------------------------------------  LABS:                        8.2    11.72 )-----------( 548      ( 05 Jul 2023 07:54 )             26.7     07-05    133<L>  |  94<L>  |  16  ----------------------------<  108<H>  5.3<H>   |  27  |  0.9    Ca    9.6      05 Jul 2023 16:43  Mg     2.1     07-05        Urinalysis Basic - ( 05 Jul 2023 16:43 )    Color: x / Appearance: x / SG: x / pH: x  Gluc: 108 mg/dL / Ketone: x  / Bili: x / Urobili: x   Blood: x / Protein: x / Nitrite: x   Leuk Esterase: x / RBC: x / WBC x   Sq Epi: x / Non Sq Epi: x / Bacteria: x                                                            -------------------------------------------------------------  RADIOLOGY:                                            --------------------------------------------------------------    PHYSICAL EXAM:  GENERAL: NAD, lying in bed comfortably  HEAD:  Atraumatic, Normocephalic  CHEST/LUNG: pt with trach, breathing on own today  HEART: regular rate and rhythm; No murmurs, rubs, or gallops  ABDOMEN: Bowel sounds present; Soft, Nontender, Nondistended.    EXTREMITIES: Warm. No clubbing, cyanosis, or edema  NERVOUS SYSTEM:  Alert & Oriented                                           --------------------------------------------------------------                 ZEYNEP ROSSI 42y Female  MRN#: [id number]     Hospital Day: 44d    Pt is currently admitted with the primary diagnosis of  Pneumonia due to infectious organism        SUBJECTIVE     Overnight events  None    Subjective complaints  Pt was evaluated this am. Patient breathing on own on vent                                            ----------------------------------------------------------  OBJECTIVE  PAST MEDICAL & SURGICAL HISTORY                                            -----------------------------------------------------------  ALLERGIES:  aspirin (Short breath; Anaphylaxis)  Bananas (Unknown)                                            ------------------------------------------------------------    HOME MEDICATIONS  Home Medications:                           MEDICATIONS:  STANDING MEDICATIONS  aMIOdarone    Tablet 100 milliGRAM(s) Oral daily  apixaban 5 milliGRAM(s) Oral every 12 hours  chlorhexidine 2% Cloths 1 Application(s) Topical <User Schedule>  diltiazem    Tablet 30 milliGRAM(s) Oral every 6 hours  famotidine    Tablet 20 milliGRAM(s) Oral daily  ferrous    sulfate Liquid 300 milliGRAM(s) Enteral Tube daily  insulin lispro (ADMELOG) corrective regimen sliding scale   SubCutaneous every 6 hours  methadone    Tablet 5 milliGRAM(s) Oral daily  metroNIDAZOLE    Tablet 500 milliGRAM(s) Oral every 8 hours  nystatin Cream 1 Application(s) Topical two times a day  petrolatum white Ointment 1 Application(s) Topical two times a day  senna 2 Tablet(s) Oral at bedtime  vancomycin  IVPB 750 milliGRAM(s) IV Intermittent every 12 hours  vitamin A &amp; D Ointment 1 Application(s) Topical daily    PRN MEDICATIONS  acetaminophen     Tablet .. 650 milliGRAM(s) Oral every 6 hours PRN  aluminum hydroxide/magnesium hydroxide/simethicone Suspension 30 milliLiter(s) Oral every 4 hours PRN  clonazePAM  Tablet 0.5 milliGRAM(s) Oral three times a day PRN  oxycodone    5 mG/acetaminophen 325 mG 1 Tablet(s) Oral/Enteral Tube every 6 hours PRN                                            ------------------------------------------------------------  VITAL SIGNS: Last 24 Hours  T(C): 36.6 (05 Jul 2023 22:00), Max: 36.8 (05 Jul 2023 14:00)  T(F): 97.8 (05 Jul 2023 22:00), Max: 98.2 (05 Jul 2023 14:00)  HR: 91 (06 Jul 2023 06:47) (63 - 96)  BP: 94/53 (06 Jul 2023 06:47) (94/53 - 131/72)  BP(mean): 70 (06 Jul 2023 06:47) (70 - 94)  RR: 18 (05 Jul 2023 22:00) (16 - 18)  SpO2: 100% (06 Jul 2023 02:14) (99% - 100%)                                             --------------------------------------------------------------  LABS:                        8.2    11.72 )-----------( 548      ( 05 Jul 2023 07:54 )             26.7     07-05    133<L>  |  94<L>  |  16  ----------------------------<  108<H>  5.3<H>   |  27  |  0.9    Ca    9.6      05 Jul 2023 16:43  Mg     2.1     07-05        Urinalysis Basic - ( 05 Jul 2023 16:43 )    Color: x / Appearance: x / SG: x / pH: x  Gluc: 108 mg/dL / Ketone: x  / Bili: x / Urobili: x   Blood: x / Protein: x / Nitrite: x   Leuk Esterase: x / RBC: x / WBC x   Sq Epi: x / Non Sq Epi: x / Bacteria: x                                                            -------------------------------------------------------------  RADIOLOGY:                                            --------------------------------------------------------------    PHYSICAL EXAM:  GENERAL: NAD, lying in bed comfortably  HEAD:  Atraumatic, Normocephalic  CHEST/LUNG: pt with trach, breathing on own today  HEART: regular rate and rhythm; No murmurs, rubs, or gallops  ABDOMEN: Bowel sounds present; Soft, Nontender, Nondistended.    EXTREMITIES: Warm. No clubbing, cyanosis, or edema  NERVOUS SYSTEM:  Alert & Oriented                                           --------------------------------------------------------------

## 2023-07-06 NOTE — CONSULT NOTE ADULT - SUBJECTIVE AND OBJECTIVE BOX
PAIN MANAGEMENT CONSULT NOTE    Chief Complaint:    HPI:  42 year-old female with PMH of Asthma, HTN? presents with complaint of cough x3 days and SOB. During my encounter pt with dyspnea and increased work of breathing and chest pain, unable to properly provide accurate history. She states that her cough has been progressively worsening over the past 3 days, endorses hemoptysis since yesterday. She states she had a friend who came over and stayed with for more than a week who was sick recently, but she does not know if she recently travelled out of [facility] or not. She also admits that her children has been sick with Sore throat and fever for past 3 days. She admits to fever, chills, sever Chest pain which has gotten worse since she came to the ED. She took 1x Tamiflu yesterday. She otherwise denies diarrhea, constipation, urinary symptoms. She is not vaccinated for Influenza or COVID-19.     In ED  /87, , RR 28, T 100.8 F, 98% on 15 L NRB  Labs significant for wbc 16.7K with Neutrophilic Predominance and L shift, Hgb 11.4, MCV 61.6, Cr 2.0, BUN 23, Alk Phos 125/AST 62/ALT 27, Lac 4.9> 3.9  RVP + Influenza B      CT Angio Chest PE Protocol w/ IV Cont   1. Bilateral patchy groundglass nodularity with dominant confluent left lower lobe opacification with numerous cavitary lesions. Additional contralateral right lower lobe cavitary 1 cm nodule. Findings compatible with cavitary pneumonia in the appropriate clinical setting; differential diagnosis includes tuberculosis.  2. Confluent ill-defined left hilar and mediastinal adenopathy, likely reactive, without dominant lymph node delineated.  3. No evidence of pulmonary embolism.    s/p 1x Rocephin, Meropenem, 3 L LR in ED    Pt admitted to SDU for further managements, during my encounter pt is very agitated, in acute distress. Pt received appropriate pain control with IV Morphine, s/p Xanax 1 mg 1x for agitation and anxiety, despite pain control and adequate fluid resuscitation pt remained tachycardic, tachypneic. STAT EKG reviewed with Cardiology team on call After discussion with Cardiology team, Pt received 1x Adenosine 6 mg IV push with no change. Case discussed with Critical Care team, decision was made for Intubation and Pt upgraded to MICU.        PAST MEDICAL & SURGICAL HISTORY:      FAMILY HISTORY:      SOCIAL HISTORY:  [ ] Denies Smoking, Alcohol, or Drug Use    HOME MEDICATIONS:   Please refer to initial HNP    PAIN HOME MEDICATIONS:    Allergies    aspirin (Short breath; Anaphylaxis)  Bananas (Unknown)    Intolerances        PAIN MEDICATIONS:  acetaminophen     Tablet .. 650 milliGRAM(s) Oral every 6 hours PRN  clonazePAM  Tablet 0.5 milliGRAM(s) Oral three times a day PRN  methadone    Tablet 5 milliGRAM(s) Oral daily  oxycodone    5 mG/acetaminophen 325 mG 1 Tablet(s) Oral/Enteral Tube every 6 hours PRN    Heme:  apixaban 5 milliGRAM(s) Oral every 12 hours    Antibiotics:  metroNIDAZOLE    Tablet 500 milliGRAM(s) Oral every 8 hours  vancomycin  IVPB 750 milliGRAM(s) IV Intermittent every 12 hours    Cardiovascular:  aMIOdarone    Tablet 100 milliGRAM(s) Oral daily  diltiazem    Tablet 60 milliGRAM(s) Oral every 6 hours    GI:  aluminum hydroxide/magnesium hydroxide/simethicone Suspension 30 milliLiter(s) Oral every 4 hours PRN  famotidine    Tablet 20 milliGRAM(s) Oral daily  senna 2 Tablet(s) Oral at bedtime    Endocrine:  insulin lispro (ADMELOG) corrective regimen sliding scale   SubCutaneous every 6 hours    All Other Medications:  chlorhexidine 2% Cloths 1 Application(s) Topical <User Schedule>  ferrous    sulfate Liquid 300 milliGRAM(s) Enteral Tube daily  nystatin Cream 1 Application(s) Topical two times a day  petrolatum white Ointment 1 Application(s) Topical two times a day  vitamin A &amp; D Ointment 1 Application(s) Topical daily      Vital Signs Last 24 Hrs  T(C): 36.6 (05 Jul 2023 22:00), Max: 36.8 (05 Jul 2023 14:00)  T(F): 97.8 (05 Jul 2023 22:00), Max: 98.2 (05 Jul 2023 14:00)  HR: 91 (06 Jul 2023 06:47) (63 - 96)  BP: 94/53 (06 Jul 2023 06:47) (94/53 - 131/72)  BP(mean): 70 (06 Jul 2023 06:47) (70 - 94)  RR: 18 (05 Jul 2023 22:00) (16 - 18)  SpO2: 100% (06 Jul 2023 02:14) (99% - 100%)    Parameters below as of 05 Jul 2023 14:00  Patient On (Oxygen Delivery Method): BiPAP/CPAP        LABS:                        8.2    11.72 )-----------( 548      ( 05 Jul 2023 07:54 )             26.7     07-05    133<L>  |  94<L>  |  16  ----------------------------<  108<H>  5.3<H>   |  27  |  0.9    Ca    9.6      05 Jul 2023 16:43  Mg     2.1     07-05        Urinalysis Basic - ( 05 Jul 2023 16:43 )    Color: x / Appearance: x / SG: x / pH: x  Gluc: 108 mg/dL / Ketone: x  / Bili: x / Urobili: x   Blood: x / Protein: x / Nitrite: x   Leuk Esterase: x / RBC: x / WBC x   Sq Epi: x / Non Sq Epi: x / Bacteria: x        RADIOLOGY:  xray KUB  IMPRESSION:    Nonobstructive bowel gas pattern. There is gas throughout much of the   colon. There is stool seen in the right colon and pelvis.    CXR:  Impression:    Retrocardiac opacity. Support devices as described.      GENERAL: NAD, lying in bed comfortably  HEAD:  Atraumatic, Normocephalic  CHEST/LUNG: pt with trach on cpap  HEART: regular rate and rhythm; No murmurs, rubs, or gallops  ABDOMEN: Bowel sounds present; Soft, Nontender, Nondistended.    NERVOUS SYSTEM:  Alert & Oriented.       ASSESSMENT:   HPI:  42 year-old female with PMH of Asthma, HTN? presents with complaint of cough x3 days and SOB. During my encounter pt with dyspnea and increased work of breathing and chest pain, unable to properly provide accurate history. She states that her cough has been progressively worsening over the past 3 days, endorses hemoptysis since yesterday. She states she had a friend who came over and stayed with for more than a week who was sick recently, but she does not know if she recently travelled out of [facility] or not. She also admits that her children has been sick with Sore throat and fever for past 3 days. She admits to fever, chills, sever Chest pain which has gotten worse since she came to the ED. She took 1x Tamiflu yesterday. She otherwise denies diarrhea, constipation, urinary symptoms. She is not vaccinated for Influenza or COVID-19.     In ED  /87, , RR 28, T 100.8 F, 98% on 15 L NRB  Labs significant for wbc 16.7K with Neutrophilic Predominance and L shift, Hgb 11.4, MCV 61.6, Cr 2.0, BUN 23, Alk Phos 125/AST 62/ALT 27, Lac 4.9> 3.9  RVP + Influenza B      CT Angio Chest PE Protocol w/ IV Cont   1. Bilateral patchy groundglass nodularity with dominant confluent left lower lobe opacification with numerous cavitary lesions. Additional contralateral right lower lobe cavitary 1 cm nodule. Findings compatible with cavitary pneumonia in the appropriate clinical setting; differential diagnosis includes tuberculosis.  2. Confluent ill-defined left hilar and mediastinal adenopathy, likely reactive, without dominant lymph node delineated.  3. No evidence of pulmonary embolism.    s/p 1x Rocephin, Meropenem, 3 L LR in ED    PPt is currently admitted with the primary diagnosis of  Pneumonia due to infectious organism    consulted for methadone taper plan    Recommendations  The patient is already essentially tapered down and now using one 5mg methadone daily.   The methadone can be stopped and oxycodone-acetaminophen 5-325mg tab can be ordered q6h PRN. Methadone morphine equivalent conversion to other narcotics is not very reliable and predictable. There is data to support about 15 MME per 5mg methadone. Oxycodone 5-325mg q6h PRN is adequate and should reliable control pain once methadone is removed.  f/u primary care team  The patient can also use non-opioid analgesics including gabapentin 300mg q8h standing, and lidocaine patches to painful area q12h  Bowel regimen: miralax / colace  Nausea ppx: zofran standing  Physical therapy

## 2023-07-06 NOTE — PROGRESS NOTE ADULT - TIME BILLING
time spent on review of labs, imaging studies, old records, obtaining history, personally examining patient, counselling and communicating with patient, entering orders for medications/tests/etc, discussions with other health care providers, documentation in electronic health records, independent interpretation of labs, imaging/procedure results and care coordination.
AF

## 2023-07-06 NOTE — PROGRESS NOTE ADULT - ASSESSMENT
42F w/ pmh of asthma and htn admitted 5/24 with ahrh secondary to MRSA PNA.  Pt's hospital course complicated by new Afib, and need for trach/PEG    Impression:  new onset Afib.  Pt currently in NSR  HTN  Asthma    Plan:  - Cont Amiodarone 100 mg daily to maintain NSR  - Cont OAC for stroke prevention  - cont to monitor LFTS/TFT's  - Recall EP as needed 1060 42F w/ pmh of asthma and htn admitted 5/24 with ahrh secondary to MRSA PNA.  Pt's hospital course complicated by new Afib, and need for trach/PEG    Impression:  new onset Afib.  Pt currently in NSR  HTN  Asthma    Plan:  - Qtc slightly prolonged at 520  - Can d/c Amiodarone and monitor pt  - Recall EP as needed 2583 42F w/ pmh of asthma and htn admitted 5/24 with ahrh secondary to MRSA PNA.  Pt's hospital course complicated by new Afib, and need for trach/PEG    Impression:  new onset Afib in setting of pneumobnia.  Pt currently in NSR  HTN  Asthma    Plan:  - Qtc slightly prolonged at 520  - Can d/c Amiodarone and monitor pt  - Recall EP as needed 2392

## 2023-07-06 NOTE — PHARMACOTHERAPY INTERVENTION NOTE - COMMENTS
Recommended obtaining a vancomycin level today at 1100 to ensure vancomycin dose of 750mg IV q12h is still appropriate since SCr is uptrending.

## 2023-07-06 NOTE — PHARMACOTHERAPY INTERVENTION NOTE - INTERVENTION TYPE RECOOMEND
Pharmacokinetics Evaluation
Route of Administration Change
Therapy Discontinuation Recommended - No indication
Therapy Recommended - Alternative treatment
Therapy Recommended - Alternative treatment
Pharmacokinetics Evaluation
Pharmacokinetics Evaluation
Therapy Recommended - Additional therapy
Therapy Recommended - Alternative treatment
Timing/Frequency of Administration Recommended
Dose Optimization/Non-renal Dose Adjustment
Dose Optimization/Non-renal Dose Adjustment
Pharmacokinetics Evaluation
Therapy Recommended - Additional therapy
Therapy Recommended - Med Rec related
Timing/Frequency of Administration Recommended
Timing/Frequency of Administration Recommended
Pharmacokinetics Evaluation
Route of Administration Change
Therapy Discontinuation Recommended - No indication
Therapy Recommended - Additional therapy
Pharmacokinetics Evaluation

## 2023-07-06 NOTE — SWALLOW BEDSIDE ASSESSMENT ADULT - SWALLOW EVAL: SECRETION MANAGEMENT
+pt requiring frequent suctioning as per RN (in-line tracheal suctioning performed prior to placement of speaking valve) +pt requiring frequent suctioning as per RN; (in-line tracheal suctioning performed prior to placement of speaking valve)

## 2023-07-06 NOTE — SWALLOW BEDSIDE ASSESSMENT ADULT - SLP PERTINENT HISTORY OF CURRENT PROBLEM
Pt is a 41 y/o F w/ PMHx: asthma, HTN c/w acute Influenza B viral pneumonia superimposed on cavitary MRSA pneumonia, acute hypoxic respiratory failure requiring intubation. Pt was initially admitted to the ICU. Pneumonia was c/b loculated parapneumonic effusion s/p chest tube placement/removal. s/p Tracheostomy and PEG placement. Pt's hospital course was c/b MRSA bacteremia. MASTER was negative for vegetations. Her stay was also c/b new onset afib w/ RVR. Pt also had LANCE that improved with no need for RRT. Pt transferred to SDU and to vent unit to be treated for acute hypoxemic respiratory failure s/p trach and PEG, severe cavitary CAP MRSA, small parapneumonic effusion (s/p chest tube removed), MRSA bacteremia, Influenza B, asthma, Hyperkalemia, microcytic anemia likely anemia of chronic disease, new onset afib w/ RVR. Pt was intubated 5/23, self-extubated 5/25, re-intubated 5/25; SBT failed, s/p trach/G tube. Pt s/p wean off trial on CPAP 7/5/23, s/p tapered trach to collar 7/6/23. Pt is a 41 y/o F w/ PMHx: asthma, HTN, p/w SOB. +Acute Influenza B viral PNA superimposed on cavitary MRSA PNA, AHRF requiring intubation. Pt was initially admitted to the ICU. Pneumonia was c/b loculated parapneumonic effusion s/p chest tube placement/removal. Pt w/ prolonged intubation, unable to be weaned from vent, s/p Tracheostomy and PEG placement on 6/14. Pt's hospital course was c/b MRSA bacteremia. Her stay was also c/b new onset afib w/ RVR. Pt also had LANCE that improved with no need for RRT. Pt s/p CPAP trial on 7/5/23, weaned to t-piece 7/6/23. CTH (-). Pt is a 43 y/o F w/ PMHx: asthma, HTN?, p/w c/o cough x3 days and SOB. Pt w/ prolonged hospital course, AHRF requiring intubation, acute Influenza B PNA, cavitary MRSA PNA, bacteremia; PNA further c/b loculated parapneumonic effusion s/p chest tube placement/removal. Stay further c/g afib w/ RVR, LANCE. S/p trach and PEG 6/14. CTH (-).

## 2023-07-06 NOTE — PROGRESS NOTE ADULT - NS ATTEND AMEND GEN_ALL_CORE FT
CT surgery attending note    Covering for Dr. Paulie Salamanca    Patient seen and examined on rounds along with the resident    In summary the patient is a 42-year-old lady admitted with bilateral bronchopneumonia with cavitary lesions.    Yesterday we placed a left pigtail catheter for small pleural effusion in the setting of extensive pneumonia which is worse on the left side than on the right side.    She is currently intubated and sedated being managed by the intensive care team on antibiotics.    The chest x-ray and imaging were reviewed and currently the suggestion would be to keep the chest tube to suction.  Further management as per the ICU team and this was discussed with them.
Agree with plan as above

## 2023-07-06 NOTE — SPEAKING VALVE EVALUATION - SLP PERTINENT HISTORY OF CURRENT PROBLEM
42F w/ pmh of asthma and htn admitted 5/24 with ahrh secondary to MRSA PNA.  Pt's hospital course complicated by new Afib, and need for trach/PEG. Pt intubated 5/23, self extubated 5/25, reintubated 5/25; prolonged mechanical ventilation, s/p trach/PEG on 6/12, weaned off mechanical ventilation to t-piece on 7/06.

## 2023-07-06 NOTE — PROGRESS NOTE ADULT - ASSESSMENT
IMPRESSION:    Acute hypoxemic respiratory failure sp trach   Severe CAP secondary to MRSA  Small parapneumonic effusion SP Chest tube and removal  MRSA bacteremia resolved   MASTER negative   Anemia sp transfusion   New onset A FIB rate controlled   LANCE improved   HO asthma   Influenza B     PLAN:    CNS:  Continue Methadone 5q 12 and slowly wean.  Can start weaning Klonopin.  Zyprexa 5 .  Monitor QTC.       HEENT: Oral care. Trach care.     PULMONARY:  HOB @ 45 degrees.  Aspiration precautions.  No change in vent settings.  Keep SaO2 92 TO 96%.  PS as tolerated.  Speach valve.      CARDIOVASCULAR:    Avoid overload, Continue rate control.     GI: GI prophylaxis. PEG Feeding. Bowel regimen     RENAL:  Follow up lytes.  Correct as needed.     INFECTIOUS DISEASE: ABX Per ID.  FU Vancomycin levels.      MATOLOGICAL:  DVT prophylaxis.  On Eliquis     ENDOCRINE:  Follow up FS.  Insulin protocol if needed.      Prognosis is poor overall .     Vent unit / DC planing

## 2023-07-06 NOTE — SPEAKING VALVE EVALUATION - DIAGNOSTIC IMPRESSIONS
Pt tolerated speaking valve for 25 minutes without episodes of desaturation or increased work of breathing. Sp02 remained at 100% throughout assessment with presence of strong voicing.

## 2023-07-06 NOTE — PROGRESS NOTE ADULT - SUBJECTIVE AND OBJECTIVE BOX
Patient is a 42y old  Female who presents with a chief complaint of AHRF (06 Jul 2023 15:24)        Over Night Events:  Tolerating trach colar. Off pressors.          ROS:     All ROS are negative except HPI         PHYSICAL EXAM    ICU Vital Signs Last 24 Hrs  T(C): 36.9 (06 Jul 2023 13:31), Max: 36.9 (06 Jul 2023 13:31)  T(F): 98.4 (06 Jul 2023 13:31), Max: 98.4 (06 Jul 2023 13:31)  HR: 105 (06 Jul 2023 13:31) (63 - 119)  BP: 128/62 (06 Jul 2023 13:31) (94/53 - 128/62)  BP(mean): 70 (06 Jul 2023 06:47) (70 - 94)  ABP: --  ABP(mean): --  RR: 19 (06 Jul 2023 13:31) (18 - 19)  SpO2: 99% (06 Jul 2023 13:31) (99% - 100%)    O2 Parameters below as of 06 Jul 2023 13:31  Patient On (Oxygen Delivery Method): T-piece            CONSTITUTIONAL:  Well nourished.  NAD    ENT:   Airway patent,   Mouth with normal mucosa.   Trach     EYES:   Pupils equal,   Round and reactive to light.    CARDIAC:   Normal rate,   Regular rhythm.    No edema    RESPIRATORY:   No wheezing  Bilateral BS  Normal chest expansion  Not tachypneic,  No use of accessory muscles    GASTROINTESTINAL:  Abdomen soft,   Non-tender,   No guarding,   + BS    MUSCULOSKELETAL:   Range of motion is not limited,  No clubbing, cyanosis    NEUROLOGICAL:   Alert   No motor  deficits.    SKIN:   Skin normal color for race,   No evidence of rash.          LABS:                            8.7    12.42 )-----------( 554      ( 06 Jul 2023 12:34 )             28.2                                               07-06    131<L>  |  91<L>  |  17  ----------------------------<  102<H>  4.9   |  29  |  0.9    Ca    9.6      06 Jul 2023 12:34  Mg     2.2     07-06    TPro  7.5  /  Alb  3.1<L>  /  TBili  <0.2  /  DBili  x   /  AST  23  /  ALT  18  /  AlkPhos  117<H>  07-06                                             Urinalysis Basic - ( 06 Jul 2023 12:34 )    Color: x / Appearance: x / SG: x / pH: x  Gluc: 102 mg/dL / Ketone: x  / Bili: x / Urobili: x   Blood: x / Protein: x / Nitrite: x   Leuk Esterase: x / RBC: x / WBC x   Sq Epi: x / Non Sq Epi: x / Bacteria: x                                                  LIVER FUNCTIONS - ( 06 Jul 2023 12:34 )  Alb: 3.1 g/dL / Pro: 7.5 g/dL / ALK PHOS: 117 U/L / ALT: 18 U/L / AST: 23 U/L / GGT: x                                                                                               Mode: AC/ CMV (Assist Control/ Continuous Mandatory Ventilation)  RR (machine): 16  TV (machine): 360  FiO2: 40  PEEP: 8  ITime: 1  MAP: 12  PIP: 27                                          MEDICATIONS  (STANDING):  aMIOdarone    Tablet 100 milliGRAM(s) Oral daily  apixaban 5 milliGRAM(s) Oral every 12 hours  chlorhexidine 2% Cloths 1 Application(s) Topical <User Schedule>  diltiazem    Tablet 30 milliGRAM(s) Oral every 6 hours  famotidine    Tablet 20 milliGRAM(s) Oral daily  ferrous    sulfate Liquid 300 milliGRAM(s) Enteral Tube daily  insulin lispro (ADMELOG) corrective regimen sliding scale   SubCutaneous every 6 hours  methadone    Tablet 5 milliGRAM(s) Oral daily  metroNIDAZOLE    Tablet 500 milliGRAM(s) Oral every 8 hours  nystatin Cream 1 Application(s) Topical two times a day  petrolatum white Ointment 1 Application(s) Topical two times a day  senna 2 Tablet(s) Oral at bedtime  vitamin A &amp; D Ointment 1 Application(s) Topical daily    MEDICATIONS  (PRN):  acetaminophen     Tablet .. 650 milliGRAM(s) Oral every 6 hours PRN Temp greater or equal to 38C (100.4F)  aluminum hydroxide/magnesium hydroxide/simethicone Suspension 30 milliLiter(s) Oral every 4 hours PRN Dyspepsia  clonazePAM  Tablet 0.5 milliGRAM(s) Oral three times a day PRN For Agitation only  oxycodone    5 mG/acetaminophen 325 mG 1 Tablet(s) Oral/Enteral Tube every 6 hours PRN Severe Pain (7 - 10)      New X-rays reviewed:                                                                                  ECHO    CXR interpreted by me:

## 2023-07-06 NOTE — SPEAKING VALVE EVALUATION - RECOMMENDATIONS
Suction prior to speaking valve placement, remove when sleeping, wash valve with soap and water. Supervision with RN/staff for placement and observed tolerance. Do not place valve without supervision.

## 2023-07-06 NOTE — PHARMACOTHERAPY INTERVENTION NOTE - COMMENTS
Patient on vancomycin 750mg IV q12h for the treatment of MRSA bacteremia secondary to pneumonia. Vancomycin level on 7/6 at 1234 was 24.2 mg/L. Per [employer] Bayesian vancomycin dosing software, current regimen of vancomycin 750mg IV q12h predicts a supratherapeutic steady-state AUC/RUSSELL of 671.81 mg/L*hr (goal 400-600). A reduced regimen of vancomycin 500mg IV q12h predicts a therapeutic steady-state AUC/RUSSELL of 447.87 mg/L*hr. Recommended holding vancomycin until 7/7 at 0600, and then initiating vancomycin 500mg IV q12h. Recommended obtaining a repeat vancomycin level on 7/8 at 1600, prior to the fourth dose of the new regimen.

## 2023-07-06 NOTE — PROGRESS NOTE ADULT - ASSESSMENT
42 year-old female with PMH of Asthma, HTN? presents with complaint of cough x3 days and SOB. In the ED was found to have Bilateral patchy groundglass nodularity with dominant confluent left lower lobe opacification with numerous cavitary lesions.Trach and PEG done for inability to wean off vent, currently off sedation. Her stay was also complicated by new onset Afib with RVR for which she is on amiodarone, diltiazem, and therapeutic anticoagulation.    #Acute hypoxemic respiratory failure / s/P Trach and PEG    #Severe cavitary CAP MRSA  #Small parapneumonic effusion SP Chest tube  #MRSA bacteremia  #Influenza B   #hx of Asthma   #Intubated 5/23 self-extubated 5/25, re-intubated 5/25; SBT failed, SP trach/G tube  - s/p Zyvox, meropenem and Tamifu, now on Vancomycin and flagyl, anticipated end date 7/9  - monitor levels per ID pharmacy   - s/p course of steroids now off  -  blood cultures  NGTD SINCE 5/28   - s/p MASTER 5/31: No evidence of valvular vegetations or intracardiac abscesses to support IE. Pulmonary hepatisation incidental finding. EF 65%, mild TR and pulm HTN  - ventilator management per pulm/cc,  - Methadone to 5 mg via peg once daily x 2 more days than d/c, --> transition to percocet   - d/c  olanzapine  - pt breathing on own today with t-collar     # Hyperkalemia  - s/p Lokelma 10 mg x 1 dose today, f/up repeated BMP at 4  - s/p lokelma 5mg 1 dose overnight 7/5 - f/u 11am BMP     #Maculopapular buttocks rash  -PCR skin scraping negative for HSV --> off  Acyclovir   -Wound care per burn, wound care RN following     #Microcytic Anemia likely  anemia of chronic disease   - s/p 6 units prbc so far last 6/5  - on Eliquis 5mg Q12H   - monitor hemoglobin - hgb 8.2    #New onset A-fib w/ RVR  - HR better controlled now  - s/p Amio drip, now on PO - EP consult to assess amiodarone need  - Diltiazem 30mg q6 with holding parameters - if  or less or HR 60 or less  - on eliquis  - f/u TSH   - free T3 and free t4 normal    #Anxiety  - continue low dose Klonopin 0.25 mg via peg every 8 hours prn. tx.  - d/c restraints     Handoff: repeat BMP today to f/up K level, continue monitor resp status off of vent, continue IV Vanco till 7/9/23, taper down Methadone and d/c in 48 hours. f/u  TSH,

## 2023-07-07 LAB
ALBUMIN SERPL ELPH-MCNC: 3.2 G/DL — LOW (ref 3.5–5.2)
ALBUMIN SERPL ELPH-MCNC: 3.3 G/DL — LOW (ref 3.5–5.2)
ALP SERPL-CCNC: 106 U/L — SIGNIFICANT CHANGE UP (ref 30–115)
ALP SERPL-CCNC: 116 U/L — HIGH (ref 30–115)
ALT FLD-CCNC: 17 U/L — SIGNIFICANT CHANGE UP (ref 0–41)
ALT FLD-CCNC: 18 U/L — SIGNIFICANT CHANGE UP (ref 0–41)
ANION GAP SERPL CALC-SCNC: 10 MMOL/L — SIGNIFICANT CHANGE UP (ref 7–14)
ANION GAP SERPL CALC-SCNC: 9 MMOL/L — SIGNIFICANT CHANGE UP (ref 7–14)
AST SERPL-CCNC: 22 U/L — SIGNIFICANT CHANGE UP (ref 0–41)
AST SERPL-CCNC: 28 U/L — SIGNIFICANT CHANGE UP (ref 0–41)
BILIRUB SERPL-MCNC: 0.2 MG/DL — SIGNIFICANT CHANGE UP (ref 0.2–1.2)
BILIRUB SERPL-MCNC: 0.2 MG/DL — SIGNIFICANT CHANGE UP (ref 0.2–1.2)
BLD GP AB SCN SERPL QL: SIGNIFICANT CHANGE UP
BUN SERPL-MCNC: 15 MG/DL — SIGNIFICANT CHANGE UP (ref 10–20)
BUN SERPL-MCNC: 16 MG/DL — SIGNIFICANT CHANGE UP (ref 10–20)
CALCIUM SERPL-MCNC: 9.7 MG/DL — SIGNIFICANT CHANGE UP (ref 8.4–10.4)
CALCIUM SERPL-MCNC: 9.7 MG/DL — SIGNIFICANT CHANGE UP (ref 8.4–10.4)
CHLORIDE SERPL-SCNC: 92 MMOL/L — LOW (ref 98–110)
CHLORIDE SERPL-SCNC: 94 MMOL/L — LOW (ref 98–110)
CO2 SERPL-SCNC: 28 MMOL/L — SIGNIFICANT CHANGE UP (ref 17–32)
CO2 SERPL-SCNC: 29 MMOL/L — SIGNIFICANT CHANGE UP (ref 17–32)
CREAT SERPL-MCNC: 0.8 MG/DL — SIGNIFICANT CHANGE UP (ref 0.7–1.5)
CREAT SERPL-MCNC: 0.8 MG/DL — SIGNIFICANT CHANGE UP (ref 0.7–1.5)
EGFR: 94 ML/MIN/1.73M2 — SIGNIFICANT CHANGE UP
EGFR: 94 ML/MIN/1.73M2 — SIGNIFICANT CHANGE UP
GLUCOSE BLDC GLUCOMTR-MCNC: 104 MG/DL — HIGH (ref 70–99)
GLUCOSE BLDC GLUCOMTR-MCNC: 107 MG/DL — HIGH (ref 70–99)
GLUCOSE BLDC GLUCOMTR-MCNC: 112 MG/DL — HIGH (ref 70–99)
GLUCOSE BLDC GLUCOMTR-MCNC: 99 MG/DL — SIGNIFICANT CHANGE UP (ref 70–99)
GLUCOSE SERPL-MCNC: 100 MG/DL — HIGH (ref 70–99)
GLUCOSE SERPL-MCNC: 109 MG/DL — HIGH (ref 70–99)
HCT VFR BLD CALC: 28.4 % — LOW (ref 37–47)
HGB BLD-MCNC: 8.6 G/DL — LOW (ref 12–16)
MAGNESIUM SERPL-MCNC: 2.1 MG/DL — SIGNIFICANT CHANGE UP (ref 1.8–2.4)
MCHC RBC-ENTMCNC: 23.9 PG — LOW (ref 27–31)
MCHC RBC-ENTMCNC: 30.3 G/DL — LOW (ref 32–37)
MCV RBC AUTO: 78.9 FL — LOW (ref 81–99)
NRBC # BLD: 0 /100 WBCS — SIGNIFICANT CHANGE UP (ref 0–0)
PLATELET # BLD AUTO: 586 K/UL — HIGH (ref 130–400)
PMV BLD: 9.1 FL — SIGNIFICANT CHANGE UP (ref 7.4–10.4)
POTASSIUM SERPL-MCNC: 4.5 MMOL/L — SIGNIFICANT CHANGE UP (ref 3.5–5)
POTASSIUM SERPL-MCNC: 5.5 MMOL/L — HIGH (ref 3.5–5)
POTASSIUM SERPL-SCNC: 4.5 MMOL/L — SIGNIFICANT CHANGE UP (ref 3.5–5)
POTASSIUM SERPL-SCNC: 5.5 MMOL/L — HIGH (ref 3.5–5)
PROT SERPL-MCNC: 7.5 G/DL — SIGNIFICANT CHANGE UP (ref 6–8)
PROT SERPL-MCNC: 7.5 G/DL — SIGNIFICANT CHANGE UP (ref 6–8)
RBC # BLD: 3.6 M/UL — LOW (ref 4.2–5.4)
RBC # FLD: 26.9 % — HIGH (ref 11.5–14.5)
SODIUM SERPL-SCNC: 129 MMOL/L — LOW (ref 135–146)
SODIUM SERPL-SCNC: 133 MMOL/L — LOW (ref 135–146)
WBC # BLD: 10.49 K/UL — SIGNIFICANT CHANGE UP (ref 4.8–10.8)
WBC # FLD AUTO: 10.49 K/UL — SIGNIFICANT CHANGE UP (ref 4.8–10.8)

## 2023-07-07 PROCEDURE — 99233 SBSQ HOSP IP/OBS HIGH 50: CPT

## 2023-07-07 RX ORDER — SODIUM ZIRCONIUM CYCLOSILICATE 10 G/10G
10 POWDER, FOR SUSPENSION ORAL ONCE
Refills: 0 | Status: COMPLETED | OUTPATIENT
Start: 2023-07-07 | End: 2023-07-07

## 2023-07-07 RX ORDER — METRONIDAZOLE 500 MG
500 TABLET ORAL EVERY 8 HOURS
Refills: 0 | Status: COMPLETED | OUTPATIENT
Start: 2023-07-07 | End: 2023-07-09

## 2023-07-07 RX ORDER — SALICYLIC ACID 0.5 %
1 CLEANSER (GRAM) TOPICAL DAILY
Refills: 0 | Status: DISCONTINUED | OUTPATIENT
Start: 2023-07-07 | End: 2023-07-17

## 2023-07-07 RX ORDER — VANCOMYCIN HCL 1 G
500 VIAL (EA) INTRAVENOUS EVERY 12 HOURS
Refills: 0 | Status: COMPLETED | OUTPATIENT
Start: 2023-07-07 | End: 2023-07-09

## 2023-07-07 RX ADMIN — Medication 500 MILLIGRAM(S): at 05:05

## 2023-07-07 RX ADMIN — APIXABAN 5 MILLIGRAM(S): 2.5 TABLET, FILM COATED ORAL at 17:26

## 2023-07-07 RX ADMIN — Medication 300 MILLIGRAM(S): at 12:10

## 2023-07-07 RX ADMIN — Medication 0.5 MILLIGRAM(S): at 00:36

## 2023-07-07 RX ADMIN — NYSTATIN CREAM 1 APPLICATION(S): 100000 CREAM TOPICAL at 05:05

## 2023-07-07 RX ADMIN — CHLORHEXIDINE GLUCONATE 1 APPLICATION(S): 213 SOLUTION TOPICAL at 05:04

## 2023-07-07 RX ADMIN — NYSTATIN CREAM 1 APPLICATION(S): 100000 CREAM TOPICAL at 17:26

## 2023-07-07 RX ADMIN — Medication 1 TABLET(S): at 12:10

## 2023-07-07 RX ADMIN — Medication 1 APPLICATION(S): at 05:44

## 2023-07-07 RX ADMIN — Medication 1 APPLICATION(S): at 12:10

## 2023-07-07 RX ADMIN — Medication 30 MILLIGRAM(S): at 05:05

## 2023-07-07 RX ADMIN — APIXABAN 5 MILLIGRAM(S): 2.5 TABLET, FILM COATED ORAL at 05:05

## 2023-07-07 RX ADMIN — SODIUM ZIRCONIUM CYCLOSILICATE 10 GRAM(S): 10 POWDER, FOR SUSPENSION ORAL at 16:16

## 2023-07-07 RX ADMIN — Medication 100 MILLIGRAM(S): at 05:06

## 2023-07-07 RX ADMIN — Medication 30 MILLIGRAM(S): at 12:10

## 2023-07-07 RX ADMIN — Medication 100 MILLIGRAM(S): at 17:26

## 2023-07-07 RX ADMIN — Medication 30 MILLIGRAM(S): at 17:26

## 2023-07-07 RX ADMIN — FAMOTIDINE 20 MILLIGRAM(S): 10 INJECTION INTRAVENOUS at 12:10

## 2023-07-07 RX ADMIN — Medication 1 APPLICATION(S): at 17:25

## 2023-07-07 RX ADMIN — AMIODARONE HYDROCHLORIDE 100 MILLIGRAM(S): 400 TABLET ORAL at 05:06

## 2023-07-07 RX ADMIN — SODIUM ZIRCONIUM CYCLOSILICATE 10 GRAM(S): 10 POWDER, FOR SUSPENSION ORAL at 12:17

## 2023-07-07 RX ADMIN — Medication 500 MILLIGRAM(S): at 21:05

## 2023-07-07 RX ADMIN — Medication 500 MILLIGRAM(S): at 14:38

## 2023-07-07 NOTE — PROGRESS NOTE ADULT - ASSESSMENT
IMPRESSION:    Acute hypoxemic respiratory failure sp trach   Severe CAP secondary to MRSA  Small parapneumonic effusion SP Chest tube and removal  MRSA bacteremia resolved   MASTER negative   Anemia sp transfusion   New onset A FIB rate controlled   LANCE improved   HO asthma   Influenza B     PLAN:    CNS:  Wean Methadone slowly. Weaning Klonopin.  Zyprexa 5 .  Monitor QTC.       HEENT: Oral care. Trach care.     PULMONARY:  HOB @ 45 degrees.  Aspiration precautions.  No change in vent settings.  Keep SaO2 92 TO 96%.  PS / trach collar as tolerated.  Speech valve.  Possible decanulation next week       CARDIOVASCULAR:    Avoid overload, Continue rate control.     GI: GI prophylaxis. PEG Feeding. Bowel regimen     RENAL:  Follow up lytes.  Correct as needed.     INFECTIOUS DISEASE: ABX Per ID.  FU Vancomycin levels.      MATOLOGICAL:  DVT prophylaxis.  On Eliquis     ENDOCRINE:  Follow up FS.  Insulin protocol if needed.      Prognosis is poor overall .     Vent unit / DC planing

## 2023-07-07 NOTE — SWALLOW FEES ASSESSMENT ADULT - ROSENBEK'S PENETRATION ASPIRATION SCALE
(1) no aspiration, material does not enter airway delayed cough response/(7) material passes glottis, visible subglottic residue remains despite patient’s response (aspiration)

## 2023-07-07 NOTE — PROGRESS NOTE ADULT - SUBJECTIVE AND OBJECTIVE BOX
ZEYNEP ROSSI 42y Female  MRN#: [id number]     Hospital Day: 45d    Pt is currently admitted with the primary diagnosis of  Pneumonia due to infectious organism        SUBJECTIVE     Overnight events  None    Subjective complaints  Pt was evaluated this am. Patient denied any active complaints and per patient his symptoms are improving                                            ----------------------------------------------------------  OBJECTIVE  PAST MEDICAL & SURGICAL HISTORY                                            -----------------------------------------------------------  ALLERGIES:  aspirin (Short breath; Anaphylaxis)  Bananas (Unknown)                                            ------------------------------------------------------------    HOME MEDICATIONS  Home Medications:                           MEDICATIONS:  STANDING MEDICATIONS  apixaban 5 milliGRAM(s) Oral every 12 hours  chlorhexidine 2% Cloths 1 Application(s) Topical <User Schedule>  diltiazem    Tablet 30 milliGRAM(s) Oral every 6 hours  famotidine    Tablet 20 milliGRAM(s) Oral daily  ferrous    sulfate Liquid 300 milliGRAM(s) Enteral Tube daily  insulin lispro (ADMELOG) corrective regimen sliding scale   SubCutaneous every 6 hours  methadone    Tablet 5 milliGRAM(s) Oral daily  metroNIDAZOLE    Tablet 500 milliGRAM(s) Oral every 8 hours  multivitamin 1 Tablet(s) Oral daily  nystatin Cream 1 Application(s) Topical two times a day  petrolatum white Ointment 1 Application(s) Topical two times a day  senna 2 Tablet(s) Oral at bedtime  vancomycin  IVPB 500 milliGRAM(s) IV Intermittent every 12 hours  vitamin A &amp; D Ointment 1 Application(s) Topical daily    PRN MEDICATIONS  acetaminophen     Tablet .. 650 milliGRAM(s) Oral every 6 hours PRN  aluminum hydroxide/magnesium hydroxide/simethicone Suspension 30 milliLiter(s) Oral every 4 hours PRN  clonazePAM  Tablet 0.5 milliGRAM(s) Oral three times a day PRN  oxycodone    5 mG/acetaminophen 325 mG 1 Tablet(s) Oral/Enteral Tube every 6 hours PRN                                            ------------------------------------------------------------  VITAL SIGNS: Last 24 Hours  T(C): 36.1 (06 Jul 2023 21:34), Max: 36.9 (06 Jul 2023 13:31)  T(F): 97 (06 Jul 2023 21:34), Max: 98.4 (06 Jul 2023 13:31)  HR: 112 (07 Jul 2023 05:03) (93 - 119)  BP: 133/79 (07 Jul 2023 05:03) (114/57 - 149/70)  BP(mean): 100 (07 Jul 2023 05:03) (100 - 100)  RR: 18 (06 Jul 2023 21:34) (18 - 19)  SpO2: 100% (07 Jul 2023 00:59) (99% - 100%)      07-06-23 @ 07:01  -  07-07-23 @ 07:00  --------------------------------------------------------  IN: 880 mL / OUT: 0 mL / NET: 880 mL                                             --------------------------------------------------------------  LABS:                        8.7    12.42 )-----------( 554      ( 06 Jul 2023 12:34 )             28.2     07-06    131<L>  |  91<L>  |  17  ----------------------------<  102<H>  4.9   |  29  |  0.9    Ca    9.6      06 Jul 2023 12:34  Mg     2.2     07-06    TPro  7.5  /  Alb  3.1<L>  /  TBili  <0.2  /  DBili  x   /  AST  23  /  ALT  18  /  AlkPhos  117<H>  07-06      Urinalysis Basic - ( 06 Jul 2023 12:34 )    Color: x / Appearance: x / SG: x / pH: x  Gluc: 102 mg/dL / Ketone: x  / Bili: x / Urobili: x   Blood: x / Protein: x / Nitrite: x   Leuk Esterase: x / RBC: x / WBC x   Sq Epi: x / Non Sq Epi: x / Bacteria: x                                                            -------------------------------------------------------------  RADIOLOGY:                                            --------------------------------------------------------------    PHYSICAL EXAM:  GENERAL: NAD, lying in bed comfortably  HEAD:  Atraumatic, Normocephalic  CHEST/LUNG: pt with trach, breathing without vent  HEART: regular rate and rhythm; No murmurs, rubs, or gallops  ABDOMEN: Bowel sounds present; Soft, Nontender, Nondistended.    EXTREMITIES: Warm. No clubbing, cyanosis, or edema  NERVOUS SYSTEM:  Alert & Oriented                                         --------------------------------------------------------------                 ZEYNEP ROSSI 42y Female  MRN#: [id number]     Hospital Day: 45d    Pt is currently admitted with the primary diagnosis of  Pneumonia due to infectious organism        SUBJECTIVE     Overnight events  None    Subjective complaints  Pt was evaluated this am. Patient using t-collar, satting 100% on RA                                            ----------------------------------------------------------  OBJECTIVE  PAST MEDICAL & SURGICAL HISTORY                                            -----------------------------------------------------------  ALLERGIES:  aspirin (Short breath; Anaphylaxis)  Bananas (Unknown)                                            ------------------------------------------------------------    HOME MEDICATIONS  Home Medications:                           MEDICATIONS:  STANDING MEDICATIONS  apixaban 5 milliGRAM(s) Oral every 12 hours  chlorhexidine 2% Cloths 1 Application(s) Topical <User Schedule>  diltiazem    Tablet 30 milliGRAM(s) Oral every 6 hours  famotidine    Tablet 20 milliGRAM(s) Oral daily  ferrous    sulfate Liquid 300 milliGRAM(s) Enteral Tube daily  insulin lispro (ADMELOG) corrective regimen sliding scale   SubCutaneous every 6 hours  methadone    Tablet 5 milliGRAM(s) Oral daily  metroNIDAZOLE    Tablet 500 milliGRAM(s) Oral every 8 hours  multivitamin 1 Tablet(s) Oral daily  nystatin Cream 1 Application(s) Topical two times a day  petrolatum white Ointment 1 Application(s) Topical two times a day  senna 2 Tablet(s) Oral at bedtime  vancomycin  IVPB 500 milliGRAM(s) IV Intermittent every 12 hours  vitamin A &amp; D Ointment 1 Application(s) Topical daily    PRN MEDICATIONS  acetaminophen     Tablet .. 650 milliGRAM(s) Oral every 6 hours PRN  aluminum hydroxide/magnesium hydroxide/simethicone Suspension 30 milliLiter(s) Oral every 4 hours PRN  clonazePAM  Tablet 0.5 milliGRAM(s) Oral three times a day PRN  oxycodone    5 mG/acetaminophen 325 mG 1 Tablet(s) Oral/Enteral Tube every 6 hours PRN                                            ------------------------------------------------------------  VITAL SIGNS: Last 24 Hours  T(C): 36.1 (06 Jul 2023 21:34), Max: 36.9 (06 Jul 2023 13:31)  T(F): 97 (06 Jul 2023 21:34), Max: 98.4 (06 Jul 2023 13:31)  HR: 112 (07 Jul 2023 05:03) (93 - 119)  BP: 133/79 (07 Jul 2023 05:03) (114/57 - 149/70)  BP(mean): 100 (07 Jul 2023 05:03) (100 - 100)  RR: 18 (06 Jul 2023 21:34) (18 - 19)  SpO2: 100% (07 Jul 2023 00:59) (99% - 100%)      07-06-23 @ 07:01  -  07-07-23 @ 07:00  --------------------------------------------------------  IN: 880 mL / OUT: 0 mL / NET: 880 mL                                             --------------------------------------------------------------  LABS:                        8.7    12.42 )-----------( 554      ( 06 Jul 2023 12:34 )             28.2     07-06    131<L>  |  91<L>  |  17  ----------------------------<  102<H>  4.9   |  29  |  0.9    Ca    9.6      06 Jul 2023 12:34  Mg     2.2     07-06    TPro  7.5  /  Alb  3.1<L>  /  TBili  <0.2  /  DBili  x   /  AST  23  /  ALT  18  /  AlkPhos  117<H>  07-06      Urinalysis Basic - ( 06 Jul 2023 12:34 )    Color: x / Appearance: x / SG: x / pH: x  Gluc: 102 mg/dL / Ketone: x  / Bili: x / Urobili: x   Blood: x / Protein: x / Nitrite: x   Leuk Esterase: x / RBC: x / WBC x   Sq Epi: x / Non Sq Epi: x / Bacteria: x                                                            -------------------------------------------------------------  RADIOLOGY:                                            --------------------------------------------------------------    PHYSICAL EXAM:  GENERAL: NAD, lying in bed comfortably  HEAD:  Atraumatic, Normocephalic  CHEST/LUNG: pt with trach, breathing without vent  HEART: regular rate and rhythm; No murmurs, rubs, or gallops  ABDOMEN: Bowel sounds present; Soft, Nontender, Nondistended.    EXTREMITIES: Warm. No clubbing, cyanosis, or edema  NERVOUS SYSTEM:  Alert & Oriented                                         --------------------------------------------------------------

## 2023-07-07 NOTE — SWALLOW FEES ASSESSMENT ADULT - PHARYNGEAL PHASE COMMENTS
pharyngeal swallow is WFL for mildly thick, puree and easy to chew solids w/o penetration/aspiration or notable residue Severe pharyngeal dysphagia for thin liquids

## 2023-07-07 NOTE — PROGRESS NOTE ADULT - SUBJECTIVE AND OBJECTIVE BOX
NUTRITION SUPPORT TEAM  -  PROGRESS NOTE  on trach collar   off pressor  abdomen  soft +g-j ub place    on J-tube feed  s/p FEES     REVIEW OF SYSTEMS:  Negative except as noted above.     VITALS:  T(F): 96.8 (07-07 @ 12:57), Max: 96.8 (07-07 @ 12:57)  HR: 89 (07-07 @ 12:57) (89 - 112)  BP: 114/80 (07-07 @ 12:57) (114/80 - 133/79)  RR: 19 (07-07 @ 12:57) (18 - 19)  SpO2: 100% (07-07 @ 12:57) (99% - 100%)  Mode: AC/ CMV (Assist Control/ Continuous Mandatory Ventilation)  RR (machine): 16  TV (machine): 360  FiO2: 40  PEEP: 8  ITime: 1  MAP: 15  PIP: 19    HEIGHT/WEIGHT/BMI:   Height (cm): 152.4 (07-05)  Weight (kg): 80.4 (07-05)  BMI (kg/m2): 34.6 (07-05)    07-06-23 @ 07:01  -  07-07-23 @ 07:00  --------------------------------------------------------  IN:    Enteral Tube Flush: 100 mL    Jevity 1.2: 780 mL  Total IN: 880 mL    OUT:  Total OUT: 0 mL  Total NET: 880 mL  MEDICATIONS:   acetaminophen     Tablet .. 650 milliGRAM(s) Oral every 6 hours PRN  aluminum hydroxide/magnesium hydroxide/simethicone Suspension 30 milliLiter(s) Oral every 4 hours PRN  apixaban 5 milliGRAM(s) Oral every 12 hours  chlorhexidine 2% Cloths 1 Application(s) Topical <User Schedule>  clonazePAM  Tablet 0.5 milliGRAM(s) Oral three times a day PRN  diltiazem    Tablet 30 milliGRAM(s) Oral every 6 hours  famotidine    Tablet 20 milliGRAM(s) Oral daily  ferrous    sulfate Liquid 300 milliGRAM(s) Enteral Tube daily  metroNIDAZOLE    Tablet 500 milliGRAM(s) Oral every 8 hours  multivitamin 1 Tablet(s) Oral daily  nystatin Cream 1 Application(s) Topical two times a day  oxycodone    5 mG/acetaminophen 325 mG 1 Tablet(s) Oral/Enteral Tube every 6 hours PRN  petrolatum white Ointment 1 Application(s) Topical two times a day  senna 2 Tablet(s) Oral at bedtime  vancomycin  IVPB 500 milliGRAM(s) IV Intermittent every 12 hours  vitamin A &amp; D Ointment 1 Application(s) Topical daily    LABS:                         8.6    10.49 )-----------( 586      ( 07 Jul 2023 08:33 )             28.4     133<L>  |  94<L>  |  16  ----------------------------<  109<H>          (07-07-23 @ 08:33)  5.5<H>   |  29  |  0.8    Ca    9.7          (07-07-23 @ 08:33)  Mg     2.1         (07-07-23 @ 08:33)    TPro  7.5  /  Alb  3.2<L>  /  TBili  0.2  /  DBili  x   /  AST  28  /  ALT  18  /  AlkPhos  116<H>       07-07-23 @ 08:33  Triglycerides, Serum: 147 mg/dL (06-26 @ 04:44)  Blood Glucose (Past 24 hours):  99 mg/dL (07-07 @ 11:24)  112 mg/dL (07-07 @ 05:38)  107 mg/dL (07-06 @ 23:45)  115 mg/dL (07-06 @ 21:13)  115 mg/dL (07-06 @ 17:30)    DIET:   Diet, Soft and Bite Sized:   Tube Feeding Modality: Jejunostomy  Jevity 1.2 Olvin  Total Volume for 24 Hours (mL): 900  Intermittent  Until Goal Tube Feed Rate (mL per Hour): 75  Tube Feeding Hours ON: 12  Tube Feeding OFF (Hours): 12  Tube Feed Start Time: 19:00  Free Water Flush Instructions:  flush JT with 30 ml warm water syringe push before and after feeding cycle - feed via JT from 7pm to 7am daily (07-07-23 @ 15:26) [Active]  RADIOLOGY:   < from: Xray Kidney Ureter Bladder (07.05.23 @ 10:15) >  IMPRESSION:  Nonobstructive bowel gas pattern. There is gas throughout much of the   colon. There is stool seen in the right colon and pelvis.   NUTRITION SUPPORT TEAM  -  PROGRESS NOTE  on trach collar   off pressor  abdomen  soft +G-J in place    on J-tube feedings  s/p FEES - findings noted    REVIEW OF SYSTEMS:  Negative except as noted above.     VITALS:  T(F): 96.8 (07-07 @ 12:57), Max: 96.8 (07-07 @ 12:57)  HR: 89 (07-07 @ 12:57) (89 - 112)  BP: 114/80 (07-07 @ 12:57) (114/80 - 133/79)  RR: 19 (07-07 @ 12:57) (18 - 19)  SpO2: 100% (07-07 @ 12:57) (99% - 100%)  Mode: AC/ CMV (Assist Control/ Continuous Mandatory Ventilation)  RR (machine): 16  TV (machine): 360  FiO2: 40  PEEP: 8  ITime: 1  MAP: 15  PIP: 19    HEIGHT/WEIGHT/BMI:   Height (cm): 152.4 (07-05)  Weight (kg): 80.4 (07-05)  BMI (kg/m2): 34.6 (07-05)    07-06-23 @ 07:01  -  07-07-23 @ 07:00  --------------------------------------------------------  IN:    Enteral Tube Flush: 100 mL    Jevity 1.2: 780 mL  Total IN: 880 mL    OUT:  Total OUT: 0 mL  Total NET: 880 mL  MEDICATIONS:   acetaminophen     Tablet .. 650 milliGRAM(s) Oral every 6 hours PRN  aluminum hydroxide/magnesium hydroxide/simethicone Suspension 30 milliLiter(s) Oral every 4 hours PRN  apixaban 5 milliGRAM(s) Oral every 12 hours  chlorhexidine 2% Cloths 1 Application(s) Topical <User Schedule>  clonazePAM  Tablet 0.5 milliGRAM(s) Oral three times a day PRN  diltiazem    Tablet 30 milliGRAM(s) Oral every 6 hours  famotidine    Tablet 20 milliGRAM(s) Oral daily  ferrous    sulfate Liquid 300 milliGRAM(s) Enteral Tube daily  metroNIDAZOLE    Tablet 500 milliGRAM(s) Oral every 8 hours  multivitamin 1 Tablet(s) Oral daily  nystatin Cream 1 Application(s) Topical two times a day  oxycodone    5 mG/acetaminophen 325 mG 1 Tablet(s) Oral/Enteral Tube every 6 hours PRN  petrolatum white Ointment 1 Application(s) Topical two times a day  senna 2 Tablet(s) Oral at bedtime  vancomycin  IVPB 500 milliGRAM(s) IV Intermittent every 12 hours  vitamin A &amp; D Ointment 1 Application(s) Topical daily    LABS:                         8.6    10.49 )-----------( 586      ( 07 Jul 2023 08:33 )             28.4     133<L>  |  94<L>  |  16  ----------------------------<  109<H>          (07-07-23 @ 08:33)  5.5<H>   |  29  |  0.8    Ca    9.7          (07-07-23 @ 08:33)  Mg     2.1         (07-07-23 @ 08:33)    TPro  7.5  /  Alb  3.2<L>  /  TBili  0.2  /  DBili  x   /  AST  28  /  ALT  18  /  AlkPhos  116<H>       07-07-23 @ 08:33  Triglycerides, Serum: 147 mg/dL (06-26 @ 04:44)  Blood Glucose (Past 24 hours):  99 mg/dL (07-07 @ 11:24)  112 mg/dL (07-07 @ 05:38)  107 mg/dL (07-06 @ 23:45)  115 mg/dL (07-06 @ 21:13)  115 mg/dL (07-06 @ 17:30)    DIET:   Diet, Soft and Bite Sized:   Tube Feeding Modality: Jejunostomy  Jevity 1.2 Olvin  Total Volume for 24 Hours (mL): 900  Intermittent  Until Goal Tube Feed Rate (mL per Hour): 75  Tube Feeding Hours ON: 12  Tube Feeding OFF (Hours): 12  Tube Feed Start Time: 19:00  Free Water Flush Instructions:  flush JT with 30 ml warm water syringe push before and after feeding cycle - feed via JT from 7pm to 7am daily (07-07-23 @ 15:26) [Active]  RADIOLOGY:   < from: Xray Kidney Ureter Bladder (07.05.23 @ 10:15) >  IMPRESSION:  Nonobstructive bowel gas pattern. There is gas throughout much of the   colon. There is stool seen in the right colon and pelvis.

## 2023-07-07 NOTE — PROGRESS NOTE ADULT - ASSESSMENT
ASSESSMENT  - acute now chronic hypoxemic resp failure  - MRSA pneumonia and bacteremia  - + flu B  - s/p pleural effusion and chest tube  - s/p multiple transfusions  - s/p trach and G-J tube placement  - S/S evaluation noted     suggest:  to start po diet :easy to chew solids  - change tube feed to  Jevity 1.2 at 75ml/hr for 12hrs daily via Jejunal tube       NEVER bolus via J tube, and give any meds via Gastric port  -document percent po intake   - will f/u   - acute now chronic hypoxemic resp failure  - MRSA pneumonia and bacteremia  - + flu B  - s/p pleural effusion and chest tube  - s/p multiple transfusions  - s/p trach and G-J tube placement  - + dysphagia, now s/p FEES      suggest:  to start po diet :  easy to chew solids  - change Jejunostomy tube feed to  Jevity 1.2 at 75ml/hr for 12hrs daily       NEVER bolus via J tube, and give any meds via Gastric port  -document percent po intake at all meals and will then adjust J tube supplemental feeding accordingly  d/w resident  - will f/u

## 2023-07-07 NOTE — PROGRESS NOTE ADULT - ASSESSMENT
42 year-old female with PMH of Asthma, HTN? presents with complaint of cough x3 days and SOB. In the ED was found to have Bilateral patchy groundglass nodularity with dominant confluent left lower lobe opacification with numerous cavitary lesions.Trach and PEG done for inability to wean off vent, currently off sedation. Her stay was also complicated by new onset Afib with RVR for which she is on amiodarone, diltiazem, and therapeutic anticoagulation.    #Acute hypoxemic respiratory failure / s/P Trach and PEG    #Severe cavitary CAP MRSA  #Small parapneumonic effusion SP Chest tube  #MRSA bacteremia  #Influenza B   #hx of Asthma   #Intubated 5/23 self-extubated 5/25, re-intubated 5/25; SBT failed, SP trach/G tube  - s/p Zyvox, meropenem and Tamifu, now on Vancomycin and flagyl, anticipated end date 7/9  - monitor levels per ID pharmacy   - s/p course of steroids now off  -  blood cultures  NGTD SINCE 5/28   - s/p MASTER 5/31: No evidence of valvular vegetations or intracardiac abscesses to support IE. Pulmonary hepatisation incidental finding. EF 65%, mild TR and pulm HTN  - ventilator management per pulm/cc,  - Methadone to 5 mg via peg - last day today  - d/c olanzapine  - pt breathing on own with t-collar   - speech and swallow FEES study    # Hyperkalemia  - s/p Lokelma 10 mg x 1 dose today, f/up repeated BMP at 4  - s/p lokelma 5mg 1 dose overnight 7/5 - f/u 11am BMP     #Maculopapular buttocks rash  -PCR skin scraping negative for HSV --> off  Acyclovir   -Wound care per burn, wound care RN following     #Microcytic Anemia likely  anemia of chronic disease   - s/p 6 units prbc so far last 6/5  - on Eliquis 5mg Q12H   - monitor hemoglobin - hgb 8.2    #New onset A-fib w/ RVR  - HR better controlled now  - s/p Amio drip, now on PO - amio d/c per EP rec  - Diltiazem 30mg q6 with holding parameters - if  or less or HR 60 or less  - on eliquis  - free T3 and free t4 normal    #Anxiety  - continue low dose Klonopin 0.25 mg via peg every 8 hours prn. tx.  - d/c restraints     #Nutrition  - multivitamin daily     Handoff:  continue IV Vanco till 7/9/23, PT for OOB to chair, speech FEES study

## 2023-07-07 NOTE — PHARMACOTHERAPY INTERVENTION NOTE - COMMENTS
Modified PO metronidazole and IV vancomycin orders so that last day for both is 7/9 per Dr. Chance's recommendation.

## 2023-07-07 NOTE — PROGRESS NOTE ADULT - SUBJECTIVE AND OBJECTIVE BOX
Patient is a 42y old  Female who presents with a chief complaint of AHRF (07 Jul 2023 09:19)        Over Night Events:  On Trach collar this am.  Off pressors.          ROS:     All ROS are negative except HPI         PHYSICAL EXAM    ICU Vital Signs Last 24 Hrs  T(C): 36.1 (06 Jul 2023 21:34), Max: 36.9 (06 Jul 2023 13:31)  T(F): 97 (06 Jul 2023 21:34), Max: 98.4 (06 Jul 2023 13:31)  HR: 97 (07 Jul 2023 12:06) (93 - 113)  BP: 117/70 (07 Jul 2023 12:06) (114/57 - 149/70)  BP(mean): 100 (07 Jul 2023 05:03) (100 - 100)  ABP: --  ABP(mean): --  RR: 18 (07 Jul 2023 09:20) (18 - 19)  SpO2: 99% (07 Jul 2023 09:20) (99% - 100%)    O2 Parameters below as of 07 Jul 2023 09:20  Patient On (Oxygen Delivery Method): T-piece            CONSTITUTIONAL:  IN NAD    ENT:   Airway patent,   Mouth with normal mucosa.   Trach     EYES:   Pupils equal,   Round and reactive to light.    CARDIAC:   Normal rate,   Regular rhythm.        RESPIRATORY:   No wheezing  Bilateral BS  Normal chest expansion  Not tachypneic,  No use of accessory muscles    GASTROINTESTINAL:  Abdomen soft,   Non-tender,   No guarding,   + BS    MUSCULOSKELETAL:   Range of motion is not limited,  No clubbing, cyanosis    NEUROLOGICAL:   Alert   Follows simple commands     SKIN:   Skin normal color for race,   No evidence of rash.        07-06-23 @ 07:01  -  07-07-23 @ 07:00  --------------------------------------------------------  IN:    Enteral Tube Flush: 100 mL    Jevity 1.2: 780 mL  Total IN: 880 mL    OUT:  Total OUT: 0 mL    Total NET: 880 mL          LABS:                            8.6    10.49 )-----------( 586      ( 07 Jul 2023 08:33 )             28.4                                               07-07    133<L>  |  94<L>  |  16  ----------------------------<  109<H>  5.5<H>   |  29  |  0.8    Ca    9.7      07 Jul 2023 08:33  Mg     2.1     07-07    TPro  7.5  /  Alb  3.2<L>  /  TBili  0.2  /  DBili  x   /  AST  28  /  ALT  18  /  AlkPhos  116<H>  07-07                                             Urinalysis Basic - ( 07 Jul 2023 08:33 )    Color: x / Appearance: x / SG: x / pH: x  Gluc: 109 mg/dL / Ketone: x  / Bili: x / Urobili: x   Blood: x / Protein: x / Nitrite: x   Leuk Esterase: x / RBC: x / WBC x   Sq Epi: x / Non Sq Epi: x / Bacteria: x                                                  LIVER FUNCTIONS - ( 07 Jul 2023 08:33 )  Alb: 3.2 g/dL / Pro: 7.5 g/dL / ALK PHOS: 116 U/L / ALT: 18 U/L / AST: 28 U/L / GGT: x                                                                                               Mode: AC/ CMV (Assist Control/ Continuous Mandatory Ventilation)  RR (machine): 16  TV (machine): 360  FiO2: 40  PEEP: 8  ITime: 1  MAP: 15  PIP: 19                                          MEDICATIONS  (STANDING):  apixaban 5 milliGRAM(s) Oral every 12 hours  chlorhexidine 2% Cloths 1 Application(s) Topical <User Schedule>  diltiazem    Tablet 30 milliGRAM(s) Oral every 6 hours  famotidine    Tablet 20 milliGRAM(s) Oral daily  ferrous    sulfate Liquid 300 milliGRAM(s) Enteral Tube daily  metroNIDAZOLE    Tablet 500 milliGRAM(s) Oral every 8 hours  multivitamin 1 Tablet(s) Oral daily  nystatin Cream 1 Application(s) Topical two times a day  petrolatum white Ointment 1 Application(s) Topical two times a day  senna 2 Tablet(s) Oral at bedtime  vancomycin  IVPB 500 milliGRAM(s) IV Intermittent every 12 hours  vitamin A &amp; D Ointment 1 Application(s) Topical daily    MEDICATIONS  (PRN):  acetaminophen     Tablet .. 650 milliGRAM(s) Oral every 6 hours PRN Temp greater or equal to 38C (100.4F)  aluminum hydroxide/magnesium hydroxide/simethicone Suspension 30 milliLiter(s) Oral every 4 hours PRN Dyspepsia  clonazePAM  Tablet 0.5 milliGRAM(s) Oral three times a day PRN For Agitation only  oxycodone    5 mG/acetaminophen 325 mG 1 Tablet(s) Oral/Enteral Tube every 6 hours PRN Severe Pain (7 - 10)

## 2023-07-07 NOTE — SWALLOW FEES ASSESSMENT ADULT - COMMENTS
+audible voice and strong cough w/ PMSV in line w/ t-piece, SPO2 saturation 97-99% t/o. Pt suctioned prior to placement and FEES

## 2023-07-08 LAB
ALBUMIN SERPL ELPH-MCNC: 3 G/DL — LOW (ref 3.5–5.2)
ALBUMIN SERPL ELPH-MCNC: 3.3 G/DL — LOW (ref 3.5–5.2)
ALP SERPL-CCNC: 102 U/L — SIGNIFICANT CHANGE UP (ref 30–115)
ALP SERPL-CCNC: 108 U/L — SIGNIFICANT CHANGE UP (ref 30–115)
ALT FLD-CCNC: 17 U/L — SIGNIFICANT CHANGE UP (ref 0–41)
ALT FLD-CCNC: 17 U/L — SIGNIFICANT CHANGE UP (ref 0–41)
ANION GAP SERPL CALC-SCNC: 11 MMOL/L — SIGNIFICANT CHANGE UP (ref 7–14)
ANION GAP SERPL CALC-SCNC: 9 MMOL/L — SIGNIFICANT CHANGE UP (ref 7–14)
AST SERPL-CCNC: 21 U/L — SIGNIFICANT CHANGE UP (ref 0–41)
AST SERPL-CCNC: 22 U/L — SIGNIFICANT CHANGE UP (ref 0–41)
BILIRUB SERPL-MCNC: 0.2 MG/DL — SIGNIFICANT CHANGE UP (ref 0.2–1.2)
BILIRUB SERPL-MCNC: <0.2 MG/DL — SIGNIFICANT CHANGE UP (ref 0.2–1.2)
BUN SERPL-MCNC: 15 MG/DL — SIGNIFICANT CHANGE UP (ref 10–20)
BUN SERPL-MCNC: 15 MG/DL — SIGNIFICANT CHANGE UP (ref 10–20)
CALCIUM SERPL-MCNC: 10 MG/DL — SIGNIFICANT CHANGE UP (ref 8.4–10.5)
CALCIUM SERPL-MCNC: 9.7 MG/DL — SIGNIFICANT CHANGE UP (ref 8.4–10.5)
CHLORIDE SERPL-SCNC: 93 MMOL/L — LOW (ref 98–110)
CHLORIDE SERPL-SCNC: 97 MMOL/L — LOW (ref 98–110)
CO2 SERPL-SCNC: 28 MMOL/L — SIGNIFICANT CHANGE UP (ref 17–32)
CO2 SERPL-SCNC: 28 MMOL/L — SIGNIFICANT CHANGE UP (ref 17–32)
CREAT SERPL-MCNC: 0.8 MG/DL — SIGNIFICANT CHANGE UP (ref 0.7–1.5)
CREAT SERPL-MCNC: 0.8 MG/DL — SIGNIFICANT CHANGE UP (ref 0.7–1.5)
EGFR: 94 ML/MIN/1.73M2 — SIGNIFICANT CHANGE UP
EGFR: 94 ML/MIN/1.73M2 — SIGNIFICANT CHANGE UP
GLUCOSE BLDC GLUCOMTR-MCNC: 105 MG/DL — HIGH (ref 70–99)
GLUCOSE BLDC GLUCOMTR-MCNC: 110 MG/DL — HIGH (ref 70–99)
GLUCOSE BLDC GLUCOMTR-MCNC: 119 MG/DL — HIGH (ref 70–99)
GLUCOSE BLDC GLUCOMTR-MCNC: 123 MG/DL — HIGH (ref 70–99)
GLUCOSE SERPL-MCNC: 113 MG/DL — HIGH (ref 70–99)
GLUCOSE SERPL-MCNC: 88 MG/DL — SIGNIFICANT CHANGE UP (ref 70–99)
HCT VFR BLD CALC: 27.6 % — LOW (ref 37–47)
HGB BLD-MCNC: 8.4 G/DL — LOW (ref 12–16)
MAGNESIUM SERPL-MCNC: 2.1 MG/DL — SIGNIFICANT CHANGE UP (ref 1.8–2.4)
MCHC RBC-ENTMCNC: 24.1 PG — LOW (ref 27–31)
MCHC RBC-ENTMCNC: 30.4 G/DL — LOW (ref 32–37)
MCV RBC AUTO: 79.1 FL — LOW (ref 81–99)
NRBC # BLD: 0 /100 WBCS — SIGNIFICANT CHANGE UP (ref 0–0)
PLATELET # BLD AUTO: 581 K/UL — HIGH (ref 130–400)
PMV BLD: 8.7 FL — SIGNIFICANT CHANGE UP (ref 7.4–10.4)
POTASSIUM SERPL-MCNC: 4.5 MMOL/L — SIGNIFICANT CHANGE UP (ref 3.5–5)
POTASSIUM SERPL-MCNC: 5.1 MMOL/L — HIGH (ref 3.5–5)
POTASSIUM SERPL-SCNC: 4.5 MMOL/L — SIGNIFICANT CHANGE UP (ref 3.5–5)
POTASSIUM SERPL-SCNC: 5.1 MMOL/L — HIGH (ref 3.5–5)
PROT SERPL-MCNC: 6.9 G/DL — SIGNIFICANT CHANGE UP (ref 6–8)
PROT SERPL-MCNC: 7.3 G/DL — SIGNIFICANT CHANGE UP (ref 6–8)
RBC # BLD: 3.49 M/UL — LOW (ref 4.2–5.4)
RBC # FLD: 26.5 % — HIGH (ref 11.5–14.5)
SODIUM SERPL-SCNC: 132 MMOL/L — LOW (ref 135–146)
SODIUM SERPL-SCNC: 134 MMOL/L — LOW (ref 135–146)
VANCOMYCIN TROUGH SERPL-MCNC: 14.8 UG/ML — HIGH (ref 5–10)
WBC # BLD: 10.43 K/UL — SIGNIFICANT CHANGE UP (ref 4.8–10.8)
WBC # FLD AUTO: 10.43 K/UL — SIGNIFICANT CHANGE UP (ref 4.8–10.8)

## 2023-07-08 PROCEDURE — 99233 SBSQ HOSP IP/OBS HIGH 50: CPT

## 2023-07-08 RX ORDER — SODIUM ZIRCONIUM CYCLOSILICATE 10 G/10G
5 POWDER, FOR SUSPENSION ORAL ONCE
Refills: 0 | Status: COMPLETED | OUTPATIENT
Start: 2023-07-08 | End: 2023-07-08

## 2023-07-08 RX ADMIN — Medication 1 TABLET(S): at 11:17

## 2023-07-08 RX ADMIN — Medication 30 MILLIGRAM(S): at 00:15

## 2023-07-08 RX ADMIN — FAMOTIDINE 20 MILLIGRAM(S): 10 INJECTION INTRAVENOUS at 11:14

## 2023-07-08 RX ADMIN — Medication 1 APPLICATION(S): at 06:49

## 2023-07-08 RX ADMIN — APIXABAN 5 MILLIGRAM(S): 2.5 TABLET, FILM COATED ORAL at 06:31

## 2023-07-08 RX ADMIN — Medication 1 APPLICATION(S): at 11:16

## 2023-07-08 RX ADMIN — Medication 1 APPLICATION(S): at 17:03

## 2023-07-08 RX ADMIN — Medication 500 MILLIGRAM(S): at 22:03

## 2023-07-08 RX ADMIN — Medication 30 MILLIGRAM(S): at 17:00

## 2023-07-08 RX ADMIN — Medication 300 MILLIGRAM(S): at 11:14

## 2023-07-08 RX ADMIN — SODIUM ZIRCONIUM CYCLOSILICATE 5 GRAM(S): 10 POWDER, FOR SUSPENSION ORAL at 15:26

## 2023-07-08 RX ADMIN — Medication 30 MILLIGRAM(S): at 11:14

## 2023-07-08 RX ADMIN — Medication 30 MILLIGRAM(S): at 23:10

## 2023-07-08 RX ADMIN — APIXABAN 5 MILLIGRAM(S): 2.5 TABLET, FILM COATED ORAL at 17:00

## 2023-07-08 RX ADMIN — NYSTATIN CREAM 1 APPLICATION(S): 100000 CREAM TOPICAL at 06:32

## 2023-07-08 RX ADMIN — Medication 100 MILLIGRAM(S): at 06:31

## 2023-07-08 RX ADMIN — Medication 0.5 MILLIGRAM(S): at 11:16

## 2023-07-08 RX ADMIN — CHLORHEXIDINE GLUCONATE 1 APPLICATION(S): 213 SOLUTION TOPICAL at 06:31

## 2023-07-08 RX ADMIN — Medication 500 MILLIGRAM(S): at 13:11

## 2023-07-08 RX ADMIN — Medication 30 MILLIGRAM(S): at 06:31

## 2023-07-08 RX ADMIN — NYSTATIN CREAM 1 APPLICATION(S): 100000 CREAM TOPICAL at 17:00

## 2023-07-08 RX ADMIN — Medication 500 MILLIGRAM(S): at 06:31

## 2023-07-08 RX ADMIN — SENNA PLUS 2 TABLET(S): 8.6 TABLET ORAL at 22:03

## 2023-07-08 NOTE — PROGRESS NOTE ADULT - SUBJECTIVE AND OBJECTIVE BOX
Patient is a 42y old  Female who presents with a chief complaint of AHRF (08 Jul 2023 10:24)      Over Night Events:  Patient seen and examined.   tolerating trach collar     ROS:  See HPI    PHYSICAL EXAM    ICU Vital Signs Last 24 Hrs  T(C): 36.1 (08 Jul 2023 05:00), Max: 36.1 (07 Jul 2023 21:14)  T(F): 97 (08 Jul 2023 05:00), Max: 97 (07 Jul 2023 21:14)  HR: 85 (08 Jul 2023 05:00) (82 - 97)  BP: 124/72 (08 Jul 2023 05:00) (114/80 - 149/84)  BP(mean): --  ABP: --  ABP(mean): --  RR: 20 (08 Jul 2023 05:00) (19 - 20)  SpO2: 100% (07 Jul 2023 12:57) (100% - 100%)        General  HEENT:         trach        Lymph Nodes: NO cervical LN   Lungs: Bilateral BS  Cardiovascular: Regular   Abdomen: Soft, Positive BS  Extremities: No clubbing   Skin: warm   Neurological:   Musculoskeletal: move all ext     I&O's Detail      LABS:                          8.4    10.43 )-----------( 581      ( 08 Jul 2023 09:07 )             27.6         08 Jul 2023 01:28    134    |  97     |  15     ----------------------------<  113    4.5     |  28     |  0.8      Ca    9.7        08 Jul 2023 01:28  Mg     2.1       07 Jul 2023 08:33    TPro  6.9    /  Alb  3.0    /  TBili  <0.2   /  DBili  x      /  AST  22     /  ALT  17     /  AlkPhos  102    08 Jul 2023 01:28  Amylase x     lipase x                                                                                        Urinalysis Basic - ( 08 Jul 2023 01:28 )    Color: x / Appearance: x / SG: x / pH: x  Gluc: 113 mg/dL / Ketone: x  / Bili: x / Urobili: x   Blood: x / Protein: x / Nitrite: x   Leuk Esterase: x / RBC: x / WBC x   Sq Epi: x / Non Sq Epi: x / Bacteria: x                                                                                                                                                     MEDICATIONS  (STANDING):  apixaban 5 milliGRAM(s) Oral every 12 hours  chlorhexidine 2% Cloths 1 Application(s) Topical <User Schedule>  diltiazem    Tablet 30 milliGRAM(s) Oral every 6 hours  famotidine    Tablet 20 milliGRAM(s) Oral daily  ferrous    sulfate Liquid 300 milliGRAM(s) Enteral Tube daily  metroNIDAZOLE    Tablet 500 milliGRAM(s) Oral every 8 hours  multivitamin 1 Tablet(s) Oral daily  nystatin Cream 1 Application(s) Topical two times a day  petrolatum white Ointment 1 Application(s) Topical two times a day  senna 2 Tablet(s) Oral at bedtime  vancomycin  IVPB 500 milliGRAM(s) IV Intermittent every 12 hours  vitamin A &amp; D Ointment 1 Application(s) Topical daily    MEDICATIONS  (PRN):  acetaminophen     Tablet .. 650 milliGRAM(s) Oral every 6 hours PRN Temp greater or equal to 38C (100.4F)  aluminum hydroxide/magnesium hydroxide/simethicone Suspension 30 milliLiter(s) Oral every 4 hours PRN Dyspepsia  clonazePAM  Tablet 0.5 milliGRAM(s) Oral three times a day PRN For Agitation only  oxycodone    5 mG/acetaminophen 325 mG 1 Tablet(s) Oral/Enteral Tube every 6 hours PRN Severe Pain (7 - 10)          Xrays:  TLC:  OG:  ET tube:                                                                                       ECHO:  [facility] ICU:

## 2023-07-08 NOTE — PHARMACOTHERAPY INTERVENTION NOTE - NSPHARMCOMMASP
ASP - Dose optimization/Non-Renal dose adjustment
ASP - Duration of therapy
ASP - Lab/ test recommended
ASP - Recommend ID Consult
ASP - Renal dose adjustment
ASP - Therapy recommended/ Alternative therapy
ASP - Therapy recommended/ Alternative therapy
ASP - Dose optimization/Non-Renal dose adjustment
ASP - Lab/ test recommended
ASP - Lab/ test recommended
ASP - Renal dose adjustment
ASP - Dose optimization/Non-Renal dose adjustment
ASP - Lab/ test recommended
ASP - Renal dose adjustment

## 2023-07-08 NOTE — PROGRESS NOTE ADULT - SUBJECTIVE AND OBJECTIVE BOX
ZEYNEP ROSSI 42y Female  MRN#: [id number]     Hospital Day: 46d    Pt is currently admitted with the primary diagnosis of  Pneumonia due to infectious organism        SUBJECTIVE     Overnight events  None                                              ----------------------------------------------------------  OBJECTIVE  PAST MEDICAL & SURGICAL HISTORY                                            -----------------------------------------------------------  ALLERGIES:  aspirin (Short breath; Anaphylaxis)  Bananas (Unknown)                                            ------------------------------------------------------------    HOME MEDICATIONS  Home Medications:                           MEDICATIONS:  STANDING MEDICATIONS  apixaban 5 milliGRAM(s) Oral every 12 hours  chlorhexidine 2% Cloths 1 Application(s) Topical <User Schedule>  diltiazem    Tablet 30 milliGRAM(s) Oral every 6 hours  famotidine    Tablet 20 milliGRAM(s) Oral daily  ferrous    sulfate Liquid 300 milliGRAM(s) Enteral Tube daily  metroNIDAZOLE    Tablet 500 milliGRAM(s) Oral every 8 hours  multivitamin 1 Tablet(s) Oral daily  nystatin Cream 1 Application(s) Topical two times a day  petrolatum white Ointment 1 Application(s) Topical two times a day  senna 2 Tablet(s) Oral at bedtime  vancomycin  IVPB 500 milliGRAM(s) IV Intermittent every 12 hours  vitamin A &amp; D Ointment 1 Application(s) Topical daily    PRN MEDICATIONS  acetaminophen     Tablet .. 650 milliGRAM(s) Oral every 6 hours PRN  aluminum hydroxide/magnesium hydroxide/simethicone Suspension 30 milliLiter(s) Oral every 4 hours PRN  clonazePAM  Tablet 0.5 milliGRAM(s) Oral three times a day PRN  oxycodone    5 mG/acetaminophen 325 mG 1 Tablet(s) Oral/Enteral Tube every 6 hours PRN                                            ------------------------------------------------------------  VITAL SIGNS: Last 24 Hours  T(C): 36.1 (08 Jul 2023 05:00), Max: 36.1 (07 Jul 2023 21:14)  T(F): 97 (08 Jul 2023 05:00), Max: 97 (07 Jul 2023 21:14)  HR: 85 (08 Jul 2023 05:00) (82 - 97)  BP: 124/72 (08 Jul 2023 05:00) (114/80 - 149/84)  BP(mean): --  RR: 20 (08 Jul 2023 05:00) (19 - 20)  SpO2: 100% (07 Jul 2023 12:57) (100% - 100%)                                             --------------------------------------------------------------  LABS:                        8.4    10.43 )-----------( 581      ( 08 Jul 2023 09:07 )             27.6     07-08    134<L>  |  97<L>  |  15  ----------------------------<  113<H>  4.5   |  28  |  0.8    Ca    9.7      08 Jul 2023 01:28  Mg     2.1     07-07    TPro  6.9  /  Alb  3.0<L>  /  TBili  <0.2  /  DBili  x   /  AST  22  /  ALT  17  /  AlkPhos  102  07-08      Urinalysis Basic - ( 08 Jul 2023 01:28 )    Color: x / Appearance: x / SG: x / pH: x  Gluc: 113 mg/dL / Ketone: x  / Bili: x / Urobili: x   Blood: x / Protein: x / Nitrite: x   Leuk Esterase: x / RBC: x / WBC x   Sq Epi: x / Non Sq Epi: x / Bacteria: x                                                            -------------------------------------------------------------  RADIOLOGY:                                            --------------------------------------------------------------    PHYSICAL EXAM:  GENERAL: NAD, lying in bed comfortably  HEAD:  Atraumatic, Normocephalic  CHEST/LUNG: pt with trach, satting 100% on RA  HEART: regular rate and rhythm; No murmurs, rubs, or gallops  ABDOMEN: Bowel sounds present; Soft, Nontender, Nondistended.    EXTREMITIES: Warm. No clubbing, cyanosis, or edema  NERVOUS SYSTEM:  Alert & Oriented X3. No focal deficits                                              --------------------------------------------------------------

## 2023-07-08 NOTE — PROGRESS NOTE ADULT - ASSESSMENT
42 year-old female with PMH of Asthma, HTN? presents with complaint of cough x3 days and SOB. In the ED was found to have Bilateral patchy groundglass nodularity with dominant confluent left lower lobe opacification with numerous cavitary lesions.Trach and PEG done for inability to wean off vent, currently off sedation. Her stay was also complicated by new onset Afib with RVR for which she is on amiodarone, diltiazem, and therapeutic anticoagulation.    #Acute hypoxemic respiratory failure / s/P Trach and PEG    #Severe cavitary CAP MRSA  #Small parapneumonic effusion SP Chest tube  #MRSA bacteremia  #Influenza B   #hx of Asthma   #Intubated 5/23 self-extubated 5/25, re-intubated 5/25; SBT failed, SP trach/G tube  - s/p Zyvox, meropenem and Tamifu, now on Vancomycin and flagyl, anticipated end date 7/9  - monitor levels per ID pharmacy   - s/p course of steroids now off  -  blood cultures  NGTD SINCE 5/28   - s/p MASTER 5/31: No evidence of valvular vegetations or intracardiac abscesses to support IE. Pulmonary hepatisation incidental finding. EF 65%, mild TR and pulm HTN  - ventilator management per pulm/cc,  - Methadone to 5 mg via peg - last day today  - d/c olanzapine  - pt breathing on own with t-collar   - pt on soft diet  - cup trach - monitor for 4 hrs today - f/u with respiratory  - monitor secretions     # Hyperkalemia  - s/p Lokelma 10 mg x 1 dose today, f/up repeated BMP at 4  - s/p lokelma 5mg 1 dose overnight 7/5 - f/u 11am BMP  - s/p lokelma 10mg 7/7     #Maculopapular buttocks rash  -PCR skin scraping negative for HSV --> off  Acyclovir   -Wound care per burn, wound care RN following     #Microcytic Anemia likely  anemia of chronic disease   - s/p 6 units prbc so far last 6/5  - on Eliquis 5mg Q12H   - monitor hemoglobin - hgb 8.2    #New onset A-fib w/ RVR  - HR better controlled now  - s/p Amio drip, now on PO - amio d/c per EP rec - monitor for tachy  - Diltiazem 30mg q6 with holding parameters - if  or less or HR 60 or less  - on eliquis  - free T3 and free t4 isael    #Anxiety  - continue low dose Klonopin 0.25 mg via peg every 8 hours prn. tx.  - d/c restraints     #Nutrition  - multivitamin daily     Handoff:  continue IV Vanco till 7/9/23, PT for OOB to chair, monitor for secretions     42 year-old female with PMH of Asthma, HTN? presents with complaint of cough x3 days and SOB. In the ED was found to have Bilateral patchy groundglass nodularity with dominant confluent left lower lobe opacification with numerous cavitary lesions.Trach and PEG done for inability to wean off vent, currently off sedation. Her stay was also complicated by new onset Afib with RVR for which she is on amiodarone, diltiazem, and therapeutic anticoagulation.    #Acute hypoxemic respiratory failure / s/P Trach and PEG    #Severe cavitary CAP MRSA  #Small parapneumonic effusion SP Chest tube  #MRSA bacteremia  #Influenza B   #hx of Asthma   #Intubated 5/23 self-extubated 5/25, re-intubated 5/25; SBT failed, SP trach/G tube  - s/p Zyvox, meropenem and Tamifu, now on Vancomycin and flagyl, anticipated end date 7/9  - monitor levels per ID pharmacy   - s/p course of steroids now off  -  blood cultures  NGTD SINCE 5/28   - s/p MASTER 5/31: No evidence of valvular vegetations or intracardiac abscesses to support IE. Pulmonary hepatisation incidental finding. EF 65%, mild TR and pulm HTN  - ventilator management per pulm/cc,  - Methadone to 5 mg via peg - last day today  - d/c olanzapine  - pt breathing on own with t-collar   - pt on soft diet  - cup trach - monitor for 4 hrs today - f/u with respiratory  - monitor secretions     # Hyperkalemia  - s/p Lokelma 10 mg x 1 dose today, f/up repeated BMP at 4  - s/p lokelma 5mg 1 dose overnight 7/5 - f/u 11am BMP  - s/p lokelma 10mg 7/7     #Maculopapular buttocks rash  -PCR skin scraping negative for HSV --> off  Acyclovir   -Wound care per burn, wound care RN following     #Microcytic Anemia likely  anemia of chronic disease   - s/p 6 units prbc so far last 6/5  - on Eliquis 5mg Q12H   - monitor hemoglobin - hgb 8.2    #New onset A-fib w/ RVR  - HR better controlled now  - s/p Amio drip, now on PO - amio d/c per EP rec - monitor for tachy  - Diltiazem 30mg q6 with holding parameters - if  or less or HR 60 or less  - on eliquis  - free T3 and free t4 isael    #Anxiety  - continue low dose Klonopin 0.25 mg via peg every 8 hours prn. tx.  - d/c restraints     #Nutrition  - multivitamin daily     Handoff:  continue IV Vanco till 7/9/23, PT for OOB to chair, monitor HR monitor for secretions

## 2023-07-08 NOTE — PHARMACOTHERAPY INTERVENTION NOTE - COMMENTS
Recommended continuing vancomycin 500mg IV q12h in the setting of vancomycin trough level of 14.8mcg/mL. Utilizing [employer], a Bayesian pharmacokinetic modeling program, the patient's current vancomycin AUC is ~430, of which is within the goal range of 400-600. Thus, no dose adjustment is required at this time.

## 2023-07-08 NOTE — PHARMACOTHERAPY INTERVENTION NOTE - INTERVENTION CATEGORIES
ASP
ASP
Therapy
ASP
Therapy
ASP
Therapy
ASP
ASP
Therapy
ASP
Therapy
Therapy
ASP

## 2023-07-09 LAB
ALBUMIN SERPL ELPH-MCNC: 3.4 G/DL — LOW (ref 3.5–5.2)
ALP SERPL-CCNC: 108 U/L — SIGNIFICANT CHANGE UP (ref 30–115)
ALT FLD-CCNC: 17 U/L — SIGNIFICANT CHANGE UP (ref 0–41)
ANION GAP SERPL CALC-SCNC: 11 MMOL/L — SIGNIFICANT CHANGE UP (ref 7–14)
AST SERPL-CCNC: 23 U/L — SIGNIFICANT CHANGE UP (ref 0–41)
BILIRUB SERPL-MCNC: 0.2 MG/DL — SIGNIFICANT CHANGE UP (ref 0.2–1.2)
BLD GP AB SCN SERPL QL: SIGNIFICANT CHANGE UP
BUN SERPL-MCNC: 14 MG/DL — SIGNIFICANT CHANGE UP (ref 10–20)
CALCIUM SERPL-MCNC: 9.9 MG/DL — SIGNIFICANT CHANGE UP (ref 8.4–10.5)
CHLORIDE SERPL-SCNC: 97 MMOL/L — LOW (ref 98–110)
CO2 SERPL-SCNC: 27 MMOL/L — SIGNIFICANT CHANGE UP (ref 17–32)
CREAT SERPL-MCNC: 0.8 MG/DL — SIGNIFICANT CHANGE UP (ref 0.7–1.5)
EGFR: 94 ML/MIN/1.73M2 — SIGNIFICANT CHANGE UP
GLUCOSE BLDC GLUCOMTR-MCNC: 105 MG/DL — HIGH (ref 70–99)
GLUCOSE BLDC GLUCOMTR-MCNC: 122 MG/DL — HIGH (ref 70–99)
GLUCOSE BLDC GLUCOMTR-MCNC: 91 MG/DL — SIGNIFICANT CHANGE UP (ref 70–99)
GLUCOSE SERPL-MCNC: 97 MG/DL — SIGNIFICANT CHANGE UP (ref 70–99)
HCT VFR BLD CALC: 29.6 % — LOW (ref 37–47)
HGB BLD-MCNC: 9 G/DL — LOW (ref 12–16)
MAGNESIUM SERPL-MCNC: 2 MG/DL — SIGNIFICANT CHANGE UP (ref 1.8–2.4)
MCHC RBC-ENTMCNC: 24.1 PG — LOW (ref 27–31)
MCHC RBC-ENTMCNC: 30.4 G/DL — LOW (ref 32–37)
MCV RBC AUTO: 79.4 FL — LOW (ref 81–99)
NRBC # BLD: 0 /100 WBCS — SIGNIFICANT CHANGE UP (ref 0–0)
PLATELET # BLD AUTO: 591 K/UL — HIGH (ref 130–400)
PMV BLD: 8.8 FL — SIGNIFICANT CHANGE UP (ref 7.4–10.4)
POTASSIUM SERPL-MCNC: 5.1 MMOL/L — HIGH (ref 3.5–5)
POTASSIUM SERPL-SCNC: 5.1 MMOL/L — HIGH (ref 3.5–5)
PROT SERPL-MCNC: 7.4 G/DL — SIGNIFICANT CHANGE UP (ref 6–8)
RBC # BLD: 3.73 M/UL — LOW (ref 4.2–5.4)
RBC # FLD: 26.7 % — HIGH (ref 11.5–14.5)
SODIUM SERPL-SCNC: 135 MMOL/L — SIGNIFICANT CHANGE UP (ref 135–146)
VANCOMYCIN TROUGH SERPL-MCNC: 14.3 UG/ML — HIGH (ref 5–10)
WBC # BLD: 9.74 K/UL — SIGNIFICANT CHANGE UP (ref 4.8–10.8)
WBC # FLD AUTO: 9.74 K/UL — SIGNIFICANT CHANGE UP (ref 4.8–10.8)

## 2023-07-09 PROCEDURE — 99233 SBSQ HOSP IP/OBS HIGH 50: CPT

## 2023-07-09 PROCEDURE — 99232 SBSQ HOSP IP/OBS MODERATE 35: CPT

## 2023-07-09 RX ORDER — SODIUM POLYSTYRENE SULFONATE 4.1 MEQ/G
15 POWDER, FOR SUSPENSION ORAL ONCE
Refills: 0 | Status: COMPLETED | OUTPATIENT
Start: 2023-07-09 | End: 2023-07-09

## 2023-07-09 RX ORDER — SCOPALAMINE 1 MG/3D
1 PATCH, EXTENDED RELEASE TRANSDERMAL
Refills: 0 | Status: DISCONTINUED | OUTPATIENT
Start: 2023-07-09 | End: 2023-07-14

## 2023-07-09 RX ADMIN — NYSTATIN CREAM 1 APPLICATION(S): 100000 CREAM TOPICAL at 05:04

## 2023-07-09 RX ADMIN — SCOPALAMINE 1 PATCH: 1 PATCH, EXTENDED RELEASE TRANSDERMAL at 06:53

## 2023-07-09 RX ADMIN — Medication 300 MILLIGRAM(S): at 11:19

## 2023-07-09 RX ADMIN — FAMOTIDINE 20 MILLIGRAM(S): 10 INJECTION INTRAVENOUS at 11:19

## 2023-07-09 RX ADMIN — Medication 0.5 MILLIGRAM(S): at 10:33

## 2023-07-09 RX ADMIN — SCOPALAMINE 1 PATCH: 1 PATCH, EXTENDED RELEASE TRANSDERMAL at 07:21

## 2023-07-09 RX ADMIN — Medication 100 MILLIGRAM(S): at 22:52

## 2023-07-09 RX ADMIN — NYSTATIN CREAM 1 APPLICATION(S): 100000 CREAM TOPICAL at 17:57

## 2023-07-09 RX ADMIN — APIXABAN 5 MILLIGRAM(S): 2.5 TABLET, FILM COATED ORAL at 17:57

## 2023-07-09 RX ADMIN — Medication 30 MILLIGRAM(S): at 11:19

## 2023-07-09 RX ADMIN — Medication 500 MILLIGRAM(S): at 05:03

## 2023-07-09 RX ADMIN — SENNA PLUS 2 TABLET(S): 8.6 TABLET ORAL at 21:45

## 2023-07-09 RX ADMIN — Medication 0.5 MILLIGRAM(S): at 15:56

## 2023-07-09 RX ADMIN — Medication 0.5 MILLIGRAM(S): at 00:40

## 2023-07-09 RX ADMIN — Medication 1 APPLICATION(S): at 05:05

## 2023-07-09 RX ADMIN — CHLORHEXIDINE GLUCONATE 1 APPLICATION(S): 213 SOLUTION TOPICAL at 05:04

## 2023-07-09 RX ADMIN — Medication 30 MILLIGRAM(S): at 23:04

## 2023-07-09 RX ADMIN — Medication 100 MILLIGRAM(S): at 11:18

## 2023-07-09 RX ADMIN — SODIUM POLYSTYRENE SULFONATE 15 GRAM(S): 4.1 POWDER, FOR SUSPENSION ORAL at 10:10

## 2023-07-09 RX ADMIN — Medication 30 MILLIGRAM(S): at 17:56

## 2023-07-09 RX ADMIN — Medication 30 MILLIGRAM(S): at 05:04

## 2023-07-09 RX ADMIN — Medication 500 MILLIGRAM(S): at 21:45

## 2023-07-09 RX ADMIN — APIXABAN 5 MILLIGRAM(S): 2.5 TABLET, FILM COATED ORAL at 05:04

## 2023-07-09 RX ADMIN — Medication 1 TABLET(S): at 11:19

## 2023-07-09 RX ADMIN — SCOPALAMINE 1 PATCH: 1 PATCH, EXTENDED RELEASE TRANSDERMAL at 18:11

## 2023-07-09 RX ADMIN — Medication 1 APPLICATION(S): at 18:10

## 2023-07-09 RX ADMIN — Medication 100 MILLIGRAM(S): at 01:01

## 2023-07-09 RX ADMIN — Medication 1 APPLICATION(S): at 11:20

## 2023-07-09 RX ADMIN — Medication 500 MILLIGRAM(S): at 14:13

## 2023-07-09 NOTE — PROGRESS NOTE ADULT - ASSESSMENT
IMPRESSION:    Acute hypoxemic respiratory failure sp trach   Severe CAP secondary to MRSA  Small parapneumonic effusion SP Chest tube and removal  MRSA bacteremia resolved   MASTER negative   Anemia sp transfusion   New onset A FIB rate controlled   ALNCE improved   HO asthma   Influenza B     PLAN:    CNS:  Wean Methadone slowly. Weaning Klonopin.  Zyprexa 5 .  Monitor QTC.       HEENT: Oral care. Trach care.     PULMONARY:  HOB @ 45 degrees.  Aspiration precautions.  No change in vent settings.  Keep SaO2 92 TO 96%.  PS / trach collar as tolerated.  Speech valve.  Possible decanulation next week       CARDIOVASCULAR:    Avoid overload, Continue rate control.     GI: GI prophylaxis. PEG Feeding. Bowel regimen     RENAL:  Follow up lytes.  Correct as needed.     INFECTIOUS DISEASE: ABX Per ID.  FU Vancomycin levels.      MATOLOGICAL:  DVT prophylaxis.  On Eliquis     ENDOCRINE:  Follow up FS.  Insulin protocol if needed.      Prognosis is poor overall .     Vent unit / DC planing

## 2023-07-09 NOTE — PROGRESS NOTE ADULT - SUBJECTIVE AND OBJECTIVE BOX
Patient is a 42y old  Female who presents with a chief complaint of AHRF (08 Jul 2023 10:48)      Over Night Events:  Patient seen and examined.   tolerating trach collar at least 3 days   off vent     ROS:  See HPI    PHYSICAL EXAM    ICU Vital Signs Last 24 Hrs  T(C): 36.2 (09 Jul 2023 05:00), Max: 36.9 (08 Jul 2023 21:00)  T(F): 97.1 (09 Jul 2023 05:00), Max: 98.4 (08 Jul 2023 21:00)  HR: 99 (09 Jul 2023 05:00) (80 - 99)  BP: 124/69 (09 Jul 2023 05:00) (111/70 - 137/80)  BP(mean): --  ABP: --  ABP(mean): --  RR: 18 (09 Jul 2023 05:00) (18 - 19)  SpO2: 99% (09 Jul 2023 05:00) (99% - 100%)    O2 Parameters below as of 08 Jul 2023 13:24  Patient On (Oxygen Delivery Method): T-piece            General: awake   HEENT:   trach              Lymph Nodes: NO cervical LN   Lungs: Bilateral BS  Cardiovascular: Regular   Abdomen: Soft, Positive BS  Extremities: No clubbing   Skin: warm   Neurological:   Musculoskeletal: move all ext     I&O's Detail    08 Jul 2023 07:01  -  09 Jul 2023 07:00  --------------------------------------------------------  IN:    Enteral Tube Flush: 100 mL    Jevity 1.2: 480 mL  Total IN: 580 mL    OUT:  Total OUT: 0 mL    Total NET: 580 mL          LABS:                          9.0    9.74  )-----------( 591      ( 09 Jul 2023 06:10 )             29.6         09 Jul 2023 06:10    135    |  97     |  14     ----------------------------<  97     5.1     |  27     |  0.8      Ca    9.9        09 Jul 2023 06:10  Mg     2.0       09 Jul 2023 06:10    TPro  7.4    /  Alb  3.4    /  TBili  0.2    /  DBili  x      /  AST  23     /  ALT  17     /  AlkPhos  108    09 Jul 2023 06:10  Amylase x     lipase x                                                                                        Urinalysis Basic - ( 09 Jul 2023 06:10 )    Color: x / Appearance: x / SG: x / pH: x  Gluc: 97 mg/dL / Ketone: x  / Bili: x / Urobili: x   Blood: x / Protein: x / Nitrite: x   Leuk Esterase: x / RBC: x / WBC x   Sq Epi: x / Non Sq Epi: x / Bacteria: x                                                                                                                                                     MEDICATIONS  (STANDING):  apixaban 5 milliGRAM(s) Oral every 12 hours  chlorhexidine 2% Cloths 1 Application(s) Topical <User Schedule>  diltiazem    Tablet 30 milliGRAM(s) Oral every 6 hours  famotidine    Tablet 20 milliGRAM(s) Oral daily  ferrous    sulfate Liquid 300 milliGRAM(s) Enteral Tube daily  metroNIDAZOLE    Tablet 500 milliGRAM(s) Oral every 8 hours  multivitamin 1 Tablet(s) Oral daily  nystatin Cream 1 Application(s) Topical two times a day  petrolatum white Ointment 1 Application(s) Topical two times a day  scopolamine 1 mG/72 Hr(s) Patch 1 Patch Transdermal every 72 hours  senna 2 Tablet(s) Oral at bedtime  vancomycin  IVPB 500 milliGRAM(s) IV Intermittent every 12 hours  vitamin A &amp; D Ointment 1 Application(s) Topical daily    MEDICATIONS  (PRN):  acetaminophen     Tablet .. 650 milliGRAM(s) Oral every 6 hours PRN Temp greater or equal to 38C (100.4F)  aluminum hydroxide/magnesium hydroxide/simethicone Suspension 30 milliLiter(s) Oral every 4 hours PRN Dyspepsia  clonazePAM  Tablet 0.5 milliGRAM(s) Oral three times a day PRN For Agitation only  oxycodone    5 mG/acetaminophen 325 mG 1 Tablet(s) Oral/Enteral Tube every 6 hours PRN Severe Pain (7 - 10)          Xrays:  TLC:  OG:  ET tube:                                                                                       ECHO:  CAM ICU:

## 2023-07-09 NOTE — PROGRESS NOTE ADULT - SUBJECTIVE AND OBJECTIVE BOX
ZEYNEP ROSSI  SI-N 3A (Back) 020 A ([facility]-N 3A (Back))      Patient was evaluated and examined  by bedside, no active complains, tolerating po diet, trach to collar, monitoring for secretions, no fever      REVIEW OF SYSTEMS: please see pertinent positives mentioned above, all other 12 ROS negative      T(C): , Max: 36.9 (07-08-23 @ 21:00)  HR: 99 (07-09-23 @ 05:00)  BP: 124/69 (07-09-23 @ 05:00)  RR: 18 (07-09-23 @ 05:00)  SpO2: 99% (07-09-23 @ 05:00)  CAPILLARY BLOOD GLUCOSE      POCT Blood Glucose.: 105 mg/dL (09 Jul 2023 06:19)  POCT Blood Glucose.: 119 mg/dL (08 Jul 2023 21:57)  POCT Blood Glucose.: 105 mg/dL (08 Jul 2023 16:47)  POCT Blood Glucose.: 110 mg/dL (08 Jul 2023 11:42)      PHYSICAL EXAM:  General: NAD, AAOX3, patient is laying comfortably in bed  HEENT: AT, NC, trach present  Lungs: good breath sounds B/L, no wheezing, no rhonchi  CVS: normal S1, S2, RRR, NO M/G/R  Abdomen: soft, bowel sounds present, non-tender, non-distended, peg present  Extremities: no edema, no clubbing, no cyanosis, positive peripheral pulses b/l  Neuro: no acute focal neurological deficits, generalized body weakness  Skin: dry scaly skin on hands/feet      LAB  CBC  Date: 07-09-23 @ 06:10  Mean cell Ogfsnhlisu50.1  Mean cell Hemoglobin Conc30.4  Mean cell Volum 79.4  Platelet count-Automate 591  RBC Count 3.73  Red Cell Distrib Width26.7  WBC Count9.74  % Albumin, Urine--  Hematocrit 29.6  Hemoglobin 9.0  CBC  Date: 07-08-23 @ 09:07  Mean cell Tribwkgkuj76.1  Mean cell Hemoglobin Conc30.4  Mean cell Volum 79.1  Platelet count-Automate 581  RBC Count 3.49  Red Cell Distrib Width26.5  WBC Count10.43  % Albumin, Urine--  Hematocrit 27.6  Hemoglobin 8.4  CBC  Date: 07-07-23 @ 08:33  Mean cell Tvcirlxige47.9  Mean cell Hemoglobin Conc30.3  Mean cell Volum 78.9  Platelet count-Automate 586  RBC Count 3.60  Red Cell Distrib Width26.9  WBC Count10.49  % Albumin, Urine--  Hematocrit 28.4  Hemoglobin 8.6          [facility]  07-09-23 @ 06:10  Blood Gas Arterial-Calcium,Ionized--  Blood Urea Nitrogen, Serum 14 mg/dL [10 - 20]  Carbon Dioxide, Serum27 mmol/L [17 - 32]  Chloride, Serum97 mmol/L<L> [98 - 110]  Creatinie, Serum0.8 mg/dL [0.7 - 1.5]  Glucose, Serum97 mg/dL [70 - 99]  Potassium, Serum5.1 mmol/L<H> [3.5 - 5.0]  Sodium, Serum 135 mmol/L [135 - 146]  [facility]  07-08-23 @ 09:07  Blood Gas Arterial-Calcium,Ionized--  Blood Urea Nitrogen, Serum 15 mg/dL [10 - 20]  Carbon Dioxide, Serum28 mmol/L [17 - 32]  Chloride, Serum93 mmol/L<L> [98 - 110]  Creatinie, Serum0.8 mg/dL [0.7 - 1.5]  Glucose, Serum88 mg/dL [70 - 99]  Potassium, Serum5.1 mmol/L<H> [3.5 - 5.0]  Sodium, Serum 132 mmol/L<L> [135 - 146]  [facility]  07-08-23 @ 01:28  Blood Gas Arterial-Calcium,Ionized--  Blood Urea Nitrogen, Serum 15 mg/dL [10 - 20]  Carbon Dioxide, Serum28 mmol/L [17 - 32]  Chloride, Serum97 mmol/L<L> [98 - 110]  Creatinie, Serum0.8 mg/dL [0.7 - 1.5]  Glucose, [id number] mg/dL<H> [70 - 99]  Potassium, Serum4.5 mmol/L [3.5 - 5.0]  Sodium, Serum 134 mmol/L<L> [135 - 146]  [facility]  07-07-23 @ 21:39  Blood Gas Arterial-Calcium,Ionized--  Blood Urea Nitrogen, Serum 15 mg/dL [10 - 20]  Carbon Dioxide, Serum28 mmol/L [17 - 32]  Chloride, Serum92 mmol/L<L> [98 - 110]  Creatinie, Serum0.8 mg/dL [0.7 - 1.5]  Glucose, Podkt416 mg/dL<H> [70 - 99]  Potassium, Serum4.5 mmol/L [3.5 - 5.0]  Sodium, Serum 129 mmol/L<L> [135 - 146]      Microbiology:    Culture - Sputum (collected 06-11-23 @ 11:47)  Source: Trach Asp Tracheal Aspirate  Gram Stain (06-11-23 @ 23:26):    Numerous polymorphonuclear leukocytes per low power field    Rare Squamous epithelial cells per low power field    No organisms seen per oil power field  Final Report (06-13-23 @ 16:58):    Normal Respiratory Laila present    Culture - Blood (collected 06-10-23 @ 11:41)  Source: .Blood None  Final Report (06-15-23 @ 20:01):    No Growth Final        Medications:  acetaminophen     Tablet .. 650 milliGRAM(s) Oral every 6 hours PRN  aluminum hydroxide/magnesium hydroxide/simethicone Suspension 30 milliLiter(s) Oral every 4 hours PRN  apixaban 5 milliGRAM(s) Oral every 12 hours  chlorhexidine 2% Cloths 1 Application(s) Topical <User Schedule>  clonazePAM  Tablet 0.5 milliGRAM(s) Oral three times a day PRN  diltiazem    Tablet 30 milliGRAM(s) Oral every 6 hours  famotidine    Tablet 20 milliGRAM(s) Oral daily  ferrous    sulfate Liquid 300 milliGRAM(s) Enteral Tube daily  metroNIDAZOLE    Tablet 500 milliGRAM(s) Oral every 8 hours  multivitamin 1 Tablet(s) Oral daily  nystatin Cream 1 Application(s) Topical two times a day  oxycodone    5 mG/acetaminophen 325 mG 1 Tablet(s) Oral/Enteral Tube every 6 hours PRN  petrolatum white Ointment 1 Application(s) Topical two times a day  scopolamine 1 mG/72 Hr(s) Patch 1 Patch Transdermal every 72 hours  senna 2 Tablet(s) Oral at bedtime  vancomycin  IVPB 500 milliGRAM(s) IV Intermittent every 12 hours  vitamin A &amp; D Ointment 1 Application(s) Topical daily        Assessment and Plan:  Patient is a 43 y/o female with PMH of Asthma, HTN c/w acute Influenza B viral pneumonia superimposed on cavitary MRSA pneumonia, acute hypoxic respiratory failure requiring intubation. Patient was initially admitted to ICU level of care, completed the course of zyvox and tamiflu.   Pneumonia was  complicated by loculated parapneumonic effusion s/p  chest tube placement/removal. s/p Tracheostomy and peg  placement   Patient's stay was complicated by MRSA bacteremia  currently on IV vancomycin and metronidazole, cultures negative to date. MASTER was negative for vegetations.   Her stay was also complicated by new onset Afib with RVR for which she is on amiodarone, diltiazem, and therapeutic anticoagulation.  Patient also had LANCE that improved with no need for RRT. Otherwise patient received a total of 6 p RBC for acute blood loss anemia ,currently hemoglobin stable. Patient transferred to step down unit and to Vent Unit with the following course of therapy:     Acute hypoxemic respiratory failure / s/p Trach and PEG    Severe cavitary CAP MRSA  Small parapneumonic effusion SP Chest tube- removed   MRSA bacteremia  Influenza B   hx of Asthma   Intubated 5/23 self-extubated 5/25, re-intubated 5/25; SBT failed, SP trach/G tube  - s/p Zyvox, meropenem and Tamifu, now on Vancomycin and flagyl, anticipated end date 7/9  - s/p course of steroids now off  -  blood cultures  NGTD SINCE 5/28   - s/p MASTER 5/31: No evidence of valvular vegetations or intracardiac abscesses to support IE. Pulmonary hepatisation incidental finding. EF 65%, mild TR and pulm HTN  - tapered off methadone( given for pain), d/c Zyprexa  -7/6/23: tapered  trach to collar, saturating 98%  -7/7/23 - 7/9/23 : patient tolerating well trach to collar. monitor for decreased secretions ,than will cap the trach, observe pt. clinically    # s/p peg placement-   -07/06/2023- patient was evaluated by speech tx. , post  video swallow on 7/7/23- started on diet.  -tolerating diet well,  d/c peg feedings      # Hyperkalemia- s/p Kayexalate tx.    # Microcytic Anemia likely  anemia of chronic disease   - s/p 6 units prbc so far last 6/5  - Restarted AC with lovenox 6/22 given Hb stable last few days, switched to Eliquis 5mg Q12H   -monitor hemoglobin   - daily iron tx, daily MVI tx.     New onset A-fib w/ RVR- currently remains in NSR with RBBB  - HR better controlled now  - s/p Amio drip, was on PO amiodarone, prolonged QT- d/c Amiodarone on 7/7/23 as per EP  - due to hypotension decrease the dose of Cardizem po  30mg via peg every 6 hrs   - therapeutic Lovenox,  transitioned to Eliquis 5mg Q12H    - EP specialist on board    Anxiety- continue low dose Klonopin 0.5 mg via peg every 8 hours prn. tx.    Physical deconditioning - post  PT eval- max assist, currently oob to chair.     GI/DVT proph.    #Progress Note Handoff: daily BMP monitoring , possible cap of trach today, complete  IV Vanco tx. tonight,  continue Klonopin prn. for anxiety/agitation events, oob to chair activity, monitor HR   Family discussion: medical plan of tx. d/w pt. by bedside Disposition: STR in 24 hrs    Total time spent to complete patient's bedside assessment, review medical chart, discuss medical plan of care with covering medical team was more than 50 minutes with >50% of time spent face to face with patient, discussion with patient/family and/or coordination of care .

## 2023-07-10 ENCOUNTER — TRANSCRIPTION ENCOUNTER (OUTPATIENT)
Age: 43
End: 2023-07-10

## 2023-07-10 LAB
ALBUMIN SERPL ELPH-MCNC: 3.6 G/DL — SIGNIFICANT CHANGE UP (ref 3.5–5.2)
ALP SERPL-CCNC: 114 U/L — SIGNIFICANT CHANGE UP (ref 30–115)
ALT FLD-CCNC: 16 U/L — SIGNIFICANT CHANGE UP (ref 0–41)
ANION GAP SERPL CALC-SCNC: 11 MMOL/L — SIGNIFICANT CHANGE UP (ref 7–14)
AST SERPL-CCNC: 21 U/L — SIGNIFICANT CHANGE UP (ref 0–41)
BILIRUB SERPL-MCNC: 0.2 MG/DL — SIGNIFICANT CHANGE UP (ref 0.2–1.2)
BUN SERPL-MCNC: 15 MG/DL — SIGNIFICANT CHANGE UP (ref 10–20)
CALCIUM SERPL-MCNC: 9.9 MG/DL — SIGNIFICANT CHANGE UP (ref 8.4–10.4)
CHLORIDE SERPL-SCNC: 94 MMOL/L — LOW (ref 98–110)
CO2 SERPL-SCNC: 28 MMOL/L — SIGNIFICANT CHANGE UP (ref 17–32)
CREAT SERPL-MCNC: 0.8 MG/DL — SIGNIFICANT CHANGE UP (ref 0.7–1.5)
EGFR: 94 ML/MIN/1.73M2 — SIGNIFICANT CHANGE UP
GLUCOSE BLDC GLUCOMTR-MCNC: 115 MG/DL — HIGH (ref 70–99)
GLUCOSE BLDC GLUCOMTR-MCNC: 123 MG/DL — HIGH (ref 70–99)
GLUCOSE BLDC GLUCOMTR-MCNC: 91 MG/DL — SIGNIFICANT CHANGE UP (ref 70–99)
GLUCOSE SERPL-MCNC: 87 MG/DL — SIGNIFICANT CHANGE UP (ref 70–99)
HCT VFR BLD CALC: 28.3 % — LOW (ref 37–47)
HGB BLD-MCNC: 8.7 G/DL — LOW (ref 12–16)
MAGNESIUM SERPL-MCNC: 1.8 MG/DL — SIGNIFICANT CHANGE UP (ref 1.8–2.4)
MCHC RBC-ENTMCNC: 24.3 PG — LOW (ref 27–31)
MCHC RBC-ENTMCNC: 30.7 G/DL — LOW (ref 32–37)
MCV RBC AUTO: 79.1 FL — LOW (ref 81–99)
NRBC # BLD: 0 /100 WBCS — SIGNIFICANT CHANGE UP (ref 0–0)
PLATELET # BLD AUTO: 628 K/UL — HIGH (ref 130–400)
PMV BLD: 8.9 FL — SIGNIFICANT CHANGE UP (ref 7.4–10.4)
POTASSIUM SERPL-MCNC: 5.1 MMOL/L — HIGH (ref 3.5–5)
POTASSIUM SERPL-SCNC: 5.1 MMOL/L — HIGH (ref 3.5–5)
PROT SERPL-MCNC: 7.8 G/DL — SIGNIFICANT CHANGE UP (ref 6–8)
RBC # BLD: 3.58 M/UL — LOW (ref 4.2–5.4)
RBC # FLD: 26.5 % — HIGH (ref 11.5–14.5)
SODIUM SERPL-SCNC: 133 MMOL/L — LOW (ref 135–146)
WBC # BLD: 12.17 K/UL — HIGH (ref 4.8–10.8)
WBC # FLD AUTO: 12.17 K/UL — HIGH (ref 4.8–10.8)

## 2023-07-10 PROCEDURE — 99232 SBSQ HOSP IP/OBS MODERATE 35: CPT

## 2023-07-10 PROCEDURE — 71045 X-RAY EXAM CHEST 1 VIEW: CPT | Mod: 26

## 2023-07-10 PROCEDURE — 99233 SBSQ HOSP IP/OBS HIGH 50: CPT

## 2023-07-10 RX ORDER — SODIUM ZIRCONIUM CYCLOSILICATE 10 G/10G
10 POWDER, FOR SUSPENSION ORAL ONCE
Refills: 0 | Status: COMPLETED | OUTPATIENT
Start: 2023-07-10 | End: 2023-07-10

## 2023-07-10 RX ORDER — SODIUM ZIRCONIUM CYCLOSILICATE 10 G/10G
5 POWDER, FOR SUSPENSION ORAL ONCE
Refills: 0 | Status: DISCONTINUED | OUTPATIENT
Start: 2023-07-10 | End: 2023-07-10

## 2023-07-10 RX ADMIN — Medication 30 MILLIGRAM(S): at 12:46

## 2023-07-10 RX ADMIN — Medication 1 APPLICATION(S): at 05:22

## 2023-07-10 RX ADMIN — Medication 300 MILLIGRAM(S): at 12:46

## 2023-07-10 RX ADMIN — Medication 1 APPLICATION(S): at 12:47

## 2023-07-10 RX ADMIN — Medication 1 TABLET(S): at 12:46

## 2023-07-10 RX ADMIN — Medication 30 MILLIGRAM(S): at 23:17

## 2023-07-10 RX ADMIN — SCOPALAMINE 1 PATCH: 1 PATCH, EXTENDED RELEASE TRANSDERMAL at 20:07

## 2023-07-10 RX ADMIN — APIXABAN 5 MILLIGRAM(S): 2.5 TABLET, FILM COATED ORAL at 05:15

## 2023-07-10 RX ADMIN — NYSTATIN CREAM 1 APPLICATION(S): 100000 CREAM TOPICAL at 05:15

## 2023-07-10 RX ADMIN — Medication 0.5 MILLIGRAM(S): at 18:53

## 2023-07-10 RX ADMIN — Medication 0.5 MILLIGRAM(S): at 10:42

## 2023-07-10 RX ADMIN — Medication 0.5 MILLIGRAM(S): at 23:18

## 2023-07-10 RX ADMIN — NYSTATIN CREAM 1 APPLICATION(S): 100000 CREAM TOPICAL at 18:27

## 2023-07-10 RX ADMIN — Medication 30 MILLIGRAM(S): at 05:15

## 2023-07-10 RX ADMIN — SODIUM ZIRCONIUM CYCLOSILICATE 10 GRAM(S): 10 POWDER, FOR SUSPENSION ORAL at 09:12

## 2023-07-10 RX ADMIN — Medication 0.5 MILLIGRAM(S): at 00:03

## 2023-07-10 RX ADMIN — FAMOTIDINE 20 MILLIGRAM(S): 10 INJECTION INTRAVENOUS at 12:47

## 2023-07-10 RX ADMIN — CHLORHEXIDINE GLUCONATE 1 APPLICATION(S): 213 SOLUTION TOPICAL at 05:17

## 2023-07-10 RX ADMIN — Medication 30 MILLIGRAM(S): at 18:26

## 2023-07-10 RX ADMIN — APIXABAN 5 MILLIGRAM(S): 2.5 TABLET, FILM COATED ORAL at 18:26

## 2023-07-10 RX ADMIN — Medication 1 APPLICATION(S): at 18:29

## 2023-07-10 NOTE — DISCHARGE NOTE PROVIDER - NSDCMRMEDTOKEN_GEN_ALL_CORE_FT
apixaban 5 mg oral tablet: 1 tab(s) orally every 12 hours  dilTIAZem 120 mg/24 hours oral capsule, extended release: 1 cap(s) orally once a day   apixaban 5 mg oral tablet: 1 tab(s) orally every 12 hours  ascorbic acid 500 mg oral tablet: 1 tab(s) orally once a day  dilTIAZem 120 mg/24 hours oral capsule, extended release: 1 cap(s) orally once a day  senna leaf extract oral tablet: 2 tab(s) orally once a day (at bedtime)   apixaban 5 mg oral tablet: 1 tab(s) orally every 12 hours  ascorbic acid 500 mg oral tablet: 1 tab(s) orally once a day  dilTIAZem 120 mg/24 hours oral capsule, extended release: 1 cap(s) orally once a day  metoprolol succinate 25 mg oral tablet, extended release: 1 tab(s) orally once a day  senna leaf extract oral tablet: 2 tab(s) orally once a day (at bedtime)  zinc sulfate 220 mg oral capsule: 1 cap(s) orally once a day

## 2023-07-10 NOTE — DISCHARGE NOTE PROVIDER - PROVIDER TOKENS
PROVIDER:[TOKEN:[[id number]],FOLLOWUP:[1 week],ESTABLISHEDPATIENT:[T]] PROVIDER:[TOKEN:[[id number]],FOLLOWUP:[1 week],ESTABLISHEDPATIENT:[T]],FREE:[LAST:[Cardiologist],PHONE:[(   )    -],FAX:[(   )    -],FOLLOWUP:[1 week]],FREE:[LAST:[PCP],PHONE:[(   )    -],FAX:[(   )    -],FOLLOWUP:[1 week]] PROVIDER:[TOKEN:[[id number]],FOLLOWUP:[1 week],ESTABLISHEDPATIENT:[T]],PROVIDER:[TOKEN:[[id number]]],FREE:[LAST:[Cardiologist],PHONE:[(   )    -],FAX:[(   )    -],FOLLOWUP:[1 week]],FREE:[LAST:[PCP],PHONE:[(   )    -],FAX:[(   )    -],FOLLOWUP:[1 week]],PROVIDER:[TOKEN:[[id number]]]

## 2023-07-10 NOTE — SWALLOW BEDSIDE ASSESSMENT ADULT - ASR SWALLOW REFERRAL
pt w/ traumatic past year, death of her baby's father, now hospitalized w/ resp failure herself, leaving 2 children to be cared for by her mother/psychiatry/psychology

## 2023-07-10 NOTE — SWALLOW BEDSIDE ASSESSMENT ADULT - ASR SWALLOW RECOMMEND DIAG
further assess oropharyngeal swallow function/FEES
consider repeat FEES depending upon, capping plan/d/c plan

## 2023-07-10 NOTE — CHART NOTE - NSCHARTNOTEFT_GEN_A_CORE
- informed by primary team pt decannulated herself.  - pt saturating well in no acute distress,  - trach was replaced #6 Shiley cuffed.   - respiratory at bedside.  - saturating 100%, and HR coming down to 110s   - ENT was also bedside and performed a bedside evaluation of trach to assure no false passages  - patient left in stable condition  - recommend urgent CXR to assess trach   - may decannulate as deemed appropriate by pulmonary/primary team - called to bedside urgently by primary team for trach dislodgement  - pt saturating well in no acute distress,  - trach was replaced #6 Shiley cuffed.   - respiratory at bedside.  - saturating 100%, and HR coming down to 110s   - ENT was also bedside and performed a bedside evaluation of trach to assure no false passages  - patient left in stable condition  - recommend urgent CXR to assess trach   - may decannulate as deemed appropriate by pulmonary/primary team

## 2023-07-10 NOTE — PROGRESS NOTE ADULT - ASSESSMENT
42 year-old female with PMH of Asthma, HTN? presents with complaint of cough x3 days and SOB. In the ED was found to have Bilateral patchy groundglass nodularity with dominant confluent left lower lobe opacification with numerous cavitary lesions.Trach and PEG done for inability to wean off vent, currently off sedation. Her stay was also complicated by new onset Afib with RVR for which she is on amiodarone, diltiazem, and therapeutic anticoagulation.    #Acute hypoxemic respiratory failure / s/P Trach and PEG    #Severe cavitary CAP MRSA  #Small parapneumonic effusion SP Chest tube  #MRSA bacteremia  #Influenza B   #hx of Asthma   #Intubated 5/23 self-extubated 5/25, re-intubated 5/25; SBT failed, SP trach/G tube  - s/p Zyvox, meropenem and Tamifu, now on Vancomycin and flagyl, anticipated end date 7/9  - monitor levels per ID pharmacy   - s/p course of steroids now off  -  blood cultures  NGTD SINCE 5/28   - s/p MASTER 5/31: No evidence of valvular vegetations or intracardiac abscesses to support IE. Pulmonary hepatisation incidental finding. EF 65%, mild TR and pulm HTN  - ventilator management per pulm/cc,  - Methadone to 5 mg via peg - last day today  - d/c olanzapine  - pt breathing on own with t-collar   - pt on soft diet  - cup trach - monitor for 4 hrs today - f/u with respiratory  - monitor secretions     # Hyperkalemia  - s/p Lokelma 10 mg x 1 dose today, f/up repeated BMP at 4  - s/p lokelma 5mg 1 dose overnight 7/5 - f/u 11am BMP  - s/p lokelma 10mg 7/7     #Maculopapular buttocks rash  -PCR skin scraping negative for HSV --> off  Acyclovir   -Wound care per burn, wound care RN following     #Microcytic Anemia likely  anemia of chronic disease   - s/p 6 units prbc so far last 6/5  - on Eliquis 5mg Q12H   - monitor hemoglobin - hgb 8.2    #New onset A-fib w/ RVR  - HR better controlled now  - s/p Amio drip, now on PO - amio d/c per EP rec - monitor for tachy  - Diltiazem 30mg q6 with holding parameters - if  or less or HR 60 or less  - on eliquis  - free T3 and free t4 isael    #Anxiety  - continue low dose Klonopin 0.25 mg via peg every 8 hours prn. tx.  - d/c restraints     #Nutrition  - multivitamin daily     Handoff: PT for OOB to chair, monitor HR, monitor for secretions, d/c planning

## 2023-07-10 NOTE — PROGRESS NOTE ADULT - SUBJECTIVE AND OBJECTIVE BOX
Over Night Events: events noted, on t collar, awake, follows commands, multiple bm    PHYSICAL EXAM    ICU Vital Signs Last 24 Hrs  T(C): 37 (09 Jul 2023 21:00), Max: 37 (09 Jul 2023 21:00)  T(F): 98.6 (09 Jul 2023 21:00), Max: 98.6 (09 Jul 2023 21:00)  HR: 101 (09 Jul 2023 23:03) (85 - 108)  BP: 132/80 (09 Jul 2023 23:03) (132/80 - 142/85)  RR: 18 (09 Jul 2023 21:00) (18 - 18)  SpO2: 100% (09 Jul 2023 21:00) (98% - 100%)    O2 Parameters below as of 09 Jul 2023 20:44  Patient On (Oxygen Delivery Method): T-piece    O2 Concentration (%): 35        General: comfortable  HEENT:  trach   Lungs: Bilateral BS  Cardiovascular: Regular   Abdomen: Soft, Positive BS  Extremities: No clubbing   Skin: Warm  Neurological: Non focal       07-08-23 @ 07:01  -  07-09-23 @ 07:00  --------------------------------------------------------  IN:    Enteral Tube Flush: 100 mL    Jevity 1.2: 480 mL  Total IN: 580 mL    OUT:  Total OUT: 0 mL    Total NET: 580 mL          LABS:                          9.0    9.74  )-----------( 591      ( 09 Jul 2023 06:10 )             29.6                                               07-09    135  |  97<L>  |  14  ----------------------------<  97  5.1<H>   |  27  |  0.8    Ca    9.9      09 Jul 2023 06:10  Mg     2.0     07-09    TPro  7.4  /  Alb  3.4<L>  /  TBili  0.2  /  DBili  x   /  AST  23  /  ALT  17  /  AlkPhos  108  07-09                                             Urinalysis Basic - ( 09 Jul 2023 06:10 )    Color: x / Appearance: x / SG: x / pH: x  Gluc: 97 mg/dL / Ketone: x  / Bili: x / Urobili: x   Blood: x / Protein: x / Nitrite: x   Leuk Esterase: x / RBC: x / WBC x   Sq Epi: x / Non Sq Epi: x / Bacteria: x                                                  LIVER FUNCTIONS - ( 09 Jul 2023 06:10 )  Alb: 3.4 g/dL / Pro: 7.4 g/dL / ALK PHOS: 108 U/L / ALT: 17 U/L / AST: 23 U/L / GGT: x                                                                                                                                       MEDICATIONS  (STANDING):  apixaban 5 milliGRAM(s) Oral every 12 hours  chlorhexidine 2% Cloths 1 Application(s) Topical <User Schedule>  diltiazem    Tablet 30 milliGRAM(s) Oral every 6 hours  famotidine    Tablet 20 milliGRAM(s) Oral daily  ferrous    sulfate Liquid 300 milliGRAM(s) Enteral Tube daily  multivitamin 1 Tablet(s) Oral daily  nystatin Cream 1 Application(s) Topical two times a day  petrolatum white Ointment 1 Application(s) Topical two times a day  scopolamine 1 mG/72 Hr(s) Patch 1 Patch Transdermal every 72 hours  senna 2 Tablet(s) Oral at bedtime  vitamin A &amp; D Ointment 1 Application(s) Topical daily    MEDICATIONS  (PRN):  acetaminophen     Tablet .. 650 milliGRAM(s) Oral every 6 hours PRN Temp greater or equal to 38C (100.4F)  aluminum hydroxide/magnesium hydroxide/simethicone Suspension 30 milliLiter(s) Oral every 4 hours PRN Dyspepsia  clonazePAM  Tablet 0.5 milliGRAM(s) Oral three times a day PRN For Agitation only  oxycodone    5 mG/acetaminophen 325 mG 1 Tablet(s) Oral/Enteral Tube every 6 hours PRN Severe Pain (7 - 10)

## 2023-07-10 NOTE — DISCHARGE NOTE PROVIDER - NPI NUMBER (FOR SYSADMIN USE ONLY) :
[[id number]] [[id number]],[UNKNOWN],[UNKNOWN] [[id number]],[[id number]],[UNKNOWN],[UNKNOWN],[[id number]]

## 2023-07-10 NOTE — PROGRESS NOTE ADULT - SUBJECTIVE AND OBJECTIVE BOX
ZEYNEP ROSSI 42y Female  MRN#: [id number]     Hospital Day: 48d    Pt is currently admitted with the primary diagnosis of  Pneumonia due to infectious organism        SUBJECTIVE     Overnight events  None    Subjective complaints  Pt was evaluated this am. Patient denied any active complaints and per patient his symptoms are improving                                            ----------------------------------------------------------  OBJECTIVE  PAST MEDICAL & SURGICAL HISTORY                                            -----------------------------------------------------------  ALLERGIES:  aspirin (Short breath; Anaphylaxis)  Bananas (Unknown)                                            ------------------------------------------------------------    HOME MEDICATIONS  Home Medications:                           MEDICATIONS:  STANDING MEDICATIONS  apixaban 5 milliGRAM(s) Oral every 12 hours  chlorhexidine 2% Cloths 1 Application(s) Topical <User Schedule>  diltiazem    Tablet 30 milliGRAM(s) Oral every 6 hours  famotidine    Tablet 20 milliGRAM(s) Oral daily  ferrous    sulfate Liquid 300 milliGRAM(s) Enteral Tube daily  multivitamin 1 Tablet(s) Oral daily  nystatin Cream 1 Application(s) Topical two times a day  petrolatum white Ointment 1 Application(s) Topical two times a day  scopolamine 1 mG/72 Hr(s) Patch 1 Patch Transdermal every 72 hours  senna 2 Tablet(s) Oral at bedtime  vitamin A &amp; D Ointment 1 Application(s) Topical daily    PRN MEDICATIONS  acetaminophen     Tablet .. 650 milliGRAM(s) Oral every 6 hours PRN  aluminum hydroxide/magnesium hydroxide/simethicone Suspension 30 milliLiter(s) Oral every 4 hours PRN  clonazePAM  Tablet 0.5 milliGRAM(s) Oral three times a day PRN  oxycodone    5 mG/acetaminophen 325 mG 1 Tablet(s) Oral/Enteral Tube every 6 hours PRN                                            ------------------------------------------------------------  VITAL SIGNS: Last 24 Hours  T(C): 37 (09 Jul 2023 21:00), Max: 37 (09 Jul 2023 21:00)  T(F): 98.6 (09 Jul 2023 21:00), Max: 98.6 (09 Jul 2023 21:00)  HR: 101 (09 Jul 2023 23:03) (85 - 108)  BP: 132/80 (09 Jul 2023 23:03) (132/80 - 142/85)  BP(mean): --  RR: 18 (09 Jul 2023 21:00) (18 - 18)  SpO2: 100% (09 Jul 2023 21:00) (98% - 100%)                                             --------------------------------------------------------------  LABS:                        8.7    12.17 )-----------( 628      ( 10 Jul 2023 05:58 )             28.3     07-10    133<L>  |  94<L>  |  15  ----------------------------<  87  5.1<H>   |  28  |  0.8    Ca    9.9      10 Jul 2023 05:58  Mg     1.8     07-10    TPro  7.8  /  Alb  3.6  /  TBili  0.2  /  DBili  x   /  AST  21  /  ALT  16  /  AlkPhos  114  07-10      Urinalysis Basic - ( 10 Jul 2023 05:58 )    Color: x / Appearance: x / SG: x / pH: x  Gluc: 87 mg/dL / Ketone: x  / Bili: x / Urobili: x   Blood: x / Protein: x / Nitrite: x   Leuk Esterase: x / RBC: x / WBC x   Sq Epi: x / Non Sq Epi: x / Bacteria: x                                                            -------------------------------------------------------------  RADIOLOGY:                                            --------------------------------------------------------------    PHYSICAL EXAM:  GENERAL: NAD, lying in bed comfortably  HEAD:  Atraumatic, Normocephalic  CHEST/LUNG: pt with trach, satting 100%   HEART: regular rate and rhythm; No murmurs, rubs, or gallops  ABDOMEN: Bowel sounds present; Soft, Nontender, Nondistended.    EXTREMITIES: Warm. No clubbing, cyanosis, or edema  NERVOUS SYSTEM:  Alert & Oriented X3. No focal deficits                                          --------------------------------------------------------------                 ZEYNEP ROSSI 42y Female  MRN#: [id number]     Hospital Day: 48d    Pt is currently admitted with the primary diagnosis of  Pneumonia due to infectious organism        SUBJECTIVE     Overnight events  None                                                ----------------------------------------------------------  OBJECTIVE  PAST MEDICAL & SURGICAL HISTORY                                            -----------------------------------------------------------  ALLERGIES:  aspirin (Short breath; Anaphylaxis)  Bananas (Unknown)                                            ------------------------------------------------------------    HOME MEDICATIONS  Home Medications:                           MEDICATIONS:  STANDING MEDICATIONS  apixaban 5 milliGRAM(s) Oral every 12 hours  chlorhexidine 2% Cloths 1 Application(s) Topical <User Schedule>  diltiazem    Tablet 30 milliGRAM(s) Oral every 6 hours  famotidine    Tablet 20 milliGRAM(s) Oral daily  ferrous    sulfate Liquid 300 milliGRAM(s) Enteral Tube daily  multivitamin 1 Tablet(s) Oral daily  nystatin Cream 1 Application(s) Topical two times a day  petrolatum white Ointment 1 Application(s) Topical two times a day  scopolamine 1 mG/72 Hr(s) Patch 1 Patch Transdermal every 72 hours  senna 2 Tablet(s) Oral at bedtime  vitamin A &amp; D Ointment 1 Application(s) Topical daily    PRN MEDICATIONS  acetaminophen     Tablet .. 650 milliGRAM(s) Oral every 6 hours PRN  aluminum hydroxide/magnesium hydroxide/simethicone Suspension 30 milliLiter(s) Oral every 4 hours PRN  clonazePAM  Tablet 0.5 milliGRAM(s) Oral three times a day PRN  oxycodone    5 mG/acetaminophen 325 mG 1 Tablet(s) Oral/Enteral Tube every 6 hours PRN                                            ------------------------------------------------------------  VITAL SIGNS: Last 24 Hours  T(C): 37 (09 Jul 2023 21:00), Max: 37 (09 Jul 2023 21:00)  T(F): 98.6 (09 Jul 2023 21:00), Max: 98.6 (09 Jul 2023 21:00)  HR: 101 (09 Jul 2023 23:03) (85 - 108)  BP: 132/80 (09 Jul 2023 23:03) (132/80 - 142/85)  BP(mean): --  RR: 18 (09 Jul 2023 21:00) (18 - 18)  SpO2: 100% (09 Jul 2023 21:00) (98% - 100%)                                             --------------------------------------------------------------  LABS:                        8.7    12.17 )-----------( 628      ( 10 Jul 2023 05:58 )             28.3     07-10    133<L>  |  94<L>  |  15  ----------------------------<  87  5.1<H>   |  28  |  0.8    Ca    9.9      10 Jul 2023 05:58  Mg     1.8     07-10    TPro  7.8  /  Alb  3.6  /  TBili  0.2  /  DBili  x   /  AST  21  /  ALT  16  /  AlkPhos  114  07-10      Urinalysis Basic - ( 10 Jul 2023 05:58 )    Color: x / Appearance: x / SG: x / pH: x  Gluc: 87 mg/dL / Ketone: x  / Bili: x / Urobili: x   Blood: x / Protein: x / Nitrite: x   Leuk Esterase: x / RBC: x / WBC x   Sq Epi: x / Non Sq Epi: x / Bacteria: x                                                            -------------------------------------------------------------  RADIOLOGY:                                            --------------------------------------------------------------    PHYSICAL EXAM:  GENERAL: NAD, lying in bed comfortably  HEAD:  Atraumatic, Normocephalic  CHEST/LUNG: pt with trach, satting 100%   HEART: regular rate and rhythm; No murmurs, rubs, or gallops  ABDOMEN: Bowel sounds present; Soft, Nontender, Nondistended.    EXTREMITIES: Warm. No clubbing, cyanosis, or edema  NERVOUS SYSTEM:  Alert & Oriented X3. No focal deficits                                          --------------------------------------------------------------

## 2023-07-10 NOTE — DISCHARGE NOTE PROVIDER - NSDCCPCAREPLAN_GEN_ALL_CORE_FT
PRINCIPAL DISCHARGE DIAGNOSIS  Diagnosis: Pneumonia  Assessment and Plan of Treatment: You came to the hospital because of shortness of breath and pneumonia symptoms that you had developed. When you came in you had a chest x-ray and CT imaging that showed bilateral patchy groundglass nodularity with dominant confluent left lower lobe opacification and numerous cavitary lesions. 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 Because of the pneumonia, you developed acute hypoxemic respiratory failure and you were intubated in the ICU. In addition you recieved a PEG tube for meals and were trache'd to allow you to breathe better. Several blood cultures were drawn for you and you recieved multiple antibiotics and you were monitored consistently by the team in the medical ICU.  Once you started to improve you were then sent to the stepdown unit and then were placed on the floors. You are currently not on a vent but with a trach and doing well on room air. Please follow up with your PCP and a pulmonologist accordingly.      SECONDARY DISCHARGE DIAGNOSES  Diagnosis: Hyperkalemia  Assessment and Plan of Treatment: You were found to have hyperkalemia during your stay. This is when your bloods potassium level is high. You were given several rounds of lokelma which is a pill to lower your potassium levels. Please follow up with your PCP for any electrolyte abnormalities.     PRINCIPAL DISCHARGE DIAGNOSIS  Diagnosis: Pneumonia  Assessment and Plan of Treatment: You came to the hospital because of shortness of breath and pneumonia symptoms that you had developed. When you came in you had a chest x-ray and CT imaging that showed bilateral patchy groundglass nodularity with dominant confluent left lower lobe opacification and numerous cavitary lesions. 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 Because of the pneumonia, you developed acute hypoxemic respiratory failure and you were intubated in the ICU. In addition you recieved a PEG tube for meals and were trache'd to allow you to breathe better. Several blood cultures were drawn for you and you recieved multiple antibiotics and you were monitored consistently by the team in the medical ICU.  Once you started to improve you were then sent to the stepdown unit and then were placed on the floors. You are currently not on a vent but with a trach and doing well on room air. Please follow up with your PCP and a pulmonologist accordingly.      SECONDARY DISCHARGE DIAGNOSES  Diagnosis: Hyperkalemia  Assessment and Plan of Treatment: You were found to have hyperkalemia during your stay. This is when your bloods potassium level is high. You were given several rounds of lokelma which is a pill to lower your potassium levels. Please follow up with your PCP for any electrolyte abnormalities.    Diagnosis: S/P percutaneous endoscopic gastrostomy (PEG) tube placement  Assessment and Plan of Treatment: During your stay in the ICU, you recieved a peg which allowed the team to provide you with food and nutrition until you were able to eat on your own. This was then removed by the CT surg team.    Diagnosis: New onset atrial fibrillation  Assessment and Plan of Treatment: You were noted to develop atrial fibrilliation during your stay here. You were started on a medication called amiodarone. This was then discontinued as further EKG's showed that you no longer had this rhthym. Please follow up with a cardiologist.     PRINCIPAL DISCHARGE DIAGNOSIS  Diagnosis: Pneumonia  Assessment and Plan of Treatment: You came to the hospital because of shortness of breath and pneumonia symptoms that you had developed. When you came in you had a chest x-ray and CT imaging that showed bilateral patchy groundglass nodularity with dominant confluent left lower lobe opacification and numerous cavitary lesions. 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 Because of the pneumonia, you developed acute hypoxemic respiratory failure and you were intubated in the ICU. In addition you recieved a PEG tube for meals and were trache'd to allow you to breathe better. Several blood cultures were drawn for you and you recieved multiple antibiotics and you were monitored consistently by the team in the medical ICU.  Once you started to improve you were then sent to the stepdown unit and then were placed on the floors. You are currently not on a vent but with a trach and doing well on room air. Please follow up with your PCP and a pulmonologist accordingly.      SECONDARY DISCHARGE DIAGNOSES  Diagnosis: Hyperkalemia  Assessment and Plan of Treatment: You were found to have hyperkalemia during your stay. This is when your bloods potassium level is high. You were given several rounds of lokelma which is a pill to lower your potassium levels. Please follow up with your PCP for any electrolyte abnormalities.    Diagnosis: S/P percutaneous endoscopic gastrostomy (PEG) tube placement  Assessment and Plan of Treatment: During your stay in the ICU, you recieved a peg which allowed the team to provide you with food and nutrition until you were able to eat on your own. This was then removed by the CT surg team.    Diagnosis: New onset atrial fibrillation  Assessment and Plan of Treatment: You were noted to develop atrial fibrilliation during your stay here. You were started on a medication called amiodarone. This was then discontinued as further EKG's showed that you no longer had this rhthym. Please follow up with a cardiologist. Take blood thinner eliquis as prescribed and Diltiazem & metoprolol.

## 2023-07-10 NOTE — DISCHARGE NOTE PROVIDER - HOSPITAL COURSE
42 year-old female with PMH of Asthma, HTN? presents with complaint of cough x3 days and SOB. During my encounter pt with dyspnea and increased work of breathing and chest pain, unable to properly provide accurate history. She states that her cough has been progressively worsening over the past 3 days, endorses hemoptysis since yesterday. She states she had a friend who came over and stayed with for more than a week who was sick recently, but she does not know if she recently travelled out of [facility] or not. She also admits that her children has been sick with Sore throat and fever for past 3 days. She admits to fever, chills, sever Chest pain which has gotten worse since she came to the ED. She took 1x Tamiflu yesterday. She otherwise denies diarrhea, constipation, urinary symptoms. She is not vaccinated for Influenza or COVID-19.     In ED  /87, , RR 28, T 100.8 F, 98% on 15 L NRB  Labs significant for wbc 16.7K with Neutrophilic Predominance and L shift, Hgb 11.4, MCV 61.6, Cr 2.0, BUN 23, Alk Phos 125/AST 62/ALT 27, Lac 4.9> 3.9  RVP + Influenza B      CT Angio Chest PE Protocol w/ IV Cont   1. Bilateral patchy groundglass nodularity with dominant confluent left lower lobe opacification with numerous cavitary lesions. Additional contralateral right lower lobe cavitary 1 cm nodule. Findings compatible with cavitary pneumonia in the appropriate clinical setting; differential diagnosis includes tuberculosis.  2. Confluent ill-defined left hilar and mediastinal adenopathy, likely reactive, without dominant lymph node delineated.  3. No evidence of pulmonary embolism.    s/p 1x Rocephin, Meropenem, 3 L LR in ED    Pt admitted to SDU for further managements, during my encounter pt is very agitated, in acute distress. Pt received appropriate pain control with IV Morphine, s/p Xanax 1 mg 1x for agitation and anxiety, despite pain control and adequate fluid resuscitation pt remained tachycardic, tachypneic. STAT EKG reviewed with Cardiology team on call After discussion with Cardiology team, Pt received 1x Adenosine 6 mg IV push with no change. Case discussed with Critical Care team, decision was made for Intubation and Pt upgraded to MICU.    Patient admitted to the ICU for acute hypoxemic respiratory failure secondary to cavitary MRSA Pneumonia with a positive influenza B, finished a course of zyvox and tamiflu. This was complicated by loculated parapneumonic effusion for which a chest tube was inserted and tpa was given twice. Her stay was complicated by MRSA bacteremia and for which she took a course of meropenem, currently on vancomycin and metronidazole, cultures negative to date. MASTER was negative for vegetations. Trach and PEG done for inability to wean off vent, currently off sedation.   Her stay was also complicated by new onset Afib with RVR for which she is on amiodarone, diltiazem, and therapeutic anticoagulation. Long QT is also noted. Patient also had LANCE that improved with no need for RRT.  Otherwise patient received a total of 6 p RBC for acute blood loss anemia ,currently hemoglobin stable.     Patient was clinically and hemodynamically stable for step down unit.    CEU course was uneventful. Patient continued vanco and flagyl. Amiodarone and methadone doses were reduced and EKG was monitored daily for any increase in QTc.     #Acute hypoxemic respiratory failure / s/P Trach and PEG    #Severe cavitary CAP MRSA  #Small parapneumonic effusion SP Chest tube  #MRSA bacteremia  #Influenza B   #hx of Asthma   #Intubated 5/23 self-extubated 5/25, re-intubated 5/25; SBT failed, SP trach/G tube  - s/p Zyvox, meropenem and Tamifu, now on Vancomycin and flagyl, anticipated end date 7/9  - monitor levels per ID pharmacy   - s/p course of steroids now off  -  blood cultures  NGTD SINCE 5/28   - s/p MASTER 5/31: No evidence of valvular vegetations or intracardiac abscesses to support IE. Pulmonary hepatisation incidental finding. EF 65%, mild TR and pulm HTN  - ventilator management per pulm/cc,  - Methadone to 5 mg via peg - last day today  - d/c olanzapine  - pt breathing on own with t-collar   - pt on soft diet  - cup trach - monitor for 4 hrs today - f/u with respiratory  - monitor secretions     # Hyperkalemia  - s/p Lokelma 10 mg x 1 dose today, f/up repeated BMP at 4  - s/p lokelma 5mg 1 dose overnight 7/5 - f/u 11am BMP  - s/p lokelma 10mg 7/7     #Maculopapular buttocks rash  -PCR skin scraping negative for HSV --> off  Acyclovir   -Wound care per burn, wound care RN following     #Microcytic Anemia likely  anemia of chronic disease   - s/p 6 units prbc so far last 6/5  - on Eliquis 5mg Q12H   - monitor hemoglobin - hgb 8.2    #New onset A-fib w/ RVR  - HR better controlled now  - s/p Amio drip, now on PO - amio d/c per EP rec - monitor for tachy  - Diltiazem 30mg q6 with holding parameters - if  or less or HR 60 or less  - on eliquis  - free T3 and free t4 isael    #Anxiety  - continue low dose Klonopin 0.25 mg via peg every 8 hours prn. tx.  - d/c restraints     #Nutrition  - multivitamin daily              42 year-old female with PMH of Asthma, HTN? presents with complaint of cough x3 days and SOB. During my encounter pt with dyspnea and increased work of breathing and chest pain, unable to properly provide accurate history. She states that her cough has been progressively worsening over the past 3 days, endorses hemoptysis since yesterday. She states she had a friend who came over and stayed with for more than a week who was sick recently, but she does not know if she recently travelled out of [facility] or not. She also admits that her children has been sick with Sore throat and fever for past 3 days. She admits to fever, chills, sever Chest pain which has gotten worse since she came to the ED. She took 1x Tamiflu yesterday. She otherwise denies diarrhea, constipation, urinary symptoms. She is not vaccinated for Influenza or COVID-19.     In ED  /87, , RR 28, T 100.8 F, 98% on 15 L NRB  Labs significant for wbc 16.7K with Neutrophilic Predominance and L shift, Hgb 11.4, MCV 61.6, Cr 2.0, BUN 23, Alk Phos 125/AST 62/ALT 27, Lac 4.9> 3.9  RVP + Influenza B      CT Angio Chest PE Protocol w/ IV Cont   1. Bilateral patchy groundglass nodularity with dominant confluent left lower lobe opacification with numerous cavitary lesions. Additional contralateral right lower lobe cavitary 1 cm nodule. Findings compatible with cavitary pneumonia in the appropriate clinical setting; differential diagnosis includes tuberculosis.  2. Confluent ill-defined left hilar and mediastinal adenopathy, likely reactive, without dominant lymph node delineated.  3. No evidence of pulmonary embolism.    s/p 1x Rocephin, Meropenem, 3 L LR in ED    Pt admitted to SDU for further managements, during my encounter pt is very agitated, in acute distress. Pt received appropriate pain control with IV Morphine, s/p Xanax 1 mg 1x for agitation and anxiety, despite pain control and adequate fluid resuscitation pt remained tachycardic, tachypneic. STAT EKG reviewed with Cardiology team on call After discussion with Cardiology team, Pt received 1x Adenosine 6 mg IV push with no change. Case discussed with Critical Care team, decision was made for Intubation and Pt upgraded to MICU.    Patient admitted to the ICU for acute hypoxemic respiratory failure secondary to cavitary MRSA Pneumonia with a positive influenza B, finished a course of zyvox and tamiflu. This was complicated by loculated parapneumonic effusion for which a chest tube was inserted and tpa was given twice. Her stay was complicated by MRSA bacteremia and for which she took a course of meropenem, currently on vancomycin and metronidazole, cultures negative to date. MASTER was negative for vegetations. Trach and PEG done for inability to wean off vent, currently off sedation.   Her stay was also complicated by new onset Afib with RVR for which she is on amiodarone, diltiazem, and therapeutic anticoagulation. Long QT is also noted. Patient also had LANCE that improved with no need for RRT.  Otherwise patient received a total of 6 p RBC for acute blood loss anemia ,currently hemoglobin stable.     Patient was clinically and hemodynamically stable for step down unit.    CEU course was uneventful. Patient continued vanco and flagyl. Amiodarone and methadone doses were reduced and EKG was monitored daily for any increase in QTc.     ##Acute hypoxemic respiratory failure / s/P Trach and PEG    #Severe cavitary CAP MRSA  #Small parapneumonic effusion SP Chest tube  #MRSA bacteremia  #Influenza B   #hx of Asthma   #Intubated 5/23 self-extubated 5/25, re-intubated 5/25; SBT failed, SP trach/G tube  - s/p Zyvox, meropenem and Tamifu, Vancomycin and flagyl  - s/p course of steroids now off  -  blood cultures  NGTD SINCE 5/28   - s/p MASTER 5/31: No evidence of valvular vegetations or intracardiac abscesses to support IE. Pulmonary hepatisation incidental finding. EF 65%, mild TR and pulm HTN  - ventilator management per pulm/cc  - decannulation 7/14  - PEG dc 7/15?    #Transaminitis - resolved  - LFT's trending down, then normal  - RUQ US - cholelithiasis    # Hyperkalemia  - s/p Lokelma 10 mg x 1 dose today, f/up repeated BMP at 4  - s/p lokelma 5mg 1 dose overnight 7/5 - f/u 11am BMP  - s/p lokelma 10mg 7/7     #Maculopapular buttocks rash  -PCR skin scraping negative for HSV --> off  Acyclovir   -Wound care per burn, wound care RN following     #Microcytic Anemia likely  anemia of chronic disease   - s/p 6 units prbc so far last 6/5  - on Eliquis 5mg Q12H   - monitor hemoglobin     #New onset A-fib w/ RVR  - HR better controlled now  - s/p Amio drip, then on PO - amio d/c per EP rec  - Diltiazem 30mg q6 with holding parameters - if  or less or HR 60 or less  - on eliquis  - free T3 and free t4 normal    #Anxiety  - psychiatry recs   - zyprexa zydis 2.5mg q8 PRN - discontinued  - ativan .5mg qhs PRN  - QTC is 540 7/14       42 year-old female with PMH of Asthma, HTN? presents with complaint of cough x3 days and SOB. During my encounter pt with dyspnea and increased work of breathing and chest pain, unable to properly provide accurate history. She states that her cough has been progressively worsening over the past 3 days, endorses hemoptysis since yesterday. She states she had a friend who came over and stayed with for more than a week who was sick recently, but she does not know if she recently travelled out of [facility] or not. She also admits that her children has been sick with Sore throat and fever for past 3 days. She admits to fever, chills, sever Chest pain which has gotten worse since she came to the ED. She took 1x Tamiflu yesterday. She otherwise denies diarrhea, constipation, urinary symptoms. She is not vaccinated for Influenza or COVID-19.     In ED  /87, , RR 28, T 100.8 F, 98% on 15 L NRB  Labs significant for wbc 16.7K with Neutrophilic Predominance and L shift, Hgb 11.4, MCV 61.6, Cr 2.0, BUN 23, Alk Phos 125/AST 62/ALT 27, Lac 4.9> 3.9  RVP + Influenza B      CT Angio Chest PE Protocol w/ IV Cont   1. Bilateral patchy groundglass nodularity with dominant confluent left lower lobe opacification with numerous cavitary lesions. Additional contralateral right lower lobe cavitary 1 cm nodule. Findings compatible with cavitary pneumonia in the appropriate clinical setting; differential diagnosis includes tuberculosis.  2. Confluent ill-defined left hilar and mediastinal adenopathy, likely reactive, without dominant lymph node delineated.  3. No evidence of pulmonary embolism.    s/p 1x Rocephin, Meropenem, 3 L LR in ED    Pt admitted to SDU for further managements, during my encounter pt is very agitated, in acute distress. Pt received appropriate pain control with IV Morphine, s/p Xanax 1 mg 1x for agitation and anxiety, despite pain control and adequate fluid resuscitation pt remained tachycardic, tachypneic. STAT EKG reviewed with Cardiology team on call After discussion with Cardiology team, Pt received 1x Adenosine 6 mg IV push with no change. Case discussed with Critical Care team, decision was made for Intubation and Pt upgraded to MICU.    Patient admitted to the ICU for acute hypoxemic respiratory failure secondary to cavitary MRSA Pneumonia with a positive influenza B, finished a course of zyvox and tamiflu. This was complicated by loculated parapneumonic effusion for which a chest tube was inserted and tpa was given twice. Her stay was complicated by MRSA bacteremia and for which she took a course of meropenem, currently on vancomycin and metronidazole, cultures negative to date. MASTER was negative for vegetations. Trach and PEG done for inability to wean off vent, currently off sedation.   Her stay was also complicated by new onset Afib with RVR for which she is on amiodarone, diltiazem, and therapeutic anticoagulation. Long QT is also noted. Patient also had LANCE that improved with no need for RRT.  Otherwise patient received a total of 6 p RBC for acute blood loss anemia ,currently hemoglobin stable.     Patient was clinically and hemodynamically stable for step down unit.    CEU course was uneventful. Patient continued vanco and flagyl. Amiodarone and methadone doses were reduced and EKG was monitored daily for any increase in QTc.     ##Acute hypoxemic respiratory failure / s/P Trach and PEG    #Severe cavitary CAP MRSA  #Small parapneumonic effusion SP Chest tube  #MRSA bacteremia  #Influenza B   #hx of Asthma   #Intubated 5/23 self-extubated 5/25, re-intubated 5/25; SBT failed, SP trach/G tube  - s/p Zyvox, meropenem and Tamifu, Vancomycin and flagyl  - s/p course of steroids now off  -  blood cultures  NGTD SINCE 5/28   - s/p MASTER 5/31: No evidence of valvular vegetations or intracardiac abscesses to support IE. Pulmonary hepatisation incidental finding. EF 65%, mild TR and pulm HTN  - ventilator management per pulm/cc  - decannulation 7/14  - PEG dc 7/15    #Transaminitis - resolved  - LFT's trending down, then normal  - RUQ US - cholelithiasis    # Hyperkalemia  - s/p Lokelma 10 mg x 1 dose today, f/up repeated BMP at 4  - s/p lokelma 5mg 1 dose overnight 7/5 - f/u 11am BMP  - s/p lokelma 10mg 7/7     #Maculopapular buttocks rash  -PCR skin scraping negative for HSV --> off  Acyclovir   -Wound care per burn, wound care RN following     #Microcytic Anemia likely  anemia of chronic disease   - s/p 6 units prbc so far last 6/5  - on Eliquis 5mg Q12H   - monitor hemoglobin     #New onset A-fib w/ RVR  - HR better controlled now  - s/p Amio drip, then on PO - amio d/c per EP rec  - Diltiazem 30mg q6 with holding parameters - if  or less or HR 60 or less  - on eliquis  - free T3 and free t4 normal    #Anxiety  - psychiatry recs   - zyprexa zydis 2.5mg q8 PRN - discontinued  - ativan .5mg qhs PRN  - QTC is 540 7/14          Attending Note:  Patient seen and examined independently. I personally had a face-to-face encounter with the patient, examined the patient myself, personally reviewed labs & Radiology images,  and reviewed the plan of care with the housestaff. Agree with resident's note but my note supersedes that of the resident in the matters hereby listed.   D/c to Home w/ Home PT. After PT has trained her son to help her at home. 42 year-old female with PMH of Asthma, HTN? presents with complaint of cough x3 days and SOB. During my encounter pt with dyspnea and increased work of breathing and chest pain, unable to properly provide accurate history. She states that her cough has been progressively worsening over the past 3 days, endorses hemoptysis since yesterday. She states she had a friend who came over and stayed with for more than a week who was sick recently, but she does not know if she recently travelled out of [facility] or not. She also admits that her children has been sick with Sore throat and fever for past 3 days. She admits to fever, chills, sever Chest pain which has gotten worse since she came to the ED. She took 1x Tamiflu yesterday. She otherwise denies diarrhea, constipation, urinary symptoms. She is not vaccinated for Influenza or COVID-19.     In ED  /87, , RR 28, T 100.8 F, 98% on 15 L NRB  Labs significant for wbc 16.7K with Neutrophilic Predominance and L shift, Hgb 11.4, MCV 61.6, Cr 2.0, BUN 23, Alk Phos 125/AST 62/ALT 27, Lac 4.9> 3.9  RVP + Influenza B      CT Angio Chest PE Protocol w/ IV Cont   1. Bilateral patchy groundglass nodularity with dominant confluent left lower lobe opacification with numerous cavitary lesions. Additional contralateral right lower lobe cavitary 1 cm nodule. Findings compatible with cavitary pneumonia in the appropriate clinical setting; differential diagnosis includes tuberculosis.  2. Confluent ill-defined left hilar and mediastinal adenopathy, likely reactive, without dominant lymph node delineated.  3. No evidence of pulmonary embolism.    s/p 1x Rocephin, Meropenem, 3 L LR in ED    Pt admitted to SDU for further managements, during my encounter pt is very agitated, in acute distress. Pt received appropriate pain control with IV Morphine, s/p Xanax 1 mg 1x for agitation and anxiety, despite pain control and adequate fluid resuscitation pt remained tachycardic, tachypneic. STAT EKG reviewed with Cardiology team on call After discussion with Cardiology team, Pt received 1x Adenosine 6 mg IV push with no change. Case discussed with Critical Care team, decision was made for Intubation and Pt upgraded to MICU.    Patient admitted to the ICU for acute hypoxemic respiratory failure secondary to cavitary MRSA Pneumonia with a positive influenza B, finished a course of zyvox and tamiflu. This was complicated by loculated parapneumonic effusion for which a chest tube was inserted and tpa was given twice. Her stay was complicated by MRSA bacteremia and for which she took a course of meropenem, currently on vancomycin and metronidazole, cultures negative to date. MASTER was negative for vegetations. Trach and PEG done for inability to wean off vent, currently off sedation.   Her stay was also complicated by new onset Afib with RVR for which she is on amiodarone, diltiazem, and therapeutic anticoagulation. Long QT is also noted. Patient also had LANCE that improved with no need for RRT.  Otherwise patient received a total of 6 p RBC for acute blood loss anemia ,currently hemoglobin stable.     Patient was clinically and hemodynamically stable for step down unit.    CEU course was uneventful. Patient continued vanco and flagyl. Amiodarone and methadone doses were reduced and EKG was monitored daily for any increase in QTc.     ##Acute hypoxemic respiratory failure / s/P Trach and PEG    #Severe cavitary CAP MRSA  #Small parapneumonic effusion SP Chest tube  #MRSA bacteremia  #Influenza B   #hx of Asthma   #Intubated 5/23 self-extubated 5/25, re-intubated 5/25; SBT failed, SP trach/G tube  - s/p Zyvox, meropenem and Tamifu, Vancomycin and flagyl  - s/p course of steroids now off  -  blood cultures  NGTD SINCE 5/28   - s/p MASTER 5/31: No evidence of valvular vegetations or intracardiac abscesses to support IE. Pulmonary hepatisation incidental finding. EF 65%, mild TR and pulm HTN  - ventilator management per pulm/cc  - decannulation 7/14  - PEG dc 7/15    #Transaminitis - resolved  - LFT's trending down, then normal  - RUQ US - cholelithiasis    # Hyperkalemia  - s/p Lokelma 10 mg x 1 dose today, f/up repeated BMP at 4  - s/p lokelma 5mg 1 dose overnight 7/5 - f/u 11am BMP  - s/p lokelma 10mg 7/7     #Maculopapular buttocks rash  -PCR skin scraping negative for HSV --> off  Acyclovir   -Wound care per burn, wound care RN following     #Microcytic Anemia likely  anemia of chronic disease   - s/p 6 units prbc so far last 6/5  - on Eliquis 5mg Q12H   - monitor hemoglobin     #New onset A-fib w/ RVR  - HR better controlled now  - s/p Amio drip, then on PO - amio d/c per EP rec  - Diltiazem 30mg q6 with holding parameters - if  or less or HR 60 or less  - on eliquis  - free T3 and free t4 normal    #Anxiety  - psychiatry recs   - zyprexa zydis 2.5mg q8 PRN - discontinued  - ativan .5mg qhs PRN  - QTC is 540 7/14          Attending Note:  Patient seen and examined independently. I personally had a face-to-face encounter with the patient, examined the patient myself, personally reviewed labs & Radiology images,  and reviewed the plan of care with the housestaff. Agree with resident's note but my note supersedes that of the resident in the matters hereby listed.   D/c to Home w/ Home PT.

## 2023-07-10 NOTE — SWALLOW BEDSIDE ASSESSMENT ADULT - SLP PERTINENT HISTORY OF CURRENT PROBLEM
Pt is a 43 y/o F w/ PMHx: asthma, HTN?, p/w c/o cough x3 days and SOB. Pt w/ prolonged hospital course, AHRF requiring intubation, acute Influenza B PNA, cavitary MRSA PNA, bacteremia; PNA further c/b loculated parapneumonic effusion s/p chest tube placement/removal. Stay further c/g afib w/ RVR, LANCE. S/p trach and PEG 6/14. CTH (-).

## 2023-07-10 NOTE — DISCHARGE NOTE PROVIDER - CARE PROVIDER_API CALL
August Rowe E  Pulmonary Disease  [address] [place] [id number]  Phone: [phone]  Fax: [phone]  Established Patient  Follow Up Time: 1 week   August Rowe E  Pulmonary Disease  [address] [place] [id number]  Phone: [phone]  Fax: [phone]  Established Patient  Follow Up Time: 1 week    Cardiologist,   Phone: (   )    -  Fax: (   )    -  Follow Up Time: 1 week    PCP,   Phone: (   )    -  Fax: (   )    -  Follow Up Time: 1 week   August Rowe  Pulmonary Disease  [address] [place] [address]  Phone: [phone]  Fax: [phone]  Established Patient  Follow Up Time: 1 week    Talia Drew  [facility]  [address] [place] [address], Admin - [address]  [place] [address]  Phone: [phone]  Fax: [phone]  Follow Up Time:     Cardiologist,   Phone: (   )    -  Fax: (   )    -  Follow Up Time: 1 week    PCP,   Phone: (   )    -  Fax: (   )    -  Follow Up Time: 1 week    Jaylen Arnett  Thoracic and Cardiac Surgery  [address] [facility], [address]  [place] [address]  Phone: [phone]  Fax: [phone]  Follow Up Time:

## 2023-07-10 NOTE — PROGRESS NOTE ADULT - ASSESSMENT
IMPRESSION:    Acute hypoxemic respiratory failure sp trach off vent  Severe CAP secondary to MRSA  Small parapneumonic effusion SP Chest tube and removal  MRSA bacteremia resolved   MASTER negative   Anemia sp transfusion   New onset A FIB rate controlled   LANCE improved   HO asthma   Influenza B     PLAN:    CNS: Avoid CNS depressant    HEENT: Oral care. Trach care.     PULMONARY:  HOB @ 45 degrees.  Aspiration precautions.  No change in vent settings.  Keep SaO2 92 TO 96%.  PS / trach collar as tolerated.  call CT sx for trach exchange cuffless, fenestrated    CARDIOVASCULAR:    Avoid overload, Continue rate control.     GI: GI prophylaxis. PEG Feeding. Bowel regimen     RENAL:  Follow up lytes.  Correct as needed.     INFECTIOUS DISEASE: ABX Per ID.      MATOLOGICAL:  DVT prophylaxis.  On Eliquis     ENDOCRINE:  Follow up FS.  Insulin protocol if needed.      Prognosis is poor overall .     DC planing   PT/OT

## 2023-07-10 NOTE — SWALLOW BEDSIDE ASSESSMENT ADULT - SLP PERTINENT HISTORY OF CURRENT PROBLEM
Pt is a 41 y/o F w/ PMHx: asthma, HTN?, p/w c/o cough x3 days and SOB. Pt w/ prolonged hospital course, AHRF requiring intubation, acute Influenza B PNA, cavitary MRSA PNA, bacteremia; PNA further c/b loculated parapneumonic effusion s/p chest tube placement/removal. Stay further c/g afib w/ RVR, LANCE. S/p trach and PEG 6/14. CTH (-).

## 2023-07-11 LAB
ALBUMIN SERPL ELPH-MCNC: 3.4 G/DL — LOW (ref 3.5–5.2)
ALP SERPL-CCNC: 675 U/L — HIGH (ref 30–115)
ALT FLD-CCNC: 65 U/L — HIGH (ref 0–41)
ANION GAP SERPL CALC-SCNC: 11 MMOL/L — SIGNIFICANT CHANGE UP (ref 7–14)
AST SERPL-CCNC: 140 U/L — HIGH (ref 0–41)
BILIRUB SERPL-MCNC: 0.2 MG/DL — SIGNIFICANT CHANGE UP (ref 0.2–1.2)
BLD GP AB SCN SERPL QL: SIGNIFICANT CHANGE UP
BUN SERPL-MCNC: 13 MG/DL — SIGNIFICANT CHANGE UP (ref 10–20)
CALCIUM SERPL-MCNC: 9.9 MG/DL — SIGNIFICANT CHANGE UP (ref 8.4–10.5)
CHLORIDE SERPL-SCNC: 99 MMOL/L — SIGNIFICANT CHANGE UP (ref 98–110)
CO2 SERPL-SCNC: 27 MMOL/L — SIGNIFICANT CHANGE UP (ref 17–32)
CREAT SERPL-MCNC: 0.9 MG/DL — SIGNIFICANT CHANGE UP (ref 0.7–1.5)
EGFR: 82 ML/MIN/1.73M2 — SIGNIFICANT CHANGE UP
GLUCOSE BLDC GLUCOMTR-MCNC: 100 MG/DL — HIGH (ref 70–99)
GLUCOSE BLDC GLUCOMTR-MCNC: 119 MG/DL — HIGH (ref 70–99)
GLUCOSE BLDC GLUCOMTR-MCNC: 125 MG/DL — HIGH (ref 70–99)
GLUCOSE BLDC GLUCOMTR-MCNC: 93 MG/DL — SIGNIFICANT CHANGE UP (ref 70–99)
GLUCOSE SERPL-MCNC: 92 MG/DL — SIGNIFICANT CHANGE UP (ref 70–99)
HCT VFR BLD CALC: 30 % — LOW (ref 37–47)
HGB BLD-MCNC: 9.3 G/DL — LOW (ref 12–16)
MAGNESIUM SERPL-MCNC: 1.9 MG/DL — SIGNIFICANT CHANGE UP (ref 1.8–2.4)
MCHC RBC-ENTMCNC: 24.7 PG — LOW (ref 27–31)
MCHC RBC-ENTMCNC: 31 G/DL — LOW (ref 32–37)
MCV RBC AUTO: 79.8 FL — LOW (ref 81–99)
NRBC # BLD: 0 /100 WBCS — SIGNIFICANT CHANGE UP (ref 0–0)
PLATELET # BLD AUTO: 584 K/UL — HIGH (ref 130–400)
PMV BLD: 8.8 FL — SIGNIFICANT CHANGE UP (ref 7.4–10.4)
POTASSIUM SERPL-MCNC: 4.5 MMOL/L — SIGNIFICANT CHANGE UP (ref 3.5–5)
POTASSIUM SERPL-SCNC: 4.5 MMOL/L — SIGNIFICANT CHANGE UP (ref 3.5–5)
PROT SERPL-MCNC: 7.4 G/DL — SIGNIFICANT CHANGE UP (ref 6–8)
RBC # BLD: 3.76 M/UL — LOW (ref 4.2–5.4)
RBC # FLD: 27 % — HIGH (ref 11.5–14.5)
SODIUM SERPL-SCNC: 137 MMOL/L — SIGNIFICANT CHANGE UP (ref 135–146)
WBC # BLD: 11.19 K/UL — HIGH (ref 4.8–10.8)
WBC # FLD AUTO: 11.19 K/UL — HIGH (ref 4.8–10.8)

## 2023-07-11 PROCEDURE — 99232 SBSQ HOSP IP/OBS MODERATE 35: CPT

## 2023-07-11 PROCEDURE — 99233 SBSQ HOSP IP/OBS HIGH 50: CPT

## 2023-07-11 RX ADMIN — Medication 1 APPLICATION(S): at 17:13

## 2023-07-11 RX ADMIN — Medication 30 MILLIGRAM(S): at 05:26

## 2023-07-11 RX ADMIN — Medication 1 APPLICATION(S): at 05:26

## 2023-07-11 RX ADMIN — SCOPALAMINE 1 PATCH: 1 PATCH, EXTENDED RELEASE TRANSDERMAL at 18:25

## 2023-07-11 RX ADMIN — APIXABAN 5 MILLIGRAM(S): 2.5 TABLET, FILM COATED ORAL at 17:07

## 2023-07-11 RX ADMIN — Medication 0.5 MILLIGRAM(S): at 17:10

## 2023-07-11 RX ADMIN — APIXABAN 5 MILLIGRAM(S): 2.5 TABLET, FILM COATED ORAL at 05:26

## 2023-07-11 RX ADMIN — Medication 30 MILLIGRAM(S): at 23:20

## 2023-07-11 RX ADMIN — NYSTATIN CREAM 1 APPLICATION(S): 100000 CREAM TOPICAL at 05:27

## 2023-07-11 RX ADMIN — Medication 30 MILLIGRAM(S): at 17:10

## 2023-07-11 RX ADMIN — CHLORHEXIDINE GLUCONATE 1 APPLICATION(S): 213 SOLUTION TOPICAL at 05:26

## 2023-07-11 RX ADMIN — Medication 0.5 MILLIGRAM(S): at 09:19

## 2023-07-11 RX ADMIN — Medication 30 MILLIGRAM(S): at 11:27

## 2023-07-11 RX ADMIN — Medication 1 APPLICATION(S): at 11:28

## 2023-07-11 RX ADMIN — SCOPALAMINE 1 PATCH: 1 PATCH, EXTENDED RELEASE TRANSDERMAL at 07:41

## 2023-07-11 RX ADMIN — FAMOTIDINE 20 MILLIGRAM(S): 10 INJECTION INTRAVENOUS at 11:27

## 2023-07-11 RX ADMIN — NYSTATIN CREAM 1 APPLICATION(S): 100000 CREAM TOPICAL at 17:12

## 2023-07-11 RX ADMIN — Medication 0.5 MILLIGRAM(S): at 23:20

## 2023-07-11 RX ADMIN — Medication 1 TABLET(S): at 11:27

## 2023-07-11 NOTE — SWALLOW BEDSIDE ASSESSMENT ADULT - SLP PERTINENT HISTORY OF CURRENT PROBLEM
43 yo female admitted 5/23. PMH of Asthma HTN? presented with cough for 3 days prior to admission and SOB.  Prolonged hospital course c/w acute influenza B viral pneumonia superimposed on cavitary MRSA pneumonia, acute hypoxic respiratory failure requiring intubation (5/23), self extubated (5/25) and reintubated (5/25). Pneumonia c/b loculated parapneumonic effusion s/p chest tube placement/removal, s/p trach and peg placement. Stay also c/b MRSA bactermia and new onset AFIB with RVR, and new LANCE (improved). Pt tolerating trach collar (7/7-7/9), self-decannulated (7/10) with no respiratory distress, CT surgery replaced with cuffed trach (7/10), plan to f/u with CT surgery to switch uncuffed trach and to cap today, possible decannulation in the am.

## 2023-07-11 NOTE — PROGRESS NOTE ADULT - ASSESSMENT
IMPRESSION:    Acute hypoxemic respiratory failure sp trach off vent  Severe CAP secondary to MRSA sp abx  Small parapneumonic effusion SP Chest tube and removal  MRSA bacteremia resolved   MASTER negative   Anemia sp transfusion   New onset A FIB rate controlled   LANCE improved   HO asthma   Influenza B     PLAN:    CNS: Avoid CNS depressant    HEENT: Oral care. Trach care.     PULMONARY:  HOB @ 45 degrees.  Aspiration precautions.  No change in vent settings.  Keep SaO2 92 TO 96%.  PS / trach collar as tolerated. nataliia trach exchange cuffless, fenestrated    CARDIOVASCULAR:    Avoid overload, Continue rate control.     GI: GI prophylaxis. PEG Feeding. Bowel regimen     RENAL:  Follow up lytes.  Correct as needed.     INFECTIOUS DISEASE: ABX Per ID.      MATOLOGICAL:  DVT prophylaxis.  On Eliquis     ENDOCRINE:  Follow up FS.  Insulin protocol if needed.      Prognosis is poor overall .     PT/OT

## 2023-07-11 NOTE — SWALLOW BEDSIDE ASSESSMENT ADULT - SWALLOW EVAL: RECOMMENDED DIET
easy to chew with mildly thick liquids, pt able to have sips of water between meals, must have mildly thick liquids during meals. easy to chew with mildly thick liquids, pt able to have sips of un-thickened water between meals, must have mildly thick liquids during meals.

## 2023-07-11 NOTE — SWALLOW BEDSIDE ASSESSMENT ADULT - NS ASR SWALLOW FINDINGS DISCUS
Physician/Nursing/Patient

## 2023-07-11 NOTE — PROGRESS NOTE ADULT - ASSESSMENT
42 year-old female with PMH of Asthma, HTN? presents with complaint of cough x3 days and SOB. In the ED was found to have Bilateral patchy groundglass nodularity with dominant confluent left lower lobe opacification with numerous cavitary lesions.Trach and PEG done for inability to wean off vent, currently off sedation. Her stay was also complicated by new onset Afib with RVR for which she is on amiodarone, diltiazem, and therapeutic anticoagulation.    #Acute hypoxemic respiratory failure / s/P Trach and PEG    #Severe cavitary CAP MRSA  #Small parapneumonic effusion SP Chest tube  #MRSA bacteremia  #Influenza B   #hx of Asthma   #Intubated 5/23 self-extubated 5/25, re-intubated 5/25; SBT failed, SP trach/G tube  - s/p Zyvox, meropenem and Tamifu, now on Vancomycin and flagyl  - s/p course of steroids now off  -  blood cultures  NGTD SINCE 5/28   - s/p MASTER 5/31: No evidence of valvular vegetations or intracardiac abscesses to support IE. Pulmonary hepatisation incidental finding. EF 65%, mild TR and pulm HTN  - ventilator management per pulm/cc,  - Methadone to 5 mg via peg - last day today  - d/c olanzapine  - pt breathing on own with t-collar   - pt on soft diet  - monitor secretions   - trach disloged yesterday - replaced with cuffed trach  - possible uncuffed trach placement today    # Hyperkalemia  - s/p Lokelma 10 mg x 1 dose today, f/up repeated BMP at 4  - s/p lokelma 5mg 1 dose overnight 7/5 - f/u 11am BMP  - s/p lokelma 10mg 7/7     #Maculopapular buttocks rash  -PCR skin scraping negative for HSV --> off  Acyclovir   -Wound care per burn, wound care RN following     #Microcytic Anemia likely  anemia of chronic disease   - s/p 6 units prbc so far last 6/5  - on Eliquis 5mg Q12H   - monitor hemoglobin - hgb 8.2    #New onset A-fib w/ RVR  - HR better controlled now  - s/p Amio drip, now on PO - amio d/c per EP rec - monitor for tachy  - Diltiazem 30mg q6 with holding parameters - if  or less or HR 60 or less  - on eliquis  - free T3 and free t4 isael    #Anxiety  - continue low dose Klonopin 0.25 mg via peg every 8 hours prn. tx.    #Nutrition  - multivitamin daily     Handoff: monitor HR, possible switch to cuffless trach, d/c planning

## 2023-07-11 NOTE — PROGRESS NOTE ADULT - SUBJECTIVE AND OBJECTIVE BOX
Over Night Events: events noted, patient pulled the trach, was replaced shiley 6, afebrile    PHYSICAL EXAM    ICU Vital Signs Last 24 Hrs  T(C): 36.9 (10 Jul 2023 20:09), Max: 37 (10 Jul 2023 14:00)  T(F): 98.4 (10 Jul 2023 20:09), Max: 98.6 (10 Jul 2023 14:00)  HR: 94 (10 Jul 2023 20:09) (94 - 98)  BP: 132/80 (10 Jul 2023 20:09) (132/80 - 136/83)  RR: 18 (10 Jul 2023 20:09) (18 - 18)  SpO2: 100% (10 Jul 2023 20:09) (97% - 100%)    O2 Parameters below as of 10 Jul 2023 20:09  Patient On (Oxygen Delivery Method): T-piece            General: comfortable  Lungs: dec bs both bases  Cardiovascular: Regular   Abdomen: Soft, Positive BS  Extremities: No clubbing   Skin: Warm  Neurological: Non focal         LABS:                          8.7    12.17 )-----------( 628      ( 10 Jul 2023 05:58 )             28.3                                               07-10    133<L>  |  94<L>  |  15  ----------------------------<  87  5.1<H>   |  28  |  0.8    Ca    9.9      10 Jul 2023 05:58  Mg     1.8     07-10    TPro  7.8  /  Alb  3.6  /  TBili  0.2  /  DBili  x   /  AST  21  /  ALT  16  /  AlkPhos  114  07-10                                             Urinalysis Basic - ( 10 Jul 2023 05:58 )    Color: x / Appearance: x / SG: x / pH: x  Gluc: 87 mg/dL / Ketone: x  / Bili: x / Urobili: x   Blood: x / Protein: x / Nitrite: x   Leuk Esterase: x / RBC: x / WBC x   Sq Epi: x / Non Sq Epi: x / Bacteria: x                                                  LIVER FUNCTIONS - ( 10 Jul 2023 05:58 )  Alb: 3.6 g/dL / Pro: 7.8 g/dL / ALK PHOS: 114 U/L / ALT: 16 U/L / AST: 21 U/L / GGT: x                                                                                               Mode: standby                                          MEDICATIONS  (STANDING):  apixaban 5 milliGRAM(s) Oral every 12 hours  chlorhexidine 2% Cloths 1 Application(s) Topical <User Schedule>  diltiazem    Tablet 30 milliGRAM(s) Oral every 6 hours  famotidine    Tablet 20 milliGRAM(s) Oral daily  ferrous    sulfate Liquid 300 milliGRAM(s) Enteral Tube daily  multivitamin 1 Tablet(s) Oral daily  nystatin Cream 1 Application(s) Topical two times a day  petrolatum white Ointment 1 Application(s) Topical two times a day  scopolamine 1 mG/72 Hr(s) Patch 1 Patch Transdermal every 72 hours  senna 2 Tablet(s) Oral at bedtime  vitamin A &amp; D Ointment 1 Application(s) Topical daily    MEDICATIONS  (PRN):  acetaminophen     Tablet .. 650 milliGRAM(s) Oral every 6 hours PRN Temp greater or equal to 38C (100.4F)  aluminum hydroxide/magnesium hydroxide/simethicone Suspension 30 milliLiter(s) Oral every 4 hours PRN Dyspepsia  clonazePAM  Tablet 0.5 milliGRAM(s) Oral three times a day PRN For Agitation only

## 2023-07-11 NOTE — SWALLOW BEDSIDE ASSESSMENT ADULT - SWALLOW EVAL: RECOMMENDED FEEDING/EATING TECHNIQUES
oral hygiene/position upright (90 degrees)
oral hygiene

## 2023-07-11 NOTE — SWALLOW BEDSIDE ASSESSMENT ADULT - SLP GENERAL OBSERVATIONS
Pt received crying and frustrated that she wants to go home to see her children who are young, pt provided w/ emotional support, pt denied SOB, breathing comfortably, stoma appeared intact/non bloody, pt self covering w/ tissue waiting for MD to arrive.
Pt received in bed awake and alert on t-piece, cuff partially inflated, self feeding lunch, cuff deflated and PMSV placed in line w/ t-piece, RN and Medical attending made aware.
Pt received awake sitting up in bed, no c/o of pain, trach in place
pt received in bed awake alert w/o c/o pain. +humidified O2 via t-piece, +Shiley flex size 6, +PMSV placed in line w/ t-piece, +voicing

## 2023-07-11 NOTE — PROGRESS NOTE ADULT - SUBJECTIVE AND OBJECTIVE BOX
ZEYNEP ROSSI 42y Female  MRN#: [id number]     Hospital Day: 49d    Pt is currently admitted with the primary diagnosis of  Pneumonia due to infectious organism        SUBJECTIVE     Overnight events  None                                            ----------------------------------------------------------  OBJECTIVE  PAST MEDICAL & SURGICAL HISTORY                                            -----------------------------------------------------------  ALLERGIES:  aspirin (Short breath; Anaphylaxis)  Bananas (Unknown)                                            ------------------------------------------------------------    HOME MEDICATIONS  Home Medications:                           MEDICATIONS:  STANDING MEDICATIONS  apixaban 5 milliGRAM(s) Oral every 12 hours  chlorhexidine 2% Cloths 1 Application(s) Topical <User Schedule>  diltiazem    Tablet 30 milliGRAM(s) Oral every 6 hours  famotidine    Tablet 20 milliGRAM(s) Oral daily  ferrous    sulfate Liquid 300 milliGRAM(s) Enteral Tube daily  multivitamin 1 Tablet(s) Oral daily  nystatin Cream 1 Application(s) Topical two times a day  petrolatum white Ointment 1 Application(s) Topical two times a day  scopolamine 1 mG/72 Hr(s) Patch 1 Patch Transdermal every 72 hours  senna 2 Tablet(s) Oral at bedtime  vitamin A &amp; D Ointment 1 Application(s) Topical daily    PRN MEDICATIONS  acetaminophen     Tablet .. 650 milliGRAM(s) Oral every 6 hours PRN  aluminum hydroxide/magnesium hydroxide/simethicone Suspension 30 milliLiter(s) Oral every 4 hours PRN  clonazePAM  Tablet 0.5 milliGRAM(s) Oral three times a day PRN  oxycodone    5 mG/acetaminophen 325 mG 1 Tablet(s) Oral/Enteral Tube every 6 hours PRN                                            ------------------------------------------------------------  VITAL SIGNS: Last 24 Hours  T(C): 36.4 (11 Jul 2023 05:00), Max: 37 (10 Jul 2023 14:00)  T(F): 97.6 (11 Jul 2023 05:00), Max: 98.6 (10 Jul 2023 14:00)  HR: 83 (11 Jul 2023 05:00) (83 - 98)  BP: 131/87 (11 Jul 2023 05:00) (131/87 - 136/83)  BP(mean): --  RR: 18 (11 Jul 2023 05:00) (18 - 18)  SpO2: 100% (11 Jul 2023 09:22) (97% - 100%)                                             --------------------------------------------------------------  LABS:                        9.3    11.19 )-----------( 584      ( 11 Jul 2023 08:08 )             30.0     07-11    137  |  99  |  13  ----------------------------<  92  4.5   |  27  |  0.9    Ca    9.9      11 Jul 2023 08:08  Mg     1.9     07-11    TPro  7.4  /  Alb  3.4<L>  /  TBili  0.2  /  DBili  x   /  AST  140<H>  /  ALT  65<H>  /  AlkPhos  675<H>  07-11      Urinalysis Basic - ( 11 Jul 2023 08:08 )    Color: x / Appearance: x / SG: x / pH: x  Gluc: 92 mg/dL / Ketone: x  / Bili: x / Urobili: x   Blood: x / Protein: x / Nitrite: x   Leuk Esterase: x / RBC: x / WBC x   Sq Epi: x / Non Sq Epi: x / Bacteria: x                                                            -------------------------------------------------------------  RADIOLOGY:                                            --------------------------------------------------------------    PHYSICAL EXAM:  GENERAL: NAD, lying in bed comfortably  HEAD:  Atraumatic, Normocephalic  CHEST/LUNG: pt with trach, satting 100%   HEART: regular rate and rhythm; No murmurs, rubs, or gallops  ABDOMEN: Bowel sounds present; Soft, Nontender, Nondistended.    EXTREMITIES: Warm. No clubbing, cyanosis, or edema  NERVOUS SYSTEM:  Alert & Oriented                                           --------------------------------------------------------------

## 2023-07-11 NOTE — SWALLOW BEDSIDE ASSESSMENT ADULT - SWALLOW EVAL: FUNCTIONAL LEVEL AT TIME OF EVAL
Pt fed w/ cuff fully deflated, PMSV placed in line w/ t-piece), strong voice
no po trials given at this time as pt received just after she self decannulated
Pt received awake in bed, alert, participating in appropriate reciprocal conversation and following simple commands throughout evaluation

## 2023-07-11 NOTE — SWALLOW BEDSIDE ASSESSMENT ADULT - SWALLOW EVAL: DIAGNOSIS
Pt reportedly disliked mildly thick liquids and has been drinking thin over the weekend, educated on aspiration risks.
+min overt s/s of penetration/aspiration w/ thin liquids +toleration of easy to chew solids w/o overt s/s of penetration/aspiration
+suspected pharyngeal dysphagia w/ thin liquid trials; +toleration for single ice chips w/o overt s/s aspiration/penetration
Pt presents with min overt s/s of penetration/aspiration w/ thin liquids +toleration of easy to chew w/ mildly thick liquids w/o overt s/s of penetration/aspiration

## 2023-07-11 NOTE — SWALLOW BEDSIDE ASSESSMENT ADULT - PHARYNGEAL PHASE
Cough post oral intake/Throat clear post oral intake
+coughing w/ thin liquids, +toleration for single ice chips/Cough post oral intake
Cough post oral intake/Throat clear post oral intake

## 2023-07-11 NOTE — SWALLOW BEDSIDE ASSESSMENT ADULT - ADDITIONAL RECOMMENDATIONS
SLP called in RN. RN called RN manager, RT, Medical attending to report how pt was received. Pt saturating at 97-99%, no apparent distress, talking and w/ occasional baseline cough. SLP will f/u w/ po trials once it is decided upon if pt will remain decannulated or will need trach tube replaced.
MUST WEAR PMSV w/ PO INTAKE, pt w/ a delayed cough response noted during FEES w/ aspiration events
MUST WEAR PMSV w/ PO INTAKE    SLP to f/u with diet recommendations s/p trach change or decannulation

## 2023-07-11 NOTE — CHART NOTE - NSCHARTNOTEFT_GEN_A_CORE
Calorie count order communicated with RN and form provided to RN. RD will follow up with result once calorie count is completed.

## 2023-07-11 NOTE — SWALLOW BEDSIDE ASSESSMENT ADULT - SWALLOW EVAL: CURRENT DIET
easy to chew with mildly thick liquids
NPO w/ J-tube
order written for soft and bite sized  (prev easy to chew w/ mildly thick-s/p FEES recs)
minced and moist w/ thin

## 2023-07-11 NOTE — SWALLOW BEDSIDE ASSESSMENT ADULT - NS SPL SWALLOW CLINIC TRIAL FT
pt w/o green dye return upon tracheal suctioning. Coughing noted w/ sips of water, therefore pt warrants instrumental swallow study prior to initiation of PO diet.
+min overt s/s of penetration/aspiration w/ thin liquids +toleration of easy to chew solids w/o overt s/s of penetration/aspiration
Pt presents with min overt s/s of penetration/aspiration w/ thin liquids

## 2023-07-12 LAB
ALBUMIN SERPL ELPH-MCNC: 3.6 G/DL — SIGNIFICANT CHANGE UP (ref 3.5–5.2)
ALP SERPL-CCNC: 455 U/L — HIGH (ref 30–115)
ALT FLD-CCNC: 39 U/L — SIGNIFICANT CHANGE UP (ref 0–41)
ANION GAP SERPL CALC-SCNC: 10 MMOL/L — SIGNIFICANT CHANGE UP (ref 7–14)
AST SERPL-CCNC: 33 U/L — SIGNIFICANT CHANGE UP (ref 0–41)
BILIRUB SERPL-MCNC: 0.2 MG/DL — SIGNIFICANT CHANGE UP (ref 0.2–1.2)
BUN SERPL-MCNC: 13 MG/DL — SIGNIFICANT CHANGE UP (ref 10–20)
CALCIUM SERPL-MCNC: 10 MG/DL — SIGNIFICANT CHANGE UP (ref 8.4–10.5)
CHLORIDE SERPL-SCNC: 98 MMOL/L — SIGNIFICANT CHANGE UP (ref 98–110)
CO2 SERPL-SCNC: 27 MMOL/L — SIGNIFICANT CHANGE UP (ref 17–32)
CREAT SERPL-MCNC: 0.8 MG/DL — SIGNIFICANT CHANGE UP (ref 0.7–1.5)
CULTURE RESULTS: SIGNIFICANT CHANGE UP
EGFR: 94 ML/MIN/1.73M2 — SIGNIFICANT CHANGE UP
GGT SERPL-CCNC: 301 U/L — HIGH (ref 1–40)
GLUCOSE BLDC GLUCOMTR-MCNC: 109 MG/DL — HIGH (ref 70–99)
GLUCOSE BLDC GLUCOMTR-MCNC: 122 MG/DL — HIGH (ref 70–99)
GLUCOSE BLDC GLUCOMTR-MCNC: 95 MG/DL — SIGNIFICANT CHANGE UP (ref 70–99)
GLUCOSE SERPL-MCNC: 82 MG/DL — SIGNIFICANT CHANGE UP (ref 70–99)
HCT VFR BLD CALC: 28.1 % — LOW (ref 37–47)
HGB BLD-MCNC: 8.7 G/DL — LOW (ref 12–16)
MAGNESIUM SERPL-MCNC: 1.8 MG/DL — SIGNIFICANT CHANGE UP (ref 1.8–2.4)
MCHC RBC-ENTMCNC: 24.9 PG — LOW (ref 27–31)
MCHC RBC-ENTMCNC: 31 G/DL — LOW (ref 32–37)
MCV RBC AUTO: 80.5 FL — LOW (ref 81–99)
NRBC # BLD: 0 /100 WBCS — SIGNIFICANT CHANGE UP (ref 0–0)
PLATELET # BLD AUTO: 568 K/UL — HIGH (ref 130–400)
PMV BLD: 8.9 FL — SIGNIFICANT CHANGE UP (ref 7.4–10.4)
POTASSIUM SERPL-MCNC: 4.2 MMOL/L — SIGNIFICANT CHANGE UP (ref 3.5–5)
POTASSIUM SERPL-SCNC: 4.2 MMOL/L — SIGNIFICANT CHANGE UP (ref 3.5–5)
PROT SERPL-MCNC: 7.4 G/DL — SIGNIFICANT CHANGE UP (ref 6–8)
RBC # BLD: 3.49 M/UL — LOW (ref 4.2–5.4)
RBC # FLD: 27 % — HIGH (ref 11.5–14.5)
SODIUM SERPL-SCNC: 135 MMOL/L — SIGNIFICANT CHANGE UP (ref 135–146)
SPECIMEN SOURCE: SIGNIFICANT CHANGE UP
WBC # BLD: 11.38 K/UL — HIGH (ref 4.8–10.8)
WBC # FLD AUTO: 11.38 K/UL — HIGH (ref 4.8–10.8)

## 2023-07-12 PROCEDURE — 99232 SBSQ HOSP IP/OBS MODERATE 35: CPT

## 2023-07-12 PROCEDURE — 90792 PSYCH DIAG EVAL W/MED SRVCS: CPT | Mod: 95

## 2023-07-12 RX ORDER — OLANZAPINE 15 MG/1
2.5 TABLET, FILM COATED ORAL EVERY 8 HOURS
Refills: 0 | Status: DISCONTINUED | OUTPATIENT
Start: 2023-07-12 | End: 2023-07-13

## 2023-07-12 RX ORDER — CLONAZEPAM 1 MG
0.5 TABLET ORAL THREE TIMES A DAY
Refills: 0 | Status: DISCONTINUED | OUTPATIENT
Start: 2023-07-12 | End: 2023-07-12

## 2023-07-12 RX ORDER — DIPHENHYDRAMINE HCL 50 MG
25 CAPSULE ORAL ONCE
Refills: 0 | Status: COMPLETED | OUTPATIENT
Start: 2023-07-12 | End: 2023-07-12

## 2023-07-12 RX ORDER — DIPHENHYDRAMINE HCL 50 MG
50 CAPSULE ORAL ONCE
Refills: 0 | Status: COMPLETED | OUTPATIENT
Start: 2023-07-12 | End: 2023-07-12

## 2023-07-12 RX ADMIN — APIXABAN 5 MILLIGRAM(S): 2.5 TABLET, FILM COATED ORAL at 05:23

## 2023-07-12 RX ADMIN — Medication 30 MILLIGRAM(S): at 11:09

## 2023-07-12 RX ADMIN — SCOPALAMINE 1 PATCH: 1 PATCH, EXTENDED RELEASE TRANSDERMAL at 19:42

## 2023-07-12 RX ADMIN — NYSTATIN CREAM 1 APPLICATION(S): 100000 CREAM TOPICAL at 21:17

## 2023-07-12 RX ADMIN — Medication 25 MILLIGRAM(S): at 21:04

## 2023-07-12 RX ADMIN — Medication 0.5 MILLIGRAM(S): at 09:43

## 2023-07-12 RX ADMIN — Medication 1 APPLICATION(S): at 18:18

## 2023-07-12 RX ADMIN — APIXABAN 5 MILLIGRAM(S): 2.5 TABLET, FILM COATED ORAL at 18:05

## 2023-07-12 RX ADMIN — SCOPALAMINE 1 PATCH: 1 PATCH, EXTENDED RELEASE TRANSDERMAL at 05:53

## 2023-07-12 RX ADMIN — Medication 300 MILLIGRAM(S): at 11:09

## 2023-07-12 RX ADMIN — SCOPALAMINE 1 PATCH: 1 PATCH, EXTENDED RELEASE TRANSDERMAL at 05:52

## 2023-07-12 RX ADMIN — NYSTATIN CREAM 1 APPLICATION(S): 100000 CREAM TOPICAL at 05:50

## 2023-07-12 RX ADMIN — Medication 1 APPLICATION(S): at 05:52

## 2023-07-12 RX ADMIN — Medication 30 MILLIGRAM(S): at 18:04

## 2023-07-12 RX ADMIN — Medication 50 MILLIGRAM(S): at 02:15

## 2023-07-12 RX ADMIN — Medication 30 MILLIGRAM(S): at 05:23

## 2023-07-12 RX ADMIN — FAMOTIDINE 20 MILLIGRAM(S): 10 INJECTION INTRAVENOUS at 11:09

## 2023-07-12 RX ADMIN — CHLORHEXIDINE GLUCONATE 1 APPLICATION(S): 213 SOLUTION TOPICAL at 05:23

## 2023-07-12 RX ADMIN — SCOPALAMINE 1 PATCH: 1 PATCH, EXTENDED RELEASE TRANSDERMAL at 10:13

## 2023-07-12 RX ADMIN — Medication 1 TABLET(S): at 11:08

## 2023-07-12 RX ADMIN — SENNA PLUS 2 TABLET(S): 8.6 TABLET ORAL at 21:05

## 2023-07-12 RX ADMIN — OLANZAPINE 2.5 MILLIGRAM(S): 15 TABLET, FILM COATED ORAL at 21:05

## 2023-07-12 RX ADMIN — Medication 30 MILLIGRAM(S): at 23:42

## 2023-07-12 RX ADMIN — Medication 1 APPLICATION(S): at 11:10

## 2023-07-12 NOTE — PROGRESS NOTE ADULT - SUBJECTIVE AND OBJECTIVE BOX
Over Night Events: events noted, on t collar, afebrild    PHYSICAL EXAM    ICU Vital Signs Last 24 Hrs  T(C): 37.2 (12 Jul 2023 05:00), Max: 37.5 (11 Jul 2023 14:00)  T(F): 98.9 (12 Jul 2023 05:00), Max: 99.5 (11 Jul 2023 14:00)  HR: 69 (12 Jul 2023 05:00) (69 - 92)  BP: 134/80 (12 Jul 2023 05:00) (122/78 - 134/80)  RR: 18 (12 Jul 2023 05:00) (18 - 20)  SpO2: 100% (12 Jul 2023 05:00) (100% - 100%)    O2 Parameters below as of 11 Jul 2023 18:59  Patient On (Oxygen Delivery Method): T-piece    O2 Concentration (%): 35        General: comforable  Lungs: dec bs both bases  Cardiovascular: Regular   Abdomen: Soft, Positive BS  Extremities: No clubbing   Neurological: Non focal       07-11-23 @ 07:01  -  07-12-23 @ 06:03  --------------------------------------------------------  IN:    Oral Fluid: 480 mL  Total IN: 480 mL    OUT:  Total OUT: 0 mL    Total NET: 480 mL          LABS:                          9.3    11.19 )-----------( 584      ( 11 Jul 2023 08:08 )             30.0                                               07-11    137  |  99  |  13  ----------------------------<  92  4.5   |  27  |  0.9    Ca    9.9      11 Jul 2023 08:08  Mg     1.9     07-11    TPro  7.4  /  Alb  3.4<L>  /  TBili  0.2  /  DBili  x   /  AST  140<H>  /  ALT  65<H>  /  AlkPhos  675<H>  07-11                                             Urinalysis Basic - ( 11 Jul 2023 08:08 )    Color: x / Appearance: x / SG: x / pH: x  Gluc: 92 mg/dL / Ketone: x  / Bili: x / Urobili: x   Blood: x / Protein: x / Nitrite: x   Leuk Esterase: x / RBC: x / WBC x   Sq Epi: x / Non Sq Epi: x / Bacteria: x                                                  LIVER FUNCTIONS - ( 11 Jul 2023 08:08 )  Alb: 3.4 g/dL / Pro: 7.4 g/dL / ALK PHOS: 675 U/L / ALT: 65 U/L / AST: 140 U/L / GGT: x                                                                                               Mode: standby                                          MEDICATIONS  (STANDING):  apixaban 5 milliGRAM(s) Oral every 12 hours  chlorhexidine 2% Cloths 1 Application(s) Topical <User Schedule>  diltiazem    Tablet 30 milliGRAM(s) Oral every 6 hours  famotidine    Tablet 20 milliGRAM(s) Oral daily  ferrous    sulfate Liquid 300 milliGRAM(s) Enteral Tube daily  multivitamin 1 Tablet(s) Oral daily  nystatin Cream 1 Application(s) Topical two times a day  petrolatum white Ointment 1 Application(s) Topical two times a day  scopolamine 1 mG/72 Hr(s) Patch 1 Patch Transdermal every 72 hours  senna 2 Tablet(s) Oral at bedtime  vitamin A &amp; D Ointment 1 Application(s) Topical daily    MEDICATIONS  (PRN):  acetaminophen     Tablet .. 650 milliGRAM(s) Oral every 6 hours PRN Temp greater or equal to 38C (100.4F)  aluminum hydroxide/magnesium hydroxide/simethicone Suspension 30 milliLiter(s) Oral every 4 hours PRN Dyspepsia  clonazePAM  Tablet 0.5 milliGRAM(s) Oral three times a day PRN For Agitation only

## 2023-07-12 NOTE — PROGRESS NOTE ADULT - SUBJECTIVE AND OBJECTIVE BOX
ZEYNEP ROSSI 42y Female  MRN#: [id number]     Hospital Day: 50d    Pt is currently admitted with the primary diagnosis of  Pneumonia due to infectious organism        SUBJECTIVE     Overnight events  None    Subjective complaints  Pt was evaluated this am. Patient denied any active complaints and per patient his symptoms are improving                                            ----------------------------------------------------------  OBJECTIVE  PAST MEDICAL & SURGICAL HISTORY                                            -----------------------------------------------------------  ALLERGIES:  aspirin (Short breath; Anaphylaxis)  Bananas (Unknown)                                            ------------------------------------------------------------    HOME MEDICATIONS  Home Medications:                           MEDICATIONS:  STANDING MEDICATIONS  apixaban 5 milliGRAM(s) Oral every 12 hours  chlorhexidine 2% Cloths 1 Application(s) Topical <User Schedule>  diltiazem    Tablet 30 milliGRAM(s) Oral every 6 hours  famotidine    Tablet 20 milliGRAM(s) Oral daily  ferrous    sulfate Liquid 300 milliGRAM(s) Enteral Tube daily  multivitamin 1 Tablet(s) Oral daily  nystatin Cream 1 Application(s) Topical two times a day  petrolatum white Ointment 1 Application(s) Topical two times a day  scopolamine 1 mG/72 Hr(s) Patch 1 Patch Transdermal every 72 hours  senna 2 Tablet(s) Oral at bedtime  vitamin A &amp; D Ointment 1 Application(s) Topical daily    PRN MEDICATIONS  clonazePAM  Tablet 0.5 milliGRAM(s) Oral three times a day PRN  oxycodone    5 mG/acetaminophen 325 mG 1 Tablet(s) Oral/Enteral Tube every 6 hours PRN                                            ------------------------------------------------------------  VITAL SIGNS: Last 24 Hours  T(C): 37.2 (12 Jul 2023 05:00), Max: 37.5 (11 Jul 2023 14:00)  T(F): 98.9 (12 Jul 2023 05:00), Max: 99.5 (11 Jul 2023 14:00)  HR: 69 (12 Jul 2023 05:00) (69 - 92)  BP: 134/80 (12 Jul 2023 05:00) (122/78 - 134/80)  BP(mean): --  RR: 18 (12 Jul 2023 05:00) (18 - 20)  SpO2: 100% (12 Jul 2023 05:00) (100% - 100%)      07-11-23 @ 07:01  -  07-12-23 @ 07:00  --------------------------------------------------------  IN: 480 mL / OUT: 0 mL / NET: 480 mL                                             --------------------------------------------------------------  LABS:                        9.3    11.19 )-----------( 584      ( 11 Jul 2023 08:08 )             30.0     07-11    137  |  99  |  13  ----------------------------<  92  4.5   |  27  |  0.9    Ca    9.9      11 Jul 2023 08:08  Mg     1.9     07-11    TPro  7.4  /  Alb  3.4<L>  /  TBili  0.2  /  DBili  x   /  AST  140<H>  /  ALT  65<H>  /  AlkPhos  675<H>  07-11      Urinalysis Basic - ( 11 Jul 2023 08:08 )    Color: x / Appearance: x / SG: x / pH: x  Gluc: 92 mg/dL / Ketone: x  / Bili: x / Urobili: x   Blood: x / Protein: x / Nitrite: x   Leuk Esterase: x / RBC: x / WBC x   Sq Epi: x / Non Sq Epi: x / Bacteria: x                                                            -------------------------------------------------------------  RADIOLOGY:                                            --------------------------------------------------------------    PHYSICAL EXAM:  GENERAL: NAD, lying in bed comfortably  HEAD:  Atraumatic, Normocephalic  CHEST/LUNG: pt with trach, satting 100%   HEART: regular rate and rhythm; No murmurs, rubs, or gallops  ABDOMEN: Bowel sounds present; Soft, Nontender, Nondistended.    EXTREMITIES: Warm. No clubbing, cyanosis, or edema  NERVOUS SYSTEM:  Alert & Oriented                                           --------------------------------------------------------------

## 2023-07-12 NOTE — BH CONSULTATION LIAISON ASSESSMENT NOTE - CURRENT MEDICATION
MEDICATIONS  (STANDING):  apixaban 5 milliGRAM(s) Oral every 12 hours  chlorhexidine 2% Cloths 1 Application(s) Topical <User Schedule>  diltiazem    Tablet 30 milliGRAM(s) Oral every 6 hours  famotidine    Tablet 20 milliGRAM(s) Oral daily  ferrous    sulfate Liquid 300 milliGRAM(s) Enteral Tube daily  nystatin Cream 1 Application(s) Topical two times a day  petrolatum white Ointment 1 Application(s) Topical two times a day  scopolamine 1 mG/72 Hr(s) Patch 1 Patch Transdermal every 72 hours  senna 2 Tablet(s) Oral at bedtime  vitamin A &amp; D Ointment 1 Application(s) Topical daily    MEDICATIONS  (PRN):  clonazePAM  Tablet 0.5 milliGRAM(s) Oral three times a day PRN For Agitation only  oxycodone    5 mG/acetaminophen 325 mG 1 Tablet(s) Oral/Enteral Tube every 6 hours PRN Severe Pain (7 - 10)

## 2023-07-12 NOTE — PROGRESS NOTE ADULT - ASSESSMENT
IMPRESSION:    Acute hypoxemic respiratory failure sp trach off vent  Severe CAP secondary to MRSA sp abx  Small parapneumonic effusion SP Chest tube and removal  MRSA bacteremia resolved   MASTER negative   Anemia sp transfusion   New onset A FIB rate controlled   LANCE improved   HO asthma   Influenza B     PLAN:    CNS: Avoid CNS depressant, BH eval    HEENT: Oral care. Trach care.     PULMONARY:  HOB @ 45 degrees.  Aspiration precautions.  No change in vent settings.  Keep SaO2 92 TO 96%.  PS / trach collar as tolerated. trach exchange cuffless, fenestrated cap for 24 h, give oxygen NC, if tolerate, will decannulate    CARDIOVASCULAR:    Avoid overload, Continue rate control.     GI: GI prophylaxis. PEG Feeding. Bowel regimen     RENAL:  Follow up lytes.  Correct as needed.     INFECTIOUS DISEASE: ABX Per ID.      MATOLOGICAL:  DVT prophylaxis.  On Eliquis     ENDOCRINE:  Follow up FS.  Insulin protocol if needed.      Prognosis is poor overall .     PT/OT

## 2023-07-12 NOTE — PROGRESS NOTE ADULT - ASSESSMENT
ASSESSMENT  - acute now chronic hypoxemic resp failure  - MRSA pneumonia and bacteremia  - + flu B  - s/p pleural effusion and chest tube  - s/p multiple transfusions  - s/p trach and G-J tube placement  - + dysphagia, now s/p FEES      suggest:  tube feed is off  swallow f/u noted  on  po diet :  easy to chew solids with mildly thick liquids  add ensure enlive/magic cup   will f/u ASSESSMENT  - acute now chronic hypoxemic resp failure  - MRSA pneumonia and bacteremia  - + flu B  - s/p pleural effusion and chest tube  - s/p multiple transfusions  - s/p trach and G-J tube placement  - + dysphagia, now s/p FEES      suggest:  tube feed is off  swallow f/u noted  on  po diet :  easy to chew solids with mildly thick liquids  add ensure enlive/magic cup   -suggest to continue  Jejunostomy tube feed to  Jevity 1.2 at 75ml/hr for 12hrs daily       NEVER bolus via J tube, and give any meds via Gastric port  -document percent po intake at all meals and will then adjust J tube supplemental feeding accordingly  d/w resident  will f/u ASSESSMENT  - acute now chronic hypoxemic resp failure  - MRSA pneumonia and bacteremia  - + flu B  - s/p pleural effusion and chest tube  - s/p multiple transfusions  - s/p trach and G-J tube placement  - + dysphagia, now s/p FEES      suggest:  d/w Ensure Clear - can't be properly thickened  swallow f/u noted  on  po diet :  easy to chew solids with mildly thick liquids  add magic cup and yogurt po as supplements  - resume Jejunostomy tube feed Jevity 1.2 at 75ml/hr for 12hrs overnight      this will provide 50 gm protein, 1069 kcal, and 729 ml free water/d     as pt increases po meal intake, will decrease JT hours - but pt may cont to need water supplementation enterally       NEVER bolus via J tube, and give any meds via Gastric port  -document percent po intake at all meals and will then adjust J tube supplemental feeding accordingly  d/w resident  will f/u

## 2023-07-12 NOTE — BH CONSULTATION LIAISON ASSESSMENT NOTE - HPI (INCLUDE ILLNESS QUALITY, SEVERITY, DURATION, TIMING, CONTEXT, MODIFYING FACTORS, ASSOCIATED SIGNS AND SYMPTOMS)
41 y/o female with no known psychiatric history, pmh of Asthma admitted to the hospital with acute Influenza B viral pneumonia superimposed on cavitary MRSA pneumonia, acute hypoxic respiratory failure requiring intubation, with complicated hospital course, patient now transferred to step down unit. Tele Psychiatry consulted for evaluation of agitation and anxiety.    Upon approach patient appears calm and cooperative with sister at bedside. She appears tearful because she reports missing her 4 year old son. Patient states feeling that her son does not remember her. Patient reports that she is looking forward to getting better and leaving the hospital so she can be with her family again. Patient's parents are taking care of her son at this time. She denies any SI/HI/AVH. She reports that her taylor is in god. 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

## 2023-07-12 NOTE — PROGRESS NOTE ADULT - ASSESSMENT
42 year-old female with PMH of Asthma, HTN? presents with complaint of cough x3 days and SOB. In the ED was found to have Bilateral patchy groundglass nodularity with dominant confluent left lower lobe opacification with numerous cavitary lesions.Trach and PEG done for inability to wean off vent, currently off sedation. Her stay was also complicated by new onset Afib with RVR for which she is on amiodarone, diltiazem, and therapeutic anticoagulation.    #Acute hypoxemic respiratory failure / s/P Trach and PEG    #Severe cavitary CAP MRSA  #Small parapneumonic effusion SP Chest tube  #MRSA bacteremia  #Influenza B   #hx of Asthma   #Intubated 5/23 self-extubated 5/25, re-intubated 5/25; SBT failed, SP trach/G tube  - s/p Zyvox, meropenem and Tamifu, now on Vancomycin and flagyl  - s/p course of steroids now off  -  blood cultures  NGTD SINCE 5/28   - s/p MASTER 5/31: No evidence of valvular vegetations or intracardiac abscesses to support IE. Pulmonary hepatisation incidental finding. EF 65%, mild TR and pulm HTN  - ventilator management per pulm/cc,  - Methadone to 5 mg via peg - last day today  - d/c olanzapine  - pt breathing on own with t-collar   - pt on soft diet  - monitor secretions   - trach disloged yesterday - replaced with cuffed trach  - possible uncuffed trach placement today    # Hyperkalemia  - s/p Lokelma 10 mg x 1 dose today, f/up repeated BMP at 4  - s/p lokelma 5mg 1 dose overnight 7/5 - f/u 11am BMP  - s/p lokelma 10mg 7/7     #Maculopapular buttocks rash  -PCR skin scraping negative for HSV --> off  Acyclovir   -Wound care per burn, wound care RN following     #Microcytic Anemia likely  anemia of chronic disease   - s/p 6 units prbc so far last 6/5  - on Eliquis 5mg Q12H   - monitor hemoglobin - hgb 8.2    #New onset A-fib w/ RVR  - HR better controlled now  - s/p Amio drip, now on PO - amio d/c per EP rec - monitor for tachy  - Diltiazem 30mg q6 with holding parameters - if  or less or HR 60 or less  - on eliquis  - free T3 and free t4 isael    #Anxiety  - continue low dose Klonopin 0.25 mg via peg every 8 hours prn. tx.    #Nutrition  - multivitamin daily     Handoff: monitor HR, possible switch to cuffless trach, d/c planning     42 year-old female with PMH of Asthma, HTN? presents with complaint of cough x3 days and SOB. In the ED was found to have Bilateral patchy groundglass nodularity with dominant confluent left lower lobe opacification with numerous cavitary lesions.Trach and PEG done for inability to wean off vent, currently off sedation. Her stay was also complicated by new onset Afib with RVR for which she is on amiodarone, diltiazem, and therapeutic anticoagulation.    #Acute hypoxemic respiratory failure / s/P Trach and PEG    #Severe cavitary CAP MRSA  #Small parapneumonic effusion SP Chest tube  #MRSA bacteremia  #Influenza B   #hx of Asthma   #Intubated 5/23 self-extubated 5/25, re-intubated 5/25; SBT failed, SP trach/G tube  - s/p Zyvox, meropenem and Tamifu, now on Vancomycin and flagyl  - s/p course of steroids now off  -  blood cultures  NGTD SINCE 5/28   - s/p MASTER 5/31: No evidence of valvular vegetations or intracardiac abscesses to support IE. Pulmonary hepatisation incidental finding. EF 65%, mild TR and pulm HTN  - ventilator management per pulm/cc,  - Methadone to 5 mg via peg - last day today  - d/c olanzapine  - pt breathing on own with t-collar   - pt on soft diet  - monitor secretions   - trach disloged yesterday - replaced with cuffed trach  - uncuffed trach placement today afternoon by CT surg  - LFT's trending down    # Hyperkalemia  - s/p Lokelma 10 mg x 1 dose today, f/up repeated BMP at 4  - s/p lokelma 5mg 1 dose overnight 7/5 - f/u 11am BMP  - s/p lokelma 10mg 7/7     #Maculopapular buttocks rash  -PCR skin scraping negative for HSV --> off  Acyclovir   -Wound care per burn, wound care RN following     #Microcytic Anemia likely  anemia of chronic disease   - s/p 6 units prbc so far last 6/5  - on Eliquis 5mg Q12H   - monitor hemoglobin - hgb 8.2    #New onset A-fib w/ RVR  - HR better controlled now  - s/p Amio drip, now on PO - amio d/c per EP rec - monitor for tachy  - Diltiazem 30mg q6 with holding parameters - if  or less or HR 60 or less  - on eliquis  - free T3 and free t4 isael    #Anxiety  - continue low dose Klonopin 0.25 mg via peg every 8 hours prn. tx.    #Nutrition  - multivitamin daily     Handoff: monitor HR, switch to cuffless trach- cap trial tmrw possible

## 2023-07-12 NOTE — BH CONSULTATION LIAISON ASSESSMENT NOTE - NSBHCHARTREVIEWLAB_PSY_A_CORE FT
8.9    10.01 )-----------( 538      ( 13 Jul 2023 08:45 )             29.8   07-13    132<L>  |  97<L>  |  13  ----------------------------<  74  4.4   |  25  |  0.8    Ca    9.8      13 Jul 2023 08:45  Mg     1.9     07-13    TPro  7.4  /  Alb  3.6  /  TBili  0.2  /  DBili  x   /  AST  33  /  ALT  39  /  AlkPhos  455<H>  07-12

## 2023-07-12 NOTE — BH CONSULTATION LIAISON ASSESSMENT NOTE - SUMMARY
41 y/o female with no known psychiatric history, pmh of Asthma admitted to the hospital with acute Influenza B viral pneumonia superimposed on cavitary MRSA pneumonia, acute hypoxic respiratory failure requiring intubation, with complicated hospital course, patient now transferred to step down unit. TeleCL Psychiatry consulted for evaluation of agitation and anxiety.    Complete note to follow.    Upon assessment today patient appears tearful during interview. She struggles with coughing for extended period of time during assessment. Overall, no signs of acute psychiatric illness. Patient endorses some low mood secondary to length of medical stay. While patient denies any confusion, given history from hospital team patient is likely having agitation/anxiety 2/2 to delirium due to extensive infection. As klonopin may be further deliriogenic, would recommend low dose antipsychotic.    Recs:  For mild to moderate agitation, zyprexa 2.5 mg q8h prn which can be provided in Zydis formulation as patient has PEG  For severe agitation, haldol 5 mg with ativan 2 mg q6h prn, may be given IM if patient refuses and considered risk to self/others  HOLD for qtc <500    Symptoms of disorientation, lethargy and fluctuations in degree of alertness / engagement and orientation are consistent with encephalopathy (aka delirium) which is likely multifactorial.     Plan/Recommendations:  Medical management:   - Treat all known contributors as you are  - Discontinue use of anticholinergic, benzodiazepine and opioid medications  	-when opioids are absolutely needed, preference for low dose hydromorphone or oxycodone   		(less deliriogenic than morphine or fentanyl)  - If not already done, ensure negative UA, normal electrolytes / renal function / LFTs / ammonia levels, Vitamin D/B12 and TSH    Environmental Provisions:   - Maintain daytime awakeness / night-time sleep  - Windows open during the day and mobilize as tolerated during the day (i.e. transfer to chair / seated position even if unable to ambulate otherwise)  - Low lighting and noise avoidance to promote sleep  - Ensure access to any sensory aides used prior to admission (i.e. glasses, hearing aids etc)    Disposition:   - Optimize patient's cognition prior to finalizing disposition options  - PT/OT to determine appropriate discharge level of care with CM to facilitate placement or home care needs  - There are NO psychiatric contraindications to discharge when patient is medically cleared.       43 y/o female with no known psychiatric history, pmh of Asthma admitted to the hospital with acute Influenza B viral pneumonia superimposed on cavitary MRSA pneumonia, acute hypoxic respiratory failure requiring intubation, with complicated hospital course, patient now transferred to step down unit. TeleCL Psychiatry consulted for evaluation of agitation and anxiety.    Upon assessment today patient appears tearful during interview. She struggles with coughing for extended period of time during assessment. Overall, no signs of acute psychiatric illness. Patient endorses some low mood secondary to length of medical stay. While patient denies any confusion, given history from hospital team patient is likely having agitation/anxiety 2/2 to delirium due to extensive infection. As klonopin may be further deliriogenic, would recommend low dose antipsychotic.    Recs:  For mild to moderate agitation, zyprexa 2.5 mg q8h prn which can be provided in Zydis formulation as patient has PEG  For severe agitation, haldol 5 mg with ativan 2 mg q6h prn, may be given IM if patient refuses and considered risk to self/others  HOLD for qtc <500    Symptoms of disorientation, lethargy and fluctuations in degree of alertness / engagement and orientation are consistent with encephalopathy (aka delirium) which is likely multifactorial.     Plan/Recommendations:  Medical management:   - Treat all known contributors as you are  - Discontinue use of anticholinergic, benzodiazepine and opioid medications  	-when opioids are absolutely needed, preference for low dose hydromorphone or oxycodone   		(less deliriogenic than morphine or fentanyl)  - If not already done, ensure negative UA, normal electrolytes / renal function / LFTs / ammonia levels, Vitamin D/B12 and TSH    Environmental Provisions:   - Maintain daytime awakeness / night-time sleep  - Windows open during the day and mobilize as tolerated during the day (i.e. transfer to chair / seated position even if unable to ambulate otherwise)  - Low lighting and noise avoidance to promote sleep  - Ensure access to any sensory aides used prior to admission (i.e. glasses, hearing aids etc)    Disposition:   - Optimize patient's cognition prior to finalizing disposition options  - PT/OT to determine appropriate discharge level of care with CM to facilitate placement or home care needs  - There are NO psychiatric contraindications to discharge when patient is medically cleared.

## 2023-07-12 NOTE — PROGRESS NOTE ADULT - SUBJECTIVE AND OBJECTIVE BOX
NUTRITION SUPPORT TEAM  -  PROGRESS NOTE   on trach collar  abdomen  soft +G-J in place    j-tube feed is off  s/p FEES - findings noted  on po diet   REVIEW OF SYSTEMS:  Negative except as noted above.     VITALS:  T(F): 98.3 (07-12 @ 12:08), Max: 98.9 (07-12 @ 05:00)  HR: 101 (07-12 @ 12:08) (69 - 101)  BP: 133/79 (07-12 @ 12:08) (133/79 - 134/80)  RR: 18 (07-12 @ 12:08) (18 - 18)  SpO2: 97% (07-12 @ 12:08) (97% - 100%)    HEIGHT/WEIGHT/BMI:   Height (cm): 152.4 (07-05)  Weight (kg): 80.4 (07-05)  BMI (kg/m2): 34.6 (07-05)  07-11-23 @ 07:01  -  07-12-23 @ 07:00  --------------------------------------------------------  IN:    Oral Fluid: 480 mL  Total IN: 480 mL    OUT:  Total OUT: 0 mL    Total NET: 480 mL        MEDICATIONS:   apixaban 5 milliGRAM(s) Oral every 12 hours  chlorhexidine 2% Cloths 1 Application(s) Topical <User Schedule>  clonazePAM  Tablet 0.5 milliGRAM(s) Oral three times a day PRN  diltiazem    Tablet 30 milliGRAM(s) Oral every 6 hours  famotidine    Tablet 20 milliGRAM(s) Oral daily  ferrous    sulfate Liquid 300 milliGRAM(s) Enteral Tube daily  multivitamin 1 Tablet(s) Oral daily  nystatin Cream 1 Application(s) Topical two times a day  oxycodone    5 mG/acetaminophen 325 mG 1 Tablet(s) Oral/Enteral Tube every 6 hours PRN  petrolatum white Ointment 1 Application(s) Topical two times a day  scopolamine 1 mG/72 Hr(s) Patch 1 Patch Transdermal every 72 hours  senna 2 Tablet(s) Oral at bedtime  vitamin A &amp; D Ointment 1 Application(s) Topical daily    LABS:                         8.7    11.38 )-----------( 568      ( 12 Jul 2023 08:11 )             28.1     135  |  98  |  13  ----------------------------<  82          (07-12-23 @ 08:11)  4.2   |  27  |  0.8    Ca    10.0          (07-12-23 @ 08:11)  Mg     1.8         (07-12-23 @ 08:11)  TPro  7.4  /  Alb  3.6  /  TBili  0.2  /  DBili  x   /  AST  33  /  ALT  39  /  AlkPhos  455<H>       07-12-23 @ 08:11  Triglycerides, Serum: 147 mg/dL (06-26 @ 04:44)  Blood Glucose (Past 24 hours):  122 mg/dL (07-12 @ 11:34)  95 mg/dL (07-12 @ 08:02)  119 mg/dL (07-11 @ 21:30)  125 mg/dL (07-11 @ 16:49)    DIET:   Diet, Easy to Chew:   Mildly Thick Liquids (MILDTHICKLIQS)  Supplement Feeding Modality:  Oral  Ensure Clear Cans or Servings Per Day:  2       Frequency:  Three Times a day (07-11-23 @ 08:32) [Active]    RADIOLOGY:   < from: Xray Chest 1 View- PORTABLE-Urgent (Xray Chest 1 View- PORTABLE-Urgent .) (07.10.23 @ 14:19) >  Impression:  Tracheostomy tube overlies the trachea. No pneumothorax.   NUTRITION SUPPORT TEAM  -  PROGRESS NOTE   on trach collar  abdomen  soft +G-J in place    j-tube feed is off  s/p FEES - findings noted  on po diet but limited to thick liquids, so suspect fluid intake will be inadequate   pt ate ~ 50% of meals only    REVIEW OF SYSTEMS:  Negative except as noted above.     VITALS:  T(F): 98.3 (07-12 @ 12:08), Max: 98.9 (07-12 @ 05:00)  HR: 101 (07-12 @ 12:08) (69 - 101)  BP: 133/79 (07-12 @ 12:08) (133/79 - 134/80)  RR: 18 (07-12 @ 12:08) (18 - 18)  SpO2: 97% (07-12 @ 12:08) (97% - 100%)    HEIGHT/WEIGHT/BMI:   Height (cm): 152.4 (07-05)  Weight (kg): 80.4 (07-05)  BMI (kg/m2): 34.6 (07-05)    07-11-23 @ 07:01  -  07-12-23 @ 07:00  --------------------------------------------------------  IN:    Oral Fluid: 480 mL  Total IN: 480 mL  OUT:  Total OUT: 0 mL  Total NET: 480 mL    MEDICATIONS:   apixaban 5 milliGRAM(s) Oral every 12 hours  chlorhexidine 2% Cloths 1 Application(s) Topical <User Schedule>  clonazePAM  Tablet 0.5 milliGRAM(s) Oral three times a day PRN  diltiazem    Tablet 30 milliGRAM(s) Oral every 6 hours  famotidine    Tablet 20 milliGRAM(s) Oral daily  ferrous    sulfate Liquid 300 milliGRAM(s) Enteral Tube daily  multivitamin 1 Tablet(s) Oral daily  nystatin Cream 1 Application(s) Topical two times a day  oxycodone    5 mG/acetaminophen 325 mG 1 Tablet(s) Oral/Enteral Tube every 6 hours PRN  petrolatum white Ointment 1 Application(s) Topical two times a day  scopolamine 1 mG/72 Hr(s) Patch 1 Patch Transdermal every 72 hours  senna 2 Tablet(s) Oral at bedtime  vitamin A &amp; D Ointment 1 Application(s) Topical daily    LABS:                         8.7    11.38 )-----------( 568      ( 12 Jul 2023 08:11 )             28.1     135  |  98  |  13  ----------------------------<  82          (07-12-23 @ 08:11)  4.2   |  27  |  0.8    Ca    10.0          (07-12-23 @ 08:11)  Mg     1.8         (07-12-23 @ 08:11)  TPro  7.4  /  Alb  3.6  /  TBili  0.2  /  DBili  x   /  AST  33  /  ALT  39  /  AlkPhos  455<H>       07-12-23 @ 08:11  Triglycerides, Serum: 147 mg/dL (06-26 @ 04:44)  Blood Glucose (Past 24 hours):  122 mg/dL (07-12 @ 11:34)  95 mg/dL (07-12 @ 08:02)  119 mg/dL (07-11 @ 21:30)  125 mg/dL (07-11 @ 16:49)    DIET:   Diet, Easy to Chew:   Mildly Thick Liquids (MILDTHICKLIQS)  Supplement Feeding Modality:  Oral  Ensure Clear Cans or Servings Per Day:  2       Frequency:  Three Times a day (07-11-23 @ 08:32) [Active]    RADIOLOGY:   < from: Xray Chest 1 View- PORTABLE-Urgent (Xray Chest 1 View- PORTABLE-Urgent .) (07.10.23 @ 14:19) >  Impression:  Tracheostomy tube overlies the trachea. No pneumothorax.

## 2023-07-12 NOTE — BH CONSULTATION LIAISON ASSESSMENT NOTE - NSBHCHARTREVIEWVS_PSY_A_CORE FT
Vital Signs Last 24 Hrs  T(C): 36.8 (12 Jul 2023 12:08), Max: 37.3 (11 Jul 2023 18:59)  T(F): 98.3 (12 Jul 2023 12:08), Max: 99.2 (11 Jul 2023 18:59)  HR: 101 (12 Jul 2023 12:08) (69 - 101)  BP: 133/79 (12 Jul 2023 12:08) (122/78 - 134/80)  BP(mean): --  RR: 18 (12 Jul 2023 12:08) (18 - 19)  SpO2: 97% (12 Jul 2023 12:08) (97% - 100%)    Parameters below as of 11 Jul 2023 18:59  Patient On (Oxygen Delivery Method): T-piece    O2 Concentration (%): 35

## 2023-07-12 NOTE — BH CONSULTATION LIAISON ASSESSMENT NOTE - DESCRIPTION
Patient reports that she has 13 kids, oldest are adults with youngest being 4. She reports that in the past she "lost her children" but has "gotten them all back." She reports that she is regularly in touch with kids, parents and aunts/uncles.

## 2023-07-13 LAB
ALBUMIN SERPL ELPH-MCNC: 3.4 G/DL — LOW (ref 3.5–5.2)
ALP SERPL-CCNC: 363 U/L — HIGH (ref 30–115)
ALT FLD-CCNC: 27 U/L — SIGNIFICANT CHANGE UP (ref 0–41)
ANION GAP SERPL CALC-SCNC: 10 MMOL/L — SIGNIFICANT CHANGE UP (ref 7–14)
AST SERPL-CCNC: 22 U/L — SIGNIFICANT CHANGE UP (ref 0–41)
BILIRUB DIRECT SERPL-MCNC: <0.2 MG/DL — SIGNIFICANT CHANGE UP (ref 0–0.3)
BILIRUB INDIRECT FLD-MCNC: >0 MG/DL — LOW (ref 0.2–1.2)
BILIRUB SERPL-MCNC: 0.2 MG/DL — SIGNIFICANT CHANGE UP (ref 0.2–1.2)
BLD GP AB SCN SERPL QL: SIGNIFICANT CHANGE UP
BUN SERPL-MCNC: 13 MG/DL — SIGNIFICANT CHANGE UP (ref 10–20)
CALCIUM SERPL-MCNC: 9.8 MG/DL — SIGNIFICANT CHANGE UP (ref 8.4–10.5)
CHLORIDE SERPL-SCNC: 97 MMOL/L — LOW (ref 98–110)
CO2 SERPL-SCNC: 25 MMOL/L — SIGNIFICANT CHANGE UP (ref 17–32)
CREAT SERPL-MCNC: 0.8 MG/DL — SIGNIFICANT CHANGE UP (ref 0.7–1.5)
EGFR: 94 ML/MIN/1.73M2 — SIGNIFICANT CHANGE UP
GLUCOSE BLDC GLUCOMTR-MCNC: 101 MG/DL — HIGH (ref 70–99)
GLUCOSE BLDC GLUCOMTR-MCNC: 115 MG/DL — HIGH (ref 70–99)
GLUCOSE BLDC GLUCOMTR-MCNC: 93 MG/DL — SIGNIFICANT CHANGE UP (ref 70–99)
GLUCOSE SERPL-MCNC: 74 MG/DL — SIGNIFICANT CHANGE UP (ref 70–99)
HCT VFR BLD CALC: 29.8 % — LOW (ref 37–47)
HGB BLD-MCNC: 8.9 G/DL — LOW (ref 12–16)
MAGNESIUM SERPL-MCNC: 1.9 MG/DL — SIGNIFICANT CHANGE UP (ref 1.8–2.4)
MCHC RBC-ENTMCNC: 24.3 PG — LOW (ref 27–31)
MCHC RBC-ENTMCNC: 29.9 G/DL — LOW (ref 32–37)
MCV RBC AUTO: 81.4 FL — SIGNIFICANT CHANGE UP (ref 81–99)
NRBC # BLD: 0 /100 WBCS — SIGNIFICANT CHANGE UP (ref 0–0)
PLATELET # BLD AUTO: 538 K/UL — HIGH (ref 130–400)
PMV BLD: 9.1 FL — SIGNIFICANT CHANGE UP (ref 7.4–10.4)
POTASSIUM SERPL-MCNC: 4.4 MMOL/L — SIGNIFICANT CHANGE UP (ref 3.5–5)
POTASSIUM SERPL-SCNC: 4.4 MMOL/L — SIGNIFICANT CHANGE UP (ref 3.5–5)
PROT SERPL-MCNC: 7.4 G/DL — SIGNIFICANT CHANGE UP (ref 6–8)
RBC # BLD: 3.66 M/UL — LOW (ref 4.2–5.4)
RBC # FLD: 26.4 % — HIGH (ref 11.5–14.5)
SODIUM SERPL-SCNC: 132 MMOL/L — LOW (ref 135–146)
WBC # BLD: 10.01 K/UL — SIGNIFICANT CHANGE UP (ref 4.8–10.8)
WBC # FLD AUTO: 10.01 K/UL — SIGNIFICANT CHANGE UP (ref 4.8–10.8)

## 2023-07-13 PROCEDURE — 99232 SBSQ HOSP IP/OBS MODERATE 35: CPT

## 2023-07-13 PROCEDURE — 99232 SBSQ HOSP IP/OBS MODERATE 35: CPT | Mod: 95

## 2023-07-13 PROCEDURE — 76705 ECHO EXAM OF ABDOMEN: CPT | Mod: 26

## 2023-07-13 RX ORDER — OLANZAPINE 15 MG/1
2.5 TABLET, FILM COATED ORAL EVERY 8 HOURS
Refills: 0 | Status: DISCONTINUED | OUTPATIENT
Start: 2023-07-13 | End: 2023-07-14

## 2023-07-13 RX ADMIN — SCOPALAMINE 1 PATCH: 1 PATCH, EXTENDED RELEASE TRANSDERMAL at 06:16

## 2023-07-13 RX ADMIN — Medication 30 MILLIGRAM(S): at 12:02

## 2023-07-13 RX ADMIN — NYSTATIN CREAM 1 APPLICATION(S): 100000 CREAM TOPICAL at 05:12

## 2023-07-13 RX ADMIN — APIXABAN 5 MILLIGRAM(S): 2.5 TABLET, FILM COATED ORAL at 05:11

## 2023-07-13 RX ADMIN — Medication 30 MILLIGRAM(S): at 23:03

## 2023-07-13 RX ADMIN — Medication 30 MILLIGRAM(S): at 18:04

## 2023-07-13 RX ADMIN — OLANZAPINE 2.5 MILLIGRAM(S): 15 TABLET, FILM COATED ORAL at 05:12

## 2023-07-13 RX ADMIN — Medication 30 MILLIGRAM(S): at 05:12

## 2023-07-13 RX ADMIN — FAMOTIDINE 20 MILLIGRAM(S): 10 INJECTION INTRAVENOUS at 11:56

## 2023-07-13 RX ADMIN — CHLORHEXIDINE GLUCONATE 1 APPLICATION(S): 213 SOLUTION TOPICAL at 05:11

## 2023-07-13 RX ADMIN — Medication 1 APPLICATION(S): at 05:16

## 2023-07-13 RX ADMIN — SCOPALAMINE 1 PATCH: 1 PATCH, EXTENDED RELEASE TRANSDERMAL at 19:35

## 2023-07-13 RX ADMIN — OLANZAPINE 2.5 MILLIGRAM(S): 15 TABLET, FILM COATED ORAL at 22:53

## 2023-07-13 RX ADMIN — APIXABAN 5 MILLIGRAM(S): 2.5 TABLET, FILM COATED ORAL at 18:04

## 2023-07-13 NOTE — PROGRESS NOTE ADULT - ASSESSMENT
42 year-old female with PMH of Asthma, HTN? presents with complaint of cough x3 days and SOB. In the ED was found to have Bilateral patchy groundglass nodularity with dominant confluent left lower lobe opacification with numerous cavitary lesions.Trach and PEG done for inability to wean off vent, currently off sedation. Her stay was also complicated by new onset Afib with RVR for which she is on amiodarone, diltiazem, and therapeutic anticoagulation.    #Acute hypoxemic respiratory failure / s/P Trach and PEG    #Severe cavitary CAP MRSA  #Small parapneumonic effusion SP Chest tube  #MRSA bacteremia  #Influenza B   #hx of Asthma   #Intubated 5/23 self-extubated 5/25, re-intubated 5/25; SBT failed, SP trach/G tube  - s/p Zyvox, meropenem and Tamifu, now on Vancomycin and flagyl  - s/p course of steroids now off  -  blood cultures  NGTD SINCE 5/28   - s/p MASTER 5/31: No evidence of valvular vegetations or intracardiac abscesses to support IE. Pulmonary hepatisation incidental finding. EF 65%, mild TR and pulm HTN  - ventilator management per pulm/cc,  - Methadone to 5 mg via peg - last day today  - d/c olanzapine  - pt breathing on own with t-collar   - pt on soft diet  - monitor secretions   - trach disloged yesterday - replaced with cuffed trach  - uncuffed trach placement today afternoon by CT surg  - LFT's trending down    # Hyperkalemia  - s/p Lokelma 10 mg x 1 dose today, f/up repeated BMP at 4  - s/p lokelma 5mg 1 dose overnight 7/5 - f/u 11am BMP  - s/p lokelma 10mg 7/7     #Maculopapular buttocks rash  -PCR skin scraping negative for HSV --> off  Acyclovir   -Wound care per burn, wound care RN following     #Microcytic Anemia likely  anemia of chronic disease   - s/p 6 units prbc so far last 6/5  - on Eliquis 5mg Q12H   - monitor hemoglobin - hgb 8.2    #New onset A-fib w/ RVR  - HR better controlled now  - s/p Amio drip, now on PO - amio d/c per EP rec - monitor for tachy  - Diltiazem 30mg q6 with holding parameters - if  or less or HR 60 or less  - on eliquis  - free T3 and free t4 isael    #Anxiety  - continue low dose Klonopin 0.25 mg via peg every 8 hours prn. tx.    #Nutrition  - multivitamin daily     Handoff: monitor HR, possible decannulation         42 year-old female with PMH of Asthma, HTN? presents with complaint of cough x3 days and SOB. In the ED was found to have Bilateral patchy groundglass nodularity with dominant confluent left lower lobe opacification with numerous cavitary lesions. Trach and PEG done for inability to wean off vent, currently off sedation. Her stay was also complicated by new onset Afib with RVR for which she was on amiodarone,  is still on diltiazem, and therapeutic anticoagulation.    #Acute hypoxemic respiratory failure / s/P Trach and PEG    #Severe cavitary CAP MRSA  #Small parapneumonic effusion SP Chest tube  #MRSA bacteremia  #Influenza B   #hx of Asthma   #Intubated 5/23 self-extubated 5/25, re-intubated 5/25; SBT failed, SP trach/G tube  - s/p Zyvox, meropenem and Tamifu, Vancomycin and flagyl  - s/p course of steroids now off  -  blood cultures  NGTD SINCE 5/28   - s/p MASTER 5/31: No evidence of valvular vegetations or intracardiac abscesses to support IE. Pulmonary hepatisation incidental finding. EF 65%, mild TR and pulm HTN  - ventilator management per pulm/cc,  - d/c olanzapine  - pt on soft diet  - monitor secretions   - uncuffed trach placement yesterday afternoon by CT surg  - decannulation today  - LFT's trending down    # Hyperkalemia  - s/p Lokelma 10 mg x 1 dose today, f/up repeated BMP at 4  - s/p lokelma 5mg 1 dose overnight 7/5 - f/u 11am BMP  - s/p lokelma 10mg 7/7     #Maculopapular buttocks rash  -PCR skin scraping negative for HSV --> off  Acyclovir   -Wound care per burn, wound care RN following     #Microcytic Anemia likely  anemia of chronic disease   - s/p 6 units prbc so far last 6/5  - on Eliquis 5mg Q12H   - monitor hemoglobin     #New onset A-fib w/ RVR  - HR better controlled now  - s/p Amio drip, now on PO - amio d/c per EP rec - monitor for tachy  - Diltiazem 30mg q6 with holding parameters - if  or less or HR 60 or less  - on eliquis  - free T3 and free t4 isael    #Anxiety  - continue low dose Klonopin 0.25 mg via peg every 8 hours prn. tx.      Handoff: monitor HR, decannulation today per CT surgery         42 year-old female with PMH of Asthma, HTN? presents with complaint of cough x3 days and SOB. In the ED was found to have Bilateral patchy groundglass nodularity with dominant confluent left lower lobe opacification with numerous cavitary lesions. Trach and PEG done for inability to wean off vent, currently off sedation. Her stay was also complicated by new onset Afib with RVR for which she was on amiodarone,  is still on diltiazem, and therapeutic anticoagulation.    #Acute hypoxemic respiratory failure / s/P Trach and PEG    #Severe cavitary CAP MRSA  #Small parapneumonic effusion SP Chest tube  #MRSA bacteremia  #Influenza B   #hx of Asthma   #Intubated 5/23 self-extubated 5/25, re-intubated 5/25; SBT failed, SP trach/G tube  - s/p Zyvox, meropenem and Tamifu, Vancomycin and flagyl  - s/p course of steroids now off  -  blood cultures  NGTD SINCE 5/28   - s/p MASTER 5/31: No evidence of valvular vegetations or intracardiac abscesses to support IE. Pulmonary hepatisation incidental finding. EF 65%, mild TR and pulm HTN  - ventilator management per pulm/cc,  - d/c olanzapine  - pt on soft diet  - monitor secretions   - uncuffed trach placement yesterday afternoon by CT surg  - decannulation today  - LFT's trending down    # Hyperkalemia  - s/p Lokelma 10 mg x 1 dose today, f/up repeated BMP at 4  - s/p lokelma 5mg 1 dose overnight 7/5 - f/u 11am BMP  - s/p lokelma 10mg 7/7     #Maculopapular buttocks rash  -PCR skin scraping negative for HSV --> off  Acyclovir   -Wound care per burn, wound care RN following     #Microcytic Anemia likely  anemia of chronic disease   - s/p 6 units prbc so far last 6/5  - on Eliquis 5mg Q12H   - monitor hemoglobin     #New onset A-fib w/ RVR  - HR better controlled now  - s/p Amio drip, now on PO - amio d/c per EP rec - monitor for tachy  - Diltiazem 30mg q6 with holding parameters - if  or less or HR 60 or less  - on eliquis  - free T3 and free t4 normal    #Anxiety  - psychiatry recs appreciated  - zyprexa zydis 2.5mg q8 PRN      Handoff: monitor HR, decannulation today per CT surgery

## 2023-07-13 NOTE — CHART NOTE - NSCHARTNOTEFT_GEN_A_CORE
Dysphagia f/u: Pt seen b/s, now w/ #6 cuffed trach w/ PMSV on hub, off t-piece, off humidification. +baseline coughing noted and reported in  consultation note. Pt has not had cap on, no cap noted at b/s. ?plan for decannulation. Pt awake, alert, in good spirits. Motivated to improve. Educated on POC including SLP recommendations to repeat her instrumental swallow study.   Recommend:   -Humidified O2 trach collar if pt to remain w/ speaking valve  -Humidified O2 via nasal cannula if cap placed  -Plan for repeat FEES once decannulated  -If pt is unable to be decannulated will plan for repeat FEES prior to d/c (however suggest waiting if pt is planned for decannulation)   -SLP will f/u

## 2023-07-13 NOTE — PROGRESS NOTE ADULT - SUBJECTIVE AND OBJECTIVE BOX
Over Night Events: events noted, doing well on RA, trac cuffless, cough, afebrile, BH noted    PHYSICAL EXAM    ICU Vital Signs Last 24 Hrs  T(C): 36.2 (13 Jul 2023 05:29), Max: 36.8 (12 Jul 2023 12:08)  T(F): 97.2 (13 Jul 2023 05:29), Max: 98.3 (12 Jul 2023 12:08)  HR: 111 (13 Jul 2023 05:29) (100 - 111)  BP: 109/76 (13 Jul 2023 05:29) (109/76 - 133/81)  RR: 18 (13 Jul 2023 05:29) (18 - 18)  SpO2: 98% (12 Jul 2023 20:27) (97% - 98%)    O2 Parameters below as of 12 Jul 2023 20:27  Patient On (Oxygen Delivery Method): room air            General: comfortable, phonating well  trach  Lungs: Bilateral BS  Cardiovascular: Regular   Abdomen: Soft, Positive BS  Extremities: No clubbing   Skin: Warm  Neurological: Non focal       07-11-23 @ 07:01  -  07-12-23 @ 07:00  --------------------------------------------------------  IN:    Oral Fluid: 480 mL  Total IN: 480 mL    OUT:  Total OUT: 0 mL    Total NET: 480 mL          LABS:                          8.7    11.38 )-----------( 568      ( 12 Jul 2023 08:11 )             28.1                                               07-12    135  |  98  |  13  ----------------------------<  82  4.2   |  27  |  0.8    Ca    10.0      12 Jul 2023 08:11  Mg     1.8     07-12    TPro  7.4  /  Alb  3.6  /  TBili  0.2  /  DBili  x   /  AST  33  /  ALT  39  /  AlkPhos  455<H>  07-12                                             Urinalysis Basic - ( 12 Jul 2023 08:11 )    Color: x / Appearance: x / SG: x / pH: x  Gluc: 82 mg/dL / Ketone: x  / Bili: x / Urobili: x   Blood: x / Protein: x / Nitrite: x   Leuk Esterase: x / RBC: x / WBC x   Sq Epi: x / Non Sq Epi: x / Bacteria: x                                                  LIVER FUNCTIONS - ( 12 Jul 2023 08:11 )  Alb: 3.6 g/dL / Pro: 7.4 g/dL / ALK PHOS: 455 U/L / ALT: 39 U/L / AST: 33 U/L / GGT: 301 U/L                                                                                                                                   MEDICATIONS  (STANDING):  apixaban 5 milliGRAM(s) Oral every 12 hours  chlorhexidine 2% Cloths 1 Application(s) Topical <User Schedule>  diltiazem    Tablet 30 milliGRAM(s) Oral every 6 hours  famotidine    Tablet 20 milliGRAM(s) Oral daily  ferrous    sulfate Liquid 300 milliGRAM(s) Enteral Tube daily  nystatin Cream 1 Application(s) Topical two times a day  OLANZapine Disintegrating Tablet 2.5 milliGRAM(s) Oral every 8 hours  petrolatum white Ointment 1 Application(s) Topical two times a day  scopolamine 1 mG/72 Hr(s) Patch 1 Patch Transdermal every 72 hours  senna 2 Tablet(s) Oral at bedtime  vitamin A &amp; D Ointment 1 Application(s) Topical daily    MEDICATIONS  (PRN):  oxycodone    5 mG/acetaminophen 325 mG 1 Tablet(s) Oral every 6 hours PRN Severe Pain (7 - 10)

## 2023-07-13 NOTE — BH CONSULTATION LIAISON PROGRESS NOTE - NSBHASSESSMENTFT_PSY_ALL_CORE
41 y/o female with no known psychiatric history, pmh of Asthma admitted to the hospital with acute Influenza B viral pneumonia superimposed on cavitary MRSA pneumonia, acute hypoxic respiratory failure requiring intubation, with complicated hospital course, patient now transferred to step down unit. Tele Psychiatry consulted for evaluation of agitation and anxiety.    7/12/23: Upon assessment today patient appears tearful during interview. She struggles with coughing for extended period of time during assessment. Overall, no signs of acute psychiatric illness. Patient endorses some low mood secondary to length of medical stay. While patient denies any confusion, given history from hospital team patient is likely having agitation/anxiety 2/2 to delirium due to extensive infection. As klonopin may be further deliriogenic, would recommend low dose antipsychotic.    7/13/23: Patient remains hopeful regarding her recovery and being reunited with family. No acute signs of psychiatric illness noted. Patient may be referred to [facility] for psychotherapy upon discharge. Would recommend  for psychosocial support.    Recs:  Repeat EKG for qtc    For mild to moderate agitation, zyprexa 2.5 mg q8h prn which can be provided in Zydis formulation as patient has PEG  For severe agitation,  zyprexa 2.5 mg q8h prn or haldol 5 mg with ativan 2 mg q6h prn, may be given IM if patient refuses and considered risk to self/others  HOLD for qtc <500    Symptoms of disorientation, lethargy and fluctuations in degree of alertness / engagement and orientation are consistent with encephalopathy (aka delirium) which is likely multifactorial.     Plan/Recommendations:  Medical management:   - Treat all known contributors as you are  - Avoid use of anticholinergic, benzodiazepine and opioid medications  	-when opioids are absolutely needed, preference for low dose hydromorphone or oxycodone   		(less deliriogenic than morphine or fentanyl)  - If not already done, ensure negative UA, normal electrolytes / renal function / LFTs / ammonia levels, Vitamin D/B12 and TSH    Environmental Provisions:   - Maintain daytime awakeness / night-time sleep  - Windows open during the day and mobilize as tolerated during the day (i.e. transfer to chair / seated position even if unable to ambulate otherwise)  - Low lighting and noise avoidance to promote sleep  - Ensure access to any sensory aides used prior to admission (i.e. glasses, hearing aids etc)    Disposition:   - Optimize patient's cognition prior to finalizing disposition options  - PT/OT to determine appropriate discharge level of care with CM to facilitate placement or home care needs  - There are NO psychiatric contraindications to discharge when patient is medically cleared.

## 2023-07-13 NOTE — BH CONSULTATION LIAISON PROGRESS NOTE - CURRENT MEDICATION
MEDICATIONS  (STANDING):  apixaban 5 milliGRAM(s) Oral every 12 hours  chlorhexidine 2% Cloths 1 Application(s) Topical <User Schedule>  diltiazem    Tablet 30 milliGRAM(s) Oral every 6 hours  famotidine    Tablet 20 milliGRAM(s) Oral daily  ferrous    sulfate Liquid 300 milliGRAM(s) Enteral Tube daily  nystatin Cream 1 Application(s) Topical two times a day  petrolatum white Ointment 1 Application(s) Topical two times a day  scopolamine 1 mG/72 Hr(s) Patch 1 Patch Transdermal every 72 hours  senna 2 Tablet(s) Oral at bedtime  vitamin A &amp; D Ointment 1 Application(s) Topical daily    MEDICATIONS  (PRN):  OLANZapine Disintegrating Tablet 2.5 milliGRAM(s) Oral every 8 hours PRN for aggitation and anxiety  oxycodone    5 mG/acetaminophen 325 mG 1 Tablet(s) Oral every 6 hours PRN Severe Pain (7 - 10)

## 2023-07-13 NOTE — BH CONSULTATION LIAISON PROGRESS NOTE - NSBHCHARTREVIEWVS_PSY_A_CORE FT
Vital Signs Last 24 Hrs  T(C): 36.8 (13 Jul 2023 12:26), Max: 36.8 (13 Jul 2023 12:26)  T(F): 98.3 (13 Jul 2023 12:26), Max: 98.3 (13 Jul 2023 12:26)  HR: 103 (13 Jul 2023 12:26) (100 - 111)  BP: 137/73 (13 Jul 2023 12:26) (109/76 - 137/73)  BP(mean): --  RR: 18 (13 Jul 2023 12:26) (18 - 18)  SpO2: 97% (13 Jul 2023 12:26) (97% - 98%)    Parameters below as of 12 Jul 2023 20:27  Patient On (Oxygen Delivery Method): room air

## 2023-07-13 NOTE — PROGRESS NOTE ADULT - ASSESSMENT
IMPRESSION:    Acute hypoxemic respiratory failure sp trach   Severe CAP secondary to MRSA sp abx  Small parapneumonic effusion SP Chest tube and removal  MRSA bacteremia resolved   MASTER negative   Anemia sp transfusion   New onset A FIB rate controlled   LANCE improved   HO asthma   Influenza B     PLAN:    CNS: Avoid CNS depressant    HEENT: Oral care. Trach care.     PULMONARY:  HOB @ 45 degrees.  Aspiration precautions. Keep SaO2 92 TO 96%.   decannulation today    CARDIOVASCULAR:    Avoid overload, Continue rate control.     GI: GI prophylaxis. po feeding    RENAL:  Follow up lytes.  Correct as needed.     INFECTIOUS DISEASE: ABX Per ID.      MATOLOGICAL:  DVT prophylaxis.  On Eliquis     ENDOCRINE:  Follow up FS.  Insulin protocol if needed.      Prognosis is poor overall .     PT/OT

## 2023-07-13 NOTE — PROGRESS NOTE ADULT - SUBJECTIVE AND OBJECTIVE BOX
ZEYNEP ROSSI 42y Female  MRN#: [id number]     Hospital Day: 51d    Pt is currently admitted with the primary diagnosis of  Pneumonia due to infectious organism        SUBJECTIVE     Overnight events  None    Subjective complaints  Pt was evaluated this am. Patient denied any active complaints and per patient his symptoms are improving                                            ----------------------------------------------------------  OBJECTIVE  PAST MEDICAL & SURGICAL HISTORY                                            -----------------------------------------------------------  ALLERGIES:  aspirin (Short breath; Anaphylaxis)  Bananas (Unknown)                                            ------------------------------------------------------------    HOME MEDICATIONS  Home Medications:                           MEDICATIONS:  STANDING MEDICATIONS  apixaban 5 milliGRAM(s) Oral every 12 hours  chlorhexidine 2% Cloths 1 Application(s) Topical <User Schedule>  diltiazem    Tablet 30 milliGRAM(s) Oral every 6 hours  famotidine    Tablet 20 milliGRAM(s) Oral daily  ferrous    sulfate Liquid 300 milliGRAM(s) Enteral Tube daily  nystatin Cream 1 Application(s) Topical two times a day  OLANZapine Disintegrating Tablet 2.5 milliGRAM(s) Oral every 8 hours  petrolatum white Ointment 1 Application(s) Topical two times a day  scopolamine 1 mG/72 Hr(s) Patch 1 Patch Transdermal every 72 hours  senna 2 Tablet(s) Oral at bedtime  vitamin A &amp; D Ointment 1 Application(s) Topical daily    PRN MEDICATIONS  oxycodone    5 mG/acetaminophen 325 mG 1 Tablet(s) Oral every 6 hours PRN                                            ------------------------------------------------------------  VITAL SIGNS: Last 24 Hours  T(C): 36.2 (13 Jul 2023 05:29), Max: 36.8 (12 Jul 2023 12:08)  T(F): 97.2 (13 Jul 2023 05:29), Max: 98.3 (12 Jul 2023 12:08)  HR: 111 (13 Jul 2023 05:29) (100 - 111)  BP: 109/76 (13 Jul 2023 05:29) (109/76 - 133/81)  BP(mean): --  RR: 18 (13 Jul 2023 05:29) (18 - 18)  SpO2: 98% (12 Jul 2023 20:27) (97% - 98%)                                             --------------------------------------------------------------  LABS:                        8.7    11.38 )-----------( 568      ( 12 Jul 2023 08:11 )             28.1     07-12    135  |  98  |  13  ----------------------------<  82  4.2   |  27  |  0.8    Ca    10.0      12 Jul 2023 08:11  Mg     1.8     07-12    TPro  7.4  /  Alb  3.6  /  TBili  0.2  /  DBili  x   /  AST  33  /  ALT  39  /  AlkPhos  455<H>  07-12      Urinalysis Basic - ( 12 Jul 2023 08:11 )    Color: x / Appearance: x / SG: x / pH: x  Gluc: 82 mg/dL / Ketone: x  / Bili: x / Urobili: x   Blood: x / Protein: x / Nitrite: x   Leuk Esterase: x / RBC: x / WBC x   Sq Epi: x / Non Sq Epi: x / Bacteria: x                                                            -------------------------------------------------------------  RADIOLOGY:                                            --------------------------------------------------------------    PHYSICAL EXAM:  GENERAL: NAD, lying in bed comfortably  HEAD:  Atraumatic, Normocephalic  CHEST/LUNG: pt with cuffless trach, satting 100%   HEART: regular rate and rhythm; No murmurs, rubs, or gallops  ABDOMEN: Bowel sounds present; Soft, Nontender, Nondistended.    EXTREMITIES: Warm. No clubbing, cyanosis, or edema  NERVOUS SYSTEM:  Alert & Oriented                                           --------------------------------------------------------------

## 2023-07-13 NOTE — BH CONSULTATION LIAISON PROGRESS NOTE - NSBHFUPINTERVALHXFT_PSY_A_CORE
Patient seen and evaluated.    No known episodes of agitation overnight. Patient appears more lethargic on interview however she is well engaged. She endorses similar concerns regarding her some from yesterday but reports that her taylor is with god. She denies any SI/HI/AVH.    Discussed her thoughts on therapy once she is out of the hospital, patient is amenable.

## 2023-07-14 LAB
ALBUMIN SERPL ELPH-MCNC: 3.6 G/DL — SIGNIFICANT CHANGE UP (ref 3.5–5.2)
ALP SERPL-CCNC: 315 U/L — HIGH (ref 30–115)
ALT FLD-CCNC: 24 U/L — SIGNIFICANT CHANGE UP (ref 0–41)
ANION GAP SERPL CALC-SCNC: 15 MMOL/L — HIGH (ref 7–14)
AST SERPL-CCNC: 26 U/L — SIGNIFICANT CHANGE UP (ref 0–41)
BILIRUB SERPL-MCNC: 0.3 MG/DL — SIGNIFICANT CHANGE UP (ref 0.2–1.2)
BUN SERPL-MCNC: 12 MG/DL — SIGNIFICANT CHANGE UP (ref 10–20)
CALCIUM SERPL-MCNC: 10.5 MG/DL — SIGNIFICANT CHANGE UP (ref 8.4–10.5)
CHLORIDE SERPL-SCNC: 99 MMOL/L — SIGNIFICANT CHANGE UP (ref 98–110)
CO2 SERPL-SCNC: 21 MMOL/L — SIGNIFICANT CHANGE UP (ref 17–32)
CREAT SERPL-MCNC: 0.9 MG/DL — SIGNIFICANT CHANGE UP (ref 0.7–1.5)
EGFR: 82 ML/MIN/1.73M2 — SIGNIFICANT CHANGE UP
GLUCOSE BLDC GLUCOMTR-MCNC: 106 MG/DL — HIGH (ref 70–99)
GLUCOSE BLDC GLUCOMTR-MCNC: 135 MG/DL — HIGH (ref 70–99)
GLUCOSE BLDC GLUCOMTR-MCNC: 95 MG/DL — SIGNIFICANT CHANGE UP (ref 70–99)
GLUCOSE BLDC GLUCOMTR-MCNC: 96 MG/DL — SIGNIFICANT CHANGE UP (ref 70–99)
GLUCOSE SERPL-MCNC: 89 MG/DL — SIGNIFICANT CHANGE UP (ref 70–99)
HCT VFR BLD CALC: 32.6 % — LOW (ref 37–47)
HGB BLD-MCNC: 10 G/DL — LOW (ref 12–16)
MAGNESIUM SERPL-MCNC: 1.8 MG/DL — SIGNIFICANT CHANGE UP (ref 1.8–2.4)
MCHC RBC-ENTMCNC: 24.8 PG — LOW (ref 27–31)
MCHC RBC-ENTMCNC: 30.7 G/DL — LOW (ref 32–37)
MCV RBC AUTO: 80.7 FL — LOW (ref 81–99)
NRBC # BLD: 0 /100 WBCS — SIGNIFICANT CHANGE UP (ref 0–0)
PLATELET # BLD AUTO: 483 K/UL — HIGH (ref 130–400)
PMV BLD: 9.4 FL — SIGNIFICANT CHANGE UP (ref 7.4–10.4)
POTASSIUM SERPL-MCNC: 4.4 MMOL/L — SIGNIFICANT CHANGE UP (ref 3.5–5)
POTASSIUM SERPL-SCNC: 4.4 MMOL/L — SIGNIFICANT CHANGE UP (ref 3.5–5)
PROT SERPL-MCNC: 7.5 G/DL — SIGNIFICANT CHANGE UP (ref 6–8)
RBC # BLD: 4.04 M/UL — LOW (ref 4.2–5.4)
RBC # FLD: 27.2 % — HIGH (ref 11.5–14.5)
SODIUM SERPL-SCNC: 135 MMOL/L — SIGNIFICANT CHANGE UP (ref 135–146)
WBC # BLD: 9.89 K/UL — SIGNIFICANT CHANGE UP (ref 4.8–10.8)
WBC # FLD AUTO: 9.89 K/UL — SIGNIFICANT CHANGE UP (ref 4.8–10.8)

## 2023-07-14 PROCEDURE — 99232 SBSQ HOSP IP/OBS MODERATE 35: CPT

## 2023-07-14 PROCEDURE — 93010 ELECTROCARDIOGRAM REPORT: CPT

## 2023-07-14 RX ORDER — DILTIAZEM HCL 120 MG
120 CAPSULE, EXT RELEASE 24 HR ORAL DAILY
Refills: 0 | Status: DISCONTINUED | OUTPATIENT
Start: 2023-07-14 | End: 2023-07-17

## 2023-07-14 RX ORDER — ACETAMINOPHEN 500 MG
500 TABLET ORAL EVERY 6 HOURS
Refills: 0 | Status: DISCONTINUED | OUTPATIENT
Start: 2023-07-14 | End: 2023-07-17

## 2023-07-14 RX ORDER — ASCORBIC ACID 60 MG
500 TABLET,CHEWABLE ORAL DAILY
Refills: 0 | Status: DISCONTINUED | OUTPATIENT
Start: 2023-07-14 | End: 2023-07-17

## 2023-07-14 RX ORDER — ALPRAZOLAM 0.25 MG
0.25 TABLET ORAL ONCE
Refills: 0 | Status: DISCONTINUED | OUTPATIENT
Start: 2023-07-14 | End: 2023-07-14

## 2023-07-14 RX ORDER — METOPROLOL TARTRATE 50 MG
12.5 TABLET ORAL
Refills: 0 | Status: DISCONTINUED | OUTPATIENT
Start: 2023-07-14 | End: 2023-07-17

## 2023-07-14 RX ORDER — OXYCODONE HYDROCHLORIDE 5 MG/1
5 TABLET ORAL EVERY 8 HOURS
Refills: 0 | Status: DISCONTINUED | OUTPATIENT
Start: 2023-07-14 | End: 2023-07-17

## 2023-07-14 RX ORDER — ZINC SULFATE TAB 220 MG (50 MG ZINC EQUIVALENT) 220 (50 ZN) MG
220 TAB ORAL DAILY
Refills: 0 | Status: DISCONTINUED | OUTPATIENT
Start: 2023-07-14 | End: 2023-07-17

## 2023-07-14 RX ORDER — MAGNESIUM SULFATE 500 MG/ML
2 VIAL (ML) INJECTION ONCE
Refills: 0 | Status: DISCONTINUED | OUTPATIENT
Start: 2023-07-14 | End: 2023-07-17

## 2023-07-14 RX ADMIN — Medication 500 MILLIGRAM(S): at 11:53

## 2023-07-14 RX ADMIN — Medication 120 MILLIGRAM(S): at 12:00

## 2023-07-14 RX ADMIN — Medication 0.5 MILLIGRAM(S): at 17:33

## 2023-07-14 RX ADMIN — APIXABAN 5 MILLIGRAM(S): 2.5 TABLET, FILM COATED ORAL at 06:48

## 2023-07-14 RX ADMIN — Medication 500 MILLIGRAM(S): at 11:16

## 2023-07-14 RX ADMIN — CHLORHEXIDINE GLUCONATE 1 APPLICATION(S): 213 SOLUTION TOPICAL at 06:52

## 2023-07-14 RX ADMIN — APIXABAN 5 MILLIGRAM(S): 2.5 TABLET, FILM COATED ORAL at 17:01

## 2023-07-14 RX ADMIN — Medication 30 MILLIGRAM(S): at 06:48

## 2023-07-14 RX ADMIN — SCOPALAMINE 1 PATCH: 1 PATCH, EXTENDED RELEASE TRANSDERMAL at 07:02

## 2023-07-14 RX ADMIN — Medication 1 APPLICATION(S): at 06:56

## 2023-07-14 RX ADMIN — Medication 0.25 MILLIGRAM(S): at 16:07

## 2023-07-14 RX ADMIN — OXYCODONE HYDROCHLORIDE 5 MILLIGRAM(S): 5 TABLET ORAL at 21:04

## 2023-07-14 RX ADMIN — OXYCODONE HYDROCHLORIDE 5 MILLIGRAM(S): 5 TABLET ORAL at 11:22

## 2023-07-14 RX ADMIN — Medication 12.5 MILLIGRAM(S): at 17:43

## 2023-07-14 RX ADMIN — Medication 500 MILLIGRAM(S): at 23:25

## 2023-07-14 RX ADMIN — Medication 500 MILLIGRAM(S): at 15:30

## 2023-07-14 RX ADMIN — OXYCODONE HYDROCHLORIDE 5 MILLIGRAM(S): 5 TABLET ORAL at 20:34

## 2023-07-14 RX ADMIN — OXYCODONE HYDROCHLORIDE 5 MILLIGRAM(S): 5 TABLET ORAL at 11:53

## 2023-07-14 RX ADMIN — NYSTATIN CREAM 1 APPLICATION(S): 100000 CREAM TOPICAL at 06:49

## 2023-07-14 RX ADMIN — Medication 500 MILLIGRAM(S): at 17:00

## 2023-07-14 RX ADMIN — Medication 500 MILLIGRAM(S): at 23:55

## 2023-07-14 RX ADMIN — SCOPALAMINE 1 PATCH: 1 PATCH, EXTENDED RELEASE TRANSDERMAL at 20:10

## 2023-07-14 NOTE — PROGRESS NOTE ADULT - SUBJECTIVE AND OBJECTIVE BOX
ZEYNEP ROSSI 42y Female  MRN#: [id number]     Hospital Day: 52d    Pt is currently admitted with the primary diagnosis of  Pneumonia due to infectious organism        SUBJECTIVE     Overnight events  None    Subjective complaints  Pt was evaluated this am. Patient denied any active complaints and per patient his symptoms are improving                                            ----------------------------------------------------------  OBJECTIVE  PAST MEDICAL & SURGICAL HISTORY                                            -----------------------------------------------------------  ALLERGIES:  aspirin (Short breath; Anaphylaxis)  Bananas (Unknown)                                            ------------------------------------------------------------    HOME MEDICATIONS  Home Medications:                           MEDICATIONS:  STANDING MEDICATIONS  acetaminophen     Tablet .. 500 milliGRAM(s) Oral every 6 hours  apixaban 5 milliGRAM(s) Oral every 12 hours  chlorhexidine 2% Cloths 1 Application(s) Topical <User Schedule>  diltiazem    milliGRAM(s) Oral daily  nystatin Cream 1 Application(s) Topical two times a day  petrolatum white Ointment 1 Application(s) Topical two times a day  senna 2 Tablet(s) Oral at bedtime  vitamin A &amp; D Ointment 1 Application(s) Topical daily    PRN MEDICATIONS  OLANZapine Disintegrating Tablet 2.5 milliGRAM(s) Oral every 8 hours PRN  oxyCODONE    IR 5 milliGRAM(s) Oral every 8 hours PRN                                            ------------------------------------------------------------  VITAL SIGNS: Last 24 Hours  T(C): 36.8 (14 Jul 2023 04:30), Max: 36.8 (13 Jul 2023 12:26)  T(F): 98.2 (14 Jul 2023 04:30), Max: 98.3 (13 Jul 2023 12:26)  HR: 100 (14 Jul 2023 04:30) (97 - 103)  BP: 121/72 (14 Jul 2023 04:30) (119/77 - 137/73)  BP(mean): --  RR: 18 (14 Jul 2023 04:30) (18 - 18)  SpO2: 96% (14 Jul 2023 04:30) (96% - 100%)                                             --------------------------------------------------------------  LABS:                        10.0   9.89  )-----------( 483      ( 14 Jul 2023 06:51 )             32.6     07-13    132<L>  |  97<L>  |  13  ----------------------------<  74  4.4   |  25  |  0.8    Ca    9.8      13 Jul 2023 08:45  Mg     1.9     07-13    TPro  7.4  /  Alb  3.4<L>  /  TBili  0.2  /  DBili  <0.2  /  AST  22  /  ALT  27  /  AlkPhos  363<H>  07-13      Urinalysis Basic - ( 13 Jul 2023 08:45 )    Color: x / Appearance: x / SG: x / pH: x  Gluc: 74 mg/dL / Ketone: x  / Bili: x / Urobili: x   Blood: x / Protein: x / Nitrite: x   Leuk Esterase: x / RBC: x / WBC x   Sq Epi: x / Non Sq Epi: x / Bacteria: x                                                            -------------------------------------------------------------  RADIOLOGY:                                            --------------------------------------------------------------    PHYSICAL EXAM:  GENERAL: NAD, lying in bed comfortably  HEAD:  Atraumatic, Normocephalic  CHEST/LUNG: pt with no trach today  HEART: regular rate and rhythm; No murmurs, rubs, or gallops  ABDOMEN: Bowel sounds present; Soft, Nontender, Nondistended.    EXTREMITIES: Warm. No clubbing, cyanosis, or edema  NERVOUS SYSTEM:  Alert & Oriented                                           --------------------------------------------------------------                 ZEYNEP ROSSI 42y Female  MRN#: [id number]     Hospital Day: 52d    Pt is currently admitted with the primary diagnosis of  Pneumonia due to infectious organism        SUBJECTIVE     Overnight events  None                                            ----------------------------------------------------------  OBJECTIVE  PAST MEDICAL & SURGICAL HISTORY                                            -----------------------------------------------------------  ALLERGIES:  aspirin (Short breath; Anaphylaxis)  Bananas (Unknown)                                            ------------------------------------------------------------    HOME MEDICATIONS  Home Medications:                           MEDICATIONS:  STANDING MEDICATIONS  acetaminophen     Tablet .. 500 milliGRAM(s) Oral every 6 hours  apixaban 5 milliGRAM(s) Oral every 12 hours  chlorhexidine 2% Cloths 1 Application(s) Topical <User Schedule>  diltiazem    milliGRAM(s) Oral daily  nystatin Cream 1 Application(s) Topical two times a day  petrolatum white Ointment 1 Application(s) Topical two times a day  senna 2 Tablet(s) Oral at bedtime  vitamin A &amp; D Ointment 1 Application(s) Topical daily    PRN MEDICATIONS  OLANZapine Disintegrating Tablet 2.5 milliGRAM(s) Oral every 8 hours PRN  oxyCODONE    IR 5 milliGRAM(s) Oral every 8 hours PRN                                            ------------------------------------------------------------  VITAL SIGNS: Last 24 Hours  T(C): 36.8 (14 Jul 2023 04:30), Max: 36.8 (13 Jul 2023 12:26)  T(F): 98.2 (14 Jul 2023 04:30), Max: 98.3 (13 Jul 2023 12:26)  HR: 100 (14 Jul 2023 04:30) (97 - 103)  BP: 121/72 (14 Jul 2023 04:30) (119/77 - 137/73)  BP(mean): --  RR: 18 (14 Jul 2023 04:30) (18 - 18)  SpO2: 96% (14 Jul 2023 04:30) (96% - 100%)                                             --------------------------------------------------------------  LABS:                        10.0   9.89  )-----------( 483      ( 14 Jul 2023 06:51 )             32.6     07-13    132<L>  |  97<L>  |  13  ----------------------------<  74  4.4   |  25  |  0.8    Ca    9.8      13 Jul 2023 08:45  Mg     1.9     07-13    TPro  7.4  /  Alb  3.4<L>  /  TBili  0.2  /  DBili  <0.2  /  AST  22  /  ALT  27  /  AlkPhos  363<H>  07-13      Urinalysis Basic - ( 13 Jul 2023 08:45 )    Color: x / Appearance: x / SG: x / pH: x  Gluc: 74 mg/dL / Ketone: x  / Bili: x / Urobili: x   Blood: x / Protein: x / Nitrite: x   Leuk Esterase: x / RBC: x / WBC x   Sq Epi: x / Non Sq Epi: x / Bacteria: x                                                            -------------------------------------------------------------  RADIOLOGY:                                            --------------------------------------------------------------    PHYSICAL EXAM:  GENERAL: NAD, lying in bed comfortably  HEAD:  Atraumatic, Normocephalic  CHEST/LUNG: pt with no trach today  HEART: regular rate and rhythm; No murmurs, rubs, or gallops  ABDOMEN: Bowel sounds present; Soft, Nontender, Nondistended.    EXTREMITIES: Warm. No clubbing, cyanosis, or edema  NERVOUS SYSTEM:  Alert & Oriented                                           --------------------------------------------------------------

## 2023-07-14 NOTE — CONSULT NOTE ADULT - ASSESSMENT
IMPRESSION: Rehab of debilitation / respiratory failure, s/p trach / peg / Asthma    PRECAUTIONS: [   ] Cardiac  [   ] Respiratory  [   ] Seizures [   ] Contact Isolation  [   ] Droplet Isolation  [   ] Other    Weight Bearing Status:     RECOMMENDATION:    Out of Bed to Chair     DVT/Decubiti Prophylaxis    REHAB PLAN:     [ x   ] Bedside P/T 3-5 times a week   [  x  ]   Bedside O/T  2-3 times a week             [    ] Speech Therapy               [    ]  No Rehab Therapy Indicated   Conditioning/ROM                                    ADL  Bed Mobility                                               Conditioning/ROM  Transfers                                                     Bed Mobility  Sitting /Standing Balance                         Transfers                                        Gait Training                                               Sitting/Standing Balance  Stair Training [   ]Applicable                    Home equipment Eval                                                                        Splinting  [   ] Only      GOALS:   ADL   [  x  ]   Independent                    Transfers  [  x  ] Independent                          Ambulation  [x    ] Independent     [  x   ] With device                            [    ]  CG                                                         [    ]  CG                                                                  [    ] CG                            [    ] Min A                                                   [    ] Min A                                                              [    ] Min  A          DISCHARGE PLAN:   [    ]  Good candidate for Intensive Rehabilitation/Hospital based                                             Will tolerate 3hrs Intensive Rehab Daily                                       [  x   ]  Short Term Rehab in Skilled Nursing Facility                           vs            [   x  ]  Home with Outpatient or VN services                                         [     ]  Possible Candidate for Intensive Hospital based Rehab                                        IMPRESSION: Rehab of debilitation / respiratory failure, s/p trach / peg / Asthma    PRECAUTIONS: [   ] Cardiac  [   ] Respiratory  [   ] Seizures [   ] Contact Isolation  [   ] Droplet Isolation  [   ] Other    Weight Bearing Status:     RECOMMENDATION:    Out of Bed to Chair     DVT/Decubiti Prophylaxis    REHAB PLAN:     [ x   ] Bedside P/T 3-5 times a week   [  x  ]   Bedside O/T  2-3 times a week             [  x  ] Speech Therapy               [    ]  No Rehab Therapy Indicated   Conditioning/ROM                                    ADL  Bed Mobility                                               Conditioning/ROM  Transfers                                                     Bed Mobility  Sitting /Standing Balance                         Transfers                                        Gait Training                                               Sitting/Standing Balance  Stair Training [   ]Applicable                    Home equipment Eval                                                                        Splinting  [   ] Only      GOALS:   ADL   [  x  ]   Independent                    Transfers  [  x  ] Independent                          Ambulation  [x    ] Independent     [  x   ] With device                            [    ]  CG                                                         [    ]  CG                                                                  [    ] CG                            [    ] Min A                                                   [    ] Min A                                                              [    ] Min  A          DISCHARGE PLAN:   [    ]  Good candidate for Intensive Rehabilitation/Hospital based                                             Will tolerate 3hrs Intensive Rehab Daily                                       [  x   ]  Short Term Rehab in Skilled Nursing Facility                           vs            [   x  ]  Home with Outpatient or VN services                                         [     ]  Possible Candidate for Intensive Hospital based Rehab

## 2023-07-14 NOTE — CONSULT NOTE ADULT - PROVIDER SPECIALTY LIST ADULT
Nephrology
Electrophysiology
Critical Care
Thoracic Surgery
Nutrition Support
Physiatry
Pain Medicine
Burn
Infectious Disease
Dermatology

## 2023-07-14 NOTE — PROGRESS NOTE ADULT - ASSESSMENT
42 year-old female with PMH of Asthma, HTN? presents with complaint of cough x3 days and SOB. In the ED was found to have Bilateral patchy groundglass nodularity with dominant confluent left lower lobe opacification with numerous cavitary lesions. Trach and PEG done for inability to wean off vent, currently off sedation. Her stay was also complicated by new onset Afib with RVR for which she was on amiodarone,  is still on diltiazem, and therapeutic anticoagulation.    #Acute hypoxemic respiratory failure / s/P Trach and PEG    #Severe cavitary CAP MRSA  #Small parapneumonic effusion SP Chest tube  #MRSA bacteremia  #Influenza B   #hx of Asthma   #Intubated 5/23 self-extubated 5/25, re-intubated 5/25; SBT failed, SP trach/G tube  - s/p Zyvox, meropenem and Tamifu, Vancomycin and flagyl  - s/p course of steroids now off  -  blood cultures  NGTD SINCE 5/28   - s/p MASTER 5/31: No evidence of valvular vegetations or intracardiac abscesses to support IE. Pulmonary hepatisation incidental finding. EF 65%, mild TR and pulm HTN  - ventilator management per pulm/cc,  - d/c olanzapine  - pt on soft diet  - monitor secretions   - uncuffed trach placement yesterday afternoon by CT surg  - decannulation today, possible S&S today  - LFT's trending down    # Hyperkalemia  - s/p Lokelma 10 mg x 1 dose today, f/up repeated BMP at 4  - s/p lokelma 5mg 1 dose overnight 7/5 - f/u 11am BMP  - s/p lokelma 10mg 7/7     #Maculopapular buttocks rash  -PCR skin scraping negative for HSV --> off  Acyclovir   -Wound care per burn, wound care RN following     #Microcytic Anemia likely  anemia of chronic disease   - s/p 6 units prbc so far last 6/5  - on Eliquis 5mg Q12H   - monitor hemoglobin     #New onset A-fib w/ RVR  - HR better controlled now  - s/p Amio drip, now on PO - amio d/c per EP rec - monitor for tachy  - Diltiazem 30mg q6 with holding parameters - if  or less or HR 60 or less  - on eliquis  - free T3 and free t4 normal    #Anxiety  - psychiatry recs appreciated  - zyprexa zydis 2.5mg q8 PRN      Handoff: monitor HR, monitor SpO2     42 year-old female with PMH of Asthma, HTN? presents with complaint of cough x3 days and SOB. In the ED was found to have Bilateral patchy groundglass nodularity with dominant confluent left lower lobe opacification with numerous cavitary lesions. Trach and PEG done for inability to wean off vent, currently off sedation. Her stay was also complicated by new onset Afib with RVR for which she was on amiodarone,  is still on diltiazem, and therapeutic anticoagulation.    #Acute hypoxemic respiratory failure / s/P Trach and PEG    #Severe cavitary CAP MRSA  #Small parapneumonic effusion SP Chest tube  #MRSA bacteremia  #Influenza B   #hx of Asthma   #Intubated 5/23 self-extubated 5/25, re-intubated 5/25; SBT failed, SP trach/G tube  - s/p Zyvox, meropenem and Tamifu, Vancomycin and flagyl  - s/p course of steroids now off  -  blood cultures  NGTD SINCE 5/28   - s/p MASTER 5/31: No evidence of valvular vegetations or intracardiac abscesses to support IE. Pulmonary hepatisation incidental finding. EF 65%, mild TR and pulm HTN  - ventilator management per pulm/cc,  - d/c olanzapine  - pt on soft diet  - monitor secretions   - uncuffed trach placement yesterday afternoon by CT surg  - decannulation today, possible S&S FEES today    #Transaminitis - resolved  - LFT's trending down, now normal  - RUQ US - cholelithiasis    # Hyperkalemia  - s/p Lokelma 10 mg x 1 dose today, f/up repeated BMP at 4  - s/p lokelma 5mg 1 dose overnight 7/5 - f/u 11am BMP  - s/p lokelma 10mg 7/7     #Maculopapular buttocks rash  -PCR skin scraping negative for HSV --> off  Acyclovir   -Wound care per burn, wound care RN following     #Microcytic Anemia likely  anemia of chronic disease   - s/p 6 units prbc so far last 6/5  - on Eliquis 5mg Q12H   - monitor hemoglobin     #New onset A-fib w/ RVR  - HR better controlled now  - s/p Amio drip, now on PO - amio d/c per EP rec - monitor for tachy  - Diltiazem 30mg q6 with holding parameters - if  or less or HR 60 or less  - on eliquis  - free T3 and free t4 normal    #Anxiety  - psychiatry recs appreciated  - zyprexa zydis 2.5mg q8 PRN - discontinued  - QTC is 540 7/14        Handoff: monitor HR, monitor SpO2

## 2023-07-14 NOTE — CONSULT NOTE ADULT - CONSULT REQUESTED BY NAME
Dr Sanchez
ICU
elvia fernández
ED
Primary team
Chelle Knight
Dr Mayer
Dr. Sanchez M
Marcus Sanchez
medicine

## 2023-07-14 NOTE — CONSULT NOTE ADULT - CONSULT REQUESTED DATE/TIME
05-Jul-2023 14:09
24-May-2023 08:47
28-Jun-2023 14:50
07-Jun-2023 18:40
23-May-2023 09:43
04-Jun-2023 12:05
01-Jun-2023 11:01
14-Jul-2023 18:05
30-Jun-2023 13:28
24-May-2023 18:33

## 2023-07-14 NOTE — PROGRESS NOTE ADULT - ASSESSMENT
IMPRESSION:    Acute hypoxemic respiratory failure sp trach on RA  Severe CAP secondary to MRSA sp abx  Small parapneumonic effusion SP Chest tube and removal  MRSA bacteremia resolved   MASTER negative   Anemia sp transfusion   New onset A FIB rate controlled   LANCE improved   HO asthma   Influenza B     PLAN:    CNS: Avoid CNS depressant    HEENT: Oral care. Trach care.     PULMONARY:  HOB @ 45 degrees.  Aspiration precautions. Keep SaO2 92 TO 96%.   decannulate, dc scopolamine    CARDIOVASCULAR:    Avoid overload, Continue rate control.     GI: GI prophylaxis. po feeding    RENAL:  Follow up lytes.  Correct as needed.     INFECTIOUS DISEASE: ABX Per ID.      MATOLOGICAL:  DVT prophylaxis.  On Eliquis     ENDOCRINE:  Follow up FS.  Insulin protocol if needed.      Prognosis is poor overall .     PT/OT IMPRESSION:    Acute hypoxemic respiratory failure sp trach on RA  Severe CAP secondary to MRSA sp abx  Small parapneumonic effusion SP Chest tube and removal  MRSA bacteremia resolved   MASTER negative   Anemia sp transfusion   New onset A FIB rate controlled   LANCE improved   HO asthma   Influenza B     PLAN:    CNS: Avoid CNS depressant    HEENT: Oral care. Trach care.     PULMONARY:  HOB @ 45 degrees.  Aspiration precautions. Keep SaO2 92 TO 96%.   decannulate, dc scopolamine    CARDIOVASCULAR:    Avoid overload, Continue rate control.     GI: GI prophylaxis. po feeding    RENAL:  Follow up lytes.  Correct as needed.     INFECTIOUS DISEASE: ABX Per ID.      HEMATOLOGICAL:  DVT prophylaxis.  On Eliquis     ENDOCRINE:  Follow up FS.  Insulin protocol if needed.      Prognosis is poor overall .     PT/OT

## 2023-07-14 NOTE — SWALLOW FEES ASSESSMENT ADULT - SLP PERTINENT HISTORY OF CURRENT PROBLEM
Pt is a 43 y/o F w/ PMHx: asthma, HTN?, p/w c/o cough x3 days and SOB. Pt w/ prolonged hospital course, AHRF requiring intubation, acute Influenza B PNA, cavitary MRSA PNA, bacteremia; PNA further c/b loculated parapneumonic effusion s/p chest tube placement/removal. Stay further c/g afib w/ RVR, LANCE. S/p trach and PEG 6/14. CTH (-).
41 yo female admitted 5/23. PMH of Asthma HTN? presented with cough for 3 days prior to admission and SOB.  Prolonged hospital course c/w acute influenza B viral pneumonia superimposed on cavitary MRSA pneumonia, acute hypoxic respiratory failure requiring intubation (5/23), self extubated (5/25) and reintubated (5/25). Pneumonia c/b loculated parapneumonic effusion s/p chest tube placement/removal, s/p trach and peg placement. Stay also c/b MRSA bactermia and new onset AFIB with RVR, and new LANCE (improved). Pt tolerating trach collar (7/7-7/9), self-decannulated (7/10) with no respiratory distress, CT surgery replaced with cuffed trach (7/10), plan to f/u with CT surgery to switch uncuffed trach and to cap today, possible decannulation in the am.

## 2023-07-14 NOTE — CONSULT NOTE ADULT - CONSULT REASON
ARF
Rash
Cavitary pneumonia
LANCE
Sacral ulcer
debilitation
New Onset AF
left PNA
methadone tapering
problem w tube feeds, new diarrhea

## 2023-07-14 NOTE — CONSULT NOTE ADULT - SUBJECTIVE AND OBJECTIVE BOX
HPI:  42 year-old female with PMH of Asthma, HTN? presents with complaint of cough x3 days and SOB. During my encounter pt with dyspnea and increased work of breathing and chest pain, unable to properly provide accurate history. She states that her cough has been progressively worsening over the past 3 days, endorses hemoptysis since yesterday. She states she had a friend who came over and stayed with for more than a week who was sick recently, but she does not know if she recently travelled out of [facility] or not. She also admits that her children has been sick with Sore throat and fever for past 3 days. She admits to fever, chills, sever Chest pain which has gotten worse since she came to the ED. She took 1x Tamiflu yesterday. She otherwise denies diarrhea, constipation, urinary symptoms. She is not vaccinated for Influenza or COVID-19.     In ED  /87, , RR 28, T 100.8 F, 98% on 15 L NRB  Labs significant for wbc 16.7K with Neutrophilic Predominance and L shift, Hgb 11.4, MCV 61.6, Cr 2.0, BUN 23, Alk Phos 125/AST 62/ALT 27, Lac 4.9> 3.9  RVP + Influenza B      CT Angio Chest PE Protocol w/ IV Cont   1. Bilateral patchy groundglass nodularity with dominant confluent left lower lobe opacification with numerous cavitary lesions. Additional contralateral right lower lobe cavitary 1 cm nodule. Findings compatible with cavitary pneumonia in the appropriate clinical setting; differential diagnosis includes tuberculosis.  2. Confluent ill-defined left hilar and mediastinal adenopathy, likely reactive, without dominant lymph node delineated.  3. No evidence of pulmonary embolism.    s/p 1x Rocephin, Meropenem, 3 L LR in ED    Pt admitted to SDU for further managements, during my encounter pt is very agitated, in acute distress. Pt received appropriate pain control with IV Morphine, s/p Xanax 1 mg 1x for agitation and anxiety, despite pain control and adequate fluid resuscitation pt remained tachycardic, tachypneic. STAT EKG reviewed with Cardiology team on call After discussion with Cardiology team, Pt received 1x Adenosine 6 mg IV push with no change. Case discussed with Critical Care team, decision was made for Intubation and Pt upgraded to MICU.     #Acute hypoxemic respiratory failure / s/P Trach and PEG    #Severe cavitary CAP MRSA  #Small parapneumonic effusion SP Chest tube  #MRSA bacteremia  #Influenza B   #hx of Asthma   #Intubated 5/23 self-extubated 5/25, re-intubated 5/25; SBT failed, SP trach/G tube  - s/p Zyvox, meropenem and Tamifu, Vancomycin and flagyl  - s/p course of steroids now off  -  blood cultures  NGTD SINCE 5/28   - s/p MASTER 5/31: No evidence of valvular vegetations or intracardiac abscesses to support IE. Pulmonary hepatisation incidental finding. EF 65%, mild TR and pulm HTN  - ventilator management per pulm/cc,  - d/c olanzapine  - pt on soft diet  - monitor secretions   - uncuffed trach placement yesterday afternoon by CT surg      PAST MEDICAL & SURGICAL HISTORY:      Hospital Course:    TODAY'S SUBJECTIVE & REVIEW OF SYMPTOMS:     Constitutional WNL   Cardio WNL   Resp sob    GI WNL  Heme WNL  Endo WNL  Skin WNL  MSK WNL  Neuro WNL  Cognitive WNL  Psych WNL      MEDICATIONS  (STANDING):  acetaminophen     Tablet .. 500 milliGRAM(s) Oral every 6 hours  apixaban 5 milliGRAM(s) Oral every 12 hours  ascorbic acid 500 milliGRAM(s) Oral daily  chlorhexidine 2% Cloths 1 Application(s) Topical <User Schedule>  diltiazem    milliGRAM(s) Oral daily  magnesium sulfate  IVPB 2 Gram(s) IV Intermittent once  metoprolol tartrate 12.5 milliGRAM(s) Oral two times a day  nystatin Cream 1 Application(s) Topical two times a day  petrolatum white Ointment 1 Application(s) Topical two times a day  senna 2 Tablet(s) Oral at bedtime  vitamin A &amp; D Ointment 1 Application(s) Topical daily  zinc sulfate 220 milliGRAM(s) Oral daily    MEDICATIONS  (PRN):  LORazepam     Tablet 0.5 milliGRAM(s) Oral at bedtime PRN Agitation  oxyCODONE    IR 5 milliGRAM(s) Oral every 8 hours PRN Severe Pain (7 - 10)      FAMILY HISTORY:      Allergies    aspirin (Short breath; Anaphylaxis)  Bananas (Unknown)    Intolerances        SOCIAL HISTORY:    [  ] Etoh  [  ] Smoking  [  ] Substance abuse     Home Environment:  [   ] Home Alone  [ x  ] Lives with Family  [   ] Home Health Aid    Dwelling:  [   ] Apartment  [ x  ] Private House  [   ] Adult Home  [   ] Skilled Nursing Facility      [   ] Short Term  [   ] Long Term  [ x  ] Stairs       Elevator [   ]    FUNCTIONAL STATUS PTA: (Check all that apply)  Ambulation: [  x  ]Independent    [   ] Dependent     [   ] Non-Ambulatory  Assistive Device: [   ] SA Cane  [   ]  Q Cane  [   ] Walker  [   ]  Wheelchair  ADL : [ x  ] Independent  [    ]  Dependent       Vital Signs Last 24 Hrs  T(C): 36.8 (14 Jul 2023 12:28), Max: 36.8 (14 Jul 2023 04:30)  T(F): 98.2 (14 Jul 2023 12:28), Max: 98.2 (14 Jul 2023 04:30)  HR: 94 (14 Jul 2023 12:28) (94 - 100)  BP: 121/68 (14 Jul 2023 12:28) (119/77 - 132/81)  BP(mean): --  RR: 18 (14 Jul 2023 12:28) (18 - 18)  SpO2: 98% (14 Jul 2023 12:28) (96% - 100%)    Parameters below as of 14 Jul 2023 04:30  Patient On (Oxygen Delivery Method): room air          PHYSICAL EXAM: Awake & Alert  GENERAL: NAD  HEAD:  Normocephalic  CHEST/LUNG: Clear   HEART: S1S2+  ABDOMEN: Soft, Nontender, + peg   EXTREMITIES:  no calf tenderness    NERVOUS SYSTEM:  Cranial Nerves 2-12 intact [   ] Abnormal  [   ]  ROM: WFL all extremities [ x  ]  Abnormal [   ]  Motor Strength: WFL all extremities  [ x  ]  Abnormal [   ]  Sensation: intact to light touch [ x  ] Abnormal [   ]    FUNCTIONAL STATUS:  Bed Mobility: Independent [   ]  Supervision [  x ]  Needs Assistance [   ]  N/A [   ]  Transfers: Independent [   ]  Supervision [   ]  Needs Assistance [x   ]  N/A [   ]   Ambulation: Independent [   ]  Supervision [   ]  Needs Assistance [x   ]  N/A [   ]  ADL: Independent [   ] Requires Assistance [   ] N/A [   ]      LABS:                        10.0   9.89  )-----------( 483      ( 14 Jul 2023 06:51 )             32.6     07-14    135  |  99  |  12  ----------------------------<  89  4.4   |  21  |  0.9    Ca    10.5      14 Jul 2023 06:51  Mg     1.8     07-14    TPro  7.5  /  Alb  3.6  /  TBili  0.3  /  DBili  x   /  AST  26  /  ALT  24  /  AlkPhos  315<H>  07-14      Urinalysis Basic - ( 14 Jul 2023 06:51 )    Color: x / Appearance: x / SG: x / pH: x  Gluc: 89 mg/dL / Ketone: x  / Bili: x / Urobili: x   Blood: x / Protein: x / Nitrite: x   Leuk Esterase: x / RBC: x / WBC x   Sq Epi: x / Non Sq Epi: x / Bacteria: x        RADIOLOGY & ADDITIONAL STUDIES:

## 2023-07-14 NOTE — SWALLOW FEES ASSESSMENT ADULT - SPECIFY REASON(S)
dysphagia f/u, s/p trach change to cuffless, capped overnight and decannulated this am
FEES to further assess cherelle-pharyngeal swallow integrity

## 2023-07-14 NOTE — SWALLOW FEES ASSESSMENT ADULT - DIAGNOSTIC IMPRESSIONS
pharyngeal swallow is WFL for thin, puree and regular solids w/o penetration or aspiration, overall improved swallow function
Severe pharyngeal dysphagia for thin liquids and a functional pharyngeal swallow for mildly thick, puree and easy to chew solids.

## 2023-07-14 NOTE — PROGRESS NOTE ADULT - SUBJECTIVE AND OBJECTIVE BOX
Over Night Events: events noted, on RA, BH noted, afebrile  PHYSICAL EXAM    ICU Vital Signs Last 24 Hrs  T(C): 36.8 (14 Jul 2023 04:30), Max: 36.8 (13 Jul 2023 12:26)  T(F): 98.2 (14 Jul 2023 04:30), Max: 98.3 (13 Jul 2023 12:26)  HR: 100 (14 Jul 2023 04:30) (97 - 103)  BP: 121/72 (14 Jul 2023 04:30) (119/77 - 137/73)  RR: 18 (14 Jul 2023 04:30) (18 - 18)  SpO2: 96% (14 Jul 2023 04:30) (96% - 100%)    O2 Parameters below as of 14 Jul 2023 04:30  Patient On (Oxygen Delivery Method): room air            General: comfortable  HEENT: trach        Lungs: Bilateral BS  Cardiovascular: Regular   Abdomen: Soft, Positive BS  Extremities: No clubbing   Skin: Warm  Neurological: Non focal         LABS:                          8.9    10.01 )-----------( 538      ( 13 Jul 2023 08:45 )             29.8                                               07-13    132<L>  |  97<L>  |  13  ----------------------------<  74  4.4   |  25  |  0.8    Ca    9.8      13 Jul 2023 08:45  Mg     1.9     07-13    TPro  7.4  /  Alb  3.4<L>  /  TBili  0.2  /  DBili  <0.2  /  AST  22  /  ALT  27  /  AlkPhos  363<H>  07-13                                             Urinalysis Basic - ( 13 Jul 2023 08:45 )    Color: x / Appearance: x / SG: x / pH: x  Gluc: 74 mg/dL / Ketone: x  / Bili: x / Urobili: x   Blood: x / Protein: x / Nitrite: x   Leuk Esterase: x / RBC: x / WBC x   Sq Epi: x / Non Sq Epi: x / Bacteria: x                                                  LIVER FUNCTIONS - ( 13 Jul 2023 15:09 )  Alb: 3.4 g/dL / Pro: 7.4 g/dL / ALK PHOS: 363 U/L / ALT: 27 U/L / AST: 22 U/L / GGT: x                                                                                                                                       MEDICATIONS  (STANDING):  apixaban 5 milliGRAM(s) Oral every 12 hours  chlorhexidine 2% Cloths 1 Application(s) Topical <User Schedule>  diltiazem    Tablet 30 milliGRAM(s) Oral every 6 hours  famotidine    Tablet 20 milliGRAM(s) Oral daily  ferrous    sulfate Liquid 300 milliGRAM(s) Enteral Tube daily  nystatin Cream 1 Application(s) Topical two times a day  petrolatum white Ointment 1 Application(s) Topical two times a day  scopolamine 1 mG/72 Hr(s) Patch 1 Patch Transdermal every 72 hours  senna 2 Tablet(s) Oral at bedtime  vitamin A &amp; D Ointment 1 Application(s) Topical daily    MEDICATIONS  (PRN):  OLANZapine Disintegrating Tablet 2.5 milliGRAM(s) Oral every 8 hours PRN for aggitation and anxiety  oxycodone    5 mG/acetaminophen 325 mG 1 Tablet(s) Oral every 6 hours PRN Severe Pain (7 - 10)

## 2023-07-14 NOTE — SWALLOW FEES ASSESSMENT ADULT - SLP GENERAL OBSERVATIONS
Pt received in bed awake and alert on room air, stoma covered w/ dressing, pt w/o audible air escape from stoma site +strong baseline cough
PT received OOB to chair, PMSV not placed correctly, SLP educated RN on placement, use and care of valve

## 2023-07-14 NOTE — SWALLOW FEES ASSESSMENT ADULT - RECOMMENDED FEEDING/EATING TECHNIQUES
oral hygiene/position upright (90 degrees)
oral hygiene/position upright (90 degrees)/small sips/bites

## 2023-07-14 NOTE — SWALLOW FEES ASSESSMENT ADULT - PHARYNGEAL PHASE COMMENTS
pharyngeal swallow is WFL for thin, puree and regular solids w/o penetration or aspiration, overall improved swallow function

## 2023-07-15 LAB
ALBUMIN SERPL ELPH-MCNC: 3.9 G/DL — SIGNIFICANT CHANGE UP (ref 3.5–5.2)
ALP SERPL-CCNC: 326 U/L — HIGH (ref 30–115)
ALT FLD-CCNC: 26 U/L — SIGNIFICANT CHANGE UP (ref 0–41)
ANION GAP SERPL CALC-SCNC: 12 MMOL/L — SIGNIFICANT CHANGE UP (ref 7–14)
AST SERPL-CCNC: 24 U/L — SIGNIFICANT CHANGE UP (ref 0–41)
BILIRUB SERPL-MCNC: 0.2 MG/DL — SIGNIFICANT CHANGE UP (ref 0.2–1.2)
BLD GP AB SCN SERPL QL: SIGNIFICANT CHANGE UP
BUN SERPL-MCNC: 13 MG/DL — SIGNIFICANT CHANGE UP (ref 10–20)
CALCIUM SERPL-MCNC: 10.2 MG/DL — SIGNIFICANT CHANGE UP (ref 8.4–10.5)
CHLORIDE SERPL-SCNC: 100 MMOL/L — SIGNIFICANT CHANGE UP (ref 98–110)
CO2 SERPL-SCNC: 25 MMOL/L — SIGNIFICANT CHANGE UP (ref 17–32)
CREAT SERPL-MCNC: 0.8 MG/DL — SIGNIFICANT CHANGE UP (ref 0.7–1.5)
EGFR: 94 ML/MIN/1.73M2 — SIGNIFICANT CHANGE UP
GLUCOSE BLDC GLUCOMTR-MCNC: 111 MG/DL — HIGH (ref 70–99)
GLUCOSE BLDC GLUCOMTR-MCNC: 122 MG/DL — HIGH (ref 70–99)
GLUCOSE BLDC GLUCOMTR-MCNC: 87 MG/DL — SIGNIFICANT CHANGE UP (ref 70–99)
GLUCOSE SERPL-MCNC: 94 MG/DL — SIGNIFICANT CHANGE UP (ref 70–99)
HCT VFR BLD CALC: 31.4 % — LOW (ref 37–47)
HGB BLD-MCNC: 9.7 G/DL — LOW (ref 12–16)
MAGNESIUM SERPL-MCNC: 1.8 MG/DL — SIGNIFICANT CHANGE UP (ref 1.8–2.4)
MCHC RBC-ENTMCNC: 25 PG — LOW (ref 27–31)
MCHC RBC-ENTMCNC: 30.9 G/DL — LOW (ref 32–37)
MCV RBC AUTO: 80.9 FL — LOW (ref 81–99)
NRBC # BLD: 0 /100 WBCS — SIGNIFICANT CHANGE UP (ref 0–0)
PLATELET # BLD AUTO: 577 K/UL — HIGH (ref 130–400)
PMV BLD: 9 FL — SIGNIFICANT CHANGE UP (ref 7.4–10.4)
POTASSIUM SERPL-MCNC: 4 MMOL/L — SIGNIFICANT CHANGE UP (ref 3.5–5)
POTASSIUM SERPL-SCNC: 4 MMOL/L — SIGNIFICANT CHANGE UP (ref 3.5–5)
PROT SERPL-MCNC: 8 G/DL — SIGNIFICANT CHANGE UP (ref 6–8)
RBC # BLD: 3.88 M/UL — LOW (ref 4.2–5.4)
RBC # FLD: 26.5 % — HIGH (ref 11.5–14.5)
SODIUM SERPL-SCNC: 137 MMOL/L — SIGNIFICANT CHANGE UP (ref 135–146)
WBC # BLD: 10.25 K/UL — SIGNIFICANT CHANGE UP (ref 4.8–10.8)
WBC # FLD AUTO: 10.25 K/UL — SIGNIFICANT CHANGE UP (ref 4.8–10.8)

## 2023-07-15 PROCEDURE — 99233 SBSQ HOSP IP/OBS HIGH 50: CPT

## 2023-07-15 PROCEDURE — 99232 SBSQ HOSP IP/OBS MODERATE 35: CPT

## 2023-07-15 RX ORDER — DIPHENHYDRAMINE HCL 50 MG
25 CAPSULE ORAL ONCE
Refills: 0 | Status: DISCONTINUED | OUTPATIENT
Start: 2023-07-15 | End: 2023-07-17

## 2023-07-15 RX ORDER — OLANZAPINE 15 MG/1
2.5 TABLET, FILM COATED ORAL ONCE
Refills: 0 | Status: COMPLETED | OUTPATIENT
Start: 2023-07-15 | End: 2023-07-15

## 2023-07-15 RX ORDER — DILTIAZEM HCL 120 MG
1 CAPSULE, EXT RELEASE 24 HR ORAL
Qty: 0 | Refills: 0 | DISCHARGE
Start: 2023-07-15

## 2023-07-15 RX ORDER — APIXABAN 2.5 MG/1
1 TABLET, FILM COATED ORAL
Qty: 0 | Refills: 0 | DISCHARGE
Start: 2023-07-15

## 2023-07-15 RX ADMIN — OXYCODONE HYDROCHLORIDE 5 MILLIGRAM(S): 5 TABLET ORAL at 05:50

## 2023-07-15 RX ADMIN — OXYCODONE HYDROCHLORIDE 5 MILLIGRAM(S): 5 TABLET ORAL at 22:45

## 2023-07-15 RX ADMIN — OXYCODONE HYDROCHLORIDE 5 MILLIGRAM(S): 5 TABLET ORAL at 05:20

## 2023-07-15 RX ADMIN — Medication 500 MILLIGRAM(S): at 13:17

## 2023-07-15 RX ADMIN — Medication 500 MILLIGRAM(S): at 17:21

## 2023-07-15 RX ADMIN — OXYCODONE HYDROCHLORIDE 5 MILLIGRAM(S): 5 TABLET ORAL at 14:07

## 2023-07-15 RX ADMIN — CHLORHEXIDINE GLUCONATE 1 APPLICATION(S): 213 SOLUTION TOPICAL at 05:18

## 2023-07-15 RX ADMIN — NYSTATIN CREAM 1 APPLICATION(S): 100000 CREAM TOPICAL at 17:23

## 2023-07-15 RX ADMIN — Medication 12.5 MILLIGRAM(S): at 05:17

## 2023-07-15 RX ADMIN — OXYCODONE HYDROCHLORIDE 5 MILLIGRAM(S): 5 TABLET ORAL at 14:37

## 2023-07-15 RX ADMIN — Medication 0.5 MILLIGRAM(S): at 17:21

## 2023-07-15 RX ADMIN — Medication 12.5 MILLIGRAM(S): at 17:22

## 2023-07-15 RX ADMIN — NYSTATIN CREAM 1 APPLICATION(S): 100000 CREAM TOPICAL at 05:28

## 2023-07-15 RX ADMIN — OXYCODONE HYDROCHLORIDE 5 MILLIGRAM(S): 5 TABLET ORAL at 22:12

## 2023-07-15 RX ADMIN — Medication 120 MILLIGRAM(S): at 05:17

## 2023-07-15 RX ADMIN — ZINC SULFATE TAB 220 MG (50 MG ZINC EQUIVALENT) 220 MILLIGRAM(S): 220 (50 ZN) TAB at 12:47

## 2023-07-15 RX ADMIN — Medication 500 MILLIGRAM(S): at 12:48

## 2023-07-15 RX ADMIN — SCOPALAMINE 1 PATCH: 1 PATCH, EXTENDED RELEASE TRANSDERMAL at 06:39

## 2023-07-15 RX ADMIN — Medication 500 MILLIGRAM(S): at 05:51

## 2023-07-15 RX ADMIN — SENNA PLUS 2 TABLET(S): 8.6 TABLET ORAL at 21:53

## 2023-07-15 RX ADMIN — APIXABAN 5 MILLIGRAM(S): 2.5 TABLET, FILM COATED ORAL at 05:17

## 2023-07-15 RX ADMIN — Medication 1 APPLICATION(S): at 05:28

## 2023-07-15 RX ADMIN — Medication 500 MILLIGRAM(S): at 12:47

## 2023-07-15 RX ADMIN — Medication 500 MILLIGRAM(S): at 05:21

## 2023-07-15 NOTE — PROGRESS NOTE ADULT - ATTENDING COMMENTS
41 y/o female with PMH of Asthma, HTN c/w acute Influenza B viral pneumonia superimposed on cavitary MRSA pneumonia, acute hypoxic respiratory failure requiring intubation. Patient was initially admitted to ICU level of care, completed the course of zyvox and tamiflu.   Pneumonia was  complicated by loculated parapneumonic effusion s/p  chest tube placement/removal. s/p Tracheostomy and peg  placement   Patient's stay was complicated by MRSA bacteremia  currently on IV vancomycin and metronidazole, cultures negative to date. MASTER was negative for vegetations.   Her stay was also complicated by new onset Afib with RVR for which she is on amiodarone, diltiazem, and therapeutic anticoagulation.  Patient also had LANCE that improved with no need for RRT. Otherwise patient received a total of 6 p RBC for acute blood loss anemia ,currently hemoglobin stable. Patient transferred to step down unit and to Vent Unit with the following course of therapy:     Acute hypoxemic respiratory failure / s/p Trach and PEG    Severe cavitary CAP MRSA  Small parapneumonic effusion SP Chest tube- removed   MRSA bacteremia  Influenza B   hx of Asthma   Intubated 5/23 self-extubated 5/25, re-intubated 5/25; SBT failed, SP trach/G tube  - s/p Zyvox, meropenem and Tamifu, now on Vancomycin and flagyl, anticipated end date 7/9  - s/p course of steroids now off  -  blood cultures  NGTD SINCE 5/28   - s/p MASTER 5/31: No evidence of valvular vegetations or intracardiac abscesses to support IE. Pulmonary hepatisation incidental finding. EF 65%, mild TR and pulm HTN  - tapered off methadone( given for pain), d/c Zyprexa  -7/6/23: tapered  trach to collar, saturating 98%  -7/7/23 - 7/9/23 : patient tolerating well trach to collar.   -7/10/23- patient pulled the tracheostomy out, replaced by CT surgery with cuffed trach- stable pulse ox  - 7/12/23- f/up with CT Surgery to switch to uncuffed trach and to cap it today, possible decannulation in am     7/13: Tolerating capped uncuffed trach. Plan for decannulation at bedside by CT surgery today evening.   7/14: s/p decannulation. FEES done. regular diet. f/u GI to take out PEG. Pending Auth for STR.   7/15: requested CT surgery to d/c PEG. They said they will do today. examined PEG site as patient is having some soreness there. minimal serosanguinous discharge. Does NOT appear infected. REquested bedside RN to scrub the area clean. waiting for CT surgery.   No significant hernia on exam. small subcutanoeous firm nodules (maybe hematoma from lovenox shots).   monitor.   Worked well with PT. wants to go home. PT will train her sister to take care while ambulating by hopefully tomorrow. likely d/c in 24-48 hours.     # Transaminitis-    > 675 > 455. Improving. Trend.  liver US- asymptomatic gallstone    # s/p peg placement-   -07/06/2023- patient was evaluated by speech tx. , post  video swallow on 7/7/23- started on diet, tolerating diet well  - d/c peg feedings      # Microcytic Anemia likely  anemia of chronic disease   - s/p 6 units prbc so far last 6/5  - Restarted AC with lovenox 6/22 given Hb stable last few days, switched to Eliquis 5mg Q12H   -monitor hemoglobin   - daily iron tx, daily MVI tx.     New onset A-fib w/ RVR- currently remains in NSR with RBBB  - HR better controlled now  - s/p Amio drip, was on PO amiodarone, prolonged QT- d/c Amiodarone on 7/7/23 as per EP  - due to hypotension decrease the dose of Cardizem po  30mg via peg every 6 hrs   - therapeutic Lovenox,  transitioned to Eliquis 5mg Q12H    - EP specialist on board    Anxiety- continue low dose Klonopin 0.5 mg via peg every 8 hours prn. tx.  - Patient feels very stressed out, crying at times- consulted behavioral therapist , f/up recommendations     Physical deconditioning - daily PT tx. as tolerated       GI/DVT proph.    #Progress Note Handoff:   CT surgery to remove PEG. likely d/c home w/ PT tomorrow .
IMPRESSION:    Acute hypoxemic respiratory failure sp trach   Severe cavitary CAP MRSA  Small parapneumonic effusion SP Chest tube  MRSA bacteremia resolved   MASTER negative   Anemia sp transfusion   New onset A FIB rate controlled   LANCE non oliguric improving  Hypernatremia Improving  HO asthma   Influenza B     Plan as outlined above
Ms. Chambers was seen and examined at bedside today, the patient remained stable on her ventilator settings through her tracheostomy.  Patient should be trialed on pressure support ventilation as tolerated, and is stable for transfer to the ventilator unit.  Continue with antibiotics as per ID.  I agree with the fellow note, with the exceptions listed in my attestation above.  The remainder of impression and plan per fellow note.
43 y/o female with PMH of Asthma, HTN c/w acute Influenza B viral pneumonia superimposed on cavitary MRSA pneumonia, acute hypoxic respiratory failure requiring intubation. Patient was initially admitted to ICU level of care, completed the course of zyvox and tamiflu.   Pneumonia was  complicated by loculated parapneumonic effusion s/p  chest tube placement/removal. s/p Tracheostomy and peg  placement   Patient's stay was complicated by MRSA bacteremia  currently on IV vancomycin and metronidazole, cultures negative to date. MASTER was negative for vegetations.   Her stay was also complicated by new onset Afib with RVR for which she is on amiodarone, diltiazem, and therapeutic anticoagulation.  Patient also had LANCE that improved with no need for RRT. Otherwise patient received a total of 6 p RBC for acute blood loss anemia ,currently hemoglobin stable. Patient transferred to step down unit and to Vent Unit with the following course of therapy:     Acute hypoxemic respiratory failure / s/p Trach and PEG    Severe cavitary CAP MRSA  Small parapneumonic effusion SP Chest tube- removed   MRSA bacteremia  Influenza B   hx of Asthma   Intubated 5/23 self-extubated 5/25, re-intubated 5/25; SBT failed, SP trach/G tube  - s/p Zyvox, meropenem and Tamifu, now on Vancomycin and flagyl, anticipated end date 7/9  - s/p course of steroids now off  -  blood cultures  NGTD SINCE 5/28   - s/p MASTER 5/31: No evidence of valvular vegetations or intracardiac abscesses to support IE. Pulmonary hepatisation incidental finding. EF 65%, mild TR and pulm HTN  - tapered off methadone( given for pain), d/c Zyprexa  -7/6/23: tapered  trach to collar, saturating 98%  -7/7/23 - 7/9/23 : patient tolerating well trach to collar.   -7/10/23- patient pulled the tracheostomy out, replaced by CT surgery with cuffed trach- stable pulse ox  - 7/12/23- f/up with CT Surgery to switch to uncuffed trach and to cap it today, possible decannulation in am       # Transaminitis-    > 675 > 455. Improving. Trend.  obtain liver US    # s/p peg placement-   -07/06/2023- patient was evaluated by speech tx. , post  video swallow on 7/7/23- started on diet, tolerating diet well  - d/c peg feedings      # Microcytic Anemia likely  anemia of chronic disease   - s/p 6 units prbc so far last 6/5  - Restarted AC with lovenox 6/22 given Hb stable last few days, switched to Eliquis 5mg Q12H   -monitor hemoglobin   - daily iron tx, daily MVI tx.     New onset A-fib w/ RVR- currently remains in NSR with RBBB  - HR better controlled now  - s/p Amio drip, was on PO amiodarone, prolonged QT- d/c Amiodarone on 7/7/23 as per EP  - due to hypotension decrease the dose of Cardizem po  30mg via peg every 6 hrs   - therapeutic Lovenox,  transitioned to Eliquis 5mg Q12H    - EP specialist on board    Anxiety- continue low dose Klonopin 0.5 mg via peg every 8 hours prn. tx.  - Patient feels very stressed out, crying at times- consulted behavioral therapist , f/up recommendations     Physical deconditioning - daily PT tx. as tolerated       GI/DVT proph.    #Progress Note Handoff: f/up  CT surgery to change trach to uncuffed and cap it, possible decannulation tomorrow , acute transaminitis- obtain liver US, LFT's in am,  continue Klonopin prn. for anxiety/agitation events, consulted Behavioral therapist to evaluate pt. for unc. anxiety/stress, oob to chair activity  Family discussion: medical plan of tx. d/w pt. by bedside Disposition: STR once medically stable
Ms. Chambers was seen and examined at bedside today, the patient has been significantly improved from her initial presentation and is stable on her mechanical ventilation through her tracheostomy.  Patient is stable for transfer to the ventilator unit.  I agree with the fellow note, with the exceptions listed in my attestation above.  The remainder of impression and plan per fellow note.
Ms. Chambers was seen and examined at bedside today, the patient remained stable on her ventilator and synchronous.  Continue pressure support trials as tolerated.  Patient is clinically stable for transfer to the ventilator unit.  I agree with the fellow note, with the exceptions listed in my attestation above.  The remainder of impression and plan per fellow note.
Patient is a 43 y/o female with PMH of Asthma, HTN c/w acute Influenza B viral pneumonia superimposed on cavitary MRSA pneumonia, acute hypoxic respiratory failure requiring intubation. Patient was initially admitted to ICU level of care, completed the course of zyvox and tamiflu.   Pneumonia was  complicated by loculated parapneumonic effusion s/p  chest tube placement/removal. s/p Tracheostomy and peg  placement   Patient's stay was complicated by MRSA bacteremia  currently on IV vancomycin and metronidazole, cultures negative to date. MASTER was negative for vegetations.   Her stay was also complicated by new onset Afib with RVR for which she is on amiodarone, diltiazem, and therapeutic anticoagulation.  Patient also had LANCE that improved with no need for RRT. Otherwise patient received a total of 6 p RBC for acute blood loss anemia ,currently hemoglobin stable. Patient transferred to step down unit and to Vent Unit with the following course of therapy:     Acute hypoxemic respiratory failure / s/p Trach and PEG    Severe cavitary CAP MRSA  Small parapneumonic effusion SP Chest tube- removed   MRSA bacteremia  Influenza B   hx of Asthma   Intubated 5/23 self-extubated 5/25, re-intubated 5/25; SBT failed, SP trach/G tube  - s/p Zyvox, meropenem and Tamifu, now on Vancomycin and flagyl, anticipated end date 7/9  - s/p course of steroids now off  -  blood cultures  NGTD SINCE 5/28   - s/p MASTER 5/31: No evidence of valvular vegetations or intracardiac abscesses to support IE. Pulmonary hepatisation incidental finding. EF 65%, mild TR and pulm HTN  - tapered off methadone( given for pain), d/c Zyprexa  -7/6/23: tapered  trach to collar, saturating 98%  -7/7/23 - 7/9/23 : patient tolerating well trach to collar.   -7/10/23- patient pulled the tracheostomy out, replaced by CT surgery with cuffed trach- stable pulse ox  - 7/11/23- f/up with CT Surgery to switch to uncuffed trach and to cup it today, possible decannulation in am       # s/p peg placement-   -07/06/2023- patient was evaluated by speech tx. , post  video swallow on 7/7/23- started on diet, tolerating diet well  - d/c peg feedings    # Transaminitis- obtain liver US , f/up results, LFT's in am     # Microcytic Anemia likely  anemia of chronic disease   - s/p 6 units prbc so far last 6/5  - Restarted AC with lovenox 6/22 given Hb stable last few days, switched to Eliquis 5mg Q12H   -monitor hemoglobin   - daily iron tx, daily MVI tx.     New onset A-fib w/ RVR- currently remains in NSR with RBBB  - HR better controlled now  - s/p Amio drip, was on PO amiodarone, prolonged QT- d/c Amiodarone on 7/7/23 as per EP  - due to hypotension decrease the dose of Cardizem po  30mg via peg every 6 hrs   - therapeutic Lovenox,  transitioned to Eliquis 5mg Q12H    - EP specialist on board    Anxiety- continue low dose Klonopin 0.5 mg via peg every 8 hours prn. tx.  - Patient feels very stressed out, crying at times- consulted behavioral therapist , f/up recommendations     Physical deconditioning - daily PT tx. as tolerated         GI/DVT proph.    #Progress Note Handoff: f/up  CT surgery to change trach to uncuffed and cap it, possible decannulation tomorrow , acute transaminitis- obtain liver US, LFT's in am,  continue Klonopin prn. for anxiety/agitation events, consulted Behavioral therapist to evaluate pt. for unc. anxiety/stress, oob to chair activity  Family discussion: medical plan of tx. d/w pt. by bedside Disposition: STR once medically stable    Total time spent to complete patient's bedside assessment, review medical chart, discuss medical plan of care with covering medical team was more than 50 minutes with >50% of time spent face to face with patient, discussion with patient/family and/or coordination of care .
Patient is a 43 y/o female with PMH of Asthma, HTN c/w acute Influenza B viral pneumonia superimposed on cavitary MRSA pneumonia, acute hypoxic respiratory failure requiring intubation. Patient was initially admitted to ICU level of care, completed the course of zyvox and tamiflu.   Pneumonia was  complicated by loculated parapneumonic effusion s/p  chest tube placement/removal. s/p Tracheostomy and peg  placement   Patient's stay was complicated by MRSA bacteremia  currently on IV vancomycin and metronidazole, cultures negative to date. MASTER was negative for vegetations.   Her stay was also complicated by new onset Afib with RVR for which she is on amiodarone, diltiazem, and therapeutic anticoagulation.  Patient also had LANCE that improved with no need for RRT. Otherwise patient received a total of 6 p RBC for acute blood loss anemia ,currently hemoglobin stable. Patient transferred to step down unit and to Vent Unit with the following course of therapy:     Acute hypoxemic respiratory failure / s/p Trach and PEG    Severe cavitary CAP MRSA  Small parapneumonic effusion SP Chest tube- removed   MRSA bacteremia  Influenza B   hx of Asthma   Intubated 5/23 self-extubated 5/25, re-intubated 5/25; SBT failed, SP trach/G tube  - s/p Zyvox, meropenem and Tamifu, now on Vancomycin and flagyl, anticipated end date 7/9  - s/p course of steroids now off  -  blood cultures  NGTD SINCE 5/28   - s/p MASTER 5/31: No evidence of valvular vegetations or intracardiac abscesses to support IE. Pulmonary hepatisation incidental finding. EF 65%, mild TR and pulm HTN  - tapered off methadone( given for pain), d/c Zyprexa  -7/6/23: tapered  trach to collar, saturating 98%  -7/7/23 - 7/9/23 : patient tolerating well trach to collar.   -7/10/23- patient pulled the tracheostomy out, replaced by CT surgery, will repeat CXR, VS stable. Pulm. f/up     # s/p peg placement-   -07/06/2023- patient was evaluated by speech tx. , post  video swallow on 7/7/23- started on diet.  -tolerating diet well,  d/c peg feedings      # Hyperkalemia- s/p Lokelma tx.    # Microcytic Anemia likely  anemia of chronic disease   - s/p 6 units prbc so far last 6/5  - Restarted AC with lovenox 6/22 given Hb stable last few days, switched to Eliquis 5mg Q12H   -monitor hemoglobin   - daily iron tx, daily MVI tx.     New onset A-fib w/ RVR- currently remains in NSR with RBBB  - HR better controlled now  - s/p Amio drip, was on PO amiodarone, prolonged QT- d/c Amiodarone on 7/7/23 as per EP  - due to hypotension decrease the dose of Cardizem po  30mg via peg every 6 hrs   - therapeutic Lovenox,  transitioned to Eliquis 5mg Q12H    - EP specialist on board    Anxiety- continue low dose Klonopin 0.5 mg via peg every 8 hours prn. tx.    Physical deconditioning - post  PT eval- max assist, currently oob to chair.     GI/DVT proph.    #Progress Note Handoff: s/p self trach removal, placed new one by CT surgery, f/up Pulm team for further decannulation process ,  continue Klonopin prn. for anxiety/agitation events, oob to chair activity, monitor HR , monitor BMP  Family discussion: medical plan of tx. d/w pt. by bedside Disposition: STR in 24 hrs    Total time spent to complete patient's bedside assessment, review medical chart, discuss medical plan of care with covering medical team was more than 50 minutes with >50% of time spent face to face with patient, discussion with patient/family and/or coordination of care .
41 y/o female with PMH of Asthma, HTN c/w acute Influenza B viral pneumonia superimposed on cavitary MRSA pneumonia, acute hypoxic respiratory failure requiring intubation. Patient was initially admitted to ICU level of care, completed the course of zyvox and tamiflu.   Pneumonia was  complicated by loculated parapneumonic effusion s/p  chest tube placement/removal. s/p Tracheostomy and peg  placement   Patient's stay was complicated by MRSA bacteremia  currently on IV vancomycin and metronidazole, cultures negative to date. MASTER was negative for vegetations.   Her stay was also complicated by new onset Afib with RVR for which she is on amiodarone, diltiazem, and therapeutic anticoagulation.  Patient also had LANCE that improved with no need for RRT. Otherwise patient received a total of 6 p RBC for acute blood loss anemia ,currently hemoglobin stable. Patient transferred to step down unit and to Vent Unit with the following course of therapy:     Acute hypoxemic respiratory failure / s/p Trach and PEG    Severe cavitary CAP MRSA  Small parapneumonic effusion SP Chest tube- removed   MRSA bacteremia  Influenza B   hx of Asthma   Intubated 5/23 self-extubated 5/25, re-intubated 5/25; SBT failed, SP trach/G tube  - s/p Zyvox, meropenem and Tamifu, now on Vancomycin and flagyl, anticipated end date 7/9  - s/p course of steroids now off  -  blood cultures  NGTD SINCE 5/28   - s/p MASTER 5/31: No evidence of valvular vegetations or intracardiac abscesses to support IE. Pulmonary hepatisation incidental finding. EF 65%, mild TR and pulm HTN  - tapered off methadone( given for pain), d/c Zyprexa  -7/6/23: tapered  trach to collar, saturating 98%  -7/7/23 - 7/9/23 : patient tolerating well trach to collar.   -7/10/23- patient pulled the tracheostomy out, replaced by CT surgery with cuffed trach- stable pulse ox  - 7/12/23- f/up with CT Surgery to switch to uncuffed trach and to cap it today, possible decannulation in am     7/13: Tolerating capped uncuffed trach. Plan for decannulation at bedside by CT surgery today evening.   7/14: s/p decannulation. FEES done. regular diet. f/u GI to take out PEG. Pending Auth for STR.     # Transaminitis-    > 675 > 455. Improving. Trend.  liver US- asymptomatic gallstone    # s/p peg placement-   -07/06/2023- patient was evaluated by speech tx. , post  video swallow on 7/7/23- started on diet, tolerating diet well  - d/c peg feedings      # Microcytic Anemia likely  anemia of chronic disease   - s/p 6 units prbc so far last 6/5  - Restarted AC with lovenox 6/22 given Hb stable last few days, switched to Eliquis 5mg Q12H   -monitor hemoglobin   - daily iron tx, daily MVI tx.     New onset A-fib w/ RVR- currently remains in NSR with RBBB  - HR better controlled now  - s/p Amio drip, was on PO amiodarone, prolonged QT- d/c Amiodarone on 7/7/23 as per EP  - due to hypotension decrease the dose of Cardizem po  30mg via peg every 6 hrs   - therapeutic Lovenox,  transitioned to Eliquis 5mg Q12H    - EP specialist on board    Anxiety- continue low dose Klonopin 0.5 mg via peg every 8 hours prn. tx.  - Patient feels very stressed out, crying at times- consulted behavioral therapist , f/up recommendations     Physical deconditioning - daily PT tx. as tolerated       GI/DVT proph.    #Progress Note Handoff:   7/14: s/p decannulation. FEES done. regular diet. f/u GI to take out PEG. Pending Auth for STR.
Attending Statement: I have personally performed a face to face diagnostic evaluation on this patient. The patient is suffering from:  Acute hypoxemic respiratory failure sp trach   Severe cavitary CAP MRSA  Small parapneumonic effusion SP Chest tube  MRSA bacteremia resolved   MASTER negative   Anemia sp transfusion   New onset A FIB rate controlled   LANCE non oliguric improving  Hypernatremia Improving  HO asthma   Influenza B  I have made amendments to the documentation where necessary. I have personally seen and examined this patient.  I have fully participated in the care of this patient.  I have reviewed all pertinent clinical information, including history, physical exam, plan and note.
Patient is a 41 y/o female with PMH of Asthma, HTN c/w acute Influenza B viral pneumonia superimposed on cavitary MRSA pneumonia, acute hypoxic respiratory failure requiring intubation. Patient was initially admitted to ICU level of care, completed the course of zyvox and tamiflu.   Pneumonia was  complicated by loculated parapneumonic effusion s/p  chest tube placement/removal. s/p Tracheostomy and peg  placement   Patient's stay was complicated by MRSA bacteremia  currently on IV vancomycin and metronidazole, cultures negative to date. MASTER was negative for vegetations.   Her stay was also complicated by new onset Afib with RVR for which she is on amiodarone, diltiazem, and therapeutic anticoagulation.  Patient also had LANCE that improved with no need for RRT. Otherwise patient received a total of 6 p RBC for acute blood loss anemia ,currently hemoglobin stable. Patient transferred to step down unit and to Vent Unit with the following course of therapy:     Acute hypoxemic respiratory failure / s/P Trach and PEG    Severe cavitary CAP MRSA  Small parapneumonic effusion SP Chest tube- removed   MRSA bacteremia  Influenza B   hx of Asthma   Intubated 5/23 self-extubated 5/25, re-intubated 5/25; SBT failed, SP trach/G tube  - s/p Zyvox, meropenem and Tamifu, now on Vancomycin and flagyl, anticipated end date 7/9  - s/p course of steroids now off  -  blood cultures  NGTD SINCE 5/28   - s/p MASTER 5/31: No evidence of valvular vegetations or intracardiac abscesses to support IE. Pulmonary hepatisation incidental finding. EF 65%, mild TR and pulm HTN  - ventilator management per pulm/cc, tolerated PS for 5 hours yesterday   - 7/5/23 taper down  Methadone to 5 mg via peg once daily x 2 more days than d/c, d/c  olanzapine   - 7/5/23: cont. wean off trial on CPAP 10/8 sat 100% for entire day, placed on a/c mode at night   -7/6/23: tapered  trach to collar, saturating 98%, will obtain speech eval today    # s/p peg placement-   -07/06/2023- since patient is on trach to collar, will consult speech and swallow therapist.       # Hyperkalemia- f/up BMP today     # Microcytic Anemia likely  anemia of chronic disease   - s/p 6 units prbc so far last 6/5  - Restarted AC with lovenox 6/22 given Hb stable last few days, switched to Eliquis 5mg Q12H   -monitor hemoglobin     New onset A-fib w/ RVR- currently remains in NSR with RBBB  - HR better controlled now  - s/p Amio drip, now on PO  - Cardizem po 60mg q6hr   - therapeutic Lovenox,  transitioned to Eliquis 5mg Q12H    - EP specialist on board, f/up rec.    Anxiety- continue low dose Klonopin 0.5 mg via peg every 8 hours prn. tx.    Physical deconditioning - obtain PT eval    GI/DVT proph.    #Progress Note Handoff: repeat BMP today to f/up K level, continue trach to collar , continue IV Vanco till 7/9/23, taper down Methadone and d/c in 48 hours. continue Klonopin prn. for anxiety/agitation events, oob to chair activity, speech eval for PO intake.  Family discussion: medical plan of tx. d/w pt. by bedside Disposition: LTAC candidate.     Total time spent to complete patient's bedside assessment, review medical chart, discuss medical plan of care with covering medical team was more than 50 minutes with >50% of time spent face to face with patient, discussion with patient/family and/or coordination of care
Patient is a 43 y/o female with PMH of Asthma, HTN c/w acute Influenza B viral pneumonia superimposed on cavitary MRSA pneumonia, acute hypoxic respiratory failure requiring intubation. Patient was initially admitted to ICU level of care, completed the course of zyvox and tamiflu.   Pneumonia was  complicated by loculated parapneumonic effusion s/p  chest tube placement/removal. s/p Tracheostomy and peg  placement   Patient's stay was complicated by MRSA bacteremia  currently on IV vancomycin and metronidazole, cultures negative to date. MASTER was negative for vegetations.   Her stay was also complicated by new onset Afib with RVR for which she is on amiodarone, diltiazem, and therapeutic anticoagulation.  Patient also had LANCE that improved with no need for RRT. Otherwise patient received a total of 6 p RBC for acute blood loss anemia ,currently hemoglobin stable. Patient transferred to step down unit and to Vent Unit with the following course of therapy:     Acute hypoxemic respiratory failure / s/P Trach and PEG    Severe cavitary CAP MRSA  Small parapneumonic effusion SP Chest tube- removed   MRSA bacteremia  Influenza B   hx of Asthma   Intubated 5/23 self-extubated 5/25, re-intubated 5/25; SBT failed, SP trach/G tube  - s/p Zyvox, meropenem and Tamifu, now on Vancomycin and flagyl, anticipated end date 7/9  - s/p course of steroids now off  -  blood cultures  NGTD SINCE 5/28   - s/p MASTER 5/31: No evidence of valvular vegetations or intracardiac abscesses to support IE. Pulmonary hepatisation incidental finding. EF 65%, mild TR and pulm HTN  - ventilator management per pulm/cc, tolerated PS for 5 hours yesterday   - 7/5/23 taper down  Methadone to 5 mg via peg once daily x 2 more days than d/c, d/c  olanzapine   - 7/5/23: cont. wean off trial on CPAP 10/8 sat 100% for entire day, placed on a/c mode at night   -7/6/23: tapered  trach to collar, saturating 98%  -7/7/23: patient tolerating well trach to collar.     # s/p peg placement-   -07/06/2023- patient was evaluated by speech tx. planned to do video swallow on 7/7/23      # Hyperkalemia- will give 1 dose of lokelma today, f/up BMP in am     # Microcytic Anemia likely  anemia of chronic disease   - s/p 6 units prbc so far last 6/5  - Restarted AC with lovenox 6/22 given Hb stable last few days, switched to Eliquis 5mg Q12H   -monitor hemoglobin   - daily iron tx, daily MVI tx.     New onset A-fib w/ RVR- currently remains in NSR with RBBB  - HR better controlled now  - s/p Amio drip, now on PO amiodarone, prolonged QT- d/c Amiodarone on 7/7/23 as per EP  - due to hypotension decrease the dose of Cardizem po  30mg via peg every 6 hrs   - therapeutic Lovenox,  transitioned to Eliquis 5mg Q12H    - EP specialist on board    Anxiety- continue low dose Klonopin 0.5 mg via peg every 8 hours prn. tx.    Physical deconditioning - post  PT eval- max assist, currently oob to chair.     GI/DVT proph.    #Progress Note Handoff: daily BMP monitoring , continue trach to collar , continue IV Vanco till 7/9/23, d/c Methadone . continue Klonopin prn. for anxiety/agitation events, oob to chair activity, swallow study today, monitor HR   Family discussion: medical plan of tx. d/w pt. by bedside Disposition: LTAC candidate.     Total time spent to complete patient's bedside assessment, review medical chart, discuss medical plan of care with covering medical team was more than 50 minutes with >50% of time spent face to face with patient, discussion with patient/family and/or coordination of care .
events noted, vent dependant, taper methadone/ klonopin, abx, PS as tolerated, plan as above
41 y/o female with PMH of Asthma, HTN c/w acute Influenza B viral pneumonia superimposed on cavitary MRSA pneumonia, acute hypoxic respiratory failure requiring intubation. Patient was initially admitted to ICU level of care, completed the course of zyvox and tamiflu.   Pneumonia was  complicated by loculated parapneumonic effusion s/p  chest tube placement/removal. s/p Tracheostomy and peg  placement   Patient's stay was complicated by MRSA bacteremia  currently on IV vancomycin and metronidazole, cultures negative to date. MASTER was negative for vegetations.   Her stay was also complicated by new onset Afib with RVR for which she is on amiodarone, diltiazem, and therapeutic anticoagulation.  Patient also had LANCE that improved with no need for RRT. Otherwise patient received a total of 6 p RBC for acute blood loss anemia ,currently hemoglobin stable. Patient transferred to step down unit and to Vent Unit with the following course of therapy:     Acute hypoxemic respiratory failure / s/p Trach and PEG    Severe cavitary CAP MRSA  Small parapneumonic effusion SP Chest tube- removed   MRSA bacteremia  Influenza B   hx of Asthma   Intubated 5/23 self-extubated 5/25, re-intubated 5/25; SBT failed, SP trach/G tube  - s/p Zyvox, meropenem and Tamifu, now on Vancomycin and flagyl, anticipated end date 7/9  - s/p course of steroids now off  -  blood cultures  NGTD SINCE 5/28   - s/p MASTER 5/31: No evidence of valvular vegetations or intracardiac abscesses to support IE. Pulmonary hepatisation incidental finding. EF 65%, mild TR and pulm HTN  - tapered off methadone( given for pain), d/c Zyprexa  -7/6/23: tapered  trach to collar, saturating 98%  -7/7/23 - 7/9/23 : patient tolerating well trach to collar.   -7/10/23- patient pulled the tracheostomy out, replaced by CT surgery with cuffed trach- stable pulse ox  - 7/12/23- f/up with CT Surgery to switch to uncuffed trach and to cap it today, possible decannulation in am     7/13: Tolerating capped uncuffed trach. Plan for decannulation at bedside by CT surgery today evening.     # Transaminitis-    > 675 > 455. Improving. Trend.  obtain liver US    # s/p peg placement-   -07/06/2023- patient was evaluated by speech tx. , post  video swallow on 7/7/23- started on diet, tolerating diet well  - d/c peg feedings      # Microcytic Anemia likely  anemia of chronic disease   - s/p 6 units prbc so far last 6/5  - Restarted AC with lovenox 6/22 given Hb stable last few days, switched to Eliquis 5mg Q12H   -monitor hemoglobin   - daily iron tx, daily MVI tx.     New onset A-fib w/ RVR- currently remains in NSR with RBBB  - HR better controlled now  - s/p Amio drip, was on PO amiodarone, prolonged QT- d/c Amiodarone on 7/7/23 as per EP  - due to hypotension decrease the dose of Cardizem po  30mg via peg every 6 hrs   - therapeutic Lovenox,  transitioned to Eliquis 5mg Q12H    - EP specialist on board    Anxiety- continue low dose Klonopin 0.5 mg via peg every 8 hours prn. tx.  - Patient feels very stressed out, crying at times- consulted behavioral therapist , f/up recommendations     Physical deconditioning - daily PT tx. as tolerated       GI/DVT proph.    #Progress Note Handoff:   decannulation today  eventual STR.   Trend LFTs, f/u RUQ us
IMPRESSION:    Acute hypoxemic respiratory failure sp trach   Severe cavitary CAP MRSA  Small parapneumonic effusion SP Chest tube  MRSA bacteremia resolved   MASTER negative   Anemia sp transfusion   New onset A FIB rate controlled   LANCE non oliguric improving  Hypernatremia Improving  HO asthma   Influenza B     Plan as outlined above
IMPRESSION:    Acute hypoxemic respiratory failure sp trach   Severe cavitary CAP MRSA  Small parapneumonic effusion SP Chest tube  MRSA bacteremia resolved   MASTER negative   Anemia sp transfusion   New onset A FIB rate controlled   LANCE non oliguric improving  Hypernatremia Improving  HO asthma   Influenza B     Plan as outlined above
Attending Statement: I have personally performed a face to face diagnostic evaluation on this patient. The patient is suffering from:  Acute hypoxemic respiratory failure sp trach   Severe cavitary CAP MRSA  Small parapneumonic effusion SP Chest tube  MRSA bacteremia resolved   MASTER negative   Anemia sp transfusion   New onset A FIB rate controlled   LANCE non oliguric improving  Hypernatremia Improving  HO asthma   Influenza B   I have made amendments to the documentation where necessary. I have personally seen and examined this patient.  I have fully participated in the care of this patient.  I have reviewed all pertinent clinical information, including history, physical exam, plan and note.

## 2023-07-15 NOTE — PROGRESS NOTE ADULT - SUBJECTIVE AND OBJECTIVE BOX
ZEYNEP ROSSI 42y Female  MRN#: [id number]     Hospital Day: 53d    Pt is currently admitted with the primary diagnosis of  Pneumonia due to infectious organism        SUBJECTIVE     Overnight events  None                                            ----------------------------------------------------------  OBJECTIVE  PAST MEDICAL & SURGICAL HISTORY                                            -----------------------------------------------------------  ALLERGIES:  aspirin (Short breath; Anaphylaxis)  Bananas (Unknown)                                            ------------------------------------------------------------    HOME MEDICATIONS  Home Medications:                           MEDICATIONS:  STANDING MEDICATIONS  acetaminophen     Tablet .. 500 milliGRAM(s) Oral every 6 hours  apixaban 5 milliGRAM(s) Oral every 12 hours  ascorbic acid 500 milliGRAM(s) Oral daily  chlorhexidine 2% Cloths 1 Application(s) Topical <User Schedule>  diltiazem    milliGRAM(s) Oral daily  magnesium sulfate  IVPB 2 Gram(s) IV Intermittent once  metoprolol tartrate 12.5 milliGRAM(s) Oral two times a day  nystatin Cream 1 Application(s) Topical two times a day  petrolatum white Ointment 1 Application(s) Topical two times a day  senna 2 Tablet(s) Oral at bedtime  trimethobenzamide Injectable 200 milliGRAM(s) IntraMuscular once  vitamin A &amp; D Ointment 1 Application(s) Topical daily  zinc sulfate 220 milliGRAM(s) Oral daily    PRN MEDICATIONS  LORazepam     Tablet 0.5 milliGRAM(s) Oral at bedtime PRN  oxyCODONE    IR 5 milliGRAM(s) Oral every 8 hours PRN                                            ------------------------------------------------------------  VITAL SIGNS: Last 24 Hours  T(C): 36.8 (15 Jul 2023 05:00), Max: 36.8 (14 Jul 2023 12:28)  T(F): 98.3 (15 Jul 2023 05:00), Max: 98.3 (14 Jul 2023 20:00)  HR: 102 (15 Jul 2023 05:00) (94 - 102)  BP: 130/55 (15 Jul 2023 05:00) (114/77 - 130/55)  BP(mean): --  RR: 19 (15 Jul 2023 05:00) (18 - 19)  SpO2: 100% (15 Jul 2023 05:00) (98% - 100%)                                             --------------------------------------------------------------  LABS:                        10.0   9.89  )-----------( 483      ( 14 Jul 2023 06:51 )             32.6     07-14    135  |  99  |  12  ----------------------------<  89  4.4   |  21  |  0.9    Ca    10.5      14 Jul 2023 06:51  Mg     1.8     07-14    TPro  7.5  /  Alb  3.6  /  TBili  0.3  /  DBili  x   /  AST  26  /  ALT  24  /  AlkPhos  315<H>  07-14      Urinalysis Basic - ( 14 Jul 2023 06:51 )    Color: x / Appearance: x / SG: x / pH: x  Gluc: 89 mg/dL / Ketone: x  / Bili: x / Urobili: x   Blood: x / Protein: x / Nitrite: x   Leuk Esterase: x / RBC: x / WBC x   Sq Epi: x / Non Sq Epi: x / Bacteria: x                                                            -------------------------------------------------------------  RADIOLOGY:                                            --------------------------------------------------------------    PHYSICAL EXAM:  GENERAL: NAD, lying in bed comfortably  HEAD:  Atraumatic, Normocephalic  EYES: EOMI, conjunctiva and sclera clear  ENT: Moist mucous membranes  NECK: Supple, No JVD  CHEST/LUNG: Clear to auscultation bilaterally; No rales, rhonchi, wheezing, or rubs. Unlabored respirations  HEART: regular rate and rhythm; No murmurs, rubs, or gallops  ABDOMEN: Bowel sounds present; Soft, Nontender, Nondistended.    EXTREMITIES: Warm. No clubbing, cyanosis, or edema  NERVOUS SYSTEM:  Alert & Oriented X3. No focal deficits   SKIN: No rashes or lesions                                           --------------------------------------------------------------

## 2023-07-15 NOTE — PROGRESS NOTE ADULT - ASSESSMENT
42 year-old female with PMH of Asthma, HTN? presents with complaint of cough x3 days and SOB. In the ED was found to have Bilateral patchy groundglass nodularity with dominant confluent left lower lobe opacification with numerous cavitary lesions. Trach and PEG done for inability to wean off vent, currently off sedation. Her stay was also complicated by new onset Afib with RVR for which she was on amiodarone,  is still on diltiazem, and therapeutic anticoagulation.    #Acute hypoxemic respiratory failure / s/P Trach and PEG    #Severe cavitary CAP MRSA  #Small parapneumonic effusion SP Chest tube  #MRSA bacteremia  #Influenza B   #hx of Asthma   #Intubated 5/23 self-extubated 5/25, re-intubated 5/25; SBT failed, SP trach/G tube  - s/p Zyvox, meropenem and Tamifu, Vancomycin and flagyl  - s/p course of steroids now off  -  blood cultures  NGTD SINCE 5/28   - s/p MASTER 5/31: No evidence of valvular vegetations or intracardiac abscesses to support IE. Pulmonary hepatisation incidental finding. EF 65%, mild TR and pulm HTN  - ventilator management per pulm/cc,  - monitor secretions   - uncuffed trach placement yesterday afternoon by CT surg  - decannulation 7/14  - PEG dc 7/15?    #Transaminitis - resolved  - LFT's trending down, now normal  - RUQ US - cholelithiasis    # Hyperkalemia  - s/p Lokelma 10 mg x 1 dose today, f/up repeated BMP at 4  - s/p lokelma 5mg 1 dose overnight 7/5 - f/u 11am BMP  - s/p lokelma 10mg 7/7     #Maculopapular buttocks rash  -PCR skin scraping negative for HSV --> off  Acyclovir   -Wound care per burn, wound care RN following     #Microcytic Anemia likely  anemia of chronic disease   - s/p 6 units prbc so far last 6/5  - on Eliquis 5mg Q12H   - monitor hemoglobin     #New onset A-fib w/ RVR  - HR better controlled now  - s/p Amio drip, now on PO - amio d/c per EP rec - monitor for tachy  - Diltiazem 30mg q6 with holding parameters - if  or less or HR 60 or less  - on eliquis  - free T3 and free t4 normal    #Anxiety  - psychiatry recs appreciated  - zyprexa zydis 2.5mg q8 PRN - discontinued  - ativan .5mg qhs PRN  - QTC is 540 7/14        Handoff: monitor HR, monitor SpO2

## 2023-07-15 NOTE — PROGRESS NOTE ADULT - ASSESSMENT
IMPRESSION:    Acute hypoxemic respiratory failure sp trach/ decannulation on RA  Severe CAP secondary to MRSA sp abx  Small parapneumonic effusion SP Chest tube and removal  MRSA bacteremia resolved   MASTER negative   Anemia sp transfusion   New onset A FIB rate controlled   LANCE improved   HO asthma   Influenza B     PLAN:    CNS: Avoid CNS depressant    HEENT: Oral care. Trach care.     PULMONARY:  HOB @ 45 degrees.  Aspiration precautions. Keep SaO2 92 TO 96%.    CARDIOVASCULAR:    Avoid overload, Continue rate control.     GI: GI prophylaxis. po feeding DC PEG    RENAL:  Follow up lytes.  Correct as needed.     INFECTIOUS DISEASE: ABX Per ID.      HEMATOLOGICAL:  DVT prophylaxis.  On Eliquis     ENDOCRINE:  Follow up FS.  Insulin protocol if needed.      Prognosis is poor overall .     PT/OT

## 2023-07-15 NOTE — PROGRESS NOTE ADULT - ATTENDING SUPERVISION STATEMENT
Fellow
Fellow
Resident
Fellow
Resident
Fellow
Fellow
Resident
Fellow
Resident

## 2023-07-15 NOTE — PROGRESS NOTE ADULT - SUBJECTIVE AND OBJECTIVE BOX
Over Night Events: events noted, sp decannulation, feels well, co pain around peg site    PHYSICAL EXAM    ICU Vital Signs Last 24 Hrs  T(C): 36.8 (15 Jul 2023 05:00), Max: 36.8 (14 Jul 2023 12:28)  T(F): 98.3 (15 Jul 2023 05:00), Max: 98.3 (14 Jul 2023 20:00)  HR: 102 (15 Jul 2023 05:00) (94 - 102)  BP: 130/55 (15 Jul 2023 05:00) (114/77 - 130/55)  RR: 19 (15 Jul 2023 05:00) (18 - 19)  SpO2: 100% (15 Jul 2023 05:00) (98% - 100%)        General: comfortable on RA  Lungs: Bilateral BS  Cardiovascular: Regular   Abdomen: Soft, Positive BS  Extremities: No clubbing   Skin: Warm  Neurological: Non focal         LABS:                          10.0   9.89  )-----------( 483      ( 14 Jul 2023 06:51 )             32.6                                               07-14    135  |  99  |  12  ----------------------------<  89  4.4   |  21  |  0.9    Ca    10.5      14 Jul 2023 06:51  Mg     1.8     07-14    TPro  7.5  /  Alb  3.6  /  TBili  0.3  /  DBili  x   /  AST  26  /  ALT  24  /  AlkPhos  315<H>  07-14                                             Urinalysis Basic - ( 14 Jul 2023 06:51 )    Color: x / Appearance: x / SG: x / pH: x  Gluc: 89 mg/dL / Ketone: x  / Bili: x / Urobili: x   Blood: x / Protein: x / Nitrite: x   Leuk Esterase: x / RBC: x / WBC x   Sq Epi: x / Non Sq Epi: x / Bacteria: x                                                  LIVER FUNCTIONS - ( 14 Jul 2023 06:51 )  Alb: 3.6 g/dL / Pro: 7.5 g/dL / ALK PHOS: 315 U/L / ALT: 24 U/L / AST: 26 U/L / GGT: x                                                                                                                                       MEDICATIONS  (STANDING):  acetaminophen     Tablet .. 500 milliGRAM(s) Oral every 6 hours  apixaban 5 milliGRAM(s) Oral every 12 hours  ascorbic acid 500 milliGRAM(s) Oral daily  chlorhexidine 2% Cloths 1 Application(s) Topical <User Schedule>  diltiazem    milliGRAM(s) Oral daily  magnesium sulfate  IVPB 2 Gram(s) IV Intermittent once  metoprolol tartrate 12.5 milliGRAM(s) Oral two times a day  nystatin Cream 1 Application(s) Topical two times a day  petrolatum white Ointment 1 Application(s) Topical two times a day  senna 2 Tablet(s) Oral at bedtime  trimethobenzamide Injectable 200 milliGRAM(s) IntraMuscular once  vitamin A &amp; D Ointment 1 Application(s) Topical daily  zinc sulfate 220 milliGRAM(s) Oral daily    MEDICATIONS  (PRN):  LORazepam     Tablet 0.5 milliGRAM(s) Oral at bedtime PRN Agitation  oxyCODONE    IR 5 milliGRAM(s) Oral every 8 hours PRN Severe Pain (7 - 10)

## 2023-07-16 LAB
ALBUMIN SERPL ELPH-MCNC: 3.4 G/DL — LOW (ref 3.5–5.2)
ALP SERPL-CCNC: 298 U/L — HIGH (ref 30–115)
ALT FLD-CCNC: 24 U/L — SIGNIFICANT CHANGE UP (ref 0–41)
ANION GAP SERPL CALC-SCNC: 13 MMOL/L — SIGNIFICANT CHANGE UP (ref 7–14)
AST SERPL-CCNC: 23 U/L — SIGNIFICANT CHANGE UP (ref 0–41)
BILIRUB SERPL-MCNC: 0.2 MG/DL — SIGNIFICANT CHANGE UP (ref 0.2–1.2)
BUN SERPL-MCNC: 10 MG/DL — SIGNIFICANT CHANGE UP (ref 10–20)
CALCIUM SERPL-MCNC: 10.1 MG/DL — SIGNIFICANT CHANGE UP (ref 8.4–10.5)
CHLORIDE SERPL-SCNC: 101 MMOL/L — SIGNIFICANT CHANGE UP (ref 98–110)
CO2 SERPL-SCNC: 25 MMOL/L — SIGNIFICANT CHANGE UP (ref 17–32)
CREAT SERPL-MCNC: 0.8 MG/DL — SIGNIFICANT CHANGE UP (ref 0.7–1.5)
EGFR: 94 ML/MIN/1.73M2 — SIGNIFICANT CHANGE UP
GLUCOSE BLDC GLUCOMTR-MCNC: 100 MG/DL — HIGH (ref 70–99)
GLUCOSE SERPL-MCNC: 64 MG/DL — LOW (ref 70–99)
HCT VFR BLD CALC: 30.7 % — LOW (ref 37–47)
HGB BLD-MCNC: 9.3 G/DL — LOW (ref 12–16)
MAGNESIUM SERPL-MCNC: 1.7 MG/DL — LOW (ref 1.8–2.4)
MCHC RBC-ENTMCNC: 24.7 PG — LOW (ref 27–31)
MCHC RBC-ENTMCNC: 30.3 G/DL — LOW (ref 32–37)
MCV RBC AUTO: 81.4 FL — SIGNIFICANT CHANGE UP (ref 81–99)
NRBC # BLD: 0 /100 WBCS — SIGNIFICANT CHANGE UP (ref 0–0)
PLATELET # BLD AUTO: 504 K/UL — HIGH (ref 130–400)
PMV BLD: 9 FL — SIGNIFICANT CHANGE UP (ref 7.4–10.4)
POTASSIUM SERPL-MCNC: 4.3 MMOL/L — SIGNIFICANT CHANGE UP (ref 3.5–5)
POTASSIUM SERPL-SCNC: 4.3 MMOL/L — SIGNIFICANT CHANGE UP (ref 3.5–5)
PROT SERPL-MCNC: 7.3 G/DL — SIGNIFICANT CHANGE UP (ref 6–8)
RBC # BLD: 3.77 M/UL — LOW (ref 4.2–5.4)
RBC # FLD: 25.7 % — HIGH (ref 11.5–14.5)
SODIUM SERPL-SCNC: 139 MMOL/L — SIGNIFICANT CHANGE UP (ref 135–146)
WBC # BLD: 9.68 K/UL — SIGNIFICANT CHANGE UP (ref 4.8–10.8)
WBC # FLD AUTO: 9.68 K/UL — SIGNIFICANT CHANGE UP (ref 4.8–10.8)

## 2023-07-16 PROCEDURE — 99232 SBSQ HOSP IP/OBS MODERATE 35: CPT

## 2023-07-16 PROCEDURE — 99233 SBSQ HOSP IP/OBS HIGH 50: CPT

## 2023-07-16 RX ORDER — SENNA PLUS 8.6 MG/1
2 TABLET ORAL
Qty: 0 | Refills: 0 | DISCHARGE
Start: 2023-07-16

## 2023-07-16 RX ORDER — METOPROLOL TARTRATE 50 MG
1 TABLET ORAL
Qty: 30 | Refills: 0
Start: 2023-07-16

## 2023-07-16 RX ORDER — ASCORBIC ACID 60 MG
1 TABLET,CHEWABLE ORAL
Qty: 0 | Refills: 0 | DISCHARGE
Start: 2023-07-16

## 2023-07-16 RX ADMIN — Medication 0.5 MILLIGRAM(S): at 05:02

## 2023-07-16 RX ADMIN — Medication 500 MILLIGRAM(S): at 11:10

## 2023-07-16 RX ADMIN — CHLORHEXIDINE GLUCONATE 1 APPLICATION(S): 213 SOLUTION TOPICAL at 06:44

## 2023-07-16 RX ADMIN — Medication 1 APPLICATION(S): at 18:02

## 2023-07-16 RX ADMIN — ZINC SULFATE TAB 220 MG (50 MG ZINC EQUIVALENT) 220 MILLIGRAM(S): 220 (50 ZN) TAB at 11:06

## 2023-07-16 RX ADMIN — Medication 500 MILLIGRAM(S): at 18:01

## 2023-07-16 RX ADMIN — OXYCODONE HYDROCHLORIDE 5 MILLIGRAM(S): 5 TABLET ORAL at 16:34

## 2023-07-16 RX ADMIN — NYSTATIN CREAM 1 APPLICATION(S): 100000 CREAM TOPICAL at 06:44

## 2023-07-16 RX ADMIN — Medication 500 MILLIGRAM(S): at 11:06

## 2023-07-16 RX ADMIN — Medication 500 MILLIGRAM(S): at 05:03

## 2023-07-16 RX ADMIN — Medication 500 MILLIGRAM(S): at 18:31

## 2023-07-16 RX ADMIN — Medication 12.5 MILLIGRAM(S): at 07:00

## 2023-07-16 RX ADMIN — Medication 500 MILLIGRAM(S): at 05:30

## 2023-07-16 RX ADMIN — OXYCODONE HYDROCHLORIDE 5 MILLIGRAM(S): 5 TABLET ORAL at 07:03

## 2023-07-16 RX ADMIN — Medication 120 MILLIGRAM(S): at 06:59

## 2023-07-16 RX ADMIN — Medication 500 MILLIGRAM(S): at 11:40

## 2023-07-16 RX ADMIN — APIXABAN 5 MILLIGRAM(S): 2.5 TABLET, FILM COATED ORAL at 06:44

## 2023-07-16 RX ADMIN — OXYCODONE HYDROCHLORIDE 5 MILLIGRAM(S): 5 TABLET ORAL at 07:33

## 2023-07-16 RX ADMIN — APIXABAN 5 MILLIGRAM(S): 2.5 TABLET, FILM COATED ORAL at 18:03

## 2023-07-16 RX ADMIN — NYSTATIN CREAM 1 APPLICATION(S): 100000 CREAM TOPICAL at 18:02

## 2023-07-16 RX ADMIN — Medication 1 APPLICATION(S): at 11:10

## 2023-07-16 RX ADMIN — Medication 12.5 MILLIGRAM(S): at 18:02

## 2023-07-16 NOTE — PROGRESS NOTE ADULT - REASON FOR ADMISSION
AHRF

## 2023-07-16 NOTE — PROGRESS NOTE ADULT - SUBJECTIVE AND OBJECTIVE BOX
Over Night Events: events noted, afebrile, on RA    PHYSICAL EXAM    ICU Vital Signs Last 24 Hrs  T(C): 36.2 (15 Jul 2023 20:23), Max: 37.1 (15 Jul 2023 14:00)  T(F): 97.2 (15 Jul 2023 20:23), Max: 98.8 (15 Jul 2023 14:00)  HR: 94 (15 Jul 2023 20:23) (85 - 94)  BP: 139/81 (15 Jul 2023 20:23) (124/82 - 139/81)  RR: 18 (15 Jul 2023 20:23) (18 - 18)  SpO2: 96% (15 Jul 2023 20:23) (96% - 99%)    O2 Parameters below as of 15 Jul 2023 20:23  Patient On (Oxygen Delivery Method): room air            General: ill looking  Lungs: dec bs both bases  Cardiovascular: Regular   Abdomen: Soft, Positive BS  Extremities: No clubbing   Neurological: Non focal         LABS:                          9.3    9.68  )-----------( 504      ( 16 Jul 2023 08:09 )             30.7                                               07-15    137  |  100  |  13  ----------------------------<  94  4.0   |  25  |  0.8    Ca    10.2      15 Jul 2023 09:34  Mg     1.8     07-15    TPro  8.0  /  Alb  3.9  /  TBili  0.2  /  DBili  x   /  AST  24  /  ALT  26  /  AlkPhos  326<H>  07-15                                             Urinalysis Basic - ( 15 Jul 2023 09:34 )    Color: x / Appearance: x / SG: x / pH: x  Gluc: 94 mg/dL / Ketone: x  / Bili: x / Urobili: x   Blood: x / Protein: x / Nitrite: x   Leuk Esterase: x / RBC: x / WBC x   Sq Epi: x / Non Sq Epi: x / Bacteria: x                                                  LIVER FUNCTIONS - ( 15 Jul 2023 09:34 )  Alb: 3.9 g/dL / Pro: 8.0 g/dL / ALK PHOS: 326 U/L / ALT: 26 U/L / AST: 24 U/L / GGT: x                                                                                                                                       MEDICATIONS  (STANDING):  acetaminophen     Tablet .. 500 milliGRAM(s) Oral every 6 hours  apixaban 5 milliGRAM(s) Oral every 12 hours  ascorbic acid 500 milliGRAM(s) Oral daily  chlorhexidine 2% Cloths 1 Application(s) Topical <User Schedule>  diltiazem    milliGRAM(s) Oral daily  diphenhydrAMINE 25 milliGRAM(s) Oral once  magnesium sulfate  IVPB 2 Gram(s) IV Intermittent once  metoprolol tartrate 12.5 milliGRAM(s) Oral two times a day  nystatin Cream 1 Application(s) Topical two times a day  petrolatum white Ointment 1 Application(s) Topical two times a day  senna 2 Tablet(s) Oral at bedtime  trimethobenzamide Injectable 200 milliGRAM(s) IntraMuscular once  vitamin A &amp; D Ointment 1 Application(s) Topical daily  zinc sulfate 220 milliGRAM(s) Oral daily    MEDICATIONS  (PRN):  LORazepam     Tablet 0.5 milliGRAM(s) Oral at bedtime PRN Agitation  oxyCODONE    IR 5 milliGRAM(s) Oral every 8 hours PRN Severe Pain (7 - 10)

## 2023-07-16 NOTE — PROGRESS NOTE ADULT - ASSESSMENT
41 y/o female with PMH of Asthma, HTN c/w acute Influenza B viral pneumonia superimposed on cavitary MRSA pneumonia, acute hypoxic respiratory failure requiring intubation. Patient was initially admitted to ICU level of care, completed the course of zyvox and tamiflu.   Pneumonia was  complicated by loculated parapneumonic effusion s/p  chest tube placement/removal. s/p Tracheostomy and peg  placement   Patient's stay was complicated by MRSA bacteremia  currently on IV vancomycin and metronidazole, cultures negative to date. MASTER was negative for vegetations.   Her stay was also complicated by new onset Afib with RVR for which she is on amiodarone, diltiazem, and therapeutic anticoagulation.  Patient also had LANCE that improved with no need for RRT. Otherwise patient received a total of 6 p RBC for acute blood loss anemia ,currently hemoglobin stable. Patient transferred to step down unit and to Vent Unit with the following course of therapy:     Acute hypoxemic respiratory failure / s/p Trach and PEG    Severe cavitary CAP MRSA  Small parapneumonic effusion SP Chest tube- removed   MRSA bacteremia  Influenza B   hx of Asthma   Intubated 5/23 self-extubated 5/25, re-intubated 5/25; SBT failed, SP trach/G tube  - s/p Zyvox, meropenem and Tamifu, now on Vancomycin and flagyl, anticipated end date 7/9  - s/p course of steroids now off  -  blood cultures  NGTD SINCE 5/28   - s/p MASTER 5/31: No evidence of valvular vegetations or intracardiac abscesses to support IE. Pulmonary hepatisation incidental finding. EF 65%, mild TR and pulm HTN  - tapered off methadone( given for pain), d/c Zyprexa  -7/6/23: tapered  trach to collar, saturating 98%  -7/7/23 - 7/9/23 : patient tolerating well trach to collar.   -7/10/23- patient pulled the tracheostomy out, replaced by CT surgery with cuffed trach- stable pulse ox  - 7/12/23- f/up with CT Surgery to switch to uncuffed trach and to cap it today, possible decannulation in am     7/13: Tolerating capped uncuffed trach. Plan for decannulation at bedside by CT surgery today evening.   7/14: s/p decannulation. FEES done. regular diet. f/u GI to take out PEG. Pending Auth for STR.   7/15: requested CT surgery to d/c PEG. They said they will do today. examined PEG site as patient is having some soreness there. minimal serosanguinous discharge. Does NOT appear infected. REquested bedside RN to scrub the area clean. waiting for CT surgery.   No significant hernia on exam. small subcutanoeous firm nodules (maybe hematoma from lovenox shots).   monitor.   Worked well with PT. wants to go home. PT will train her sister to take care while ambulating by hopefully tomorrow. likely d/c in 24-48 hours.     # Transaminitis-    > 675 > 455. Improving. Trend.  liver US- asymptomatic gallstone    # s/p peg placement-   -07/06/2023- patient was evaluated by speech tx. , post  video swallow on 7/7/23- started on diet, tolerating diet well  - d/c peg feedings      # Microcytic Anemia likely  anemia of chronic disease   - s/p 6 units prbc so far last 6/5  - Restarted AC with lovenox 6/22 given Hb stable last few days, switched to Eliquis 5mg Q12H   -monitor hemoglobin   - daily iron tx, daily MVI tx.     New onset A-fib w/ RVR- currently remains in NSR with RBBB  - HR better controlled now  - s/p Amio drip, was on PO amiodarone, prolonged QT- d/c Amiodarone on 7/7/23 as per EP  - due to hypotension decrease the dose of Cardizem po  30mg via peg every 6 hrs   - therapeutic Lovenox,  transitioned to Eliquis 5mg Q12H    - EP specialist on board    Anxiety- continue low dose Klonopin 0.5 mg via peg every 8 hours prn. tx.  - Patient feels very stressed out, crying at times- consulted behavioral therapist , f/up recommendations     Physical deconditioning - daily PT tx. as tolerated       GI/DVT proph.    #Progress Note Handoff:   PEG removed. Waiting for PT to give another session today and train her son. Likely home w/ PT today or tomorrow.

## 2023-07-16 NOTE — PROGRESS NOTE ADULT - ASSESSMENT
IMPRESSION:    Acute hypoxemic respiratory failure sp trach/ decannulation on RA  Severe CAP secondary to MRSA sp abx  Small parapneumonic effusion SP Chest tube and removal  MRSA bacteremia resolved   MASTER negative   Anemia sp transfusion   New onset A FIB rate controlled   LANCE improved   HO asthma   Influenza B     PLAN:    CNS: Avoid CNS depressant    HEENT: Oral care. Trach care.     PULMONARY:  HOB @ 45 degrees.  Aspiration precautions. Keep SaO2 92 TO 96%.    CARDIOVASCULAR:    Avoid overload, Continue rate control.     GI: GI prophylaxis. po feeding DC PEG    RENAL:  Follow up lytes.  Correct as needed.     INFECTIOUS DISEASE: sp abx    HEMATOLOGICAL:  DVT prophylaxis.  On Eliquis     ENDOCRINE:  Follow up FS.  Insulin protocol if needed.      Prognosis is poor overall .     PT/OT  recall as needed

## 2023-07-16 NOTE — PROGRESS NOTE ADULT - SUBJECTIVE AND OBJECTIVE BOX
S: PEG removed. feels better  worked with Pt yesterday.      All other pertinent ROS negative.      Vital Signs Last 24 Hrs  T(C): 36.6 (16 Jul 2023 12:21), Max: 37.1 (15 Jul 2023 14:00)  T(F): 97.8 (16 Jul 2023 12:21), Max: 98.8 (15 Jul 2023 14:00)  HR: 90 (16 Jul 2023 12:21) (85 - 94)  BP: 121/93 (16 Jul 2023 12:21) (121/93 - 139/81)  BP(mean): --  RR: 16 (16 Jul 2023 12:21) (16 - 18)  SpO2: 98% (16 Jul 2023 12:21) (96% - 99%)    Parameters below as of 15 Jul 2023 20:23  Patient On (Oxygen Delivery Method): room air      PHYSICAL EXAM:    Constitutional: NAD, awake and alert  HEENT: PERR, EOMI, Normal Hearing, MMM  Neck: Soft and supple, No LAD, No JVD  Respiratory: Breath sounds are clear bilaterally, No wheezing, rales or rhonchi  Cardiovascular: S1 and S2, regular rate and rhythm, no Murmurs, gallops or rubs  Gastrointestinal: Bowel Sounds present, soft, nontender, nondistended, no guarding, no rebound. PEG out. wound site dressed c/d/i  Extremities: No peripheral edema      MEDICATIONS:  MEDICATIONS  (STANDING):  acetaminophen     Tablet .. 500 milliGRAM(s) Oral every 6 hours  apixaban 5 milliGRAM(s) Oral every 12 hours  ascorbic acid 500 milliGRAM(s) Oral daily  chlorhexidine 2% Cloths 1 Application(s) Topical <User Schedule>  diltiazem    milliGRAM(s) Oral daily  diphenhydrAMINE 25 milliGRAM(s) Oral once  magnesium sulfate  IVPB 2 Gram(s) IV Intermittent once  metoprolol tartrate 12.5 milliGRAM(s) Oral two times a day  nystatin Cream 1 Application(s) Topical two times a day  petrolatum white Ointment 1 Application(s) Topical two times a day  senna 2 Tablet(s) Oral at bedtime  trimethobenzamide Injectable 200 milliGRAM(s) IntraMuscular once  vitamin A &amp; D Ointment 1 Application(s) Topical daily  zinc sulfate 220 milliGRAM(s) Oral daily      LABS: All Labs Reviewed:                        9.3    9.68  )-----------( 504      ( 16 Jul 2023 08:09 )             30.7     07-16    139  |  101  |  10  ----------------------------<  64<L>  4.3   |  25  |  0.8    Ca    10.1      16 Jul 2023 08:09  Mg     1.7     07-16    TPro  7.3  /  Alb  3.4<L>  /  TBili  0.2  /  DBili  x   /  AST  23  /  ALT  24  /  AlkPhos  298<H>  07-16          Blood Culture:     Radiology: reviewed

## 2023-07-16 NOTE — PROGRESS NOTE ADULT - PROVIDER SPECIALTY LIST ADULT
Critical Care
Critical Care
Hospitalist
Infectious Disease
Internal Medicine
Nephrology
Pulmonology
Thoracic Surgery
Critical Care
Hospitalist
Infectious Disease
Internal Medicine
MICU
Nephrology
Nephrology
Pulmonology
Thoracic Surgery
CT Surgery
Critical Care
Hospitalist
Infectious Disease
Internal Medicine
MICU
Nephrology
Nutrition Support
Nutrition Support
Pulmonology
Thoracic Surgery
Critical Care
Electrophysiology
Infectious Disease
Internal Medicine
MICU
Nephrology
Nutrition Support
Pulmonology
Pulmonology
Thoracic Surgery
Critical Care
Infectious Disease

## 2023-07-16 NOTE — CHART NOTE - NSCHARTNOTEFT_GEN_A_CORE
PEG and PEG sutures removed (4 sutures). PEG removed and covered PEG site with dressing. Patient left stable.

## 2023-07-17 ENCOUNTER — TRANSCRIPTION ENCOUNTER (OUTPATIENT)
Age: 43
End: 2023-07-17

## 2023-07-17 VITALS
DIASTOLIC BLOOD PRESSURE: 89 MMHG | RESPIRATION RATE: 18 BRPM | HEART RATE: 97 BPM | OXYGEN SATURATION: 97 % | TEMPERATURE: 97 F | SYSTOLIC BLOOD PRESSURE: 136 MMHG

## 2023-07-17 LAB
ALBUMIN SERPL ELPH-MCNC: 3.8 G/DL — SIGNIFICANT CHANGE UP (ref 3.5–5.2)
ALP SERPL-CCNC: 276 U/L — HIGH (ref 30–115)
ALT FLD-CCNC: 22 U/L — SIGNIFICANT CHANGE UP (ref 0–41)
ANION GAP SERPL CALC-SCNC: 11 MMOL/L — SIGNIFICANT CHANGE UP (ref 7–14)
AST SERPL-CCNC: 22 U/L — SIGNIFICANT CHANGE UP (ref 0–41)
BILIRUB SERPL-MCNC: 0.2 MG/DL — SIGNIFICANT CHANGE UP (ref 0.2–1.2)
BLD GP AB SCN SERPL QL: SIGNIFICANT CHANGE UP
BUN SERPL-MCNC: 12 MG/DL — SIGNIFICANT CHANGE UP (ref 10–20)
CALCIUM SERPL-MCNC: 10.3 MG/DL — SIGNIFICANT CHANGE UP (ref 8.4–10.4)
CHLORIDE SERPL-SCNC: 100 MMOL/L — SIGNIFICANT CHANGE UP (ref 98–110)
CO2 SERPL-SCNC: 28 MMOL/L — SIGNIFICANT CHANGE UP (ref 17–32)
CREAT SERPL-MCNC: 0.9 MG/DL — SIGNIFICANT CHANGE UP (ref 0.7–1.5)
EGFR: 82 ML/MIN/1.73M2 — SIGNIFICANT CHANGE UP
GLUCOSE SERPL-MCNC: 88 MG/DL — SIGNIFICANT CHANGE UP (ref 70–99)
HCT VFR BLD CALC: 32 % — LOW (ref 37–47)
HGB BLD-MCNC: 9.7 G/DL — LOW (ref 12–16)
MAGNESIUM SERPL-MCNC: 1.8 MG/DL — SIGNIFICANT CHANGE UP (ref 1.8–2.4)
MCHC RBC-ENTMCNC: 25.1 PG — LOW (ref 27–31)
MCHC RBC-ENTMCNC: 30.3 G/DL — LOW (ref 32–37)
MCV RBC AUTO: 82.7 FL — SIGNIFICANT CHANGE UP (ref 81–99)
NRBC # BLD: 0 /100 WBCS — SIGNIFICANT CHANGE UP (ref 0–0)
PLATELET # BLD AUTO: 528 K/UL — HIGH (ref 130–400)
PMV BLD: 9.2 FL — SIGNIFICANT CHANGE UP (ref 7.4–10.4)
POTASSIUM SERPL-MCNC: 5 MMOL/L — SIGNIFICANT CHANGE UP (ref 3.5–5)
POTASSIUM SERPL-SCNC: 5 MMOL/L — SIGNIFICANT CHANGE UP (ref 3.5–5)
PROT SERPL-MCNC: 7.8 G/DL — SIGNIFICANT CHANGE UP (ref 6–8)
RBC # BLD: 3.87 M/UL — LOW (ref 4.2–5.4)
RBC # FLD: 25.8 % — HIGH (ref 11.5–14.5)
SODIUM SERPL-SCNC: 139 MMOL/L — SIGNIFICANT CHANGE UP (ref 135–146)
WBC # BLD: 9.56 K/UL — SIGNIFICANT CHANGE UP (ref 4.8–10.8)
WBC # FLD AUTO: 9.56 K/UL — SIGNIFICANT CHANGE UP (ref 4.8–10.8)

## 2023-07-17 PROCEDURE — 99239 HOSP IP/OBS DSCHRG MGMT >30: CPT

## 2023-07-17 RX ORDER — APIXABAN 2.5 MG/1
1 TABLET, FILM COATED ORAL
Qty: 60 | Refills: 0
Start: 2023-07-17 | End: 2023-08-15

## 2023-07-17 RX ORDER — SENNA PLUS 8.6 MG/1
2 TABLET ORAL
Qty: 60 | Refills: 0
Start: 2023-07-17 | End: 2023-08-15

## 2023-07-17 RX ORDER — DILTIAZEM HCL 120 MG
1 CAPSULE, EXT RELEASE 24 HR ORAL
Qty: 30 | Refills: 0
Start: 2023-07-17 | End: 2023-08-15

## 2023-07-17 RX ORDER — ZINC SULFATE TAB 220 MG (50 MG ZINC EQUIVALENT) 220 (50 ZN) MG
1 TAB ORAL
Qty: 30 | Refills: 0
Start: 2023-07-17 | End: 2023-08-15

## 2023-07-17 RX ORDER — METOPROLOL TARTRATE 50 MG
1 TABLET ORAL
Qty: 30 | Refills: 0
Start: 2023-07-17 | End: 2023-08-15

## 2023-07-17 RX ADMIN — OXYCODONE HYDROCHLORIDE 5 MILLIGRAM(S): 5 TABLET ORAL at 00:34

## 2023-07-17 RX ADMIN — Medication 500 MILLIGRAM(S): at 00:02

## 2023-07-17 RX ADMIN — Medication 12.5 MILLIGRAM(S): at 06:41

## 2023-07-17 RX ADMIN — Medication 500 MILLIGRAM(S): at 06:40

## 2023-07-17 RX ADMIN — Medication 120 MILLIGRAM(S): at 06:41

## 2023-07-17 RX ADMIN — Medication 500 MILLIGRAM(S): at 00:30

## 2023-07-17 RX ADMIN — Medication 0.5 MILLIGRAM(S): at 00:03

## 2023-07-17 RX ADMIN — ZINC SULFATE TAB 220 MG (50 MG ZINC EQUIVALENT) 220 MILLIGRAM(S): 220 (50 ZN) TAB at 11:38

## 2023-07-17 RX ADMIN — Medication 500 MILLIGRAM(S): at 11:38

## 2023-07-17 RX ADMIN — OXYCODONE HYDROCHLORIDE 5 MILLIGRAM(S): 5 TABLET ORAL at 01:00

## 2023-07-17 RX ADMIN — NYSTATIN CREAM 1 APPLICATION(S): 100000 CREAM TOPICAL at 06:40

## 2023-07-17 RX ADMIN — APIXABAN 5 MILLIGRAM(S): 2.5 TABLET, FILM COATED ORAL at 06:41

## 2023-07-17 RX ADMIN — Medication 1 APPLICATION(S): at 11:38

## 2023-07-17 RX ADMIN — CHLORHEXIDINE GLUCONATE 1 APPLICATION(S): 213 SOLUTION TOPICAL at 06:41

## 2023-07-17 NOTE — DISCHARGE NOTE NURSING/CASE MANAGEMENT/SOCIAL WORK - PATIENT PORTAL LINK FT
You can access the FollowMyHealth Patient Portal offered by [facility] by registering at the following website: [email or web address]. By joining [employer]’s FollowMyHealth portal, you will also be able to view your health information using other applications (apps) compatible with our system.

## 2023-07-17 NOTE — DISCHARGE NOTE NURSING/CASE MANAGEMENT/SOCIAL WORK - NSDCPEFALRISK_GEN_ALL_CORE
For information on Fall & Injury Prevention, visit: [email or web address] OR  [email or web address] OR  https://www.cdc.gov/steadi/patient.html

## 2023-07-18 ENCOUNTER — APPOINTMENT (OUTPATIENT)
Dept: CARE COORDINATION | Facility: HOME HEALTH | Age: 43
End: 2023-07-18
Payer: MEDICAID

## 2023-07-18 PROBLEM — Z00.00 ENCOUNTER FOR PREVENTIVE HEALTH EXAMINATION: Status: ACTIVE | Noted: 2023-07-18

## 2023-07-18 PROCEDURE — 99442: CPT

## 2023-07-19 DIAGNOSIS — J18.9 PNEUMONIA, UNSPECIFIED ORGANISM: ICD-10-CM

## 2023-07-19 DIAGNOSIS — Z86.79 PERSONAL HISTORY OF OTHER DISEASES OF THE CIRCULATORY SYSTEM: ICD-10-CM

## 2023-07-19 DIAGNOSIS — J15.212 PNEUMONIA DUE TO METHICILLIN RESISTANT STAPHYLOCOCCUS AUREUS: ICD-10-CM

## 2023-07-19 DIAGNOSIS — Z87.09 PERSONAL HISTORY OF OTHER DISEASES OF THE RESPIRATORY SYSTEM: ICD-10-CM

## 2023-07-19 DIAGNOSIS — Z86.59 PERSONAL HISTORY OF OTHER MENTAL AND BEHAVIORAL DISORDERS: ICD-10-CM

## 2023-07-19 DIAGNOSIS — Z93.0 TRACHEOSTOMY STATUS: ICD-10-CM

## 2023-07-19 DIAGNOSIS — J98.4 PNEUMONIA, UNSPECIFIED ORGANISM: ICD-10-CM

## 2023-07-19 DIAGNOSIS — Z86.2 PERSONAL HISTORY OF DISEASES OF THE BLOOD AND BLOOD-FORMING ORGANS AND CERTAIN DISORDERS INVOLVING THE IMMUNE MECHANISM: ICD-10-CM

## 2023-07-19 DIAGNOSIS — J96.00 ACUTE RESPIRATORY FAILURE, UNSPECIFIED WHETHER WITH HYPOXIA OR HYPERCAPNIA: ICD-10-CM

## 2023-07-19 LAB
CULTURE RESULTS: SIGNIFICANT CHANGE UP
SPECIMEN SOURCE: SIGNIFICANT CHANGE UP

## 2023-07-19 RX ORDER — SENNOSIDES 8.6 MG TABLETS 8.6 MG/1
TABLET ORAL
Refills: 0 | Status: ACTIVE | COMMUNITY

## 2023-07-19 RX ORDER — METOPROLOL TARTRATE 25 MG/1
25 TABLET, FILM COATED ORAL
Refills: 0 | Status: ACTIVE | COMMUNITY

## 2023-07-19 RX ORDER — COLD-HOT PACK
EACH MISCELLANEOUS
Refills: 0 | Status: ACTIVE | COMMUNITY

## 2023-07-19 RX ORDER — ASCORBIC ACID 500 MG
500 TABLET ORAL
Refills: 0 | Status: ACTIVE | COMMUNITY

## 2023-07-19 RX ORDER — APIXABAN 5 MG/1
5 TABLET, FILM COATED ORAL
Refills: 0 | Status: ACTIVE | COMMUNITY

## 2023-07-19 RX ORDER — DILTIAZEM HYDROCHLORIDE 120 MG/1
120 CAPSULE, COATED, EXTENDED RELEASE ORAL
Refills: 0 | Status: ACTIVE | COMMUNITY

## 2023-07-19 NOTE — ASSESSMENT
[FreeTextEntry1] : "42 year-old female with PMH of Asthma, HTN? presents with complaint of cough x3 days and SOB. During my encounter pt with dyspnea and increased work of breathing and chest pain, unable to properly provide accurate history. She states that her cough has been progressively worsening over the past 3 days, endorses hemoptysis since yesterday. She states she had a friend who came over and stayed with for more than a week who was sick recently, but she does not know if she recently travelled out of State or not. She also admits that her children has been sick with Sore throat and fever for past 3 days. She admits to fever, chills, sever Chest pain which has gotten worse since she came to the ED. She took 1x Tamiflu yesterday. She otherwise denies diarrhea, constipation, urinary symptoms. She is not vaccinated for Influenza or COVID-19. RVP + Influenza B.  CT Angio Chest PE Protocol w/ IV Cont 1. Bilateral patchy groundglass nodularity with dominant confluent left lower lobe opacification with numerous cavitary lesions. Additional contralateral right lower lobe cavitary 1 cm nodule. Findings compatible with cavitary pneumonia in the appropriate clinical setting; differential diagnosis includes tuberculosis.2. Confluent ill-defined left hilar and mediastinal adenopathy, likely reactive, without dominant lymph node delineated.\par 3. No evidence of pulmonary embolism.s/p 1x Rocephin, Meropenem, 3 L LR in ED. Pt admitted to SDU for further managements, during my encounter pt is very agitated, in acute distress. Pt received appropriate pain control with IV Morphine, s/p Xanax 1 mg 1x for agitation and anxiety, despite pain control and adequate fluid resuscitation pt remained tachycardic, tachypneic. STAT EKG reviewed with Cardiology team on call After discussion with Cardiology team, Pt received 1x Adenosine 6 mg IV push with no change. Case discussed with Critical Care team, decision was made for Intubation and Pt upgraded to MICU.Patient admitted to the ICU for acute hypoxemic respiratory failure secondary to cavitary MRSA Pneumonia with a positive influenza B, finished a course of zyvox and tamiflu. This was complicated by loculated parapneumonic effusion for which a chest tube was inserted and tpa was given twice. Her stay was complicated by MRSA bacteremia and for which she took a course of meropenem, currently on vancomycin and metronidazole, cultures negative to date. MASTER was negative for vegetations. Trach and PEG done for inability to wean off vent, currently off sedation. Her stay was also complicated by new onset Afib with RVR for which she is on amiodarone, diltiazem, and therapeutic anticoagulation. Long QT is also noted. Patient also had LANCE that improved with no need for RRT.Otherwise patient received a total of 6 p RBC for acute blood loss anemia ,currently hemoglobin stable.  Patient was clinically and hemodynamically stable for step down unit.  CEU course was uneventful. Patient continued vanco and flagyl. Amiodarone and methadone doses were reduced and EKG was monitored daily for any increase in QTc. Acute hypoxemic respiratory failure / s/P Trach and PEG. Severe cavitary CAP MRSA. Small parapneumonic effusion SP Chest tube. MRSA bacteremia. Influenza B. Hx of Asthma. Intubated 5/23 self-extubated 5/25, re-intubated 5/25; SBT failed, SP trach/G tube s/p Zyvox, meropenem and Tamifu, Vancomycin and flagyl. S/P course of steroids now off. Blood cultures NGTD SINCE 5/28. S/P MASTER 5/31: No evidence of valvular vegetations or intracardiac abscesses to support IE. Pulmonary hepatisation incidental finding. EF 65%, mild TR and pulm HTN. Ventilator management per pulm/cc. Decannulation 7/14. PEG dc 7/15. Transaminitis - resolved. LFT's trending down, then normal. RUQ US - cholelithiasis. Hyperkalemia s/p Lokelma 10 mg x 1 dose today, f/up repeated BMP at 4. S/P lokelma 5mg 1 dose overnight 7/5 - f/u 11am BMP. S/P lokelma 10mg 7/7. Maculopapular buttocks rash. PCR skin scraping negative for HSV --> off Acyclovir Wound care per burn, wound care RN following Microcytic Anemia likely anemia of chronic disease s/p 6 units prbc so far last 6/5. On Eliquis 5mg Q12H, monitor hemoglobin. New onset A-fib w/ RVR. HR better controlled. S/P Amio drip, then on PO - amio d/c per EP rec. Diltiazem 30mg q6 with holding parameters - if  or less or HR 60 or less. On eliquis, free T3 and free t4 normal.  Anxiety psychiatry recs, zyprexa 2.5mg q8 PRN - discontinued. Ativan .5mg qhs PRN. QTC is 540 7/14.  The patient was discharged home in stable condition with home care services and follow up care.\par \par \par Cavitary MRSA PNA, S/P Acute Respiratory Failure, S/P Chest Tube, S/P Trach, Decannulation\par Continue established treatment plan with follow up\par Monitor for worsening  signs and symptoms  and reviewed when to call medical provider\par Pt verbalized good understanding\par Follow up with Medical providers: PCP\par \par AFib: stable\par Continue established treatment plan with follow up\par Continue Eliquis, Diltiazem, metoprolol\par Monitor for worsening  signs and symptoms  and reviewed when to call medical provider\par Pt verbalized good understanding\par Bleeding precautions reviewed with the pt \par Follow up with PCP and Cardiologist\par \par Anxiety\par Continue established treatment plan with follow up\par Continue anti-anxiety meds as per in-patient team\par Monitor for worsening  signs and symptoms  and reviewed when to call medical provider\par Pt verbalized good understanding\par Follow up with Medical providers: PCP, Behavioral Health\par \par Sacral Open wound: Stage 2\par Continue established treatment plan with follow up\par Tylenol prn for pain, turn & position q 2 hours\par Continue local wound care with Home Care Services\par Monitor for worsening signs and symptoms of infection and when to call medical provider\par Pt and RN  verbalized good understanding\par Follow up with Medical providers: PCP\par \par \par Pt will need continued Home Care Services RN\par This patient is Enrolled in the [facility] Follow Up Program through [employer]\par Recommended  referral to assist with PCP follow up appointment\par CN reviewed with the pt that [facility] until pt is seen by the PCP.    Reviewed with pt and Home Care RN if symptoms worsen pt will need to call 911 or EMS to be evaluated at local ED.  Pt and Home Care RN verbalized good understanding.\par CN will be assigned to sign Home Care orders post-discharge for continuity of care.\par \par \par \par

## 2023-07-19 NOTE — HISTORY OF PRESENT ILLNESS
[FreeTextEntry1] : Follow up post discharge s/p recent hospitalization\par  [de-identified] : This patient is Enrolled in the Post-Discharge [facility] Follow Up Program through [facility] for Continuity of Care S/P Recent Hospitalization until seen by PCP.\par Copied As per [facility] Discharge Summary\par \par "42 year-old female with PMH of Asthma, HTN? presents with complaint of cough x3 days and SOB. During my encounter pt with dyspnea and increased work of breathing and chest pain, unable to properly provide accurate history. She states that her cough has been progressively worsening over the past 3 days, endorses hemoptysis since yesterday. She states she had a friend who came over and stayed with for more than a week who was sick recently, but she does not know if she recently travelled out of State or not. She also admits that her children has been sick with Sore throat and fever for past 3 days. She admits to fever, chills, sever Chest pain which has gotten worse since she came to the ED. She took 1x Tamiflu yesterday. She otherwise denies diarrhea, constipation, urinary symptoms. She is not vaccinated for Influenza or COVID-19. RVP + Influenza B.  CT Angio Chest PE Protocol w/ IV Cont 1. Bilateral patchy groundglass nodularity with dominant confluent left lower lobe opacification with numerous cavitary lesions. Additional contralateral right lower lobe cavitary 1 cm nodule. Findings compatible with cavitary pneumonia in the appropriate clinical setting; differential diagnosis includes tuberculosis.2. Confluent ill-defined left hilar and mediastinal adenopathy, likely reactive, without dominant lymph node delineated.\par 3. No evidence of pulmonary embolism.s/p 1x Rocephin, Meropenem, 3 L LR in ED. Pt admitted to SDU for further managements, during my encounter pt is very agitated, in acute distress. Pt received appropriate pain control with IV Morphine, s/p Xanax 1 mg 1x for agitation and anxiety, despite pain control and adequate fluid resuscitation pt remained tachycardic, tachypneic. STAT EKG reviewed with Cardiology team on call After discussion with Cardiology team, Pt received 1x Adenosine 6 mg IV push with no change. Case discussed with Critical Care team, decision was made for Intubation and Pt upgraded to MICU.Patient admitted to the ICU for acute hypoxemic respiratory failure secondary to cavitary MRSA Pneumonia with a positive influenza B, finished a course of zyvox and tamiflu. This was complicated by loculated parapneumonic effusion for which a chest tube was inserted and tpa was given twice. Her stay was complicated by MRSA bacteremia and for which she took a course of meropenem, currently on vancomycin and metronidazole, cultures negative to date. MASTER was negative for vegetations. Trach and PEG done for inability to wean off vent, currently off sedation. Her stay was also complicated by new onset Afib with RVR for which she is on amiodarone, diltiazem, and therapeutic anticoagulation. Long QT is also noted. Patient also had LANCE that improved with no need for RRT.Otherwise patient received a total of 6 p RBC for acute blood loss anemia ,currently hemoglobin stable.  Patient was clinically and hemodynamically stable for step down unit.  CEU course was uneventful. Patient continued vanco and flagyl. Amiodarone and methadone doses were reduced and EKG was monitored daily for any increase in QTc. Acute hypoxemic respiratory failure / s/P Trach and PEG. Severe cavitary CAP MRSA. Small parapneumonic effusion SP Chest tube. MRSA bacteremia. Influenza B. Hx of Asthma. Intubated 5/23 self-extubated 5/25, re-intubated 5/25; SBT failed, SP trach/G tube s/p Zyvox, meropenem and Tamifu, Vancomycin and flagyl. S/P course of steroids now off. Blood cultures NGTD SINCE 5/28. S/P MASTER 5/31: No evidence of valvular vegetations or intracardiac abscesses to support IE. Pulmonary hepatisation incidental finding. EF 65%, mild TR and pulm HTN. Ventilator management per pulm/cc. Decannulation 7/14. PEG dc 7/15. Transaminitis - resolved. LFT's trending down, then normal. RUQ US - cholelithiasis. Hyperkalemia s/p Lokelma 10 mg x 1 dose today, f/up repeated BMP at 4. S/P lokelma 5mg 1 dose overnight 7/5 - f/u 11am BMP. S/P lokelma 10mg 7/7. Maculopapular buttocks rash. PCR skin scraping negative for HSV --> off Acyclovir \par Wound care per burn, wound care RN following Microcytic Anemia likely anemia of chronic disease s/p 6 units prbc so far last 6/5. On Eliquis 5mg Q12H, monitor hemoglobin. New onset A-fib w/ RVR. HR better controlled. S/P Amio drip, then on PO - amio d/c per EP rec. Diltiazem 30mg q6 with holding parameters - if  or less or HR 60 or less. On eliquis, free T3 and free t4 normal.  Anxiety psychiatry recs, zyprexa zydis 2.5mg q8 PRN - discontinued. Ativan .5mg qhs PRN. QTC is 540 7/14.  The patient was discharged home in stable condition with home care services and follow up care.\par \par CN was not able to connect with the patient via TeleHealth due to technology challenges.  Telephonic follow up was conducted.  The patient denies chest pain, shortness of breath, palpitations, abdominal pain, nausea, vomiting, fever or chills.  The patient is able to speak in complete sentences and with no audible wheeze.  Pt notes sacral pain and anxiety.  Home Care RN to contact in-patient team re: anxiety med rx.\par \par CN reviewed that pt  is under the [facility] program for home care until pt is seen by  PCP.   CN will be assigned to sign Home Care orders post-discharge.\par

## 2023-08-24 ENCOUNTER — APPOINTMENT (OUTPATIENT)
Dept: INTERNAL MEDICINE | Facility: CLINIC | Age: 43
End: 2023-08-24

## 2023-10-09 ENCOUNTER — EMERGENCY (EMERGENCY)
Facility: HOSPITAL | Age: 43
LOS: 0 days | Discharge: HOME | End: 2023-10-10
Attending: EMERGENCY MEDICINE
Payer: MEDICAID

## 2023-10-09 VITALS
OXYGEN SATURATION: 98 % | DIASTOLIC BLOOD PRESSURE: 80 MMHG | HEART RATE: 108 BPM | HEIGHT: 67 IN | WEIGHT: 186.95 LBS | SYSTOLIC BLOOD PRESSURE: 148 MMHG | TEMPERATURE: 98 F | RESPIRATION RATE: 14 BRPM

## 2023-10-09 DIAGNOSIS — Z91.018 ALLERGY TO OTHER FOODS: ICD-10-CM

## 2023-10-09 DIAGNOSIS — Z79.01 LONG TERM (CURRENT) USE OF ANTICOAGULANTS: ICD-10-CM

## 2023-10-09 DIAGNOSIS — Z20.822 CONTACT WITH AND (SUSPECTED) EXPOSURE TO COVID-19: ICD-10-CM

## 2023-10-09 DIAGNOSIS — Z88.6 ALLERGY STATUS TO ANALGESIC AGENT: ICD-10-CM

## 2023-10-09 DIAGNOSIS — J10.1 INFLUENZA DUE TO OTHER IDENTIFIED INFLUENZA VIRUS WITH OTHER RESPIRATORY MANIFESTATIONS: ICD-10-CM

## 2023-10-09 DIAGNOSIS — M79.10 MYALGIA, UNSPECIFIED SITE: ICD-10-CM

## 2023-10-09 DIAGNOSIS — J45.909 UNSPECIFIED ASTHMA, UNCOMPLICATED: ICD-10-CM

## 2023-10-09 DIAGNOSIS — R09.81 NASAL CONGESTION: ICD-10-CM

## 2023-10-09 DIAGNOSIS — J02.9 ACUTE PHARYNGITIS, UNSPECIFIED: ICD-10-CM

## 2023-10-09 DIAGNOSIS — Z87.891 PERSONAL HISTORY OF NICOTINE DEPENDENCE: ICD-10-CM

## 2023-10-09 DIAGNOSIS — I48.0 PAROXYSMAL ATRIAL FIBRILLATION: ICD-10-CM

## 2023-10-09 LAB
ALBUMIN SERPL ELPH-MCNC: 4.7 G/DL — SIGNIFICANT CHANGE UP (ref 3.5–5.2)
ALP SERPL-CCNC: 595 U/L — HIGH (ref 30–115)
ALT FLD-CCNC: 62 U/L — HIGH (ref 0–41)
ANION GAP SERPL CALC-SCNC: 15 MMOL/L — HIGH (ref 7–14)
AST SERPL-CCNC: 42 U/L — HIGH (ref 0–41)
BASE EXCESS BLDV CALC-SCNC: -2.4 MMOL/L — LOW (ref -2–3)
BASOPHILS # BLD AUTO: 0.07 K/UL — SIGNIFICANT CHANGE UP (ref 0–0.2)
BASOPHILS NFR BLD AUTO: 0.6 % — SIGNIFICANT CHANGE UP (ref 0–1)
BILIRUB SERPL-MCNC: <0.2 MG/DL — SIGNIFICANT CHANGE UP (ref 0.2–1.2)
BUN SERPL-MCNC: 14 MG/DL — SIGNIFICANT CHANGE UP (ref 10–20)
CA-I SERPL-SCNC: 1.28 MMOL/L — SIGNIFICANT CHANGE UP (ref 1.15–1.33)
CALCIUM SERPL-MCNC: 10.1 MG/DL — SIGNIFICANT CHANGE UP (ref 8.4–10.5)
CHLORIDE SERPL-SCNC: 101 MMOL/L — SIGNIFICANT CHANGE UP (ref 98–110)
CO2 SERPL-SCNC: 20 MMOL/L — SIGNIFICANT CHANGE UP (ref 17–32)
CREAT SERPL-MCNC: 0.9 MG/DL — SIGNIFICANT CHANGE UP (ref 0.7–1.5)
EGFR: 82 ML/MIN/1.73M2 — SIGNIFICANT CHANGE UP
EOSINOPHIL # BLD AUTO: 0 K/UL — SIGNIFICANT CHANGE UP (ref 0–0.7)
EOSINOPHIL NFR BLD AUTO: 0 % — SIGNIFICANT CHANGE UP (ref 0–8)
FLUAV AG NPH QL: DETECTED
FLUBV AG NPH QL: SIGNIFICANT CHANGE UP
GAS PNL BLDV: 135 MMOL/L — LOW (ref 136–145)
GAS PNL BLDV: SIGNIFICANT CHANGE UP
GAS PNL BLDV: SIGNIFICANT CHANGE UP
GLUCOSE SERPL-MCNC: 112 MG/DL — HIGH (ref 70–99)
HCG SERPL QL: NEGATIVE — SIGNIFICANT CHANGE UP
HCO3 BLDV-SCNC: 22 MMOL/L — SIGNIFICANT CHANGE UP (ref 22–29)
HCT VFR BLD CALC: 41.1 % — SIGNIFICANT CHANGE UP (ref 37–47)
HCT VFR BLDA CALC: 40 % — SIGNIFICANT CHANGE UP (ref 39–51)
HGB BLD CALC-MCNC: 13.4 G/DL — SIGNIFICANT CHANGE UP (ref 12.6–17.4)
HGB BLD-MCNC: 13 G/DL — SIGNIFICANT CHANGE UP (ref 12–16)
IMM GRANULOCYTES NFR BLD AUTO: 0.5 % — HIGH (ref 0.1–0.3)
LACTATE BLDV-MCNC: 1.8 MMOL/L — SIGNIFICANT CHANGE UP (ref 0.5–2)
LYMPHOCYTES # BLD AUTO: 0.22 K/UL — LOW (ref 1.2–3.4)
LYMPHOCYTES # BLD AUTO: 1.9 % — LOW (ref 20.5–51.1)
MCHC RBC-ENTMCNC: 25 PG — LOW (ref 27–31)
MCHC RBC-ENTMCNC: 31.6 G/DL — LOW (ref 32–37)
MCV RBC AUTO: 79 FL — LOW (ref 81–99)
MONOCYTES # BLD AUTO: 0.92 K/UL — HIGH (ref 0.1–0.6)
MONOCYTES NFR BLD AUTO: 8 % — SIGNIFICANT CHANGE UP (ref 1.7–9.3)
NEUTROPHILS # BLD AUTO: 10.26 K/UL — HIGH (ref 1.4–6.5)
NEUTROPHILS NFR BLD AUTO: 89 % — HIGH (ref 42.2–75.2)
NRBC # BLD: 0 /100 WBCS — SIGNIFICANT CHANGE UP (ref 0–0)
PCO2 BLDV: 34 MMHG — LOW (ref 39–42)
PH BLDV: 7.41 — SIGNIFICANT CHANGE UP (ref 7.32–7.43)
PLATELET # BLD AUTO: 301 K/UL — SIGNIFICANT CHANGE UP (ref 130–400)
PMV BLD: 10.7 FL — HIGH (ref 7.4–10.4)
PO2 BLDV: 54 MMHG — SIGNIFICANT CHANGE UP
POTASSIUM BLDV-SCNC: 4.2 MMOL/L — SIGNIFICANT CHANGE UP (ref 3.5–5.1)
POTASSIUM SERPL-MCNC: 3.9 MMOL/L — SIGNIFICANT CHANGE UP (ref 3.5–5)
POTASSIUM SERPL-SCNC: 3.9 MMOL/L — SIGNIFICANT CHANGE UP (ref 3.5–5)
PROT SERPL-MCNC: 8 G/DL — SIGNIFICANT CHANGE UP (ref 6–8)
RBC # BLD: 5.2 M/UL — SIGNIFICANT CHANGE UP (ref 4.2–5.4)
RBC # FLD: 14.2 % — SIGNIFICANT CHANGE UP (ref 11.5–14.5)
RSV RNA NPH QL NAA+NON-PROBE: SIGNIFICANT CHANGE UP
SAO2 % BLDV: 84.4 % — SIGNIFICANT CHANGE UP
SARS-COV-2 RNA SPEC QL NAA+PROBE: SIGNIFICANT CHANGE UP
SODIUM SERPL-SCNC: 136 MMOL/L — SIGNIFICANT CHANGE UP (ref 135–146)
TROPONIN T SERPL-MCNC: <0.01 NG/ML — SIGNIFICANT CHANGE UP
WBC # BLD: 11.53 K/UL — HIGH (ref 4.8–10.8)
WBC # FLD AUTO: 11.53 K/UL — HIGH (ref 4.8–10.8)

## 2023-10-09 PROCEDURE — 96374 THER/PROPH/DIAG INJ IV PUSH: CPT

## 2023-10-09 PROCEDURE — 36415 COLL VENOUS BLD VENIPUNCTURE: CPT

## 2023-10-09 PROCEDURE — 85018 HEMOGLOBIN: CPT

## 2023-10-09 PROCEDURE — 84484 ASSAY OF TROPONIN QUANT: CPT

## 2023-10-09 PROCEDURE — 83605 ASSAY OF LACTIC ACID: CPT

## 2023-10-09 PROCEDURE — 82330 ASSAY OF CALCIUM: CPT

## 2023-10-09 PROCEDURE — 71045 X-RAY EXAM CHEST 1 VIEW: CPT | Mod: 26

## 2023-10-09 PROCEDURE — 84703 CHORIONIC GONADOTROPIN ASSAY: CPT

## 2023-10-09 PROCEDURE — 85014 HEMATOCRIT: CPT

## 2023-10-09 PROCEDURE — 80053 COMPREHEN METABOLIC PANEL: CPT

## 2023-10-09 PROCEDURE — 99284 EMERGENCY DEPT VISIT MOD MDM: CPT | Mod: 25

## 2023-10-09 PROCEDURE — 82803 BLOOD GASES ANY COMBINATION: CPT

## 2023-10-09 PROCEDURE — 71045 X-RAY EXAM CHEST 1 VIEW: CPT

## 2023-10-09 PROCEDURE — 99285 EMERGENCY DEPT VISIT HI MDM: CPT

## 2023-10-09 PROCEDURE — 84132 ASSAY OF SERUM POTASSIUM: CPT

## 2023-10-09 PROCEDURE — 94640 AIRWAY INHALATION TREATMENT: CPT

## 2023-10-09 PROCEDURE — 84295 ASSAY OF SERUM SODIUM: CPT

## 2023-10-09 PROCEDURE — 0241U: CPT

## 2023-10-09 PROCEDURE — 85025 COMPLETE CBC W/AUTO DIFF WBC: CPT

## 2023-10-09 RX ORDER — SODIUM CHLORIDE 9 MG/ML
1000 INJECTION INTRAMUSCULAR; INTRAVENOUS; SUBCUTANEOUS ONCE
Refills: 0 | Status: COMPLETED | OUTPATIENT
Start: 2023-10-09 | End: 2023-10-09

## 2023-10-09 RX ORDER — DEXAMETHASONE 0.5 MG/5ML
10 ELIXIR ORAL ONCE
Refills: 0 | Status: COMPLETED | OUTPATIENT
Start: 2023-10-09 | End: 2023-10-09

## 2023-10-09 RX ORDER — IPRATROPIUM/ALBUTEROL SULFATE 18-103MCG
3 AEROSOL WITH ADAPTER (GRAM) INHALATION ONCE
Refills: 0 | Status: COMPLETED | OUTPATIENT
Start: 2023-10-09 | End: 2023-10-09

## 2023-10-09 RX ORDER — ALBUTEROL 90 UG/1
2 AEROSOL, METERED ORAL
Qty: 1 | Refills: 0
Start: 2023-10-09

## 2023-10-09 RX ORDER — ACETAMINOPHEN 500 MG
975 TABLET ORAL ONCE
Refills: 0 | Status: COMPLETED | OUTPATIENT
Start: 2023-10-09 | End: 2023-10-09

## 2023-10-09 RX ORDER — ACETAMINOPHEN WITH CODEINE 300MG-30MG
1 TABLET ORAL ONCE
Refills: 0 | Status: DISCONTINUED | OUTPATIENT
Start: 2023-10-09 | End: 2023-10-09

## 2023-10-09 RX ADMIN — Medication 975 MILLIGRAM(S): at 23:49

## 2023-10-09 RX ADMIN — Medication 100 MILLIGRAM(S): at 21:47

## 2023-10-09 RX ADMIN — Medication 1 TABLET(S): at 22:00

## 2023-10-09 RX ADMIN — SODIUM CHLORIDE 1000 MILLILITER(S): 9 INJECTION INTRAMUSCULAR; INTRAVENOUS; SUBCUTANEOUS at 22:00

## 2023-10-09 RX ADMIN — Medication 3 MILLILITER(S): at 22:00

## 2023-10-09 RX ADMIN — Medication 10 MILLIGRAM(S): at 21:47

## 2023-10-09 NOTE — ED PROVIDER NOTE - CLINICAL SUMMARY MEDICAL DECISION MAKING FREE TEXT BOX
Labs and EKG were ordered and reviewed.  Imaging was ordered and reviewed by me.  Appropriate medications for patient's presenting complaints were ordered and effects were reassessed.  Patient's records (prior hospital, ED visit, and/or nursing home notes if available) were reviewed.  Additional history was obtained from EMS, family, and/or PCP (where available).  Escalation to admission/observation was considered.  However, patient is well appearing without respiratory distress or hypoxemia.  Patient is well appearing  Will treat with oral meds with plan to return for any new or worsening and close outpatient follow up.

## 2023-10-09 NOTE — ED PROVIDER NOTE - PHYSICAL EXAMINATION
CONSTITUTIONAL: Well-appearing;  in no apparent distress.   EYES: PERRL; EOM intact.   ENT: + rhinorrhea. normal pharynx.   CARDIOVASCULAR: Normal S1, S2; no murmurs, rubs, or gallops.   RESPIRATORY: RR - 20. speaking full sentence. no accessory muscles use. no wheezing or rhonchi. + non-productive coughing during inspiration.   GI: Normal bowel sounds; non-distended; non-tender; no palpable organomegaly.   MS: No calf swelling and tenderness. no pitting edema to b/l LE.   SKIN: Normal for age and race; warm; dry; good turgor; no apparent lesions or exudate.   NEURO/PSYCH: A & O x 3; grossly unremarkable. anxious mood.

## 2023-10-09 NOTE — ED PROVIDER NOTE - PATIENT PORTAL LINK FT
You can access the FollowMyHealth Patient Portal offered by [facility] by registering at the following website: [email or web address]. By joining [employer]’s FollowMyHealth portal, you will also be able to view your health information using other applications (apps) compatible with our system.

## 2023-10-09 NOTE — ED PROVIDER NOTE - NSFOLLOWUPINSTRUCTIONS_ED_ALL_ED_FT
Influenza    WHAT YOU NEED TO KNOW:    Influenza (the flu) is an infection caused by the influenza virus. The flu is easily spread when an infected person coughs, sneezes, or has close contact with others. You may be able to spread the flu to others for 1 week or longer after signs or symptoms appear.     DISCHARGE INSTRUCTIONS:    Call your local emergency number (911 in the ) if:  You have trouble breathing, and your lips look purple or blue.  You have a seizure.    Call your doctor if:     You are dizzy, or you are urinating less or not at all.   You have a headache with a stiff neck, and you feel tired or confused.  You have new pain or pressure in your chest.  Your symptoms, such as shortness of breath, vomiting, or diarrhea, get worse.   Your symptoms, such as fever and coughing, seem to get better, but then get worse.   You have new muscle pain or weakness.  You have questions or concerns about your condition or care.    Medicines: You may need any of the following:     Acetaminophen decreases pain and fever. It is available without a doctor's order. Ask how much to take and how often to take it. Follow directions. Read the labels of all other medicines you are using to see if they also contain acetaminophen, or ask your doctor or pharmacist. Acetaminophen can cause liver damage if not taken correctly. Do not use more than 4 grams (4,000 milligrams) total of acetaminophen in one day.     NSAIDs, such as ibuprofen, help decrease swelling, pain, and fever. This medicine is available with or without a doctor's order. NSAIDs can cause stomach bleeding or kidney problems in certain people. If you take blood thinner medicine, always ask your healthcare provider if NSAIDs are safe for you. Always read the medicine label and follow directions.    Antivirals help fight a viral infection.    Take your medicine as directed. Contact your healthcare provider if you think your medicine is not helping or if you have side effects. Tell him or her if you are allergic to any medicine. Keep a list of the medicines, vitamins, and herbs you take. Include the amounts, and when and why you take them. Bring the list or the pill bottles to follow-up visits. Carry your medicine list with you in case of an emergency.    Rest as much as you can to help you recover.    Drink liquids as directed to help prevent dehydration. Ask how much liquid to drink each day and which liquids are best for you.    Prevent the spread of influenza:   Wash your hands often. Use soap and water. Wash your hands after you use the bathroom, change a child's diapers, or sneeze. Wash your hands before you prepare or eat food. Use gel hand cleanser that has 60% alcohol, when soap and water are not available. Do not touch your eyes, nose, or mouth unless you have washed your hands first.    Handwashing    Cover your mouth when you sneeze or cough. Cough into a tissue or the bend of your arm. If you use a tissue, throw it away immediately and wash your hands.     Clean shared items with a germ-killing . Clean table surfaces, doorknobs, and light switches. Do not share towels, silverware, and dishes with people who are sick. Wash bed sheets, towels, silverware, and dishes with soap and water.     Wear a mask over your mouth and nose if you are sick. The face mask may help protect others from becoming infected with the flu. Wear the mask when in common areas of your home or if you seek care with a healthcare provider.     Stay away from others if you are sick. Stay at home until 24 hours after your fever and symptoms are gone.    Influenza vaccine helps prevent influenza (flu). Everyone older than 6 months should get a yearly influenza vaccine. Get the vaccine as soon as it is available, usually in September or October each year.    Follow up with your healthcare provider as directed: Write down your questions so you remember to ask them during your visits.

## 2023-10-09 NOTE — ED PROVIDER NOTE - PROGRESS NOTE DETAILS
patient feeling better but still with non-productive coughing. no respiratory distress. will treat with tamiflu. encourage patient to return ED for worsening/concerning symptoms.

## 2023-10-09 NOTE — ED PROVIDER NOTE - OBJECTIVE STATEMENT
43 yo male hx of asthma, p a.fib present c/o flu like illness x 1 day. + bodya aches + sore throat + runny nose + non-productive coughing + weakness + HA + fever + chill + nausea. patient's son with similar sxs at home. reported she had bacteremia and pneumonia from the flu in May 2023 and complicated with intubation and tracheostomy at that time. denies dizziness/chest pain/abd pain/vomiting and diarrhea/urinary sxs.

## 2023-10-10 RX ADMIN — Medication 75 MILLIGRAM(S): at 00:44

## 2023-10-10 RX ADMIN — Medication 100 MILLIGRAM(S): at 00:44

## 2023-10-10 NOTE — ED ADULT NURSE NOTE - NSFALLUNIVINTERV_ED_ALL_ED
Bed/Stretcher in lowest position, wheels locked, appropriate side rails in place/Call bell, personal items and telephone in reach/Instruct patient to call for assistance before getting out of bed/chair/stretcher/Non-slip footwear applied when patient is off stretcher/[place] to call system/Physically safe environment - no spills, clutter or unnecessary equipment/Purposeful proactive rounding/Room/bathroom lighting operational, light cord in reach

## 2023-10-10 NOTE — ED ADULT NURSE NOTE - NSFALLRISKFACTORS_ED_ALL_ED
Problem: Falls - Risk of  Goal: *Absence of Falls  Document Kizzy Fall Risk and appropriate interventions in the flowsheet. Outcome: Progressing Towards Goal  Fall Risk Interventions:  Mobility Interventions: Bed/chair exit alarm    Mentation Interventions: Adequate sleep, hydration, pain control, Bed/chair exit alarm, Toileting rounds, Door open when patient unattended    Medication Interventions: Bed/chair exit alarm    Elimination Interventions: Bed/chair exit alarm, Toileting schedule/hourly rounds    History of Falls Interventions: Bed/chair exit alarm, Door open when patient unattended    Pt received in bed asleep. Respirations even and unlabored. No stress noted.  Will cont to monitor q15 min by staff No indicators present

## 2024-01-22 ENCOUNTER — TRANSCRIPTION ENCOUNTER (OUTPATIENT)
Age: 44
End: 2024-01-22

## 2024-01-22 ENCOUNTER — INPATIENT (INPATIENT)
Facility: HOSPITAL | Age: 44
LOS: 1 days | Discharge: HOME | DRG: 263 | End: 2024-01-24
Attending: SURGERY | Admitting: SURGERY
Payer: MEDICAID

## 2024-01-22 VITALS
OXYGEN SATURATION: 96 % | HEART RATE: 105 BPM | DIASTOLIC BLOOD PRESSURE: 78 MMHG | TEMPERATURE: 98 F | WEIGHT: 190.04 LBS | RESPIRATION RATE: 18 BRPM | SYSTOLIC BLOOD PRESSURE: 146 MMHG

## 2024-01-22 DIAGNOSIS — Z98.890 OTHER SPECIFIED POSTPROCEDURAL STATES: Chronic | ICD-10-CM

## 2024-01-22 DIAGNOSIS — Z92.89 PERSONAL HISTORY OF OTHER MEDICAL TREATMENT: Chronic | ICD-10-CM

## 2024-01-22 DIAGNOSIS — K81.9 CHOLECYSTITIS, UNSPECIFIED: ICD-10-CM

## 2024-01-22 PROBLEM — J45.909 UNSPECIFIED ASTHMA, UNCOMPLICATED: Chronic | Status: ACTIVE | Noted: 2023-10-10

## 2024-01-22 PROBLEM — I48.0 PAROXYSMAL ATRIAL FIBRILLATION: Chronic | Status: ACTIVE | Noted: 2023-10-10

## 2024-01-22 LAB
ALBUMIN SERPL ELPH-MCNC: 4.1 G/DL — SIGNIFICANT CHANGE UP (ref 3.5–5.2)
ALBUMIN SERPL ELPH-MCNC: 4.2 G/DL — SIGNIFICANT CHANGE UP (ref 3.5–5.2)
ALP SERPL-CCNC: 249 U/L — HIGH (ref 30–115)
ALP SERPL-CCNC: 253 U/L — HIGH (ref 30–115)
ALT FLD-CCNC: 21 U/L — SIGNIFICANT CHANGE UP (ref 0–41)
ALT FLD-CCNC: 22 U/L — SIGNIFICANT CHANGE UP (ref 0–41)
ANION GAP SERPL CALC-SCNC: 12 MMOL/L — SIGNIFICANT CHANGE UP (ref 7–14)
ANION GAP SERPL CALC-SCNC: 13 MMOL/L — SIGNIFICANT CHANGE UP (ref 7–14)
APPEARANCE UR: CLEAR — SIGNIFICANT CHANGE UP
AST SERPL-CCNC: 14 U/L — SIGNIFICANT CHANGE UP (ref 0–41)
AST SERPL-CCNC: 18 U/L — SIGNIFICANT CHANGE UP (ref 0–41)
BACTERIA # UR AUTO: NEGATIVE /HPF — SIGNIFICANT CHANGE UP
BASOPHILS # BLD AUTO: 0.13 K/UL — SIGNIFICANT CHANGE UP (ref 0–0.2)
BASOPHILS # BLD AUTO: 0.15 K/UL — SIGNIFICANT CHANGE UP (ref 0–0.2)
BASOPHILS NFR BLD AUTO: 0.5 % — SIGNIFICANT CHANGE UP (ref 0–1)
BASOPHILS NFR BLD AUTO: 0.6 % — SIGNIFICANT CHANGE UP (ref 0–1)
BILIRUB DIRECT SERPL-MCNC: 0.2 MG/DL — SIGNIFICANT CHANGE UP (ref 0–0.3)
BILIRUB INDIRECT FLD-MCNC: 0.3 MG/DL — SIGNIFICANT CHANGE UP (ref 0.2–1.2)
BILIRUB SERPL-MCNC: 0.4 MG/DL — SIGNIFICANT CHANGE UP (ref 0.2–1.2)
BILIRUB SERPL-MCNC: 0.5 MG/DL — SIGNIFICANT CHANGE UP (ref 0.2–1.2)
BILIRUB UR-MCNC: NEGATIVE — SIGNIFICANT CHANGE UP
BLD GP AB SCN SERPL QL: SIGNIFICANT CHANGE UP
BUN SERPL-MCNC: 12 MG/DL — SIGNIFICANT CHANGE UP (ref 10–20)
BUN SERPL-MCNC: 9 MG/DL — LOW (ref 10–20)
CALCIUM SERPL-MCNC: 9.2 MG/DL — SIGNIFICANT CHANGE UP (ref 8.4–10.5)
CALCIUM SERPL-MCNC: 9.5 MG/DL — SIGNIFICANT CHANGE UP (ref 8.4–10.5)
CAST: 0 /LPF — SIGNIFICANT CHANGE UP (ref 0–4)
CHLORIDE SERPL-SCNC: 103 MMOL/L — SIGNIFICANT CHANGE UP (ref 98–110)
CHLORIDE SERPL-SCNC: 105 MMOL/L — SIGNIFICANT CHANGE UP (ref 98–110)
CO2 SERPL-SCNC: 23 MMOL/L — SIGNIFICANT CHANGE UP (ref 17–32)
CO2 SERPL-SCNC: 23 MMOL/L — SIGNIFICANT CHANGE UP (ref 17–32)
COLOR SPEC: YELLOW — SIGNIFICANT CHANGE UP
CREAT SERPL-MCNC: 0.7 MG/DL — SIGNIFICANT CHANGE UP (ref 0.7–1.5)
CREAT SERPL-MCNC: 0.8 MG/DL — SIGNIFICANT CHANGE UP (ref 0.7–1.5)
DIFF PNL FLD: ABNORMAL
EGFR: 110 ML/MIN/1.73M2 — SIGNIFICANT CHANGE UP
EGFR: 94 ML/MIN/1.73M2 — SIGNIFICANT CHANGE UP
EOSINOPHIL # BLD AUTO: 0 K/UL — SIGNIFICANT CHANGE UP (ref 0–0.7)
EOSINOPHIL # BLD AUTO: 0.03 K/UL — SIGNIFICANT CHANGE UP (ref 0–0.7)
EOSINOPHIL NFR BLD AUTO: 0 % — SIGNIFICANT CHANGE UP (ref 0–8)
EOSINOPHIL NFR BLD AUTO: 0.1 % — SIGNIFICANT CHANGE UP (ref 0–8)
GLUCOSE SERPL-MCNC: 108 MG/DL — HIGH (ref 70–99)
GLUCOSE SERPL-MCNC: 98 MG/DL — SIGNIFICANT CHANGE UP (ref 70–99)
GLUCOSE UR QL: NEGATIVE MG/DL — SIGNIFICANT CHANGE UP
HCG SERPL QL: NEGATIVE — SIGNIFICANT CHANGE UP
HCG UR QL: NEGATIVE — SIGNIFICANT CHANGE UP
HCT VFR BLD CALC: 37.8 % — SIGNIFICANT CHANGE UP (ref 37–47)
HCT VFR BLD CALC: 39.5 % — SIGNIFICANT CHANGE UP (ref 37–47)
HGB BLD-MCNC: 11.7 G/DL — LOW (ref 12–16)
HGB BLD-MCNC: 12.1 G/DL — SIGNIFICANT CHANGE UP (ref 12–16)
IMM GRANULOCYTES NFR BLD AUTO: 0.8 % — HIGH (ref 0.1–0.3)
IMM GRANULOCYTES NFR BLD AUTO: 0.8 % — HIGH (ref 0.1–0.3)
INR BLD: 1.16 RATIO — SIGNIFICANT CHANGE UP (ref 0.65–1.3)
INR BLD: 1.16 RATIO — SIGNIFICANT CHANGE UP (ref 0.65–1.3)
KETONES UR-MCNC: ABNORMAL MG/DL
LACTATE SERPL-SCNC: 0.9 MMOL/L — SIGNIFICANT CHANGE UP (ref 0.7–2)
LEUKOCYTE ESTERASE UR-ACNC: NEGATIVE — SIGNIFICANT CHANGE UP
LIDOCAIN IGE QN: 13 U/L — SIGNIFICANT CHANGE UP (ref 7–60)
LIDOCAIN IGE QN: 16 U/L — SIGNIFICANT CHANGE UP (ref 7–60)
LYMPHOCYTES # BLD AUTO: 1.21 K/UL — SIGNIFICANT CHANGE UP (ref 1.2–3.4)
LYMPHOCYTES # BLD AUTO: 1.43 K/UL — SIGNIFICANT CHANGE UP (ref 1.2–3.4)
LYMPHOCYTES # BLD AUTO: 4.2 % — LOW (ref 20.5–51.1)
LYMPHOCYTES # BLD AUTO: 6.7 % — LOW (ref 20.5–51.1)
MAGNESIUM SERPL-MCNC: 1.8 MG/DL — SIGNIFICANT CHANGE UP (ref 1.8–2.4)
MCHC RBC-ENTMCNC: 23.7 PG — LOW (ref 27–31)
MCHC RBC-ENTMCNC: 23.8 PG — LOW (ref 27–31)
MCHC RBC-ENTMCNC: 30.6 G/DL — LOW (ref 32–37)
MCHC RBC-ENTMCNC: 31 G/DL — LOW (ref 32–37)
MCV RBC AUTO: 76.8 FL — LOW (ref 81–99)
MCV RBC AUTO: 77.5 FL — LOW (ref 81–99)
MONOCYTES # BLD AUTO: 1.89 K/UL — HIGH (ref 0.1–0.6)
MONOCYTES # BLD AUTO: 2.45 K/UL — HIGH (ref 0.1–0.6)
MONOCYTES NFR BLD AUTO: 8.6 % — SIGNIFICANT CHANGE UP (ref 1.7–9.3)
MONOCYTES NFR BLD AUTO: 8.8 % — SIGNIFICANT CHANGE UP (ref 1.7–9.3)
NEUTROPHILS # BLD AUTO: 17.72 K/UL — HIGH (ref 1.4–6.5)
NEUTROPHILS # BLD AUTO: 24.46 K/UL — HIGH (ref 1.4–6.5)
NEUTROPHILS NFR BLD AUTO: 83 % — HIGH (ref 42.2–75.2)
NEUTROPHILS NFR BLD AUTO: 85.9 % — HIGH (ref 42.2–75.2)
NITRITE UR-MCNC: NEGATIVE — SIGNIFICANT CHANGE UP
NRBC # BLD: 0 /100 WBCS — SIGNIFICANT CHANGE UP (ref 0–0)
NRBC # BLD: 0 /100 WBCS — SIGNIFICANT CHANGE UP (ref 0–0)
PH UR: 7 — SIGNIFICANT CHANGE UP (ref 5–8)
PHOSPHATE SERPL-MCNC: 3.7 MG/DL — SIGNIFICANT CHANGE UP (ref 2.1–4.9)
PLATELET # BLD AUTO: 345 K/UL — SIGNIFICANT CHANGE UP (ref 130–400)
PLATELET # BLD AUTO: 366 K/UL — SIGNIFICANT CHANGE UP (ref 130–400)
PMV BLD: 10.2 FL — SIGNIFICANT CHANGE UP (ref 7.4–10.4)
PMV BLD: 9.8 FL — SIGNIFICANT CHANGE UP (ref 7.4–10.4)
POTASSIUM SERPL-MCNC: 3.9 MMOL/L — SIGNIFICANT CHANGE UP (ref 3.5–5)
POTASSIUM SERPL-MCNC: 4.2 MMOL/L — SIGNIFICANT CHANGE UP (ref 3.5–5)
POTASSIUM SERPL-SCNC: 3.9 MMOL/L — SIGNIFICANT CHANGE UP (ref 3.5–5)
POTASSIUM SERPL-SCNC: 4.2 MMOL/L — SIGNIFICANT CHANGE UP (ref 3.5–5)
PROT SERPL-MCNC: 7 G/DL — SIGNIFICANT CHANGE UP (ref 6–8)
PROT SERPL-MCNC: 7.3 G/DL — SIGNIFICANT CHANGE UP (ref 6–8)
PROT UR-MCNC: SIGNIFICANT CHANGE UP MG/DL
PROTHROM AB SERPL-ACNC: 13.2 SEC — HIGH (ref 9.95–12.87)
PROTHROM AB SERPL-ACNC: 13.3 SEC — HIGH (ref 9.95–12.87)
RBC # BLD: 4.92 M/UL — SIGNIFICANT CHANGE UP (ref 4.2–5.4)
RBC # BLD: 5.1 M/UL — SIGNIFICANT CHANGE UP (ref 4.2–5.4)
RBC # FLD: 15.1 % — HIGH (ref 11.5–14.5)
RBC # FLD: 15.1 % — HIGH (ref 11.5–14.5)
RBC CASTS # UR COMP ASSIST: 8 /HPF — HIGH (ref 0–4)
SODIUM SERPL-SCNC: 139 MMOL/L — SIGNIFICANT CHANGE UP (ref 135–146)
SODIUM SERPL-SCNC: 140 MMOL/L — SIGNIFICANT CHANGE UP (ref 135–146)
SP GR SPEC: >1.03 — HIGH (ref 1–1.03)
SQUAMOUS # UR AUTO: 0 /HPF — SIGNIFICANT CHANGE UP (ref 0–5)
UROBILINOGEN FLD QL: 0.2 MG/DL — SIGNIFICANT CHANGE UP (ref 0.2–1)
WBC # BLD: 21.37 K/UL — HIGH (ref 4.8–10.8)
WBC # BLD: 28.51 K/UL — HIGH (ref 4.8–10.8)
WBC # FLD AUTO: 21.37 K/UL — HIGH (ref 4.8–10.8)
WBC # FLD AUTO: 28.51 K/UL — HIGH (ref 4.8–10.8)
WBC UR QL: 0 /HPF — SIGNIFICANT CHANGE UP (ref 0–5)

## 2024-01-22 PROCEDURE — 74177 CT ABD & PELVIS W/CONTRAST: CPT | Mod: 26,MA

## 2024-01-22 PROCEDURE — 83735 ASSAY OF MAGNESIUM: CPT

## 2024-01-22 PROCEDURE — 93010 ELECTROCARDIOGRAM REPORT: CPT | Mod: 77

## 2024-01-22 PROCEDURE — 84100 ASSAY OF PHOSPHORUS: CPT

## 2024-01-22 PROCEDURE — 85027 COMPLETE CBC AUTOMATED: CPT

## 2024-01-22 PROCEDURE — 76705 ECHO EXAM OF ABDOMEN: CPT | Mod: 26

## 2024-01-22 PROCEDURE — 86900 BLOOD TYPING SEROLOGIC ABO: CPT

## 2024-01-22 PROCEDURE — 85025 COMPLETE CBC W/AUTO DIFF WBC: CPT

## 2024-01-22 PROCEDURE — 99285 EMERGENCY DEPT VISIT HI MDM: CPT

## 2024-01-22 PROCEDURE — 47562 LAPAROSCOPIC CHOLECYSTECTOMY: CPT | Mod: 22

## 2024-01-22 PROCEDURE — 88304 TISSUE EXAM BY PATHOLOGIST: CPT | Mod: 26

## 2024-01-22 PROCEDURE — 86901 BLOOD TYPING SEROLOGIC RH(D): CPT

## 2024-01-22 PROCEDURE — 36415 COLL VENOUS BLD VENIPUNCTURE: CPT

## 2024-01-22 PROCEDURE — 80048 BASIC METABOLIC PNL TOTAL CA: CPT

## 2024-01-22 PROCEDURE — C1889: CPT

## 2024-01-22 PROCEDURE — 88304 TISSUE EXAM BY PATHOLOGIST: CPT

## 2024-01-22 PROCEDURE — 71045 X-RAY EXAM CHEST 1 VIEW: CPT | Mod: 26

## 2024-01-22 PROCEDURE — 83690 ASSAY OF LIPASE: CPT

## 2024-01-22 PROCEDURE — 99223 1ST HOSP IP/OBS HIGH 75: CPT | Mod: 57

## 2024-01-22 PROCEDURE — 85610 PROTHROMBIN TIME: CPT

## 2024-01-22 PROCEDURE — 86850 RBC ANTIBODY SCREEN: CPT

## 2024-01-22 PROCEDURE — 80076 HEPATIC FUNCTION PANEL: CPT

## 2024-01-22 PROCEDURE — 93005 ELECTROCARDIOGRAM TRACING: CPT

## 2024-01-22 RX ORDER — MONTELUKAST 4 MG/1
10 TABLET, CHEWABLE ORAL DAILY
Refills: 0 | Status: DISCONTINUED | OUTPATIENT
Start: 2024-01-22 | End: 2024-01-22

## 2024-01-22 RX ORDER — SODIUM CHLORIDE 9 MG/ML
1000 INJECTION, SOLUTION INTRAVENOUS
Refills: 0 | Status: DISCONTINUED | OUTPATIENT
Start: 2024-01-22 | End: 2024-01-22

## 2024-01-22 RX ORDER — FENOFIBRATE,MICRONIZED 130 MG
145 CAPSULE ORAL DAILY
Refills: 0 | Status: DISCONTINUED | OUTPATIENT
Start: 2024-01-22 | End: 2024-01-22

## 2024-01-22 RX ORDER — METOPROLOL TARTRATE 50 MG
25 TABLET ORAL DAILY
Refills: 0 | Status: DISCONTINUED | OUTPATIENT
Start: 2024-01-22 | End: 2024-01-22

## 2024-01-22 RX ORDER — ACETAMINOPHEN 500 MG
650 TABLET ORAL EVERY 6 HOURS
Refills: 0 | Status: DISCONTINUED | OUTPATIENT
Start: 2024-01-22 | End: 2024-01-24

## 2024-01-22 RX ORDER — CHLORHEXIDINE GLUCONATE 213 G/1000ML
1 SOLUTION TOPICAL
Refills: 0 | Status: DISCONTINUED | OUTPATIENT
Start: 2024-01-22 | End: 2024-01-22

## 2024-01-22 RX ORDER — APIXABAN 2.5 MG/1
1 TABLET, FILM COATED ORAL
Refills: 0 | DISCHARGE

## 2024-01-22 RX ORDER — TRAZODONE HCL 50 MG
100 TABLET ORAL DAILY
Refills: 0 | Status: DISCONTINUED | OUTPATIENT
Start: 2024-01-22 | End: 2024-01-22

## 2024-01-22 RX ORDER — CHLORHEXIDINE GLUCONATE 213 G/1000ML
1 SOLUTION TOPICAL
Refills: 0 | Status: DISCONTINUED | OUTPATIENT
Start: 2024-01-22 | End: 2024-01-24

## 2024-01-22 RX ORDER — SODIUM CHLORIDE 9 MG/ML
1000 INJECTION INTRAMUSCULAR; INTRAVENOUS; SUBCUTANEOUS ONCE
Refills: 0 | Status: COMPLETED | OUTPATIENT
Start: 2024-01-22 | End: 2024-01-22

## 2024-01-22 RX ORDER — MONTELUKAST 4 MG/1
10 TABLET, CHEWABLE ORAL DAILY
Refills: 0 | Status: DISCONTINUED | OUTPATIENT
Start: 2024-01-22 | End: 2024-01-24

## 2024-01-22 RX ORDER — FLUOXETINE HCL 10 MG
20 CAPSULE ORAL DAILY
Refills: 0 | Status: DISCONTINUED | OUTPATIENT
Start: 2024-01-22 | End: 2024-01-24

## 2024-01-22 RX ORDER — ENOXAPARIN SODIUM 100 MG/ML
40 INJECTION SUBCUTANEOUS EVERY 24 HOURS
Refills: 0 | Status: DISCONTINUED | OUTPATIENT
Start: 2024-01-22 | End: 2024-01-22

## 2024-01-22 RX ORDER — MONTELUKAST 4 MG/1
1 TABLET, CHEWABLE ORAL
Refills: 0 | DISCHARGE

## 2024-01-22 RX ORDER — METOPROLOL TARTRATE 50 MG
1 TABLET ORAL
Refills: 0 | DISCHARGE

## 2024-01-22 RX ORDER — LABETALOL HCL 100 MG
10 TABLET ORAL ONCE
Refills: 0 | Status: COMPLETED | OUTPATIENT
Start: 2024-01-22 | End: 2024-01-22

## 2024-01-22 RX ORDER — ACETAMINOPHEN 500 MG
650 TABLET ORAL ONCE
Refills: 0 | Status: DISCONTINUED | OUTPATIENT
Start: 2024-01-22 | End: 2024-01-22

## 2024-01-22 RX ORDER — PIPERACILLIN AND TAZOBACTAM 4; .5 G/20ML; G/20ML
3.38 INJECTION, POWDER, LYOPHILIZED, FOR SOLUTION INTRAVENOUS EVERY 8 HOURS
Refills: 0 | Status: DISCONTINUED | OUTPATIENT
Start: 2024-01-22 | End: 2024-01-24

## 2024-01-22 RX ORDER — OXYCODONE HYDROCHLORIDE 5 MG/1
5 TABLET ORAL EVERY 6 HOURS
Refills: 0 | Status: DISCONTINUED | OUTPATIENT
Start: 2024-01-22 | End: 2024-01-22

## 2024-01-22 RX ORDER — FAMOTIDINE 10 MG/ML
20 INJECTION INTRAVENOUS ONCE
Refills: 0 | Status: COMPLETED | OUTPATIENT
Start: 2024-01-22 | End: 2024-01-22

## 2024-01-22 RX ORDER — ONDANSETRON 8 MG/1
4 TABLET, FILM COATED ORAL ONCE
Refills: 0 | Status: COMPLETED | OUTPATIENT
Start: 2024-01-22 | End: 2024-01-22

## 2024-01-22 RX ORDER — PIPERACILLIN AND TAZOBACTAM 4; .5 G/20ML; G/20ML
3.38 INJECTION, POWDER, LYOPHILIZED, FOR SOLUTION INTRAVENOUS ONCE
Refills: 0 | Status: DISCONTINUED | OUTPATIENT
Start: 2024-01-22 | End: 2024-01-22

## 2024-01-22 RX ORDER — ASCORBIC ACID 60 MG
500 TABLET,CHEWABLE ORAL DAILY
Refills: 0 | Status: DISCONTINUED | OUTPATIENT
Start: 2024-01-22 | End: 2024-01-24

## 2024-01-22 RX ORDER — MORPHINE SULFATE 50 MG/1
4 CAPSULE, EXTENDED RELEASE ORAL ONCE
Refills: 0 | Status: DISCONTINUED | OUTPATIENT
Start: 2024-01-22 | End: 2024-01-22

## 2024-01-22 RX ORDER — ALBUTEROL 90 UG/1
2 AEROSOL, METERED ORAL EVERY 6 HOURS
Refills: 0 | Status: DISCONTINUED | OUTPATIENT
Start: 2024-01-22 | End: 2024-01-24

## 2024-01-22 RX ORDER — DILTIAZEM HCL 120 MG
120 CAPSULE, EXT RELEASE 24 HR ORAL DAILY
Refills: 0 | Status: DISCONTINUED | OUTPATIENT
Start: 2024-01-22 | End: 2024-01-22

## 2024-01-22 RX ORDER — TRAZODONE HCL 50 MG
100 TABLET ORAL DAILY
Refills: 0 | Status: DISCONTINUED | OUTPATIENT
Start: 2024-01-22 | End: 2024-01-24

## 2024-01-22 RX ORDER — PIPERACILLIN AND TAZOBACTAM 4; .5 G/20ML; G/20ML
3.38 INJECTION, POWDER, LYOPHILIZED, FOR SOLUTION INTRAVENOUS EVERY 8 HOURS
Refills: 0 | Status: DISCONTINUED | OUTPATIENT
Start: 2024-01-22 | End: 2024-01-22

## 2024-01-22 RX ORDER — ENOXAPARIN SODIUM 100 MG/ML
40 INJECTION SUBCUTANEOUS EVERY 24 HOURS
Refills: 0 | Status: DISCONTINUED | OUTPATIENT
Start: 2024-01-22 | End: 2024-01-24

## 2024-01-22 RX ORDER — OXYCODONE HYDROCHLORIDE 5 MG/1
5 TABLET ORAL EVERY 6 HOURS
Refills: 0 | Status: DISCONTINUED | OUTPATIENT
Start: 2024-01-22 | End: 2024-01-23

## 2024-01-22 RX ORDER — ONDANSETRON 8 MG/1
4 TABLET, FILM COATED ORAL EVERY 6 HOURS
Refills: 0 | Status: DISCONTINUED | OUTPATIENT
Start: 2024-01-22 | End: 2024-01-22

## 2024-01-22 RX ORDER — ACETAMINOPHEN 500 MG
650 TABLET ORAL EVERY 6 HOURS
Refills: 0 | Status: DISCONTINUED | OUTPATIENT
Start: 2024-01-22 | End: 2024-01-22

## 2024-01-22 RX ORDER — HYDROMORPHONE HYDROCHLORIDE 2 MG/ML
0.2 INJECTION INTRAMUSCULAR; INTRAVENOUS; SUBCUTANEOUS EVERY 4 HOURS
Refills: 0 | Status: DISCONTINUED | OUTPATIENT
Start: 2024-01-22 | End: 2024-01-22

## 2024-01-22 RX ORDER — MORPHINE SULFATE 50 MG/1
6 CAPSULE, EXTENDED RELEASE ORAL ONCE
Refills: 0 | Status: DISCONTINUED | OUTPATIENT
Start: 2024-01-22 | End: 2024-01-22

## 2024-01-22 RX ORDER — FENOFIBRATE,MICRONIZED 130 MG
1 CAPSULE ORAL
Refills: 0 | DISCHARGE

## 2024-01-22 RX ORDER — PIPERACILLIN AND TAZOBACTAM 4; .5 G/20ML; G/20ML
3.38 INJECTION, POWDER, LYOPHILIZED, FOR SOLUTION INTRAVENOUS ONCE
Refills: 0 | Status: COMPLETED | OUTPATIENT
Start: 2024-01-22 | End: 2024-01-22

## 2024-01-22 RX ORDER — PANTOPRAZOLE SODIUM 20 MG/1
40 TABLET, DELAYED RELEASE ORAL
Refills: 0 | Status: DISCONTINUED | OUTPATIENT
Start: 2024-01-22 | End: 2024-01-24

## 2024-01-22 RX ORDER — ASCORBIC ACID 60 MG
500 TABLET,CHEWABLE ORAL DAILY
Refills: 0 | Status: DISCONTINUED | OUTPATIENT
Start: 2024-01-22 | End: 2024-01-22

## 2024-01-22 RX ORDER — ONDANSETRON 8 MG/1
4 TABLET, FILM COATED ORAL ONCE
Refills: 0 | Status: DISCONTINUED | OUTPATIENT
Start: 2024-01-22 | End: 2024-01-22

## 2024-01-22 RX ORDER — TRAZODONE HCL 50 MG
1 TABLET ORAL
Refills: 0 | DISCHARGE

## 2024-01-22 RX ORDER — ALBUTEROL 90 UG/1
2 AEROSOL, METERED ORAL EVERY 6 HOURS
Refills: 0 | Status: DISCONTINUED | OUTPATIENT
Start: 2024-01-22 | End: 2024-01-22

## 2024-01-22 RX ORDER — DILTIAZEM HCL 120 MG
120 CAPSULE, EXT RELEASE 24 HR ORAL DAILY
Refills: 0 | Status: DISCONTINUED | OUTPATIENT
Start: 2024-01-22 | End: 2024-01-24

## 2024-01-22 RX ORDER — FLUOXETINE HCL 10 MG
20 CAPSULE ORAL DAILY
Refills: 0 | Status: DISCONTINUED | OUTPATIENT
Start: 2024-01-22 | End: 2024-01-22

## 2024-01-22 RX ORDER — FLUOXETINE HCL 10 MG
1 CAPSULE ORAL
Refills: 0 | DISCHARGE

## 2024-01-22 RX ORDER — OXYCODONE HYDROCHLORIDE 5 MG/1
5 TABLET ORAL ONCE
Refills: 0 | Status: DISCONTINUED | OUTPATIENT
Start: 2024-01-22 | End: 2024-01-22

## 2024-01-22 RX ORDER — PANTOPRAZOLE SODIUM 20 MG/1
40 TABLET, DELAYED RELEASE ORAL
Refills: 0 | Status: DISCONTINUED | OUTPATIENT
Start: 2024-01-22 | End: 2024-01-22

## 2024-01-22 RX ORDER — METOPROLOL TARTRATE 50 MG
25 TABLET ORAL DAILY
Refills: 0 | Status: DISCONTINUED | OUTPATIENT
Start: 2024-01-22 | End: 2024-01-24

## 2024-01-22 RX ORDER — HYDROMORPHONE HYDROCHLORIDE 2 MG/ML
1 INJECTION INTRAMUSCULAR; INTRAVENOUS; SUBCUTANEOUS
Refills: 0 | Status: DISCONTINUED | OUTPATIENT
Start: 2024-01-22 | End: 2024-01-22

## 2024-01-22 RX ORDER — FENOFIBRATE,MICRONIZED 130 MG
145 CAPSULE ORAL DAILY
Refills: 0 | Status: DISCONTINUED | OUTPATIENT
Start: 2024-01-22 | End: 2024-01-24

## 2024-01-22 RX ORDER — METOCLOPRAMIDE HCL 10 MG
10 TABLET ORAL ONCE
Refills: 0 | Status: DISCONTINUED | OUTPATIENT
Start: 2024-01-22 | End: 2024-01-22

## 2024-01-22 RX ADMIN — FAMOTIDINE 20 MILLIGRAM(S): 10 INJECTION INTRAVENOUS at 01:09

## 2024-01-22 RX ADMIN — SODIUM CHLORIDE 120 MILLILITER(S): 9 INJECTION, SOLUTION INTRAVENOUS at 08:52

## 2024-01-22 RX ADMIN — MONTELUKAST 10 MILLIGRAM(S): 4 TABLET, CHEWABLE ORAL at 22:26

## 2024-01-22 RX ADMIN — PIPERACILLIN AND TAZOBACTAM 200 GRAM(S): 4; .5 INJECTION, POWDER, LYOPHILIZED, FOR SOLUTION INTRAVENOUS at 05:18

## 2024-01-22 RX ADMIN — ONDANSETRON 4 MILLIGRAM(S): 8 TABLET, FILM COATED ORAL at 11:59

## 2024-01-22 RX ADMIN — SODIUM CHLORIDE 1000 MILLILITER(S): 9 INJECTION INTRAMUSCULAR; INTRAVENOUS; SUBCUTANEOUS at 01:14

## 2024-01-22 RX ADMIN — ENOXAPARIN SODIUM 40 MILLIGRAM(S): 100 INJECTION SUBCUTANEOUS at 12:43

## 2024-01-22 RX ADMIN — Medication 650 MILLIGRAM(S): at 11:58

## 2024-01-22 RX ADMIN — HYDROMORPHONE HYDROCHLORIDE 1 MILLIGRAM(S): 2 INJECTION INTRAMUSCULAR; INTRAVENOUS; SUBCUTANEOUS at 17:05

## 2024-01-22 RX ADMIN — PIPERACILLIN AND TAZOBACTAM 25 GRAM(S): 4; .5 INJECTION, POWDER, LYOPHILIZED, FOR SOLUTION INTRAVENOUS at 22:25

## 2024-01-22 RX ADMIN — MORPHINE SULFATE 6 MILLIGRAM(S): 50 CAPSULE, EXTENDED RELEASE ORAL at 01:22

## 2024-01-22 RX ADMIN — OXYCODONE HYDROCHLORIDE 5 MILLIGRAM(S): 5 TABLET ORAL at 19:15

## 2024-01-22 RX ADMIN — MORPHINE SULFATE 4 MILLIGRAM(S): 50 CAPSULE, EXTENDED RELEASE ORAL at 03:38

## 2024-01-22 RX ADMIN — MORPHINE SULFATE 4 MILLIGRAM(S): 50 CAPSULE, EXTENDED RELEASE ORAL at 07:49

## 2024-01-22 RX ADMIN — HYDROMORPHONE HYDROCHLORIDE 1 MILLIGRAM(S): 2 INJECTION INTRAMUSCULAR; INTRAVENOUS; SUBCUTANEOUS at 17:20

## 2024-01-22 RX ADMIN — Medication 20 MILLIGRAM(S): at 22:26

## 2024-01-22 RX ADMIN — HYDROMORPHONE HYDROCHLORIDE 0.2 MILLIGRAM(S): 2 INJECTION INTRAMUSCULAR; INTRAVENOUS; SUBCUTANEOUS at 10:22

## 2024-01-22 RX ADMIN — Medication 10 MILLIGRAM(S): at 17:23

## 2024-01-22 RX ADMIN — ONDANSETRON 4 MILLIGRAM(S): 8 TABLET, FILM COATED ORAL at 01:09

## 2024-01-22 RX ADMIN — Medication 650 MILLIGRAM(S): at 23:02

## 2024-01-22 RX ADMIN — MORPHINE SULFATE 6 MILLIGRAM(S): 50 CAPSULE, EXTENDED RELEASE ORAL at 01:52

## 2024-01-22 NOTE — H&P ADULT - NSHPLABSRESULTS_GEN_ALL_CORE
LABS  CBC (01-22 @ 01:53)                              11.7<L>                         21.37<H>  )----------------(  345        83.0<H>% Neutrophils, 6.7<L>% Lymphocytes, ANC: 17.72<H>                              37.8      BMP (01-22 @ 01:53)             140     |  105     |  12    		Ca++ --      Ca 9.2                ---------------------------------( 108<H>		Mg --                 3.9     |  23      |  0.8   			Ph --        LFTs (01-22 @ 01:53)      TPro 7.0 / Alb 4.1 / TBili 0.4 / DBili -- / AST 18 / ALT 22 / AlkPhos 253<H>        ABG (01-22 @ 01:53)      /  /  /  /  / %     Lactate:  0.9      --------------------------------------------------------------------------------------------    MICROBIOLOGY  Urinalysis (01-22 @ 04:25):     Color: Yellow / Appearance: Clear / SG: >1.030<H> / pH: 7.0 / Gluc: Negative / Ketones: Trace<!> / Bili: Negative / Urobili: 0.2 / Protein :Trace / Nitrites: Negative / Leuk.Est: Negative / RBC: 8<H> / WBC: 0 / Sq Epi:  / Non Sq Epi: 0 / Bacteria Negative         --------------------------------------------------------------------------------------------    I&O's Summary      < from: CT Abdomen and Pelvis w/ IV Cont (01.22.24 @ 03:39) >      IMPRESSION:    Findings suspicious for acute calculus cholecystitis.    --- End of Report ---      < end of copied text >    < from: US Abdomen Upper Quadrant Right (07.13.23 @ 11:22) >    IMPRESSION:    Cholelithiasis without sonographic evidence of acute cholecystitis.    No biliary ductal dilatation.        --- End of Report ---          < end of copied text >

## 2024-01-22 NOTE — PRE-ANESTHESIA EVALUATION ADULT - NSRADCARDRESULTSFT_GEN_ALL_CORE
EKG:    Ventricular Rate 82 BPM    Atrial Rate 82 BPM    P-R Interval 156 ms    QRS Duration 150 ms    Q-T Interval 422 ms    QTC Calculation(Bazett) 493 ms    P Axis 74 degrees    R Axis 20 degrees    T Axis 54 degrees    Diagnosis Line Normal sinus rhythm with sinus arrhythmia  Right bundle branch block  Abnormal ECG    Confirmed by PANFILO COVINGTON MD ([id number]) on 1/22/2024 6:45:51 AM    CXR: (-)

## 2024-01-22 NOTE — H&P ADULT - ASSESSMENT
42 y/o female pt with PMH of asthma and HTN that comes in with chief complaint of abdominal pain. Patient's clinical presentation consistent with acute cholecystitis, admit to surgery service for surgical intervention.    PLAN:  -Admit to surgery team  -NPO  -IVF's  -Pain control  -Pre-op labs  -IV ABX  -Will add on to OR schedule  -Will obtain consent    spectra [id number]

## 2024-01-22 NOTE — PRE-ANESTHESIA EVALUATION ADULT - NSANTHPMHFT_GEN_ALL_CORE
H/o MRSA PNA c/b ARF  s/p trach & PEG - now s/p decannulation & removal H/o MRSA PNA c/b ARF  s/p trach & PEG - now s/p decannulation & removal  Class I Obesity

## 2024-01-22 NOTE — ED PROVIDER NOTE - OBJECTIVE STATEMENT
43 yold female to ED Pmhx Asthma, complicated pneumonia(s/p intubation, trach/peg - now resolved), pAfib, Htn c/o mid abdominal pain radiating diffusely with n/v started yesterday without fever, chills, back pain, dysuria, frequency, burning; abdominal surgeries only signif for peg and ;

## 2024-01-22 NOTE — CHART NOTE - NSCHARTNOTESELECT_GEN_ALL_CORE
Surgery Post Op Check/Event Note Site: Sternum (03 May 2024 04:30)  Bilirubin: 4.1 (03 May 2024 04:30)   Site: Sternum (04 May 2024 00:40)  Bilirubin: 5.7 (04 May 2024 00:40)  Site: Sternum (03 May 2024 13:00)  Bilirubin: 4.6 (03 May 2024 13:00)  Site: Sternum (03 May 2024 04:30)  Bilirubin: 4.1 (03 May 2024 04:30)   Site: Sternum (04 May 2024 14:10)  Bilirubin: 6.9 (04 May 2024 14:10)  Bilirubin: 5.7 (04 May 2024 00:40)  Site: Sternum (04 May 2024 00:40)  Site: Sternum (03 May 2024 13:00)  Bilirubin: 4.6 (03 May 2024 13:00)  Site: Sternum (03 May 2024 04:30)  Bilirubin: 4.1 (03 May 2024 04:30)

## 2024-01-22 NOTE — ED PROVIDER NOTE - ATTENDING CONTRIBUTION TO CARE
43-year-old female with history of asthma, paroxysmal A-fib, RBBB, admission last year for pneumonia/respiratory failure s/p trach/peg with reversal, in ER with complaint of abdominal pain which started yesterday.  Patient describes diffuse spasms of abdominal pain across her whole abdomen associated with nausea and vomiting.  Denies any diarrhea.  LBM 2 days ago.  No F/C.  No urinary or vaginal symptoms.  No CP/SOB.  No HA/dizziness/syncope.   PE - mild distress 2/2 pain, nc/at, eomi, perrl, op - clear, mmm, neck supple, cta b/l, no w/r/r, rrr, abd- soft, nd, + diffuse tenderness, no guarding/rebound, nabs, from x 4, no LE swelling/tenderness, A&O x 3, cn 2-12 intact, no focal motor/sensory deficits.  -check labs,  ua, ct abd 43-year-old female with history of asthma, paroxysmal A-fib (not on AC, doesn't follow with card), RBBB, admission last year for pneumonia/respiratory failure s/p trach/peg with reversal, in ER with complaint of abdominal pain which started yesterday.  Patient describes diffuse spasms of abdominal pain across her whole abdomen associated with nausea and vomiting.  Denies any diarrhea.  LBM 2 days ago.  No F/C.  No urinary or vaginal symptoms.  No CP/SOB.  No HA/dizziness/syncope.   PE - mild distress 2/2 pain, nc/at, eomi, perrl, op - clear, mmm, neck supple, cta b/l, no w/r/r, rrr, abd- soft, nd, + diffuse tenderness, no guarding/rebound, nabs, from x 4, no LE swelling/tenderness, A&O x 3, cn 2-12 intact, no focal motor/sensory deficits.  -check labs,  ua, ct abd

## 2024-01-22 NOTE — PRE-OP CHECKLIST - ORDERS/MEDICATION ADMINISTRATION RECORD ON CHART
Patient presents to ER for evaluation of congestion, sore throat, chills, headache that has been going on for the past 2 days. Mom states that child has tried aleve for the headache, no relief. Denies any vomiting, abdominal pain or diarrhea.    done

## 2024-01-22 NOTE — CHART NOTE - NSCHARTNOTEFT_GEN_A_CORE
Post Operative Note  Patient: ZEYNEP ROSSI 43y (1980) Female   MRN: [id number]  Location: [address] ([facility]) [facility]  Visit: 01-22-24 Inpatient  Date: 01-23-24 @ 01:00    Procedure: S/P Laparoscopic Cholecystectomy    Subjective:   Nausea: no, Vomiting: no, Ambulating: yes, Flatus: no  Pain Assessment: yes, Location: Abdomen , Pain Intensity:5 /10 , Quality: Aching     Objective:  Vitals: T(F): 98 (01-23-24 @ 00:22), Max: 98.6 (01-22-24 @ 03:30)  HR: 109 (01-22-24 @ 23:57)  BP: 160/91 (01-23-24 @ 00:22) (142/102 - 197/110)  RR: 18 (01-23-24 @ 00:22)  SpO2: 95% (01-22-24 @ 22:25)  Vent Settings:     In:   01-22-24 @ 07:01  -  01-23-24 @ 01:00  --------------------------------------------------------  IN: 0 mL      IV Fluids: ascorbic acid 500 milliGRAM(s) Oral daily      Out:   01-22-24 @ 07:01  -  01-23-24 @ 01:00  --------------------------------------------------------  OUT: 350 mL      EBL:     Voided Urine:   01-22-24 @ 07:01  -  01-23-24 @ 01:00  --------------------------------------------------------  OUT: 350 mL      Weaver Catheter: yes no   Drains:   CARTER:    ,   Chest Tube:      NG Tube:       Physical Examination:  General Appearance: NAD  HEENT: EOMI, sclera non-icteric.  Heart: RR  Lungs: Equal chest rise bilaterally  Abdomen:  Soft, moderately tender to palpation, nondistended. No rigidity, guarding, or rebound tenderness.   MSK/Extremities: Warm & well-perfused. Peripheral pulses intact.  Skin: Warm, dry. No jaundice.   Incisions/Wounds: Dressings in place, clean, dry and intact, no signs of infection/active bleeding/drainage    Medications: [Standing]  acetaminophen     Tablet .. 650 milliGRAM(s) Oral every 6 hours  acetaminophen   IVPB .. 1000 milliGRAM(s) IV Intermittent once  albuterol    90 MICROgram(s) HFA Inhaler 2 Puff(s) Inhalation every 6 hours PRN  ascorbic acid 500 milliGRAM(s) Oral daily  chlorhexidine 2% Cloths 1 Application(s) Topical <User Schedule>  diltiazem    milliGRAM(s) Oral daily  enoxaparin Injectable 40 milliGRAM(s) SubCutaneous every 24 hours  fenofibrate Tablet 145 milliGRAM(s) Oral daily  FLUoxetine 20 milliGRAM(s) Oral daily  HYDROmorphone  Injectable 0.5 milliGRAM(s) IV Push once  metoprolol succinate ER 25 milliGRAM(s) Oral daily  montelukast 10 milliGRAM(s) Oral daily  oxyCODONE    IR 5 milliGRAM(s) Oral every 6 hours PRN  pantoprazole    Tablet 40 milliGRAM(s) Oral before breakfast  piperacillin/tazobactam IVPB.. 3.375 Gram(s) IV Intermittent every 8 hours  traZODone 100 milliGRAM(s) Oral daily    Medications: [PRN]  acetaminophen     Tablet .. 650 milliGRAM(s) Oral every 6 hours  acetaminophen   IVPB .. 1000 milliGRAM(s) IV Intermittent once  albuterol    90 MICROgram(s) HFA Inhaler 2 Puff(s) Inhalation every 6 hours PRN  ascorbic acid 500 milliGRAM(s) Oral daily  chlorhexidine 2% Cloths 1 Application(s) Topical <User Schedule>  diltiazem    milliGRAM(s) Oral daily  enoxaparin Injectable 40 milliGRAM(s) SubCutaneous every 24 hours  fenofibrate Tablet 145 milliGRAM(s) Oral daily  FLUoxetine 20 milliGRAM(s) Oral daily  HYDROmorphone  Injectable 0.5 milliGRAM(s) IV Push once  metoprolol succinate ER 25 milliGRAM(s) Oral daily  montelukast 10 milliGRAM(s) Oral daily  oxyCODONE    IR 5 milliGRAM(s) Oral every 6 hours PRN  pantoprazole    Tablet 40 milliGRAM(s) Oral before breakfast  piperacillin/tazobactam IVPB.. 3.375 Gram(s) IV Intermittent every 8 hours  traZODone 100 milliGRAM(s) Oral daily    Labs:                        12.1   28.51 )-----------( 366      ( 22 Jan 2024 11:29 )             39.5     01-22    139  |  103  |  9<L>  ----------------------------<  98  4.2   |  23  |  0.7    Ca    9.5      22 Jan 2024 11:29  Phos  3.7     01-22  Mg     1.8     01-22    TPro  7.3  /  Alb  4.2  /  TBili  0.5  /  DBili  0.2  /  AST  14  /  ALT  21  /  AlkPhos  249<H>  01-22    PT/INR - ( 22 Jan 2024 11:29 )   PT: 13.20 sec;   INR: 1.16 ratio        Imaging:  No post-op imaging studies    Assessment:  43yFemale patient S/P Laparoscopic cholecystectomy for acute cholecystitis    Plan:  - Pain control  - DVT ppx  - Regular diet  - mIVF  - IV abx: Zosyn  - Continue home medications  - Encourage ambulation, incentive spirometry  - Monitor for ROBF        Date/Time: 01-23-24 @ 01:00

## 2024-01-22 NOTE — ED PROVIDER NOTE - PHYSICAL EXAMINATION
Constitutional: Well developed, well nourished. NAD  Head: Normocephalic, atraumatic.  Eyes: PERRL, EOMI.  ENT: No nasal discharge. Mucous membranes dry.  Neck: Supple. Painless ROM.  Cardiovascular:  Regular rate and rhythm.    Pulmonary:  Lungs clear to auscultation bilaterally.    Abdominal: Soft BS+ mild tenderness diffusely without rebound, guarding or flank pain;  Extremities. Pelvis stable. No lower extremity edema, symmetric calves.  Skin: No rashes, cyanosis.  Neuro: AAOx3. No focal neurological deficits.  Psych: Normal mood. Normal affect.

## 2024-01-22 NOTE — ED ADULT NURSE NOTE - NSFALLUNIVINTERV_ED_ALL_ED
Monitor gait and stability/Monitor for mental status changes and reorient to person, place, and time, as needed/Bed/Stretcher in lowest position, wheels locked, appropriate side rails in place/Call bell, personal items and telephone in reach/Instruct patient to call for assistance before getting out of bed/chair/stretcher/Non-slip footwear applied when patient is off stretcher/[place] to call system/Physically safe environment - no spills, clutter or unnecessary equipment/Purposeful proactive rounding/Room/bathroom lighting operational, light cord in reach

## 2024-01-22 NOTE — PATIENT PROFILE ADULT - FALL HARM RISK - HARM RISK INTERVENTIONS
Assistance with ambulation/Assistance OOB with selected safe patient handling equipment/Communicate Risk of Fall with Harm to all staff/Discuss with provider need for PT consult/Monitor gait and stability/Provide patient with walking aids - walker, cane, crutches/Reinforce activity limits and safety measures with patient and family/Sit up slowly, dangle for a short time, stand at bedside before walking/Tailored Fall Risk Interventions/Use of alarms - bed, chair and/or voice tab/Visual Cue: Yellow wristband and red socks/Bed in lowest position, wheels locked, appropriate side rails in place/Call bell, personal items and telephone in reach/Instruct patient to call for assistance before getting out of bed or chair/Non-slip footwear when patient is out of bed/[place] to call system/Physically safe environment - no spills, clutter or unnecessary equipment/Purposeful Proactive Rounding/Room/bathroom lighting operational, light cord in reach

## 2024-01-22 NOTE — H&P ADULT - NSHPPHYSICALEXAM_GEN_ALL_CORE
PHYSICAL EXAM:  GENERAL: reporting severe abdominal pain,  well-developed  HEAD:  Atraumatic, Normocephalic  NECK: Supple, no lymphadenopathy, no JVD  CHEST/LUNG: CTAB; No wheezes, rales, or rhonchi  HEART: Regular rate and rhythm.   ABDOMEN: Soft, epigastric and RUQ tenderness to palpation, non-distended  EXTREMITIES:  2+ peripheral pulses b/l, No clubbing, cyanosis, or edema

## 2024-01-22 NOTE — ED PROVIDER NOTE - CLINICAL SUMMARY MEDICAL DECISION MAKING FREE TEXT BOX
Endorsed from Dr. Hernandez  fever + abdo pain. CT shows stone in neck w/ GB wall thickening.   Pending surgery for assessment and admission

## 2024-01-22 NOTE — BRIEF OPERATIVE NOTE - OPERATION/FINDINGS
Acutely inflamed, dilated, edematous gallbladder. Critical view of safety obtained, clip x2 over cystic duct stump. No evidence of bile leak, hemostasis achieved.

## 2024-01-22 NOTE — ED PROVIDER NOTE - NS ED ATTENDING STATEMENT MOD
You were seen in the ER for possible foreign body in your foot. X-ray does not show any radiopaque foreign bodies. Use the referral below to place a call to the general surgeon to plan for possible foreign body removal if your symptoms do not improve. Make sure to take the antibiotics first before you see them in clinic to try to prevent any infection.
I have seen and examined this patient and fully participated in the care of this patient as the teaching attending.  The service was shared with the KRYSTA.  I reviewed and verified the documentation.
No adenopathy or splenomegaly. No cervical or inguinal lymphadenopathy.

## 2024-01-22 NOTE — H&P ADULT - ATTENDING COMMENTS
This is 44 y/o female pt with PMH of asthma and HTN that comes in with chief complaint of abdominal pain x1 day. Pt reports constant abdominal pain localized in the epigastric area with an intensity of 10/10. Pain is accompanied with nausea and multiple episodes of non bloody bilious emesis. Pt denies any previous similar episodes, fevers, chills or diarrhea.     PE:  AAO x3  Chest: clear.  CV: rrr  Abdomen: tender in the epigastrium with +Woodall sign    WBC 21; T.Javan 0.4; ; AST 18; ALT 22    Abd. US and CT Abd/Pelvis were reviewed.    ASSESSMENT:  44 y/o female with Acute Calculous Cholecystitis.    PLAN:  - NPO, IVF  - iv Zosyn  - DVT prophylaxis    Will book for urgent Laparoscopic Cholecystectomy, Possible Open.    Informed consent was obtained from the patient for the above procedure after explaining all the risks and benefits of it including but not limited to infection, bleeding and etc. She understood and agreed. All questions were answered. This is 42 y/o female pt with PMH of asthma and HTN that comes in with chief complaint of abdominal pain x1 day. Pt reports constant abdominal pain localized in the epigastric area with an intensity of 10/10. Pain is accompanied with nausea and multiple episodes of non bloody bilious emesis. Pt denies any previous similar episodes, fevers, chills or diarrhea.     PE:  AAO x3  Chest: clear.  CV: rrr  Abdomen: tender in the epigastrium with +Woodall sign    WBC 21; T.Javan 0.4; ; AST 18; ALT 22    Abd. US and CT Abd/Pelvis were reviewed.    ASSESSMENT:  42 y/o female with Acute Calculous Cholecystitis.  Abdominal pain.    PLAN:  - NPO, IVF  - iv Zosyn  - DVT prophylaxis    Patient told me that she is not on any blood thinners at this time.  Will book for urgent Laparoscopic Cholecystectomy, Possible Open.    Informed consent was obtained from the patient for the above procedure after explaining all the risks and benefits of it including but not limited to infection, bleeding and etc. She understood and agreed. All questions were answered. This is 42 y/o female pt with PMH of asthma and HTN that comes in with chief complaint of abdominal pain x1 day. Pt reports constant abdominal pain localized in the epigastric area with an intensity of 10/10. Pain is accompanied with nausea and multiple episodes of non bloody bilious emesis. Pt denies any previous similar episodes, fevers, chills or diarrhea.     PE:  AAO x3  Chest: clear.  CV: rrr  Abdomen: tender in the epigastrium with +Woodall sign    WBC 21; T.Javan 0.4; ; AST 18; ALT 22    Abd. US and CT Abd/Pelvis were reviewed.    ASSESSMENT:  42 y/o female with Acute Calculous Cholecystitis.  Abdominal pain.  Asthma. A.fib.    PLAN:  - NPO, IVF  - iv Zosyn  - DVT prophylaxis    Patient told me that she is not on any blood thinners at this time.  Will book for urgent Laparoscopic Cholecystectomy, Possible Open.    Informed consent was obtained from the patient for the above procedure after explaining all the risks and benefits of it including but not limited to infection, bleeding and etc. She understood and agreed. All questions were answered.

## 2024-01-22 NOTE — PRE-ANESTHESIA EVALUATION ADULT - NSANTHOSAYNRD_GEN_A_CORE
No. [place] screening performed.  STOP BANG Legend: 0-2 = LOW Risk; 3-4 = INTERMEDIATE Risk; 5-8 = HIGH Risk

## 2024-01-22 NOTE — ED PROVIDER NOTE - PROGRESS NOTE DETAILS
WBC 21K, lactate 0.9.  CT abd suspicious for acute calculus cholecystitis.  pt given iv abx, surgery to eval.  Pt s/o to Dr. Alonzo to f/u surg consult and admit. AYANA Carranza s/o from [place] fearns awaiting surgery eval and admission.

## 2024-01-22 NOTE — ED ADULT NURSE NOTE - SUICIDE SCREENING QUESTION 3
No Detail Level: Detailed Quality 110: Preventive Care And Screening: Influenza Immunization: Influenza Immunization Administered during Influenza season

## 2024-01-22 NOTE — H&P ADULT - HISTORY OF PRESENT ILLNESS
42 y/o female pt with PMH of asthma and HTN that comes in with chief complaint of abdominal pain. Pt reports constant abdominal pain localized in the epigastric area with an intensity of 10/10. Pain is accompanied with nausea and multiple episodes of non bloody bilious emesis. Pt denies any previous similar episodes, fevers, chills or diarrhea.

## 2024-01-23 ENCOUNTER — TRANSCRIPTION ENCOUNTER (OUTPATIENT)
Age: 44
End: 2024-01-23

## 2024-01-23 LAB
ALBUMIN SERPL ELPH-MCNC: 3.6 G/DL — SIGNIFICANT CHANGE UP (ref 3.5–5.2)
ALP SERPL-CCNC: 188 U/L — HIGH (ref 30–115)
ALT FLD-CCNC: 21 U/L — SIGNIFICANT CHANGE UP (ref 0–41)
ANION GAP SERPL CALC-SCNC: 12 MMOL/L — SIGNIFICANT CHANGE UP (ref 7–14)
AST SERPL-CCNC: 18 U/L — SIGNIFICANT CHANGE UP (ref 0–41)
BILIRUB DIRECT SERPL-MCNC: 0.2 MG/DL — SIGNIFICANT CHANGE UP (ref 0–0.3)
BILIRUB INDIRECT FLD-MCNC: 0.4 MG/DL — SIGNIFICANT CHANGE UP (ref 0.2–1.2)
BILIRUB SERPL-MCNC: 0.6 MG/DL — SIGNIFICANT CHANGE UP (ref 0.2–1.2)
BUN SERPL-MCNC: 11 MG/DL — SIGNIFICANT CHANGE UP (ref 10–20)
CALCIUM SERPL-MCNC: 9 MG/DL — SIGNIFICANT CHANGE UP (ref 8.4–10.5)
CHLORIDE SERPL-SCNC: 101 MMOL/L — SIGNIFICANT CHANGE UP (ref 98–110)
CO2 SERPL-SCNC: 23 MMOL/L — SIGNIFICANT CHANGE UP (ref 17–32)
CREAT SERPL-MCNC: 0.9 MG/DL — SIGNIFICANT CHANGE UP (ref 0.7–1.5)
EGFR: 81 ML/MIN/1.73M2 — SIGNIFICANT CHANGE UP
GLUCOSE SERPL-MCNC: 151 MG/DL — HIGH (ref 70–99)
HCT VFR BLD CALC: 34.2 % — LOW (ref 37–47)
HCT VFR BLD CALC: 34.3 % — LOW (ref 37–47)
HGB BLD-MCNC: 10.7 G/DL — LOW (ref 12–16)
HGB BLD-MCNC: 10.8 G/DL — LOW (ref 12–16)
MAGNESIUM SERPL-MCNC: 1.6 MG/DL — LOW (ref 1.8–2.4)
MCHC RBC-ENTMCNC: 23.8 PG — LOW (ref 27–31)
MCHC RBC-ENTMCNC: 24.1 PG — LOW (ref 27–31)
MCHC RBC-ENTMCNC: 31.3 G/DL — LOW (ref 32–37)
MCHC RBC-ENTMCNC: 31.5 G/DL — LOW (ref 32–37)
MCV RBC AUTO: 76 FL — LOW (ref 81–99)
MCV RBC AUTO: 76.4 FL — LOW (ref 81–99)
NRBC # BLD: 0 /100 WBCS — SIGNIFICANT CHANGE UP (ref 0–0)
NRBC # BLD: 0 /100 WBCS — SIGNIFICANT CHANGE UP (ref 0–0)
PHOSPHATE SERPL-MCNC: 3 MG/DL — SIGNIFICANT CHANGE UP (ref 2.1–4.9)
PLATELET # BLD AUTO: 306 K/UL — SIGNIFICANT CHANGE UP (ref 130–400)
PLATELET # BLD AUTO: 313 K/UL — SIGNIFICANT CHANGE UP (ref 130–400)
PMV BLD: 10.7 FL — HIGH (ref 7.4–10.4)
PMV BLD: 9.6 FL — SIGNIFICANT CHANGE UP (ref 7.4–10.4)
POTASSIUM SERPL-MCNC: 3.8 MMOL/L — SIGNIFICANT CHANGE UP (ref 3.5–5)
POTASSIUM SERPL-SCNC: 3.8 MMOL/L — SIGNIFICANT CHANGE UP (ref 3.5–5)
PROT SERPL-MCNC: 6.3 G/DL — SIGNIFICANT CHANGE UP (ref 6–8)
RBC # BLD: 4.49 M/UL — SIGNIFICANT CHANGE UP (ref 4.2–5.4)
RBC # BLD: 4.5 M/UL — SIGNIFICANT CHANGE UP (ref 4.2–5.4)
RBC # FLD: 15 % — HIGH (ref 11.5–14.5)
RBC # FLD: 15 % — HIGH (ref 11.5–14.5)
SODIUM SERPL-SCNC: 136 MMOL/L — SIGNIFICANT CHANGE UP (ref 135–146)
WBC # BLD: 24.95 K/UL — HIGH (ref 4.8–10.8)
WBC # BLD: 29.8 K/UL — HIGH (ref 4.8–10.8)
WBC # FLD AUTO: 24.95 K/UL — HIGH (ref 4.8–10.8)
WBC # FLD AUTO: 29.8 K/UL — HIGH (ref 4.8–10.8)

## 2024-01-23 PROCEDURE — 99024 POSTOP FOLLOW-UP VISIT: CPT

## 2024-01-23 RX ORDER — OXYCODONE AND ACETAMINOPHEN 5; 325 MG/1; MG/1
1 TABLET ORAL
Qty: 12 | Refills: 0
Start: 2024-01-23

## 2024-01-23 RX ORDER — GABAPENTIN 400 MG/1
300 CAPSULE ORAL THREE TIMES A DAY
Refills: 0 | Status: DISCONTINUED | OUTPATIENT
Start: 2024-01-23 | End: 2024-01-23

## 2024-01-23 RX ORDER — GABAPENTIN 400 MG/1
300 CAPSULE ORAL THREE TIMES A DAY
Refills: 0 | Status: DISCONTINUED | OUTPATIENT
Start: 2024-01-23 | End: 2024-01-24

## 2024-01-23 RX ORDER — OXYCODONE HYDROCHLORIDE 5 MG/1
5 TABLET ORAL EVERY 4 HOURS
Refills: 0 | Status: DISCONTINUED | OUTPATIENT
Start: 2024-01-23 | End: 2024-01-23

## 2024-01-23 RX ORDER — LIDOCAINE 4 G/100G
1 CREAM TOPICAL EVERY 24 HOURS
Refills: 0 | Status: DISCONTINUED | OUTPATIENT
Start: 2024-01-23 | End: 2024-01-24

## 2024-01-23 RX ORDER — ACETAMINOPHEN 500 MG
1000 TABLET ORAL ONCE
Refills: 0 | Status: COMPLETED | OUTPATIENT
Start: 2024-01-23 | End: 2024-01-23

## 2024-01-23 RX ORDER — OXYCODONE HYDROCHLORIDE 5 MG/1
5 TABLET ORAL EVERY 6 HOURS
Refills: 0 | Status: DISCONTINUED | OUTPATIENT
Start: 2024-01-23 | End: 2024-01-24

## 2024-01-23 RX ORDER — SIMETHICONE 80 MG/1
80 TABLET, CHEWABLE ORAL ONCE
Refills: 0 | Status: COMPLETED | OUTPATIENT
Start: 2024-01-23 | End: 2024-01-23

## 2024-01-23 RX ORDER — HYDROMORPHONE HYDROCHLORIDE 2 MG/ML
0.5 INJECTION INTRAMUSCULAR; INTRAVENOUS; SUBCUTANEOUS ONCE
Refills: 0 | Status: DISCONTINUED | OUTPATIENT
Start: 2024-01-23 | End: 2024-01-23

## 2024-01-23 RX ADMIN — OXYCODONE HYDROCHLORIDE 5 MILLIGRAM(S): 5 TABLET ORAL at 14:28

## 2024-01-23 RX ADMIN — OXYCODONE HYDROCHLORIDE 5 MILLIGRAM(S): 5 TABLET ORAL at 08:29

## 2024-01-23 RX ADMIN — GABAPENTIN 300 MILLIGRAM(S): 400 CAPSULE ORAL at 13:23

## 2024-01-23 RX ADMIN — LIDOCAINE 1 PATCH: 4 CREAM TOPICAL at 11:34

## 2024-01-23 RX ADMIN — SIMETHICONE 80 MILLIGRAM(S): 80 TABLET, CHEWABLE ORAL at 07:59

## 2024-01-23 RX ADMIN — Medication 650 MILLIGRAM(S): at 00:02

## 2024-01-23 RX ADMIN — PIPERACILLIN AND TAZOBACTAM 25 GRAM(S): 4; .5 INJECTION, POWDER, LYOPHILIZED, FOR SOLUTION INTRAVENOUS at 13:22

## 2024-01-23 RX ADMIN — Medication 145 MILLIGRAM(S): at 11:33

## 2024-01-23 RX ADMIN — GABAPENTIN 300 MILLIGRAM(S): 400 CAPSULE ORAL at 11:37

## 2024-01-23 RX ADMIN — OXYCODONE HYDROCHLORIDE 5 MILLIGRAM(S): 5 TABLET ORAL at 07:59

## 2024-01-23 RX ADMIN — GABAPENTIN 300 MILLIGRAM(S): 400 CAPSULE ORAL at 21:24

## 2024-01-23 RX ADMIN — Medication 120 MILLIGRAM(S): at 05:01

## 2024-01-23 RX ADMIN — OXYCODONE HYDROCHLORIDE 5 MILLIGRAM(S): 5 TABLET ORAL at 14:58

## 2024-01-23 RX ADMIN — Medication 400 MILLIGRAM(S): at 04:42

## 2024-01-23 RX ADMIN — Medication 650 MILLIGRAM(S): at 23:29

## 2024-01-23 RX ADMIN — Medication 100 MILLIGRAM(S): at 11:38

## 2024-01-23 RX ADMIN — Medication 650 MILLIGRAM(S): at 11:33

## 2024-01-23 RX ADMIN — PIPERACILLIN AND TAZOBACTAM 25 GRAM(S): 4; .5 INJECTION, POWDER, LYOPHILIZED, FOR SOLUTION INTRAVENOUS at 21:24

## 2024-01-23 RX ADMIN — Medication 20 MILLIGRAM(S): at 11:32

## 2024-01-23 RX ADMIN — Medication 25 MILLIGRAM(S): at 05:02

## 2024-01-23 RX ADMIN — HYDROMORPHONE HYDROCHLORIDE 0.5 MILLIGRAM(S): 2 INJECTION INTRAMUSCULAR; INTRAVENOUS; SUBCUTANEOUS at 01:21

## 2024-01-23 RX ADMIN — HYDROMORPHONE HYDROCHLORIDE 0.5 MILLIGRAM(S): 2 INJECTION INTRAMUSCULAR; INTRAVENOUS; SUBCUTANEOUS at 02:10

## 2024-01-23 RX ADMIN — Medication 500 MILLIGRAM(S): at 11:33

## 2024-01-23 RX ADMIN — Medication 650 MILLIGRAM(S): at 17:10

## 2024-01-23 RX ADMIN — PIPERACILLIN AND TAZOBACTAM 25 GRAM(S): 4; .5 INJECTION, POWDER, LYOPHILIZED, FOR SOLUTION INTRAVENOUS at 05:03

## 2024-01-23 RX ADMIN — PANTOPRAZOLE SODIUM 40 MILLIGRAM(S): 20 TABLET, DELAYED RELEASE ORAL at 05:02

## 2024-01-23 RX ADMIN — ENOXAPARIN SODIUM 40 MILLIGRAM(S): 100 INJECTION SUBCUTANEOUS at 05:03

## 2024-01-23 RX ADMIN — Medication 1000 MILLIGRAM(S): at 06:40

## 2024-01-23 RX ADMIN — MONTELUKAST 10 MILLIGRAM(S): 4 TABLET, CHEWABLE ORAL at 11:33

## 2024-01-23 RX ADMIN — Medication 400 MILLIGRAM(S): at 11:34

## 2024-01-23 RX ADMIN — CHLORHEXIDINE GLUCONATE 1 APPLICATION(S): 213 SOLUTION TOPICAL at 06:42

## 2024-01-23 RX ADMIN — OXYCODONE HYDROCHLORIDE 5 MILLIGRAM(S): 5 TABLET ORAL at 21:26

## 2024-01-23 NOTE — DISCHARGE NOTE PROVIDER - NSDCMRMEDTOKEN_GEN_ALL_CORE_FT
Albuterol (Eqv-Ventolin HFA) 90 mcg/inh inhalation aerosol: 2 puff(s) inhaled every 4 hours  apixaban 5 mg oral tablet: 1 tab(s) orally every 12 hours  ascorbic acid 500 mg oral tablet: 1 tab(s) orally once a day  dilTIAZem 120 mg/24 hours oral capsule, extended release: 1 cap(s) orally once a day  fenofibrate 120 mg oral tablet: 1 tab(s) orally once a day  FLUoxetine (Eqv-PROzac) 20 mg oral tablet: 1 tab(s) orally once a day  Metoprolol Succinate ER 25 mg oral tablet, extended release: 1 tab(s) orally once a day  montelukast 10 mg oral tablet: 1 tab(s) orally once a day  Percocet 5 mg-325 mg oral tablet: 1 tab(s) orally 4 times a day MDD: 4  traZODone 100 mg oral tablet: 1 milligram(s) orally once a day   Albuterol (Eqv-Ventolin HFA) 90 mcg/inh inhalation aerosol: 2 puff(s) inhaled every 4 hours  amoxicillin-clavulanate 875 mg-125 mg oral tablet: 875 milligram(s) orally 2 times a day  apixaban 5 mg oral tablet: 1 tab(s) orally every 12 hours  ascorbic acid 500 mg oral tablet: 1 tab(s) orally once a day  dilTIAZem 120 mg/24 hours oral capsule, extended release: 1 cap(s) orally once a day  fenofibrate 120 mg oral tablet: 1 tab(s) orally once a day  FLUoxetine (Eqv-PROzac) 20 mg oral tablet: 1 tab(s) orally once a day  Metoprolol Succinate ER 25 mg oral tablet, extended release: 1 tab(s) orally once a day  montelukast 10 mg oral tablet: 1 tab(s) orally once a day  Percocet 5 mg-325 mg oral tablet: 1 tab(s) orally 4 times a day MDD: 4  traZODone 100 mg oral tablet: 1 milligram(s) orally once a day

## 2024-01-23 NOTE — DISCHARGE NOTE PROVIDER - NSDCFUADDINST_GEN_ALL_CORE_FT
SURGERY DISCHARGE INSTRUCTIONS    FOLLOW-UP - with Dr. Vidal in 1 week. Call the office to make an appointment or if you have any questions/concerns.    DIET - low fat diet.     ACTIVITY- No heavy lifting for 4-6 wks over 10-20 lbs. Walking is encouraged. No running or swimming. No driving while taking pain medication.    WOUNDCARE - Some drainage from your incisions or drain sites is normal. If you have drainage from an open incision or drain site- cover loosely with sterile gauze and tape and change daily. If you have clear outer plastic dressing with gauze- remove 3 days after surgery and leave little white bandage strips underneath it on for 7 days. If you have no bandages but have purple glue over your incisions instead, this will come off with time. May shower 24 hours after surgery but no submerging wound under water for 2 weeks (tub bathing). Pat area dry when wet, keep clean and dry. Do not apply powders or lotion to wound area.     PAIN MEDS - Take prescription pain meds as instructed but only if needed as they can be addicting. Take over the counter extra strength tylenol 650mg and/or ibuprofen 400mg with food every 6 hours for pain instead of prescription pain meds if you do not need stronger pain control. Do not take tylenol in addition to your prescription pain med if your prescription pain med already has tylenol in it. No more than 4g of tylenol in 24hrs or 1g in 4 hrs. No mixing alcohol with prescription pain meds. No driving or operating machinery while taking prescription pain meds. Drink plenty of water and increase your fiber intake (unless your diet restricts fiber) while taking prescription pain meds as these can cause constipation and abdominal straining. If you do not have a bowel movement in 3 days take an over the counter stool softener of your choice daily. If you still do not have a bowel movement the following 2 days call your primary care physician.    OTHER MEDS - If you have any questions about your other regular home medications please call your primary care physician or the physician who prescribed those medications to you.     If you develop fever, dizziness, chest pain, trouble breathing, nausea, vomiting, increasing abdominal pain, inability to pass bowel movements, redness/pain/discharge from incisions. Please call the office or go to the emergency room immediately. SURGERY DISCHARGE INSTRUCTIONS    FOLLOW-UP - with Dr. Vidal in 1 week. Call the office to make an appointment or if you have any questions/concerns.    DIET - low fat diet over next few weeks and return to eating fatty foods as tolerated.    ACTIVITY- No heavy lifting for 6 wks over 10 lbs. Walking is encouraged. No running or swimming. No driving while taking pain medication.    WOUNDCARE - Some drainage from your incisions or drain sites is normal. If you have drainage from an open incision or drain site- cover loosely with sterile gauze and tape and change daily. If you have clear outer plastic dressing with gauze- remove 3 days after surgery and leave little white bandage strips underneath it on for 7 days. If you have no bandages but have purple glue over your incisions instead, this will come off with time. May shower 24 hours after surgery but no submerging wound under water for 2 weeks (tub bathing). Pat area dry when wet, keep clean and dry. Do not apply powders or lotion to wound area.     PAIN MEDS - Take prescription pain meds as instructed but only if needed as they can be addicting. Take over the counter extra strength tylenol 650mg and/or ibuprofen 400mg with food every 6 hours for pain instead of prescription pain meds if you do not need stronger pain control. Do not take tylenol in addition to your prescription pain med if your prescription pain med already has tylenol in it. No more than 4g of tylenol in 24hrs or 1g in 4 hrs. No mixing alcohol with prescription pain meds. No driving or operating machinery while taking prescription pain meds. Drink plenty of water and increase your fiber intake (unless your diet restricts fiber) while taking prescription pain meds as these can cause constipation and abdominal straining. If you do not have a bowel movement in 3 days take an over the counter stool softener of your choice daily. If you still do not have a bowel movement the following 2 days call your primary care physician.    OTHER MEDS - If you have any questions about your other regular home medications please call your primary care physician or the physician who prescribed those medications to you.     If you develop fever, dizziness, chest pain, trouble breathing, nausea, vomiting, increasing abdominal pain, inability to pass bowel movements, redness/pain/discharge from incisions. Please call the office or go to the emergency room immediately.

## 2024-01-23 NOTE — DISCHARGE NOTE PROVIDER - HOSPITAL COURSE
1/22: 44 y/o female pt with PMH of asthma and HTN that comes in with chief complaint of abdominal pain x1 day. Pt reports constant abdominal pain localized in the epigastric area with an intensity of 10/10. Pain is accompanied with nausea and multiple episodes of non bloody bilious emesis. Pt denies any previous similar episodes, fevers, chills or diarrhea.   Diagnosed with Acute calculous cholecystitis   Managed with Zosyn and taken to the operating room on 1/22:  Operative findings:  Laparoscopic cholecystectomy  Acutely inflamed, dilated, edematous gallbladder. Critical view of safety obtained, clip x2 over cystic duct stump. No evidence of bile leak, hemostasis achieved.    Post operative course uncomplicated. Diet advanced, tolerating. Pain controlled. Patient ready, able and willing to be discharged.

## 2024-01-23 NOTE — DISCHARGE NOTE PROVIDER - CARE PROVIDER_API CALL
Chad Vidal J  Surgery  [address] [place] [id number]  Phone: [phone]  Fax: [phone]  Follow Up Time: 1 week

## 2024-01-23 NOTE — PROGRESS NOTE ADULT - ASSESSMENT
44 y/o female pt with PMH of asthma and HTN that comes in with chief complaint of abdominal pain. Patient's clinical presentation consistent with acute cholecystitis, admitted to surgery service for surgical intervention. She is now s/p uncomplicated Laparoscopic cholecystectomy.     PLAN:  - DVT ppx  - IV abx  - Diet: Regular, f/u tolerating  - Encourage ambulation, incentive spirometry  - Pain control  - Daily labs, replete electrolytes as needed.   - Monitor for ROBF  - Monitor incision sites for signs of infection.   - Continue home medications.     spectra [id number]

## 2024-01-24 ENCOUNTER — TRANSCRIPTION ENCOUNTER (OUTPATIENT)
Age: 44
End: 2024-01-24

## 2024-01-24 VITALS
HEART RATE: 97 BPM | TEMPERATURE: 99 F | RESPIRATION RATE: 18 BRPM | DIASTOLIC BLOOD PRESSURE: 59 MMHG | OXYGEN SATURATION: 96 % | SYSTOLIC BLOOD PRESSURE: 103 MMHG

## 2024-01-24 LAB
HCT VFR BLD CALC: 30.5 % — LOW (ref 37–47)
HGB BLD-MCNC: 9.5 G/DL — LOW (ref 12–16)
MCHC RBC-ENTMCNC: 23.8 PG — LOW (ref 27–31)
MCHC RBC-ENTMCNC: 31.1 G/DL — LOW (ref 32–37)
MCV RBC AUTO: 76.4 FL — LOW (ref 81–99)
NRBC # BLD: 0 /100 WBCS — SIGNIFICANT CHANGE UP (ref 0–0)
PLATELET # BLD AUTO: 293 K/UL — SIGNIFICANT CHANGE UP (ref 130–400)
PMV BLD: 10.2 FL — SIGNIFICANT CHANGE UP (ref 7.4–10.4)
RBC # BLD: 3.99 M/UL — LOW (ref 4.2–5.4)
RBC # FLD: 14.8 % — HIGH (ref 11.5–14.5)
SURGICAL PATHOLOGY STUDY: SIGNIFICANT CHANGE UP
WBC # BLD: 18.42 K/UL — HIGH (ref 4.8–10.8)
WBC # FLD AUTO: 18.42 K/UL — HIGH (ref 4.8–10.8)

## 2024-01-24 RX ORDER — SIMETHICONE 80 MG/1
80 TABLET, CHEWABLE ORAL ONCE
Refills: 0 | Status: DISCONTINUED | OUTPATIENT
Start: 2024-01-24 | End: 2024-01-24

## 2024-01-24 RX ADMIN — MONTELUKAST 10 MILLIGRAM(S): 4 TABLET, CHEWABLE ORAL at 11:09

## 2024-01-24 RX ADMIN — Medication 100 MILLIGRAM(S): at 11:09

## 2024-01-24 RX ADMIN — OXYCODONE HYDROCHLORIDE 5 MILLIGRAM(S): 5 TABLET ORAL at 10:19

## 2024-01-24 RX ADMIN — Medication 650 MILLIGRAM(S): at 11:09

## 2024-01-24 RX ADMIN — Medication 120 MILLIGRAM(S): at 05:10

## 2024-01-24 RX ADMIN — Medication 650 MILLIGRAM(S): at 05:10

## 2024-01-24 RX ADMIN — OXYCODONE HYDROCHLORIDE 5 MILLIGRAM(S): 5 TABLET ORAL at 03:54

## 2024-01-24 RX ADMIN — Medication 145 MILLIGRAM(S): at 11:08

## 2024-01-24 RX ADMIN — GABAPENTIN 300 MILLIGRAM(S): 400 CAPSULE ORAL at 05:10

## 2024-01-24 RX ADMIN — OXYCODONE HYDROCHLORIDE 5 MILLIGRAM(S): 5 TABLET ORAL at 10:49

## 2024-01-24 RX ADMIN — PIPERACILLIN AND TAZOBACTAM 25 GRAM(S): 4; .5 INJECTION, POWDER, LYOPHILIZED, FOR SOLUTION INTRAVENOUS at 05:09

## 2024-01-24 RX ADMIN — Medication 25 MILLIGRAM(S): at 05:10

## 2024-01-24 RX ADMIN — Medication 500 MILLIGRAM(S): at 11:08

## 2024-01-24 RX ADMIN — LIDOCAINE 1 PATCH: 4 CREAM TOPICAL at 11:10

## 2024-01-24 RX ADMIN — Medication 20 MILLIGRAM(S): at 11:08

## 2024-01-24 NOTE — PROGRESS NOTE ADULT - ASSESSMENT
42 y/o female pt with PMH of asthma and HTN that comes in with chief complaint of abdominal pain. Patient's clinical presentation consistent with acute cholecystitis, admitted to surgery service for surgical intervention. She is now s/p uncomplicated Laparoscopic cholecystectomy.     PLAN:  - DVT ppx  - IV abx  - Diet: Regular  - Encourage ambulation, incentive spirometry  - Pain control  - Daily labs, replete electrolytes as needed.   - Monitor for ROBF  - Monitor incision sites for signs of infection.   - Continue home medications.   - D/c today     spectra [id number]

## 2024-01-24 NOTE — PROGRESS NOTE ADULT - ATTENDING COMMENTS
Low fat diet  OOB DVT prophylaxis  OK for discharge.
ACS Attending  Note Attestation    Patient is examined and evaluated at the bedside with the residents/PAs. Treatment plan discussed with the team, nurses, and consulting physicians and consulting teams. Medications, radiological studies and all other relevant studies reviewed.     ZEYNEP ROSSI Patient is a 43y old  Female who presents with a chief complaint of acute cholecystitis      Vital Signs Last 24 Hrs  T(C): 37.3 (23 Jan 2024 20:25), Max: 37.3 (23 Jan 2024 20:25)  T(F): 99.2 (23 Jan 2024 20:25), Max: 99.2 (23 Jan 2024 20:25)  HR: 96 (23 Jan 2024 20:25) (88 - 109)  BP: 121/60 (23 Jan 2024 20:25) (121/60 - 183/110)  BP(mean): --  RR: 18 (23 Jan 2024 20:25) (18 - 18)  SpO2: 97% (23 Jan 2024 20:25) (95% - 97%)    Parameters below as of 23 Jan 2024 20:25  Patient On (Oxygen Delivery Method): room air                            10.8   24.95 )-----------( 306      ( 23 Jan 2024 12:00 )             34.3     01-22    136  |  101  |  11  ----------------------------<  151<H>  3.8   |  23  |  0.9    Ca    9.0      22 Jan 2024 23:30  Phos  3.0     01-22  Mg     1.6     01-22    TPro  6.3  /  Alb  3.6  /  TBili  0.6  /  DBili  0.2  /  AST  18  /  ALT  21  /  AlkPhos  188<H>  01-22      Diagnosis: Acute cholecystitis                  S/P laparoscopic cholecystectomy    Plan:	  - supportive care  - clear liquid diet  - GI/DVT prophylaxis  - pain management  - incentive spirometer    - follow up consults  - repeat studies as needed  - out of bed to chair  - replace electrolytes  - case management evaluation     Elizabeth Yanez MD, FACS  Trauma/ACS/Surgical Critical Care Attending

## 2024-01-24 NOTE — PROGRESS NOTE ADULT - SUBJECTIVE AND OBJECTIVE BOX
GENERAL SURGERY PROGRESS NOTE     ZEYNEP ROSSI  Female  Hospital day :1d  POD:1  Procedure: Laparoscopic cholecystectomy      OVERNIGHT EVENTS: no acute events overnight. Patient POD 1 s/p laparoscopic appendectomy. Voiding, denies ambulation, passing gas, bowel movements, nausea, or vomiting. Endorses abdominal pain. Tolerating clear liquids, has not attempted eating solid food thus far.     T(F): 98 (01-23-24 @ 00:22), Max: 98.6 (01-22-24 @ 03:30)  HR: 109 (01-22-24 @ 23:57) (78 - 109)  BP: 160/91 (01-23-24 @ 00:22) (142/102 - 197/110)  ABP: --  ABP(mean): --  RR: 18 (01-23-24 @ 00:22) (16 - 18)  SpO2: 95% (01-22-24 @ 22:25) (95% - 98%)    DIET/FLUIDS: ascorbic acid 500 milliGRAM(s) Oral daily    NG:                                                                                DRAINS:     BM:     EMESIS:     URINE:      GI proph:  pantoprazole    Tablet 40 milliGRAM(s) Oral before breakfast    AC/ proph: enoxaparin Injectable 40 milliGRAM(s) SubCutaneous every 24 hours    ABx: piperacillin/tazobactam IVPB.. 3.375 Gram(s) IV Intermittent every 8 hours      PHYSICAL EXAM:  GENERAL: NAD, well-appearing  CHEST/LUNG: Equal chest rise bilaterally, no audible wheezing.   HEART: Regular rate   ABDOMEN: Soft, mildly tender to palpation, Nondistended;   EXTREMITIES:  No clubbing, cyanosis, or edema      LABS  Labs:  CAPILLARY BLOOD GLUCOSE                              12.1   28.51 )-----------( 366      ( 22 Jan 2024 11:29 )             39.5       Auto Neutrophil %: 85.9 % (01-22-24 @ 11:29)  Auto Immature Granulocyte %: 0.8 % (01-22-24 @ 11:29)  Auto Neutrophil %: 83.0 % (01-22-24 @ 01:53)  Auto Immature Granulocyte %: 0.8 % (01-22-24 @ 01:53)    01-22    139  |  103  |  9<L>  ----------------------------<  98  4.2   |  23  |  0.7      Calcium: 9.5 mg/dL (01-22-24 @ 11:29)      LFTs:             7.3  | 0.5  | 14       ------------------[249     ( 22 Jan 2024 11:29 )  4.2  | 0.2  | 21          Lipase:x      Amylase:x         Lactate, Blood: 0.9 mmol/L (01-22-24 @ 01:53)      Coags:     13.20  ----< 1.16    ( 22 Jan 2024 11:29 )     x           Urinalysis Basic - ( 22 Jan 2024 11:29 )    Color: x / Appearance: x / SG: x / pH: x  Gluc: 98 mg/dL / Ketone: x  / Bili: x / Urobili: x   Blood: x / Protein: x / Nitrite: x   Leuk Esterase: x / RBC: x / WBC x   Sq Epi: x / Non Sq Epi: x / Bacteria: x    RADIOLOGY & ADDITIONAL TESTS:  < from: CT Abdomen and Pelvis w/ IV Cont (01.22.24 @ 03:39) >  Findings suspicious for acute calculus cholecystitis.    < end of copied text >  < from: Xray Chest 1 View- PORTABLE-Urgent (Xray Chest 1 View- PORTABLE-Urgent .) (01.22.24 @ 04:44) >  No radiographic evidence of acute cardiopulmonary disease.    < end of copied text >  < from: US Abdomen Upper Quadrant Right (01.22.24 @ 06:28) >  Cholelithiasis. Mild abnormal gallbladder wall thickening at 0.32 cm.   Negative sonographic Woodall's sign. Trace pericholecystic fluid.    < end of copied text >          
GENERAL SURGERY PROGRESS NOTE    Patient: ZEYNEP ROSSI , 43y ([birth year])Female   MRN: [id number]  Location: [address]  Visit: 01-22-24 Inpatient  Date: 01-24-24 @ 07:47      PAST MEDICAL & SURGICAL HISTORY:  Asthma      Paroxysmal atrial fibrillation      H/O tracheostomy      History of prescribed enteral nutrition feeding          Vitals:   T(F): 99.2 (01-24-24 @ 04:00), Max: 99.2 (01-23-24 @ 20:25)  HR: 99 (01-24-24 @ 04:00)  BP: 107/60 (01-24-24 @ 04:00)  RR: 18 (01-24-24 @ 04:00)  SpO2: 96% (01-24-24 @ 04:00)      Diet, Regular:   Low Fat (LOWFAT)      Fluids:     I & O's:    01-23-24 @ 07:01  -  01-24-24 @ 07:00  --------------------------------------------------------  IN:  Total IN: 0 mL    OUT:    Voided (mL): 175 mL  Total OUT: 175 mL    Total NET: -175 mL        PHYSICAL EXAM:  GENERAL: NAD, well-appearing  CHEST/LUNG: Equal chest rise bilaterally, no audible wheezing.   HEART: Regular rate   ABDOMEN: Soft, mildly tender to palpation, Nondistended;   EXTREMITIES:  No clubbing, cyanosis, or edema    MEDICATIONS  (STANDING):  acetaminophen     Tablet .. 650 milliGRAM(s) Oral every 6 hours  ascorbic acid 500 milliGRAM(s) Oral daily  chlorhexidine 2% Cloths 1 Application(s) Topical <User Schedule>  diltiazem    milliGRAM(s) Oral daily  enoxaparin Injectable 40 milliGRAM(s) SubCutaneous every 24 hours  fenofibrate Tablet 145 milliGRAM(s) Oral daily  FLUoxetine 20 milliGRAM(s) Oral daily  gabapentin 300 milliGRAM(s) Oral three times a day  lidocaine   4% Patch 1 Patch Transdermal every 24 hours  metoprolol succinate ER 25 milliGRAM(s) Oral daily  montelukast 10 milliGRAM(s) Oral daily  pantoprazole    Tablet 40 milliGRAM(s) Oral before breakfast  piperacillin/tazobactam IVPB.. 3.375 Gram(s) IV Intermittent every 8 hours  traZODone 100 milliGRAM(s) Oral daily    MEDICATIONS  (PRN):  albuterol    90 MICROgram(s) HFA Inhaler 2 Puff(s) Inhalation every 6 hours PRN Shortness of Breath and/or Wheezing  oxyCODONE    IR 5 milliGRAM(s) Oral every 6 hours PRN Severe Pain (7 - 10)      DVT PROPHYLAXIS: enoxaparin Injectable 40 milliGRAM(s) SubCutaneous every 24 hours    GI PROPHYLAXIS: pantoprazole    Tablet 40 milliGRAM(s) Oral before breakfast    ANTICOAGULATION:   ANTIBIOTICS:  piperacillin/tazobactam IVPB.. 3.375 Gram(s)            LAB/STUDIES:  Labs:  CAPILLARY BLOOD GLUCOSE                              9.5    18.42 )-----------( 293      ( 24 Jan 2024 05:23 )             30.5         01-22    136  |  101  |  11  ----------------------------<  151<H>  3.8   |  23  |  0.9          LFTs:             6.3  | 0.6  | 18       ------------------[188     ( 22 Jan 2024 23:30 )  3.6  | 0.2  | 21          Lipase:x      Amylase:x         Lactate, Blood: 0.9 mmol/L (01-22-24 @ 01:53)      Coags:     13.20  ----< 1.16    ( 22 Jan 2024 11:29 )     x          Urinalysis Basic - ( 22 Jan 2024 23:30 )    Color: x / Appearance: x / SG: x / pH: x  Gluc: 151 mg/dL / Ketone: x  / Bili: x / Urobili: x   Blood: x / Protein: x / Nitrite: x   Leuk Esterase: x / RBC: x / WBC x   Sq Epi: x / Non Sq Epi: x / Bacteria: x

## 2024-01-24 NOTE — DISCHARGE NOTE NURSING/CASE MANAGEMENT/SOCIAL WORK - PATIENT PORTAL LINK FT
You can access the FollowMyHealth Patient Portal offered by [facility] by registering at the following website: [email or web address]. By joining [employer]’s FollowMyHealth portal, you will also be able to view your health information using other applications (apps) compatible with our system.

## 2024-01-30 DIAGNOSIS — Z87.891 PERSONAL HISTORY OF NICOTINE DEPENDENCE: ICD-10-CM

## 2024-01-30 DIAGNOSIS — Z91.018 ALLERGY TO OTHER FOODS: ICD-10-CM

## 2024-01-30 DIAGNOSIS — I48.0 PAROXYSMAL ATRIAL FIBRILLATION: ICD-10-CM

## 2024-01-30 DIAGNOSIS — Z88.6 ALLERGY STATUS TO ANALGESIC AGENT: ICD-10-CM

## 2024-01-30 DIAGNOSIS — J45.909 UNSPECIFIED ASTHMA, UNCOMPLICATED: ICD-10-CM

## 2024-01-30 DIAGNOSIS — K65.9 PERITONITIS, UNSPECIFIED: ICD-10-CM

## 2024-01-30 DIAGNOSIS — K66.0 PERITONEAL ADHESIONS (POSTPROCEDURAL) (POSTINFECTION): ICD-10-CM

## 2024-01-30 DIAGNOSIS — E66.09 OTHER OBESITY DUE TO EXCESS CALORIES: ICD-10-CM

## 2024-01-30 DIAGNOSIS — I10 ESSENTIAL (PRIMARY) HYPERTENSION: ICD-10-CM

## 2024-01-30 DIAGNOSIS — Z79.52 LONG TERM (CURRENT) USE OF SYSTEMIC STEROIDS: ICD-10-CM

## 2024-01-30 DIAGNOSIS — K80.00 CALCULUS OF GALLBLADDER WITH ACUTE CHOLECYSTITIS WITHOUT OBSTRUCTION: ICD-10-CM

## 2024-02-16 ENCOUNTER — APPOINTMENT (OUTPATIENT)
Dept: PAIN MANAGEMENT | Facility: CLINIC | Age: 44
End: 2024-02-16

## 2024-04-15 NOTE — ED PROVIDER NOTE - NPI NUMBER (FOR SYSADMIN USE ONLY) :
VITAL SIGNS: I have reviewed nursing notes and confirm.  CONSTITUTIONAL: well-appearing, non-toxic, NAD  SKIN: Warm dry, normal skin turgor, no bruising.   HEAD: NCAT  EYES: EOMI, PERRLA, no scleral icterus  ENT: Moist mucous membranes, normal pharynx with no erythema or exudates  NECK: Supple; non tender. Full ROM. No cervical LAD  CARD: RRR, no murmurs, rubs or gallops  RESP: clear to ausculation b/l.  No rales, rhonchi, or wheezing.  ABD: soft, + BS, non-tender, non-distended, no rebound or guarding. No CVA tenderness  EXT: Full ROM, no bony tenderness, no pedal edema, no calf tenderness  NEURO: normal motor. normal sensory. CN II-XII intact. Cerebellar testing normal. right lumbar paraspinal tenderness, lumbar midline spinal tenderness present, no saddle anesthesia  PSYCH: Cooperative, appropriate. [UNKNOWN]

## 2024-06-03 NOTE — ED ADULT NURSE NOTE - TEMPLATE LIST FOR HEAD TO TOE ASSESSMENT
[FreeTextEntry1] : This note was written by Claudia Durbin, acting as the  for Dr. Casper. This note accurately reflects the work and decisions made by Dr. Casper.  General

## 2024-06-10 NOTE — PROGRESS NOTE ADULT - SUBJECTIVE AND OBJECTIVE BOX
ZEYNEP ROSSI 42y Female  MRN#: [id number]   CODE STATUS:________    Hospital Day: 31d    Pt is currently admitted with the primary diagnosis of Acute hypoxemic respiratory failure 2/2 cavitary MRSA PNA    SUBJECTIVE  Hospital Course  42 year old female with PMHx Asthma, HTN presents with cough and SOB x3 days and hemoptysis. CT chest showing cavitary pneumonia. Cultures +MRSA bacteremia. Intubated; failed SBT. On Zyvox and Tamiflu. Repeat CT showed worsening PNA. Acute hypoxemic respiratory failure 2/2 cavitary MRSA PNA    6/20 - plan to wean off sedation, patient on propofol/precedex/clonipen/methadone/zyvox    6/21 - propofol at 10mcg/min and Precedex 1mcg/min will wean, will restart AC in am if Hb stable  6/22 - agitated when weaned off sedation, back on precedex, will increase clonazepam and wean off precedex. Restarted AC with lovenox for now as Hb stable, started cardizem 30mg q6hr po  6/23 - on Precedex 0.4 mcg/min, will hold off on PS today, increase clonopin dose to wean Precedex, will repeat iron panel and ferritin given microcytic anemia    Overnight events   None significant    Subjective complaints   awake but not following commands this am    Present Today:   - Meg:  No [  ], Yes [   ] : Indication:     - Type of IV Access:       .. CVC/Piccline:  No [  ], Yes [   ] : Indication:       .. Midline: No [  ], Yes [   ] : Indication:                                             ----------------------------------------------------------  OBJECTIVE  PAST MEDICAL & SURGICAL HISTORY                                            -----------------------------------------------------------  ALLERGIES:  aspirin (Short breath; Anaphylaxis)                                            ------------------------------------------------------------    HOME MEDICATIONS  Home Medications:                           MEDICATIONS:  STANDING MEDICATIONS  aMIOdarone    Tablet 100 milliGRAM(s) Oral daily  chlorhexidine 2% Cloths 1 Application(s) Topical <User Schedule>  clonazePAM  Tablet 1.5 milliGRAM(s) Oral three times a day  dexMEDEtomidine Infusion 0.2 MICROgram(s)/kG/Hr IV Continuous <Continuous>  diltiazem    Tablet 60 milliGRAM(s) Oral every 6 hours  enoxaparin Injectable 80 milliGRAM(s) SubCutaneous every 12 hours  insulin lispro (ADMELOG) corrective regimen sliding scale   SubCutaneous every 6 hours  methadone    Tablet 10 milliGRAM(s) Oral every 8 hours  metroNIDAZOLE    Tablet 500 milliGRAM(s) Oral every 8 hours  OLANZapine 5 milliGRAM(s) Oral daily  pantoprazole   Suspension 40 milliGRAM(s) Oral daily  vancomycin  IVPB 1000 milliGRAM(s) IV Intermittent every 12 hours  vitamin A &amp; D Ointment 1 Application(s) Topical daily    PRN MEDICATIONS  acetaminophen     Tablet .. 650 milliGRAM(s) Oral every 6 hours PRN                                            ------------------------------------------------------------  VITAL SIGNS: Last 24 Hours  T(C): 36.6 (23 Jun 2023 04:00), Max: 37.3 (22 Jun 2023 15:00)  T(F): 97.9 (23 Jun 2023 04:00), Max: 99.2 (22 Jun 2023 15:00)  HR: 85 (23 Jun 2023 07:40) (79 - 149)  BP: 113/69 (23 Jun 2023 07:00) (102/57 - 192/111)  BP(mean): 86 (23 Jun 2023 07:00) (74 - 151)  RR: 24 (23 Jun 2023 07:00) (16 - 65)  SpO2: 98% (23 Jun 2023 07:40) (97% - 100%)      06-22-23 @ 07:01  -  06-23-23 @ 07:00  --------------------------------------------------------  IN: 2390.4 mL / OUT: 1730 mL / NET: 660.4 mL                                             --------------------------------------------------------------  LABS:                        7.4    12.62 )-----------( 358      ( 23 Jun 2023 04:43 )             24.3     06-23    135  |  96<L>  |  13  ----------------------------<  105<H>  3.8   |  29  |  <0.5<L>    Ca    8.5      23 Jun 2023 04:43  Mg     2.0     06-23    TPro  6.4  /  Alb  2.3<L>  /  TBili  0.2  /  DBili  x   /  AST  21  /  ALT  22  /  AlkPhos  274<H>  06-23      Urinalysis Basic - ( 23 Jun 2023 04:43 )    Color: x / Appearance: x / SG: x / pH: x  Gluc: 105 mg/dL / Ketone: x  / Bili: x / Urobili: x   Blood: x / Protein: x / Nitrite: x   Leuk Esterase: x / RBC: x / WBC x   Sq Epi: x / Non Sq Epi: x / Bacteria: x                                                            -------------------------------------------------------------  RADIOLOGY:                                            --------------------------------------------------------------    PHYSICAL EXAM:  GEN: No acute distress  LUNGS: Normal respiratory effort. trached  HEART: S1/S2 present.   ABD: Soft, non-tender, non-distended  EXT: no cyanosis or edema  NEURO: awake trying to move ext and follow simple commands             Calm

## 2024-06-29 ENCOUNTER — EMERGENCY (EMERGENCY)
Facility: HOSPITAL | Age: 44
LOS: 0 days | Discharge: HOME | End: 2024-06-29
Attending: EMERGENCY MEDICINE
Payer: MEDICAID

## 2024-06-29 VITALS
SYSTOLIC BLOOD PRESSURE: 150 MMHG | DIASTOLIC BLOOD PRESSURE: 98 MMHG | HEART RATE: 60 BPM | RESPIRATION RATE: 18 BRPM | OXYGEN SATURATION: 98 % | TEMPERATURE: 98 F

## 2024-06-29 DIAGNOSIS — I10 ESSENTIAL (PRIMARY) HYPERTENSION: ICD-10-CM

## 2024-06-29 DIAGNOSIS — Z88.6 ALLERGY STATUS TO ANALGESIC AGENT: ICD-10-CM

## 2024-06-29 DIAGNOSIS — Z92.89 PERSONAL HISTORY OF OTHER MEDICAL TREATMENT: Chronic | ICD-10-CM
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
## 2024-06-29 DIAGNOSIS — Z91.018 ALLERGY TO OTHER FOODS: ICD-10-CM

## 2024-06-29 DIAGNOSIS — Z98.890 OTHER SPECIFIED POSTPROCEDURAL STATES: Chronic | ICD-10-CM

## 2024-06-29 DIAGNOSIS — Z91.013 ALLERGY TO SEAFOOD: ICD-10-CM

## 2024-06-29 DIAGNOSIS — J45.909 UNSPECIFIED ASTHMA, UNCOMPLICATED: ICD-10-CM
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
## 2024-06-29 DIAGNOSIS — S61.011A LACERATION WITHOUT FOREIGN BODY OF RIGHT THUMB WITHOUT DAMAGE TO NAIL, INITIAL ENCOUNTER: ICD-10-CM

## 2024-06-29 DIAGNOSIS — S61.411A LACERATION WITHOUT FOREIGN BODY OF RIGHT HAND, INITIAL ENCOUNTER: ICD-10-CM

## 2024-06-29 PROCEDURE — 99283 EMERGENCY DEPT VISIT LOW MDM: CPT | Mod: 25

## 2024-06-29 PROCEDURE — 12001 RPR S/N/AX/GEN/TRNK 2.5CM/<: CPT

## 2024-06-29 PROCEDURE — 99284 EMERGENCY DEPT VISIT MOD MDM: CPT | Mod: 25

## 2024-06-29 RX ORDER — ACETAMINOPHEN 500 MG/5ML
975 LIQUID (ML) ORAL ONCE
Refills: 0 | Status: COMPLETED | OUTPATIENT
Start: 2024-06-29 | End: 2024-06-29

## 2024-06-29 RX ADMIN — Medication 975 MILLIGRAM(S): at 17:19

## 2024-07-06 ENCOUNTER — EMERGENCY (EMERGENCY)
Facility: HOSPITAL | Age: 44
LOS: 0 days | Discharge: HOME | End: 2024-07-06
Attending: EMERGENCY MEDICINE
Payer: MEDICAID

## 2024-07-06 VITALS
OXYGEN SATURATION: 99 % | TEMPERATURE: 99 F | SYSTOLIC BLOOD PRESSURE: 168 MMHG | RESPIRATION RATE: 18 BRPM | HEART RATE: 71 BPM | DIASTOLIC BLOOD PRESSURE: 97 MMHG | HEIGHT: 65 IN

## 2024-07-06 DIAGNOSIS — Z48.02 ENCOUNTER FOR REMOVAL OF SUTURES: ICD-10-CM

## 2024-07-06 DIAGNOSIS — Z92.89 PERSONAL HISTORY OF OTHER MEDICAL TREATMENT: Chronic | ICD-10-CM

## 2024-07-06 DIAGNOSIS — S61.411D LACERATION WITHOUT FOREIGN BODY OF RIGHT HAND, SUBSEQUENT ENCOUNTER: ICD-10-CM

## 2024-07-06 DIAGNOSIS — Z98.890 OTHER SPECIFIED POSTPROCEDURAL STATES: Chronic | ICD-10-CM

## 2024-07-06 PROCEDURE — L9995: CPT

## 2024-07-06 PROCEDURE — 99212 OFFICE O/P EST SF 10 MIN: CPT

## 2025-02-24 NOTE — H&P ADULT - NSHPROSALLOTHERNEGRD_GEN_ALL_CORE
All other review of systems negative, except as noted in HPI Render Risk Assessment In Note?: no Additional Notes: Appeared resolved, pt wants to continue to watch Detail Level: Simple